# Patient Record
Sex: MALE | Race: WHITE | NOT HISPANIC OR LATINO | Employment: OTHER | URBAN - METROPOLITAN AREA
[De-identification: names, ages, dates, MRNs, and addresses within clinical notes are randomized per-mention and may not be internally consistent; named-entity substitution may affect disease eponyms.]

---

## 2019-12-16 ENCOUNTER — OFFICE VISIT (OUTPATIENT)
Dept: FAMILY MEDICINE | Facility: CLINIC | Age: 69
End: 2019-12-16
Payer: MEDICARE

## 2019-12-16 VITALS
BODY MASS INDEX: 36.48 KG/M2 | TEMPERATURE: 97 F | OXYGEN SATURATION: 96 % | WEIGHT: 218.94 LBS | DIASTOLIC BLOOD PRESSURE: 62 MMHG | HEIGHT: 65 IN | HEART RATE: 74 BPM | SYSTOLIC BLOOD PRESSURE: 130 MMHG

## 2019-12-16 DIAGNOSIS — I10 ESSENTIAL HYPERTENSION: Primary | ICD-10-CM

## 2019-12-16 DIAGNOSIS — K92.2 UPPER GI BLEED: ICD-10-CM

## 2019-12-16 DIAGNOSIS — E66.01 SEVERE OBESITY (BMI 35.0-39.9) WITH COMORBIDITY: ICD-10-CM

## 2019-12-16 DIAGNOSIS — E78.1 HYPERTRIGLYCERIDEMIA: ICD-10-CM

## 2019-12-16 DIAGNOSIS — E78.5 HYPERLIPIDEMIA, UNSPECIFIED HYPERLIPIDEMIA TYPE: ICD-10-CM

## 2019-12-16 PROCEDURE — 99204 OFFICE O/P NEW MOD 45 MIN: CPT | Mod: S$GLB,,, | Performed by: FAMILY MEDICINE

## 2019-12-16 PROCEDURE — 99999 PR PBB SHADOW E&M-NEW PATIENT-LVL III: ICD-10-PCS | Mod: PBBFAC,,, | Performed by: FAMILY MEDICINE

## 2019-12-16 PROCEDURE — 1159F PR MEDICATION LIST DOCUMENTED IN MEDICAL RECORD: ICD-10-PCS | Mod: S$GLB,,, | Performed by: FAMILY MEDICINE

## 2019-12-16 PROCEDURE — 1159F MED LIST DOCD IN RCRD: CPT | Mod: S$GLB,,, | Performed by: FAMILY MEDICINE

## 2019-12-16 PROCEDURE — 1126F AMNT PAIN NOTED NONE PRSNT: CPT | Mod: S$GLB,,, | Performed by: FAMILY MEDICINE

## 2019-12-16 PROCEDURE — 99204 PR OFFICE/OUTPT VISIT, NEW, LEVL IV, 45-59 MIN: ICD-10-PCS | Mod: S$GLB,,, | Performed by: FAMILY MEDICINE

## 2019-12-16 PROCEDURE — 1101F PT FALLS ASSESS-DOCD LE1/YR: CPT | Mod: S$GLB,,, | Performed by: FAMILY MEDICINE

## 2019-12-16 PROCEDURE — 99499 UNLISTED E&M SERVICE: CPT | Mod: S$GLB,,, | Performed by: FAMILY MEDICINE

## 2019-12-16 PROCEDURE — 1101F PR PT FALLS ASSESS DOC 0-1 FALLS W/OUT INJ PAST YR: ICD-10-PCS | Mod: S$GLB,,, | Performed by: FAMILY MEDICINE

## 2019-12-16 PROCEDURE — 99499 RISK ADDL DX/OHS AUDIT: ICD-10-PCS | Mod: S$GLB,,, | Performed by: FAMILY MEDICINE

## 2019-12-16 PROCEDURE — 99999 PR PBB SHADOW E&M-NEW PATIENT-LVL III: CPT | Mod: PBBFAC,,, | Performed by: FAMILY MEDICINE

## 2019-12-16 PROCEDURE — 1126F PR PAIN SEVERITY QUANTIFIED, NO PAIN PRESENT: ICD-10-PCS | Mod: S$GLB,,, | Performed by: FAMILY MEDICINE

## 2019-12-16 RX ORDER — DICYCLOMINE HYDROCHLORIDE 20 MG/1
TABLET ORAL
Status: ON HOLD | COMMUNITY
Start: 2019-08-28 | End: 2024-01-05 | Stop reason: HOSPADM

## 2019-12-16 RX ORDER — PANTOPRAZOLE SODIUM 40 MG/1
40 TABLET, DELAYED RELEASE ORAL DAILY
Refills: 3 | COMMUNITY
Start: 2019-09-24 | End: 2019-12-16

## 2019-12-16 RX ORDER — ATORVASTATIN CALCIUM 20 MG/1
20 TABLET, FILM COATED ORAL DAILY
Qty: 90 TABLET | Refills: 3 | Status: SHIPPED | OUTPATIENT
Start: 2019-12-16 | End: 2020-11-18 | Stop reason: SDUPTHER

## 2019-12-16 RX ORDER — FENOFIBRATE 160 MG/1
160 TABLET ORAL
COMMUNITY
Start: 2019-07-10 | End: 2019-12-16 | Stop reason: SDUPTHER

## 2019-12-16 RX ORDER — AMLODIPINE AND BENAZEPRIL HYDROCHLORIDE 10; 40 MG/1; MG/1
1 CAPSULE ORAL DAILY
Refills: 1 | COMMUNITY
Start: 2019-10-27 | End: 2020-04-21 | Stop reason: SDUPTHER

## 2019-12-16 RX ORDER — ATORVASTATIN CALCIUM 20 MG/1
20 TABLET, FILM COATED ORAL DAILY
Refills: 5 | COMMUNITY
Start: 2019-11-30 | End: 2019-12-16 | Stop reason: SDUPTHER

## 2019-12-16 RX ORDER — FENOFIBRATE 160 MG/1
160 TABLET ORAL DAILY
Qty: 90 TABLET | Refills: 3 | Status: SHIPPED | OUTPATIENT
Start: 2019-12-16 | End: 2020-11-27

## 2019-12-16 RX ORDER — METOPROLOL SUCCINATE 50 MG/1
50 TABLET, EXTENDED RELEASE ORAL DAILY
Refills: 1 | COMMUNITY
Start: 2019-10-27 | End: 2020-03-27 | Stop reason: SDUPTHER

## 2019-12-16 NOTE — PROGRESS NOTES
Subjective:       Patient ID: Quintin Ling is a 69 y.o. male.    Chief Complaint: No chief complaint on file.      HPI Comments:       Current Outpatient Medications:     dicyclomine (BENTYL) 20 mg tablet, TAKE 1 TABLET BY MOUTH THREE TIMES A DAY FOR 7 DAYS, Disp: , Rfl:     fenofibrate 160 MG Tab, Take 1 tablet (160 mg total) by mouth once daily., Disp: 90 tablet, Rfl: 3    amlodipine-benazepril (LOTREL) 10-40 mg per capsule, Take 1 capsule by mouth once daily., Disp: , Rfl: 1    atorvastatin (LIPITOR) 20 MG tablet, Take 1 tablet (20 mg total) by mouth once daily., Disp: 90 tablet, Rfl: 3    metoprolol succinate (TOPROL-XL) 50 MG 24 hr tablet, Take 50 mg by mouth once daily., Disp: , Rfl: 1      Needs a new PCP.  Previous:  Dr. Tripathi.    Hypertension, hyperlipidemia/hypertriglyceridemia, recent bleeding duodenal ulcer.  Recently completed a course of PPI, which was discontinued by GI.  Has something on his liver that they are going to look at with an ultrasound.  Followed by Baudilio Brown.:  Guarded negative this year.  Never had a colonoscopy.    Past medical history:  Tonsillectomy, hypertension, hyperlipidemia, hypertriglyceridemia, bleeding peptic ulcer disease. No other hospitalizations or surgeries.    Medications Lotrel 10/40, Toprol 50 mg, atorvastatin 20 mg, fenofibrate 160 mg    Social:  .  Two grown children.  Nonsmoker.  Quit drinking with his ulcer.  Retired     Lab results from 09/19 reviewed and significant only for anemia    Family history:  Mother with MI, dad with Alzheimer's, no cancer or diabetes.    Review of Systems   Constitutional: Negative for activity change, appetite change and fever.   HENT: Negative for sore throat.    Respiratory: Negative for cough and shortness of breath.    Cardiovascular: Negative for chest pain.   Gastrointestinal: Negative for abdominal pain, anal bleeding, blood in stool, diarrhea and nausea.   Genitourinary: Negative for difficulty  "urinating.   Musculoskeletal: Negative for arthralgias and myalgias.   Neurological: Negative for dizziness and headaches.       Objective:      Vitals:    12/16/19 0930   BP: 130/62   Pulse: 74   Temp: 96.9 °F (36.1 °C)   TempSrc: Tympanic   SpO2: 96%   Weight: 99.3 kg (218 lb 14.7 oz)   Height: 5' 5" (1.651 m)   PainSc: 0-No pain     Physical Exam   Constitutional: He is oriented to person, place, and time. He appears well-developed and well-nourished. No distress.   HENT:   Head: Normocephalic.   Neck: Neck supple. No thyromegaly present.   Cardiovascular: Normal rate, regular rhythm and normal heart sounds.   No murmur heard.  Pulmonary/Chest: Effort normal and breath sounds normal. He has no wheezes. He has no rales.   Abdominal: Soft. He exhibits no distension and no mass. There is no hepatosplenomegaly. There is no tenderness.   Musculoskeletal: He exhibits no edema.   Large, nontender, abdominal diastasis   Lymphadenopathy:     He has no cervical adenopathy.   Neurological: He is alert and oriented to person, place, and time.   Skin: Skin is warm and dry. He is not diaphoretic.   Psychiatric: He has a normal mood and affect. His behavior is normal. Judgment and thought content normal.   Nursing note and vitals reviewed.      Assessment:       1. Essential hypertension    2. Hyperlipidemia, unspecified hyperlipidemia type    3. Hypertriglyceridemia    4. Upper GI bleed    5. Severe obesity (BMI 35.0-39.9) with comorbidity        Plan:   Essential hypertension  Comments:  Continue current medications.  Get previous PCP records.  Follow-up 3 months    Hyperlipidemia, unspecified hyperlipidemia type  Comments:  Moderately well controlled on most recent lipid profile    Hypertriglyceridemia  Comments:  Controlled on medication    Upper GI bleed  Comments:  Resolved.  Chronic duodenal ulcer.  Completed PPI therapy.  Followed by GI.  Liver lesion being further evaluated    Severe obesity (BMI 35.0-39.9) with " comorbidity  Comments:  Patient is working on his diet    Other orders  -     atorvastatin (LIPITOR) 20 MG tablet; Take 1 tablet (20 mg total) by mouth once daily.  Dispense: 90 tablet; Refill: 3  -     fenofibrate 160 MG Tab; Take 1 tablet (160 mg total) by mouth once daily.  Dispense: 90 tablet; Refill: 3

## 2019-12-16 NOTE — PROGRESS NOTES
Patient, Quintin Ling (MRN #[id number]), presented with a recorded BMI of 36.43 kg/m^2 and a documented comorbidity(s):  - Hypertension  - Hyperlipidemia  to which the severe obesity is a contributing factor. This is consistent with the definition of severe obesity (BMI 35.0-39.9) with comorbidity (ICD-10 E66.01, Z68.35). The patient's severe obesity was monitored, evaluated, addressed and/or treated. This addendum to the medical record is made on 12/16/2019.

## 2020-03-27 RX ORDER — METOPROLOL SUCCINATE 50 MG/1
50 TABLET, EXTENDED RELEASE ORAL DAILY
Qty: 90 TABLET | Refills: 1 | Status: SHIPPED | OUTPATIENT
Start: 2020-03-27 | End: 2020-04-21 | Stop reason: SDUPTHER

## 2020-04-21 ENCOUNTER — OFFICE VISIT (OUTPATIENT)
Dept: FAMILY MEDICINE | Facility: CLINIC | Age: 70
End: 2020-04-21
Payer: MEDICARE

## 2020-04-21 VITALS
HEIGHT: 65 IN | TEMPERATURE: 98 F | HEART RATE: 81 BPM | BODY MASS INDEX: 37.76 KG/M2 | OXYGEN SATURATION: 95 % | WEIGHT: 226.63 LBS

## 2020-04-21 DIAGNOSIS — Z13.6 ENCOUNTER FOR ABDOMINAL AORTIC ANEURYSM (AAA) SCREENING: ICD-10-CM

## 2020-04-21 DIAGNOSIS — E66.01 SEVERE OBESITY (BMI 35.0-39.9) WITH COMORBIDITY: ICD-10-CM

## 2020-04-21 DIAGNOSIS — E78.1 HYPERTRIGLYCERIDEMIA: ICD-10-CM

## 2020-04-21 DIAGNOSIS — Z23 NEED FOR VACCINATION: ICD-10-CM

## 2020-04-21 DIAGNOSIS — I10 ESSENTIAL HYPERTENSION: Primary | ICD-10-CM

## 2020-04-21 DIAGNOSIS — K92.2 UPPER GI BLEED: ICD-10-CM

## 2020-04-21 DIAGNOSIS — E78.5 HYPERLIPIDEMIA, UNSPECIFIED HYPERLIPIDEMIA TYPE: ICD-10-CM

## 2020-04-21 DIAGNOSIS — Z12.11 SCREENING FOR COLON CANCER: ICD-10-CM

## 2020-04-21 PROCEDURE — 90715 TDAP VACCINE GREATER THAN OR EQUAL TO 7YO IM: ICD-10-PCS | Mod: S$GLB,,, | Performed by: FAMILY MEDICINE

## 2020-04-21 PROCEDURE — 99999 PR PBB SHADOW E&M-EST. PATIENT-LVL III: ICD-10-PCS | Mod: PBBFAC,,, | Performed by: FAMILY MEDICINE

## 2020-04-21 PROCEDURE — 1126F AMNT PAIN NOTED NONE PRSNT: CPT | Mod: S$GLB,,, | Performed by: FAMILY MEDICINE

## 2020-04-21 PROCEDURE — 99214 PR OFFICE/OUTPT VISIT, EST, LEVL IV, 30-39 MIN: ICD-10-PCS | Mod: 25,S$GLB,, | Performed by: FAMILY MEDICINE

## 2020-04-21 PROCEDURE — 1159F PR MEDICATION LIST DOCUMENTED IN MEDICAL RECORD: ICD-10-PCS | Mod: S$GLB,,, | Performed by: FAMILY MEDICINE

## 2020-04-21 PROCEDURE — 1159F MED LIST DOCD IN RCRD: CPT | Mod: S$GLB,,, | Performed by: FAMILY MEDICINE

## 2020-04-21 PROCEDURE — 1101F PT FALLS ASSESS-DOCD LE1/YR: CPT | Mod: S$GLB,,, | Performed by: FAMILY MEDICINE

## 2020-04-21 PROCEDURE — 1101F PR PT FALLS ASSESS DOC 0-1 FALLS W/OUT INJ PAST YR: ICD-10-PCS | Mod: S$GLB,,, | Performed by: FAMILY MEDICINE

## 2020-04-21 PROCEDURE — 90471 TDAP VACCINE GREATER THAN OR EQUAL TO 7YO IM: ICD-10-PCS | Mod: S$GLB,,, | Performed by: FAMILY MEDICINE

## 2020-04-21 PROCEDURE — 90715 TDAP VACCINE 7 YRS/> IM: CPT | Mod: S$GLB,,, | Performed by: FAMILY MEDICINE

## 2020-04-21 PROCEDURE — 1126F PR PAIN SEVERITY QUANTIFIED, NO PAIN PRESENT: ICD-10-PCS | Mod: S$GLB,,, | Performed by: FAMILY MEDICINE

## 2020-04-21 PROCEDURE — 90471 IMMUNIZATION ADMIN: CPT | Mod: S$GLB,,, | Performed by: FAMILY MEDICINE

## 2020-04-21 PROCEDURE — 99214 OFFICE O/P EST MOD 30 MIN: CPT | Mod: 25,S$GLB,, | Performed by: FAMILY MEDICINE

## 2020-04-21 PROCEDURE — 99999 PR PBB SHADOW E&M-EST. PATIENT-LVL III: CPT | Mod: PBBFAC,,, | Performed by: FAMILY MEDICINE

## 2020-04-21 RX ORDER — AMLODIPINE AND BENAZEPRIL HYDROCHLORIDE 10; 40 MG/1; MG/1
1 CAPSULE ORAL DAILY
Qty: 90 CAPSULE | Refills: 1 | Status: SHIPPED | OUTPATIENT
Start: 2020-04-21 | End: 2020-10-16

## 2020-04-21 RX ORDER — METOPROLOL SUCCINATE 100 MG/1
100 TABLET, EXTENDED RELEASE ORAL DAILY
Qty: 90 TABLET | Refills: 1 | Status: SHIPPED | OUTPATIENT
Start: 2020-04-21 | End: 2020-09-21

## 2020-04-21 NOTE — PROGRESS NOTES
Subjective:       Patient ID: Quintin Ling is a 69 y.o. male.    Chief Complaint: No chief complaint on file.      HPI Comments:       Current Outpatient Medications:     amLODIPine-benazepriL (LOTREL) 10-40 mg per capsule, Take 1 capsule by mouth once daily., Disp: 90 capsule, Rfl: 1    atorvastatin (LIPITOR) 20 MG tablet, Take 1 tablet (20 mg total) by mouth once daily., Disp: 90 tablet, Rfl: 3    dicyclomine (BENTYL) 20 mg tablet, TAKE 1 TABLET BY MOUTH THREE TIMES A DAY FOR 7 DAYS, Disp: , Rfl:     fenofibrate 160 MG Tab, Take 1 tablet (160 mg total) by mouth once daily., Disp: 90 tablet, Rfl: 3    metoprolol succinate (TOPROL-XL) 100 MG 24 hr tablet, Take 1 tablet (100 mg total) by mouth once daily., Disp: 90 tablet, Rfl: 1      Four-month follow-up after our initial, [facility], visit in December.    He has been taking his blood pressure medicines every day and has not run out.  Says his readings at home are high, with systolic generally greater than 150.    He would like to do a wellness visit at our next visit.    Dizzy had his liver further evaluated by GI.  Ultrasound showed that everything was okay.  No details.    Today we talked about AAA screening.  He smoked for at least a decade, 1 pack per day, quit over 20 years ago.  He is interested in screening will talk loses or company about co-pay.    He has never had a colonoscopy.  He had a stool test done last year that was normal.  This was the 1st time.  He is willing to have a colonoscopy done however    Review of Systems   Constitutional: Negative for activity change, appetite change and fever.   HENT: Negative for sore throat.    Respiratory: Negative for cough and shortness of breath.    Cardiovascular: Negative for chest pain.   Gastrointestinal: Negative for abdominal pain, diarrhea and nausea.   Genitourinary: Negative for difficulty urinating.   Musculoskeletal: Negative for arthralgias and myalgias.   Neurological: Negative for  "dizziness and headaches.       Objective:      Vitals:    04/21/20 0858   Pulse: 81   Temp: 97.9 °F (36.6 °C)   SpO2: 95%   Weight: 102.8 kg (226 lb 10.1 oz)   Height: 5' 5" (1.651 m)   PainSc: 0-No pain     Physical Exam   Constitutional: He is oriented to person, place, and time. He appears well-developed and well-nourished. No distress.   HENT:   Head: Normocephalic.   Neck: Neck supple. No thyromegaly present.   Cardiovascular: Normal rate and regular rhythm.   Murmur heard.   Systolic murmur is present with a grade of 2/6.  Pulmonary/Chest: Effort normal and breath sounds normal. He has no wheezes. He has no rales.   Abdominal: Soft. He exhibits no distension.   Musculoskeletal: He exhibits no edema.   Lymphadenopathy:     He has no cervical adenopathy.   Neurological: He is alert and oriented to person, place, and time.   Skin: Skin is warm and dry. He is not diaphoretic.   Psychiatric: He has a normal mood and affect. His behavior is normal. Judgment and thought content normal.   Nursing note and vitals reviewed.      Assessment:       1. Essential hypertension    2. Hyperlipidemia, unspecified hyperlipidemia type    3. Hypertriglyceridemia    4. Upper GI bleed    5. Screening for colon cancer    6. Severe obesity (BMI 35.0-39.9) with comorbidity    7. Need for vaccination    8. Encounter for abdominal aortic aneurysm (AAA) screening        Plan:   Essential hypertension  Comments:  Uncontrolled.  Increase metoprolol to 100 mg.  Nurse visit blood pressure check in 2 weeks    Hyperlipidemia, unspecified hyperlipidemia type  Comments:  Taking statin.  Will check lipids wellness visit this summer    Hypertriglyceridemia  Comments:  On medication    Upper GI bleed  Comments:  No current problems    Screening for colon cancer  Comments:  Initial screening colonoscopy ordered  Orders:  -     Case request GI: COLONOSCOPY    Severe obesity (BMI 35.0-39.9) with comorbidity  Comments:  His increased weight by several " lb    Need for vaccination  Comments:  Tdap today  Orders:  -     Tdap Vaccine    Encounter for abdominal aortic aneurysm (AAA) screening  Comments:  Patient will look into details on co-payment, and let me know    Other orders  -     amLODIPine-benazepriL (LOTREL) 10-40 mg per capsule; Take 1 capsule by mouth once daily.  Dispense: 90 capsule; Refill: 1  -     metoprolol succinate (TOPROL-XL) 100 MG 24 hr tablet; Take 1 tablet (100 mg total) by mouth once daily.  Dispense: 90 tablet; Refill: 1

## 2020-05-05 ENCOUNTER — CLINICAL SUPPORT (OUTPATIENT)
Dept: FAMILY MEDICINE | Facility: CLINIC | Age: 70
End: 2020-05-05
Payer: MEDICARE

## 2020-05-05 NOTE — Clinical Note
Pt came in today for bp check. Pt bp at 8:20 was 179/74 pulse 66 , pt sat for 15 mins and bp was 143/74 pulse 60. Pt did take his medication this morning.

## 2020-06-02 ENCOUNTER — TELEPHONE (OUTPATIENT)
Dept: ENDOSCOPY | Facility: HOSPITAL | Age: 70
End: 2020-06-02

## 2020-06-11 ENCOUNTER — TELEPHONE (OUTPATIENT)
Dept: FAMILY MEDICINE | Facility: CLINIC | Age: 70
End: 2020-06-11

## 2020-06-11 NOTE — TELEPHONE ENCOUNTER
S/w Estela. She stated that pt is requesting rx refill for compound medication for sildenafil joanna 100mg. She stated that the last doctor to prescribe this medication was Dr. Merlin Tripathi in June of 2019. rx is for sildenafil joanna 100mg, dissolve 1 joanna 30 mins prior to intercourse, not to exceed 1 joanna in 24 hour period. Told Estela that I would send information to Dr. Gabriel

## 2020-06-11 NOTE — TELEPHONE ENCOUNTER
Received fax refill request from [facility]. Dr. Gabriel approved rx refill with 3 refills. Faxed refill to [facility] at [phone]

## 2020-06-11 NOTE — TELEPHONE ENCOUNTER
----- Message from Ly Carlson sent at 6/11/2020 12:41 PM CDT -----  Contact: Estela Palmer from [facility] needs authorization for sildenafil joanna    Please reach out to [facility] at [phone] Fax [phone]

## 2020-07-21 ENCOUNTER — OFFICE VISIT (OUTPATIENT)
Dept: FAMILY MEDICINE | Facility: CLINIC | Age: 70
End: 2020-07-21
Payer: MEDICARE

## 2020-07-21 ENCOUNTER — LAB VISIT (OUTPATIENT)
Dept: LAB | Facility: HOSPITAL | Age: 70
End: 2020-07-21
Attending: FAMILY MEDICINE
Payer: MEDICARE

## 2020-07-21 VITALS
OXYGEN SATURATION: 95 % | WEIGHT: 225.88 LBS | HEIGHT: 65 IN | DIASTOLIC BLOOD PRESSURE: 76 MMHG | BODY MASS INDEX: 37.63 KG/M2 | SYSTOLIC BLOOD PRESSURE: 138 MMHG | TEMPERATURE: 99 F | HEART RATE: 74 BPM

## 2020-07-21 DIAGNOSIS — E66.01 SEVERE OBESITY (BMI 35.0-39.9) WITH COMORBIDITY: ICD-10-CM

## 2020-07-21 DIAGNOSIS — Z00.00 ANNUAL PHYSICAL EXAM: Primary | ICD-10-CM

## 2020-07-21 DIAGNOSIS — I10 ESSENTIAL HYPERTENSION: ICD-10-CM

## 2020-07-21 DIAGNOSIS — Z72.0 TOBACCO ABUSE: ICD-10-CM

## 2020-07-21 DIAGNOSIS — Z13.6 SCREENING FOR ISCHEMIC HEART DISEASE: ICD-10-CM

## 2020-07-21 DIAGNOSIS — E78.5 HYPERLIPIDEMIA, UNSPECIFIED HYPERLIPIDEMIA TYPE: ICD-10-CM

## 2020-07-21 DIAGNOSIS — Z12.11 SCREENING FOR COLON CANCER: ICD-10-CM

## 2020-07-21 DIAGNOSIS — Z13.6 SCREENING FOR AAA (ABDOMINAL AORTIC ANEURYSM): ICD-10-CM

## 2020-07-21 LAB
ALBUMIN SERPL BCP-MCNC: 4 G/DL (ref 3.5–5.2)
ALP SERPL-CCNC: 50 U/L (ref 55–135)
ALT SERPL W/O P-5'-P-CCNC: 19 U/L (ref 10–44)
ANION GAP SERPL CALC-SCNC: 6 MMOL/L (ref 8–16)
AST SERPL-CCNC: 17 U/L (ref 10–40)
BASOPHILS # BLD AUTO: 0.02 K/UL (ref 0–0.2)
BASOPHILS NFR BLD: 0.3 % (ref 0–1.9)
BILIRUB SERPL-MCNC: 0.4 MG/DL (ref 0.1–1)
BUN SERPL-MCNC: 29 MG/DL (ref 8–23)
CALCIUM SERPL-MCNC: 9.2 MG/DL (ref 8.7–10.5)
CHLORIDE SERPL-SCNC: 107 MMOL/L (ref 95–110)
CO2 SERPL-SCNC: 26 MMOL/L (ref 23–29)
CREAT SERPL-MCNC: 1.3 MG/DL (ref 0.5–1.4)
DIFFERENTIAL METHOD: ABNORMAL
EOSINOPHIL # BLD AUTO: 0.1 K/UL (ref 0–0.5)
EOSINOPHIL NFR BLD: 2.1 % (ref 0–8)
ERYTHROCYTE [DISTWIDTH] IN BLOOD BY AUTOMATED COUNT: 13.2 % (ref 11.5–14.5)
EST. GFR  (AFRICAN AMERICAN): >60 ML/MIN/1.73 M^2
EST. GFR  (NON AFRICAN AMERICAN): 56.1 ML/MIN/1.73 M^2
GLUCOSE SERPL-MCNC: 106 MG/DL (ref 70–110)
HCT VFR BLD AUTO: 41.8 % (ref 40–54)
HGB BLD-MCNC: 12.9 G/DL (ref 14–18)
IMM GRANULOCYTES # BLD AUTO: 0.01 K/UL (ref 0–0.04)
IMM GRANULOCYTES NFR BLD AUTO: 0.2 % (ref 0–0.5)
LYMPHOCYTES # BLD AUTO: 0.9 K/UL (ref 1–4.8)
LYMPHOCYTES NFR BLD: 15.2 % (ref 18–48)
MCH RBC QN AUTO: 27.9 PG (ref 27–31)
MCHC RBC AUTO-ENTMCNC: 30.9 G/DL (ref 32–36)
MCV RBC AUTO: 90 FL (ref 82–98)
MONOCYTES # BLD AUTO: 0.6 K/UL (ref 0.3–1)
MONOCYTES NFR BLD: 10.2 % (ref 4–15)
NEUTROPHILS # BLD AUTO: 4.2 K/UL (ref 1.8–7.7)
NEUTROPHILS NFR BLD: 72 % (ref 38–73)
NRBC BLD-RTO: 0 /100 WBC
PLATELET # BLD AUTO: 235 K/UL (ref 150–350)
PMV BLD AUTO: 11.3 FL (ref 9.2–12.9)
POTASSIUM SERPL-SCNC: 5.2 MMOL/L (ref 3.5–5.1)
PROT SERPL-MCNC: 7.6 G/DL (ref 6–8.4)
RBC # BLD AUTO: 4.63 M/UL (ref 4.6–6.2)
SODIUM SERPL-SCNC: 139 MMOL/L (ref 136–145)
TSH SERPL DL<=0.005 MIU/L-ACNC: 1.29 UIU/ML (ref 0.4–4)
WBC # BLD AUTO: 5.8 K/UL (ref 3.9–12.7)

## 2020-07-21 PROCEDURE — 99397 PR PREVENTIVE VISIT,EST,65 & OVER: ICD-10-PCS | Mod: S$GLB,,, | Performed by: FAMILY MEDICINE

## 2020-07-21 PROCEDURE — 99397 PER PM REEVAL EST PAT 65+ YR: CPT | Mod: S$GLB,,, | Performed by: FAMILY MEDICINE

## 2020-07-21 PROCEDURE — 36415 COLL VENOUS BLD VENIPUNCTURE: CPT | Mod: PO

## 2020-07-21 PROCEDURE — 3078F PR MOST RECENT DIASTOLIC BLOOD PRESSURE < 80 MM HG: ICD-10-PCS | Mod: S$GLB,,, | Performed by: FAMILY MEDICINE

## 2020-07-21 PROCEDURE — 83036 HEMOGLOBIN GLYCOSYLATED A1C: CPT

## 2020-07-21 PROCEDURE — 3078F DIAST BP <80 MM HG: CPT | Mod: S$GLB,,, | Performed by: FAMILY MEDICINE

## 2020-07-21 PROCEDURE — 80061 LIPID PANEL: CPT

## 2020-07-21 PROCEDURE — 85025 COMPLETE CBC W/AUTO DIFF WBC: CPT

## 2020-07-21 PROCEDURE — 84443 ASSAY THYROID STIM HORMONE: CPT

## 2020-07-21 PROCEDURE — 80053 COMPREHEN METABOLIC PANEL: CPT

## 2020-07-21 PROCEDURE — 3075F SYST BP GE 130 - 139MM HG: CPT | Mod: S$GLB,,, | Performed by: FAMILY MEDICINE

## 2020-07-21 PROCEDURE — 99999 PR PBB SHADOW E&M-EST. PATIENT-LVL IV: ICD-10-PCS | Mod: PBBFAC,,, | Performed by: FAMILY MEDICINE

## 2020-07-21 PROCEDURE — 3075F PR MOST RECENT SYSTOLIC BLOOD PRESS GE 130-139MM HG: ICD-10-PCS | Mod: S$GLB,,, | Performed by: FAMILY MEDICINE

## 2020-07-21 PROCEDURE — 99999 PR PBB SHADOW E&M-EST. PATIENT-LVL IV: CPT | Mod: PBBFAC,,, | Performed by: FAMILY MEDICINE

## 2020-07-21 RX ORDER — DOXAZOSIN 2 MG/1
2 TABLET ORAL NIGHTLY
Qty: 30 TABLET | Refills: 1 | Status: SHIPPED | OUTPATIENT
Start: 2020-07-21 | End: 2020-08-13

## 2020-07-21 RX ORDER — SILDENAFIL 100 MG/1
100 TABLET, FILM COATED ORAL DAILY PRN
Qty: 10 TABLET | Refills: 1 | Status: ON HOLD | OUTPATIENT
Start: 2020-07-21 | End: 2024-01-05 | Stop reason: HOSPADM

## 2020-07-21 NOTE — PROGRESS NOTES
Subjective:       Patient ID: Quintin Ling is a 70 y.o. male.    Chief Complaint: Follow-up (3 month)      HPI Comments:       Current Outpatient Medications:     amLODIPine-benazepriL (LOTREL) 10-40 mg per capsule, Take 1 capsule by mouth once daily., Disp: 90 capsule, Rfl: 1    atorvastatin (LIPITOR) 20 MG tablet, Take 1 tablet (20 mg total) by mouth once daily., Disp: 90 tablet, Rfl: 3    dicyclomine (BENTYL) 20 mg tablet, TAKE 1 TABLET BY MOUTH THREE TIMES A DAY FOR 7 DAYS, Disp: , Rfl:     fenofibrate 160 MG Tab, Take 1 tablet (160 mg total) by mouth once daily., Disp: 90 tablet, Rfl: 3    metoprolol succinate (TOPROL-XL) 100 MG 24 hr tablet, Take 1 tablet (100 mg total) by mouth once daily., Disp: 90 tablet, Rfl: 1    doxazosin (CARDURA) 2 MG tablet, Take 1 tablet (2 mg total) by mouth every evening., Disp: 30 tablet, Rfl: 1    sildenafiL (VIAGRA) 100 MG tablet, Take 1 tablet (100 mg total) by mouth daily as needed for Erectile Dysfunction., Disp: 10 tablet, Rfl: 1      Blood pressure still running high at home and here when checked.  Wants a refill on the sildenafil troches.  I have a hard time finding does so a were going to switch to tablets and 100 mg.  He is going to shop them through [facility].    Wants to postpone the colonoscopy because of some problems with health that his wife is having.  This was ordered last time but he has not heard from them..  Also his insurance company wants me to order the AAA screen in order for them to cost it out for him     Gets up twice at night.  Stable pattern    Review of Systems   Constitutional: Negative for activity change, appetite change and fever.   HENT: Negative for sore throat.    Respiratory: Negative for cough and shortness of breath.    Cardiovascular: Negative for chest pain.   Gastrointestinal: Negative for abdominal pain, diarrhea and nausea.   Genitourinary: Negative for difficulty urinating.   Musculoskeletal: Negative for arthralgias and  "myalgias.   Neurological: Negative for dizziness and headaches.       Objective:      Vitals:    07/21/20 0848   BP: 138/76   Pulse: 74   Temp: 98.8 °F (37.1 °C)   TempSrc: Temporal   SpO2: 95%   Weight: 102.4 kg (225 lb 13.8 oz)   Height: 5' 5" (1.651 m)   PainSc: 0-No pain     Physical Exam  Vitals signs and nursing note reviewed.   Constitutional:       General: He is not in acute distress.     Appearance: He is well-developed. He is not diaphoretic.   HENT:      Head: Normocephalic.   Neck:      Musculoskeletal: Neck supple.      Thyroid: No thyromegaly.   Cardiovascular:      Rate and Rhythm: Normal rate and regular rhythm.      Heart sounds: Normal heart sounds. No murmur.   Pulmonary:      Effort: Pulmonary effort is normal.      Breath sounds: Normal breath sounds. No wheezing or rales.   Abdominal:      General: There is no distension.      Palpations: Abdomen is soft.   Lymphadenopathy:      Cervical: No cervical adenopathy.   Skin:     General: Skin is warm and dry.   Neurological:      Mental Status: He is alert and oriented to person, place, and time.   Psychiatric:         Behavior: Behavior normal.         Thought Content: Thought content normal.         Judgment: Judgment normal.         Assessment:       1. Annual physical exam    2. Essential hypertension    3. Hyperlipidemia, unspecified hyperlipidemia type    4. Screening for colon cancer    5. Severe obesity (BMI 35.0-39.9) with comorbidity    6. Screening for ischemic heart disease    7. Screening for AAA (abdominal aortic aneurysm)    8. Tobacco abuse        Plan:   Annual physical exam    Essential hypertension  Comments:  Uncontrolled.  Add Cardura 2 mg daily.  Follow-up 3 months.  Cautioned about orthostatic hypotension initially  Orders:  -     CBC auto differential; Future; Expected date: 07/21/2020  -     Comprehensive metabolic panel; Future; Expected date: 07/21/2020  -     TSH; Future; Expected date: 07/21/2020    Hyperlipidemia, " unspecified hyperlipidemia type  Comments:  Fasting lipid profile    Screening for colon cancer  Comments:  Patient wants to postpone colonoscopy until later in the year or next year    Severe obesity (BMI 35.0-39.9) with comorbidity  -     Hemoglobin A1C; Future; Expected date: 07/21/2020    Screening for ischemic heart disease  -     Lipid Panel; Future; Expected date: 07/21/2020    Screening for AAA (abdominal aortic aneurysm)  Comments:  Ultrasound ordered  Orders:  -     US Abdominal Aorta; Future; Expected date: 07/21/2020    Tobacco abuse  Comments:  Former smoker.  qualifies for AAA screen  Orders:  -     US Abdominal Aorta; Future; Expected date: 07/21/2020    Other orders  -     sildenafiL (VIAGRA) 100 MG tablet; Take 1 tablet (100 mg total) by mouth daily as needed for Erectile Dysfunction.  Dispense: 10 tablet; Refill: 1  -     doxazosin (CARDURA) 2 MG tablet; Take 1 tablet (2 mg total) by mouth every evening.  Dispense: 30 tablet; Refill: 1

## 2020-07-22 LAB
CHOLEST SERPL-MCNC: 154 MG/DL (ref 120–199)
CHOLEST/HDLC SERPL: 4.3 {RATIO} (ref 2–5)
ESTIMATED AVG GLUCOSE: 120 MG/DL (ref 68–131)
HBA1C MFR BLD HPLC: 5.8 % (ref 4–5.6)
HDLC SERPL-MCNC: 36 MG/DL (ref 40–75)
HDLC SERPL: 23.4 % (ref 20–50)
LDLC SERPL CALC-MCNC: 103.2 MG/DL (ref 63–159)
NONHDLC SERPL-MCNC: 118 MG/DL
TRIGL SERPL-MCNC: 74 MG/DL (ref 30–150)

## 2020-08-18 ENCOUNTER — OFFICE VISIT (OUTPATIENT)
Dept: FAMILY MEDICINE | Facility: CLINIC | Age: 70
End: 2020-08-18
Payer: MEDICARE

## 2020-08-18 ENCOUNTER — LAB VISIT (OUTPATIENT)
Dept: LAB | Facility: HOSPITAL | Age: 70
End: 2020-08-18
Attending: FAMILY MEDICINE
Payer: MEDICARE

## 2020-08-18 VITALS
HEART RATE: 76 BPM | TEMPERATURE: 98 F | WEIGHT: 226.63 LBS | BODY MASS INDEX: 37.71 KG/M2 | DIASTOLIC BLOOD PRESSURE: 67 MMHG | OXYGEN SATURATION: 96 % | SYSTOLIC BLOOD PRESSURE: 154 MMHG

## 2020-08-18 DIAGNOSIS — R73.03 PREDIABETES: ICD-10-CM

## 2020-08-18 DIAGNOSIS — D64.9 ANEMIA, UNSPECIFIED TYPE: Primary | ICD-10-CM

## 2020-08-18 DIAGNOSIS — E87.5 HYPERKALEMIA: ICD-10-CM

## 2020-08-18 DIAGNOSIS — Z13.6 SCREENING FOR AAA (ABDOMINAL AORTIC ANEURYSM): ICD-10-CM

## 2020-08-18 DIAGNOSIS — I10 ESSENTIAL HYPERTENSION: ICD-10-CM

## 2020-08-18 DIAGNOSIS — K92.2 UPPER GI BLEED: ICD-10-CM

## 2020-08-18 PROCEDURE — 36415 COLL VENOUS BLD VENIPUNCTURE: CPT | Mod: PO

## 2020-08-18 PROCEDURE — 99214 OFFICE O/P EST MOD 30 MIN: CPT | Mod: S$GLB,,, | Performed by: FAMILY MEDICINE

## 2020-08-18 PROCEDURE — 1101F PT FALLS ASSESS-DOCD LE1/YR: CPT | Mod: S$GLB,,, | Performed by: FAMILY MEDICINE

## 2020-08-18 PROCEDURE — 80048 BASIC METABOLIC PNL TOTAL CA: CPT

## 2020-08-18 PROCEDURE — 1101F PR PT FALLS ASSESS DOC 0-1 FALLS W/OUT INJ PAST YR: ICD-10-PCS | Mod: S$GLB,,, | Performed by: FAMILY MEDICINE

## 2020-08-18 PROCEDURE — 3078F PR MOST RECENT DIASTOLIC BLOOD PRESSURE < 80 MM HG: ICD-10-PCS | Mod: S$GLB,,, | Performed by: FAMILY MEDICINE

## 2020-08-18 PROCEDURE — 3077F SYST BP >= 140 MM HG: CPT | Mod: S$GLB,,, | Performed by: FAMILY MEDICINE

## 2020-08-18 PROCEDURE — 1126F AMNT PAIN NOTED NONE PRSNT: CPT | Mod: S$GLB,,, | Performed by: FAMILY MEDICINE

## 2020-08-18 PROCEDURE — 3008F BODY MASS INDEX DOCD: CPT | Mod: S$GLB,,, | Performed by: FAMILY MEDICINE

## 2020-08-18 PROCEDURE — 99214 PR OFFICE/OUTPT VISIT, EST, LEVL IV, 30-39 MIN: ICD-10-PCS | Mod: S$GLB,,, | Performed by: FAMILY MEDICINE

## 2020-08-18 PROCEDURE — 3077F PR MOST RECENT SYSTOLIC BLOOD PRESSURE >= 140 MM HG: ICD-10-PCS | Mod: S$GLB,,, | Performed by: FAMILY MEDICINE

## 2020-08-18 PROCEDURE — 3078F DIAST BP <80 MM HG: CPT | Mod: S$GLB,,, | Performed by: FAMILY MEDICINE

## 2020-08-18 PROCEDURE — 1159F PR MEDICATION LIST DOCUMENTED IN MEDICAL RECORD: ICD-10-PCS | Mod: S$GLB,,, | Performed by: FAMILY MEDICINE

## 2020-08-18 PROCEDURE — 1159F MED LIST DOCD IN RCRD: CPT | Mod: S$GLB,,, | Performed by: FAMILY MEDICINE

## 2020-08-18 PROCEDURE — 99999 PR PBB SHADOW E&M-EST. PATIENT-LVL III: ICD-10-PCS | Mod: PBBFAC,,, | Performed by: FAMILY MEDICINE

## 2020-08-18 PROCEDURE — 99999 PR PBB SHADOW E&M-EST. PATIENT-LVL III: CPT | Mod: PBBFAC,,, | Performed by: FAMILY MEDICINE

## 2020-08-18 PROCEDURE — 1126F PR PAIN SEVERITY QUANTIFIED, NO PAIN PRESENT: ICD-10-PCS | Mod: S$GLB,,, | Performed by: FAMILY MEDICINE

## 2020-08-18 PROCEDURE — 3008F PR BODY MASS INDEX (BMI) DOCUMENTED: ICD-10-PCS | Mod: S$GLB,,, | Performed by: FAMILY MEDICINE

## 2020-08-18 RX ORDER — DOXAZOSIN 4 MG/1
2 TABLET ORAL NIGHTLY
Qty: 90 TABLET | Refills: 1 | Status: SHIPPED | OUTPATIENT
Start: 2020-08-18 | End: 2020-11-18 | Stop reason: SDUPTHER

## 2020-08-18 RX ORDER — METFORMIN HYDROCHLORIDE 500 MG/1
500 TABLET ORAL 2 TIMES DAILY WITH MEALS
Qty: 180 TABLET | Refills: 3 | Status: SHIPPED | OUTPATIENT
Start: 2020-08-18 | End: 2021-08-11

## 2020-08-18 NOTE — PROGRESS NOTES
Subjective:       Patient ID: Quintin Ling is a 70 y.o. male.    Chief Complaint: Follow-up      HPI Comments:       Current Outpatient Medications:     amLODIPine-benazepriL (LOTREL) 10-40 mg per capsule, Take 1 capsule by mouth once daily., Disp: 90 capsule, Rfl: 1    atorvastatin (LIPITOR) 20 MG tablet, Take 1 tablet (20 mg total) by mouth once daily., Disp: 90 tablet, Rfl: 3    dicyclomine (BENTYL) 20 mg tablet, TAKE 1 TABLET BY MOUTH THREE TIMES A DAY FOR 7 DAYS, Disp: , Rfl:     doxazosin (CARDURA) 4 MG tablet, Take 0.5 tablets (2 mg total) by mouth every evening., Disp: 90 tablet, Rfl: 1    fenofibrate 160 MG Tab, Take 1 tablet (160 mg total) by mouth once daily., Disp: 90 tablet, Rfl: 3    metoprolol succinate (TOPROL-XL) 100 MG 24 hr tablet, Take 1 tablet (100 mg total) by mouth once daily., Disp: 90 tablet, Rfl: 1    sildenafiL (VIAGRA) 100 MG tablet, Take 1 tablet (100 mg total) by mouth daily as needed for Erectile Dysfunction., Disp: 10 tablet, Rfl: 1    metFORMIN (GLUCOPHAGE) 500 MG tablet, Take 1 tablet (500 mg total) by mouth 2 (two) times daily with meals., Disp: 180 tablet, Rfl: 3      Three month follow-up.  Patient doing well.  No complaints taking Cardura without any problems.  Blood pressure at home are still elevated, with systolic consistently 140s, 150s.  Thinks he might be getting up of the LEs at night since starting alpha blocker.    Today we talked about prediabetes.  The a restart metformin.  His wife also recently been diagnosed with the same.  They will work on the diet.    Still chronically anemic.  History of upper GI bleed.  Never had a colonoscopy but is done stool cards.  Wants to wait while longer before scheduling colonoscopy, because of everything going on at home     Says he insurance company need some kind of clarification for me before approving his AAA screen.  I have not received anything.  He will look into this further and let me know    Review of Systems    Constitutional: Negative for activity change, appetite change and fever.   HENT: Negative for sore throat.    Respiratory: Negative for cough and shortness of breath.    Cardiovascular: Negative for chest pain.   Gastrointestinal: Negative for abdominal pain, diarrhea and nausea.   Genitourinary: Negative for difficulty urinating.   Musculoskeletal: Negative for arthralgias and myalgias.   Neurological: Negative for dizziness and headaches.       Objective:      Vitals:    08/18/20 0830   BP: (!) 154/67   Pulse: 76   Temp: 97.5 °F (36.4 °C)   SpO2: 96%   Weight: 102.8 kg (226 lb 10.1 oz)   PainSc: 0-No pain     Physical Exam  Vitals signs and nursing note reviewed.   Constitutional:       General: He is not in acute distress.     Appearance: He is well-developed. He is not diaphoretic.   HENT:      Head: Normocephalic.   Neck:      Musculoskeletal: Neck supple.      Thyroid: No thyromegaly.   Cardiovascular:      Rate and Rhythm: Normal rate and regular rhythm.      Heart sounds: Normal heart sounds. No murmur.   Pulmonary:      Effort: Pulmonary effort is normal.      Breath sounds: Normal breath sounds. No wheezing or rales.   Abdominal:      General: There is no distension.      Palpations: Abdomen is soft.   Lymphadenopathy:      Cervical: No cervical adenopathy.   Skin:     General: Skin is warm and dry.   Neurological:      Mental Status: He is alert and oriented to person, place, and time.   Psychiatric:         Behavior: Behavior normal.         Thought Content: Thought content normal.         Judgment: Judgment normal.         Assessment:       1. Anemia, unspecified type    2. Essential hypertension    3. Hyperkalemia    4. Prediabetes    5. Upper GI bleed    6. Screening for AAA (abdominal aortic aneurysm)        Plan:   Anemia, unspecified type  Comments:  Better than previously during his upper GI bleed.  Patient still wants to defer colonoscopy few more months    Essential  hypertension  Comments:  Uncontrolled.  Increase Cardura to 4 mg.  Follow-up 3 months    Hyperkalemia  Comments:  BMP today  Orders:  -     Basic metabolic panel; Future; Expected date: 08/18/2020    Prediabetes  Comments:  Metformin.  Recheck A1c at follow-up    Upper GI bleed  Comments:  Resolving, but persistent, anemia    Screening for AAA (abdominal aortic aneurysm)  Comments:  Patient still working out with his insurance    Other orders  -     metFORMIN (GLUCOPHAGE) 500 MG tablet; Take 1 tablet (500 mg total) by mouth 2 (two) times daily with meals.  Dispense: 180 tablet; Refill: 3  -     doxazosin (CARDURA) 4 MG tablet; Take 0.5 tablets (2 mg total) by mouth every evening.  Dispense: 90 tablet; Refill: 1

## 2020-08-19 LAB
ANION GAP SERPL CALC-SCNC: 6 MMOL/L (ref 8–16)
BUN SERPL-MCNC: 24 MG/DL (ref 8–23)
CALCIUM SERPL-MCNC: 9.3 MG/DL (ref 8.7–10.5)
CHLORIDE SERPL-SCNC: 107 MMOL/L (ref 95–110)
CO2 SERPL-SCNC: 24 MMOL/L (ref 23–29)
CREAT SERPL-MCNC: 1.2 MG/DL (ref 0.5–1.4)
EST. GFR  (AFRICAN AMERICAN): >60 ML/MIN/1.73 M^2
EST. GFR  (NON AFRICAN AMERICAN): >60 ML/MIN/1.73 M^2
GLUCOSE SERPL-MCNC: 107 MG/DL (ref 70–110)
POTASSIUM SERPL-SCNC: 4.8 MMOL/L (ref 3.5–5.1)
SODIUM SERPL-SCNC: 137 MMOL/L (ref 136–145)

## 2020-11-18 ENCOUNTER — OFFICE VISIT (OUTPATIENT)
Dept: FAMILY MEDICINE | Facility: CLINIC | Age: 70
End: 2020-11-18
Payer: MEDICARE

## 2020-11-18 ENCOUNTER — LAB VISIT (OUTPATIENT)
Dept: LAB | Facility: HOSPITAL | Age: 70
End: 2020-11-18
Attending: FAMILY MEDICINE
Payer: MEDICARE

## 2020-11-18 VITALS
WEIGHT: 227.19 LBS | HEIGHT: 65 IN | BODY MASS INDEX: 37.85 KG/M2 | DIASTOLIC BLOOD PRESSURE: 60 MMHG | HEART RATE: 78 BPM | RESPIRATION RATE: 16 BRPM | TEMPERATURE: 98 F | OXYGEN SATURATION: 98 % | SYSTOLIC BLOOD PRESSURE: 136 MMHG

## 2020-11-18 DIAGNOSIS — Z12.11 SCREENING FOR COLON CANCER: ICD-10-CM

## 2020-11-18 DIAGNOSIS — R73.03 PREDIABETES: ICD-10-CM

## 2020-11-18 DIAGNOSIS — R25.2 LEG CRAMPS: ICD-10-CM

## 2020-11-18 DIAGNOSIS — I10 ESSENTIAL HYPERTENSION: Primary | ICD-10-CM

## 2020-11-18 PROCEDURE — 99214 OFFICE O/P EST MOD 30 MIN: CPT | Mod: S$GLB,,, | Performed by: FAMILY MEDICINE

## 2020-11-18 PROCEDURE — 1159F PR MEDICATION LIST DOCUMENTED IN MEDICAL RECORD: ICD-10-PCS | Mod: S$GLB,,, | Performed by: FAMILY MEDICINE

## 2020-11-18 PROCEDURE — 99999 PR PBB SHADOW E&M-EST. PATIENT-LVL IV: CPT | Mod: PBBFAC,,, | Performed by: FAMILY MEDICINE

## 2020-11-18 PROCEDURE — 83036 HEMOGLOBIN GLYCOSYLATED A1C: CPT

## 2020-11-18 PROCEDURE — 3075F SYST BP GE 130 - 139MM HG: CPT | Mod: S$GLB,,, | Performed by: FAMILY MEDICINE

## 2020-11-18 PROCEDURE — 1126F PR PAIN SEVERITY QUANTIFIED, NO PAIN PRESENT: ICD-10-PCS | Mod: S$GLB,,, | Performed by: FAMILY MEDICINE

## 2020-11-18 PROCEDURE — 3078F PR MOST RECENT DIASTOLIC BLOOD PRESSURE < 80 MM HG: ICD-10-PCS | Mod: S$GLB,,, | Performed by: FAMILY MEDICINE

## 2020-11-18 PROCEDURE — 3075F PR MOST RECENT SYSTOLIC BLOOD PRESS GE 130-139MM HG: ICD-10-PCS | Mod: S$GLB,,, | Performed by: FAMILY MEDICINE

## 2020-11-18 PROCEDURE — 3078F DIAST BP <80 MM HG: CPT | Mod: S$GLB,,, | Performed by: FAMILY MEDICINE

## 2020-11-18 PROCEDURE — 1126F AMNT PAIN NOTED NONE PRSNT: CPT | Mod: S$GLB,,, | Performed by: FAMILY MEDICINE

## 2020-11-18 PROCEDURE — 3008F BODY MASS INDEX DOCD: CPT | Mod: S$GLB,,, | Performed by: FAMILY MEDICINE

## 2020-11-18 PROCEDURE — 1159F MED LIST DOCD IN RCRD: CPT | Mod: S$GLB,,, | Performed by: FAMILY MEDICINE

## 2020-11-18 PROCEDURE — 36415 COLL VENOUS BLD VENIPUNCTURE: CPT | Mod: PO

## 2020-11-18 PROCEDURE — 99999 PR PBB SHADOW E&M-EST. PATIENT-LVL IV: ICD-10-PCS | Mod: PBBFAC,,, | Performed by: FAMILY MEDICINE

## 2020-11-18 PROCEDURE — 3008F PR BODY MASS INDEX (BMI) DOCUMENTED: ICD-10-PCS | Mod: S$GLB,,, | Performed by: FAMILY MEDICINE

## 2020-11-18 PROCEDURE — 99214 PR OFFICE/OUTPT VISIT, EST, LEVL IV, 30-39 MIN: ICD-10-PCS | Mod: S$GLB,,, | Performed by: FAMILY MEDICINE

## 2020-11-18 RX ORDER — ATORVASTATIN CALCIUM 20 MG/1
20 TABLET, FILM COATED ORAL DAILY
Qty: 90 TABLET | Refills: 3 | Status: SHIPPED | OUTPATIENT
Start: 2020-11-18 | End: 2022-01-28

## 2020-11-18 RX ORDER — DOXAZOSIN 4 MG/1
4 TABLET ORAL NIGHTLY
Qty: 90 TABLET | Refills: 1 | Status: SHIPPED | OUTPATIENT
Start: 2020-11-18 | End: 2021-05-19 | Stop reason: SDUPTHER

## 2020-11-18 NOTE — PROGRESS NOTES
The the Subjective:       Patient ID: Quintin Ling is a 70 y.o. male.    Chief Complaint: Follow-up      HPI Comments:       Current Outpatient Medications:     amLODIPine-benazepriL (LOTREL) 10-40 mg per capsule, TAKE 1 CAPSULE BY MOUTH EVERY DAY, Disp: 90 capsule, Rfl: 1    atorvastatin (LIPITOR) 20 MG tablet, Take 1 tablet (20 mg total) by mouth once daily., Disp: 90 tablet, Rfl: 3    dicyclomine (BENTYL) 20 mg tablet, TAKE 1 TABLET BY MOUTH THREE TIMES A DAY FOR 7 DAYS, Disp: , Rfl:     doxazosin (CARDURA) 4 MG tablet, Take 1 tablet (4 mg total) by mouth every evening., Disp: 90 tablet, Rfl: 1    fenofibrate 160 MG Tab, Take 1 tablet (160 mg total) by mouth once daily., Disp: 90 tablet, Rfl: 3    metFORMIN (GLUCOPHAGE) 500 MG tablet, Take 1 tablet (500 mg total) by mouth 2 (two) times daily with meals., Disp: 180 tablet, Rfl: 3    metoprolol succinate (TOPROL-XL) 100 MG 24 hr tablet, TAKE 1 TABLET BY MOUTH EVERY DAY, Disp: 90 tablet, Rfl: 1    sildenafiL (VIAGRA) 100 MG tablet, Take 1 tablet (100 mg total) by mouth daily as needed for Erectile Dysfunction., Disp: 10 tablet, Rfl: 1      Doing well.  Only taking half tablet of Cardura 4 mg.  Will increase today.    Today we talked about changing his diet in light of prediabetes.  No weight loss.  Starts to get more exercise when hunting season begins.    AAA screen is scheduled for December.    Needs colonoscopy order.    Night cramps.  Not every night.  Gets better when he moves around or when he takes a hot bath.  No claudication    Review of Systems   Constitutional: Negative for activity change, appetite change and fever.   HENT: Negative for sore throat.    Respiratory: Negative for cough and shortness of breath.    Cardiovascular: Negative for chest pain.   Gastrointestinal: Negative for abdominal pain, diarrhea and nausea.   Genitourinary: Negative for difficulty urinating.   Musculoskeletal: Positive for myalgias. Negative for arthralgias.  "  Neurological: Negative for dizziness and headaches.       Objective:      Vitals:    11/18/20 0838 11/18/20 0850   BP: (!) 140/60 136/60   Pulse: 78    Resp: 16    Temp: 97.9 °F (36.6 °C)    TempSrc: Temporal    SpO2: 98%    Weight: 103 kg (227 lb 2.9 oz)    Height: 5' 5" (1.651 m)    PainSc: 0-No pain      Physical Exam  Vitals signs and nursing note reviewed.   Constitutional:       General: He is not in acute distress.     Appearance: He is well-developed. He is not diaphoretic.   HENT:      Head: Normocephalic.   Neck:      Musculoskeletal: Neck supple.      Thyroid: No thyromegaly.   Cardiovascular:      Rate and Rhythm: Normal rate and regular rhythm.      Heart sounds: Normal heart sounds. No murmur.   Pulmonary:      Effort: Pulmonary effort is normal.      Breath sounds: Normal breath sounds. No wheezing or rales.   Abdominal:      General: There is no distension.      Palpations: Abdomen is soft.   Lymphadenopathy:      Cervical: No cervical adenopathy.   Skin:     General: Skin is warm and dry.   Neurological:      Mental Status: He is alert and oriented to person, place, and time.   Psychiatric:         Mood and Affect: Mood normal.         Behavior: Behavior normal.         Thought Content: Thought content normal.         Judgment: Judgment normal.         Assessment:       1. Essential hypertension    2. Prediabetes    3. Screening for colon cancer    4. Leg cramps        Plan:   Essential hypertension  Comments:  Near the target.  Increase Cardura to 4 mg.    Prediabetes  Comments:  No weight loss since starting metformin.  Review dietary recommendations and emphasize physical activity.  Follow-up 6 months.  A1c today  Orders:  -     Hemoglobin A1C; Future; Expected date: 11/18/2020    Screening for colon cancer  Comments:  Colonoscopy previously ordered  Orders:  -     Case request GI: COLONOSCOPY    Leg cramps  Comments:  No concern about PAD.  Reassured    Other orders  -     doxazosin (CARDURA) " 4 MG tablet; Take 1 tablet (4 mg total) by mouth every evening.  Dispense: 90 tablet; Refill: 1  -     atorvastatin (LIPITOR) 20 MG tablet; Take 1 tablet (20 mg total) by mouth once daily.  Dispense: 90 tablet; Refill: 3

## 2020-11-19 LAB
ESTIMATED AVG GLUCOSE: 114 MG/DL (ref 68–131)
HBA1C MFR BLD HPLC: 5.6 % (ref 4–5.6)

## 2020-11-27 ENCOUNTER — TELEPHONE (OUTPATIENT)
Dept: ENDOSCOPY | Facility: HOSPITAL | Age: 70
End: 2020-11-27

## 2020-11-27 RX ORDER — FENOFIBRATE 160 MG/1
TABLET ORAL
Qty: 90 TABLET | Refills: 3 | Status: SHIPPED | OUTPATIENT
Start: 2020-11-27 | End: 2021-06-13

## 2020-11-27 RX ORDER — METOPROLOL SUCCINATE 100 MG/1
100 TABLET, EXTENDED RELEASE ORAL DAILY
Qty: 90 TABLET | Refills: 1 | Status: SHIPPED | OUTPATIENT
Start: 2020-11-27 | End: 2021-05-19 | Stop reason: SDUPTHER

## 2020-11-27 NOTE — TELEPHONE ENCOUNTER
----- Message from Virgen Herrmann sent at 11/27/2020 11:31 AM CST -----  ..Type:  RX Refill Request    Who Called:   Refill or New Rx refill   RX Name and Strength: metoprolol succer  How is the patient currently taking it? (ex. 1XDay):daily   Is this a 30 day or 90 day RX 30  Preferred Pharmacy with phone number: .  [facility]/pharmacy #[id number] - [place], [place] - [id number] [facility] [id number]  Phone: [phone] Fax: [phone]     Local or Mail Order local   Ordering Provider:  Would the patient rather a call back or a response via [facility]?  Call back   Best Call Back Number: [phone]  Additional Information:  refill

## 2020-12-01 ENCOUNTER — TELEPHONE (OUTPATIENT)
Dept: RADIOLOGY | Facility: HOSPITAL | Age: 70
End: 2020-12-01

## 2020-12-02 ENCOUNTER — HOSPITAL ENCOUNTER (OUTPATIENT)
Dept: RADIOLOGY | Facility: HOSPITAL | Age: 70
Discharge: HOME | End: 2020-12-02
Attending: FAMILY MEDICINE
Payer: MEDICARE

## 2020-12-02 DIAGNOSIS — Z72.0 TOBACCO ABUSE: ICD-10-CM

## 2020-12-02 DIAGNOSIS — Z13.6 SCREENING FOR AAA (ABDOMINAL AORTIC ANEURYSM): ICD-10-CM

## 2020-12-02 PROCEDURE — 76775 US EXAM ABDO BACK WALL LIM: CPT | Mod: TC

## 2020-12-02 PROCEDURE — 76775 US EXAM ABDO BACK WALL LIM: CPT | Mod: 26,,, | Performed by: RADIOLOGY

## 2020-12-02 PROCEDURE — 76775 US ABDOMINAL AORTA: ICD-10-PCS | Mod: 26,,, | Performed by: RADIOLOGY

## 2021-05-19 ENCOUNTER — TELEPHONE (OUTPATIENT)
Dept: FAMILY MEDICINE | Facility: CLINIC | Age: 71
End: 2021-05-19

## 2021-05-19 ENCOUNTER — LAB VISIT (OUTPATIENT)
Dept: LAB | Facility: HOSPITAL | Age: 71
End: 2021-05-19
Attending: FAMILY MEDICINE
Payer: MEDICARE

## 2021-05-19 ENCOUNTER — OFFICE VISIT (OUTPATIENT)
Dept: FAMILY MEDICINE | Facility: CLINIC | Age: 71
End: 2021-05-19
Payer: MEDICARE

## 2021-05-19 VITALS
TEMPERATURE: 98 F | OXYGEN SATURATION: 95 % | BODY MASS INDEX: 38.36 KG/M2 | HEIGHT: 65 IN | HEART RATE: 82 BPM | RESPIRATION RATE: 16 BRPM | WEIGHT: 230.25 LBS | SYSTOLIC BLOOD PRESSURE: 160 MMHG | DIASTOLIC BLOOD PRESSURE: 72 MMHG

## 2021-05-19 DIAGNOSIS — Z12.5 SCREENING FOR PROSTATE CANCER: ICD-10-CM

## 2021-05-19 DIAGNOSIS — Z12.11 SCREENING FOR COLON CANCER: Primary | ICD-10-CM

## 2021-05-19 DIAGNOSIS — R73.03 PREDIABETES: ICD-10-CM

## 2021-05-19 DIAGNOSIS — I10 ESSENTIAL HYPERTENSION: ICD-10-CM

## 2021-05-19 DIAGNOSIS — Z12.11 SCREENING FOR COLON CANCER: ICD-10-CM

## 2021-05-19 DIAGNOSIS — E66.01 SEVERE OBESITY (BMI 35.0-39.9) WITH COMORBIDITY: ICD-10-CM

## 2021-05-19 DIAGNOSIS — D75.839 THROMBOCYTOSIS: ICD-10-CM

## 2021-05-19 DIAGNOSIS — E78.2 MIXED HYPERLIPIDEMIA: ICD-10-CM

## 2021-05-19 DIAGNOSIS — I10 ESSENTIAL HYPERTENSION: Primary | ICD-10-CM

## 2021-05-19 LAB
ALBUMIN SERPL BCP-MCNC: 4.1 G/DL (ref 3.5–5.2)
ALP SERPL-CCNC: 41 U/L (ref 55–135)
ALT SERPL W/O P-5'-P-CCNC: 17 U/L (ref 10–44)
ANION GAP SERPL CALC-SCNC: 8 MMOL/L (ref 8–16)
AST SERPL-CCNC: 21 U/L (ref 10–40)
BASOPHILS # BLD AUTO: 0.03 K/UL (ref 0–0.2)
BASOPHILS NFR BLD: 0.4 % (ref 0–1.9)
BILIRUB SERPL-MCNC: 0.5 MG/DL (ref 0.1–1)
BUN SERPL-MCNC: 24 MG/DL (ref 8–23)
CALCIUM SERPL-MCNC: 9.5 MG/DL (ref 8.7–10.5)
CHLORIDE SERPL-SCNC: 106 MMOL/L (ref 95–110)
CHOLEST SERPL-MCNC: 146 MG/DL (ref 120–199)
CHOLEST/HDLC SERPL: 3.2 {RATIO} (ref 2–5)
CO2 SERPL-SCNC: 24 MMOL/L (ref 23–29)
COMPLEXED PSA SERPL-MCNC: 0.75 NG/ML (ref 0–4)
CREAT SERPL-MCNC: 1.3 MG/DL (ref 0.5–1.4)
DIFFERENTIAL METHOD: ABNORMAL
EOSINOPHIL # BLD AUTO: 0.1 K/UL (ref 0–0.5)
EOSINOPHIL NFR BLD: 1.2 % (ref 0–8)
ERYTHROCYTE [DISTWIDTH] IN BLOOD BY AUTOMATED COUNT: 13.4 % (ref 11.5–14.5)
EST. GFR  (AFRICAN AMERICAN): >60 ML/MIN/1.73 M^2
EST. GFR  (NON AFRICAN AMERICAN): 56.1 ML/MIN/1.73 M^2
ESTIMATED AVG GLUCOSE: 114 MG/DL (ref 68–131)
GLUCOSE SERPL-MCNC: 98 MG/DL (ref 70–110)
HBA1C MFR BLD: 5.6 % (ref 4–5.6)
HCT VFR BLD AUTO: 38.9 % (ref 40–54)
HDLC SERPL-MCNC: 45 MG/DL (ref 40–75)
HDLC SERPL: 30.8 % (ref 20–50)
HGB BLD-MCNC: 12.5 G/DL (ref 14–18)
IMM GRANULOCYTES # BLD AUTO: 0.02 K/UL (ref 0–0.04)
IMM GRANULOCYTES NFR BLD AUTO: 0.3 % (ref 0–0.5)
LDLC SERPL CALC-MCNC: 86.6 MG/DL (ref 63–159)
LYMPHOCYTES # BLD AUTO: 0.8 K/UL (ref 1–4.8)
LYMPHOCYTES NFR BLD: 12.1 % (ref 18–48)
MCH RBC QN AUTO: 28.2 PG (ref 27–31)
MCHC RBC AUTO-ENTMCNC: 32.1 G/DL (ref 32–36)
MCV RBC AUTO: 88 FL (ref 82–98)
MONOCYTES # BLD AUTO: 0.6 K/UL (ref 0.3–1)
MONOCYTES NFR BLD: 9.4 % (ref 4–15)
NEUTROPHILS # BLD AUTO: 5.1 K/UL (ref 1.8–7.7)
NEUTROPHILS NFR BLD: 76.6 % (ref 38–73)
NONHDLC SERPL-MCNC: 101 MG/DL
NRBC BLD-RTO: 0 /100 WBC
PLATELET # BLD AUTO: 208 K/UL (ref 150–450)
PMV BLD AUTO: 11.2 FL (ref 9.2–12.9)
POTASSIUM SERPL-SCNC: 4.7 MMOL/L (ref 3.5–5.1)
PROT SERPL-MCNC: 7.5 G/DL (ref 6–8.4)
RBC # BLD AUTO: 4.43 M/UL (ref 4.6–6.2)
SODIUM SERPL-SCNC: 138 MMOL/L (ref 136–145)
TRIGL SERPL-MCNC: 72 MG/DL (ref 30–150)
WBC # BLD AUTO: 6.71 K/UL (ref 3.9–12.7)

## 2021-05-19 PROCEDURE — 1126F AMNT PAIN NOTED NONE PRSNT: CPT | Mod: S$GLB,,, | Performed by: FAMILY MEDICINE

## 2021-05-19 PROCEDURE — 3078F DIAST BP <80 MM HG: CPT | Mod: S$GLB,,, | Performed by: FAMILY MEDICINE

## 2021-05-19 PROCEDURE — 3077F PR MOST RECENT SYSTOLIC BLOOD PRESSURE >= 140 MM HG: ICD-10-PCS | Mod: S$GLB,,, | Performed by: FAMILY MEDICINE

## 2021-05-19 PROCEDURE — 36415 COLL VENOUS BLD VENIPUNCTURE: CPT | Mod: PO | Performed by: FAMILY MEDICINE

## 2021-05-19 PROCEDURE — 99214 PR OFFICE/OUTPT VISIT, EST, LEVL IV, 30-39 MIN: ICD-10-PCS | Mod: S$GLB,,, | Performed by: FAMILY MEDICINE

## 2021-05-19 PROCEDURE — 3008F BODY MASS INDEX DOCD: CPT | Mod: S$GLB,,, | Performed by: FAMILY MEDICINE

## 2021-05-19 PROCEDURE — 1159F PR MEDICATION LIST DOCUMENTED IN MEDICAL RECORD: ICD-10-PCS | Mod: S$GLB,,, | Performed by: FAMILY MEDICINE

## 2021-05-19 PROCEDURE — 3288F FALL RISK ASSESSMENT DOCD: CPT | Mod: S$GLB,,, | Performed by: FAMILY MEDICINE

## 2021-05-19 PROCEDURE — 1126F PR PAIN SEVERITY QUANTIFIED, NO PAIN PRESENT: ICD-10-PCS | Mod: S$GLB,,, | Performed by: FAMILY MEDICINE

## 2021-05-19 PROCEDURE — 1101F PR PT FALLS ASSESS DOC 0-1 FALLS W/OUT INJ PAST YR: ICD-10-PCS | Mod: S$GLB,,, | Performed by: FAMILY MEDICINE

## 2021-05-19 PROCEDURE — 3078F PR MOST RECENT DIASTOLIC BLOOD PRESSURE < 80 MM HG: ICD-10-PCS | Mod: S$GLB,,, | Performed by: FAMILY MEDICINE

## 2021-05-19 PROCEDURE — 83036 HEMOGLOBIN GLYCOSYLATED A1C: CPT | Performed by: FAMILY MEDICINE

## 2021-05-19 PROCEDURE — 99999 PR PBB SHADOW E&M-EST. PATIENT-LVL III: ICD-10-PCS | Mod: PBBFAC,,, | Performed by: FAMILY MEDICINE

## 2021-05-19 PROCEDURE — 1101F PT FALLS ASSESS-DOCD LE1/YR: CPT | Mod: S$GLB,,, | Performed by: FAMILY MEDICINE

## 2021-05-19 PROCEDURE — 85025 COMPLETE CBC W/AUTO DIFF WBC: CPT | Performed by: FAMILY MEDICINE

## 2021-05-19 PROCEDURE — 80061 LIPID PANEL: CPT | Performed by: FAMILY MEDICINE

## 2021-05-19 PROCEDURE — 1159F MED LIST DOCD IN RCRD: CPT | Mod: S$GLB,,, | Performed by: FAMILY MEDICINE

## 2021-05-19 PROCEDURE — 80053 COMPREHEN METABOLIC PANEL: CPT | Performed by: FAMILY MEDICINE

## 2021-05-19 PROCEDURE — 99999 PR PBB SHADOW E&M-EST. PATIENT-LVL III: CPT | Mod: PBBFAC,,, | Performed by: FAMILY MEDICINE

## 2021-05-19 PROCEDURE — 3077F SYST BP >= 140 MM HG: CPT | Mod: S$GLB,,, | Performed by: FAMILY MEDICINE

## 2021-05-19 PROCEDURE — 3008F PR BODY MASS INDEX (BMI) DOCUMENTED: ICD-10-PCS | Mod: S$GLB,,, | Performed by: FAMILY MEDICINE

## 2021-05-19 PROCEDURE — 3288F PR FALLS RISK ASSESSMENT DOCUMENTED: ICD-10-PCS | Mod: S$GLB,,, | Performed by: FAMILY MEDICINE

## 2021-05-19 PROCEDURE — 99214 OFFICE O/P EST MOD 30 MIN: CPT | Mod: S$GLB,,, | Performed by: FAMILY MEDICINE

## 2021-05-19 PROCEDURE — 84153 ASSAY OF PSA TOTAL: CPT | Performed by: FAMILY MEDICINE

## 2021-05-19 RX ORDER — DOXAZOSIN 4 MG/1
4 TABLET ORAL NIGHTLY
Qty: 90 TABLET | Refills: 1 | Status: SHIPPED | OUTPATIENT
Start: 2021-05-19 | End: 2021-09-06

## 2021-05-19 RX ORDER — METOPROLOL SUCCINATE 100 MG/1
100 TABLET, EXTENDED RELEASE ORAL 2 TIMES DAILY
Qty: 180 TABLET | Refills: 1 | Status: SHIPPED | OUTPATIENT
Start: 2021-05-19 | End: 2021-06-16 | Stop reason: SDUPTHER

## 2021-05-25 ENCOUNTER — TELEPHONE (OUTPATIENT)
Dept: ENDOSCOPY | Facility: HOSPITAL | Age: 71
End: 2021-05-25

## 2021-06-16 ENCOUNTER — OFFICE VISIT (OUTPATIENT)
Dept: FAMILY MEDICINE | Facility: CLINIC | Age: 71
End: 2021-06-16
Payer: MEDICARE

## 2021-06-16 VITALS
HEIGHT: 65 IN | TEMPERATURE: 98 F | RESPIRATION RATE: 16 BRPM | SYSTOLIC BLOOD PRESSURE: 156 MMHG | HEART RATE: 85 BPM | DIASTOLIC BLOOD PRESSURE: 71 MMHG | BODY MASS INDEX: 38.89 KG/M2 | WEIGHT: 233.44 LBS | OXYGEN SATURATION: 96 %

## 2021-06-16 DIAGNOSIS — E78.2 MIXED HYPERLIPIDEMIA: ICD-10-CM

## 2021-06-16 DIAGNOSIS — I10 ESSENTIAL HYPERTENSION: Primary | ICD-10-CM

## 2021-06-16 DIAGNOSIS — R35.1 NOCTURIA: ICD-10-CM

## 2021-06-16 DIAGNOSIS — D64.9 ANEMIA, UNSPECIFIED TYPE: ICD-10-CM

## 2021-06-16 DIAGNOSIS — R73.03 PREDIABETES: ICD-10-CM

## 2021-06-16 PROCEDURE — 1126F PR PAIN SEVERITY QUANTIFIED, NO PAIN PRESENT: ICD-10-PCS | Mod: S$GLB,,, | Performed by: FAMILY MEDICINE

## 2021-06-16 PROCEDURE — 1159F PR MEDICATION LIST DOCUMENTED IN MEDICAL RECORD: ICD-10-PCS | Mod: S$GLB,,, | Performed by: FAMILY MEDICINE

## 2021-06-16 PROCEDURE — 3008F BODY MASS INDEX DOCD: CPT | Mod: S$GLB,,, | Performed by: FAMILY MEDICINE

## 2021-06-16 PROCEDURE — 99999 PR PBB SHADOW E&M-EST. PATIENT-LVL III: CPT | Mod: PBBFAC,,, | Performed by: FAMILY MEDICINE

## 2021-06-16 PROCEDURE — 3077F PR MOST RECENT SYSTOLIC BLOOD PRESSURE >= 140 MM HG: ICD-10-PCS | Mod: S$GLB,,, | Performed by: FAMILY MEDICINE

## 2021-06-16 PROCEDURE — 1159F MED LIST DOCD IN RCRD: CPT | Mod: S$GLB,,, | Performed by: FAMILY MEDICINE

## 2021-06-16 PROCEDURE — 3078F DIAST BP <80 MM HG: CPT | Mod: S$GLB,,, | Performed by: FAMILY MEDICINE

## 2021-06-16 PROCEDURE — 3008F PR BODY MASS INDEX (BMI) DOCUMENTED: ICD-10-PCS | Mod: S$GLB,,, | Performed by: FAMILY MEDICINE

## 2021-06-16 PROCEDURE — 99214 OFFICE O/P EST MOD 30 MIN: CPT | Mod: S$GLB,,, | Performed by: FAMILY MEDICINE

## 2021-06-16 PROCEDURE — 3077F SYST BP >= 140 MM HG: CPT | Mod: S$GLB,,, | Performed by: FAMILY MEDICINE

## 2021-06-16 PROCEDURE — 1126F AMNT PAIN NOTED NONE PRSNT: CPT | Mod: S$GLB,,, | Performed by: FAMILY MEDICINE

## 2021-06-16 PROCEDURE — 3078F PR MOST RECENT DIASTOLIC BLOOD PRESSURE < 80 MM HG: ICD-10-PCS | Mod: S$GLB,,, | Performed by: FAMILY MEDICINE

## 2021-06-16 PROCEDURE — 99214 PR OFFICE/OUTPT VISIT, EST, LEVL IV, 30-39 MIN: ICD-10-PCS | Mod: S$GLB,,, | Performed by: FAMILY MEDICINE

## 2021-06-16 PROCEDURE — 99999 PR PBB SHADOW E&M-EST. PATIENT-LVL III: ICD-10-PCS | Mod: PBBFAC,,, | Performed by: FAMILY MEDICINE

## 2021-06-16 RX ORDER — AMLODIPINE BESYLATE 10 MG/1
10 TABLET ORAL DAILY
Qty: 90 TABLET | Refills: 1 | Status: SHIPPED | OUTPATIENT
Start: 2021-06-16 | End: 2021-10-12

## 2021-06-16 RX ORDER — OLMESARTAN MEDOXOMIL 40 MG/1
40 TABLET ORAL DAILY
Qty: 90 TABLET | Refills: 3 | Status: SHIPPED | OUTPATIENT
Start: 2021-06-16 | End: 2022-05-30 | Stop reason: SDUPTHER

## 2021-06-16 RX ORDER — METOPROLOL SUCCINATE 100 MG/1
100 TABLET, EXTENDED RELEASE ORAL DAILY
Qty: 1 TABLET | Refills: 1 | Status: SHIPPED | OUTPATIENT
Start: 2021-06-16 | End: 2021-09-06

## 2021-07-15 ENCOUNTER — LAB VISIT (OUTPATIENT)
Dept: LAB | Facility: HOSPITAL | Age: 71
End: 2021-07-15
Attending: FAMILY MEDICINE
Payer: MEDICARE

## 2021-07-15 ENCOUNTER — OFFICE VISIT (OUTPATIENT)
Dept: FAMILY MEDICINE | Facility: CLINIC | Age: 71
End: 2021-07-15
Payer: MEDICARE

## 2021-07-15 VITALS
WEIGHT: 235.69 LBS | BODY MASS INDEX: 39.27 KG/M2 | HEART RATE: 79 BPM | SYSTOLIC BLOOD PRESSURE: 138 MMHG | TEMPERATURE: 99 F | HEIGHT: 65 IN | DIASTOLIC BLOOD PRESSURE: 72 MMHG | RESPIRATION RATE: 16 BRPM | OXYGEN SATURATION: 96 %

## 2021-07-15 DIAGNOSIS — N52.9 ERECTILE DYSFUNCTION, UNSPECIFIED ERECTILE DYSFUNCTION TYPE: ICD-10-CM

## 2021-07-15 DIAGNOSIS — I10 ESSENTIAL HYPERTENSION: ICD-10-CM

## 2021-07-15 DIAGNOSIS — I10 ESSENTIAL HYPERTENSION: Primary | ICD-10-CM

## 2021-07-15 DIAGNOSIS — R35.1 NOCTURIA: ICD-10-CM

## 2021-07-15 PROCEDURE — 1126F PR PAIN SEVERITY QUANTIFIED, NO PAIN PRESENT: ICD-10-PCS | Mod: S$GLB,,, | Performed by: FAMILY MEDICINE

## 2021-07-15 PROCEDURE — 3288F FALL RISK ASSESSMENT DOCD: CPT | Mod: S$GLB,,, | Performed by: FAMILY MEDICINE

## 2021-07-15 PROCEDURE — 3075F SYST BP GE 130 - 139MM HG: CPT | Mod: S$GLB,,, | Performed by: FAMILY MEDICINE

## 2021-07-15 PROCEDURE — 99999 PR PBB SHADOW E&M-EST. PATIENT-LVL III: ICD-10-PCS | Mod: PBBFAC,,, | Performed by: FAMILY MEDICINE

## 2021-07-15 PROCEDURE — 1159F PR MEDICATION LIST DOCUMENTED IN MEDICAL RECORD: ICD-10-PCS | Mod: S$GLB,,, | Performed by: FAMILY MEDICINE

## 2021-07-15 PROCEDURE — 3008F BODY MASS INDEX DOCD: CPT | Mod: S$GLB,,, | Performed by: FAMILY MEDICINE

## 2021-07-15 PROCEDURE — 36415 COLL VENOUS BLD VENIPUNCTURE: CPT | Mod: PO | Performed by: FAMILY MEDICINE

## 2021-07-15 PROCEDURE — 1126F AMNT PAIN NOTED NONE PRSNT: CPT | Mod: S$GLB,,, | Performed by: FAMILY MEDICINE

## 2021-07-15 PROCEDURE — 80048 BASIC METABOLIC PNL TOTAL CA: CPT | Performed by: FAMILY MEDICINE

## 2021-07-15 PROCEDURE — 3288F PR FALLS RISK ASSESSMENT DOCUMENTED: ICD-10-PCS | Mod: S$GLB,,, | Performed by: FAMILY MEDICINE

## 2021-07-15 PROCEDURE — 3078F DIAST BP <80 MM HG: CPT | Mod: S$GLB,,, | Performed by: FAMILY MEDICINE

## 2021-07-15 PROCEDURE — 3078F PR MOST RECENT DIASTOLIC BLOOD PRESSURE < 80 MM HG: ICD-10-PCS | Mod: S$GLB,,, | Performed by: FAMILY MEDICINE

## 2021-07-15 PROCEDURE — 1159F MED LIST DOCD IN RCRD: CPT | Mod: S$GLB,,, | Performed by: FAMILY MEDICINE

## 2021-07-15 PROCEDURE — 99999 PR PBB SHADOW E&M-EST. PATIENT-LVL III: CPT | Mod: PBBFAC,,, | Performed by: FAMILY MEDICINE

## 2021-07-15 PROCEDURE — 3008F PR BODY MASS INDEX (BMI) DOCUMENTED: ICD-10-PCS | Mod: S$GLB,,, | Performed by: FAMILY MEDICINE

## 2021-07-15 PROCEDURE — 1101F PT FALLS ASSESS-DOCD LE1/YR: CPT | Mod: S$GLB,,, | Performed by: FAMILY MEDICINE

## 2021-07-15 PROCEDURE — 3075F PR MOST RECENT SYSTOLIC BLOOD PRESS GE 130-139MM HG: ICD-10-PCS | Mod: S$GLB,,, | Performed by: FAMILY MEDICINE

## 2021-07-15 PROCEDURE — 99214 PR OFFICE/OUTPT VISIT, EST, LEVL IV, 30-39 MIN: ICD-10-PCS | Mod: S$GLB,,, | Performed by: FAMILY MEDICINE

## 2021-07-15 PROCEDURE — 1101F PR PT FALLS ASSESS DOC 0-1 FALLS W/OUT INJ PAST YR: ICD-10-PCS | Mod: S$GLB,,, | Performed by: FAMILY MEDICINE

## 2021-07-15 PROCEDURE — 99214 OFFICE O/P EST MOD 30 MIN: CPT | Mod: S$GLB,,, | Performed by: FAMILY MEDICINE

## 2021-07-16 LAB
ANION GAP SERPL CALC-SCNC: 9 MMOL/L (ref 8–16)
BUN SERPL-MCNC: 22 MG/DL (ref 8–23)
CALCIUM SERPL-MCNC: 9.8 MG/DL (ref 8.7–10.5)
CHLORIDE SERPL-SCNC: 107 MMOL/L (ref 95–110)
CO2 SERPL-SCNC: 23 MMOL/L (ref 23–29)
CREAT SERPL-MCNC: 1.3 MG/DL (ref 0.5–1.4)
EST. GFR  (AFRICAN AMERICAN): >60 ML/MIN/1.73 M^2
EST. GFR  (NON AFRICAN AMERICAN): 55.7 ML/MIN/1.73 M^2
GLUCOSE SERPL-MCNC: 94 MG/DL (ref 70–110)
POTASSIUM SERPL-SCNC: 4.9 MMOL/L (ref 3.5–5.1)
SODIUM SERPL-SCNC: 139 MMOL/L (ref 136–145)

## 2021-07-29 ENCOUNTER — OFFICE VISIT (OUTPATIENT)
Dept: UROLOGY | Facility: CLINIC | Age: 71
End: 2021-07-29
Payer: MEDICARE

## 2021-07-29 VITALS
WEIGHT: 239.44 LBS | DIASTOLIC BLOOD PRESSURE: 76 MMHG | BODY MASS INDEX: 39.84 KG/M2 | SYSTOLIC BLOOD PRESSURE: 172 MMHG

## 2021-07-29 DIAGNOSIS — Z12.5 PROSTATE CANCER SCREENING: ICD-10-CM

## 2021-07-29 DIAGNOSIS — N52.9 ERECTILE DYSFUNCTION, UNSPECIFIED ERECTILE DYSFUNCTION TYPE: Primary | ICD-10-CM

## 2021-07-29 DIAGNOSIS — R35.1 NOCTURIA: ICD-10-CM

## 2021-07-29 PROCEDURE — 3078F DIAST BP <80 MM HG: CPT | Mod: S$GLB,,, | Performed by: UROLOGY

## 2021-07-29 PROCEDURE — 3077F PR MOST RECENT SYSTOLIC BLOOD PRESSURE >= 140 MM HG: ICD-10-PCS | Mod: S$GLB,,, | Performed by: UROLOGY

## 2021-07-29 PROCEDURE — 3044F PR MOST RECENT HEMOGLOBIN A1C LEVEL <7.0%: ICD-10-PCS | Mod: S$GLB,,, | Performed by: UROLOGY

## 2021-07-29 PROCEDURE — 99204 PR OFFICE/OUTPT VISIT, NEW, LEVL IV, 45-59 MIN: ICD-10-PCS | Mod: S$GLB,,, | Performed by: UROLOGY

## 2021-07-29 PROCEDURE — 3077F SYST BP >= 140 MM HG: CPT | Mod: S$GLB,,, | Performed by: UROLOGY

## 2021-07-29 PROCEDURE — 1126F AMNT PAIN NOTED NONE PRSNT: CPT | Mod: S$GLB,,, | Performed by: UROLOGY

## 2021-07-29 PROCEDURE — 99999 PR PBB SHADOW E&M-EST. PATIENT-LVL III: ICD-10-PCS | Mod: PBBFAC,,, | Performed by: UROLOGY

## 2021-07-29 PROCEDURE — 99999 PR PBB SHADOW E&M-EST. PATIENT-LVL III: CPT | Mod: PBBFAC,,, | Performed by: UROLOGY

## 2021-07-29 PROCEDURE — 99204 OFFICE O/P NEW MOD 45 MIN: CPT | Mod: S$GLB,,, | Performed by: UROLOGY

## 2021-07-29 PROCEDURE — 1159F PR MEDICATION LIST DOCUMENTED IN MEDICAL RECORD: ICD-10-PCS | Mod: S$GLB,,, | Performed by: UROLOGY

## 2021-07-29 PROCEDURE — 1101F PR PT FALLS ASSESS DOC 0-1 FALLS W/OUT INJ PAST YR: ICD-10-PCS | Mod: S$GLB,,, | Performed by: UROLOGY

## 2021-07-29 PROCEDURE — 3288F PR FALLS RISK ASSESSMENT DOCUMENTED: ICD-10-PCS | Mod: S$GLB,,, | Performed by: UROLOGY

## 2021-07-29 PROCEDURE — 1101F PT FALLS ASSESS-DOCD LE1/YR: CPT | Mod: S$GLB,,, | Performed by: UROLOGY

## 2021-07-29 PROCEDURE — 1159F MED LIST DOCD IN RCRD: CPT | Mod: S$GLB,,, | Performed by: UROLOGY

## 2021-07-29 PROCEDURE — 3288F FALL RISK ASSESSMENT DOCD: CPT | Mod: S$GLB,,, | Performed by: UROLOGY

## 2021-07-29 PROCEDURE — 1160F PR REVIEW ALL MEDS BY PRESCRIBER/CLIN PHARMACIST DOCUMENTED: ICD-10-PCS | Mod: S$GLB,,, | Performed by: UROLOGY

## 2021-07-29 PROCEDURE — 3078F PR MOST RECENT DIASTOLIC BLOOD PRESSURE < 80 MM HG: ICD-10-PCS | Mod: S$GLB,,, | Performed by: UROLOGY

## 2021-07-29 PROCEDURE — 1160F RVW MEDS BY RX/DR IN RCRD: CPT | Mod: S$GLB,,, | Performed by: UROLOGY

## 2021-07-29 PROCEDURE — 3044F HG A1C LEVEL LT 7.0%: CPT | Mod: S$GLB,,, | Performed by: UROLOGY

## 2021-07-29 PROCEDURE — 3008F PR BODY MASS INDEX (BMI) DOCUMENTED: ICD-10-PCS | Mod: S$GLB,,, | Performed by: UROLOGY

## 2021-07-29 PROCEDURE — 1126F PR PAIN SEVERITY QUANTIFIED, NO PAIN PRESENT: ICD-10-PCS | Mod: S$GLB,,, | Performed by: UROLOGY

## 2021-07-29 PROCEDURE — 3008F BODY MASS INDEX DOCD: CPT | Mod: S$GLB,,, | Performed by: UROLOGY

## 2021-10-28 ENCOUNTER — OFFICE VISIT (OUTPATIENT)
Dept: UROLOGY | Facility: CLINIC | Age: 71
End: 2021-10-28
Payer: MEDICARE

## 2021-10-28 VITALS — DIASTOLIC BLOOD PRESSURE: 78 MMHG | WEIGHT: 239.75 LBS | BODY MASS INDEX: 39.9 KG/M2 | SYSTOLIC BLOOD PRESSURE: 161 MMHG

## 2021-10-28 DIAGNOSIS — R35.1 NOCTURIA: Primary | ICD-10-CM

## 2021-10-28 DIAGNOSIS — N52.9 ERECTILE DYSFUNCTION, UNSPECIFIED ERECTILE DYSFUNCTION TYPE: ICD-10-CM

## 2021-10-28 PROCEDURE — 4010F ACE/ARB THERAPY RXD/TAKEN: CPT | Mod: S$GLB,,, | Performed by: UROLOGY

## 2021-10-28 PROCEDURE — 3044F PR MOST RECENT HEMOGLOBIN A1C LEVEL <7.0%: ICD-10-PCS | Mod: S$GLB,,, | Performed by: UROLOGY

## 2021-10-28 PROCEDURE — 1159F MED LIST DOCD IN RCRD: CPT | Mod: S$GLB,,, | Performed by: UROLOGY

## 2021-10-28 PROCEDURE — 3077F PR MOST RECENT SYSTOLIC BLOOD PRESSURE >= 140 MM HG: ICD-10-PCS | Mod: S$GLB,,, | Performed by: UROLOGY

## 2021-10-28 PROCEDURE — 99214 OFFICE O/P EST MOD 30 MIN: CPT | Mod: S$GLB,,, | Performed by: UROLOGY

## 2021-10-28 PROCEDURE — 1126F AMNT PAIN NOTED NONE PRSNT: CPT | Mod: S$GLB,,, | Performed by: UROLOGY

## 2021-10-28 PROCEDURE — 3008F BODY MASS INDEX DOCD: CPT | Mod: S$GLB,,, | Performed by: UROLOGY

## 2021-10-28 PROCEDURE — 1160F RVW MEDS BY RX/DR IN RCRD: CPT | Mod: S$GLB,,, | Performed by: UROLOGY

## 2021-10-28 PROCEDURE — 99999 PR PBB SHADOW E&M-EST. PATIENT-LVL III: ICD-10-PCS | Mod: PBBFAC,,, | Performed by: UROLOGY

## 2021-10-28 PROCEDURE — 1159F PR MEDICATION LIST DOCUMENTED IN MEDICAL RECORD: ICD-10-PCS | Mod: S$GLB,,, | Performed by: UROLOGY

## 2021-10-28 PROCEDURE — 3077F SYST BP >= 140 MM HG: CPT | Mod: S$GLB,,, | Performed by: UROLOGY

## 2021-10-28 PROCEDURE — 3008F PR BODY MASS INDEX (BMI) DOCUMENTED: ICD-10-PCS | Mod: S$GLB,,, | Performed by: UROLOGY

## 2021-10-28 PROCEDURE — 3078F DIAST BP <80 MM HG: CPT | Mod: S$GLB,,, | Performed by: UROLOGY

## 2021-10-28 PROCEDURE — 1160F PR REVIEW ALL MEDS BY PRESCRIBER/CLIN PHARMACIST DOCUMENTED: ICD-10-PCS | Mod: S$GLB,,, | Performed by: UROLOGY

## 2021-10-28 PROCEDURE — 99999 PR PBB SHADOW E&M-EST. PATIENT-LVL III: CPT | Mod: PBBFAC,,, | Performed by: UROLOGY

## 2021-10-28 PROCEDURE — 3078F PR MOST RECENT DIASTOLIC BLOOD PRESSURE < 80 MM HG: ICD-10-PCS | Mod: S$GLB,,, | Performed by: UROLOGY

## 2021-10-28 PROCEDURE — 1126F PR PAIN SEVERITY QUANTIFIED, NO PAIN PRESENT: ICD-10-PCS | Mod: S$GLB,,, | Performed by: UROLOGY

## 2021-10-28 PROCEDURE — 3044F HG A1C LEVEL LT 7.0%: CPT | Mod: S$GLB,,, | Performed by: UROLOGY

## 2021-10-28 PROCEDURE — 4010F PR ACE/ARB THEARPY RXD/TAKEN: ICD-10-PCS | Mod: S$GLB,,, | Performed by: UROLOGY

## 2021-10-28 PROCEDURE — 99214 PR OFFICE/OUTPT VISIT, EST, LEVL IV, 30-39 MIN: ICD-10-PCS | Mod: S$GLB,,, | Performed by: UROLOGY

## 2022-01-23 NOTE — TELEPHONE ENCOUNTER
Care Due:                  Date            Visit Type   Department     Provider  --------------------------------------------------------------------------------                                             [facility] INTERNAL  Last Visit: 07-      None         KERI Gabriel                                           [facility] INTERNAL  Next Visit: 02-      Rodney Gabriel                                                            Last  Test          Frequency    Reason                     Performed    Due Date  --------------------------------------------------------------------------------    HBA1C.......  6 months...  metFORMIN................  05- 11-    Powered by [employer]. Reference number: [id number].   1/23/2022 12:13:08 AM CST

## 2022-01-28 RX ORDER — ATORVASTATIN CALCIUM 20 MG/1
TABLET, FILM COATED ORAL
Qty: 90 TABLET | Refills: 1 | Status: SHIPPED | OUTPATIENT
Start: 2022-01-28 | End: 2022-07-31

## 2022-01-29 NOTE — TELEPHONE ENCOUNTER
Refill Authorization Note   Quintin Ling  is requesting a refill authorization.  Brief Assessment and Rationale for Refill:  Approve    -Medication-Related Problems Identified: Requires labs  Medication Therapy Plan:       Medication Reconciliation Completed: No   Comments:   --->Care Gap information included below if applicable.       Requested Prescriptions   Pending Prescriptions Disp Refills    atorvastatin (LIPITOR) 20 MG tablet [Pharmacy Med Name: ATORVASTATIN 20 MG TABLET] 90 tablet 1     Sig: TAKE 1 TABLET BY MOUTH EVERY DAY       Cardiovascular:  Antilipid - Statins Passed - 1/23/2022 12:12 AM        Passed - Patient is at least 18 years old        Passed - Valid encounter within last 15 months     Recent Visits  Date Type Provider Dept   07/15/21 Office Visit Bishop Gabriel MD [facility] Internal Medicine   06/16/21 Office Visit Bishop Gabrile MD [facility] Medicine   05/19/21 Office Visit Bishop Gabriel MD [facility] Internal Medicine   Showing recent visits within past 720 days and meeting all other requirements  Future Appointments  No visits were found meeting these conditions.  Showing future appointments within next 150 days and meeting all other requirements      Future Appointments              In 4 days Bishop Gabriel MD [facility], [facility]    In 3 months Chago Mohan MD [facility]                Passed - ALT is 131 or below and within 360 days     ALT   Date Value Ref Range Status   05/19/2021 17 10 - 44 U/L Final   07/21/2020 19 10 - 44 U/L Final              Passed - AST is 119 or below and within 360 days     AST   Date Value Ref Range Status   05/19/2021 21 10 - 40 U/L Final   07/21/2020 17 10 - 40 U/L Final              Passed - Total Cholesterol within 360 days     Lab Results   Component Value Date    CHOL 146 05/19/2021    CHOL 154 07/21/2020              Passed - LDL within 360 days     LDL Cholesterol   Date Value Ref Range  Status   05/19/2021 86.6 63.0 - 159.0 mg/dL Final     Comment:     The National Cholesterol Education Program (NCEP) has set the  following guidelines (reference values) for LDL Cholesterol:  Optimal.......................<130 mg/dL  Borderline High...............130-159 mg/dL  High..........................160-189 mg/dL  Very High.....................>190 mg/dL              Passed - HDL within 360 days     HDL   Date Value Ref Range Status   05/19/2021 45 40 - 75 mg/dL Final     Comment:     The National Cholesterol Education Program (NCEP) has set the  following guidelines (reference values) for HDL Cholesterol:  Low...............<40 mg/dL  Optimal...........>60 mg/dL              Passed - Triglycerides within 360 days     Lab Results   Component Value Date    TRIG 72 05/19/2021    TRIG 74 07/21/2020                  Appointments  past 12m or future 3m with PCP    Date Provider   Last Visit   7/15/2021 Bishop Gabriel MD   Next Visit   2/1/2022 Bishop Gabriel MD   ED visits in past 90 days: 0     Note composed:6:52 PM 01/28/2022

## 2022-01-29 NOTE — TELEPHONE ENCOUNTER
Provider Staff:     Action is required for this patient.   Please see care gap opportunities below in Care Due Message.     Thanks!  [facility]     Appointments      Date Provider   Last Visit   7/15/2021 Bishop Gabriel MD   Next Visit   2/1/2022 Bishop Gabriel MD     Note composed:6:52 PM 01/28/2022

## 2022-02-01 ENCOUNTER — OFFICE VISIT (OUTPATIENT)
Dept: FAMILY MEDICINE | Facility: CLINIC | Age: 72
End: 2022-02-01
Payer: MEDICARE

## 2022-02-01 ENCOUNTER — HOSPITAL ENCOUNTER (OUTPATIENT)
Dept: RADIOLOGY | Facility: HOSPITAL | Age: 72
Discharge: HOME | End: 2022-02-01
Attending: FAMILY MEDICINE
Payer: MEDICARE

## 2022-02-01 VITALS
OXYGEN SATURATION: 98 % | BODY MASS INDEX: 38.44 KG/M2 | DIASTOLIC BLOOD PRESSURE: 62 MMHG | HEIGHT: 65 IN | RESPIRATION RATE: 16 BRPM | TEMPERATURE: 97 F | HEART RATE: 68 BPM | SYSTOLIC BLOOD PRESSURE: 160 MMHG | WEIGHT: 230.69 LBS

## 2022-02-01 DIAGNOSIS — N18.31 STAGE 3A CHRONIC KIDNEY DISEASE: ICD-10-CM

## 2022-02-01 DIAGNOSIS — D64.9 ANEMIA, UNSPECIFIED TYPE: ICD-10-CM

## 2022-02-01 DIAGNOSIS — R73.03 PREDIABETES: ICD-10-CM

## 2022-02-01 DIAGNOSIS — L02.212 BACK ABSCESS: Primary | ICD-10-CM

## 2022-02-01 DIAGNOSIS — M79.605 LEFT LEG PAIN: ICD-10-CM

## 2022-02-01 DIAGNOSIS — E66.01 SEVERE OBESITY (BMI 35.0-39.9) WITH COMORBIDITY: ICD-10-CM

## 2022-02-01 DIAGNOSIS — I10 ESSENTIAL HYPERTENSION: ICD-10-CM

## 2022-02-01 DIAGNOSIS — E78.2 MIXED HYPERLIPIDEMIA: ICD-10-CM

## 2022-02-01 DIAGNOSIS — D47.3 ESSENTIAL (HEMORRHAGIC) THROMBOCYTHEMIA: ICD-10-CM

## 2022-02-01 DIAGNOSIS — Z12.11 SCREENING FOR COLON CANCER: ICD-10-CM

## 2022-02-01 PROCEDURE — 3077F SYST BP >= 140 MM HG: CPT | Mod: CPTII,S$GLB,, | Performed by: FAMILY MEDICINE

## 2022-02-01 PROCEDURE — 3078F PR MOST RECENT DIASTOLIC BLOOD PRESSURE < 80 MM HG: ICD-10-PCS | Mod: CPTII,S$GLB,, | Performed by: FAMILY MEDICINE

## 2022-02-01 PROCEDURE — 72100 X-RAY EXAM L-S SPINE 2/3 VWS: CPT | Mod: TC,PO

## 2022-02-01 PROCEDURE — 73562 X-RAY EXAM OF KNEE 3: CPT | Mod: 26,LT,, | Performed by: RADIOLOGY

## 2022-02-01 PROCEDURE — 1126F PR PAIN SEVERITY QUANTIFIED, NO PAIN PRESENT: ICD-10-PCS | Mod: CPTII,S$GLB,, | Performed by: FAMILY MEDICINE

## 2022-02-01 PROCEDURE — 72100 XR LUMBAR SPINE AP AND LATERAL: ICD-10-PCS | Mod: 26,,, | Performed by: RADIOLOGY

## 2022-02-01 PROCEDURE — 3077F PR MOST RECENT SYSTOLIC BLOOD PRESSURE >= 140 MM HG: ICD-10-PCS | Mod: CPTII,S$GLB,, | Performed by: FAMILY MEDICINE

## 2022-02-01 PROCEDURE — 73560 XR KNEE ORTHO LEFT: ICD-10-PCS | Mod: 26,RT,, | Performed by: RADIOLOGY

## 2022-02-01 PROCEDURE — 3008F BODY MASS INDEX DOCD: CPT | Mod: CPTII,S$GLB,, | Performed by: FAMILY MEDICINE

## 2022-02-01 PROCEDURE — 99999 PR PBB SHADOW E&M-EST. PATIENT-LVL V: CPT | Mod: PBBFAC,,, | Performed by: FAMILY MEDICINE

## 2022-02-01 PROCEDURE — 1126F AMNT PAIN NOTED NONE PRSNT: CPT | Mod: CPTII,S$GLB,, | Performed by: FAMILY MEDICINE

## 2022-02-01 PROCEDURE — 3008F PR BODY MASS INDEX (BMI) DOCUMENTED: ICD-10-PCS | Mod: CPTII,S$GLB,, | Performed by: FAMILY MEDICINE

## 2022-02-01 PROCEDURE — 99215 PR OFFICE/OUTPT VISIT, EST, LEVL V, 40-54 MIN: ICD-10-PCS | Mod: S$GLB,,, | Performed by: FAMILY MEDICINE

## 2022-02-01 PROCEDURE — 99999 PR PBB SHADOW E&M-EST. PATIENT-LVL V: ICD-10-PCS | Mod: PBBFAC,,, | Performed by: FAMILY MEDICINE

## 2022-02-01 PROCEDURE — 1159F PR MEDICATION LIST DOCUMENTED IN MEDICAL RECORD: ICD-10-PCS | Mod: CPTII,S$GLB,, | Performed by: FAMILY MEDICINE

## 2022-02-01 PROCEDURE — 3078F DIAST BP <80 MM HG: CPT | Mod: CPTII,S$GLB,, | Performed by: FAMILY MEDICINE

## 2022-02-01 PROCEDURE — 1159F MED LIST DOCD IN RCRD: CPT | Mod: CPTII,S$GLB,, | Performed by: FAMILY MEDICINE

## 2022-02-01 PROCEDURE — 99215 OFFICE O/P EST HI 40 MIN: CPT | Mod: S$GLB,,, | Performed by: FAMILY MEDICINE

## 2022-02-01 PROCEDURE — 73562 XR KNEE ORTHO LEFT: ICD-10-PCS | Mod: 26,LT,, | Performed by: RADIOLOGY

## 2022-02-01 PROCEDURE — 73502 X-RAY EXAM HIP UNI 2-3 VIEWS: CPT | Mod: TC,PO,LT

## 2022-02-01 PROCEDURE — 73560 X-RAY EXAM OF KNEE 1 OR 2: CPT | Mod: 26,RT,, | Performed by: RADIOLOGY

## 2022-02-01 PROCEDURE — 72100 X-RAY EXAM L-S SPINE 2/3 VWS: CPT | Mod: 26,,, | Performed by: RADIOLOGY

## 2022-02-01 PROCEDURE — 73502 X-RAY EXAM HIP UNI 2-3 VIEWS: CPT | Mod: 26,LT,, | Performed by: RADIOLOGY

## 2022-02-01 PROCEDURE — 73502 XR HIP WITH PELVIS WHEN PERFORMED, 2 OR 3 VIEWS LEFT: ICD-10-PCS | Mod: 26,LT,, | Performed by: RADIOLOGY

## 2022-02-01 PROCEDURE — 73562 X-RAY EXAM OF KNEE 3: CPT | Mod: TC,PO,LT

## 2022-02-01 PROCEDURE — 73560 X-RAY EXAM OF KNEE 1 OR 2: CPT | Mod: TC,PO,RT

## 2022-02-01 RX ORDER — METOPROLOL SUCCINATE 100 MG/1
100 TABLET, EXTENDED RELEASE ORAL 2 TIMES DAILY
Qty: 180 TABLET | Refills: 1 | Status: SHIPPED | OUTPATIENT
Start: 2022-02-01 | End: 2022-04-22

## 2022-02-01 RX ORDER — SULFAMETHOXAZOLE AND TRIMETHOPRIM 800; 160 MG/1; MG/1
1 TABLET ORAL 2 TIMES DAILY
Qty: 14 TABLET | Refills: 0 | Status: SHIPPED | OUTPATIENT
Start: 2022-02-01 | End: 2022-11-30

## 2022-02-01 NOTE — PROGRESS NOTES
Subjective:       Patient ID: Quintin Ling is a 71 y.o. male.    Chief Complaint: Follow-up      HPI Comments:       Current Outpatient Medications:     amLODIPine (NORVASC) 10 MG tablet, TAKE 1 TABLET BY MOUTH EVERY DAY, Disp: 90 tablet, Rfl: 1    atorvastatin (LIPITOR) 20 MG tablet, TAKE 1 TABLET BY MOUTH EVERY DAY, Disp: 90 tablet, Rfl: 1    dicyclomine (BENTYL) 20 mg tablet, TAKE 1 TABLET BY MOUTH THREE TIMES A DAY FOR 7 DAYS, Disp: , Rfl:     doxazosin (CARDURA) 4 MG tablet, TAKE 1/2 TABLET BY MOUTH EVERY EVENING, Disp: 45 tablet, Rfl: 3    fenofibrate 160 MG Tab, TAKE 1 TABLET BY MOUTH EVERY DAY, Disp: 90 tablet, Rfl: 3    metFORMIN (GLUCOPHAGE) 500 MG tablet, TAKE 1 TABLET BY MOUTH TWICE A DAY WITH MEALS, Disp: 180 tablet, Rfl: 3    olmesartan (BENICAR) 40 MG tablet, Take 1 tablet (40 mg total) by mouth once daily., Disp: 90 tablet, Rfl: 3    metoprolol succinate (TOPROL-XL) 100 MG 24 hr tablet, Take 1 tablet (100 mg total) by mouth 2 (two) times daily., Disp: 180 tablet, Rfl: 1    sildenafiL (VIAGRA) 100 MG tablet, Take 1 tablet (100 mg total) by mouth daily as needed for Erectile Dysfunction., Disp: 10 tablet, Rfl: 1    sulfamethoxazole-trimethoprim 800-160mg (BACTRIM DS) 800-160 mg Tab, Take 1 tablet by mouth 2 (two) times daily., Disp: 14 tablet, Rfl: 0      Has had a draining lesion on his mid upper back for about a week.  Wife says it looks like a cyst.  Not painful.  No fever chills.  Lately been draining.    Taking Toprol only once a day.  I had previously decreased to once a day to see if it helps his ED.  Seemed to make no difference.  Viagra is also not helping.  Follow-up with urology in April.  Will increase his Toprol to b.i.d. a again today because of uncontrolled hypertension    Complains of calf tightness in the mornings.  Some pain throughout the leg.  Says he had a soft hip joint when he was a boy and had to stay off of it for a few years.  No significant hip her back pain  "now.  Occasional knee pain.  Uses an over-the-counter cream which seems to help when he has the pains.  Cramps seem to wear off after about 30 minutes of walking around    Never got around the colonoscopy.  Really does not want to do it    Review of Systems   Constitutional: Negative for activity change, appetite change and fever.   HENT: Negative for sore throat.    Respiratory: Negative for cough and shortness of breath.    Cardiovascular: Negative for chest pain.   Gastrointestinal: Negative for abdominal pain, diarrhea and nausea.   Genitourinary: Negative for difficulty urinating.   Musculoskeletal: Positive for arthralgias and myalgias.   Skin: Positive for color change.   Neurological: Negative for dizziness and headaches.       Objective:      Vitals:    02/01/22 0820   BP: (!) 160/62   Pulse: 68   Resp: 16   Temp: 97.3 °F (36.3 °C)   TempSrc: Temporal   SpO2: 98%   Weight: 104.6 kg (230 lb 11.4 oz)   Height: 5' 5" (1.651 m)   PainSc: 0-No pain     Physical Exam  Vitals and nursing note reviewed.   Constitutional:       General: He is not in acute distress.     Appearance: He is well-developed and well-nourished. He is not diaphoretic.   HENT:      Head: Normocephalic.   Neck:      Thyroid: No thyromegaly.   Cardiovascular:      Rate and Rhythm: Normal rate and regular rhythm.      Heart sounds: Normal heart sounds. No murmur heard.      Pulmonary:      Effort: Pulmonary effort is normal.      Breath sounds: Normal breath sounds. No wheezing or rales.   Abdominal:      General: There is no distension.      Palpations: Abdomen is soft.   Musculoskeletal:         General: No edema.      Cervical back: Neck supple.        Back:    Lymphadenopathy:      Cervical: No cervical adenopathy.   Skin:     General: Skin is warm and dry.   Neurological:      Mental Status: He is alert and oriented to person, place, and time.   Psychiatric:         Mood and Affect: Mood and affect normal.         Behavior: Behavior " normal.         Thought Content: Thought content normal.         Judgment: Judgment normal.         Assessment:       1. Back abscess    2. Essential hypertension    3. Prediabetes    4. Mixed hyperlipidemia    5. Screening for colon cancer    6. Stage 3a chronic kidney disease    7. Severe obesity (BMI 35.0-39.9) with comorbidity    8. Essential (hemorrhagic) thrombocythemia    9. Left leg pain    10. Anemia, unspecified type        Plan:   Back abscess  Comments:  Referral for incision/excision and drainage.  Bactrim  Orders:  -     Cancel: Ambulatory referral/consult to General Surgery; Future; Expected date: 02/08/2022  -     Ambulatory referral/consult to Dermatology; Future; Expected date: 02/08/2022    Essential hypertension  Comments:  Uncontrolled.  Increase Toprol to b.i.d..  Follow-up 1 month    Prediabetes  Comments:  A1c today  Orders:  -     Comprehensive Metabolic Panel; Future; Expected date: 02/01/2022  -     CBC Auto Differential; Future; Expected date: 02/01/2022  -     Hemoglobin A1C; Future; Expected date: 02/01/2022    Mixed hyperlipidemia  Comments:  Compliant with medication.  Controlled    Screening for colon cancer  Comments:  Patient wants to declined colonoscopy at this time.    Stage 3a chronic kidney disease  Comments:  Want to avoid oral NSAIDs if possible    Severe obesity (BMI 35.0-39.9) with comorbidity  Comments:  Weight down 5 lb    Essential (hemorrhagic) thrombocythemia  Comments:  Resolved    Left leg pain  Comments:  X-rays of hip and knee.  Lumbar spine.  Continue current over-the-counter cream  Orders:  -     X-Ray Lumbar Spine AP And Lateral; Future; Expected date: 02/01/2022  -     X-Ray Hip 2 or 3 views Left (with Pelvis when performed); Future; Expected date: 02/01/2022  -     X-Ray Knee 3 View Left; Future; Expected date: 02/01/2022    Anemia, unspecified type  Comments:  Patient declines colonoscopy.  Will recheck CBC.  If still falling will consult Hematology    Other  orders  -     metoprolol succinate (TOPROL-XL) 100 MG 24 hr tablet; Take 1 tablet (100 mg total) by mouth 2 (two) times daily.  Dispense: 180 tablet; Refill: 1  -     sulfamethoxazole-trimethoprim 800-160mg (BACTRIM DS) 800-160 mg Tab; Take 1 tablet by mouth 2 (two) times daily.  Dispense: 14 tablet; Refill: 0

## 2022-02-23 ENCOUNTER — OFFICE VISIT (OUTPATIENT)
Dept: DERMATOLOGY | Facility: CLINIC | Age: 72
End: 2022-02-23
Payer: MEDICARE

## 2022-02-23 DIAGNOSIS — L72.9 FOLLICULAR CYST OF SKIN: Primary | ICD-10-CM

## 2022-02-23 DIAGNOSIS — L02.212 BACK ABSCESS: ICD-10-CM

## 2022-02-23 DIAGNOSIS — D48.5 NEOPLASM OF UNCERTAIN BEHAVIOR OF SKIN: ICD-10-CM

## 2022-02-23 PROCEDURE — 99999 PR PBB SHADOW E&M-EST. PATIENT-LVL III: ICD-10-PCS | Mod: PBBFAC,,, | Performed by: DERMATOLOGY

## 2022-02-23 PROCEDURE — 99999 PR PBB SHADOW E&M-EST. PATIENT-LVL III: CPT | Mod: PBBFAC,,, | Performed by: DERMATOLOGY

## 2022-02-23 PROCEDURE — 1126F PR PAIN SEVERITY QUANTIFIED, NO PAIN PRESENT: ICD-10-PCS | Mod: CPTII,S$GLB,, | Performed by: DERMATOLOGY

## 2022-02-23 PROCEDURE — 3044F HG A1C LEVEL LT 7.0%: CPT | Mod: CPTII,S$GLB,, | Performed by: DERMATOLOGY

## 2022-02-23 PROCEDURE — 1101F PT FALLS ASSESS-DOCD LE1/YR: CPT | Mod: CPTII,S$GLB,, | Performed by: DERMATOLOGY

## 2022-02-23 PROCEDURE — 1160F RVW MEDS BY RX/DR IN RCRD: CPT | Mod: CPTII,S$GLB,, | Performed by: DERMATOLOGY

## 2022-02-23 PROCEDURE — 1159F PR MEDICATION LIST DOCUMENTED IN MEDICAL RECORD: ICD-10-PCS | Mod: CPTII,S$GLB,, | Performed by: DERMATOLOGY

## 2022-02-23 PROCEDURE — 3288F PR FALLS RISK ASSESSMENT DOCUMENTED: ICD-10-PCS | Mod: CPTII,S$GLB,, | Performed by: DERMATOLOGY

## 2022-02-23 PROCEDURE — 1159F MED LIST DOCD IN RCRD: CPT | Mod: CPTII,S$GLB,, | Performed by: DERMATOLOGY

## 2022-02-23 PROCEDURE — 3288F FALL RISK ASSESSMENT DOCD: CPT | Mod: CPTII,S$GLB,, | Performed by: DERMATOLOGY

## 2022-02-23 PROCEDURE — 99203 PR OFFICE/OUTPT VISIT, NEW, LEVL III, 30-44 MIN: ICD-10-PCS | Mod: S$GLB,,, | Performed by: DERMATOLOGY

## 2022-02-23 PROCEDURE — 1160F PR REVIEW ALL MEDS BY PRESCRIBER/CLIN PHARMACIST DOCUMENTED: ICD-10-PCS | Mod: CPTII,S$GLB,, | Performed by: DERMATOLOGY

## 2022-02-23 PROCEDURE — 1101F PR PT FALLS ASSESS DOC 0-1 FALLS W/OUT INJ PAST YR: ICD-10-PCS | Mod: CPTII,S$GLB,, | Performed by: DERMATOLOGY

## 2022-02-23 PROCEDURE — 3044F PR MOST RECENT HEMOGLOBIN A1C LEVEL <7.0%: ICD-10-PCS | Mod: CPTII,S$GLB,, | Performed by: DERMATOLOGY

## 2022-02-23 PROCEDURE — 1126F AMNT PAIN NOTED NONE PRSNT: CPT | Mod: CPTII,S$GLB,, | Performed by: DERMATOLOGY

## 2022-02-23 PROCEDURE — 99203 OFFICE O/P NEW LOW 30 MIN: CPT | Mod: S$GLB,,, | Performed by: DERMATOLOGY

## 2022-02-23 NOTE — PROGRESS NOTES
Subjective:       Patient ID:  Quintin Ling is a 71 y.o. male who presents for   Chief Complaint   Patient presents with    Cyst     Cyst on back and chest      71M presents to clinic today for cyst on back and growth on chest. Cyst on back recently became infected and he was put on oral antibiotics-Bactrim. This improved symptoms-no more pain, draining, redness, but he is interested in getting the cyst removed. He also c/o growth on central chest. It has been present for many years, stable. It is asymptomatic. He would like to discuss possibly having it removed as well.       Review of Systems   Constitutional: Negative for malaise.   Skin: Negative for rash.        Objective:    Physical Exam   Constitutional: He appears well-developed and well-nourished. No distress.   Neurological: He is alert and oriented to person, place, and time.   Psychiatric: He has a normal mood and affect.   Skin:   Areas Examined (abnormalities noted in diagram):   Head / Face Inspection Performed  Neck Inspection Performed  Chest / Axilla Inspection Performed  Abdomen Inspection Performed  Back Inspection Performed  RUE Inspected  LUE Inspection Performed              Diagram Legend     Erythematous scaling macule/papule c/w actinic keratosis       Vascular papule c/w angioma      Pigmented verrucoid papule/plaque c/w seborrheic keratosis      Yellow umbilicated papule c/w sebaceous hyperplasia      Irregularly shaped tan macule c/w lentigo     1-2 mm smooth white papules consistent with Milia      Movable subcutaneous cyst with punctum c/w epidermal inclusion cyst      Subcutaneous movable cyst c/w pilar cyst      Firm pink to brown papule c/w dermatofibroma      Pedunculated fleshy papule(s) c/w skin tag(s)      Evenly pigmented macule c/w junctional nevus     Mildly variegated pigmented, slightly irregular-bordered macule c/w mildly atypical nevus      Flesh colored to evenly pigmented papule c/w intradermal nevus       Pink  pearly papule/plaque c/w basal cell carcinoma      Erythematous hyperkeratotic cursted plaque c/w SCC      Surgical scar with no sign of skin cancer recurrence      Open and closed comedones      Inflammatory papules and pustules      Verrucoid papule consistent consistent with wart     Erythematous eczematous patches and plaques     Dystrophic onycholytic nail with subungual debris c/w onychomycosis     Umbilicated papule    Erythematous-base heme-crusted tan verrucoid plaque consistent with inflamed seborrheic keratosis     Erythematous Silvery Scaling Plaque c/w Psoriasis     See annotation      Assessment / Plan:        Follicular cyst of skin  Offered elective excision, procedure details, risks and benefits explained. Patient would like to proceed with procedure. We will schedule for near future.         Neoplasm of uncertain behavior of skin, subcutaneous tissue, chest  ?lipoma  Given large size and deep nature and unclear relationship to deeper structures of chest wall, recommend evaluation and consideration of excision by general surgery  I offered to place referral, patient declines at this time             Follow up for cyst excision.

## 2022-03-03 NOTE — TELEPHONE ENCOUNTER
Care Due:                  Date            Visit Type   Department     Provider  --------------------------------------------------------------------------------                                EP -                              PRIMARY      [facility] INTERNAL  Last Visit: 02-      CARE ([facility])   MEDICINE       Bishop Gabriel  Next Visit: None Scheduled  None         None Found                                                            Last  Test          Frequency    Reason                     Performed    Due Date  --------------------------------------------------------------------------------    Lipid Panel.  12 months..  atorvastatin, fenofibrate  05-   05-    Powered by [employer] by [employer]. Reference number: [id number].   3/03/2022 3:04:59 PM CST

## 2022-03-03 NOTE — TELEPHONE ENCOUNTER
----- Message from Temitope Lezama sent at 3/3/2022  2:42 PM CST -----  Contact: [phone]  Requesting an RX refill or new RX.  Is this a refill or new RX: refill 1  RX name and strength (copy/paste from chart):  doxazosin (CARDURA) 4 MG tablet  Is this a 30 day or 90 day RX:   Pharmacy name and phone # (copy/paste from chart): [facility]/pharmacy #[id number] [address]   Phone:  [phone] Fax:  [phone]         The doctors have asked that we provide their patients with the following 2 reminders -- prescription refills can take up to 72 hours, and a friendly reminder that in the future you can use your [facility] account to request refills:     Please update medication to one pill, patient ran out because was never update form 1/2 to 1 pill. thank you

## 2022-03-04 RX ORDER — DOXAZOSIN 4 MG/1
4 TABLET ORAL NIGHTLY
Qty: 90 TABLET | Refills: 3 | Status: SHIPPED | OUTPATIENT
Start: 2022-03-04 | End: 2022-05-30 | Stop reason: SDUPTHER

## 2022-03-09 ENCOUNTER — PROCEDURE VISIT (OUTPATIENT)
Dept: DERMATOLOGY | Facility: CLINIC | Age: 72
End: 2022-03-09
Payer: MEDICARE

## 2022-03-09 DIAGNOSIS — D48.5 NEOPLASM OF UNCERTAIN BEHAVIOR OF SKIN: Primary | ICD-10-CM

## 2022-03-09 PROCEDURE — 88304 TISSUE EXAM BY PATHOLOGIST: CPT | Mod: 26,,, | Performed by: PATHOLOGY

## 2022-03-09 PROCEDURE — 88304 PR  SURG PATH,LEVEL III: ICD-10-PCS | Mod: 26,,, | Performed by: PATHOLOGY

## 2022-03-09 PROCEDURE — 11404 PR EXC SKIN BENIG 3.1-4 CM TRUNK,ARM,LEG: ICD-10-PCS | Mod: S$GLB,,, | Performed by: DERMATOLOGY

## 2022-03-09 PROCEDURE — 11404 EXC TR-EXT B9+MARG 3.1-4 CM: CPT | Mod: S$GLB,,, | Performed by: DERMATOLOGY

## 2022-03-09 PROCEDURE — 99499 UNLISTED E&M SERVICE: CPT | Mod: S$GLB,,, | Performed by: DERMATOLOGY

## 2022-03-09 PROCEDURE — 99499 NO LOS: ICD-10-PCS | Mod: S$GLB,,, | Performed by: DERMATOLOGY

## 2022-03-09 PROCEDURE — 12034 INTMD RPR S/TR/EXT 7.6-12.5: CPT | Mod: 51,S$GLB,, | Performed by: DERMATOLOGY

## 2022-03-09 PROCEDURE — 12034 PR LAYR CLOS WND TRUNK,ARM,LEG 7.6-12.5 CM: ICD-10-PCS | Mod: 51,S$GLB,, | Performed by: DERMATOLOGY

## 2022-03-09 PROCEDURE — 88304 TISSUE EXAM BY PATHOLOGIST: CPT | Performed by: PATHOLOGY

## 2022-03-09 NOTE — PROCEDURES
Procedures     PROCEDURE: Elliptical excision with intermediate layered repair    ANESTHETIC: 15 cc 1% Lidocaine with Epinephrine 1:100,000    SURGICAL PREP: Betadine    SURGEON: Reynaldo Hinkle MD    ASSISTANTS:  PRATEEK Kapoor MA    PREOPERATIVE DIAGNOSIS: NUB, presumed infundibular cyst    POSTOPERATIVE DIAGNOSIS: same    PATHOLOGIC DIAGNOSIS: Pending    LOCATION: mid back    INITIAL LESION SIZE: 3.2 x 2.9 cm    EXCISED DIAMETER: 3.2 cm    PREPARATION:  The diagnosis, procedure, alternatives, benefits and risks, including but not limited to: drug reactions, pain, scar or cosmetic defect, local sensation disturbances, and/or recurrence of present condition were explained to the patient. The patient elected to proceed.    PROCEDURE:  The area of the mid back was prepped, draped, and anesthetized in the usual sterile fashion. Lesional tissue was carefully marked prior to administration of the anesthesia. An elliptical excision was drawn around the lesion. A fusiform elliptical excision was done with a #10 blade carried down completely through the dermis into the subcutaneous tissues. Then, with a combination of blunt and sharp dissection, the lesion was removed.  The specimen was submitted for histologic evaluation. The operative site was widely undermined in the subcutaneous tissue plane. Then, electrocoagulation was used to obtain hemostasis. Blood loss was minimal. The wound was then approximated in a layered fashion with subcutaneous and intradermal 4-0 Vicryl sutures. The wound was then superficially closed with interrupted 4-0 Ethilon sutures.    The patient tolerated the procedure well.    The area was cleaned and dressed appropriately and the patient was given wound care instructions, as well as an appointment for follow-up evaluation.    LENGTH OF REPAIR: 9.1 cm      Follow up in about 2 weeks (around 3/23/2022) for suture removal.

## 2022-03-16 LAB
FINAL PATHOLOGIC DIAGNOSIS: NORMAL
GROSS: NORMAL
Lab: NORMAL
MICROSCOPIC EXAM: NORMAL

## 2022-03-18 ENCOUNTER — TELEPHONE (OUTPATIENT)
Dept: DERMATOLOGY | Facility: CLINIC | Age: 72
End: 2022-03-18
Payer: MEDICARE

## 2022-03-18 NOTE — TELEPHONE ENCOUNTER
Called patient back and informed him of his biopsy results. Patient understood results. NFT.      ----- Message from Aleta Rojo sent at 3/18/2022 11:45 AM CDT -----  Pt would like return call  regarding questions about test results.  Please call back at .[phone] (home)  or [phone].  Erin Raymond

## 2022-03-28 ENCOUNTER — CLINICAL SUPPORT (OUTPATIENT)
Dept: DERMATOLOGY | Facility: CLINIC | Age: 72
End: 2022-03-28
Payer: MEDICARE

## 2022-03-28 DIAGNOSIS — Z48.02 VISIT FOR SUTURE REMOVAL: Primary | ICD-10-CM

## 2022-03-28 PROCEDURE — 99024 PR POST-OP FOLLOW-UP VISIT: ICD-10-PCS | Mod: S$GLB,,, | Performed by: DERMATOLOGY

## 2022-03-28 PROCEDURE — 99024 POSTOP FOLLOW-UP VISIT: CPT | Mod: S$GLB,,, | Performed by: DERMATOLOGY

## 2022-03-28 NOTE — PATIENT INSTRUCTIONS
Patient presents for suture removal. The wound is well healed without signs of infection.  The sutures are removed. Wound care and activity instructions given. Return prn.    Suture removal was performed on Wednesday the 23rd of March.

## 2022-04-21 NOTE — TELEPHONE ENCOUNTER
No new care gaps identified.  Powered by [employer] by [employer]. Reference number: [id number].   4/21/2022 12:10:35 AM CDT

## 2022-04-21 NOTE — TELEPHONE ENCOUNTER
Refill Routing Note   Medication(s) are not appropriate for processing by [facility] for the following reason(s):      - Required vitals are abnormal    [facility] action(s):  Defer          Medication reconciliation completed: No     Appointments  past 12m or future 3m with PCP    Date Provider   Last Visit   2/1/2022 Bishop Gabriel MD   Next Visit   Visit date not found Bishop Gabriel MD   ED visits in past 90 days: 0        Note composed:10:00 AM 04/21/2022

## 2022-04-22 RX ORDER — METOPROLOL SUCCINATE 100 MG/1
TABLET, EXTENDED RELEASE ORAL
Qty: 180 TABLET | Refills: 0 | Status: SHIPPED | OUTPATIENT
Start: 2022-04-22 | End: 2022-12-05

## 2022-04-22 NOTE — TELEPHONE ENCOUNTER
LM letting patient know refill was sent in for 90 days and he will need to be seen before they run out for an exam and possible blood work

## 2022-05-19 NOTE — TELEPHONE ENCOUNTER
Care Due:                  Date            Visit Type   Department     Provider  --------------------------------------------------------------------------------                                EP -                              PRIMARY      [facility] INTERNAL  Last Visit: 02-      CARE ([facility])   MEDICINE       Bishop Gabriel  Next Visit: None Scheduled  None         None Found                                                            Last  Test          Frequency    Reason                     Performed    Due Date  --------------------------------------------------------------------------------    HBA1C.......  6 months...  metFORMIN................  02- 08-    Lipid Panel.  12 months..  atorvastatin, fenofibrate  05-   05-    [facility] Embedded Care Gaps. Reference number: [id number]. 5/19/2022   12:17:12 AM CDT

## 2022-05-19 NOTE — TELEPHONE ENCOUNTER
Refill Routing Note   Medication(s) are not appropriate for processing by [facility] for the following reason(s):      - Required vitals are abnormal    [facility] action(s):  Defer Medication-related problems identified: Requires labs        Medication reconciliation completed: No     Appointments  past 12m or future 3m with PCP    Date Provider   Last Visit   2/1/2022 Bishop Gabriel MD   Next Visit   Visit date not found Bishop Gabriel MD   ED visits in past 90 days: 0        Note composed:1:57 PM 05/19/2022

## 2022-05-20 RX ORDER — AMLODIPINE BESYLATE 10 MG/1
10 TABLET ORAL DAILY
Qty: 90 TABLET | Refills: 0 | Status: SHIPPED | OUTPATIENT
Start: 2022-05-20 | End: 2022-08-19

## 2022-05-23 ENCOUNTER — TELEPHONE (OUTPATIENT)
Dept: FAMILY MEDICINE | Facility: CLINIC | Age: 72
End: 2022-05-23
Payer: MEDICARE

## 2022-05-23 NOTE — TELEPHONE ENCOUNTER
Left vm for pt to return call to clinic if he has any other questions regarding his appt on 5/30/22

## 2022-05-23 NOTE — TELEPHONE ENCOUNTER
----- Message from Miah Matias sent at 5/23/2022 10:25 AM CDT -----  Contact: [phone]  Quintin is requesting a callback; he has an appointment  next Monday and he states that he needs to get blood work done.    Callback number: [phone]

## 2022-05-30 ENCOUNTER — OFFICE VISIT (OUTPATIENT)
Dept: FAMILY MEDICINE | Facility: CLINIC | Age: 72
End: 2022-05-30
Payer: MEDICARE

## 2022-05-30 ENCOUNTER — LAB VISIT (OUTPATIENT)
Dept: LAB | Facility: HOSPITAL | Age: 72
End: 2022-05-30
Attending: FAMILY MEDICINE
Payer: MEDICARE

## 2022-05-30 VITALS
BODY MASS INDEX: 38.35 KG/M2 | WEIGHT: 230.19 LBS | OXYGEN SATURATION: 95 % | HEIGHT: 65 IN | DIASTOLIC BLOOD PRESSURE: 64 MMHG | SYSTOLIC BLOOD PRESSURE: 162 MMHG | RESPIRATION RATE: 18 BRPM | TEMPERATURE: 98 F | HEART RATE: 70 BPM

## 2022-05-30 DIAGNOSIS — Z00.00 ANNUAL PHYSICAL EXAM: Primary | ICD-10-CM

## 2022-05-30 DIAGNOSIS — I10 ESSENTIAL HYPERTENSION: ICD-10-CM

## 2022-05-30 DIAGNOSIS — R25.2 LEG CRAMPS: ICD-10-CM

## 2022-05-30 DIAGNOSIS — N18.31 STAGE 3A CHRONIC KIDNEY DISEASE: ICD-10-CM

## 2022-05-30 DIAGNOSIS — Z12.5 PROSTATE CANCER SCREENING: ICD-10-CM

## 2022-05-30 DIAGNOSIS — E78.2 MIXED HYPERLIPIDEMIA: ICD-10-CM

## 2022-05-30 DIAGNOSIS — R73.03 PREDIABETES: ICD-10-CM

## 2022-05-30 DIAGNOSIS — Z12.11 SCREENING FOR COLON CANCER: ICD-10-CM

## 2022-05-30 DIAGNOSIS — E66.01 SEVERE OBESITY (BMI 35.0-39.9) WITH COMORBIDITY: ICD-10-CM

## 2022-05-30 LAB
ALBUMIN SERPL BCP-MCNC: 4 G/DL (ref 3.5–5.2)
ALP SERPL-CCNC: 36 U/L (ref 55–135)
ALT SERPL W/O P-5'-P-CCNC: 15 U/L (ref 10–44)
ANION GAP SERPL CALC-SCNC: 8 MMOL/L (ref 8–16)
AST SERPL-CCNC: 16 U/L (ref 10–40)
BILIRUB SERPL-MCNC: 0.4 MG/DL (ref 0.1–1)
BUN SERPL-MCNC: 23 MG/DL (ref 8–23)
CALCIUM SERPL-MCNC: 9.5 MG/DL (ref 8.7–10.5)
CHLORIDE SERPL-SCNC: 104 MMOL/L (ref 95–110)
CO2 SERPL-SCNC: 26 MMOL/L (ref 23–29)
COMPLEXED PSA SERPL-MCNC: 0.98 NG/ML (ref 0–4)
CREAT SERPL-MCNC: 1.2 MG/DL (ref 0.5–1.4)
EST. GFR  (AFRICAN AMERICAN): >60 ML/MIN/1.73 M^2
EST. GFR  (NON AFRICAN AMERICAN): >60 ML/MIN/1.73 M^2
ESTIMATED AVG GLUCOSE: 111 MG/DL (ref 68–131)
GLUCOSE SERPL-MCNC: 97 MG/DL (ref 70–110)
HBA1C MFR BLD: 5.5 % (ref 4–5.6)
POTASSIUM SERPL-SCNC: 4.7 MMOL/L (ref 3.5–5.1)
PROT SERPL-MCNC: 7.1 G/DL (ref 6–8.4)
SODIUM SERPL-SCNC: 138 MMOL/L (ref 136–145)

## 2022-05-30 PROCEDURE — 1159F MED LIST DOCD IN RCRD: CPT | Mod: CPTII,S$GLB,, | Performed by: FAMILY MEDICINE

## 2022-05-30 PROCEDURE — 1159F PR MEDICATION LIST DOCUMENTED IN MEDICAL RECORD: ICD-10-PCS | Mod: CPTII,S$GLB,, | Performed by: FAMILY MEDICINE

## 2022-05-30 PROCEDURE — 84153 ASSAY OF PSA TOTAL: CPT | Performed by: FAMILY MEDICINE

## 2022-05-30 PROCEDURE — 3288F FALL RISK ASSESSMENT DOCD: CPT | Mod: CPTII,S$GLB,, | Performed by: FAMILY MEDICINE

## 2022-05-30 PROCEDURE — 3078F DIAST BP <80 MM HG: CPT | Mod: CPTII,S$GLB,, | Performed by: FAMILY MEDICINE

## 2022-05-30 PROCEDURE — 80053 COMPREHEN METABOLIC PANEL: CPT | Performed by: FAMILY MEDICINE

## 2022-05-30 PROCEDURE — 1101F PT FALLS ASSESS-DOCD LE1/YR: CPT | Mod: CPTII,S$GLB,, | Performed by: FAMILY MEDICINE

## 2022-05-30 PROCEDURE — 3008F PR BODY MASS INDEX (BMI) DOCUMENTED: ICD-10-PCS | Mod: CPTII,S$GLB,, | Performed by: FAMILY MEDICINE

## 2022-05-30 PROCEDURE — 1101F PR PT FALLS ASSESS DOC 0-1 FALLS W/OUT INJ PAST YR: ICD-10-PCS | Mod: CPTII,S$GLB,, | Performed by: FAMILY MEDICINE

## 2022-05-30 PROCEDURE — 3044F HG A1C LEVEL LT 7.0%: CPT | Mod: CPTII,S$GLB,, | Performed by: FAMILY MEDICINE

## 2022-05-30 PROCEDURE — 3078F PR MOST RECENT DIASTOLIC BLOOD PRESSURE < 80 MM HG: ICD-10-PCS | Mod: CPTII,S$GLB,, | Performed by: FAMILY MEDICINE

## 2022-05-30 PROCEDURE — 4010F PR ACE/ARB THEARPY RXD/TAKEN: ICD-10-PCS | Mod: CPTII,S$GLB,, | Performed by: FAMILY MEDICINE

## 2022-05-30 PROCEDURE — 99999 PR PBB SHADOW E&M-EST. PATIENT-LVL III: CPT | Mod: PBBFAC,,, | Performed by: FAMILY MEDICINE

## 2022-05-30 PROCEDURE — 3044F PR MOST RECENT HEMOGLOBIN A1C LEVEL <7.0%: ICD-10-PCS | Mod: CPTII,S$GLB,, | Performed by: FAMILY MEDICINE

## 2022-05-30 PROCEDURE — 3008F BODY MASS INDEX DOCD: CPT | Mod: CPTII,S$GLB,, | Performed by: FAMILY MEDICINE

## 2022-05-30 PROCEDURE — 4010F ACE/ARB THERAPY RXD/TAKEN: CPT | Mod: CPTII,S$GLB,, | Performed by: FAMILY MEDICINE

## 2022-05-30 PROCEDURE — 83036 HEMOGLOBIN GLYCOSYLATED A1C: CPT | Performed by: FAMILY MEDICINE

## 2022-05-30 PROCEDURE — 36415 COLL VENOUS BLD VENIPUNCTURE: CPT | Mod: PO | Performed by: FAMILY MEDICINE

## 2022-05-30 PROCEDURE — 3077F PR MOST RECENT SYSTOLIC BLOOD PRESSURE >= 140 MM HG: ICD-10-PCS | Mod: CPTII,S$GLB,, | Performed by: FAMILY MEDICINE

## 2022-05-30 PROCEDURE — 3077F SYST BP >= 140 MM HG: CPT | Mod: CPTII,S$GLB,, | Performed by: FAMILY MEDICINE

## 2022-05-30 PROCEDURE — 99999 PR PBB SHADOW E&M-EST. PATIENT-LVL III: ICD-10-PCS | Mod: PBBFAC,,, | Performed by: FAMILY MEDICINE

## 2022-05-30 PROCEDURE — 99397 PR PREVENTIVE VISIT,EST,65 & OVER: ICD-10-PCS | Mod: GY,S$GLB,, | Performed by: FAMILY MEDICINE

## 2022-05-30 PROCEDURE — 3288F PR FALLS RISK ASSESSMENT DOCUMENTED: ICD-10-PCS | Mod: CPTII,S$GLB,, | Performed by: FAMILY MEDICINE

## 2022-05-30 PROCEDURE — 99397 PER PM REEVAL EST PAT 65+ YR: CPT | Mod: GY,S$GLB,, | Performed by: FAMILY MEDICINE

## 2022-05-30 RX ORDER — HYDROCHLOROTHIAZIDE 25 MG/1
TABLET ORAL
Qty: 90 TABLET | Refills: 1 | Status: SHIPPED | OUTPATIENT
Start: 2022-05-30 | End: 2022-09-09

## 2022-05-30 RX ORDER — DOXAZOSIN 4 MG/1
4 TABLET ORAL NIGHTLY
Qty: 90 TABLET | Refills: 3 | Status: SHIPPED | OUTPATIENT
Start: 2022-05-30 | End: 2023-06-03

## 2022-05-30 RX ORDER — OLMESARTAN MEDOXOMIL 40 MG/1
40 TABLET ORAL DAILY
Qty: 90 TABLET | Refills: 3 | Status: SHIPPED | OUTPATIENT
Start: 2022-05-30 | End: 2023-05-29

## 2022-05-30 NOTE — PROGRESS NOTES
Subjective:       Patient ID: Quintin Ling is a 71 y.o. male.    Chief Complaint: Annual Exam      HPI Comments:       Current Outpatient Medications:     amLODIPine (NORVASC) 10 MG tablet, Take 1 tablet (10 mg total) by mouth once daily., Disp: 90 tablet, Rfl: 0    atorvastatin (LIPITOR) 20 MG tablet, TAKE 1 TABLET BY MOUTH EVERY DAY, Disp: 90 tablet, Rfl: 1    dicyclomine (BENTYL) 20 mg tablet, TAKE 1 TABLET BY MOUTH THREE TIMES A DAY FOR 7 DAYS, Disp: , Rfl:     fenofibrate 160 MG Tab, TAKE 1 TABLET BY MOUTH EVERY DAY, Disp: 90 tablet, Rfl: 3    metFORMIN (GLUCOPHAGE) 500 MG tablet, TAKE 1 TABLET BY MOUTH TWICE A DAY WITH MEALS, Disp: 180 tablet, Rfl: 3    metoprolol succinate (TOPROL-XL) 100 MG 24 hr tablet, TAKE 1 TABLET BY MOUTH TWICE A DAY, Disp: 180 tablet, Rfl: 0    sulfamethoxazole-trimethoprim 800-160mg (BACTRIM DS) 800-160 mg Tab, Take 1 tablet by mouth 2 (two) times daily., Disp: 14 tablet, Rfl: 0    doxazosin (CARDURA) 4 MG tablet, Take 1 tablet (4 mg total) by mouth every evening., Disp: 90 tablet, Rfl: 3    hydroCHLOROthiazide (HYDRODIURIL) 25 MG tablet, Take one tablet by mouth every morning, Disp: 90 tablet, Rfl: 1    olmesartan (BENICAR) 40 MG tablet, Take 1 tablet (40 mg total) by mouth once daily., Disp: 90 tablet, Rfl: 3    sildenafiL (VIAGRA) 100 MG tablet, Take 1 tablet (100 mg total) by mouth daily as needed for Erectile Dysfunction., Disp: 10 tablet, Rfl: 1      Here for annual physical.  Generally doing well.  Still has cramps in his calves at night.  Recommend he try pickle juice and vitamin-B complex, the latter t.i.d..    Does not want to do colonoscopy..  Willing to try  Cologuard    Taking all of his blood pressure medications, including the b.i.d. change of Toprol that I had made last time in February.  Blood pressure still elevated.  Will add HCTZ in the morning     Had a large cyst removed from his back.  Has another 1 in his front near the sternum.  Dermatology wants  "him to see surgery when time comes for this 1 to me removed.  Right now it's not bothering the patient very much    The the blood work done last time was all good, including A1c of 5.5    Review of Systems   Constitutional: Negative for activity change, appetite change and fever.   HENT: Negative for sore throat.    Respiratory: Negative for cough and shortness of breath.    Cardiovascular: Negative for chest pain.   Gastrointestinal: Negative for abdominal pain, diarrhea and nausea.   Genitourinary: Negative for difficulty urinating.   Musculoskeletal: Positive for myalgias. Negative for arthralgias.   Neurological: Negative for dizziness and headaches.       Objective:      Vitals:    05/30/22 0929   BP: (!) 162/64   Pulse: 70   Resp: 18   Temp: 97.9 °F (36.6 °C)   SpO2: 95%   Weight: 104.4 kg (230 lb 2.6 oz)   Height: 5' 5" (1.651 m)     Physical Exam  Vitals and nursing note reviewed.   Constitutional:       General: He is not in acute distress.     Appearance: He is well-developed. He is not diaphoretic.   HENT:      Head: Normocephalic.   Neck:      Thyroid: No thyromegaly.   Cardiovascular:      Rate and Rhythm: Normal rate and regular rhythm.      Heart sounds: Normal heart sounds. No murmur heard.  Pulmonary:      Effort: Pulmonary effort is normal.      Breath sounds: Normal breath sounds. No wheezing or rales.   Abdominal:      General: There is no distension.      Palpations: Abdomen is soft.   Musculoskeletal:      Cervical back: Neck supple.   Lymphadenopathy:      Cervical: No cervical adenopathy.   Skin:     General: Skin is warm and dry.   Neurological:      Mental Status: He is alert and oriented to person, place, and time.   Psychiatric:         Mood and Affect: Mood normal.         Behavior: Behavior normal.         Thought Content: Thought content normal.         Judgment: Judgment normal.         Assessment:       1. Annual physical exam    2. Essential hypertension    3. Prediabetes    4. " Mixed hyperlipidemia    5. Screening for colon cancer    6. Stage 3a chronic kidney disease    7. Severe obesity (BMI 35.0-39.9) with comorbidity    8. Prostate cancer screening    9. Leg cramps        Plan:   Annual physical exam  Comments:  Reviewed colon cancer screening options.  Patient does not want to do colonoscopy    Essential hypertension  Comments:  Uncontrolled.  Add HCTZ.  Follow-up 6 months  Orders:  -     Comprehensive Metabolic Panel; Future; Expected date: 05/30/2022    Prediabetes  Comments:  Controlled on metformin.  A1c today  Orders:  -     Hemoglobin A1C; Future; Expected date: 05/30/2022    Mixed hyperlipidemia  Comments:  Stable on statin    Screening for colon cancer  Comments:  Cologuard ordered  Orders:  -     Cologuard Screening (Multitarget Stool DNA); Future; Expected date: 05/30/2022    Stage 3a chronic kidney disease  Comments:  Controlled    Severe obesity (BMI 35.0-39.9) with comorbidity  Comments:  Weight unchanged    Prostate cancer screening  Comments:  PSA  Orders:  -     PSA, Screening; Future; Expected date: 05/30/2022    Leg cramps  Comments:  Recommend pickle juice and/or vitamin-D complex    Other orders  -     hydroCHLOROthiazide (HYDRODIURIL) 25 MG tablet; Take one tablet by mouth every morning  Dispense: 90 tablet; Refill: 1  -     olmesartan (BENICAR) 40 MG tablet; Take 1 tablet (40 mg total) by mouth once daily.  Dispense: 90 tablet; Refill: 3  -     doxazosin (CARDURA) 4 MG tablet; Take 1 tablet (4 mg total) by mouth every evening.  Dispense: 90 tablet; Refill: 3

## 2022-06-06 NOTE — TELEPHONE ENCOUNTER
No new care gaps identified.  [facility] Embedded Care Gaps. Reference number: [id number]. 6/06/2022   12:12:03 AM CDT

## 2022-06-06 NOTE — TELEPHONE ENCOUNTER
Refill Routing Note   Medication(s) are not appropriate for processing by [facility] for the following reason(s):      - Required laboratory values are outdated    [facility] action(s):  Defer          Medication reconciliation completed: No     Appointments  past 12m or future 3m with PCP    Date Provider   Last Visit   5/30/2022 Bishop Gabriel MD   Next Visit   11/30/2022 Bishop Gabriel MD   ED visits in past 90 days: 0        Note composed:3:01 PM 06/06/2022

## 2022-06-08 RX ORDER — FENOFIBRATE 160 MG/1
TABLET ORAL
Qty: 90 TABLET | Refills: 3 | Status: SHIPPED | OUTPATIENT
Start: 2022-06-08 | End: 2023-06-05

## 2022-06-15 ENCOUNTER — TELEPHONE (OUTPATIENT)
Dept: FAMILY MEDICINE | Facility: CLINIC | Age: 72
End: 2022-06-15
Payer: MEDICARE

## 2022-06-15 NOTE — TELEPHONE ENCOUNTER
Gave pt results of blood work patient verbalized understanding. And would like for a copy to be mailed to him. Also notified him [facility] will mail his test to him

## 2022-08-13 LAB — NONINV COLON CA DNA+OCC BLD SCRN STL QL: NEGATIVE

## 2022-08-17 RX ORDER — METFORMIN HYDROCHLORIDE 500 MG/1
TABLET ORAL
Qty: 180 TABLET | Refills: 1 | Status: SHIPPED | OUTPATIENT
Start: 2022-08-17 | End: 2023-03-01

## 2022-08-17 NOTE — TELEPHONE ENCOUNTER
No new care gaps identified.  [facility] Embedded Care Gaps. Reference number: [id number]. 8/17/2022   1:20:05 AM YANELIST

## 2022-08-17 NOTE — TELEPHONE ENCOUNTER
Refill Routing Note   Medication(s) are not appropriate for processing by [facility] for the following reason(s):      - Required vitals are abnormal    [facility] action(s):  Defer  Approve       Medication Therapy Plan: approve-metformin; defer-amlodipine  --->Care Gap information included in message below if applicable.   Medication reconciliation completed: No   Automatic Epic Generated Protocol Data:        Requested Prescriptions   Pending Prescriptions Disp Refills    metFORMIN (GLUCOPHAGE) 500 MG tablet [Pharmacy Med Name: METFORMIN  MG TABLET] 180 tablet 3     Sig: TAKE 1 TABLET BY MOUTH TWICE A DAY WITH MEALS       Endocrinology:  Diabetes - Biguanides Passed - 8/17/2022 10:04 AM        Passed - Patient is at least 18 years old        Passed - Valid encounter within last 15 months     Recent Visits  Date Type Provider Dept   05/30/22 Office Visit Bishop Gabriel MD [facility]   02/01/22 Office Visit Bishop Gabriel MD [facility]   07/15/21 Office Visit Bishop Gabriel MD [facility]   06/16/21 Office Visit Bishop Gabriel MD [facility]   05/19/21 Office Visit Bishop Gabriel MD [facility]   Showing recent visits within past 720 days and meeting all other requirements  Future Appointments  No visits were found meeting these conditions.  Showing future appointments within next 150 days and meeting all other requirements      Future Appointments              In 3 months Bishop Gabriel MD [facility]                Passed - Matches previous order       Previous Authorizing Provider: Bishop Gabriel MD (metFORMIN (GLUCOPHAGE) 500 MG tablet)  Previous Authorizing Provider: Bishop Gabriel MD (amLODIPine (NORVASC) 10 MG tablet)  Previous Pharmacy: [facility]/pharmacy #[id number] [facility] - [place], [place] - [address] [address]  Previous Pharmacy: [facility]/pharmacy #[id number] [place]  [address] [address]            Passed - No ED/Admission Since Last PCP visit               Passed - Cr is 1.4 or below and within 360 days     Lab Results   Component Value Date    CREATININE 1.2 05/30/2022    CREATININE 1.3 02/01/2022    CREATININE 1.3 07/15/2021              Passed - HBA1C within 180 days     Lab Results   Component Value Date    HGBA1C 5.5 05/30/2022    HGBA1C 5.5 02/01/2022    HGBA1C 5.6 05/19/2021              Passed - eGFR is 45 or above and within 360 days     Lab Results   Component Value Date    EGFRNONAA >60.0 05/30/2022    EGFRNONAA 55.7 (A) 02/01/2022    EGFRNONAA 55.7 (A) 07/15/2021                  amLODIPine (NORVASC) 10 MG tablet [Pharmacy Med Name: AMLODIPINE BESYLATE 10 MG TAB] 90 tablet 0     Sig: TAKE 1 TABLET BY MOUTH EVERY DAY       Cardiovascular:  Calcium Channel Blockers Failed - 8/17/2022 10:04 AM        Failed - Last BP in normal range within 360 days     BP Readings from Last 3 Encounters:   05/30/22 (!) 162/64   02/01/22 (!) 160/62   10/28/21 (!) 161/78               Passed - Patient is at least 18 years old        Passed - Valid encounter within last 15 months     Recent Visits  Date Type Provider Dept   05/30/22 Office Visit Bishop Gabriel MD [facility]   02/01/22 Office Visit Bishop Gabriel MD [facility]   07/15/21 Office Visit Bishop Gabriel MD [facility]   06/16/21 Office Visit Bishop Gabriel MD [facility]   05/19/21 Office Visit Bishop Gabriel MD [facility]   Showing recent visits within past 720 days and meeting all other requirements  Future Appointments  No visits were found meeting these conditions.  Showing future appointments within next 150 days and meeting all other requirements      Future Appointments              [facility]                Passed - Matches previous order       Previous Authorizing Provider: Bishop Gabriel MD  (metFORMIN (GLUCOPHAGE) 500 MG tablet)  Previous Authorizing Provider: Bishop Gabriel MD (amLODIPine (NORVASC) 10 MG tablet)  Previous Pharmacy: [facility]/pharmacy #[id number] [facility] - [address]  Previous Pharmacy: [facility]/pharmacy #[id number] [facility] - [address]            Passed - No ED/Admission Since Last PCP visit                     Appointments  past 12m or future 3m with PCP    Date Provider   Last Visit   5/30/2022 Bishop Gabriel MD   Next Visit   11/30/2022 Bishop Gabriel MD   ED visits in past 90 days: 0        Note composed:10:05 AM 08/17/2022

## 2022-08-19 RX ORDER — AMLODIPINE BESYLATE 10 MG/1
TABLET ORAL
Qty: 90 TABLET | Refills: 1 | Status: SHIPPED | OUTPATIENT
Start: 2022-08-19 | End: 2022-11-30 | Stop reason: SDUPTHER

## 2022-08-30 ENCOUNTER — TELEPHONE (OUTPATIENT)
Dept: FAMILY MEDICINE | Facility: CLINIC | Age: 72
End: 2022-08-30
Payer: MEDICARE

## 2022-08-30 NOTE — TELEPHONE ENCOUNTER
----- Message from Becca Alejandro sent at 8/30/2022 12:40 PM CDT -----  Contact: [phone] or [phone]  Pt states he sent in his Cologuard test on 08/08/22 and has not received the results. Please call and advise.

## 2022-09-06 ENCOUNTER — TELEPHONE (OUTPATIENT)
Dept: FAMILY MEDICINE | Facility: CLINIC | Age: 72
End: 2022-09-06
Payer: MEDICARE

## 2022-09-06 NOTE — TELEPHONE ENCOUNTER
----- Message from Elisa Rico sent at 9/6/2022  8:39 AM CDT -----  Contact: Quintin Meyer is calling to speak with the nurse regarding test results. Patient explains the test was mailed out 8/08/2022. Please give patient a call back at [phone] or [phone] as requested.  Thanks  LR

## 2022-11-30 ENCOUNTER — LAB VISIT (OUTPATIENT)
Dept: LAB | Facility: HOSPITAL | Age: 72
End: 2022-11-30
Attending: FAMILY MEDICINE
Payer: MEDICARE

## 2022-11-30 ENCOUNTER — OFFICE VISIT (OUTPATIENT)
Dept: FAMILY MEDICINE | Facility: CLINIC | Age: 72
End: 2022-11-30
Payer: MEDICARE

## 2022-11-30 VITALS
TEMPERATURE: 97 F | WEIGHT: 225.06 LBS | DIASTOLIC BLOOD PRESSURE: 60 MMHG | HEART RATE: 76 BPM | SYSTOLIC BLOOD PRESSURE: 138 MMHG | OXYGEN SATURATION: 95 % | HEIGHT: 65 IN | BODY MASS INDEX: 37.5 KG/M2

## 2022-11-30 DIAGNOSIS — E78.2 MIXED HYPERLIPIDEMIA: ICD-10-CM

## 2022-11-30 DIAGNOSIS — E66.01 SEVERE OBESITY (BMI 35.0-39.9) WITH COMORBIDITY: ICD-10-CM

## 2022-11-30 DIAGNOSIS — N18.31 STAGE 3A CHRONIC KIDNEY DISEASE: ICD-10-CM

## 2022-11-30 DIAGNOSIS — I10 ESSENTIAL HYPERTENSION: ICD-10-CM

## 2022-11-30 DIAGNOSIS — I10 ESSENTIAL HYPERTENSION: Primary | ICD-10-CM

## 2022-11-30 DIAGNOSIS — R73.03 PREDIABETES: ICD-10-CM

## 2022-11-30 LAB
ANION GAP SERPL CALC-SCNC: 7 MMOL/L (ref 8–16)
BUN SERPL-MCNC: 25 MG/DL (ref 8–23)
CALCIUM SERPL-MCNC: 9.1 MG/DL (ref 8.7–10.5)
CHLORIDE SERPL-SCNC: 106 MMOL/L (ref 95–110)
CO2 SERPL-SCNC: 26 MMOL/L (ref 23–29)
CREAT SERPL-MCNC: 1.3 MG/DL (ref 0.5–1.4)
EST. GFR  (NO RACE VARIABLE): 59.1 ML/MIN/1.73 M^2
GLUCOSE SERPL-MCNC: 99 MG/DL (ref 70–110)
POTASSIUM SERPL-SCNC: 4.7 MMOL/L (ref 3.5–5.1)
SODIUM SERPL-SCNC: 139 MMOL/L (ref 136–145)

## 2022-11-30 PROCEDURE — 3075F SYST BP GE 130 - 139MM HG: CPT | Mod: CPTII,S$GLB,, | Performed by: FAMILY MEDICINE

## 2022-11-30 PROCEDURE — 4010F ACE/ARB THERAPY RXD/TAKEN: CPT | Mod: CPTII,S$GLB,, | Performed by: FAMILY MEDICINE

## 2022-11-30 PROCEDURE — 1159F MED LIST DOCD IN RCRD: CPT | Mod: CPTII,S$GLB,, | Performed by: FAMILY MEDICINE

## 2022-11-30 PROCEDURE — 1101F PT FALLS ASSESS-DOCD LE1/YR: CPT | Mod: CPTII,S$GLB,, | Performed by: FAMILY MEDICINE

## 2022-11-30 PROCEDURE — 36415 COLL VENOUS BLD VENIPUNCTURE: CPT | Mod: PO | Performed by: FAMILY MEDICINE

## 2022-11-30 PROCEDURE — 3044F PR MOST RECENT HEMOGLOBIN A1C LEVEL <7.0%: ICD-10-PCS | Mod: CPTII,S$GLB,, | Performed by: FAMILY MEDICINE

## 2022-11-30 PROCEDURE — 3008F PR BODY MASS INDEX (BMI) DOCUMENTED: ICD-10-PCS | Mod: CPTII,S$GLB,, | Performed by: FAMILY MEDICINE

## 2022-11-30 PROCEDURE — 1126F PR PAIN SEVERITY QUANTIFIED, NO PAIN PRESENT: ICD-10-PCS | Mod: CPTII,S$GLB,, | Performed by: FAMILY MEDICINE

## 2022-11-30 PROCEDURE — 99214 OFFICE O/P EST MOD 30 MIN: CPT | Mod: S$GLB,,, | Performed by: FAMILY MEDICINE

## 2022-11-30 PROCEDURE — 3008F BODY MASS INDEX DOCD: CPT | Mod: CPTII,S$GLB,, | Performed by: FAMILY MEDICINE

## 2022-11-30 PROCEDURE — 4010F PR ACE/ARB THEARPY RXD/TAKEN: ICD-10-PCS | Mod: CPTII,S$GLB,, | Performed by: FAMILY MEDICINE

## 2022-11-30 PROCEDURE — 3044F HG A1C LEVEL LT 7.0%: CPT | Mod: CPTII,S$GLB,, | Performed by: FAMILY MEDICINE

## 2022-11-30 PROCEDURE — 3288F FALL RISK ASSESSMENT DOCD: CPT | Mod: CPTII,S$GLB,, | Performed by: FAMILY MEDICINE

## 2022-11-30 PROCEDURE — 99214 PR OFFICE/OUTPT VISIT, EST, LEVL IV, 30-39 MIN: ICD-10-PCS | Mod: S$GLB,,, | Performed by: FAMILY MEDICINE

## 2022-11-30 PROCEDURE — 3288F PR FALLS RISK ASSESSMENT DOCUMENTED: ICD-10-PCS | Mod: CPTII,S$GLB,, | Performed by: FAMILY MEDICINE

## 2022-11-30 PROCEDURE — 1126F AMNT PAIN NOTED NONE PRSNT: CPT | Mod: CPTII,S$GLB,, | Performed by: FAMILY MEDICINE

## 2022-11-30 PROCEDURE — 1101F PR PT FALLS ASSESS DOC 0-1 FALLS W/OUT INJ PAST YR: ICD-10-PCS | Mod: CPTII,S$GLB,, | Performed by: FAMILY MEDICINE

## 2022-11-30 PROCEDURE — 1159F PR MEDICATION LIST DOCUMENTED IN MEDICAL RECORD: ICD-10-PCS | Mod: CPTII,S$GLB,, | Performed by: FAMILY MEDICINE

## 2022-11-30 PROCEDURE — 80048 BASIC METABOLIC PNL TOTAL CA: CPT | Performed by: FAMILY MEDICINE

## 2022-11-30 PROCEDURE — 3075F PR MOST RECENT SYSTOLIC BLOOD PRESS GE 130-139MM HG: ICD-10-PCS | Mod: CPTII,S$GLB,, | Performed by: FAMILY MEDICINE

## 2022-11-30 PROCEDURE — 99999 PR PBB SHADOW E&M-EST. PATIENT-LVL III: CPT | Mod: PBBFAC,,, | Performed by: FAMILY MEDICINE

## 2022-11-30 PROCEDURE — 3078F PR MOST RECENT DIASTOLIC BLOOD PRESSURE < 80 MM HG: ICD-10-PCS | Mod: CPTII,S$GLB,, | Performed by: FAMILY MEDICINE

## 2022-11-30 PROCEDURE — 3078F DIAST BP <80 MM HG: CPT | Mod: CPTII,S$GLB,, | Performed by: FAMILY MEDICINE

## 2022-11-30 PROCEDURE — 99999 PR PBB SHADOW E&M-EST. PATIENT-LVL III: ICD-10-PCS | Mod: PBBFAC,,, | Performed by: FAMILY MEDICINE

## 2022-11-30 RX ORDER — AMLODIPINE BESYLATE 5 MG/1
5 TABLET ORAL DAILY
Qty: 90 TABLET | Refills: 1 | Status: SHIPPED | OUTPATIENT
Start: 2022-11-30 | End: 2023-05-29

## 2022-11-30 NOTE — PROGRESS NOTES
Subjective:       Patient ID: Quintin Ling is a 72 y.o. male.    Chief Complaint: Follow-up      HPI Comments:       Current Outpatient Medications:     atorvastatin (LIPITOR) 20 MG tablet, TAKE 1 TABLET BY MOUTH EVERY DAY, Disp: 90 tablet, Rfl: 1    dicyclomine (BENTYL) 20 mg tablet, TAKE 1 TABLET BY MOUTH THREE TIMES A DAY FOR 7 DAYS, Disp: , Rfl:     doxazosin (CARDURA) 4 MG tablet, Take 1 tablet (4 mg total) by mouth every evening., Disp: 90 tablet, Rfl: 3    fenofibrate 160 MG Tab, TAKE 1 TABLET BY MOUTH EVERY DAY, Disp: 90 tablet, Rfl: 3    hydroCHLOROthiazide (HYDRODIURIL) 25 MG tablet, TAKE 1 TABLET BY MOUTH EVERY DAY IN THE MORNING, Disp: 90 tablet, Rfl: 0    metFORMIN (GLUCOPHAGE) 500 MG tablet, TAKE 1 TABLET BY MOUTH TWICE A DAY WITH MEALS, Disp: 180 tablet, Rfl: 1    metoprolol succinate (TOPROL-XL) 100 MG 24 hr tablet, TAKE 1 TABLET BY MOUTH TWICE A DAY, Disp: 180 tablet, Rfl: 0    olmesartan (BENICAR) 40 MG tablet, Take 1 tablet (40 mg total) by mouth once daily., Disp: 90 tablet, Rfl: 3    amLODIPine (NORVASC) 5 MG tablet, Take 1 tablet (5 mg total) by mouth once daily., Disp: 90 tablet, Rfl: 1    sildenafiL (VIAGRA) 100 MG tablet, Take 1 tablet (100 mg total) by mouth daily as needed for Erectile Dysfunction., Disp: 10 tablet, Rfl: 1    6 month follow-up.  No new concerns or complaints    Had his cataract surgery done.  Vision is better.      Taking B complex vitamin 3 times a day as I recommended for leg cramps.  Seems to be helping.      Noticed some swelling in his legs.  I note that his amlodipine has been increased up to 10 mg.  Will reduce this to 5 mg.      Last time I put him on HCTZ.  This seems to be helping his blood pressure.  Will check BMP today.    Cologuard negative    Blood work last time: CMP okay, A1c 5.5, PSA 0.98    Review of Systems   Constitutional:  Negative for activity change, appetite change and fever.   HENT:  Negative for sore throat.    Respiratory:  Negative for cough  "and shortness of breath.    Cardiovascular:  Positive for leg swelling. Negative for chest pain.   Gastrointestinal:  Negative for abdominal pain, diarrhea and nausea.   Genitourinary:  Negative for difficulty urinating.   Musculoskeletal:  Negative for arthralgias and myalgias.   Neurological:  Negative for dizziness and headaches.     Objective:      Vitals:    11/30/22 0948   BP: 138/60   Pulse: 76   Temp: 97.3 °F (36.3 °C)   SpO2: 95%   Weight: 102.1 kg (225 lb 1.4 oz)   Height: 5' 5" (1.651 m)   PainSc: 0-No pain     Physical Exam  Vitals and nursing note reviewed.   Constitutional:       General: He is not in acute distress.     Appearance: He is well-developed. He is not diaphoretic.   HENT:      Head: Normocephalic.   Neck:      Thyroid: No thyromegaly.   Cardiovascular:      Rate and Rhythm: Normal rate and regular rhythm.      Heart sounds: Normal heart sounds. No murmur heard.  Pulmonary:      Effort: Pulmonary effort is normal.      Breath sounds: Normal breath sounds. No wheezing or rales.   Abdominal:      General: There is no distension.      Palpations: Abdomen is soft.   Musculoskeletal:      Cervical back: Neck supple.      Right lower leg: Edema present.      Left lower leg: Edema present.   Lymphadenopathy:      Cervical: No cervical adenopathy.   Skin:     General: Skin is warm and dry.   Neurological:      Mental Status: He is alert and oriented to person, place, and time.   Psychiatric:         Mood and Affect: Mood normal.         Behavior: Behavior normal.         Thought Content: Thought content normal.         Judgment: Judgment normal.       Assessment:       1. Essential hypertension    2. Prediabetes    3. Mixed hyperlipidemia    4. Stage 3a chronic kidney disease    5. Severe obesity (BMI 35.0-39.9) with comorbidity          Plan:   Essential hypertension  Comments:  Improved control.  However need to reduce amlodipine to 5 mg given edema.  Recheck 6 months  Orders:  -     Basic " Metabolic Panel; Future; Expected date: 11/30/2022    Prediabetes  Comments:  A1c stable    Mixed hyperlipidemia  Comments:  LDL 86    Stage 3a chronic kidney disease  Comments:  BMP today    Severe obesity (BMI 35.0-39.9) with comorbidity  Comments:  Weight down a few lb    Other orders  -     amLODIPine (NORVASC) 5 MG tablet; Take 1 tablet (5 mg total) by mouth once daily.  Dispense: 90 tablet; Refill: 1

## 2022-12-05 RX ORDER — METOPROLOL SUCCINATE 100 MG/1
TABLET, EXTENDED RELEASE ORAL
Qty: 180 TABLET | Refills: 3 | Status: SHIPPED | OUTPATIENT
Start: 2022-12-05 | End: 2023-09-30

## 2022-12-05 NOTE — TELEPHONE ENCOUNTER
Care Due:                  Date            Visit Type   Department     Provider  --------------------------------------------------------------------------------                                EP -                              PRIMARY      [facility] INTERNAL  Last Visit: 11-      CARE ([facility])   MEDICINE       Bishop Gabriel                               -                              PRIMARY      [facility] INTERNAL  Next Visit: 05-      CARE ([facility])   KERI Gabriel                                                            Last  Test          Frequency    Reason                     Performed    Due Date  --------------------------------------------------------------------------------    CBC.........  12 months..  fenofibrate..............  02- 01-    HBA1C.......  6 months...  metFORMIN................  05- 11-    Lipid Panel.  12 months..  atorvastatin, fenofibrate  05-   05-    [facility] Embedded Care Gaps. Reference number: [id number]. 12/05/2022   12:12:14 AM CST

## 2022-12-06 NOTE — TELEPHONE ENCOUNTER
Refill Decision Note   Quintin Ling  is requesting a refill authorization.  Brief Assessment and Rationale for Refill:  Approve    -Medication-Related Problems Identified: Requires labs  Medication Therapy Plan:       Medication Reconciliation Completed: No   Comments:     Provider Staff:     Action is required for this patient.   Please see care gap opportunities below in Care Due Message.     Thanks!  [facility]     Appointments      Date Provider   Last Visit   11/30/2022 Bishop Gabriel MD   Next Visit   5/30/2023 Bishop Gabriel MD     Note composed:7:24 PM 12/05/2022           Note composed:7:24 PM 12/05/2022

## 2023-02-12 RX ORDER — HYDROCHLOROTHIAZIDE 25 MG/1
TABLET ORAL
Qty: 90 TABLET | Refills: 3 | Status: SHIPPED | OUTPATIENT
Start: 2023-02-12 | End: 2023-11-27

## 2023-02-12 NOTE — TELEPHONE ENCOUNTER
Care Due:                  Date            Visit Type   Department     Provider  --------------------------------------------------------------------------------                                EP -                              PRIMARY      [facility] INTERNAL  Last Visit: 11-      CARE ([facility])   MEDICINE       Bishop Gabriel                               -                              PRIMARY      [facility] INTERNAL  Next Visit: 05-      CARE ([facility])   KERI Gabriel                                                            Last  Test          Frequency    Reason                     Performed    Due Date  --------------------------------------------------------------------------------    CBC.........  12 months..  fenofibrate..............  02- 01-    HBA1C.......  6 months...  metFORMIN................  05- 11-    Lipid Panel.  12 months..  atorvastatin, fenofibrate  05-   05-    [facility] Embedded Care Gaps. Reference number: [id number]. 2/12/2023   12:21:28 AM CST

## 2023-02-13 NOTE — TELEPHONE ENCOUNTER
Refill Decision Note   Quintin Ling  is requesting a refill authorization.  Brief Assessment and Rationale for Refill:  Approve     Medication Therapy Plan:       Medication Reconciliation Completed: No   Comments:     Provider Staff:     Action is required for this patient.   Please see care gap opportunities below in Care Due Message.     Thanks!  [facility]     Appointments      Date Provider   Last Visit   11/30/2022 Bishop Gabriel MD   Next Visit   5/30/2023 Bishop Gabriel MD     Note composed:6:22 PM 02/12/2023           Note composed:6:22 PM 02/12/2023

## 2023-05-28 NOTE — TELEPHONE ENCOUNTER
Care Due:                  Date            Visit Type   Department     Provider  --------------------------------------------------------------------------------                                EP -                              PRIMARY      [facility] INTERNAL  Last Visit: 11-      CARE ([facility])   KERI Gabriel                              [facility]                              PRIMARY      [facility] INTERNAL  Next Visit: 05-      CARE ([facility])   KERI Gabriel                                                            Last  Test          Frequency    Reason                     Performed    Due Date  --------------------------------------------------------------------------------    CBC.........  12 months..  fenofibrate..............  02- 01-    CMP.........  12 months..  atorvastatin, fenofibrate  05- 05-    HBA1C.......  6 months...  metFORMIN................  05- 11-    Lipid Panel.  12 months..  atorvastatin, fenofibrate  Not Found    Overdue    [facility] Embedded Care Due Messages. Reference number: [id number].   5/28/2023 12:24:39 AM CDT

## 2023-05-29 PROBLEM — D47.3 ESSENTIAL (HEMORRHAGIC) THROMBOCYTHEMIA: Status: RESOLVED | Noted: 2021-05-19 | Resolved: 2023-05-29

## 2023-05-29 PROBLEM — N18.31 STAGE 3A CHRONIC KIDNEY DISEASE: Status: ACTIVE | Noted: 2023-05-29

## 2023-05-29 RX ORDER — AMLODIPINE BESYLATE 5 MG/1
TABLET ORAL
Qty: 90 TABLET | Refills: 1 | Status: SHIPPED | OUTPATIENT
Start: 2023-05-29 | End: 2023-09-02

## 2023-05-29 RX ORDER — OLMESARTAN MEDOXOMIL 40 MG/1
TABLET ORAL
Qty: 90 TABLET | Refills: 1 | Status: SHIPPED | OUTPATIENT
Start: 2023-05-29 | End: 2023-09-30

## 2023-05-29 NOTE — TELEPHONE ENCOUNTER
Provider Staff:  Action required for this patient     Please see care gap opportunities below in Care Due Message.    Thanks!  [facility]     Appointments      Date Provider   Last Visit   11/30/2022 Bishop Gabriel MD   Next Visit   5/30/2023 Bishop Gabriel MD     Refill Decision Note   Quintin Ling  is requesting a refill authorization.  Brief Assessment and Rationale for Refill:  Approve     Medication Therapy Plan:         Comments:     Note composed:10:57 AM 05/29/2023

## 2023-05-30 ENCOUNTER — OFFICE VISIT (OUTPATIENT)
Dept: FAMILY MEDICINE | Facility: CLINIC | Age: 73
End: 2023-05-30
Payer: MEDICARE

## 2023-05-30 ENCOUNTER — LAB VISIT (OUTPATIENT)
Dept: LAB | Facility: HOSPITAL | Age: 73
End: 2023-05-30
Attending: FAMILY MEDICINE
Payer: MEDICARE

## 2023-05-30 VITALS
HEART RATE: 65 BPM | DIASTOLIC BLOOD PRESSURE: 62 MMHG | HEIGHT: 65 IN | SYSTOLIC BLOOD PRESSURE: 134 MMHG | WEIGHT: 213.88 LBS | TEMPERATURE: 97 F | BODY MASS INDEX: 35.63 KG/M2 | OXYGEN SATURATION: 96 %

## 2023-05-30 DIAGNOSIS — N18.31 STAGE 3A CHRONIC KIDNEY DISEASE: Primary | ICD-10-CM

## 2023-05-30 DIAGNOSIS — I10 ESSENTIAL HYPERTENSION: ICD-10-CM

## 2023-05-30 DIAGNOSIS — E78.2 MIXED HYPERLIPIDEMIA: ICD-10-CM

## 2023-05-30 DIAGNOSIS — Z12.5 PROSTATE CANCER SCREENING: ICD-10-CM

## 2023-05-30 DIAGNOSIS — E66.01 SEVERE OBESITY (BMI 35.0-39.9) WITH COMORBIDITY: ICD-10-CM

## 2023-05-30 DIAGNOSIS — R73.03 PREDIABETES: ICD-10-CM

## 2023-05-30 LAB
ALBUMIN SERPL BCP-MCNC: 3.8 G/DL (ref 3.5–5.2)
ALP SERPL-CCNC: 31 U/L (ref 55–135)
ALT SERPL W/O P-5'-P-CCNC: 18 U/L (ref 10–44)
ANION GAP SERPL CALC-SCNC: 8 MMOL/L (ref 8–16)
AST SERPL-CCNC: 20 U/L (ref 10–40)
BASOPHILS # BLD AUTO: 0.03 K/UL (ref 0–0.2)
BASOPHILS NFR BLD: 0.5 % (ref 0–1.9)
BILIRUB SERPL-MCNC: 0.5 MG/DL (ref 0.1–1)
BUN SERPL-MCNC: 18 MG/DL (ref 8–23)
CALCIUM SERPL-MCNC: 9.5 MG/DL (ref 8.7–10.5)
CHLORIDE SERPL-SCNC: 105 MMOL/L (ref 95–110)
CHOLEST SERPL-MCNC: 131 MG/DL (ref 120–199)
CHOLEST/HDLC SERPL: 2.9 {RATIO} (ref 2–5)
CO2 SERPL-SCNC: 25 MMOL/L (ref 23–29)
COMPLEXED PSA SERPL-MCNC: 0.83 NG/ML (ref 0–4)
CREAT SERPL-MCNC: 1.5 MG/DL (ref 0.5–1.4)
DIFFERENTIAL METHOD: ABNORMAL
EOSINOPHIL # BLD AUTO: 0.1 K/UL (ref 0–0.5)
EOSINOPHIL NFR BLD: 0.9 % (ref 0–8)
ERYTHROCYTE [DISTWIDTH] IN BLOOD BY AUTOMATED COUNT: 13.5 % (ref 11.5–14.5)
EST. GFR  (NO RACE VARIABLE): 49.8 ML/MIN/1.73 M^2
ESTIMATED AVG GLUCOSE: 105 MG/DL (ref 68–131)
GLUCOSE SERPL-MCNC: 103 MG/DL (ref 70–110)
HBA1C MFR BLD: 5.3 % (ref 4–5.6)
HCT VFR BLD AUTO: 36.7 % (ref 40–54)
HDLC SERPL-MCNC: 45 MG/DL (ref 40–75)
HDLC SERPL: 34.4 % (ref 20–50)
HGB BLD-MCNC: 11.9 G/DL (ref 14–18)
IMM GRANULOCYTES # BLD AUTO: 0.04 K/UL (ref 0–0.04)
IMM GRANULOCYTES NFR BLD AUTO: 0.6 % (ref 0–0.5)
LDLC SERPL CALC-MCNC: 75 MG/DL (ref 63–159)
LYMPHOCYTES # BLD AUTO: 0.8 K/UL (ref 1–4.8)
LYMPHOCYTES NFR BLD: 12.5 % (ref 18–48)
MCH RBC QN AUTO: 28.8 PG (ref 27–31)
MCHC RBC AUTO-ENTMCNC: 32.4 G/DL (ref 32–36)
MCV RBC AUTO: 89 FL (ref 82–98)
MONOCYTES # BLD AUTO: 0.6 K/UL (ref 0.3–1)
MONOCYTES NFR BLD: 9.7 % (ref 4–15)
NEUTROPHILS # BLD AUTO: 4.8 K/UL (ref 1.8–7.7)
NEUTROPHILS NFR BLD: 75.8 % (ref 38–73)
NONHDLC SERPL-MCNC: 86 MG/DL
NRBC BLD-RTO: 0 /100 WBC
PLATELET # BLD AUTO: 194 K/UL (ref 150–450)
PMV BLD AUTO: 10.8 FL (ref 9.2–12.9)
POTASSIUM SERPL-SCNC: 4.5 MMOL/L (ref 3.5–5.1)
PROT SERPL-MCNC: 6.8 G/DL (ref 6–8.4)
RBC # BLD AUTO: 4.13 M/UL (ref 4.6–6.2)
SODIUM SERPL-SCNC: 138 MMOL/L (ref 136–145)
TRIGL SERPL-MCNC: 55 MG/DL (ref 30–150)
WBC # BLD AUTO: 6.32 K/UL (ref 3.9–12.7)

## 2023-05-30 PROCEDURE — 3078F DIAST BP <80 MM HG: CPT | Mod: CPTII,S$GLB,, | Performed by: FAMILY MEDICINE

## 2023-05-30 PROCEDURE — 1126F AMNT PAIN NOTED NONE PRSNT: CPT | Mod: CPTII,S$GLB,, | Performed by: FAMILY MEDICINE

## 2023-05-30 PROCEDURE — 1159F MED LIST DOCD IN RCRD: CPT | Mod: CPTII,S$GLB,, | Performed by: FAMILY MEDICINE

## 2023-05-30 PROCEDURE — 80053 COMPREHEN METABOLIC PANEL: CPT | Performed by: FAMILY MEDICINE

## 2023-05-30 PROCEDURE — 3008F BODY MASS INDEX DOCD: CPT | Mod: CPTII,S$GLB,, | Performed by: FAMILY MEDICINE

## 2023-05-30 PROCEDURE — 3075F SYST BP GE 130 - 139MM HG: CPT | Mod: CPTII,S$GLB,, | Performed by: FAMILY MEDICINE

## 2023-05-30 PROCEDURE — 3078F PR MOST RECENT DIASTOLIC BLOOD PRESSURE < 80 MM HG: ICD-10-PCS | Mod: CPTII,S$GLB,, | Performed by: FAMILY MEDICINE

## 2023-05-30 PROCEDURE — 99999 PR PBB SHADOW E&M-EST. PATIENT-LVL IV: ICD-10-PCS | Mod: PBBFAC,,, | Performed by: FAMILY MEDICINE

## 2023-05-30 PROCEDURE — 99999 PR PBB SHADOW E&M-EST. PATIENT-LVL IV: CPT | Mod: PBBFAC,,, | Performed by: FAMILY MEDICINE

## 2023-05-30 PROCEDURE — 1126F PR PAIN SEVERITY QUANTIFIED, NO PAIN PRESENT: ICD-10-PCS | Mod: CPTII,S$GLB,, | Performed by: FAMILY MEDICINE

## 2023-05-30 PROCEDURE — 4010F PR ACE/ARB THEARPY RXD/TAKEN: ICD-10-PCS | Mod: CPTII,S$GLB,, | Performed by: FAMILY MEDICINE

## 2023-05-30 PROCEDURE — 85025 COMPLETE CBC W/AUTO DIFF WBC: CPT | Performed by: FAMILY MEDICINE

## 2023-05-30 PROCEDURE — 1159F PR MEDICATION LIST DOCUMENTED IN MEDICAL RECORD: ICD-10-PCS | Mod: CPTII,S$GLB,, | Performed by: FAMILY MEDICINE

## 2023-05-30 PROCEDURE — 99214 PR OFFICE/OUTPT VISIT, EST, LEVL IV, 30-39 MIN: ICD-10-PCS | Mod: S$GLB,,, | Performed by: FAMILY MEDICINE

## 2023-05-30 PROCEDURE — 4010F ACE/ARB THERAPY RXD/TAKEN: CPT | Mod: CPTII,S$GLB,, | Performed by: FAMILY MEDICINE

## 2023-05-30 PROCEDURE — 3075F PR MOST RECENT SYSTOLIC BLOOD PRESS GE 130-139MM HG: ICD-10-PCS | Mod: CPTII,S$GLB,, | Performed by: FAMILY MEDICINE

## 2023-05-30 PROCEDURE — 3008F PR BODY MASS INDEX (BMI) DOCUMENTED: ICD-10-PCS | Mod: CPTII,S$GLB,, | Performed by: FAMILY MEDICINE

## 2023-05-30 PROCEDURE — 80061 LIPID PANEL: CPT | Performed by: FAMILY MEDICINE

## 2023-05-30 PROCEDURE — 99214 OFFICE O/P EST MOD 30 MIN: CPT | Mod: S$GLB,,, | Performed by: FAMILY MEDICINE

## 2023-05-30 PROCEDURE — 83036 HEMOGLOBIN GLYCOSYLATED A1C: CPT | Performed by: FAMILY MEDICINE

## 2023-05-30 PROCEDURE — 84153 ASSAY OF PSA TOTAL: CPT | Performed by: FAMILY MEDICINE

## 2023-05-30 PROCEDURE — 36415 COLL VENOUS BLD VENIPUNCTURE: CPT | Mod: PO | Performed by: FAMILY MEDICINE

## 2023-05-30 NOTE — PROGRESS NOTES
"Subjective:       Patient ID: Quintin Ling is a 72 y.o. male.    Chief Complaint: Follow-up      HPI Comments:       Current Outpatient Medications:     amLODIPine (NORVASC) 5 MG tablet, TAKE 1 TABLET BY MOUTH EVERY DAY, Disp: 90 tablet, Rfl: 1    atorvastatin (LIPITOR) 20 MG tablet, TAKE 1 TABLET BY MOUTH EVERY DAY, Disp: 90 tablet, Rfl: 1    dicyclomine (BENTYL) 20 mg tablet, TAKE 1 TABLET BY MOUTH THREE TIMES A DAY FOR 7 DAYS, Disp: , Rfl:     doxazosin (CARDURA) 4 MG tablet, Take 1 tablet (4 mg total) by mouth every evening., Disp: 90 tablet, Rfl: 3    fenofibrate 160 MG Tab, TAKE 1 TABLET BY MOUTH EVERY DAY, Disp: 90 tablet, Rfl: 3    hydroCHLOROthiazide (HYDRODIURIL) 25 MG tablet, TAKE 1 TABLET BY MOUTH EVERY DAY IN THE MORNING, Disp: 90 tablet, Rfl: 3    metFORMIN (GLUCOPHAGE) 500 MG tablet, TAKE 1 TABLET BY MOUTH TWICE A DAY WITH MEALS, Disp: 180 tablet, Rfl: 1    metoprolol succinate (TOPROL-XL) 100 MG 24 hr tablet, TAKE 1 TABLET BY MOUTH TWICE A DAY, Disp: 180 tablet, Rfl: 3    olmesartan (BENICAR) 40 MG tablet, TAKE 1 TABLET BY MOUTH EVERY DAY, Disp: 90 tablet, Rfl: 1    sildenafiL (VIAGRA) 100 MG tablet, Take 1 tablet (100 mg total) by mouth daily as needed for Erectile Dysfunction., Disp: 10 tablet, Rfl: 1      6 month follow-up.  Generally doing very well.  The no complaints.      Last time I decreased his amlodipine dose because of some swelling in his legs.  This has improved.  His blood pressure is also stable.      He has lost 12 lb since his last visit.  He is not sure how he did that.  Nothing special     Recently his brother  at the is a 75.  This suspect cardiac disease but not sure.  Quintin has never seen a cardiologist and has had heart control blood pressure.  I offered him to get a cardiac "checkup" but he wants to wait for now.  "maybe later".  He has [facility] nursing home in [place] without Alzheimer's.    He has refused colonoscopy in the past.  His recent Cologuard was " "normal    Review of Systems   Constitutional:  Negative for activity change, appetite change and fever.   HENT:  Negative for sore throat.    Respiratory:  Negative for cough and shortness of breath.    Cardiovascular:  Negative for chest pain.   Gastrointestinal:  Negative for abdominal pain, diarrhea and nausea.   Genitourinary:  Negative for difficulty urinating.   Musculoskeletal:  Negative for arthralgias and myalgias.   Neurological:  Negative for dizziness and headaches.     Objective:      Vitals:    05/30/23 0850   BP: 134/62   Pulse: 65   Temp: 96.7 °F (35.9 °C)   TempSrc: Tympanic   SpO2: 96%   Weight: 97 kg (213 lb 13.5 oz)   Height: 5' 5" (1.651 m)   PainSc: 0-No pain     Physical Exam  Vitals and nursing note reviewed.   Constitutional:       General: He is not in acute distress.     Appearance: He is well-developed. He is not diaphoretic.   HENT:      Head: Normocephalic.   Neck:      Thyroid: No thyromegaly.   Cardiovascular:      Rate and Rhythm: Normal rate and regular rhythm.      Heart sounds: Normal heart sounds. No murmur heard.  Pulmonary:      Effort: Pulmonary effort is normal.      Breath sounds: Normal breath sounds. No wheezing or rales.   Abdominal:      General: There is no distension.      Palpations: Abdomen is soft.   Musculoskeletal:      Cervical back: Neck supple.      Right lower leg: No edema.      Left lower leg: No edema.   Lymphadenopathy:      Cervical: No cervical adenopathy.   Skin:     General: Skin is warm and dry.   Neurological:      Mental Status: He is alert and oriented to person, place, and time.   Psychiatric:         Mood and Affect: Mood normal.         Behavior: Behavior normal.         Thought Content: Thought content normal.         Judgment: Judgment normal.       Assessment:       1. Stage 3a chronic kidney disease    2. Severe obesity (BMI 35.0-39.9) with comorbidity    3. Essential hypertension    4. Prediabetes    5. Mixed hyperlipidemia    6. Prostate " cancer screening        Plan:   Stage 3a chronic kidney disease  Comments:  CMP today.  Follow-up 6 months    Severe obesity (BMI 35.0-39.9) with comorbidity  Comments:  Weight down 12 lb.    Essential hypertension  Comments:  Controlled on current meds/doses  Orders:  -     CBC Auto Differential; Future; Expected date: 05/30/2023    Prediabetes  Comments:  A1c today  Orders:  -     Comprehensive Metabolic Panel; Future; Expected date: 05/30/2023  -     Hemoglobin A1C; Future; Expected date: 05/30/2023    Mixed hyperlipidemia  Comments:  Fasting lipid profile today.  On statin  Orders:  -     Lipid Panel; Future; Expected date: 05/30/2023    Prostate cancer screening  Comments:  PSA today  Orders:  -     PSA, Screening; Future; Expected date: 05/30/2023

## 2023-06-05 ENCOUNTER — TELEPHONE (OUTPATIENT)
Dept: FAMILY MEDICINE | Facility: CLINIC | Age: 73
End: 2023-06-05
Payer: MEDICARE

## 2023-06-05 DIAGNOSIS — R79.89 ELEVATED SERUM CREATININE: Primary | ICD-10-CM

## 2023-06-05 RX ORDER — FENOFIBRATE 54 MG/1
54 TABLET ORAL DAILY
Qty: 90 TABLET | Refills: 3 | Status: SHIPPED | OUTPATIENT
Start: 2023-06-05 | End: 2024-06-04

## 2023-06-05 NOTE — TELEPHONE ENCOUNTER
----- Message from Radharae Bennett sent at 6/5/2023  9:45 AM CDT -----  Contact: Quintin  Type:  Patient Returning Call    Who Called:Quintin  Who Left Message for Patient:unknown  Does the patient know what this is regarding?:Test results/Visit  Would the patient rather a call back or a response via [facility]? call  Best Call Back Number:[phone]  Additional Information: Patient reports missing a call and request another call back.  Thank you,  GH

## 2023-06-05 NOTE — TELEPHONE ENCOUNTER
----- Message from Bishop Gabriel MD sent at 6/5/2023  4:33 AM CDT -----  Bloodwork looks good except for a slight decrease in kidney function. I am going to decrease your dose of fenofibrate and would like to recheck the numbers in a month  Please schedule office visit in one month, a few days after BMP I ordered

## 2023-06-21 ENCOUNTER — HOSPITAL ENCOUNTER (OUTPATIENT)
Dept: RADIOLOGY | Facility: HOSPITAL | Age: 73
Discharge: HOME | End: 2023-06-21
Attending: FAMILY MEDICINE
Payer: MEDICARE

## 2023-06-21 ENCOUNTER — OFFICE VISIT (OUTPATIENT)
Dept: FAMILY MEDICINE | Facility: CLINIC | Age: 73
End: 2023-06-21
Payer: MEDICARE

## 2023-06-21 VITALS
DIASTOLIC BLOOD PRESSURE: 62 MMHG | TEMPERATURE: 99 F | OXYGEN SATURATION: 98 % | HEART RATE: 69 BPM | WEIGHT: 218.5 LBS | SYSTOLIC BLOOD PRESSURE: 142 MMHG | BODY MASS INDEX: 36.36 KG/M2

## 2023-06-21 DIAGNOSIS — M79.605 LEFT LEG PAIN: Primary | ICD-10-CM

## 2023-06-21 DIAGNOSIS — I10 ESSENTIAL HYPERTENSION: ICD-10-CM

## 2023-06-21 DIAGNOSIS — N18.31 STAGE 3A CHRONIC KIDNEY DISEASE: ICD-10-CM

## 2023-06-21 DIAGNOSIS — M79.605 LEFT LEG PAIN: ICD-10-CM

## 2023-06-21 DIAGNOSIS — E78.2 MIXED HYPERLIPIDEMIA: ICD-10-CM

## 2023-06-21 DIAGNOSIS — R73.03 PREDIABETES: ICD-10-CM

## 2023-06-21 PROCEDURE — 1159F MED LIST DOCD IN RCRD: CPT | Mod: CPTII,S$GLB,, | Performed by: FAMILY MEDICINE

## 2023-06-21 PROCEDURE — 72100 X-RAY EXAM L-S SPINE 2/3 VWS: CPT | Mod: 26,,, | Performed by: RADIOLOGY

## 2023-06-21 PROCEDURE — 1125F PR PAIN SEVERITY QUANTIFIED, PAIN PRESENT: ICD-10-PCS | Mod: CPTII,S$GLB,, | Performed by: FAMILY MEDICINE

## 2023-06-21 PROCEDURE — 3008F PR BODY MASS INDEX (BMI) DOCUMENTED: ICD-10-PCS | Mod: CPTII,S$GLB,, | Performed by: FAMILY MEDICINE

## 2023-06-21 PROCEDURE — 3077F PR MOST RECENT SYSTOLIC BLOOD PRESSURE >= 140 MM HG: ICD-10-PCS | Mod: CPTII,S$GLB,, | Performed by: FAMILY MEDICINE

## 2023-06-21 PROCEDURE — 3008F BODY MASS INDEX DOCD: CPT | Mod: CPTII,S$GLB,, | Performed by: FAMILY MEDICINE

## 2023-06-21 PROCEDURE — 4010F PR ACE/ARB THEARPY RXD/TAKEN: ICD-10-PCS | Mod: CPTII,S$GLB,, | Performed by: FAMILY MEDICINE

## 2023-06-21 PROCEDURE — 99999 PR PBB SHADOW E&M-EST. PATIENT-LVL V: CPT | Mod: PBBFAC,,, | Performed by: FAMILY MEDICINE

## 2023-06-21 PROCEDURE — 3044F PR MOST RECENT HEMOGLOBIN A1C LEVEL <7.0%: ICD-10-PCS | Mod: CPTII,S$GLB,, | Performed by: FAMILY MEDICINE

## 2023-06-21 PROCEDURE — 72100 X-RAY EXAM L-S SPINE 2/3 VWS: CPT | Mod: TC,PO

## 2023-06-21 PROCEDURE — 99214 OFFICE O/P EST MOD 30 MIN: CPT | Mod: S$GLB,,, | Performed by: FAMILY MEDICINE

## 2023-06-21 PROCEDURE — 73502 X-RAY EXAM HIP UNI 2-3 VIEWS: CPT | Mod: TC,PO,LT

## 2023-06-21 PROCEDURE — 3078F PR MOST RECENT DIASTOLIC BLOOD PRESSURE < 80 MM HG: ICD-10-PCS | Mod: CPTII,S$GLB,, | Performed by: FAMILY MEDICINE

## 2023-06-21 PROCEDURE — 4010F ACE/ARB THERAPY RXD/TAKEN: CPT | Mod: CPTII,S$GLB,, | Performed by: FAMILY MEDICINE

## 2023-06-21 PROCEDURE — 1125F AMNT PAIN NOTED PAIN PRSNT: CPT | Mod: CPTII,S$GLB,, | Performed by: FAMILY MEDICINE

## 2023-06-21 PROCEDURE — 73502 XR HIP WITH PELVIS WHEN PERFORMED, 2 OR 3 VIEWS LEFT: ICD-10-PCS | Mod: 26,LT,, | Performed by: RADIOLOGY

## 2023-06-21 PROCEDURE — 3044F HG A1C LEVEL LT 7.0%: CPT | Mod: CPTII,S$GLB,, | Performed by: FAMILY MEDICINE

## 2023-06-21 PROCEDURE — 99999 PR PBB SHADOW E&M-EST. PATIENT-LVL V: ICD-10-PCS | Mod: PBBFAC,,, | Performed by: FAMILY MEDICINE

## 2023-06-21 PROCEDURE — 1159F PR MEDICATION LIST DOCUMENTED IN MEDICAL RECORD: ICD-10-PCS | Mod: CPTII,S$GLB,, | Performed by: FAMILY MEDICINE

## 2023-06-21 PROCEDURE — 72100 XR LUMBAR SPINE AP AND LATERAL: ICD-10-PCS | Mod: 26,,, | Performed by: RADIOLOGY

## 2023-06-21 PROCEDURE — 73590 X-RAY EXAM OF LOWER LEG: CPT | Mod: 26,LT,, | Performed by: RADIOLOGY

## 2023-06-21 PROCEDURE — 3078F DIAST BP <80 MM HG: CPT | Mod: CPTII,S$GLB,, | Performed by: FAMILY MEDICINE

## 2023-06-21 PROCEDURE — 73590 X-RAY EXAM OF LOWER LEG: CPT | Mod: TC,PO,LT

## 2023-06-21 PROCEDURE — 3077F SYST BP >= 140 MM HG: CPT | Mod: CPTII,S$GLB,, | Performed by: FAMILY MEDICINE

## 2023-06-21 PROCEDURE — 99214 PR OFFICE/OUTPT VISIT, EST, LEVL IV, 30-39 MIN: ICD-10-PCS | Mod: S$GLB,,, | Performed by: FAMILY MEDICINE

## 2023-06-21 PROCEDURE — 73590 XR TIBIA FIBULA 2 VIEW LEFT: ICD-10-PCS | Mod: 26,LT,, | Performed by: RADIOLOGY

## 2023-06-21 PROCEDURE — 73502 X-RAY EXAM HIP UNI 2-3 VIEWS: CPT | Mod: 26,LT,, | Performed by: RADIOLOGY

## 2023-06-21 NOTE — PROGRESS NOTES
"Subjective:       Patient ID: Quintin Ling is a 72 y.o. male.    Chief Complaint: Lt leg pain      HPI Comments:       Current Outpatient Medications:     amLODIPine (NORVASC) 5 MG tablet, TAKE 1 TABLET BY MOUTH EVERY DAY, Disp: 90 tablet, Rfl: 1    atorvastatin (LIPITOR) 20 MG tablet, TAKE 1 TABLET BY MOUTH EVERY DAY, Disp: 90 tablet, Rfl: 1    dicyclomine (BENTYL) 20 mg tablet, TAKE 1 TABLET BY MOUTH THREE TIMES A DAY FOR 7 DAYS, Disp: , Rfl:     doxazosin (CARDURA) 4 MG tablet, TAKE 1 TABLET BY MOUTH EVERY EVENING., Disp: 90 tablet, Rfl: 3    fenofibrate (TRICOR) 54 MG tablet, Take 1 tablet (54 mg total) by mouth once daily., Disp: 90 tablet, Rfl: 3    hydroCHLOROthiazide (HYDRODIURIL) 25 MG tablet, TAKE 1 TABLET BY MOUTH EVERY DAY IN THE MORNING, Disp: 90 tablet, Rfl: 3    metFORMIN (GLUCOPHAGE) 500 MG tablet, TAKE 1 TABLET BY MOUTH TWICE A DAY WITH MEALS, Disp: 180 tablet, Rfl: 1    metoprolol succinate (TOPROL-XL) 100 MG 24 hr tablet, TAKE 1 TABLET BY MOUTH TWICE A DAY, Disp: 180 tablet, Rfl: 3    olmesartan (BENICAR) 40 MG tablet, TAKE 1 TABLET BY MOUTH EVERY DAY, Disp: 90 tablet, Rfl: 1    sildenafiL (VIAGRA) 100 MG tablet, Take 1 tablet (100 mg total) by mouth daily as needed for Erectile Dysfunction., Disp: 10 tablet, Rfl: 1      Presents with a bad pain in his left lower leg over the last week or so.  Does not bother him when he is just sitting, but if he declines with his legs extended it will begin hurting.  Even worse with limping when he is walking.  Takes some Tylenol and uses heat but no improvement so far.  No trauma.  Feels like it "tightness in his shin and Calf.  Right side is okay.    Had something similar last year when he had bilateral leg cramps.  At the time we thought it was coming from his degenerative changes in his low back.  But he also improved with B complex vitamins.  This pain seems different    Says he had a "soft hip joint" when he was a child.  Had to avoid weight-bearing for 2 " or 3 years before it finally resolved    Creatinine bumped a little bit last month.  Will check it again today.  In response I lowered his fenofibrate dose, which he has been doing now for about a month    Review of Systems   Constitutional:  Negative for activity change, appetite change and fever.   HENT:  Negative for sore throat.    Respiratory:  Negative for cough and shortness of breath.    Cardiovascular:  Negative for chest pain.   Gastrointestinal:  Negative for abdominal pain, diarrhea and nausea.   Genitourinary:  Negative for difficulty urinating.   Musculoskeletal:  Positive for back pain. Negative for arthralgias and myalgias.   Neurological:  Negative for dizziness and headaches.     Objective:      Vitals:    06/21/23 1313 06/21/23 1335   BP: (!) 162/60 (!) 142/62   Pulse: 69    Temp: 99.3 °F (37.4 °C)    TempSrc: Tympanic    SpO2: 98%    Weight: 99.1 kg (218 lb 7.6 oz)    PainSc:   8    PainLoc: Leg      Physical Exam  Vitals and nursing note reviewed.   Constitutional:       General: He is not in acute distress.     Appearance: He is well-developed. He is not diaphoretic.   HENT:      Head: Normocephalic.      Mouth/Throat:      Pharynx: No oropharyngeal exudate.   Neck:      Thyroid: No thyromegaly.   Cardiovascular:      Rate and Rhythm: Normal rate and regular rhythm.      Heart sounds: Normal heart sounds. No murmur heard.  Pulmonary:      Effort: Pulmonary effort is normal.      Breath sounds: Normal breath sounds. No wheezing or rales.   Abdominal:      General: There is no distension.      Palpations: Abdomen is soft.   Musculoskeletal:         General: No swelling, tenderness or deformity.      Cervical back: Neck supple.      Right lower leg: No edema.      Left lower leg: No edema.        Legs:       Comments: Areas of discomfort   Lymphadenopathy:      Cervical: No cervical adenopathy.   Skin:     General: Skin is warm and dry.   Neurological:      Mental Status: He is alert and oriented  to person, place, and time.   Psychiatric:         Mood and Affect: Mood normal.         Behavior: Behavior normal.         Thought Content: Thought content normal.         Judgment: Judgment normal.       Assessment:       1. Left leg pain    2. Stage 3a chronic kidney disease    3. Essential hypertension    4. Prediabetes    5. Mixed hyperlipidemia        Plan:   Left leg pain  Comments:  Rule out DVT.  X-ray of tib-fib, hip, lumbar spine  Orders:  -     X-Ray Lumbar Spine AP And Lateral; Future; Expected date: 06/21/2023  -     X-Ray Tibia Fibula Bilateral; Future; Expected date: 06/21/2023  -     X-Ray Hip 2 or 3 views Left (with Pelvis when performed); Future; Expected date: 06/21/2023  -     US Lower Extremity Veins Bilateral; Future; Expected date: 06/21/2023    Stage 3a chronic kidney disease  Comments:  Creatinine increased from 1.3-1.5 last time.  Recheck today  Orders:  -     Basic Metabolic Panel; Future; Expected date: 06/21/2023    Essential hypertension  Comments:  Elevated today    Prediabetes  Comments:  A1c 5.3    Mixed hyperlipidemia  Comments:  LDL 75

## 2023-06-22 ENCOUNTER — TELEPHONE (OUTPATIENT)
Dept: PHYSICAL MEDICINE AND REHAB | Facility: CLINIC | Age: 73
End: 2023-06-22
Payer: MEDICARE

## 2023-06-22 ENCOUNTER — TELEPHONE (OUTPATIENT)
Dept: FAMILY MEDICINE | Facility: CLINIC | Age: 73
End: 2023-06-22
Payer: MEDICARE

## 2023-06-22 DIAGNOSIS — M79.605 LEG PAIN, DIFFUSE, LEFT: Primary | ICD-10-CM

## 2023-06-22 DIAGNOSIS — M47.816 OSTEOARTHRITIS OF LUMBAR SPINE, UNSPECIFIED SPINAL OSTEOARTHRITIS COMPLICATION STATUS: ICD-10-CM

## 2023-06-22 NOTE — TELEPHONE ENCOUNTER
Spoke to pt and scheduled his appt with [facility] on 06/23/23 at 0800 with Dr. HOLLI Manriquez.

## 2023-06-22 NOTE — TELEPHONE ENCOUNTER
----- Message from Bishop Gabriel MD sent at 6/22/2023  8:28 AM CDT -----  Spoke with patient on the telephone.    Imaging of lower leg was unremarkable.  Thank you might be walking differently because of your back problem.  This is causing strain on the muscles and soft tissues of the lower leg.  Would like to see a spine specialist     Referral placed

## 2023-06-22 NOTE — TELEPHONE ENCOUNTER
Spoke to pt and explained to him that I was going to cancel his appt with Dr. Manriquez at [facility] because she said that she will only refer him to pain management so Pain manage will give him a call to schedule an appt; Pt verbalized understanding.

## 2023-06-22 NOTE — TELEPHONE ENCOUNTER
Called patient to get him correctly scheduled with [facility]. Scheduled at [facility] and put on wait list per request.  LIBIA

## 2023-06-27 ENCOUNTER — TELEPHONE (OUTPATIENT)
Dept: FAMILY MEDICINE | Facility: CLINIC | Age: 73
End: 2023-06-27
Payer: MEDICARE

## 2023-06-27 NOTE — TELEPHONE ENCOUNTER
Spoke to pt and he is complaining of still having left leg pain; pt stated he has a pain management appt in Oct but that is tool long for him; pt stated that the lady at pain management added him to the waiting list but he can't wait. I explained to pt that the only thing I can do is try and send pain management a message to see if they can get him in sooner and pt was ok with that but requested that he would like for Dr. Gabriel to give him a call. Message sent to MD.

## 2023-06-27 NOTE — TELEPHONE ENCOUNTER
----- Message from Rowena Ag sent at 6/27/2023  8:15 AM CDT -----  Contact: Patt  Patient is calling to speak with a nurse regarding questions and concerns . Reports having pain in left leg . Please give patient a call back at [phone] .

## 2023-06-28 ENCOUNTER — HOSPITAL ENCOUNTER (EMERGENCY)
Facility: HOSPITAL | Age: 73
Discharge: HOME | End: 2023-06-28
Attending: EMERGENCY MEDICINE
Payer: MEDICARE

## 2023-06-28 VITALS
HEART RATE: 62 BPM | DIASTOLIC BLOOD PRESSURE: 69 MMHG | BODY MASS INDEX: 36.01 KG/M2 | WEIGHT: 216.38 LBS | OXYGEN SATURATION: 99 % | TEMPERATURE: 98 F | SYSTOLIC BLOOD PRESSURE: 155 MMHG | RESPIRATION RATE: 20 BRPM

## 2023-06-28 DIAGNOSIS — M79.605 LEFT LEG PAIN: Primary | ICD-10-CM

## 2023-06-28 DIAGNOSIS — I73.9 PERIPHERAL ARTERIAL DISEASE: ICD-10-CM

## 2023-06-28 DIAGNOSIS — G57.32 NEUROPATHY OF LEFT PERONEAL NERVE: ICD-10-CM

## 2023-06-28 PROCEDURE — 99284 EMERGENCY DEPT VISIT MOD MDM: CPT

## 2023-06-28 PROCEDURE — 25000003 PHARM REV CODE 250: Performed by: EMERGENCY MEDICINE

## 2023-06-28 RX ORDER — HYDROCODONE BITARTRATE AND ACETAMINOPHEN 5; 325 MG/1; MG/1
1 TABLET ORAL EVERY 12 HOURS PRN
Qty: 30 TABLET | Refills: 0 | Status: SHIPPED | OUTPATIENT
Start: 2023-06-28 | End: 2023-07-11

## 2023-06-28 RX ORDER — ONDANSETRON 4 MG/1
4 TABLET, ORALLY DISINTEGRATING ORAL
Status: COMPLETED | OUTPATIENT
Start: 2023-06-28 | End: 2023-06-28

## 2023-06-28 RX ORDER — CYCLOBENZAPRINE HCL 10 MG
10 TABLET ORAL 3 TIMES DAILY PRN
Qty: 15 TABLET | Refills: 0 | Status: SHIPPED | OUTPATIENT
Start: 2023-06-28 | End: 2023-07-03

## 2023-06-28 RX ORDER — HYDROCODONE BITARTRATE AND ACETAMINOPHEN 10; 325 MG/1; MG/1
1 TABLET ORAL
Status: COMPLETED | OUTPATIENT
Start: 2023-06-28 | End: 2023-06-28

## 2023-06-28 RX ADMIN — ONDANSETRON 4 MG: 4 TABLET, ORALLY DISINTEGRATING ORAL at 09:06

## 2023-06-28 RX ADMIN — HYDROCODONE BITARTRATE AND ACETAMINOPHEN 1 TABLET: 10; 325 TABLET ORAL at 09:06

## 2023-06-28 NOTE — ED PROVIDER NOTES
SCRIBE #1 NOTE: IPuma, am scribing for, and in the presence of, Laura Huston Do, MD. I have scribed the entire note.       History     Chief Complaint   Patient presents with    Leg Pain     C/o left leg pain. Was seen PCP 2 weeks ago. Has appointment with pain management for October. Denies fall/trauma. Hx of osteoarthritis     Review of patient's allergies indicates:  No Known Allergies      History of Present Illness     HPI    6/28/2023, 8:24 AM  History obtained from the patient      History of Present Illness: Quintin Ling is a 72 y.o. male patient with a PMHx of HLD, HTN, DM2, who presents to the Emergency Department for evaluation of left leg pain which onset gradually 3 weeks ago. Pt states pain is not always in the same spot. He states his L ankle was hurting this morning, but it also occurs in his front thigh and the front of his shin on L leg. Symptoms are constant and moderate in severity. Pt states pain is worse when he sits for long periods of time and at night when he lays down. Associated sxs include back pain. Patient denies any incontinence, numbness, CP, SOB, abdominal pain, and all other sxs at this time. Pt was seen by his PCP on 6/21/23 for these symptoms and had L hip x-ray which showed degenerative changes, L spine x-ray that showed spondylotic changes with no fractures and a tib/fib x-ray with no fracture abnormality. Pt was in a brace from age 10-11 for a soft hip bone that resolved after wearing the brace. No further complaints or concerns at this time.       Arrival mode: Personal vehicle     PCP: Bishop Gabriel MD        Past Medical History:  Past Medical History:   Diagnosis Date    Hyperlipidemia     Hypertension        Past Surgical History:  Past Surgical History:   Procedure Laterality Date    NO PAST SURGERIES           Family History:  Family History   Problem Relation Age of Onset    No Known Problems Mother     No Known Problems Father        Social  History:  Social History     Tobacco Use    Smoking status: Former    Smokeless tobacco: Never   Substance and Sexual Activity    Alcohol use: Never    Drug use: Never    Sexual activity: Yes     Partners: Female        Review of Systems     Review of Systems   Constitutional:  Negative for fever.   HENT:  Negative for sore throat.    Respiratory:  Negative for shortness of breath.    Cardiovascular:  Negative for chest pain.   Gastrointestinal:  Negative for abdominal pain and nausea.   Genitourinary:  Negative for dysuria.        (-)Incontinence   Musculoskeletal:  Positive for arthralgias (L leg) and back pain.   Skin:  Negative for rash.   Neurological:  Negative for weakness and numbness.   Hematological:  Does not bruise/bleed easily.   All other systems reviewed and are negative.     Physical Exam     Initial Vitals [06/28/23 0755]   BP Pulse Resp Temp SpO2   (!) 173/73 70 20 97.9 °F (36.6 °C) 97 %      MAP       --          Physical Exam  Nursing Notes and Vital Signs Reviewed.  Constitutional: Patient is in no acute distress. Well-developed and well-nourished.  Head: Atraumatic. Normocephalic.  Eyes: PERRL. EOM intact. Conjunctivae are not pale. No scleral icterus.  ENT: Mucous membranes are moist. Oropharynx is clear and symmetric.    Neck: Supple. Full ROM. No lymphadenopathy.  Cardiovascular: Regular rate. Regular rhythm. No murmurs, rubs, or gallops. Distal pulses are 2+ and symmetric.  Pulmonary/Chest: No respiratory distress. Clear to auscultation bilaterally. No wheezing or rales.  Abdominal: Soft and non-distended.  There is no tenderness.  No rebound, guarding, or rigidity. Good bowel sounds.  Genitourinary: No CVA tenderness  Musculoskeletal: Walks with a limp. No tenderness to palpation of T or L spine or SI joint spaces. Bilateral leg raise to 80 degrees with no pain. No hair to lower leg. R leg 2+ DP pulse, L leg 1+ DP pulse. Patella reflex 2+ bilaterally. R achilles reflex 1+. L achilles  reflex 2+. Full ROM of hip, int/ext rotation. Good ROM of knee and ankle. No foot drop. Good dorsiflexion of feet and toes against resistance. Moves all extremities. No obvious deformities. No edema. No calf tenderness.  Skin: Warm and dry.  Neurological:  Alert, awake, and appropriate.  Normal speech.  No acute focal neurological deficits are appreciated.  Psychiatric: Normal affect. Good eye contact. Appropriate in content.     ED Course   Procedures  ED Vital Signs:  Vitals:    06/28/23 0755 06/28/23 0907 06/28/23 0955 06/28/23 1138   BP: (!) 173/73  (!) 165/75 (!) 155/69   Pulse: 70  71 62   Resp: 20 18 20 20   Temp: 97.9 °F (36.6 °C)  98 °F (36.7 °C) 98 °F (36.7 °C)   TempSrc: Oral  Oral Oral   SpO2: 97%  98% 99%   Weight: 98.1 kg (216 lb 6.1 oz)          Abnormal Lab Results:  Labs Reviewed - No data to display     All Lab Results:  None    Imaging Results:  Imaging Results    None              The Emergency Provider reviewed the vital signs and test results, which are outlined above.     ED Discussion     10:51 AM: Reassessed pt at this time. Discussed with pt all pertinent ED information and results. Discussed pt dx and plan of tx. Gave pt all f/u and return to the ED instructions. All questions and concerns were addressed at this time. Pt expresses understanding of information and instructions, and is comfortable with plan to discharge. Pt is stable for discharge.    I discussed with patient and/or family/caretaker that evaluation in the ED does not suggest any emergent or life threatening medical conditions requiring immediate intervention beyond what was provided in the ED, and I believe patient is safe for discharge.  Regardless, an unremarkable evaluation in the ED does not preclude the development or presence of a serious of life threatening condition. As such, patient was instructed to return immediately for any worsening or change in current symptoms.        Medical Decision Making:   Initial  Assessment:   Patient with intermittent left leg pain for over a month.  Differential Diagnosis:   Osteoarthritis, sciatica, DVT, neuropathy, vascular disease  ED Management:  10:45 AM: Pt is no longer in pain. PCP referred him to pain management but he has not gotten the appointment yet..  He had a x-ray of the lumbar spine the left hip and pelvis and he also had a recent ultrasound.  He has not DJD in the lumbar spine but there were no other acute findings.  The ultrasound did not show a DVT.  Will give him the number to vasular surgeon to follow up on decreased pulses. Pt will discuss physical therapy with PCP to increase mobility. Will give pt a few norcos for pain and muscle relaxers to help bridge him.     I think he has several things going on.  I think he has compression of his peroneal nerve,  When he sits a lot , he will have pain and numbness to the left lateral   He also gets the numbness and pain when he drives    He would great reflexes on the left side he did not have any here in his lower legs, the right leg had a good pulse dorsalis pulse the left leg was very thready barely feel it.  It was not emergent I think he can get outpatient vascular studies.     He was given Norco in the emergency room which helped with his pain.  He was sent home with a prescription for  Norco and muscle relaxers for now because he was so frustrated due to the intermittent pain and he is not been able to sleep and he is not seen by pain management yet.    Chart was sent to his primary care doctor for further management and workup.           ED Medication(s):  Medications   HYDROcodone-acetaminophen  mg per tablet 1 tablet (1 tablet Oral Given 6/28/23 0907)   ondansetron disintegrating tablet 4 mg (4 mg Oral Given 6/28/23 0907)       Discharge Medication List as of 6/28/2023 11:33 AM        START taking these medications    Details   cyclobenzaprine (FLEXERIL) 10 MG tablet Take 1 tablet (10 mg total) by mouth 3  (three) times daily as needed for Muscle spasms., Starting Wed 6/28/2023, Until Mon 7/3/2023 at 2359, Normal      HYDROcodone-acetaminophen (NORCO) 5-325 mg per tablet Take 1 tablet by mouth every 12 (twelve) hours as needed for Pain., Starting Wed 6/28/2023, Until Thu 7/13/2023 at 2359, Normal              Follow-up Information       Bishop Gabriel MD In 2 days.    Specialty: Family Medicine  Contact information:  [address] [place] [id number]  [phone]                                 Scribe Attestation:   Scribe #1: I performed the above scribed service and the documentation accurately describes the services I performed. I attest to the accuracy of the note.     Attending:   Physician Attestation Statement for Scribe #1: I, Laura Huston Do, MD, personally performed the services described in this documentation, as scribed by Puma Pearl, in my presence, and it is both accurate and complete.           Clinical Impression       ICD-10-CM ICD-9-CM   1. Left leg pain  M79.605 729.5   2. Neuropathy of left peroneal nerve  G57.32 355.3   3. Peripheral arterial disease  I73.9 443.9       Disposition:   Disposition: Discharged  Condition: Stable       Laura Huston Do, MD  06/28/23 5890

## 2023-06-29 ENCOUNTER — TELEPHONE (OUTPATIENT)
Dept: FAMILY MEDICINE | Facility: CLINIC | Age: 73
End: 2023-06-29
Payer: MEDICARE

## 2023-06-29 NOTE — TELEPHONE ENCOUNTER
----- Message from Hoda Middleton sent at 6/29/2023  3:22 PM CDT -----  Contact: Quintin  Patient is calling to speak with the nurse regarding medication. Reports wanting to know about medication. Please give patient a call back at .[phone]

## 2023-06-29 NOTE — TELEPHONE ENCOUNTER
Pt went to ER and a Rx for hydrocodone was sent to the pharmacy but pt said the pharmacist told him they didn't have what he needed and he will have to get his doctor to write another script; I explained to the pt that he will need to contact the ER doctor that wrote the script to send in another one because Dr. Gabriel is not going to do it because he didn't prescribe it; Pt verbalized understanding.

## 2023-07-05 ENCOUNTER — TELEPHONE (OUTPATIENT)
Dept: FAMILY MEDICINE | Facility: CLINIC | Age: 73
End: 2023-07-05
Payer: MEDICARE

## 2023-07-05 NOTE — TELEPHONE ENCOUNTER
----- Message from Flor Washington sent at 7/5/2023 11:39 AM CDT -----  Contact: Quintin  .Type:  Patient Returning Call    Who Called: Quintin   Who Left Message for Patient: Payal   Does the patient know what this is regarding?: unknown   Would the patient rather a call back or a response via [facility]?  Call   Best Call Back Number: [phone]  Additional Information:

## 2023-07-05 NOTE — TELEPHONE ENCOUNTER
----- Message from Niki Nelson sent at 7/5/2023 11:25 AM CDT -----  Pt is requesting the nurse give him a call back today at [phone]

## 2023-07-11 ENCOUNTER — OFFICE VISIT (OUTPATIENT)
Dept: FAMILY MEDICINE | Facility: CLINIC | Age: 73
End: 2023-07-11
Payer: MEDICARE

## 2023-07-11 ENCOUNTER — TELEPHONE (OUTPATIENT)
Dept: VASCULAR SURGERY | Facility: CLINIC | Age: 73
End: 2023-07-11
Payer: MEDICARE

## 2023-07-11 VITALS
DIASTOLIC BLOOD PRESSURE: 80 MMHG | HEIGHT: 65 IN | OXYGEN SATURATION: 95 % | TEMPERATURE: 97 F | SYSTOLIC BLOOD PRESSURE: 136 MMHG | HEART RATE: 72 BPM | WEIGHT: 217.38 LBS | BODY MASS INDEX: 36.22 KG/M2

## 2023-07-11 DIAGNOSIS — N18.31 STAGE 3A CHRONIC KIDNEY DISEASE: ICD-10-CM

## 2023-07-11 DIAGNOSIS — E78.2 MIXED HYPERLIPIDEMIA: ICD-10-CM

## 2023-07-11 DIAGNOSIS — M79.605 LEG PAIN, DIFFUSE, LEFT: Primary | ICD-10-CM

## 2023-07-11 DIAGNOSIS — M47.816 OSTEOARTHRITIS OF LUMBAR SPINE, UNSPECIFIED SPINAL OSTEOARTHRITIS COMPLICATION STATUS: ICD-10-CM

## 2023-07-11 DIAGNOSIS — E66.01 SEVERE OBESITY (BMI 35.0-39.9) WITH COMORBIDITY: ICD-10-CM

## 2023-07-11 DIAGNOSIS — I10 ESSENTIAL HYPERTENSION: ICD-10-CM

## 2023-07-11 DIAGNOSIS — R73.03 PREDIABETES: ICD-10-CM

## 2023-07-11 PROCEDURE — 4010F ACE/ARB THERAPY RXD/TAKEN: CPT | Mod: CPTII,S$GLB,, | Performed by: FAMILY MEDICINE

## 2023-07-11 PROCEDURE — 3288F PR FALLS RISK ASSESSMENT DOCUMENTED: ICD-10-PCS | Mod: CPTII,S$GLB,, | Performed by: FAMILY MEDICINE

## 2023-07-11 PROCEDURE — 3044F HG A1C LEVEL LT 7.0%: CPT | Mod: CPTII,S$GLB,, | Performed by: FAMILY MEDICINE

## 2023-07-11 PROCEDURE — 3079F PR MOST RECENT DIASTOLIC BLOOD PRESSURE 80-89 MM HG: ICD-10-PCS | Mod: CPTII,S$GLB,, | Performed by: FAMILY MEDICINE

## 2023-07-11 PROCEDURE — 3008F PR BODY MASS INDEX (BMI) DOCUMENTED: ICD-10-PCS | Mod: CPTII,S$GLB,, | Performed by: FAMILY MEDICINE

## 2023-07-11 PROCEDURE — 3079F DIAST BP 80-89 MM HG: CPT | Mod: CPTII,S$GLB,, | Performed by: FAMILY MEDICINE

## 2023-07-11 PROCEDURE — 3288F FALL RISK ASSESSMENT DOCD: CPT | Mod: CPTII,S$GLB,, | Performed by: FAMILY MEDICINE

## 2023-07-11 PROCEDURE — 3075F PR MOST RECENT SYSTOLIC BLOOD PRESS GE 130-139MM HG: ICD-10-PCS | Mod: CPTII,S$GLB,, | Performed by: FAMILY MEDICINE

## 2023-07-11 PROCEDURE — 1101F PR PT FALLS ASSESS DOC 0-1 FALLS W/OUT INJ PAST YR: ICD-10-PCS | Mod: CPTII,S$GLB,, | Performed by: FAMILY MEDICINE

## 2023-07-11 PROCEDURE — 3044F PR MOST RECENT HEMOGLOBIN A1C LEVEL <7.0%: ICD-10-PCS | Mod: CPTII,S$GLB,, | Performed by: FAMILY MEDICINE

## 2023-07-11 PROCEDURE — 3008F BODY MASS INDEX DOCD: CPT | Mod: CPTII,S$GLB,, | Performed by: FAMILY MEDICINE

## 2023-07-11 PROCEDURE — 99214 PR OFFICE/OUTPT VISIT, EST, LEVL IV, 30-39 MIN: ICD-10-PCS | Mod: S$GLB,,, | Performed by: FAMILY MEDICINE

## 2023-07-11 PROCEDURE — 99999 PR PBB SHADOW E&M-EST. PATIENT-LVL IV: CPT | Mod: PBBFAC,,, | Performed by: FAMILY MEDICINE

## 2023-07-11 PROCEDURE — 1159F MED LIST DOCD IN RCRD: CPT | Mod: CPTII,S$GLB,, | Performed by: FAMILY MEDICINE

## 2023-07-11 PROCEDURE — 99214 OFFICE O/P EST MOD 30 MIN: CPT | Mod: S$GLB,,, | Performed by: FAMILY MEDICINE

## 2023-07-11 PROCEDURE — 4010F PR ACE/ARB THEARPY RXD/TAKEN: ICD-10-PCS | Mod: CPTII,S$GLB,, | Performed by: FAMILY MEDICINE

## 2023-07-11 PROCEDURE — 3075F SYST BP GE 130 - 139MM HG: CPT | Mod: CPTII,S$GLB,, | Performed by: FAMILY MEDICINE

## 2023-07-11 PROCEDURE — 99999 PR PBB SHADOW E&M-EST. PATIENT-LVL IV: ICD-10-PCS | Mod: PBBFAC,,, | Performed by: FAMILY MEDICINE

## 2023-07-11 PROCEDURE — 1101F PT FALLS ASSESS-DOCD LE1/YR: CPT | Mod: CPTII,S$GLB,, | Performed by: FAMILY MEDICINE

## 2023-07-11 PROCEDURE — 1159F PR MEDICATION LIST DOCUMENTED IN MEDICAL RECORD: ICD-10-PCS | Mod: CPTII,S$GLB,, | Performed by: FAMILY MEDICINE

## 2023-07-11 RX ORDER — GABAPENTIN 100 MG/1
100 CAPSULE ORAL 3 TIMES DAILY
Qty: 90 CAPSULE | Refills: 1 | Status: SHIPPED | OUTPATIENT
Start: 2023-07-11 | End: 2023-08-04

## 2023-07-11 RX ORDER — TRAMADOL HYDROCHLORIDE 50 MG/1
50 TABLET ORAL EVERY 6 HOURS PRN
Qty: 12 TABLET | Refills: 0 | Status: ON HOLD | OUTPATIENT
Start: 2023-07-11 | End: 2024-03-20 | Stop reason: HOSPADM

## 2023-07-11 NOTE — TELEPHONE ENCOUNTER
----- Message from Lor Serna LPN sent at 7/11/2023  8:17 AM CDT -----  Regarding: Appt  Pt needs an appt. First available per Dr. Gabriel.

## 2023-07-11 NOTE — TELEPHONE ENCOUNTER
----- Message from Maddy Paz sent at 7/11/2023 11:05 AM CDT -----  Regarding: pt call back  Type:  Patient Returning Call         Who Called:Wife          Who Left Message for Patient: Briana Castañeda         Does the patient know what this is regarding? Scheduling          Best Call Back Number: [phone]         Additional Information:

## 2023-07-11 NOTE — PROGRESS NOTES
Subjective:       Patient ID: Quintin Ling is a 73 y.o. male.    Chief Complaint: Follow-up      HPI Comments:       Current Outpatient Medications:     amLODIPine (NORVASC) 5 MG tablet, TAKE 1 TABLET BY MOUTH EVERY DAY, Disp: 90 tablet, Rfl: 1    atorvastatin (LIPITOR) 20 MG tablet, TAKE 1 TABLET BY MOUTH EVERY DAY, Disp: 90 tablet, Rfl: 1    dicyclomine (BENTYL) 20 mg tablet, TAKE 1 TABLET BY MOUTH THREE TIMES A DAY FOR 7 DAYS, Disp: , Rfl:     doxazosin (CARDURA) 4 MG tablet, TAKE 1 TABLET BY MOUTH EVERY EVENING., Disp: 90 tablet, Rfl: 3    fenofibrate (TRICOR) 54 MG tablet, Take 1 tablet (54 mg total) by mouth once daily., Disp: 90 tablet, Rfl: 3    hydroCHLOROthiazide (HYDRODIURIL) 25 MG tablet, TAKE 1 TABLET BY MOUTH EVERY DAY IN THE MORNING, Disp: 90 tablet, Rfl: 3    metFORMIN (GLUCOPHAGE) 500 MG tablet, TAKE 1 TABLET BY MOUTH TWICE A DAY WITH MEALS, Disp: 180 tablet, Rfl: 1    metoprolol succinate (TOPROL-XL) 100 MG 24 hr tablet, TAKE 1 TABLET BY MOUTH TWICE A DAY, Disp: 180 tablet, Rfl: 3    olmesartan (BENICAR) 40 MG tablet, TAKE 1 TABLET BY MOUTH EVERY DAY, Disp: 90 tablet, Rfl: 1    gabapentin (NEURONTIN) 100 MG capsule, Take 1 capsule (100 mg total) by mouth 3 (three) times daily., Disp: 90 capsule, Rfl: 1    sildenafiL (VIAGRA) 100 MG tablet, Take 1 tablet (100 mg total) by mouth daily as needed for Erectile Dysfunction., Disp: 10 tablet, Rfl: 1    traMADoL (ULTRAM) 50 mg tablet, Take 1 tablet (50 mg total) by mouth every 6 (six) hours as needed for Pain., Disp: 12 tablet, Rfl: 0    Ongoing pain in the left leg.  Generally below the knee.  Severe pain in the calf at times.  Went to the ER because of severe pain in the left ankle.  Was not able to get the Lortab fill that the ER gave him.  Has been on muscle relaxants.  Would like something for severe pain when he has not.  Has an appointment in October for pain management, however I sent a note today and they will move him up much sooner.    Today  "will also get him set up for vascular evaluation and nerve conduction studies..  A trial of gabapentin and Rx for tramadol    Follow-up  Pertinent negatives include no abdominal pain, arthralgias, chest pain, coughing, fever, headaches, myalgias, nausea or sore throat.   Review of Systems   Constitutional:  Negative for activity change, appetite change and fever.   HENT:  Negative for sore throat.    Respiratory:  Negative for cough and shortness of breath.    Cardiovascular:  Negative for chest pain.   Gastrointestinal:  Negative for abdominal pain, diarrhea and nausea.   Genitourinary:  Negative for difficulty urinating.   Musculoskeletal:  Negative for arthralgias and myalgias.   Neurological:  Negative for dizziness and headaches.     Objective:      Vitals:    07/11/23 0751 07/11/23 0810   BP: (!) 154/70 136/80   Pulse: 72    Temp: 96.7 °F (35.9 °C)    TempSrc: Tympanic    SpO2: 95%    Weight: 98.6 kg (217 lb 6 oz)    Height: 5' 5" (1.651 m)      Physical Exam  Vitals and nursing note reviewed.   Constitutional:       General: He is not in acute distress.     Appearance: He is well-developed. He is not diaphoretic.   HENT:      Head: Normocephalic.      Mouth/Throat:      Pharynx: No oropharyngeal exudate.   Neck:      Thyroid: No thyromegaly.   Cardiovascular:      Rate and Rhythm: Normal rate and regular rhythm.      Heart sounds: Normal heart sounds. No murmur heard.  Pulmonary:      Effort: Pulmonary effort is normal.      Breath sounds: Normal breath sounds. No wheezing or rales.   Abdominal:      General: There is no distension.      Palpations: Abdomen is soft.   Musculoskeletal:      Cervical back: Neck supple.      Left lower leg: No swelling, deformity, tenderness or bony tenderness. No edema.      Left ankle: No swelling. No tenderness. Normal range of motion.   Lymphadenopathy:      Cervical: No cervical adenopathy.   Skin:     General: Skin is warm and dry.   Neurological:      Mental Status: He is " alert and oriented to person, place, and time.   Psychiatric:         Behavior: Behavior normal.         Thought Content: Thought content normal.         Judgment: Judgment normal.       Assessment:       1. Leg pain, diffuse, left    2. Osteoarthritis of lumbar spine, unspecified spinal osteoarthritis complication status    3. Stage 3a chronic kidney disease    4. Essential hypertension    5. Prediabetes    6. Mixed hyperlipidemia    7. Severe obesity (BMI 35.0-39.9) with comorbidity        Plan:   Leg pain, diffuse, left  Comments:  Vascular evaluation in progress.  Pain management access poor.  Will try to improve.  Tramadol.  Gabapentin  Orders:  -     EMG W/ ULTRASOUND AND NERVE CONDUCTION TEST 1 Extremity; Future  -     gabapentin (NEURONTIN) 100 MG capsule; Take 1 capsule (100 mg total) by mouth 3 (three) times daily.  Dispense: 90 capsule; Refill: 1  -     Ambulatory referral/consult to Vascular Surgery; Future; Expected date: 07/18/2023  -     traMADoL (ULTRAM) 50 mg tablet; Take 1 tablet (50 mg total) by mouth every 6 (six) hours as needed for Pain.  Dispense: 12 tablet; Refill: 0    Osteoarthritis of lumbar spine, unspecified spinal osteoarthritis complication status  Comments:  Chronic pain referral EMG    Stage 3a chronic kidney disease  Comments:  Creatinine 1.4.  Avoiding NSAIDs    Essential hypertension  Comments:  Controlled    Prediabetes  Comments:  Stable    Mixed hyperlipidemia  Comments:  Stable on statin    Severe obesity (BMI 35.0-39.9) with comorbidity

## 2023-07-11 NOTE — TELEPHONE ENCOUNTER
Returned call to patient and spoke with his wife. Appointment scheduled with Dr. Noel per referral. Patient is aware of date, time and location.

## 2023-08-03 ENCOUNTER — INITIAL CONSULT (OUTPATIENT)
Dept: VASCULAR SURGERY | Facility: CLINIC | Age: 73
End: 2023-08-03
Payer: MEDICARE

## 2023-08-03 VITALS — BODY MASS INDEX: 36.17 KG/M2 | WEIGHT: 217.38 LBS

## 2023-08-03 DIAGNOSIS — M79.605 LEG PAIN, DIFFUSE, LEFT: ICD-10-CM

## 2023-08-03 PROCEDURE — 99999 PR PBB SHADOW E&M-EST. PATIENT-LVL III: CPT | Mod: PBBFAC,,, | Performed by: SURGERY

## 2023-08-03 PROCEDURE — 99203 OFFICE O/P NEW LOW 30 MIN: CPT | Mod: S$GLB,,, | Performed by: SURGERY

## 2023-08-03 PROCEDURE — 99999 PR PBB SHADOW E&M-EST. PATIENT-LVL III: ICD-10-PCS | Mod: PBBFAC,,, | Performed by: SURGERY

## 2023-08-03 PROCEDURE — 99203 PR OFFICE/OUTPT VISIT, NEW, LEVL III, 30-44 MIN: ICD-10-PCS | Mod: S$GLB,,, | Performed by: SURGERY

## 2023-08-03 NOTE — PROGRESS NOTES
Subjective:       Patient ID: Quintin Ling is a 73 y.o. male.    Chief Complaint: Leg Pain    HPI seen today for evaluation of left leg pain.  72 y/o male history of DM, HTN, remote former smoker.  Reports over last year has been having increasing pain in the left posterior knee and calf.  Pain is described as fairly constant and seems to be positional occurring mainly in a sitting position and sometime in bed while resting.  He noticed that sitting in a heated carseat seems to relieve his symptoms.  He does have 2+ palp pedal pulses on exam.  Was recommended for evaluatioon with ortho spine specialist but pt reports he has not seen anyone for his back  Review of Systems      Objective:      Physical Exam    Assessment:       1. Leg pain, diffuse, left        Plan:       Discussed with pt that although he certainly does have risk factors for PAD including DM and HTN his history and symptoms do not appear consistent with PAD and do not appear to be true vascular claudication.  He vascular exam is normal with readily palpable pedal pulses.  I've discussed with him further eval with a spine specialist

## 2023-08-04 ENCOUNTER — OFFICE VISIT (OUTPATIENT)
Dept: PAIN MEDICINE | Facility: CLINIC | Age: 73
End: 2023-08-04
Payer: MEDICARE

## 2023-08-04 ENCOUNTER — TELEPHONE (OUTPATIENT)
Dept: PHYSICAL MEDICINE AND REHAB | Facility: CLINIC | Age: 73
End: 2023-08-04
Payer: MEDICARE

## 2023-08-04 VITALS
SYSTOLIC BLOOD PRESSURE: 160 MMHG | BODY MASS INDEX: 35.45 KG/M2 | HEIGHT: 65 IN | WEIGHT: 212.75 LBS | HEART RATE: 57 BPM | DIASTOLIC BLOOD PRESSURE: 73 MMHG

## 2023-08-04 DIAGNOSIS — M43.16 SPONDYLOLISTHESIS OF LUMBAR REGION: ICD-10-CM

## 2023-08-04 DIAGNOSIS — M47.816 OSTEOARTHRITIS OF LUMBAR SPINE, UNSPECIFIED SPINAL OSTEOARTHRITIS COMPLICATION STATUS: ICD-10-CM

## 2023-08-04 DIAGNOSIS — M60.862 OTHER MYOSITIS OF LEFT LOWER EXTREMITY: ICD-10-CM

## 2023-08-04 DIAGNOSIS — M54.9 DORSALGIA, UNSPECIFIED: ICD-10-CM

## 2023-08-04 DIAGNOSIS — M47.816 LUMBAR SPONDYLOSIS: ICD-10-CM

## 2023-08-04 DIAGNOSIS — M51.36 DDD (DEGENERATIVE DISC DISEASE), LUMBAR: ICD-10-CM

## 2023-08-04 DIAGNOSIS — M54.16 LUMBAR RADICULOPATHY: Primary | ICD-10-CM

## 2023-08-04 DIAGNOSIS — M79.605 LEG PAIN, DIFFUSE, LEFT: ICD-10-CM

## 2023-08-04 DIAGNOSIS — M54.16 LUMBAR RADICULOPATHY, CHRONIC: ICD-10-CM

## 2023-08-04 PROCEDURE — 3044F HG A1C LEVEL LT 7.0%: CPT | Mod: CPTII,S$GLB,, | Performed by: ANESTHESIOLOGY

## 2023-08-04 PROCEDURE — 99204 OFFICE O/P NEW MOD 45 MIN: CPT | Mod: S$GLB,,, | Performed by: ANESTHESIOLOGY

## 2023-08-04 PROCEDURE — 3008F PR BODY MASS INDEX (BMI) DOCUMENTED: ICD-10-PCS | Mod: CPTII,S$GLB,, | Performed by: ANESTHESIOLOGY

## 2023-08-04 PROCEDURE — 99204 PR OFFICE/OUTPT VISIT, NEW, LEVL IV, 45-59 MIN: ICD-10-PCS | Mod: S$GLB,,, | Performed by: ANESTHESIOLOGY

## 2023-08-04 PROCEDURE — 4010F PR ACE/ARB THEARPY RXD/TAKEN: ICD-10-PCS | Mod: CPTII,S$GLB,, | Performed by: ANESTHESIOLOGY

## 2023-08-04 PROCEDURE — 1101F PR PT FALLS ASSESS DOC 0-1 FALLS W/OUT INJ PAST YR: ICD-10-PCS | Mod: CPTII,S$GLB,, | Performed by: ANESTHESIOLOGY

## 2023-08-04 PROCEDURE — 99999 PR PBB SHADOW E&M-EST. PATIENT-LVL III: CPT | Mod: PBBFAC,,, | Performed by: ANESTHESIOLOGY

## 2023-08-04 PROCEDURE — 4010F ACE/ARB THERAPY RXD/TAKEN: CPT | Mod: CPTII,S$GLB,, | Performed by: ANESTHESIOLOGY

## 2023-08-04 PROCEDURE — 1126F AMNT PAIN NOTED NONE PRSNT: CPT | Mod: CPTII,S$GLB,, | Performed by: ANESTHESIOLOGY

## 2023-08-04 PROCEDURE — 3288F FALL RISK ASSESSMENT DOCD: CPT | Mod: CPTII,S$GLB,, | Performed by: ANESTHESIOLOGY

## 2023-08-04 PROCEDURE — 3288F PR FALLS RISK ASSESSMENT DOCUMENTED: ICD-10-PCS | Mod: CPTII,S$GLB,, | Performed by: ANESTHESIOLOGY

## 2023-08-04 PROCEDURE — 1126F PR PAIN SEVERITY QUANTIFIED, NO PAIN PRESENT: ICD-10-PCS | Mod: CPTII,S$GLB,, | Performed by: ANESTHESIOLOGY

## 2023-08-04 PROCEDURE — 3044F PR MOST RECENT HEMOGLOBIN A1C LEVEL <7.0%: ICD-10-PCS | Mod: CPTII,S$GLB,, | Performed by: ANESTHESIOLOGY

## 2023-08-04 PROCEDURE — 3008F BODY MASS INDEX DOCD: CPT | Mod: CPTII,S$GLB,, | Performed by: ANESTHESIOLOGY

## 2023-08-04 PROCEDURE — 3078F PR MOST RECENT DIASTOLIC BLOOD PRESSURE < 80 MM HG: ICD-10-PCS | Mod: CPTII,S$GLB,, | Performed by: ANESTHESIOLOGY

## 2023-08-04 PROCEDURE — 3077F SYST BP >= 140 MM HG: CPT | Mod: CPTII,S$GLB,, | Performed by: ANESTHESIOLOGY

## 2023-08-04 PROCEDURE — 3077F PR MOST RECENT SYSTOLIC BLOOD PRESSURE >= 140 MM HG: ICD-10-PCS | Mod: CPTII,S$GLB,, | Performed by: ANESTHESIOLOGY

## 2023-08-04 PROCEDURE — 99999 PR PBB SHADOW E&M-EST. PATIENT-LVL III: ICD-10-PCS | Mod: PBBFAC,,, | Performed by: ANESTHESIOLOGY

## 2023-08-04 PROCEDURE — 1101F PT FALLS ASSESS-DOCD LE1/YR: CPT | Mod: CPTII,S$GLB,, | Performed by: ANESTHESIOLOGY

## 2023-08-04 PROCEDURE — 3078F DIAST BP <80 MM HG: CPT | Mod: CPTII,S$GLB,, | Performed by: ANESTHESIOLOGY

## 2023-08-04 RX ORDER — PREGABALIN 50 MG/1
50 CAPSULE ORAL 2 TIMES DAILY
Qty: 60 CAPSULE | Refills: 1 | Status: SHIPPED | OUTPATIENT
Start: 2023-08-04 | End: 2023-08-29 | Stop reason: DRUGHIGH

## 2023-08-04 NOTE — PROGRESS NOTES
New Patient Interventional Pain Note (Initial Visit)    Referring Physician: Bishop Gabriel MD    PCP: Bishop Gabriel MD    Chief Complaint:     Chief Complaint   Patient presents with    Leg Pain     Left side        SUBJECTIVE:    Quintin Ling is a 73 y.o. male who presents to the clinic for the evaluation of left lower extremity pain.   Patient reports three-month history of left lower extremity pain.  He denies any inciting events or traumas.  Patient reports 1 year ago he experienced bilateral calf pain that is resolved with B12 supplementation.  He also reports as a child he was placed in traction for 2 weeks on his left lower extremity and to wear a brace for 3 years secondary to hip dysplasia.  Patient denies any recent infections.  Patient denies any previous surgical intervention on his lumbar spine.  Pain is described as a pulling burning pain diffusely over his left calf.  He denies any significant radiation to his foot or up into his left lower back but does report a long history of lower back pain.  Patient denies any significant change in sensation to left lower extremity.  Pain is worse with prolonged sitting or walking, better with lying supine.  Patient does report swelling of his left lower extremity with prolonged walking.  Patient denies any significant history of rheumatoid disease in his family.  Patient reports emergency department on 06/28/2023 for exacerbation of left lower extremity pain.  Denies any fevers, chills, saddle anesthesia, or bowel and bladder incontinence        Non-Pharmacologic Treatments:  Physical Therapy/Home Exercise: yes  Ice/Heat:yes  TENS: no  Acupuncture: no  Massage: yes  Chiropractic: no        Previous Pain Medications:  NSAIDs, Tylenol, gabapentin, tramadol     report:  Reviewed and consistent with medication use as prescribed.    Pain Procedures:   None    Pain Disability Index Review:     Last 3 PDI Scores 8/4/2023   Pain Disability Index (PDI) 32        Imaging:   Results for orders placed during the hospital encounter of 06/21/23    X-Ray Lumbar Spine AP And Lateral    Narrative  EXAMINATION:  XR LUMBAR SPINE AP AND LATERAL    CLINICAL HISTORY:  Pain in left leg    TECHNIQUE:  AP, lateral and spot images were performed of the lumbar spine.    COMPARISON:  02/01/2022    FINDINGS:  Five lumbar type vertebral bodies.  The lordotic curvature is normal.  Grade 1 anterolisthesis of L5 on S1. no evidence for compression deformity, fracture, or subluxation.  The vertebral heights are maintained without significant intervertebral disc space narrowing.  Progressive degenerative changes of facets are identified.    The bilateral SI joints are normal.    Impression  Spondylotic changes lumbar spine without evidence for fracture.      Electronically signed by: Chago Chapa MD  Date:    06/21/2023  Time:    14:29        Results for orders placed during the hospital encounter of 06/21/23    X-Ray Hip 2 or 3 views Left (with Pelvis when performed)    Narrative  EXAMINATION:  XR HIP WITH PELVIS WHEN PERFORMED, 2 OR 3 VIEWS LEFT    CLINICAL HISTORY:  Pain in left leg    TECHNIQUE:  AP view of the pelvis and frog leg lateral view of the left hip were performed.    COMPARISON:  02/01/2022    FINDINGS:  No fracture the pelvis or proximal femora.  The femoral heads are well seated in the acetabulum without significant joint space narrowing.  Osteopenia is identified.    Degenerative changes of bilateral SI joints.  The pubic symphysis is normal.    Impression  No fracture the pelvis or proximal femora.  Degenerative changes are noted.      Electronically signed by: Chago Chapa MD  Date:    06/21/2023  Time:    14:28    XR TIBIA FIBULA 2 VIEW LEFT 6/21/23     CLINICAL HISTORY:  Pain in left leg     TECHNIQUE:  AP and lateral views of the left tibia and fibula were performed.     COMPARISON:  None.     FINDINGS:  No fracture.  The alignment is normal.  Joint space narrowing is  identified of the knee as well as of the ankle.  No significant soft tissue swelling.  Calcifications are identified of the vasculature.     Impression:     No fracture of the tibia or fibula.      US LOWER EXTREMITY VEINS BILATERAL 06/21/23     CLINICAL HISTORY:  Pain in left leg     TECHNIQUE:  Duplex and color flow Doppler and dynamic compression was performed of the bilateral lower extremity veins was performed.     COMPARISON:  None     FINDINGS:  Right thigh veins: The common femoral, femoral, popliteal, upper greater saphenous, and deep femoral veins are patent and free of thrombus. The veins are normally compressible and have normal phasic flow and augmentation response.     Right calf veins: The visualized calf veins are patent.     Left thigh veins: The common femoral, femoral, popliteal, upper greater saphenous, and deep femoral veins are patent and free of thrombus. The veins are normally compressible and have normal phasic flow and augmentation response.     Left calf veins: The visualized calf veins are patent.     Miscellaneous: None     Impression:     No evidence of deep venous thrombosis in either lower extremity.    Past Medical History:   Diagnosis Date    Hyperlipidemia     Hypertension      Past Surgical History:   Procedure Laterality Date    NO PAST SURGERIES       Social History     Socioeconomic History    Marital status:    Tobacco Use    Smoking status: Former     Current packs/day: 0.00    Smokeless tobacco: Never   Substance and Sexual Activity    Alcohol use: Never    Drug use: Never    Sexual activity: Yes     Partners: Female     Family History   Problem Relation Age of Onset    No Known Problems Mother     No Known Problems Father        Review of patient's allergies indicates:  No Known Allergies    Current Outpatient Medications   Medication Sig    amLODIPine (NORVASC) 5 MG tablet TAKE 1 TABLET BY MOUTH EVERY DAY    atorvastatin (LIPITOR) 20 MG tablet TAKE 1 TABLET BY MOUTH  EVERY DAY    dicyclomine (BENTYL) 20 mg tablet TAKE 1 TABLET BY MOUTH THREE TIMES A DAY FOR 7 DAYS    doxazosin (CARDURA) 4 MG tablet TAKE 1 TABLET BY MOUTH EVERY EVENING.    fenofibrate (TRICOR) 54 MG tablet Take 1 tablet (54 mg total) by mouth once daily.    hydroCHLOROthiazide (HYDRODIURIL) 25 MG tablet TAKE 1 TABLET BY MOUTH EVERY DAY IN THE MORNING    metFORMIN (GLUCOPHAGE) 500 MG tablet TAKE 1 TABLET BY MOUTH TWICE A DAY WITH MEALS    metoprolol succinate (TOPROL-XL) 100 MG 24 hr tablet TAKE 1 TABLET BY MOUTH TWICE A DAY    olmesartan (BENICAR) 40 MG tablet TAKE 1 TABLET BY MOUTH EVERY DAY    traMADoL (ULTRAM) 50 mg tablet Take 1 tablet (50 mg total) by mouth every 6 (six) hours as needed for Pain.    pregabalin (LYRICA) 50 MG capsule Take 1 capsule (50 mg total) by mouth 2 (two) times daily.    sildenafiL (VIAGRA) 100 MG tablet Take 1 tablet (100 mg total) by mouth daily as needed for Erectile Dysfunction. (Patient not taking: Reported on 8/4/2023)     No current facility-administered medications for this visit.         ROS  Review of Systems   Constitutional:  Negative for activity change, appetite change and fever.   HENT:  Negative for facial swelling, rhinorrhea and sore throat.    Eyes:  Negative for pain and redness.   Respiratory:  Negative for cough, chest tightness, shortness of breath, wheezing and stridor.    Cardiovascular:  Positive for leg swelling. Negative for chest pain and palpitations.   Gastrointestinal:  Negative for abdominal pain, blood in stool, constipation, diarrhea, nausea and vomiting.   Endocrine: Negative for polydipsia, polyphagia and polyuria.   Genitourinary:  Positive for urgency. Negative for dysuria and hematuria.   Musculoskeletal:  Positive for arthralgias, back pain, gait problem and myalgias. Negative for joint swelling, neck pain and neck stiffness.   Skin:  Negative for rash.   Allergic/Immunologic: Negative for food allergies.   Neurological:  Positive for weakness  "and numbness. Negative for dizziness, tremors, seizures, syncope, facial asymmetry, speech difficulty, light-headedness and headaches.   Psychiatric/Behavioral:  Negative for agitation, hallucinations, self-injury and suicidal ideas. The patient is not nervous/anxious and is not hyperactive.             OBJECTIVE:  BP (!) 160/73 (BP Location: Left arm, Patient Position: Sitting)   Pulse (!) 57   Ht 5' 5" (1.651 m)   Wt 96.5 kg (212 lb 11.9 oz)   BMI 35.40 kg/m²         Physical Exam  Constitutional:       General: He is not in acute distress.     Appearance: Normal appearance. He is not ill-appearing.   HENT:      Head: Normocephalic and atraumatic.      Nose: No congestion or rhinorrhea.   Eyes:      Extraocular Movements: Extraocular movements intact.      Pupils: Pupils are equal, round, and reactive to light.   Cardiovascular:      Pulses: Normal pulses.   Pulmonary:      Effort: Pulmonary effort is normal.   Abdominal:      General: Abdomen is flat.      Palpations: Abdomen is soft.   Skin:     General: Skin is warm and dry.      Capillary Refill: Capillary refill takes less than 2 seconds.   Neurological:      General: No focal deficit present.      Mental Status: He is alert and oriented to person, place, and time.      Sensory: Sensory deficit present.      Motor: Weakness present. No abnormal muscle tone.      Gait: Gait abnormal.      Deep Tendon Reflexes:      Reflex Scores:       Patellar reflexes are 2+ on the right side and 2+ on the left side.       Achilles reflexes are 2+ on the right side and 1+ on the left side.     Comments: Decreased light touch sensation over lateral aspect left calf  4/5 strength in left dorsiflexion   Psychiatric:         Mood and Affect: Mood normal.         Behavior: Behavior normal.         Thought Content: Thought content normal.           Musculoskeletal:      Lumbar Exam  Incision: no  Pain with Flexion: no  Pain with Extension: no  ROM: FROM  Paraspinous TTP:  Mild " bilaterally  Facet TTP:  L5-S1  Facet Loading:  Negative bilaterally  SLR:  Positive on the left at 75° in L5 distribution  SIJ TTP:  Negative bilaterally      LABS:  Lab Results   Component Value Date    WBC 6.32 05/30/2023    HGB 11.9 (L) 05/30/2023    HCT 36.7 (L) 05/30/2023    MCV 89 05/30/2023     05/30/2023       CMP  Sodium   Date Value Ref Range Status   06/21/2023 139 136 - 145 mmol/L Final     Potassium   Date Value Ref Range Status   06/21/2023 4.4 3.5 - 5.1 mmol/L Final     Chloride   Date Value Ref Range Status   06/21/2023 104 95 - 110 mmol/L Final     CO2   Date Value Ref Range Status   06/21/2023 26 23 - 29 mmol/L Final     Glucose   Date Value Ref Range Status   06/21/2023 91 70 - 110 mg/dL Final     BUN   Date Value Ref Range Status   06/21/2023 22 8 - 23 mg/dL Final     Creatinine   Date Value Ref Range Status   06/21/2023 1.4 0.5 - 1.4 mg/dL Final     Calcium   Date Value Ref Range Status   06/21/2023 9.5 8.7 - 10.5 mg/dL Final     Total Protein   Date Value Ref Range Status   05/30/2023 6.8 6.0 - 8.4 g/dL Final     Albumin   Date Value Ref Range Status   05/30/2023 3.8 3.5 - 5.2 g/dL Final     Total Bilirubin   Date Value Ref Range Status   05/30/2023 0.5 0.1 - 1.0 mg/dL Final     Comment:     For infants and newborns, interpretation of results should be based  on gestational age, weight and in agreement with clinical  observations.    Premature Infant recommended reference ranges:  Up to 24 hours.............<8.0 mg/dL  Up to 48 hours............<12.0 mg/dL  3-5 days..................<15.0 mg/dL  6-29 days.................<15.0 mg/dL       Alkaline Phosphatase   Date Value Ref Range Status   05/30/2023 31 (L) 55 - 135 U/L Final     AST   Date Value Ref Range Status   05/30/2023 20 10 - 40 U/L Final     ALT   Date Value Ref Range Status   05/30/2023 18 10 - 44 U/L Final     Anion Gap   Date Value Ref Range Status   06/21/2023 9 8 - 16 mmol/L Final     eGFR if    Date Value  Ref Range Status   05/30/2022 >60.0 >60 mL/min/1.73 m^2 Final     eGFR if non    Date Value Ref Range Status   05/30/2022 >60.0 >60 mL/min/1.73 m^2 Final     Comment:     Calculation used to obtain the estimated glomerular filtration  rate (eGFR) is the CKD-EPI equation.          Lab Results   Component Value Date    HGBA1C 5.3 05/30/2023             ASSESSMENT:       73 y.o. year old male with left lower extremity pain, consistent with     1. Lumbar radiculopathy  pregabalin (LYRICA) 50 MG capsule    MRI Lumbar Spine Without Contrast    Nerve conduction test      2. Leg pain, diffuse, left  Ambulatory referral/consult to [facility]    Ambulatory referral/consult to Pain Clinic    pregabalin (LYRICA) 50 MG capsule    Nerve conduction test      3. Osteoarthritis of lumbar spine, unspecified spinal osteoarthritis complication status  Ambulatory referral/consult to [facility]    Ambulatory referral/consult to Pain Clinic    pregabalin (LYRICA) 50 MG capsule      4. Lumbar spondylosis  pregabalin (LYRICA) 50 MG capsule      5. DDD (degenerative disc disease), lumbar  pregabalin (LYRICA) 50 MG capsule      6. Other myositis of left lower extremity  pregabalin (LYRICA) 50 MG capsule    Nerve conduction test      7. Dorsalgia, unspecified  pregabalin (LYRICA) 50 MG capsule      8. Lumbar radiculopathy, chronic  pregabalin (LYRICA) 50 MG capsule      9. Spondylolisthesis of lumbar region          Lumbar radiculopathy  -     pregabalin (LYRICA) 50 MG capsule; Take 1 capsule (50 mg total) by mouth 2 (two) times daily.  Dispense: 60 capsule; Refill: 1  -     MRI Lumbar Spine Without Contrast; Future; Expected date: 08/04/2023  -     Nerve conduction test; Future    Leg pain, diffuse, left  -     Ambulatory referral/consult to [facility]  -     Ambulatory referral/consult to Pain Clinic  -     pregabalin (LYRICA) 50 MG capsule; Take 1 capsule (50 mg total) by mouth 2 (two) times daily.   Dispense: 60 capsule; Refill: 1  -     Nerve conduction test; Future    Osteoarthritis of lumbar spine, unspecified spinal osteoarthritis complication status  -     Ambulatory referral/consult to [facility]  -     Ambulatory referral/consult to Pain Clinic  -     pregabalin (LYRICA) 50 MG capsule; Take 1 capsule (50 mg total) by mouth 2 (two) times daily.  Dispense: 60 capsule; Refill: 1    Lumbar spondylosis  -     pregabalin (LYRICA) 50 MG capsule; Take 1 capsule (50 mg total) by mouth 2 (two) times daily.  Dispense: 60 capsule; Refill: 1    DDD (degenerative disc disease), lumbar  -     pregabalin (LYRICA) 50 MG capsule; Take 1 capsule (50 mg total) by mouth 2 (two) times daily.  Dispense: 60 capsule; Refill: 1    Other myositis of left lower extremity  -     pregabalin (LYRICA) 50 MG capsule; Take 1 capsule (50 mg total) by mouth 2 (two) times daily.  Dispense: 60 capsule; Refill: 1  -     Nerve conduction test; Future    Dorsalgia, unspecified  -     pregabalin (LYRICA) 50 MG capsule; Take 1 capsule (50 mg total) by mouth 2 (two) times daily.  Dispense: 60 capsule; Refill: 1    Lumbar radiculopathy, chronic  -     pregabalin (LYRICA) 50 MG capsule; Take 1 capsule (50 mg total) by mouth 2 (two) times daily.  Dispense: 60 capsule; Refill: 1    Spondylolisthesis of lumbar region             PLAN:   - Interventions:   Left lower extremity none at this time.  Left lower extremity pain appears to be most consistent with lumbar radiculopathy.  Patient has had ultrasound of left lower extremity reveals no significant vascular abnormality.  We will obtain MRI lumbar spine further evaluation of lumbar radiculopathy.  We will also obtain EMG of bilateral lower extremity to further evaluate lumbar radiculopathy versus an overlapping peripheral neuropathy.  Can not currently rule out myositis picture of the left lower extremity as well.    - Anticoagulation use:   No no anticoagulation    -  Medications:   Discontinue gabapentin and start Lyrica 50 mg at night.  Patient may titrate up to 50 mg twice a day   Continue tramadol 50 mg twice a day as needed    - Therapy:    Patient has completed 6 weeks of formal physical therapy in the last 3 months with increase in pain.  We will stop formal physical therapy at this time until pain is lessened.    - Imaging/Diagnostic:   X-rays of left hip left tibia and fibula are unremarkable.   Ultrasound of left lower extremity reveals no abnormality   X-ray of lumbar spine reveals grade 1 anterolisthesis of L5 upon S1 with loss of disc height at L5-S1 and L4-5.  Degenerative changes of the facets noted at L4-5 and L5-S1.   We will obtain MRI of lumbar spine without contrast to further evaluate left lower extremity pain consistent with lumbar radiculopathy that is continued despite over 6 weeks of conservative therapy with noted changes sensation on exam, loss of reflex, and positive straight leg raise test on the left.  Ultrasound of left lower extremity and x-rays of the left lower extremity  Revealed no acute abnormality.  - Consults:   None at this time.        - Patient Questions: Answered all of the patient's questions regarding diagnosis, therapy, and treatment     This condition does not require this patient to take time off of work, and the primary goal of our Pain Management services is to improve the patient's functional capacity.     - Follow up visit: return to clinic after MRI and EMG of bilateral lower extremities        The above plan and management options were discussed at length with patient. Patient is in agreement with the above and verbalized understanding.    I discussed the goals of interventional chronic pain management with the patient on today's visit.  I explained the utility of injections for diagnostic and therapeutic purposes.  We discussed a multimodal approach to pain including treating the patient's given worst pain at any given time.   We will use a systematic approach to addressing pain.  We will also adopt a multimodal approach that includes injections, adjuvant medications, physical therapy, at times psychiatry.  There may be a limited role for opioid use intermittently in the treatment of pain, more particularly for acute pain although no one approach can be used as a sole treatment modality.    I emphasized the importance of regular exercise, core strengthening and stretching, diet and weight loss as a cornerstone of long-term pain management.      Oneil Carlson MD  Interventional Pain Management  [facility]    Disclaimer:  This note was prepared using voice recognition system and is likely to have sound alike errors that may have been overlooked even after proof reading.  Please call me with any questions

## 2023-08-22 ENCOUNTER — HOSPITAL ENCOUNTER (OUTPATIENT)
Dept: RADIOLOGY | Facility: HOSPITAL | Age: 73
Discharge: HOME | End: 2023-08-22
Attending: ANESTHESIOLOGY
Payer: MEDICARE

## 2023-08-22 DIAGNOSIS — M54.16 LUMBAR RADICULOPATHY: ICD-10-CM

## 2023-08-22 PROCEDURE — 72148 MRI LUMBAR SPINE W/O DYE: CPT | Mod: TC

## 2023-08-22 PROCEDURE — 72148 MRI LUMBAR SPINE WITHOUT CONTRAST: ICD-10-PCS | Mod: 26,,, | Performed by: RADIOLOGY

## 2023-08-22 PROCEDURE — 72148 MRI LUMBAR SPINE W/O DYE: CPT | Mod: 26,,, | Performed by: RADIOLOGY

## 2023-08-23 ENCOUNTER — OFFICE VISIT (OUTPATIENT)
Dept: FAMILY MEDICINE | Facility: CLINIC | Age: 73
End: 2023-08-23
Payer: MEDICARE

## 2023-08-23 ENCOUNTER — LAB VISIT (OUTPATIENT)
Dept: LAB | Facility: HOSPITAL | Age: 73
End: 2023-08-23
Attending: FAMILY MEDICINE
Payer: MEDICARE

## 2023-08-23 VITALS
WEIGHT: 214.94 LBS | HEART RATE: 56 BPM | BODY MASS INDEX: 35.81 KG/M2 | DIASTOLIC BLOOD PRESSURE: 64 MMHG | TEMPERATURE: 98 F | OXYGEN SATURATION: 96 % | HEIGHT: 65 IN | SYSTOLIC BLOOD PRESSURE: 132 MMHG

## 2023-08-23 DIAGNOSIS — M54.16 LUMBAR RADICULOPATHY: Primary | ICD-10-CM

## 2023-08-23 DIAGNOSIS — D64.9 ANEMIA, UNSPECIFIED TYPE: ICD-10-CM

## 2023-08-23 DIAGNOSIS — R73.03 PREDIABETES: ICD-10-CM

## 2023-08-23 DIAGNOSIS — R01.1 HEART MURMUR: ICD-10-CM

## 2023-08-23 DIAGNOSIS — N18.31 STAGE 3A CHRONIC KIDNEY DISEASE: ICD-10-CM

## 2023-08-23 DIAGNOSIS — M48.061 SPINAL STENOSIS OF LUMBAR REGION WITH RADICULOPATHY: ICD-10-CM

## 2023-08-23 DIAGNOSIS — R30.0 DYSURIA: ICD-10-CM

## 2023-08-23 DIAGNOSIS — R26.89 OTHER ABNORMALITIES OF GAIT AND MOBILITY: ICD-10-CM

## 2023-08-23 DIAGNOSIS — E66.01 SEVERE OBESITY (BMI 35.0-39.9) WITH COMORBIDITY: ICD-10-CM

## 2023-08-23 DIAGNOSIS — I10 ESSENTIAL HYPERTENSION: ICD-10-CM

## 2023-08-23 DIAGNOSIS — M54.16 SPINAL STENOSIS OF LUMBAR REGION WITH RADICULOPATHY: ICD-10-CM

## 2023-08-23 LAB
BACTERIA #/AREA URNS AUTO: ABNORMAL /HPF
BASOPHILS # BLD AUTO: 0.03 K/UL (ref 0–0.2)
BASOPHILS NFR BLD: 0.4 % (ref 0–1.9)
BILIRUB UR QL STRIP: NEGATIVE
CLARITY UR REFRACT.AUTO: ABNORMAL
COLOR UR AUTO: YELLOW
DIFFERENTIAL METHOD: ABNORMAL
EOSINOPHIL # BLD AUTO: 0.1 K/UL (ref 0–0.5)
EOSINOPHIL NFR BLD: 1.7 % (ref 0–8)
ERYTHROCYTE [DISTWIDTH] IN BLOOD BY AUTOMATED COUNT: 13.2 % (ref 11.5–14.5)
GLUCOSE UR QL STRIP: NEGATIVE
HCT VFR BLD AUTO: 35.6 % (ref 40–54)
HGB BLD-MCNC: 11.3 G/DL (ref 14–18)
HGB UR QL STRIP: NEGATIVE
IMM GRANULOCYTES # BLD AUTO: 0.02 K/UL (ref 0–0.04)
IMM GRANULOCYTES NFR BLD AUTO: 0.3 % (ref 0–0.5)
IRON SERPL-MCNC: 58 UG/DL (ref 45–160)
KETONES UR QL STRIP: NEGATIVE
LEUKOCYTE ESTERASE UR QL STRIP: ABNORMAL
LYMPHOCYTES # BLD AUTO: 0.8 K/UL (ref 1–4.8)
LYMPHOCYTES NFR BLD: 11.4 % (ref 18–48)
MCH RBC QN AUTO: 28.3 PG (ref 27–31)
MCHC RBC AUTO-ENTMCNC: 31.7 G/DL (ref 32–36)
MCV RBC AUTO: 89 FL (ref 82–98)
MICROSCOPIC COMMENT: ABNORMAL
MONOCYTES # BLD AUTO: 0.6 K/UL (ref 0.3–1)
MONOCYTES NFR BLD: 8.5 % (ref 4–15)
NEUTROPHILS # BLD AUTO: 5.5 K/UL (ref 1.8–7.7)
NEUTROPHILS NFR BLD: 77.7 % (ref 38–73)
NITRITE UR QL STRIP: NEGATIVE
NRBC BLD-RTO: 0 /100 WBC
PH UR STRIP: 6 [PH] (ref 5–8)
PLATELET # BLD AUTO: 209 K/UL (ref 150–450)
PMV BLD AUTO: 10.6 FL (ref 9.2–12.9)
PROT UR QL STRIP: NEGATIVE
RBC # BLD AUTO: 4 M/UL (ref 4.6–6.2)
RBC #/AREA URNS AUTO: 2 /HPF (ref 0–4)
SATURATED IRON: 15 % (ref 20–50)
SP GR UR STRIP: 1.01 (ref 1–1.03)
TOTAL IRON BINDING CAPACITY: 391 UG/DL (ref 250–450)
TRANSFERRIN SERPL-MCNC: 264 MG/DL (ref 200–375)
URN SPEC COLLECT METH UR: ABNORMAL
VIT B12 SERPL-MCNC: 436 PG/ML (ref 210–950)
WBC # BLD AUTO: 7.04 K/UL (ref 3.9–12.7)
WBC #/AREA URNS AUTO: 18 /HPF (ref 0–5)
WBC CLUMPS UR QL AUTO: ABNORMAL

## 2023-08-23 PROCEDURE — 1101F PR PT FALLS ASSESS DOC 0-1 FALLS W/OUT INJ PAST YR: ICD-10-PCS | Mod: CPTII,S$GLB,, | Performed by: FAMILY MEDICINE

## 2023-08-23 PROCEDURE — 3288F FALL RISK ASSESSMENT DOCD: CPT | Mod: CPTII,S$GLB,, | Performed by: FAMILY MEDICINE

## 2023-08-23 PROCEDURE — 99215 PR OFFICE/OUTPT VISIT, EST, LEVL V, 40-54 MIN: ICD-10-PCS | Mod: S$GLB,,, | Performed by: FAMILY MEDICINE

## 2023-08-23 PROCEDURE — 1159F MED LIST DOCD IN RCRD: CPT | Mod: CPTII,S$GLB,, | Performed by: FAMILY MEDICINE

## 2023-08-23 PROCEDURE — 87186 SC STD MICRODIL/AGAR DIL: CPT | Performed by: FAMILY MEDICINE

## 2023-08-23 PROCEDURE — 87077 CULTURE AEROBIC IDENTIFY: CPT | Performed by: FAMILY MEDICINE

## 2023-08-23 PROCEDURE — 3008F PR BODY MASS INDEX (BMI) DOCUMENTED: ICD-10-PCS | Mod: CPTII,S$GLB,, | Performed by: FAMILY MEDICINE

## 2023-08-23 PROCEDURE — 82746 ASSAY OF FOLIC ACID SERUM: CPT | Performed by: FAMILY MEDICINE

## 2023-08-23 PROCEDURE — 3044F PR MOST RECENT HEMOGLOBIN A1C LEVEL <7.0%: ICD-10-PCS | Mod: CPTII,S$GLB,, | Performed by: FAMILY MEDICINE

## 2023-08-23 PROCEDURE — 99215 OFFICE O/P EST HI 40 MIN: CPT | Mod: S$GLB,,, | Performed by: FAMILY MEDICINE

## 2023-08-23 PROCEDURE — 1126F AMNT PAIN NOTED NONE PRSNT: CPT | Mod: CPTII,S$GLB,, | Performed by: FAMILY MEDICINE

## 2023-08-23 PROCEDURE — 3075F SYST BP GE 130 - 139MM HG: CPT | Mod: CPTII,S$GLB,, | Performed by: FAMILY MEDICINE

## 2023-08-23 PROCEDURE — 3075F PR MOST RECENT SYSTOLIC BLOOD PRESS GE 130-139MM HG: ICD-10-PCS | Mod: CPTII,S$GLB,, | Performed by: FAMILY MEDICINE

## 2023-08-23 PROCEDURE — 99999 PR PBB SHADOW E&M-EST. PATIENT-LVL IV: CPT | Mod: PBBFAC,,, | Performed by: FAMILY MEDICINE

## 2023-08-23 PROCEDURE — 99999 PR PBB SHADOW E&M-EST. PATIENT-LVL IV: ICD-10-PCS | Mod: PBBFAC,,, | Performed by: FAMILY MEDICINE

## 2023-08-23 PROCEDURE — 81001 URINALYSIS AUTO W/SCOPE: CPT | Performed by: FAMILY MEDICINE

## 2023-08-23 PROCEDURE — 3288F PR FALLS RISK ASSESSMENT DOCUMENTED: ICD-10-PCS | Mod: CPTII,S$GLB,, | Performed by: FAMILY MEDICINE

## 2023-08-23 PROCEDURE — 36415 COLL VENOUS BLD VENIPUNCTURE: CPT | Mod: PO | Performed by: FAMILY MEDICINE

## 2023-08-23 PROCEDURE — 3044F HG A1C LEVEL LT 7.0%: CPT | Mod: CPTII,S$GLB,, | Performed by: FAMILY MEDICINE

## 2023-08-23 PROCEDURE — 85025 COMPLETE CBC W/AUTO DIFF WBC: CPT | Performed by: FAMILY MEDICINE

## 2023-08-23 PROCEDURE — 83540 ASSAY OF IRON: CPT | Performed by: FAMILY MEDICINE

## 2023-08-23 PROCEDURE — 4010F PR ACE/ARB THEARPY RXD/TAKEN: ICD-10-PCS | Mod: CPTII,S$GLB,, | Performed by: FAMILY MEDICINE

## 2023-08-23 PROCEDURE — 3078F PR MOST RECENT DIASTOLIC BLOOD PRESSURE < 80 MM HG: ICD-10-PCS | Mod: CPTII,S$GLB,, | Performed by: FAMILY MEDICINE

## 2023-08-23 PROCEDURE — 83921 ORGANIC ACID SINGLE QUANT: CPT | Performed by: FAMILY MEDICINE

## 2023-08-23 PROCEDURE — 3078F DIAST BP <80 MM HG: CPT | Mod: CPTII,S$GLB,, | Performed by: FAMILY MEDICINE

## 2023-08-23 PROCEDURE — 1159F PR MEDICATION LIST DOCUMENTED IN MEDICAL RECORD: ICD-10-PCS | Mod: CPTII,S$GLB,, | Performed by: FAMILY MEDICINE

## 2023-08-23 PROCEDURE — 4010F ACE/ARB THERAPY RXD/TAKEN: CPT | Mod: CPTII,S$GLB,, | Performed by: FAMILY MEDICINE

## 2023-08-23 PROCEDURE — 1126F PR PAIN SEVERITY QUANTIFIED, NO PAIN PRESENT: ICD-10-PCS | Mod: CPTII,S$GLB,, | Performed by: FAMILY MEDICINE

## 2023-08-23 PROCEDURE — 1101F PT FALLS ASSESS-DOCD LE1/YR: CPT | Mod: CPTII,S$GLB,, | Performed by: FAMILY MEDICINE

## 2023-08-23 PROCEDURE — 3008F BODY MASS INDEX DOCD: CPT | Mod: CPTII,S$GLB,, | Performed by: FAMILY MEDICINE

## 2023-08-23 PROCEDURE — 87086 URINE CULTURE/COLONY COUNT: CPT | Performed by: FAMILY MEDICINE

## 2023-08-23 PROCEDURE — 84466 ASSAY OF TRANSFERRIN: CPT | Performed by: FAMILY MEDICINE

## 2023-08-23 PROCEDURE — 82607 VITAMIN B-12: CPT | Performed by: FAMILY MEDICINE

## 2023-08-23 PROCEDURE — 87088 URINE BACTERIA CULTURE: CPT | Performed by: FAMILY MEDICINE

## 2023-08-23 RX ORDER — CIPROFLOXACIN 500 MG/1
500 TABLET ORAL 2 TIMES DAILY
Qty: 14 TABLET | Refills: 0 | Status: ON HOLD | OUTPATIENT
Start: 2023-08-23 | End: 2024-01-05 | Stop reason: HOSPADM

## 2023-08-23 NOTE — PROGRESS NOTES
Subjective:       Patient ID: Quintin Ling is a 73 y.o. male.    Chief Complaint: Urinary Tract Infection      HPI Comments:       Current Outpatient Medications:     amLODIPine (NORVASC) 5 MG tablet, TAKE 1 TABLET BY MOUTH EVERY DAY, Disp: 90 tablet, Rfl: 1    atorvastatin (LIPITOR) 20 MG tablet, TAKE 1 TABLET BY MOUTH EVERY DAY, Disp: 90 tablet, Rfl: 3    dicyclomine (BENTYL) 20 mg tablet, TAKE 1 TABLET BY MOUTH THREE TIMES A DAY FOR 7 DAYS, Disp: , Rfl:     doxazosin (CARDURA) 4 MG tablet, TAKE 1 TABLET BY MOUTH EVERY EVENING., Disp: 90 tablet, Rfl: 3    fenofibrate (TRICOR) 54 MG tablet, Take 1 tablet (54 mg total) by mouth once daily., Disp: 90 tablet, Rfl: 3    hydroCHLOROthiazide (HYDRODIURIL) 25 MG tablet, TAKE 1 TABLET BY MOUTH EVERY DAY IN THE MORNING, Disp: 90 tablet, Rfl: 3    metFORMIN (GLUCOPHAGE) 500 MG tablet, TAKE 1 TABLET BY MOUTH TWICE A DAY WITH MEALS, Disp: 180 tablet, Rfl: 1    metoprolol succinate (TOPROL-XL) 100 MG 24 hr tablet, TAKE 1 TABLET BY MOUTH TWICE A DAY, Disp: 180 tablet, Rfl: 3    olmesartan (BENICAR) 40 MG tablet, TAKE 1 TABLET BY MOUTH EVERY DAY, Disp: 90 tablet, Rfl: 1    pregabalin (LYRICA) 50 MG capsule, Take 1 capsule (50 mg total) by mouth 2 (two) times daily., Disp: 60 capsule, Rfl: 1    traMADoL (ULTRAM) 50 mg tablet, Take 1 tablet (50 mg total) by mouth every 6 (six) hours as needed for Pain., Disp: 12 tablet, Rfl: 0    ciprofloxacin HCl (CIPRO) 500 MG tablet, Take 1 tablet (500 mg total) by mouth 2 (two) times daily., Disp: 14 tablet, Rfl: 0    sildenafiL (VIAGRA) 100 MG tablet, Take 1 tablet (100 mg total) by mouth daily as needed for Erectile Dysfunction. (Patient not taking: Reported on 8/4/2023), Disp: 10 tablet, Rfl: 1      Complains of burning with urination for the last 2 weeks.  No change in frequency or urgency.  No fever chills.  No abdominal or back pain.  No urethral discharge.  No STD exposures.  No blood in urine.    Left legs got a little bit better  "since he started Lyrica.  Had his MRI yesterday which showed severe spinal stenosis and moderate foraminal.  Has his nerve conduction study tomorrow.  After that will be hearing from pain management I a soon    Heart murmur heard today.  He says he has no history of this.  Will get a Cardiology consult.    Has been watching his hemoglobin.  Up and down.  Will get vitamin and iron studies today.  Told him he may need an EGD.  Had a Cologuard last year that was negative after declining colonoscopy      Review of Systems   Constitutional:  Negative for activity change, appetite change and fever.   HENT:  Negative for sore throat.    Respiratory:  Negative for cough and shortness of breath.    Cardiovascular:  Negative for chest pain.   Gastrointestinal:  Negative for abdominal pain, diarrhea and nausea.   Genitourinary:  Positive for dysuria. Negative for decreased urine volume, difficulty urinating, flank pain, frequency, hematuria, penile discharge and urgency.   Musculoskeletal:  Negative for arthralgias and myalgias.   Neurological:  Negative for dizziness and headaches.       Objective:      Vitals:    08/23/23 0854   BP: 132/64   Pulse: (!) 56   Temp: 98 °F (36.7 °C)   TempSrc: Tympanic   SpO2: 96%   Weight: 97.5 kg (214 lb 15.2 oz)   Height: 5' 5" (1.651 m)   PainSc: 0-No pain     Physical Exam  Vitals and nursing note reviewed.   Constitutional:       General: He is not in acute distress.     Appearance: He is well-developed. He is not diaphoretic.   HENT:      Head: Normocephalic.   Neck:      Thyroid: No thyromegaly.   Cardiovascular:      Rate and Rhythm: Normal rate and regular rhythm.      Heart sounds: Murmur heard.      Systolic murmur is present with a grade of 2/6.   Pulmonary:      Effort: Pulmonary effort is normal.      Breath sounds: Normal breath sounds. No wheezing or rales.   Abdominal:      General: There is no distension.      Palpations: Abdomen is soft.      Tenderness: There is no abdominal " tenderness.   Musculoskeletal:      Cervical back: Neck supple.      Right lower leg: No edema.      Left lower leg: No edema.   Lymphadenopathy:      Cervical: No cervical adenopathy.   Skin:     General: Skin is warm and dry.   Neurological:      Mental Status: He is alert and oriented to person, place, and time.   Psychiatric:         Mood and Affect: Mood normal.         Behavior: Behavior normal.         Thought Content: Thought content normal.         Judgment: Judgment normal.         Assessment:       1. Lumbar radiculopathy    2. Spinal stenosis of lumbar region with radiculopathy    3. Anemia, unspecified type    4. Stage 3a chronic kidney disease    5. Essential hypertension    6. Prediabetes    7. Severe obesity (BMI 35.0-39.9) with comorbidity    8. Dysuria    9. Other abnormalities of gait and mobility    10. Heart murmur        Plan:   Lumbar radiculopathy  Comments:  Followed by pain management.  Doing better on Lyrica    Spinal stenosis of lumbar region with radiculopathy  Comments:  Recently seen on MRI.  Follow-up pain management    Anemia, unspecified type  Comments:  Check iron studies and vitamin levels.  May need EGD  Orders:  -     Iron and TIBC; Future; Expected date: 08/23/2023  -     Vitamin B12; Future; Expected date: 08/23/2023  -     Methylmalonic Acid, Serum; Future; Expected date: 08/23/2023  -     Folate; Future; Expected date: 08/23/2023  -     CBC Auto Differential; Future; Expected date: 08/23/2023    Stage 3a chronic kidney disease  Comments:  Creatinine improved to 1.4 last time    Essential hypertension  Comments:  Controlled.      Prediabetes  Comments:  A1c 5.3    Severe obesity (BMI 35.0-39.9) with comorbidity    Dysuria  Comments:  Likely prostatitis.  Check urinalysis.  Cipro for 7 days.  Follow-up if symptoms remain  Orders:  -     Urinalysis; Future; Expected date: 08/23/2023  -     Urine culture; Future; Expected date: 08/23/2023    Other abnormalities of gait and  mobility  -     Vitamin B12; Future; Expected date: 08/23/2023  -     Folate; Future; Expected date: 08/23/2023    Heart murmur  Comments:  Cardiology consult  Orders:  -     Ambulatory referral/consult to Cardiology; Future; Expected date: 08/30/2023    Other orders  -     ciprofloxacin HCl (CIPRO) 500 MG tablet; Take 1 tablet (500 mg total) by mouth 2 (two) times daily.  Dispense: 14 tablet; Refill: 0

## 2023-08-24 ENCOUNTER — OFFICE VISIT (OUTPATIENT)
Dept: PHYSICAL MEDICINE AND REHAB | Facility: CLINIC | Age: 73
End: 2023-08-24
Payer: MEDICARE

## 2023-08-24 VITALS
WEIGHT: 214 LBS | HEART RATE: 55 BPM | HEIGHT: 65 IN | SYSTOLIC BLOOD PRESSURE: 166 MMHG | RESPIRATION RATE: 13 BRPM | DIASTOLIC BLOOD PRESSURE: 68 MMHG | BODY MASS INDEX: 35.65 KG/M2

## 2023-08-24 DIAGNOSIS — M54.16 LUMBAR RADICULOPATHY: Primary | ICD-10-CM

## 2023-08-24 LAB — FOLATE SERPL-MCNC: 36.8 NG/ML (ref 4–24)

## 2023-08-24 PROCEDURE — 95885 MUSC TST DONE W/NERV TST LIM: CPT | Mod: 59,S$GLB,, | Performed by: PHYSICAL MEDICINE & REHABILITATION

## 2023-08-24 PROCEDURE — 95908 PR NERVE CONDUCTION STUDY; 3-4 STUDIES: ICD-10-PCS | Mod: S$GLB,,, | Performed by: PHYSICAL MEDICINE & REHABILITATION

## 2023-08-24 PROCEDURE — 99499 NO LOS: ICD-10-PCS | Mod: S$GLB,,, | Performed by: PHYSICAL MEDICINE & REHABILITATION

## 2023-08-24 PROCEDURE — 99999 PR PBB SHADOW E&M-EST. PATIENT-LVL III: ICD-10-PCS | Mod: PBBFAC,,, | Performed by: PHYSICAL MEDICINE & REHABILITATION

## 2023-08-24 PROCEDURE — 99999 PR PBB SHADOW E&M-EST. PATIENT-LVL III: CPT | Mod: PBBFAC,,, | Performed by: PHYSICAL MEDICINE & REHABILITATION

## 2023-08-24 PROCEDURE — 95886 MUSC TEST DONE W/N TEST COMP: CPT | Mod: S$GLB,,, | Performed by: PHYSICAL MEDICINE & REHABILITATION

## 2023-08-24 PROCEDURE — 95908 NRV CNDJ TST 3-4 STUDIES: CPT | Mod: S$GLB,,, | Performed by: PHYSICAL MEDICINE & REHABILITATION

## 2023-08-24 PROCEDURE — 95886 PR EMG COMPLETE, W/ NERVE CONDUCTION STUDIES, 5+ MUSCLES: ICD-10-PCS | Mod: S$GLB,,, | Performed by: PHYSICAL MEDICINE & REHABILITATION

## 2023-08-24 PROCEDURE — 99499 UNLISTED E&M SERVICE: CPT | Mod: S$GLB,,, | Performed by: PHYSICAL MEDICINE & REHABILITATION

## 2023-08-24 PROCEDURE — 95885 PR MUSC TST DONE W/NERV TST LIM: ICD-10-PCS | Mod: 59,S$GLB,, | Performed by: PHYSICAL MEDICINE & REHABILITATION

## 2023-08-24 NOTE — PROGRESS NOTES
[facility]   [address]  [place] [address]  Phone: [phone]        Full Name: Quintin Ling YOB: 1950  Patient ID: [id number]      Visit Date: 8/24/2023 09:50  Age: 73 Years 1 Months Old  Examining Physician: Carly Manriquez M.D.  Referring Physician:   Reason for Referral: le pain        Chief Complaint   Patient presents with    Back Pain     Into legs       HPI: This is a 73 y.o.  male being seen in clinic today for evaluation of chronic low back pain with radiation into his left leg/calf.  His symptoms are worse with prolonged standing/walking.  Rest provides some relief.. 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    History obtained from patient    Past family, medical, social, and surgical history reviewed in chart    Review of Systems:     General- denies lethargy, weight change, fever, chills  Head/neck- denies swallowing difficulties  ENT- denies hearing changes  Cardiovascular-denies chest pain  Pulmonary- denies shortness of breath  GI- denies constipation or bowel incontinence  - denies bladder incontinence+CKD  Skin- denies wounds or rashes  Musculoskeletal- denies weakness, + pain  Neurologic- + numbness and tingling  Psychiatric- denies depressive or psychotic features, denies anxiety  Lymphatic-denies swelling  Endocrine- denies hypoglycemic symptoms/DM history  All other pertinent systems negative     Physical Examination:  General: Well developed, well nourished male, NAD  HEENT:NCAT EOMI bilaterally   Pulmonary:Normal respirations    Spinal Examination: CERVICAL  Active ROM is within normal limits.  Inspection: No deformity of spinal alignment.  Palpation: No vertebral tenderness to percussion.      Spinal Examination: LUMBAR or THORACIC  Active ROM is limited in all planes at endrange  Inspection: No deformity of spinal alignment.        Bilateral Upper and Lower Extremities:  Pulses are 2+ at radial  bilaterally.  Shoulder/Elbow/Wrist/Hand ROM   Hip/Knee/Ankle ROM wnl  Bilateral Extremities show normal capillary refill.  No signs of cyanosis, rubor, edema, skin changes, or dysvascular changes of appendages.  Nails appear intact.    Neurological Exam:  Cranial Nerves:  II-XII grossly intact    Manual Muscle Testing: (Motor 5=normal)  5/5 strength bilateral lower extremities    No focal atrophy is noted of either lower extremity.    Bilateral Reflexes:  No clonus at knee or ankle.    Sensation: tested to light touch  - intact in legs    Gait: antalgic and limited left hip and knee ROM      Entire procedure explained to patient prior to proceeding.  Verbal consent obtained      SNC      Nerve / Sites Rec. Site Onset Lat Peak Lat Amp Segments Distance Velocity     ms ms µV  mm m/s   L Sural - Ankle (Calf)      Calf Ankle 3.0 3.4 5.8 Calf - Ankle 140 47   L Superficial peroneal - Ankle      Lat leg Ankle 2.8 3.4 5.8 Lat leg - Ankle 140 50       MNC      Nerve / Sites Muscle Latency Amplitude Duration Rel Amp Segments Distance Lat Diff Velocity     ms mV ms %  mm ms m/s   L Peroneal - EDB      Ankle EDB 3.6 2.4 4.6 100 Ankle - EDB 80        Fibular head EDB 9.9 2.1 5.2 89.8 Fibular head -         Pop fossa EDB 11.3 2.2 5.3 102 Pop fossa - EDB 70     L Tibial - AH      Ankle AH 3.8 0.9 4.1 100 Ankle - Ankle 80        Pop fossa AH 13.0 0.9 4.3 103 Pop fossa - Ankle 400 9.3 43       EMG         EMG Summary Table     Spontaneous MUAP Recruitment   Muscle IA Fib PSW Fasc Other Dur. Dur Amp Dur Polys Pattern Effort   L. Rectus femoris N None None None . _NFT_ _NFT_ N N 1+ sl red Max   L. Tibialis anterior Incr 2+ 2+ None . _NFT_ _NFT_ N N 1+ sl red Max   L. Gastrocnemius (Medial head) N None None None . _NFT_ _NFT_ N N 1+ sl red Max   L. Biceps femoris (short head) N None None None . _NFT_ _NFT_ N N 1+ sl red Max   L. Abductor hallucis N None None None . _NFT_ _NFT_ N N 1+ Reduced Max   L. Extensor digitorum brevis  Incr 2+ 2+ None . _NFT_ _NFT_ N N 1+ sl red Max   R. Extensor digitorum brevis Incr None None None . _NFT_ _NFT_ N N 1+ Reduced Max   R. Gastrocnemius (Medial head) N None None None . _NFT_ _NFT_ N N 1+ sl red Max   R. Tibialis anterior N None None None . _NFT_ _NFT_ N Sl Incr 1+ Reduced Max   R. Rectus femoris N None None None . _NFT_ _NFT_ N N 1+ ssl red Max                              INTERPRETATION    -Left superficial peroneal sensory nerve conduction study showed normal peak latency and borderline normal amplitude  -Left sural sensory nerve conduction study showed normal peak latency and borderline normal amplitude  -Left peroneal motor nerve conduction study showed normal latency, amplitude, and conduction velocity  -Left tibial motor nerve conduction study showed normal latency, dec amplitude, and conduction velocity  -Needle EMG examination performed to above mentioned muscles     IMPRESSION  ABNORMAL study  2.  There is electrodiagnostic evidence of an acute on chronic radiculopathy of the left L5 nerve root, a subacute on chronic radiculopathy of the right L5 nerve root, and a chronic radiculopathy of the S1 and probable L4 nerve roots    PLAN  Discussed in detail for greater than 30 minutes about diagnosis and treatment plan    1. Follow up with referring provider: Dr. Oneil Carlson  2. Handouts on lumbar radic, stenosis, and back exercises provided  3. This study is good for one year. If symptoms worsen or do not improve, please re-consult.    Carly Manriquez M.D.  Physical Medicine and Rehab

## 2023-08-25 LAB — BACTERIA UR CULT: ABNORMAL

## 2023-08-25 RX ORDER — METFORMIN HYDROCHLORIDE 500 MG/1
TABLET ORAL
Qty: 180 TABLET | Refills: 1 | Status: SHIPPED | OUTPATIENT
Start: 2023-08-25 | End: 2024-01-09

## 2023-08-25 NOTE — TELEPHONE ENCOUNTER
No care due was identified.  [facility] Embedded Care Due Messages. Reference number: [id number].   8/25/2023 12:21:11 AM CDT

## 2023-08-25 NOTE — TELEPHONE ENCOUNTER
Refill Decision Note   Quintin Sushil  is requesting a refill authorization.  Brief Assessment and Rationale for Refill:  Approve     Medication Therapy Plan:         Comments:     Note composed:8:16 AM 08/25/2023

## 2023-08-27 RX ORDER — FENOFIBRATE 160 MG/1
TABLET ORAL
Qty: 90 TABLET | Refills: 3 | OUTPATIENT
Start: 2023-08-27

## 2023-08-27 NOTE — TELEPHONE ENCOUNTER
Refill Decision Note   Quintin Ling  is requesting a refill authorization.  Brief Assessment and Rationale for Refill:  Quick Discontinue     Medication Therapy Plan:  The original prescription was discontinued on 6/5/2023 by Bishop Gabriel MD.      Comments:     Note composed:10:47 AM 08/27/2023             Appointments     Last Visit   8/23/2023 Bishop Gabriel MD   Next Visit   Visit date not found Bishop Gabriel MD           Appointments     Last Visit   8/23/2023 Bishop Gabriel MD   Next Visit   Visit date not found Bishop Gabriel MD

## 2023-08-27 NOTE — TELEPHONE ENCOUNTER
No care due was identified.  [facility] Embedded Care Due Messages. Reference number: [id number].   8/27/2023 10:04:32 AM CDT

## 2023-08-29 ENCOUNTER — OFFICE VISIT (OUTPATIENT)
Dept: PAIN MEDICINE | Facility: CLINIC | Age: 73
End: 2023-08-29
Payer: MEDICARE

## 2023-08-29 VITALS
BODY MASS INDEX: 36.58 KG/M2 | SYSTOLIC BLOOD PRESSURE: 146 MMHG | DIASTOLIC BLOOD PRESSURE: 57 MMHG | HEIGHT: 65 IN | WEIGHT: 219.56 LBS | HEART RATE: 54 BPM

## 2023-08-29 DIAGNOSIS — R26.9 GAIT ABNORMALITY: Primary | ICD-10-CM

## 2023-08-29 DIAGNOSIS — M54.16 LUMBAR RADICULOPATHY: ICD-10-CM

## 2023-08-29 LAB — METHYLMALONATE SERPL-SCNC: 0.27 UMOL/L

## 2023-08-29 PROCEDURE — 99999 PR PBB SHADOW E&M-EST. PATIENT-LVL V: CPT | Mod: PBBFAC,,, | Performed by: PHYSICIAN ASSISTANT

## 2023-08-29 PROCEDURE — 99214 PR OFFICE/OUTPT VISIT, EST, LEVL IV, 30-39 MIN: ICD-10-PCS | Mod: S$GLB,,, | Performed by: PHYSICIAN ASSISTANT

## 2023-08-29 PROCEDURE — 3077F SYST BP >= 140 MM HG: CPT | Mod: CPTII,S$GLB,, | Performed by: PHYSICIAN ASSISTANT

## 2023-08-29 PROCEDURE — 1160F RVW MEDS BY RX/DR IN RCRD: CPT | Mod: CPTII,S$GLB,, | Performed by: PHYSICIAN ASSISTANT

## 2023-08-29 PROCEDURE — 3044F HG A1C LEVEL LT 7.0%: CPT | Mod: CPTII,S$GLB,, | Performed by: PHYSICIAN ASSISTANT

## 2023-08-29 PROCEDURE — 1159F MED LIST DOCD IN RCRD: CPT | Mod: CPTII,S$GLB,, | Performed by: PHYSICIAN ASSISTANT

## 2023-08-29 PROCEDURE — 99214 OFFICE O/P EST MOD 30 MIN: CPT | Mod: S$GLB,,, | Performed by: PHYSICIAN ASSISTANT

## 2023-08-29 PROCEDURE — 3288F PR FALLS RISK ASSESSMENT DOCUMENTED: ICD-10-PCS | Mod: CPTII,S$GLB,, | Performed by: PHYSICIAN ASSISTANT

## 2023-08-29 PROCEDURE — 3008F BODY MASS INDEX DOCD: CPT | Mod: CPTII,S$GLB,, | Performed by: PHYSICIAN ASSISTANT

## 2023-08-29 PROCEDURE — 3078F DIAST BP <80 MM HG: CPT | Mod: CPTII,S$GLB,, | Performed by: PHYSICIAN ASSISTANT

## 2023-08-29 PROCEDURE — 1160F PR REVIEW ALL MEDS BY PRESCRIBER/CLIN PHARMACIST DOCUMENTED: ICD-10-PCS | Mod: CPTII,S$GLB,, | Performed by: PHYSICIAN ASSISTANT

## 2023-08-29 PROCEDURE — 1159F PR MEDICATION LIST DOCUMENTED IN MEDICAL RECORD: ICD-10-PCS | Mod: CPTII,S$GLB,, | Performed by: PHYSICIAN ASSISTANT

## 2023-08-29 PROCEDURE — 1101F PR PT FALLS ASSESS DOC 0-1 FALLS W/OUT INJ PAST YR: ICD-10-PCS | Mod: CPTII,S$GLB,, | Performed by: PHYSICIAN ASSISTANT

## 2023-08-29 PROCEDURE — 1101F PT FALLS ASSESS-DOCD LE1/YR: CPT | Mod: CPTII,S$GLB,, | Performed by: PHYSICIAN ASSISTANT

## 2023-08-29 PROCEDURE — 1125F AMNT PAIN NOTED PAIN PRSNT: CPT | Mod: CPTII,S$GLB,, | Performed by: PHYSICIAN ASSISTANT

## 2023-08-29 PROCEDURE — 1125F PR PAIN SEVERITY QUANTIFIED, PAIN PRESENT: ICD-10-PCS | Mod: CPTII,S$GLB,, | Performed by: PHYSICIAN ASSISTANT

## 2023-08-29 PROCEDURE — 3008F PR BODY MASS INDEX (BMI) DOCUMENTED: ICD-10-PCS | Mod: CPTII,S$GLB,, | Performed by: PHYSICIAN ASSISTANT

## 2023-08-29 PROCEDURE — 4010F PR ACE/ARB THEARPY RXD/TAKEN: ICD-10-PCS | Mod: CPTII,S$GLB,, | Performed by: PHYSICIAN ASSISTANT

## 2023-08-29 PROCEDURE — 99999 PR PBB SHADOW E&M-EST. PATIENT-LVL V: ICD-10-PCS | Mod: PBBFAC,,, | Performed by: PHYSICIAN ASSISTANT

## 2023-08-29 PROCEDURE — 3077F PR MOST RECENT SYSTOLIC BLOOD PRESSURE >= 140 MM HG: ICD-10-PCS | Mod: CPTII,S$GLB,, | Performed by: PHYSICIAN ASSISTANT

## 2023-08-29 PROCEDURE — 3078F PR MOST RECENT DIASTOLIC BLOOD PRESSURE < 80 MM HG: ICD-10-PCS | Mod: CPTII,S$GLB,, | Performed by: PHYSICIAN ASSISTANT

## 2023-08-29 PROCEDURE — 3288F FALL RISK ASSESSMENT DOCD: CPT | Mod: CPTII,S$GLB,, | Performed by: PHYSICIAN ASSISTANT

## 2023-08-29 PROCEDURE — 3044F PR MOST RECENT HEMOGLOBIN A1C LEVEL <7.0%: ICD-10-PCS | Mod: CPTII,S$GLB,, | Performed by: PHYSICIAN ASSISTANT

## 2023-08-29 PROCEDURE — 4010F ACE/ARB THERAPY RXD/TAKEN: CPT | Mod: CPTII,S$GLB,, | Performed by: PHYSICIAN ASSISTANT

## 2023-08-29 RX ORDER — PREGABALIN 75 MG/1
75 CAPSULE ORAL 2 TIMES DAILY
Qty: 60 CAPSULE | Refills: 3 | Status: SHIPPED | OUTPATIENT
Start: 2023-08-29 | End: 2024-02-06 | Stop reason: SDUPTHER

## 2023-08-29 NOTE — PROGRESS NOTES
New Patient Interventional Pain Note (Initial Visit)    Referring Physician: No ref. provider found    PCP: Bishop Gabriel MD    Chief Complaint:     No chief complaint on file.       SUBJECTIVE:    Interval History (8/29/2023):  Quintin Ling presents today for follow-up visit for MRI review.  Patient was last seen on 8/4/2023. Taking Lyrica 50 mg BID with no side effects, minimal improvement. Patient reports pain as 8/10 today. Continues to report low back pain with radiation into his left lower extremity. Pain is worsened with prolonged standing and walking. Reports pain and weakness is affecting his gait, he often walks with a limp.    MRI Lumbar spine 08/22/2023  FINDINGS:  Grade 1 degenerative spondylolisthesis at L4-L5.  Vertebral body height is normal.  Marrow signal is within normal limits. The conus medullaris terminates at the level of T12-L1.  No abnormal signal within the conus. Intervertebral disc levels are as follows:     T12-L1 disc: Disc space height loss with anterior bridging osteophytes and fatty Modic change.  Normal facet joints.  No spinal or foraminal stenosis.     L1-L2 disc : Disc space height loss with a broad-based posterior disc bulge and posterior annular fissure.  Normal facet joints.  The dural canal measures 7 mm.  No foraminal stenosis.     L2-L3 disc: Normal disc space height with anterior osteophytes.  Fatty and edematous Modic change.  Normal facet joints.  Dural canal measures 12 mm.  No foraminal stenosis.     L3-L4 disc: Disc space height is relatively normal with anterolateral right-sided osteophytes.  Normal facet joints.  No spinal or foraminal stenosis.     L4-L5 disc: Grade 1 spondylolisthesis with markedly severe degenerative facet hypertrophy bilaterally.  Unroofing of disc material with a disc extrusion that extends cranially.  The disc extrusion measures 23 mm craniocaudal by 20 mm transverse by 9 mm AP.  There is severe spinal stenosis and severe subarticular  recess stenosis.  The dural canal measures 6 mm but is roughly 30% of normal area with redundancy of nerve roots adjacent to this interspace.  There is also a synovial cyst projecting inferiorly from the right facet joint that may compress the descending right L5 spinal nerve root sleeve best demonstrated on axial T2 series 6, image 25.  This synovial cyst measures 11 mm.  Moderate foraminal stenosis bilaterally.     L5-S1 disc: Severe disc space height loss most pronounced toward the left.  Fatty Modic endplate change.  Disc and osteophyte encroach into the floor of the left exit foramen.  There is also a left paracentral disc protrusion causing mild left subarticular recess stenosis without eli nerve compression.  The dural canal measures 10 mm.  Moderate to severe left foraminal stenosis.     Impression:     1. Severe spinal stenosis at L4-L5 as described above which may account for neurogenic claudication and radiculopathy of the descending nerve roots.  2. Moderate foraminal stenosis bilaterally at L4-L5 and moderate/severe left foraminal stenosis at L5-S1.  This could also precipitate radiculopathy.    EMG/NCS 08/24/2023  IMPRESSION  ABNORMAL study  2.  There is electrodiagnostic evidence of an acute on chronic radiculopathy of the left L5 nerve root, a subacute on chronic radiculopathy of the right L5 nerve root, and a chronic radiculopathy of the S1 and probable L4 nerve roots    Initial HPI 08/04/2023  Quintin Ling is a 73 y.o. male who presents to the clinic for the evaluation of left lower extremity pain.   Patient reports three-month history of left lower extremity pain.  He denies any inciting events or traumas.  Patient reports 1 year ago he experienced bilateral calf pain that is resolved with B12 supplementation.  He also reports as a child he was placed in traction for 2 weeks on his left lower extremity and to wear a brace for 3 years secondary to hip dysplasia.  Patient denies any recent  infections.  Patient denies any previous surgical intervention on his lumbar spine.  Pain is described as a pulling burning pain diffusely over his left calf.  He denies any significant radiation to his foot or up into his left lower back but does report a long history of lower back pain.  Patient denies any significant change in sensation to left lower extremity.  Pain is worse with prolonged sitting or walking, better with lying supine.  Patient does report swelling of his left lower extremity with prolonged walking.  Patient denies any significant history of rheumatoid disease in his family.  Patient reports emergency department on 06/28/2023 for exacerbation of left lower extremity pain.  Denies any fevers, chills, saddle anesthesia, or bowel and bladder incontinence        Non-Pharmacologic Treatments:  Physical Therapy/Home Exercise: yes  Ice/Heat:yes  TENS: no  Acupuncture: no  Massage: yes  Chiropractic: no        Previous Pain Medications:  NSAIDs, Tylenol, gabapentin, tramadol     report:  Reviewed and consistent with medication use as prescribed.    Pain Procedures:   None    Pain Disability Index Review:         8/4/2023     8:27 AM   Last 3 PDI Scores   Pain Disability Index (PDI) 32       Imaging:   Results for orders placed during the hospital encounter of 06/21/23    X-Ray Lumbar Spine AP And Lateral    Narrative  EXAMINATION:  XR LUMBAR SPINE AP AND LATERAL    CLINICAL HISTORY:  Pain in left leg    TECHNIQUE:  AP, lateral and spot images were performed of the lumbar spine.    COMPARISON:  02/01/2022    FINDINGS:  Five lumbar type vertebral bodies.  The lordotic curvature is normal.  Grade 1 anterolisthesis of L5 on S1. no evidence for compression deformity, fracture, or subluxation.  The vertebral heights are maintained without significant intervertebral disc space narrowing.  Progressive degenerative changes of facets are identified.    The bilateral SI joints are  normal.    Impression  Spondylotic changes lumbar spine without evidence for fracture.      Electronically signed by: Chago Chapa MD  Date:    06/21/2023  Time:    14:29        Results for orders placed during the hospital encounter of 06/21/23    X-Ray Hip 2 or 3 views Left (with Pelvis when performed)    Narrative  EXAMINATION:  XR HIP WITH PELVIS WHEN PERFORMED, 2 OR 3 VIEWS LEFT    CLINICAL HISTORY:  Pain in left leg    TECHNIQUE:  AP view of the pelvis and frog leg lateral view of the left hip were performed.    COMPARISON:  02/01/2022    FINDINGS:  No fracture the pelvis or proximal femora.  The femoral heads are well seated in the acetabulum without significant joint space narrowing.  Osteopenia is identified.    Degenerative changes of bilateral SI joints.  The pubic symphysis is normal.    Impression  No fracture the pelvis or proximal femora.  Degenerative changes are noted.      Electronically signed by: Chago Chapa MD  Date:    06/21/2023  Time:    14:28    XR TIBIA FIBULA 2 VIEW LEFT 6/21/23     CLINICAL HISTORY:  Pain in left leg     TECHNIQUE:  AP and lateral views of the left tibia and fibula were performed.     COMPARISON:  None.     FINDINGS:  No fracture.  The alignment is normal.  Joint space narrowing is identified of the knee as well as of the ankle.  No significant soft tissue swelling.  Calcifications are identified of the vasculature.     Impression:     No fracture of the tibia or fibula.      US LOWER EXTREMITY VEINS BILATERAL 06/21/23     CLINICAL HISTORY:  Pain in left leg     TECHNIQUE:  Duplex and color flow Doppler and dynamic compression was performed of the bilateral lower extremity veins was performed.     COMPARISON:  None     FINDINGS:  Right thigh veins: The common femoral, femoral, popliteal, upper greater saphenous, and deep femoral veins are patent and free of thrombus. The veins are normally compressible and have normal phasic flow and augmentation response.     Right calf  veins: The visualized calf veins are patent.     Left thigh veins: The common femoral, femoral, popliteal, upper greater saphenous, and deep femoral veins are patent and free of thrombus. The veins are normally compressible and have normal phasic flow and augmentation response.     Left calf veins: The visualized calf veins are patent.     Miscellaneous: None     Impression:     No evidence of deep venous thrombosis in either lower extremity.    Past Medical History:   Diagnosis Date    Hyperlipidemia     Hypertension      Past Surgical History:   Procedure Laterality Date    NO PAST SURGERIES       Social History     Socioeconomic History    Marital status:    Tobacco Use    Smoking status: Former    Smokeless tobacco: Never   Substance and Sexual Activity    Alcohol use: Never    Drug use: Never    Sexual activity: Yes     Partners: Female     Family History   Problem Relation Age of Onset    No Known Problems Mother     No Known Problems Father        Review of patient's allergies indicates:  No Known Allergies    Current Outpatient Medications   Medication Sig    amLODIPine (NORVASC) 5 MG tablet TAKE 1 TABLET BY MOUTH EVERY DAY    atorvastatin (LIPITOR) 20 MG tablet TAKE 1 TABLET BY MOUTH EVERY DAY    ciprofloxacin HCl (CIPRO) 500 MG tablet Take 1 tablet (500 mg total) by mouth 2 (two) times daily.    dicyclomine (BENTYL) 20 mg tablet TAKE 1 TABLET BY MOUTH THREE TIMES A DAY FOR 7 DAYS    doxazosin (CARDURA) 4 MG tablet TAKE 1 TABLET BY MOUTH EVERY EVENING.    fenofibrate (TRICOR) 54 MG tablet Take 1 tablet (54 mg total) by mouth once daily.    hydroCHLOROthiazide (HYDRODIURIL) 25 MG tablet TAKE 1 TABLET BY MOUTH EVERY DAY IN THE MORNING    metFORMIN (GLUCOPHAGE) 500 MG tablet TAKE 1 TABLET BY MOUTH TWICE A DAY WITH MEALS    metoprolol succinate (TOPROL-XL) 100 MG 24 hr tablet TAKE 1 TABLET BY MOUTH TWICE A DAY    olmesartan (BENICAR) 40 MG tablet TAKE 1 TABLET BY MOUTH EVERY DAY    pregabalin (LYRICA) 50  MG capsule Take 1 capsule (50 mg total) by mouth 2 (two) times daily.    sildenafiL (VIAGRA) 100 MG tablet Take 1 tablet (100 mg total) by mouth daily as needed for Erectile Dysfunction. (Patient not taking: Reported on 8/4/2023)    traMADoL (ULTRAM) 50 mg tablet Take 1 tablet (50 mg total) by mouth every 6 (six) hours as needed for Pain.     No current facility-administered medications for this visit.         ROS  Review of Systems   Constitutional:  Negative for activity change, appetite change and fever.   HENT:  Negative for facial swelling, rhinorrhea and sore throat.    Eyes:  Negative for pain and redness.   Respiratory:  Negative for cough, chest tightness, shortness of breath, wheezing and stridor.    Cardiovascular:  Positive for leg swelling. Negative for chest pain and palpitations.   Gastrointestinal:  Negative for abdominal pain, blood in stool, constipation, diarrhea, nausea and vomiting.   Endocrine: Negative for polydipsia, polyphagia and polyuria.   Genitourinary:  Positive for urgency. Negative for dysuria and hematuria.   Musculoskeletal:  Positive for arthralgias, back pain, gait problem and myalgias. Negative for joint swelling, neck pain and neck stiffness.   Skin:  Negative for rash.   Allergic/Immunologic: Negative for food allergies.   Neurological:  Positive for weakness and numbness. Negative for dizziness, tremors, seizures, syncope, facial asymmetry, speech difficulty, light-headedness and headaches.   Psychiatric/Behavioral:  Negative for agitation, hallucinations, self-injury and suicidal ideas. The patient is not nervous/anxious and is not hyperactive.             OBJECTIVE:  There were no vitals taken for this visit.        Physical Exam  Constitutional:       General: He is not in acute distress.     Appearance: Normal appearance. He is not ill-appearing.   HENT:      Head: Normocephalic and atraumatic.      Nose: No congestion or rhinorrhea.   Eyes:      Extraocular Movements:  Extraocular movements intact.      Pupils: Pupils are equal, round, and reactive to light.   Cardiovascular:      Pulses: Normal pulses.   Pulmonary:      Effort: Pulmonary effort is normal.   Abdominal:      General: Abdomen is flat.      Palpations: Abdomen is soft.   Skin:     General: Skin is warm and dry.      Capillary Refill: Capillary refill takes less than 2 seconds.   Neurological:      General: No focal deficit present.      Mental Status: He is alert and oriented to person, place, and time.      Sensory: Sensory deficit present.      Motor: Weakness present. No abnormal muscle tone.      Gait: Gait abnormal.      Deep Tendon Reflexes:      Reflex Scores:       Patellar reflexes are 2+ on the right side and 2+ on the left side.       Achilles reflexes are 2+ on the right side and 1+ on the left side.     Comments: Decreased light touch sensation over lateral aspect left calf  4/5 strength in left dorsiflexion   Psychiatric:         Mood and Affect: Mood normal.         Behavior: Behavior normal.         Thought Content: Thought content normal.           Musculoskeletal:      Lumbar Exam  Incision: no  Pain with Flexion: no  Pain with Extension: no  ROM: FROM  Paraspinous TTP:  Mild bilaterally  Facet TTP:  L5-S1  Facet Loading:  Negative bilaterally  SLR:  Positive on the left at 75° in L5 distribution  SIJ TTP:  Negative bilaterally      LABS:  Lab Results   Component Value Date    WBC 7.04 08/23/2023    HGB 11.3 (L) 08/23/2023    HCT 35.6 (L) 08/23/2023    MCV 89 08/23/2023     08/23/2023       CMP  Sodium   Date Value Ref Range Status   06/21/2023 139 136 - 145 mmol/L Final     Potassium   Date Value Ref Range Status   06/21/2023 4.4 3.5 - 5.1 mmol/L Final     Chloride   Date Value Ref Range Status   06/21/2023 104 95 - 110 mmol/L Final     CO2   Date Value Ref Range Status   06/21/2023 26 23 - 29 mmol/L Final     Glucose   Date Value Ref Range Status   06/21/2023 91 70 - 110 mg/dL Final     BUN    Date Value Ref Range Status   06/21/2023 22 8 - 23 mg/dL Final     Creatinine   Date Value Ref Range Status   06/21/2023 1.4 0.5 - 1.4 mg/dL Final     Calcium   Date Value Ref Range Status   06/21/2023 9.5 8.7 - 10.5 mg/dL Final     Total Protein   Date Value Ref Range Status   05/30/2023 6.8 6.0 - 8.4 g/dL Final     Albumin   Date Value Ref Range Status   05/30/2023 3.8 3.5 - 5.2 g/dL Final     Total Bilirubin   Date Value Ref Range Status   05/30/2023 0.5 0.1 - 1.0 mg/dL Final     Comment:     For infants and newborns, interpretation of results should be based  on gestational age, weight and in agreement with clinical  observations.    Premature Infant recommended reference ranges:  Up to 24 hours.............<8.0 mg/dL  Up to 48 hours............<12.0 mg/dL  3-5 days..................<15.0 mg/dL  6-29 days.................<15.0 mg/dL       Alkaline Phosphatase   Date Value Ref Range Status   05/30/2023 31 (L) 55 - 135 U/L Final     AST   Date Value Ref Range Status   05/30/2023 20 10 - 40 U/L Final     ALT   Date Value Ref Range Status   05/30/2023 18 10 - 44 U/L Final     Anion Gap   Date Value Ref Range Status   06/21/2023 9 8 - 16 mmol/L Final     eGFR if    Date Value Ref Range Status   05/30/2022 >60.0 >60 mL/min/1.73 m^2 Final     eGFR if non    Date Value Ref Range Status   05/30/2022 >60.0 >60 mL/min/1.73 m^2 Final     Comment:     Calculation used to obtain the estimated glomerular filtration  rate (eGFR) is the CKD-EPI equation.          Lab Results   Component Value Date    HGBA1C 5.3 05/30/2023             ASSESSMENT:       73 y.o. year old male with left lower extremity pain, consistent with     No diagnosis found.    There are no diagnoses linked to this encounter.           PLAN:   - Interventions:   We discussed considering lumbar Left L4/5 +L5/S1 TF JOSE to address radicular symptoms, defers at this time    - Anticoagulation use:   No no anticoagulation    -  Medications:   Increase Lyrica to 75 mg BID   Continue tramadol 50 mg twice a day as needed    - Therapy:    Patient has completed 6 weeks of formal physical therapy in the last 3 months with increase in pain.  Refer to physical therapy for gait training and aqua therapy at [place] [facility]    - Imaging/Diagnostic:   X-rays of left hip left tibia and fibula are unremarkable.   Ultrasound of left lower extremity reveals no abnormality   X-ray of lumbar spine reveals grade 1 anterolisthesis of L5 upon S1 with loss of disc height at L5-S1 and L4-5.  Degenerative changes of the facets noted at L4-5 and L5-S1.   MRI of lumbar spine and EMG/NCS reviewed  - Consults:   None at this time.        - Patient Questions: Answered all of the patient's questions regarding diagnosis, therapy, and treatment     This condition does not require this patient to take time off of work, and the primary goal of our Pain Management services is to improve the patient's functional capacity.     - Follow up visit: 10-12 weeks        The above plan and management options were discussed at length with patient. Patient is in agreement with the above and verbalized understanding.    I discussed the goals of interventional chronic pain management with the patient on today's visit.  I explained the utility of injections for diagnostic and therapeutic purposes.  We discussed a multimodal approach to pain including treating the patient's given worst pain at any given time.  We will use a systematic approach to addressing pain.  We will also adopt a multimodal approach that includes injections, adjuvant medications, physical therapy, at times psychiatry.  There may be a limited role for opioid use intermittently in the treatment of pain, more particularly for acute pain although no one approach can be used as a sole treatment modality.    I emphasized the importance of regular exercise, core strengthening and stretching, diet and weight loss as  a cornerstone of long-term pain management.      Debora Proctor PA-C  Interventional Pain Management  [facility]    Disclaimer:  This note was prepared using voice recognition system and is likely to have sound alike errors that may have been overlooked even after proof reading.  Please call me with any questions

## 2023-08-31 NOTE — TELEPHONE ENCOUNTER
Care Due:                  Date            Visit Type   Department     Provider  --------------------------------------------------------------------------------                                EP -                              PRIMARY      [facility] INTERNAL  Last Visit: 08-      CARE ([facility])   MEDICINE       Bishop Gabriel  Next Visit: None Scheduled  None         None Found                                                            Last  Test          Frequency    Reason                     Performed    Due Date  --------------------------------------------------------------------------------    HBA1C.......  6 months...  metFORMIN................  05- 11-    [facility] Embedded Care Due Messages. Reference number: [id number].   8/31/2023 10:09:58 AM CDT

## 2023-08-31 NOTE — TELEPHONE ENCOUNTER
Refill Routing Note     Refill Routing Note   Medication(s) are not appropriate for processing by [facility] for the following reason(s):      Required vitals abnormal    [facility] action(s):  Defer Care Due:  Labs due            Appointments  past 12m or future 3m with PCP    Date Provider   Last Visit   8/23/2023 Bishop Gabriel MD   Next Visit   Visit date not found Bishop Gabriel MD   ED visits in past 90 days: 1        Note composed:12:36 PM 08/31/2023

## 2023-09-02 RX ORDER — AMLODIPINE BESYLATE 5 MG/1
TABLET ORAL
Qty: 90 TABLET | Refills: 3 | Status: ON HOLD | OUTPATIENT
Start: 2023-09-02 | End: 2024-03-20 | Stop reason: HOSPADM

## 2023-09-13 DIAGNOSIS — I10 ESSENTIAL HYPERTENSION: Primary | ICD-10-CM

## 2023-09-13 DIAGNOSIS — Z00.00 ROUTINE HEALTH MAINTENANCE: ICD-10-CM

## 2023-09-27 ENCOUNTER — IMMUNIZATION (OUTPATIENT)
Dept: FAMILY MEDICINE | Facility: CLINIC | Age: 73
End: 2023-09-27
Payer: MEDICARE

## 2023-09-27 ENCOUNTER — OFFICE VISIT (OUTPATIENT)
Dept: CARDIOLOGY | Facility: CLINIC | Age: 73
End: 2023-09-27
Payer: MEDICARE

## 2023-09-27 VITALS
DIASTOLIC BLOOD PRESSURE: 62 MMHG | BODY MASS INDEX: 36.65 KG/M2 | HEART RATE: 57 BPM | HEIGHT: 65 IN | WEIGHT: 220 LBS | SYSTOLIC BLOOD PRESSURE: 138 MMHG | OXYGEN SATURATION: 97 %

## 2023-09-27 DIAGNOSIS — I10 ESSENTIAL HYPERTENSION: ICD-10-CM

## 2023-09-27 DIAGNOSIS — E66.01 SEVERE OBESITY (BMI 35.0-39.9) WITH COMORBIDITY: ICD-10-CM

## 2023-09-27 DIAGNOSIS — R01.1 HEART MURMUR: Primary | ICD-10-CM

## 2023-09-27 PROCEDURE — 1101F PT FALLS ASSESS-DOCD LE1/YR: CPT | Mod: CPTII,S$GLB,, | Performed by: INTERNAL MEDICINE

## 2023-09-27 PROCEDURE — 99999 PR PBB SHADOW E&M-EST. PATIENT-LVL IV: ICD-10-PCS | Mod: PBBFAC,,, | Performed by: INTERNAL MEDICINE

## 2023-09-27 PROCEDURE — 3075F PR MOST RECENT SYSTOLIC BLOOD PRESS GE 130-139MM HG: ICD-10-PCS | Mod: CPTII,S$GLB,, | Performed by: INTERNAL MEDICINE

## 2023-09-27 PROCEDURE — 3008F BODY MASS INDEX DOCD: CPT | Mod: CPTII,S$GLB,, | Performed by: INTERNAL MEDICINE

## 2023-09-27 PROCEDURE — 1160F PR REVIEW ALL MEDS BY PRESCRIBER/CLIN PHARMACIST DOCUMENTED: ICD-10-PCS | Mod: CPTII,S$GLB,, | Performed by: INTERNAL MEDICINE

## 2023-09-27 PROCEDURE — 4010F PR ACE/ARB THEARPY RXD/TAKEN: ICD-10-PCS | Mod: CPTII,S$GLB,, | Performed by: INTERNAL MEDICINE

## 2023-09-27 PROCEDURE — 99204 PR OFFICE/OUTPT VISIT, NEW, LEVL IV, 45-59 MIN: ICD-10-PCS | Mod: S$GLB,,, | Performed by: INTERNAL MEDICINE

## 2023-09-27 PROCEDURE — G0008 ADMIN INFLUENZA VIRUS VAC: HCPCS | Mod: S$GLB,,, | Performed by: INTERNAL MEDICINE

## 2023-09-27 PROCEDURE — 3078F DIAST BP <80 MM HG: CPT | Mod: CPTII,S$GLB,, | Performed by: INTERNAL MEDICINE

## 2023-09-27 PROCEDURE — 4010F ACE/ARB THERAPY RXD/TAKEN: CPT | Mod: CPTII,S$GLB,, | Performed by: INTERNAL MEDICINE

## 2023-09-27 PROCEDURE — 3008F PR BODY MASS INDEX (BMI) DOCUMENTED: ICD-10-PCS | Mod: CPTII,S$GLB,, | Performed by: INTERNAL MEDICINE

## 2023-09-27 PROCEDURE — 3288F PR FALLS RISK ASSESSMENT DOCUMENTED: ICD-10-PCS | Mod: CPTII,S$GLB,, | Performed by: INTERNAL MEDICINE

## 2023-09-27 PROCEDURE — G0008 FLU VACCINE - QUADRIVALENT - ADJUVANTED: ICD-10-PCS | Mod: S$GLB,,, | Performed by: INTERNAL MEDICINE

## 2023-09-27 PROCEDURE — 1101F PR PT FALLS ASSESS DOC 0-1 FALLS W/OUT INJ PAST YR: ICD-10-PCS | Mod: CPTII,S$GLB,, | Performed by: INTERNAL MEDICINE

## 2023-09-27 PROCEDURE — 3288F FALL RISK ASSESSMENT DOCD: CPT | Mod: CPTII,S$GLB,, | Performed by: INTERNAL MEDICINE

## 2023-09-27 PROCEDURE — 1159F MED LIST DOCD IN RCRD: CPT | Mod: CPTII,S$GLB,, | Performed by: INTERNAL MEDICINE

## 2023-09-27 PROCEDURE — 3075F SYST BP GE 130 - 139MM HG: CPT | Mod: CPTII,S$GLB,, | Performed by: INTERNAL MEDICINE

## 2023-09-27 PROCEDURE — 1159F PR MEDICATION LIST DOCUMENTED IN MEDICAL RECORD: ICD-10-PCS | Mod: CPTII,S$GLB,, | Performed by: INTERNAL MEDICINE

## 2023-09-27 PROCEDURE — 3078F PR MOST RECENT DIASTOLIC BLOOD PRESSURE < 80 MM HG: ICD-10-PCS | Mod: CPTII,S$GLB,, | Performed by: INTERNAL MEDICINE

## 2023-09-27 PROCEDURE — 3044F PR MOST RECENT HEMOGLOBIN A1C LEVEL <7.0%: ICD-10-PCS | Mod: CPTII,S$GLB,, | Performed by: INTERNAL MEDICINE

## 2023-09-27 PROCEDURE — 90694 FLU VACCINE - QUADRIVALENT - ADJUVANTED: ICD-10-PCS | Mod: S$GLB,,, | Performed by: INTERNAL MEDICINE

## 2023-09-27 PROCEDURE — 99204 OFFICE O/P NEW MOD 45 MIN: CPT | Mod: S$GLB,,, | Performed by: INTERNAL MEDICINE

## 2023-09-27 PROCEDURE — 3044F HG A1C LEVEL LT 7.0%: CPT | Mod: CPTII,S$GLB,, | Performed by: INTERNAL MEDICINE

## 2023-09-27 PROCEDURE — 90694 VACC AIIV4 NO PRSRV 0.5ML IM: CPT | Mod: S$GLB,,, | Performed by: INTERNAL MEDICINE

## 2023-09-27 PROCEDURE — 99999 PR PBB SHADOW E&M-EST. PATIENT-LVL IV: CPT | Mod: PBBFAC,,, | Performed by: INTERNAL MEDICINE

## 2023-09-27 PROCEDURE — 1160F RVW MEDS BY RX/DR IN RCRD: CPT | Mod: CPTII,S$GLB,, | Performed by: INTERNAL MEDICINE

## 2023-09-27 NOTE — PROGRESS NOTES
Subjective:   Patient ID:  Quintin Ling is a 73 y.o. male who presents for evaluation of No chief complaint on file.      73 ym , referred by Dr. Gabriel for care establish  PMH HTN HLD DM 2 yrs. No h/o MI CVA Ca.quit smoking 25 yrs ago, occasional drinking, Lives with his wife. Exerciser as walking  He denied chest pain, dyspnea on exertion, palpitation, fainting, PND, orthopnea, syncope and claudication.   Chronic lower back pain and on PT  2021 US venous LE no DVT  2020 ABD US no AAA  Ekg today NSR  BP LDL and A1C controlled          No results found for this or any previous visit.     No results found for this or any previous visit.       Past Medical History:   Diagnosis Date    Hyperlipidemia     Hypertension        Past Surgical History:   Procedure Laterality Date    NO PAST SURGERIES         Social History     Tobacco Use    Smoking status: Former    Smokeless tobacco: Never   Substance Use Topics    Alcohol use: Never    Drug use: Never       Family History   Problem Relation Age of Onset    No Known Problems Mother     No Known Problems Father        Review of Systems   Constitutional: Negative for decreased appetite, diaphoresis, fever, malaise/fatigue and night sweats.   HENT:  Negative for nosebleeds.    Eyes:  Negative for blurred vision and double vision.   Cardiovascular:  Negative for chest pain, claudication, dyspnea on exertion, irregular heartbeat, leg swelling, near-syncope, orthopnea, palpitations, paroxysmal nocturnal dyspnea and syncope.   Respiratory:  Negative for cough, shortness of breath, sleep disturbances due to breathing, snoring, sputum production and wheezing.    Endocrine: Negative for cold intolerance and polyuria.   Hematologic/Lymphatic: Does not bruise/bleed easily.   Skin:  Negative for rash.   Musculoskeletal:  Positive for arthritis and back pain. Negative for falls, joint pain, joint swelling and neck pain.   Gastrointestinal:  Negative for abdominal pain, heartburn,  nausea and vomiting.   Genitourinary:  Negative for dysuria, frequency and hematuria.   Neurological:  Negative for difficulty with concentration, dizziness, focal weakness, headaches, light-headedness, numbness, seizures and weakness.   Psychiatric/Behavioral:  Negative for depression, memory loss and substance abuse. The patient does not have insomnia.    Allergic/Immunologic: Negative for HIV exposure and hives.       Objective:   Physical Exam  HENT:      Head: Normocephalic.   Eyes:      Pupils: Pupils are equal, round, and reactive to light.   Neck:      Thyroid: No thyromegaly.      Vascular: Normal carotid pulses. No carotid bruit or JVD.   Cardiovascular:      Rate and Rhythm: Normal rate and regular rhythm. No extrasystoles are present.     Chest Wall: PMI is not displaced.      Pulses: Normal pulses.           Carotid pulses are 2+ on the right side and 2+ on the left side.     Heart sounds: Murmur heard.      Harsh midsystolic murmur is present with a grade of 3/6 at the upper right sternal border and upper left sternal border radiating to the neck and lower right sternal border.      No gallop. No S3 sounds.   Pulmonary:      Effort: No respiratory distress.      Breath sounds: Normal breath sounds. No stridor.   Abdominal:      General: Bowel sounds are normal.      Palpations: Abdomen is soft.      Tenderness: There is no abdominal tenderness. There is no rebound.   Musculoskeletal:         General: Normal range of motion.   Skin:     Findings: No rash.   Neurological:      Mental Status: He is alert and oriented to person, place, and time.   Psychiatric:         Behavior: Behavior normal.         Lab Results   Component Value Date    CHOL 131 05/30/2023    CHOL 146 05/19/2021    CHOL 154 07/21/2020     Lab Results   Component Value Date    HDL 45 05/30/2023    HDL 45 05/19/2021    HDL 36 (L) 07/21/2020     Lab Results   Component Value Date    LDLCALC 75.0 05/30/2023    LDLCALC 86.6 05/19/2021     "LDLCALC 103.2 07/21/2020     Lab Results   Component Value Date    TRIG 55 05/30/2023    TRIG 72 05/19/2021    TRIG 74 07/21/2020     Lab Results   Component Value Date    CHOLHDL 34.4 05/30/2023    CHOLHDL 30.8 05/19/2021    CHOLHDL 23.4 07/21/2020       Chemistry        Component Value Date/Time     06/21/2023 1345    K 4.4 06/21/2023 1345     06/21/2023 1345    CO2 26 06/21/2023 1345    BUN 22 06/21/2023 1345    CREATININE 1.4 06/21/2023 1345    GLU 91 06/21/2023 1345        Component Value Date/Time    CALCIUM 9.5 06/21/2023 1345    ALKPHOS 31 (L) 05/30/2023 0910    AST 20 05/30/2023 0910    ALT 18 05/30/2023 0910    BILITOT 0.5 05/30/2023 0910    ESTGFRAFRICA >60.0 05/30/2022 0958    EGFRNONAA >60.0 05/30/2022 0958          Lab Results   Component Value Date    HGBA1C 5.3 05/30/2023     Lab Results   Component Value Date    TSH 1.288 07/21/2020     No results found for: "INR", "PROTIME"  Lab Results   Component Value Date    WBC 7.04 08/23/2023    HGB 11.3 (L) 08/23/2023    HCT 35.6 (L) 08/23/2023    MCV 89 08/23/2023     08/23/2023     BNP  @LABRCNTIP(BNP,BNPTRIAGEBLO)@  CrCl cannot be calculated (Patient's most recent lab result is older than the maximum 7 days allowed.).  No results found in the last 24 hours.  No results found in the last 24 hours.  No results found in the last 24 hours.    Assessment:      1. Heart murmur    2. Essential hypertension    3. Severe obesity (BMI 35.0-39.9) with comorbidity        Plan:   Echo for murmur  Continue amlodipine benicar toprolXL HCTZ cardura for HTN  Continue statin and fenofibrate for HLD    Counseled DASH  Check Lipid profile with PCP in 6 months  Recommend heart-healthy diet, weight control and regular exercise.  Chet. Risk modification.   I have reviewed all pertinent labs and cardiac studies independently. Plans and recommendations have been formulated under my direct supervision. All questions answered and patient voiced understanding. "   If symptoms persist go to the ED  RTC in 12 months

## 2023-09-30 RX ORDER — FENOFIBRATE 160 MG/1
160 TABLET ORAL DAILY
Qty: 90 TABLET | Refills: 3 | OUTPATIENT
Start: 2023-09-30

## 2023-09-30 RX ORDER — METOPROLOL SUCCINATE 100 MG/1
100 TABLET, EXTENDED RELEASE ORAL 2 TIMES DAILY
Qty: 180 TABLET | Refills: 3 | Status: ON HOLD | OUTPATIENT
Start: 2023-09-30 | End: 2024-03-20 | Stop reason: HOSPADM

## 2023-09-30 RX ORDER — OLMESARTAN MEDOXOMIL 40 MG/1
40 TABLET ORAL DAILY
Qty: 90 TABLET | Refills: 2 | Status: ON HOLD | OUTPATIENT
Start: 2023-09-30 | End: 2024-04-01 | Stop reason: HOSPADM

## 2023-09-30 NOTE — TELEPHONE ENCOUNTER
No care due was identified.  [facility] Embedded Care Due Messages. Reference number: [id number].   9/30/2023 9:46:33 AM CDT

## 2023-09-30 NOTE — TELEPHONE ENCOUNTER
Refill Decision Note   Quintin Ling  is requesting a refill authorization.  Brief Assessment and Rationale for Refill:  Approve  Quick Discontinue     Medication Therapy Plan:       Medication Reconciliation Completed: No    Comments:     No Care Gaps recommended.     Note composed:1:57 PM 09/30/2023

## 2023-10-02 ENCOUNTER — HOSPITAL ENCOUNTER (OUTPATIENT)
Dept: CARDIOLOGY | Facility: HOSPITAL | Age: 73
Discharge: HOME | End: 2023-10-02
Attending: INTERNAL MEDICINE
Payer: MEDICARE

## 2023-10-02 VITALS
DIASTOLIC BLOOD PRESSURE: 62 MMHG | WEIGHT: 220 LBS | SYSTOLIC BLOOD PRESSURE: 138 MMHG | BODY MASS INDEX: 36.65 KG/M2 | HEIGHT: 65 IN

## 2023-10-02 DIAGNOSIS — R01.1 HEART MURMUR: ICD-10-CM

## 2023-10-02 LAB
AORTIC ROOT ANNULUS: 3.37 CM
ASCENDING AORTA: 3.21 CM
AV INDEX (PROSTH): 0.36
AV MEAN GRADIENT: 17 MMHG
AV PEAK GRADIENT: 25 MMHG
AV REGURGITATION PRESSURE HALF TIME: 1080.58 MS
AV VALVE AREA BY VELOCITY RATIO: 1.26 CM²
AV VALVE AREA: 1.11 CM²
AV VELOCITY RATIO: 0.41
BSA FOR ECHO PROCEDURE: 2.14 M2
CV ECHO LV RWT: 0.53 CM
DOP CALC AO PEAK VEL: 2.52 M/S
DOP CALC AO VTI: 77.5 CM
DOP CALC LVOT AREA: 3 CM2
DOP CALC LVOT DIAMETER: 1.97 CM
DOP CALC LVOT PEAK VEL: 1.04 M/S
DOP CALC LVOT STROKE VOLUME: 85.91 CM3
DOP CALC RVOT PEAK VEL: 1.01 M/S
DOP CALC RVOT VTI: 26.5 CM
DOP CALCLVOT PEAK VEL VTI: 28.2 CM
E WAVE DECELERATION TIME: 202.27 MSEC
E/A RATIO: 1.08
E/E' RATIO: 10.53 M/S
ECHO LV POSTERIOR WALL: 1.38 CM (ref 0.6–1.1)
FRACTIONAL SHORTENING: 36 % (ref 28–44)
INTERVENTRICULAR SEPTUM: 1.46 CM (ref 0.6–1.1)
IVC DIAMETER: 1.85 CM
IVRT: 57.09 MSEC
LA MAJOR: 5.44 CM
LA MINOR: 5.47 CM
LA WIDTH: 4.3 CM
LEFT ATRIUM SIZE: 3.74 CM
LEFT ATRIUM VOLUME INDEX: 36.2 ML/M2
LEFT ATRIUM VOLUME: 74.57 CM3
LEFT INTERNAL DIMENSION IN SYSTOLE: 3.31 CM (ref 2.1–4)
LEFT VENTRICLE DIASTOLIC VOLUME INDEX: 62.72 ML/M2
LEFT VENTRICLE DIASTOLIC VOLUME: 129.21 ML
LEFT VENTRICLE MASS INDEX: 153 G/M2
LEFT VENTRICLE SYSTOLIC VOLUME INDEX: 21.6 ML/M2
LEFT VENTRICLE SYSTOLIC VOLUME: 44.44 ML
LEFT VENTRICULAR INTERNAL DIMENSION IN DIASTOLE: 5.19 CM (ref 3.5–6)
LEFT VENTRICULAR MASS: 315.08 G
LV LATERAL E/E' RATIO: 10 M/S
LV SEPTAL E/E' RATIO: 11.11 M/S
LVOT MG: 2.37 MMHG
LVOT MV: 0.73 CM/S
MV PEAK A VEL: 0.93 M/S
MV PEAK E VEL: 1 M/S
MV STENOSIS PRESSURE HALF TIME: 58.66 MS
MV VALVE AREA P 1/2 METHOD: 3.75 CM2
PISA AR MAX VEL: 2.44 M/S
PISA MRMAX VEL: 3.2 M/S
PISA TR MAX VEL: 2.06 M/S
PV MEAN GRADIENT: 3 MMHG
RA MAJOR: 4.31 CM
RA PRESSURE ESTIMATED: 3 MMHG
RA WIDTH: 2.99 CM
RV TB RVSP: 5 MMHG
SINUS: 3.44 CM
STJ: 3.25 CM
TDI LATERAL: 0.1 M/S
TDI SEPTAL: 0.09 M/S
TDI: 0.1 M/S
TR MAX PG: 17 MMHG
TRICUSPID ANNULAR PLANE SYSTOLIC EXCURSION: 2.34 CM
TV REST PULMONARY ARTERY PRESSURE: 20 MMHG
Z-SCORE OF LEFT VENTRICULAR DIMENSION IN END DIASTOLE: -1.81
Z-SCORE OF LEFT VENTRICULAR DIMENSION IN END SYSTOLE: -1.11

## 2023-10-02 PROCEDURE — 93306 ECHO (CUPID ONLY): ICD-10-PCS | Mod: 26,,, | Performed by: INTERNAL MEDICINE

## 2023-10-02 PROCEDURE — 93306 TTE W/DOPPLER COMPLETE: CPT

## 2023-10-02 PROCEDURE — 93306 TTE W/DOPPLER COMPLETE: CPT | Mod: 26,,, | Performed by: INTERNAL MEDICINE

## 2023-10-03 ENCOUNTER — TELEPHONE (OUTPATIENT)
Dept: CARDIOLOGY | Facility: CLINIC | Age: 73
End: 2023-10-03
Payer: MEDICARE

## 2023-10-03 NOTE — TELEPHONE ENCOUNTER
Spoke with pt's wife in regards to lab results. She asked for us to call back tomorrow due to neither one of them being home.           ----- Message from Chai Paiz MD sent at 10/3/2023  2:04 PM CDT -----  Echo showed nl function. Moderately enlarged heart and mild AS.  Continue current Rx  F/U as scheduled

## 2023-10-04 ENCOUNTER — TELEPHONE (OUTPATIENT)
Dept: FAMILY MEDICINE | Facility: CLINIC | Age: 73
End: 2023-10-04
Payer: MEDICARE

## 2023-10-04 ENCOUNTER — TELEPHONE (OUTPATIENT)
Dept: CARDIOLOGY | Facility: CLINIC | Age: 73
End: 2023-10-04
Payer: MEDICARE

## 2023-10-04 NOTE — TELEPHONE ENCOUNTER
----- Message from Niki Nelson sent at 10/4/2023  9:04 AM CDT -----  Patient is requesting a call back at [phone]

## 2023-10-04 NOTE — TELEPHONE ENCOUNTER
Spoke to pt and he was trying to see who contacted him; told pt it was nobody from our office and it shows someone from Dr. Paiz's office was trying to contact him and he may want to reach out to there office and pt verbalized understanding.

## 2023-10-04 NOTE — TELEPHONE ENCOUNTER
Spoke with pt in regards to echo results. Pt verbalized understanding information.             ----- Message from Zoey Conway MA sent at 10/4/2023  9:40 AM CDT -----  Contact: [email or web address] [phone]  Patient called              In regards to returning phone call back to staff.                Call back [phone]

## 2023-10-27 ENCOUNTER — PATIENT MESSAGE (OUTPATIENT)
Dept: PAIN MEDICINE | Facility: CLINIC | Age: 73
End: 2023-10-27

## 2023-10-27 ENCOUNTER — OFFICE VISIT (OUTPATIENT)
Dept: PAIN MEDICINE | Facility: CLINIC | Age: 73
End: 2023-10-27
Payer: MEDICARE

## 2023-10-27 VITALS
SYSTOLIC BLOOD PRESSURE: 142 MMHG | HEIGHT: 65 IN | WEIGHT: 222.25 LBS | BODY MASS INDEX: 37.03 KG/M2 | HEART RATE: 59 BPM | DIASTOLIC BLOOD PRESSURE: 64 MMHG

## 2023-10-27 DIAGNOSIS — M51.36 DDD (DEGENERATIVE DISC DISEASE), LUMBAR: ICD-10-CM

## 2023-10-27 DIAGNOSIS — M54.16 LUMBAR RADICULOPATHY: Primary | ICD-10-CM

## 2023-10-27 DIAGNOSIS — M47.816 LUMBAR SPONDYLOSIS: ICD-10-CM

## 2023-10-27 PROCEDURE — 3077F PR MOST RECENT SYSTOLIC BLOOD PRESSURE >= 140 MM HG: ICD-10-PCS | Mod: CPTII,S$GLB,, | Performed by: ANESTHESIOLOGY

## 2023-10-27 PROCEDURE — 3288F PR FALLS RISK ASSESSMENT DOCUMENTED: ICD-10-PCS | Mod: CPTII,S$GLB,, | Performed by: ANESTHESIOLOGY

## 2023-10-27 PROCEDURE — 3044F PR MOST RECENT HEMOGLOBIN A1C LEVEL <7.0%: ICD-10-PCS | Mod: CPTII,S$GLB,, | Performed by: ANESTHESIOLOGY

## 2023-10-27 PROCEDURE — 3077F SYST BP >= 140 MM HG: CPT | Mod: CPTII,S$GLB,, | Performed by: ANESTHESIOLOGY

## 2023-10-27 PROCEDURE — 1101F PT FALLS ASSESS-DOCD LE1/YR: CPT | Mod: CPTII,S$GLB,, | Performed by: ANESTHESIOLOGY

## 2023-10-27 PROCEDURE — 3078F PR MOST RECENT DIASTOLIC BLOOD PRESSURE < 80 MM HG: ICD-10-PCS | Mod: CPTII,S$GLB,, | Performed by: ANESTHESIOLOGY

## 2023-10-27 PROCEDURE — 4010F PR ACE/ARB THEARPY RXD/TAKEN: ICD-10-PCS | Mod: CPTII,S$GLB,, | Performed by: ANESTHESIOLOGY

## 2023-10-27 PROCEDURE — 1159F MED LIST DOCD IN RCRD: CPT | Mod: CPTII,S$GLB,, | Performed by: ANESTHESIOLOGY

## 2023-10-27 PROCEDURE — 3008F PR BODY MASS INDEX (BMI) DOCUMENTED: ICD-10-PCS | Mod: CPTII,S$GLB,, | Performed by: ANESTHESIOLOGY

## 2023-10-27 PROCEDURE — 4010F ACE/ARB THERAPY RXD/TAKEN: CPT | Mod: CPTII,S$GLB,, | Performed by: ANESTHESIOLOGY

## 2023-10-27 PROCEDURE — 99999 PR PBB SHADOW E&M-EST. PATIENT-LVL IV: CPT | Mod: PBBFAC,,, | Performed by: ANESTHESIOLOGY

## 2023-10-27 PROCEDURE — 99999 PR PBB SHADOW E&M-EST. PATIENT-LVL IV: ICD-10-PCS | Mod: PBBFAC,,, | Performed by: ANESTHESIOLOGY

## 2023-10-27 PROCEDURE — 3008F BODY MASS INDEX DOCD: CPT | Mod: CPTII,S$GLB,, | Performed by: ANESTHESIOLOGY

## 2023-10-27 PROCEDURE — 1159F PR MEDICATION LIST DOCUMENTED IN MEDICAL RECORD: ICD-10-PCS | Mod: CPTII,S$GLB,, | Performed by: ANESTHESIOLOGY

## 2023-10-27 PROCEDURE — 3044F HG A1C LEVEL LT 7.0%: CPT | Mod: CPTII,S$GLB,, | Performed by: ANESTHESIOLOGY

## 2023-10-27 PROCEDURE — 3288F FALL RISK ASSESSMENT DOCD: CPT | Mod: CPTII,S$GLB,, | Performed by: ANESTHESIOLOGY

## 2023-10-27 PROCEDURE — 99214 OFFICE O/P EST MOD 30 MIN: CPT | Mod: S$GLB,,, | Performed by: ANESTHESIOLOGY

## 2023-10-27 PROCEDURE — 1101F PR PT FALLS ASSESS DOC 0-1 FALLS W/OUT INJ PAST YR: ICD-10-PCS | Mod: CPTII,S$GLB,, | Performed by: ANESTHESIOLOGY

## 2023-10-27 PROCEDURE — 99214 PR OFFICE/OUTPT VISIT, EST, LEVL IV, 30-39 MIN: ICD-10-PCS | Mod: S$GLB,,, | Performed by: ANESTHESIOLOGY

## 2023-10-27 PROCEDURE — 1125F AMNT PAIN NOTED PAIN PRSNT: CPT | Mod: CPTII,S$GLB,, | Performed by: ANESTHESIOLOGY

## 2023-10-27 PROCEDURE — 3078F DIAST BP <80 MM HG: CPT | Mod: CPTII,S$GLB,, | Performed by: ANESTHESIOLOGY

## 2023-10-27 PROCEDURE — 1125F PR PAIN SEVERITY QUANTIFIED, PAIN PRESENT: ICD-10-PCS | Mod: CPTII,S$GLB,, | Performed by: ANESTHESIOLOGY

## 2023-10-27 NOTE — PROGRESS NOTES
Interventional Pain Progress Note       Referring Physician: No ref. provider found    PCP: Bishop Gabriel MD    Chief Complaint:     Chief Complaint   Patient presents with    Low-back Pain          SUBJECTIVE:    Interval History (10/27/2023):  Patient returns to clinic for evaluation of lower back pain.  Patient reports continued lower back pain that starts in a band across his lower back.  Patient reports that as we will occasionally radiates to his bilateral posterior thighs.  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  Pain is currently rated a 5/10, but can increase to a 8/10 with exacerbating activity. Denies any fevers, chills, changes in gait, saddle anesthesia, or bowel and bladder incontinence        Interval History (8/29/2023):  Quintin Ling presents today for follow-up visit for MRI review.  Patient was last seen on 8/4/2023. Taking Lyrica 50 mg BID with no side effects, minimal improvement. Patient reports pain as 8/10 today. Continues to report low back pain with radiation into his left lower extremity. Pain is worsened with prolonged standing and walking. Reports pain and weakness is affecting his gait, he often walks with a limp.  Initial HPI 08/04/2023  Quintin Ling is a 73 y.o. male who presents to the clinic for the evaluation of left lower extremity pain.   Patient reports three-month history of left lower extremity pain.  He denies any inciting events or traumas.  Patient reports 1 year ago he experienced bilateral calf pain that is resolved with B12 supplementation.  He also reports as a child he was placed in traction for 2 weeks on his left lower extremity and to wear a brace for 3 years secondary to hip dysplasia.  Patient denies any recent infections.  Patient denies any previous surgical intervention on his lumbar spine.  Pain is described as a pulling burning pain diffusely over his left calf.  He denies any  significant radiation to his foot or up into his left lower back but does report a long history of lower back pain.  Patient denies any significant change in sensation to left lower extremity.  Pain is worse with prolonged sitting or walking, better with lying supine.  Patient does report swelling of his left lower extremity with prolonged walking.  Patient denies any significant history of rheumatoid disease in his family.  Patient reports emergency department on 06/28/2023 for exacerbation of left lower extremity pain.  Denies any fevers, chills, saddle anesthesia, or bowel and bladder incontinence        Non-Pharmacologic Treatments:  Physical Therapy/Home Exercise: yes  Ice/Heat:yes  TENS: no  Acupuncture: no  Massage: yes  Chiropractic: no        Previous Pain Medications:  NSAIDs, Tylenol, gabapentin, tramadol     report:  Reviewed and consistent with medication use as prescribed.    Pain Procedures:   None    Pain Disability Index Review:         10/27/2023     8:42 AM 8/29/2023     2:19 PM 8/4/2023     8:27 AM   Last 3 PDI Scores   Pain Disability Index (PDI) 35 0 32       Imaging:     MRI Lumbar spine 08/22/2023  FINDINGS:  Grade 1 degenerative spondylolisthesis at L4-L5.  Vertebral body height is normal.  Marrow signal is within normal limits. The conus medullaris terminates at the level of T12-L1.  No abnormal signal within the conus. Intervertebral disc levels are as follows:     T12-L1 disc: Disc space height loss with anterior bridging osteophytes and fatty Modic change.  Normal facet joints.  No spinal or foraminal stenosis.     L1-L2 disc : Disc space height loss with a broad-based posterior disc bulge and posterior annular fissure.  Normal facet joints.  The dural canal measures 7 mm.  No foraminal stenosis.     L2-L3 disc: Normal disc space height with anterior osteophytes.  Fatty and edematous Modic change.  Normal facet joints.  Dural canal measures 12 mm.  No foraminal stenosis.     L3-L4  disc: Disc space height is relatively normal with anterolateral right-sided osteophytes.  Normal facet joints.  No spinal or foraminal stenosis.     L4-L5 disc: Grade 1 spondylolisthesis with markedly severe degenerative facet hypertrophy bilaterally.  Unroofing of disc material with a disc extrusion that extends cranially.  The disc extrusion measures 23 mm craniocaudal by 20 mm transverse by 9 mm AP.  There is severe spinal stenosis and severe subarticular recess stenosis.  The dural canal measures 6 mm but is roughly 30% of normal area with redundancy of nerve roots adjacent to this interspace.  There is also a synovial cyst projecting inferiorly from the right facet joint that may compress the descending right L5 spinal nerve root sleeve best demonstrated on axial T2 series 6, image 25.  This synovial cyst measures 11 mm.  Moderate foraminal stenosis bilaterally.     L5-S1 disc: Severe disc space height loss most pronounced toward the left.  Fatty Modic endplate change.  Disc and osteophyte encroach into the floor of the left exit foramen.  There is also a left paracentral disc protrusion causing mild left subarticular recess stenosis without eli nerve compression.  The dural canal measures 10 mm.  Moderate to severe left foraminal stenosis.     Impression:     1. Severe spinal stenosis at L4-L5 as described above which may account for neurogenic claudication and radiculopathy of the descending nerve roots.  2. Moderate foraminal stenosis bilaterally at L4-L5 and moderate/severe left foraminal stenosis at L5-S1.  This could also precipitate radiculopathy.    EMG/NCS 08/24/2023  IMPRESSION  ABNORMAL study  2.  There is electrodiagnostic evidence of an acute on chronic radiculopathy of the left L5 nerve root, a subacute on chronic radiculopathy of the right L5 nerve root, and a chronic radiculopathy of the S1 and probable L4 nerve roots      Results for orders placed during the hospital encounter of  06/21/23    X-Ray Lumbar Spine AP And Lateral    Narrative  EXAMINATION:  XR LUMBAR SPINE AP AND LATERAL    CLINICAL HISTORY:  Pain in left leg    TECHNIQUE:  AP, lateral and spot images were performed of the lumbar spine.    COMPARISON:  02/01/2022    FINDINGS:  Five lumbar type vertebral bodies.  The lordotic curvature is normal.  Grade 1 anterolisthesis of L5 on S1. no evidence for compression deformity, fracture, or subluxation.  The vertebral heights are maintained without significant intervertebral disc space narrowing.  Progressive degenerative changes of facets are identified.    The bilateral SI joints are normal.    Impression  Spondylotic changes lumbar spine without evidence for fracture.      Electronically signed by: Chago Chapa MD  Date:    06/21/2023  Time:    14:29        Results for orders placed during the hospital encounter of 06/21/23    X-Ray Hip 2 or 3 views Left (with Pelvis when performed)    Narrative  EXAMINATION:  XR HIP WITH PELVIS WHEN PERFORMED, 2 OR 3 VIEWS LEFT    CLINICAL HISTORY:  Pain in left leg    TECHNIQUE:  AP view of the pelvis and frog leg lateral view of the left hip were performed.    COMPARISON:  02/01/2022    FINDINGS:  No fracture the pelvis or proximal femora.  The femoral heads are well seated in the acetabulum without significant joint space narrowing.  Osteopenia is identified.    Degenerative changes of bilateral SI joints.  The pubic symphysis is normal.    Impression  No fracture the pelvis or proximal femora.  Degenerative changes are noted.      Electronically signed by: Chago Chapa MD  Date:    06/21/2023  Time:    14:28    XR TIBIA FIBULA 2 VIEW LEFT 6/21/23     CLINICAL HISTORY:  Pain in left leg     TECHNIQUE:  AP and lateral views of the left tibia and fibula were performed.     COMPARISON:  None.     FINDINGS:  No fracture.  The alignment is normal.  Joint space narrowing is identified of the knee as well as of the ankle.  No significant soft tissue  swelling.  Calcifications are identified of the vasculature.     Impression:     No fracture of the tibia or fibula.      US LOWER EXTREMITY VEINS BILATERAL 06/21/23     CLINICAL HISTORY:  Pain in left leg     TECHNIQUE:  Duplex and color flow Doppler and dynamic compression was performed of the bilateral lower extremity veins was performed.     COMPARISON:  None     FINDINGS:  Right thigh veins: The common femoral, femoral, popliteal, upper greater saphenous, and deep femoral veins are patent and free of thrombus. The veins are normally compressible and have normal phasic flow and augmentation response.     Right calf veins: The visualized calf veins are patent.     Left thigh veins: The common femoral, femoral, popliteal, upper greater saphenous, and deep femoral veins are patent and free of thrombus. The veins are normally compressible and have normal phasic flow and augmentation response.     Left calf veins: The visualized calf veins are patent.     Miscellaneous: None     Impression:     No evidence of deep venous thrombosis in either lower extremity.    Past Medical History:   Diagnosis Date    Hyperlipidemia     Hypertension      Past Surgical History:   Procedure Laterality Date    NO PAST SURGERIES       Social History     Socioeconomic History    Marital status:    Tobacco Use    Smoking status: Former    Smokeless tobacco: Never   Substance and Sexual Activity    Alcohol use: Never    Drug use: Never    Sexual activity: Yes     Partners: Female     Family History   Problem Relation Age of Onset    No Known Problems Mother     No Known Problems Father        Review of patient's allergies indicates:  No Known Allergies    Current Outpatient Medications   Medication Sig    amLODIPine (NORVASC) 5 MG tablet TAKE 1 TABLET BY MOUTH EVERY DAY    atorvastatin (LIPITOR) 20 MG tablet TAKE 1 TABLET BY MOUTH EVERY DAY    ciprofloxacin HCl (CIPRO) 500 MG tablet Take 1 tablet (500 mg total) by mouth 2 (two)  times daily.    dicyclomine (BENTYL) 20 mg tablet TAKE 1 TABLET BY MOUTH THREE TIMES A DAY FOR 7 DAYS    doxazosin (CARDURA) 4 MG tablet TAKE 1 TABLET BY MOUTH EVERY EVENING.    fenofibrate (TRICOR) 54 MG tablet Take 1 tablet (54 mg total) by mouth once daily.    hydroCHLOROthiazide (HYDRODIURIL) 25 MG tablet TAKE 1 TABLET BY MOUTH EVERY DAY IN THE MORNING    metFORMIN (GLUCOPHAGE) 500 MG tablet TAKE 1 TABLET BY MOUTH TWICE A DAY WITH MEALS    metoprolol succinate (TOPROL-XL) 100 MG 24 hr tablet Take 1 tablet (100 mg total) by mouth 2 (two) times daily.    olmesartan (BENICAR) 40 MG tablet Take 1 tablet (40 mg total) by mouth once daily.    pregabalin (LYRICA) 75 MG capsule Take 1 capsule (75 mg total) by mouth 2 (two) times daily.    traMADoL (ULTRAM) 50 mg tablet Take 1 tablet (50 mg total) by mouth every 6 (six) hours as needed for Pain.    sildenafiL (VIAGRA) 100 MG tablet Take 1 tablet (100 mg total) by mouth daily as needed for Erectile Dysfunction. (Patient not taking: Reported on 8/4/2023)     No current facility-administered medications for this visit.         ROS  Review of Systems   Constitutional:  Negative for activity change, appetite change and fever.   Respiratory:  Negative for cough, chest tightness, shortness of breath, wheezing and stridor.    Cardiovascular:  Positive for leg swelling. Negative for chest pain and palpitations.   Gastrointestinal:  Negative for abdominal pain, blood in stool, constipation, diarrhea, nausea and vomiting.   Endocrine: Negative for polydipsia, polyphagia and polyuria.   Genitourinary:  Positive for urgency. Negative for dysuria and hematuria.   Musculoskeletal:  Positive for arthralgias, back pain, gait problem and myalgias. Negative for joint swelling, neck pain and neck stiffness.   Skin:  Negative for rash.   Allergic/Immunologic: Negative for food allergies.   Neurological:  Positive for weakness and numbness. Negative for dizziness, tremors, seizures, syncope,  "facial asymmetry, speech difficulty, light-headedness and headaches.   Psychiatric/Behavioral:  Negative for agitation, hallucinations, self-injury and suicidal ideas. The patient is not nervous/anxious and is not hyperactive.             OBJECTIVE:  BP (!) 142/64   Pulse (!) 59   Ht 5' 5" (1.651 m)   Wt 100.8 kg (222 lb 3.6 oz)   BMI 36.98 kg/m²         Physical Exam  Constitutional:       General: He is not in acute distress.     Appearance: Normal appearance. He is not ill-appearing.   HENT:      Head: Normocephalic and atraumatic.      Nose: No congestion or rhinorrhea.   Eyes:      Extraocular Movements: Extraocular movements intact.      Pupils: Pupils are equal, round, and reactive to light.   Cardiovascular:      Pulses: Normal pulses.   Pulmonary:      Effort: Pulmonary effort is normal.   Abdominal:      General: Abdomen is flat.      Palpations: Abdomen is soft.   Skin:     General: Skin is warm and dry.      Capillary Refill: Capillary refill takes less than 2 seconds.   Neurological:      General: No focal deficit present.      Mental Status: He is alert and oriented to person, place, and time.      Sensory: Sensory deficit present.      Motor: Weakness present. No abnormal muscle tone.      Gait: Gait abnormal.      Deep Tendon Reflexes:      Reflex Scores:       Patellar reflexes are 2+ on the right side and 2+ on the left side.       Achilles reflexes are 2+ on the right side and 1+ on the left side.     Comments: Decreased light touch sensation over lateral aspect left calf  4/5 strength in left dorsiflexion   Psychiatric:         Mood and Affect: Mood normal.         Behavior: Behavior normal.         Thought Content: Thought content normal.           Musculoskeletal:      Lumbar Exam  Incision: no  Pain with Flexion: no  Pain with Extension: yes  ROM:  Decreased  Paraspinous TTP:  Positive bilaterally  Facet TTP:  L5-S1  Facet Loading:  Positive bilaterally  SLR:  Positive on the left at 75° " in L5 distribution  SIJ TTP:  Negative bilaterally      LABS:  Lab Results   Component Value Date    WBC 7.04 08/23/2023    HGB 11.3 (L) 08/23/2023    HCT 35.6 (L) 08/23/2023    MCV 89 08/23/2023     08/23/2023       CMP  Sodium   Date Value Ref Range Status   06/21/2023 139 136 - 145 mmol/L Final     Potassium   Date Value Ref Range Status   06/21/2023 4.4 3.5 - 5.1 mmol/L Final     Chloride   Date Value Ref Range Status   06/21/2023 104 95 - 110 mmol/L Final     CO2   Date Value Ref Range Status   06/21/2023 26 23 - 29 mmol/L Final     Glucose   Date Value Ref Range Status   06/21/2023 91 70 - 110 mg/dL Final     BUN   Date Value Ref Range Status   06/21/2023 22 8 - 23 mg/dL Final     Creatinine   Date Value Ref Range Status   06/21/2023 1.4 0.5 - 1.4 mg/dL Final     Calcium   Date Value Ref Range Status   06/21/2023 9.5 8.7 - 10.5 mg/dL Final     Total Protein   Date Value Ref Range Status   05/30/2023 6.8 6.0 - 8.4 g/dL Final     Albumin   Date Value Ref Range Status   05/30/2023 3.8 3.5 - 5.2 g/dL Final     Total Bilirubin   Date Value Ref Range Status   05/30/2023 0.5 0.1 - 1.0 mg/dL Final     Comment:     For infants and newborns, interpretation of results should be based  on gestational age, weight and in agreement with clinical  observations.    Premature Infant recommended reference ranges:  Up to 24 hours.............<8.0 mg/dL  Up to 48 hours............<12.0 mg/dL  3-5 days..................<15.0 mg/dL  6-29 days.................<15.0 mg/dL       Alkaline Phosphatase   Date Value Ref Range Status   05/30/2023 31 (L) 55 - 135 U/L Final     AST   Date Value Ref Range Status   05/30/2023 20 10 - 40 U/L Final     ALT   Date Value Ref Range Status   05/30/2023 18 10 - 44 U/L Final     Anion Gap   Date Value Ref Range Status   06/21/2023 9 8 - 16 mmol/L Final     eGFR if    Date Value Ref Range Status   05/30/2022 >60.0 >60 mL/min/1.73 m^2 Final     eGFR if non    Date  Value Ref Range Status   05/30/2022 >60.0 >60 mL/min/1.73 m^2 Final     Comment:     Calculation used to obtain the estimated glomerular filtration  rate (eGFR) is the CKD-EPI equation.          Lab Results   Component Value Date    HGBA1C 5.3 05/30/2023             ASSESSMENT:       73 y.o. year old male with left lower extremity pain, consistent with     1. Lumbar radiculopathy  IR JOSE Lumbar w/ Img    Case Request-RAD/Other Procedure Area: Lumbar L5/S1 IL JOSE      2. DDD (degenerative disc disease), lumbar        3. Lumbar spondylosis            Lumbar radiculopathy  -     IR JOSE Lumbar w/ Img; Future; Expected date: 10/27/2023  -     Case Request-RAD/Other Procedure Area: Lumbar L5/S1 IL JOSE    DDD (degenerative disc disease), lumbar    Lumbar spondylosis             PLAN:   - Interventions:   Schedule patient for L5-S1 interlaminar epidural steroid injection for lumbar radiculopathy.  If patient has continued axial low back pain after JOSE, can consider L4-5 and L5-S1 medial branch blocks.    - Anticoagulation use:   No no anticoagulation    - Medications:   Continue Lyrica to 75 mg BID       - Therapy:    Patient has completed 6 weeks of formal physical therapy in the last 3 months with increase in pain.  Refer to physical therapy for gait training and aqua therapy at [facility]    - Imaging/Diagnostic:   X-rays of left hip left tibia and fibula are unremarkable.   Ultrasound of left lower extremity reveals no abnormality   X-ray of lumbar spine reveals grade 1 anterolisthesis of L5 upon S1 with loss of disc height at L5-S1 and L4-5.  Degenerative changes of the facets noted at L4-5 and L5-S1.   MRI of lumbar spine reviewed and findings discussed with patient.  Significant for grade 1 anterolisthesis of L4 upon L5.  There is noted disc extrusion at L4-5 resulting in severe canal stenosis and severe bilateral recess stenosis.  Dural canal measures 6 mm.  Associated right-greater-than-left foraminal  stenosis with extrusion.  There is noted loss of height at L5-S1 with left paracentral disc protrusion causing Moderate to severe left foraminal stenosis.   EMG and nerve conduction study of bilateral lower extremities reveals acute on chronic bilateral L5 and S1 radiculopathy    - Consults:   Can consider referral to Neurosurgery if lower back pain continues for evaluation of disc extrusion at L4-5        - Patient Questions: Answered all of the patient's questions regarding diagnosis, therapy, and treatment     This condition does not require this patient to take time off of work, and the primary goal of our Pain Management services is to improve the patient's functional capacity.     - Follow up visit:  Returns to clinic 4 weeks after procedure      The above plan and management options were discussed at length with patient. Patient is in agreement with the above and verbalized understanding.    I discussed the goals of interventional chronic pain management with the patient on today's visit.  I explained the utility of injections for diagnostic and therapeutic purposes.  We discussed a multimodal approach to pain including treating the patient's given worst pain at any given time.  We will use a systematic approach to addressing pain.  We will also adopt a multimodal approach that includes injections, adjuvant medications, physical therapy, at times psychiatry.  There may be a limited role for opioid use intermittently in the treatment of pain, more particularly for acute pain although no one approach can be used as a sole treatment modality.    I emphasized the importance of regular exercise, core strengthening and stretching, diet and weight loss as a cornerstone of long-term pain management.      Oneil Carlson MD  Interventional Pain Management  [facility]    Disclaimer:  This note was prepared using voice recognition system and is likely to have sound alike errors that may have been overlooked  even after proof reading.  Please call me with any questions

## 2023-10-27 NOTE — H&P (VIEW-ONLY)
Interventional Pain Progress Note       Referring Physician: No ref. provider found    PCP: Bishop Gabriel MD    Chief Complaint:     Chief Complaint   Patient presents with    Low-back Pain          SUBJECTIVE:    Interval History (10/27/2023):  Patient returns to clinic for evaluation of lower back pain.  Patient reports continued lower back pain that starts in a band across his lower back.  Patient reports that as we will occasionally radiates to his bilateral posterior thighs.  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  Pain is currently rated a 5/10, but can increase to a 8/10 with exacerbating activity. Denies any fevers, chills, changes in gait, saddle anesthesia, or bowel and bladder incontinence        Interval History (8/29/2023):  Quintin Ling presents today for follow-up visit for MRI review.  Patient was last seen on 8/4/2023. Taking Lyrica 50 mg BID with no side effects, minimal improvement. Patient reports pain as 8/10 today. Continues to report low back pain with radiation into his left lower extremity. Pain is worsened with prolonged standing and walking. Reports pain and weakness is affecting his gait, he often walks with a limp.  Initial HPI 08/04/2023  Quintin Ling is a 73 y.o. male who presents to the clinic for the evaluation of left lower extremity pain.   Patient reports three-month history of left lower extremity pain.  He denies any inciting events or traumas.  Patient reports 1 year ago he experienced bilateral calf pain that is resolved with B12 supplementation.  He also reports as a child he was placed in traction for 2 weeks on his left lower extremity and to wear a brace for 3 years secondary to hip dysplasia.  Patient denies any recent infections.  Patient denies any previous surgical intervention on his lumbar spine.  Pain is described as a pulling burning pain diffusely over his left calf.  He denies any  significant radiation to his foot or up into his left lower back but does report a long history of lower back pain.  Patient denies any significant change in sensation to left lower extremity.  Pain is worse with prolonged sitting or walking, better with lying supine.  Patient does report swelling of his left lower extremity with prolonged walking.  Patient denies any significant history of rheumatoid disease in his family.  Patient reports emergency department on 06/28/2023 for exacerbation of left lower extremity pain.  Denies any fevers, chills, saddle anesthesia, or bowel and bladder incontinence        Non-Pharmacologic Treatments:  Physical Therapy/Home Exercise: yes  Ice/Heat:yes  TENS: no  Acupuncture: no  Massage: yes  Chiropractic: no        Previous Pain Medications:  NSAIDs, Tylenol, gabapentin, tramadol     report:  Reviewed and consistent with medication use as prescribed.    Pain Procedures:   None    Pain Disability Index Review:         10/27/2023     8:42 AM 8/29/2023     2:19 PM 8/4/2023     8:27 AM   Last 3 PDI Scores   Pain Disability Index (PDI) 35 0 32       Imaging:     MRI Lumbar spine 08/22/2023  FINDINGS:  Grade 1 degenerative spondylolisthesis at L4-L5.  Vertebral body height is normal.  Marrow signal is within normal limits. The conus medullaris terminates at the level of T12-L1.  No abnormal signal within the conus. Intervertebral disc levels are as follows:     T12-L1 disc: Disc space height loss with anterior bridging osteophytes and fatty Modic change.  Normal facet joints.  No spinal or foraminal stenosis.     L1-L2 disc : Disc space height loss with a broad-based posterior disc bulge and posterior annular fissure.  Normal facet joints.  The dural canal measures 7 mm.  No foraminal stenosis.     L2-L3 disc: Normal disc space height with anterior osteophytes.  Fatty and edematous Modic change.  Normal facet joints.  Dural canal measures 12 mm.  No foraminal stenosis.     L3-L4  disc: Disc space height is relatively normal with anterolateral right-sided osteophytes.  Normal facet joints.  No spinal or foraminal stenosis.     L4-L5 disc: Grade 1 spondylolisthesis with markedly severe degenerative facet hypertrophy bilaterally.  Unroofing of disc material with a disc extrusion that extends cranially.  The disc extrusion measures 23 mm craniocaudal by 20 mm transverse by 9 mm AP.  There is severe spinal stenosis and severe subarticular recess stenosis.  The dural canal measures 6 mm but is roughly 30% of normal area with redundancy of nerve roots adjacent to this interspace.  There is also a synovial cyst projecting inferiorly from the right facet joint that may compress the descending right L5 spinal nerve root sleeve best demonstrated on axial T2 series 6, image 25.  This synovial cyst measures 11 mm.  Moderate foraminal stenosis bilaterally.     L5-S1 disc: Severe disc space height loss most pronounced toward the left.  Fatty Modic endplate change.  Disc and osteophyte encroach into the floor of the left exit foramen.  There is also a left paracentral disc protrusion causing mild left subarticular recess stenosis without eli nerve compression.  The dural canal measures 10 mm.  Moderate to severe left foraminal stenosis.     Impression:     1. Severe spinal stenosis at L4-L5 as described above which may account for neurogenic claudication and radiculopathy of the descending nerve roots.  2. Moderate foraminal stenosis bilaterally at L4-L5 and moderate/severe left foraminal stenosis at L5-S1.  This could also precipitate radiculopathy.    EMG/NCS 08/24/2023  IMPRESSION  ABNORMAL study  2.  There is electrodiagnostic evidence of an acute on chronic radiculopathy of the left L5 nerve root, a subacute on chronic radiculopathy of the right L5 nerve root, and a chronic radiculopathy of the S1 and probable L4 nerve roots      Results for orders placed during the hospital encounter of  06/21/23    X-Ray Lumbar Spine AP And Lateral    Narrative  EXAMINATION:  XR LUMBAR SPINE AP AND LATERAL    CLINICAL HISTORY:  Pain in left leg    TECHNIQUE:  AP, lateral and spot images were performed of the lumbar spine.    COMPARISON:  02/01/2022    FINDINGS:  Five lumbar type vertebral bodies.  The lordotic curvature is normal.  Grade 1 anterolisthesis of L5 on S1. no evidence for compression deformity, fracture, or subluxation.  The vertebral heights are maintained without significant intervertebral disc space narrowing.  Progressive degenerative changes of facets are identified.    The bilateral SI joints are normal.    Impression  Spondylotic changes lumbar spine without evidence for fracture.      Electronically signed by: Chago Chapa MD  Date:    06/21/2023  Time:    14:29        Results for orders placed during the hospital encounter of 06/21/23    X-Ray Hip 2 or 3 views Left (with Pelvis when performed)    Narrative  EXAMINATION:  XR HIP WITH PELVIS WHEN PERFORMED, 2 OR 3 VIEWS LEFT    CLINICAL HISTORY:  Pain in left leg    TECHNIQUE:  AP view of the pelvis and frog leg lateral view of the left hip were performed.    COMPARISON:  02/01/2022    FINDINGS:  No fracture the pelvis or proximal femora.  The femoral heads are well seated in the acetabulum without significant joint space narrowing.  Osteopenia is identified.    Degenerative changes of bilateral SI joints.  The pubic symphysis is normal.    Impression  No fracture the pelvis or proximal femora.  Degenerative changes are noted.      Electronically signed by: Chago Chapa MD  Date:    06/21/2023  Time:    14:28    XR TIBIA FIBULA 2 VIEW LEFT 6/21/23     CLINICAL HISTORY:  Pain in left leg     TECHNIQUE:  AP and lateral views of the left tibia and fibula were performed.     COMPARISON:  None.     FINDINGS:  No fracture.  The alignment is normal.  Joint space narrowing is identified of the knee as well as of the ankle.  No significant soft tissue  swelling.  Calcifications are identified of the vasculature.     Impression:     No fracture of the tibia or fibula.      US LOWER EXTREMITY VEINS BILATERAL 06/21/23     CLINICAL HISTORY:  Pain in left leg     TECHNIQUE:  Duplex and color flow Doppler and dynamic compression was performed of the bilateral lower extremity veins was performed.     COMPARISON:  None     FINDINGS:  Right thigh veins: The common femoral, femoral, popliteal, upper greater saphenous, and deep femoral veins are patent and free of thrombus. The veins are normally compressible and have normal phasic flow and augmentation response.     Right calf veins: The visualized calf veins are patent.     Left thigh veins: The common femoral, femoral, popliteal, upper greater saphenous, and deep femoral veins are patent and free of thrombus. The veins are normally compressible and have normal phasic flow and augmentation response.     Left calf veins: The visualized calf veins are patent.     Miscellaneous: None     Impression:     No evidence of deep venous thrombosis in either lower extremity.    Past Medical History:   Diagnosis Date    Hyperlipidemia     Hypertension      Past Surgical History:   Procedure Laterality Date    NO PAST SURGERIES       Social History     Socioeconomic History    Marital status:    Tobacco Use    Smoking status: Former    Smokeless tobacco: Never   Substance and Sexual Activity    Alcohol use: Never    Drug use: Never    Sexual activity: Yes     Partners: Female     Family History   Problem Relation Age of Onset    No Known Problems Mother     No Known Problems Father        Review of patient's allergies indicates:  No Known Allergies    Current Outpatient Medications   Medication Sig    amLODIPine (NORVASC) 5 MG tablet TAKE 1 TABLET BY MOUTH EVERY DAY    atorvastatin (LIPITOR) 20 MG tablet TAKE 1 TABLET BY MOUTH EVERY DAY    ciprofloxacin HCl (CIPRO) 500 MG tablet Take 1 tablet (500 mg total) by mouth 2 (two)  times daily.    dicyclomine (BENTYL) 20 mg tablet TAKE 1 TABLET BY MOUTH THREE TIMES A DAY FOR 7 DAYS    doxazosin (CARDURA) 4 MG tablet TAKE 1 TABLET BY MOUTH EVERY EVENING.    fenofibrate (TRICOR) 54 MG tablet Take 1 tablet (54 mg total) by mouth once daily.    hydroCHLOROthiazide (HYDRODIURIL) 25 MG tablet TAKE 1 TABLET BY MOUTH EVERY DAY IN THE MORNING    metFORMIN (GLUCOPHAGE) 500 MG tablet TAKE 1 TABLET BY MOUTH TWICE A DAY WITH MEALS    metoprolol succinate (TOPROL-XL) 100 MG 24 hr tablet Take 1 tablet (100 mg total) by mouth 2 (two) times daily.    olmesartan (BENICAR) 40 MG tablet Take 1 tablet (40 mg total) by mouth once daily.    pregabalin (LYRICA) 75 MG capsule Take 1 capsule (75 mg total) by mouth 2 (two) times daily.    traMADoL (ULTRAM) 50 mg tablet Take 1 tablet (50 mg total) by mouth every 6 (six) hours as needed for Pain.    sildenafiL (VIAGRA) 100 MG tablet Take 1 tablet (100 mg total) by mouth daily as needed for Erectile Dysfunction. (Patient not taking: Reported on 8/4/2023)     No current facility-administered medications for this visit.         ROS  Review of Systems   Constitutional:  Negative for activity change, appetite change and fever.   Respiratory:  Negative for cough, chest tightness, shortness of breath, wheezing and stridor.    Cardiovascular:  Positive for leg swelling. Negative for chest pain and palpitations.   Gastrointestinal:  Negative for abdominal pain, blood in stool, constipation, diarrhea, nausea and vomiting.   Endocrine: Negative for polydipsia, polyphagia and polyuria.   Genitourinary:  Positive for urgency. Negative for dysuria and hematuria.   Musculoskeletal:  Positive for arthralgias, back pain, gait problem and myalgias. Negative for joint swelling, neck pain and neck stiffness.   Skin:  Negative for rash.   Allergic/Immunologic: Negative for food allergies.   Neurological:  Positive for weakness and numbness. Negative for dizziness, tremors, seizures, syncope,  "facial asymmetry, speech difficulty, light-headedness and headaches.   Psychiatric/Behavioral:  Negative for agitation, hallucinations, self-injury and suicidal ideas. The patient is not nervous/anxious and is not hyperactive.             OBJECTIVE:  BP (!) 142/64   Pulse (!) 59   Ht 5' 5" (1.651 m)   Wt 100.8 kg (222 lb 3.6 oz)   BMI 36.98 kg/m²         Physical Exam  Constitutional:       General: He is not in acute distress.     Appearance: Normal appearance. He is not ill-appearing.   HENT:      Head: Normocephalic and atraumatic.      Nose: No congestion or rhinorrhea.   Eyes:      Extraocular Movements: Extraocular movements intact.      Pupils: Pupils are equal, round, and reactive to light.   Cardiovascular:      Pulses: Normal pulses.   Pulmonary:      Effort: Pulmonary effort is normal.   Abdominal:      General: Abdomen is flat.      Palpations: Abdomen is soft.   Skin:     General: Skin is warm and dry.      Capillary Refill: Capillary refill takes less than 2 seconds.   Neurological:      General: No focal deficit present.      Mental Status: He is alert and oriented to person, place, and time.      Sensory: Sensory deficit present.      Motor: Weakness present. No abnormal muscle tone.      Gait: Gait abnormal.      Deep Tendon Reflexes:      Reflex Scores:       Patellar reflexes are 2+ on the right side and 2+ on the left side.       Achilles reflexes are 2+ on the right side and 1+ on the left side.     Comments: Decreased light touch sensation over lateral aspect left calf  4/5 strength in left dorsiflexion   Psychiatric:         Mood and Affect: Mood normal.         Behavior: Behavior normal.         Thought Content: Thought content normal.           Musculoskeletal:      Lumbar Exam  Incision: no  Pain with Flexion: no  Pain with Extension: yes  ROM:  Decreased  Paraspinous TTP:  Positive bilaterally  Facet TTP:  L5-S1  Facet Loading:  Positive bilaterally  SLR:  Positive on the left at 75° " in L5 distribution  SIJ TTP:  Negative bilaterally      LABS:  Lab Results   Component Value Date    WBC 7.04 08/23/2023    HGB 11.3 (L) 08/23/2023    HCT 35.6 (L) 08/23/2023    MCV 89 08/23/2023     08/23/2023       CMP  Sodium   Date Value Ref Range Status   06/21/2023 139 136 - 145 mmol/L Final     Potassium   Date Value Ref Range Status   06/21/2023 4.4 3.5 - 5.1 mmol/L Final     Chloride   Date Value Ref Range Status   06/21/2023 104 95 - 110 mmol/L Final     CO2   Date Value Ref Range Status   06/21/2023 26 23 - 29 mmol/L Final     Glucose   Date Value Ref Range Status   06/21/2023 91 70 - 110 mg/dL Final     BUN   Date Value Ref Range Status   06/21/2023 22 8 - 23 mg/dL Final     Creatinine   Date Value Ref Range Status   06/21/2023 1.4 0.5 - 1.4 mg/dL Final     Calcium   Date Value Ref Range Status   06/21/2023 9.5 8.7 - 10.5 mg/dL Final     Total Protein   Date Value Ref Range Status   05/30/2023 6.8 6.0 - 8.4 g/dL Final     Albumin   Date Value Ref Range Status   05/30/2023 3.8 3.5 - 5.2 g/dL Final     Total Bilirubin   Date Value Ref Range Status   05/30/2023 0.5 0.1 - 1.0 mg/dL Final     Comment:     For infants and newborns, interpretation of results should be based  on gestational age, weight and in agreement with clinical  observations.    Premature Infant recommended reference ranges:  Up to 24 hours.............<8.0 mg/dL  Up to 48 hours............<12.0 mg/dL  3-5 days..................<15.0 mg/dL  6-29 days.................<15.0 mg/dL       Alkaline Phosphatase   Date Value Ref Range Status   05/30/2023 31 (L) 55 - 135 U/L Final     AST   Date Value Ref Range Status   05/30/2023 20 10 - 40 U/L Final     ALT   Date Value Ref Range Status   05/30/2023 18 10 - 44 U/L Final     Anion Gap   Date Value Ref Range Status   06/21/2023 9 8 - 16 mmol/L Final     eGFR if    Date Value Ref Range Status   05/30/2022 >60.0 >60 mL/min/1.73 m^2 Final     eGFR if non    Date  Value Ref Range Status   05/30/2022 >60.0 >60 mL/min/1.73 m^2 Final     Comment:     Calculation used to obtain the estimated glomerular filtration  rate (eGFR) is the CKD-EPI equation.          Lab Results   Component Value Date    HGBA1C 5.3 05/30/2023             ASSESSMENT:       73 y.o. year old male with left lower extremity pain, consistent with     1. Lumbar radiculopathy  IR JOSE Lumbar w/ Img    Case Request-RAD/Other Procedure Area: Lumbar L5/S1 IL JOSE      2. DDD (degenerative disc disease), lumbar        3. Lumbar spondylosis            Lumbar radiculopathy  -     IR JOSE Lumbar w/ Img; Future; Expected date: 10/27/2023  -     Case Request-RAD/Other Procedure Area: Lumbar L5/S1 IL JOSE    DDD (degenerative disc disease), lumbar    Lumbar spondylosis             PLAN:   - Interventions:   Schedule patient for L5-S1 interlaminar epidural steroid injection for lumbar radiculopathy.  If patient has continued axial low back pain after JOSE, can consider L4-5 and L5-S1 medial branch blocks.    - Anticoagulation use:   No no anticoagulation    - Medications:   Continue Lyrica to 75 mg BID       - Therapy:    Patient has completed 6 weeks of formal physical therapy in the last 3 months with increase in pain.  Refer to physical therapy for gait training and aqua therapy at [facility]    - Imaging/Diagnostic:   X-rays of left hip left tibia and fibula are unremarkable.   Ultrasound of left lower extremity reveals no abnormality   X-ray of lumbar spine reveals grade 1 anterolisthesis of L5 upon S1 with loss of disc height at L5-S1 and L4-5.  Degenerative changes of the facets noted at L4-5 and L5-S1.   MRI of lumbar spine reviewed and findings discussed with patient.  Significant for grade 1 anterolisthesis of L4 upon L5.  There is noted disc extrusion at L4-5 resulting in severe canal stenosis and severe bilateral recess stenosis.  Dural canal measures 6 mm.  Associated right-greater-than-left foraminal  stenosis with extrusion.  There is noted loss of height at L5-S1 with left paracentral disc protrusion causing Moderate to severe left foraminal stenosis.   EMG and nerve conduction study of bilateral lower extremities reveals acute on chronic bilateral L5 and S1 radiculopathy    - Consults:   Can consider referral to Neurosurgery if lower back pain continues for evaluation of disc extrusion at L4-5        - Patient Questions: Answered all of the patient's questions regarding diagnosis, therapy, and treatment     This condition does not require this patient to take time off of work, and the primary goal of our Pain Management services is to improve the patient's functional capacity.     - Follow up visit:  Returns to clinic 4 weeks after procedure      The above plan and management options were discussed at length with patient. Patient is in agreement with the above and verbalized understanding.    I discussed the goals of interventional chronic pain management with the patient on today's visit.  I explained the utility of injections for diagnostic and therapeutic purposes.  We discussed a multimodal approach to pain including treating the patient's given worst pain at any given time.  We will use a systematic approach to addressing pain.  We will also adopt a multimodal approach that includes injections, adjuvant medications, physical therapy, at times psychiatry.  There may be a limited role for opioid use intermittently in the treatment of pain, more particularly for acute pain although no one approach can be used as a sole treatment modality.    I emphasized the importance of regular exercise, core strengthening and stretching, diet and weight loss as a cornerstone of long-term pain management.      Oneil Carlson MD  Interventional Pain Management  [facility]    Disclaimer:  This note was prepared using voice recognition system and is likely to have sound alike errors that may have been overlooked  even after proof reading.  Please call me with any questions

## 2023-11-07 NOTE — PRE-PROCEDURE INSTRUCTIONS
Spoke with patients wife regarding procedure scheduled on 11.13     Arrival time 0745     Has patient been sick with fever or on antibiotics within the last 7 days? No     Does the patient have any open wounds, sores or rashes? No     Does the patient have any recent fractures? no     Has patient received a vaccination within the last 7 days? No     Received the COVID vaccination? yes     Has the patient stopped all medications as directed? na     Does patient have a pacemaker, defibrillator, or implantable stimulator? No     Does the patient have a ride to and from procedure and someone reliable to remain with patient?  wife     Is the patient diabetic? yes     Does the patient have sleep apnea? Or use O2 at home? No and no     Is the patient receiving sedation? yes     Is the patient instructed to remain NPO beginning at midnight the night before their procedure? yes     Procedure location confirmed with patient? Yes     Covid- Denies signs/symptoms. Instructed to notify PAT/MD if any changes.

## 2023-11-13 ENCOUNTER — HOSPITAL ENCOUNTER (OUTPATIENT)
Facility: HOSPITAL | Age: 73
Discharge: HOME | End: 2023-11-13
Attending: ANESTHESIOLOGY | Admitting: ANESTHESIOLOGY
Payer: MEDICARE

## 2023-11-13 VITALS
OXYGEN SATURATION: 97 % | DIASTOLIC BLOOD PRESSURE: 58 MMHG | SYSTOLIC BLOOD PRESSURE: 121 MMHG | RESPIRATION RATE: 15 BRPM | HEIGHT: 65 IN | HEART RATE: 60 BPM | TEMPERATURE: 98 F | BODY MASS INDEX: 37.03 KG/M2 | WEIGHT: 222.25 LBS

## 2023-11-13 DIAGNOSIS — M54.16 LUMBAR RADICULOPATHY: Primary | ICD-10-CM

## 2023-11-13 LAB — POCT GLUCOSE: 102 MG/DL (ref 70–110)

## 2023-11-13 PROCEDURE — 25000003 PHARM REV CODE 250: Performed by: ANESTHESIOLOGY

## 2023-11-13 PROCEDURE — 62323 NJX INTERLAMINAR LMBR/SAC: CPT | Mod: ,,, | Performed by: ANESTHESIOLOGY

## 2023-11-13 PROCEDURE — 62323 NJX INTERLAMINAR LMBR/SAC: CPT | Performed by: ANESTHESIOLOGY

## 2023-11-13 PROCEDURE — 99152 MOD SED SAME PHYS/QHP 5/>YRS: CPT | Performed by: ANESTHESIOLOGY

## 2023-11-13 PROCEDURE — 63600175 PHARM REV CODE 636 W HCPCS: Performed by: ANESTHESIOLOGY

## 2023-11-13 PROCEDURE — 25500020 PHARM REV CODE 255: Performed by: ANESTHESIOLOGY

## 2023-11-13 PROCEDURE — 62323 PR INJ LUMBAR/SACRAL, W/IMAGING GUIDANCE: ICD-10-PCS | Mod: ,,, | Performed by: ANESTHESIOLOGY

## 2023-11-13 RX ORDER — MIDAZOLAM HYDROCHLORIDE 1 MG/ML
INJECTION, SOLUTION INTRAMUSCULAR; INTRAVENOUS
Status: DISCONTINUED | OUTPATIENT
Start: 2023-11-13 | End: 2023-11-13 | Stop reason: HOSPADM

## 2023-11-13 RX ORDER — LIDOCAINE HYDROCHLORIDE 10 MG/ML
INJECTION, SOLUTION EPIDURAL; INFILTRATION; INTRACAUDAL; PERINEURAL
Status: DISCONTINUED | OUTPATIENT
Start: 2023-11-13 | End: 2023-11-13 | Stop reason: HOSPADM

## 2023-11-13 RX ORDER — FENTANYL CITRATE 50 UG/ML
INJECTION, SOLUTION INTRAMUSCULAR; INTRAVENOUS
Status: DISCONTINUED | OUTPATIENT
Start: 2023-11-13 | End: 2023-11-13 | Stop reason: HOSPADM

## 2023-11-13 RX ORDER — ONDANSETRON 2 MG/ML
4 INJECTION INTRAMUSCULAR; INTRAVENOUS ONCE AS NEEDED
Status: DISCONTINUED | OUTPATIENT
Start: 2023-11-13 | End: 2023-11-13 | Stop reason: HOSPADM

## 2023-11-13 RX ORDER — BETAMETHASONE SODIUM PHOSPHATE AND BETAMETHASONE ACETATE 3; 3 MG/ML; MG/ML
INJECTION, SUSPENSION INTRA-ARTICULAR; INTRALESIONAL; INTRAMUSCULAR; SOFT TISSUE
Status: DISCONTINUED | OUTPATIENT
Start: 2023-11-13 | End: 2023-11-13 | Stop reason: HOSPADM

## 2023-11-13 NOTE — DISCHARGE INSTRUCTIONS

## 2023-11-13 NOTE — OP NOTE
Lumbar Interlaminar Epidural Steroid Injection under Fluoroscopic Guidance.     INFORMED CONSENT: The procedure, risks, benefits and options were discussed with patient. There are no contraindications to the procedure. The patient expressed understanding and agreed to proceed. The personnel performing the procedure was discussed.    Date of procedure 11/13/2023    Time-out taken to identify patient and procedure side prior to starting the procedure.                     PROCEDURE:   L4/5 Interlaminar Epidural Steroid Injection Under Fluoroscopic Guidance.     Pre-Op diagnosis: Lumbar radiculopathy [M54.16]    Post-Op diagnosis: Lumbar radiculopathy [M54.16]    PHYSICIAN: Oneil Carlson MD    ASSISTANTS: None     ESTIMATED BLOOD LOSS: none.     COMPLICATIONS: none.     SPECIMENS: none    Sedation: Conscious sedation provided by M.D    SEDATION MEDICATIONS: local/IV sedation: Versed 2 mg and fentanyl 50 mcg IV.  Conscious sedation ordered by MD.  Patient reevaluated and sedation administered by MD and monitored by RN.  Total sedation time was less than 15 min.      TECHNIQUE: With the patient laying in a prone position, the area was prepped and draped in the usual sterile fashion using ChloraPrep and a fenestrated drape. 1% lidocaine was given using a 27-gauge needle by raising a wheal and going down to the hub of the needle over the L5/S1 interspace. This interspace could not be accessed, so the L4/L5 was targeted as well. After achieving skin anesthesia,  the L4/5 interlaminar space was targeted with a 3.5 inch 18-gauge Touhy needle fluoroscopic guidance in the AP and Lateral view. Once the Ligamentum flavum was encountered loss of resistance to saline was used to enter the epidural space. With positive loss of resistance and negative CSF or Blood, 3mL contrast dye Omnipaque (300mg/ml) was injected to confirm placement and there was no vascular runoff. 2ml of Betamethasone PF 6mg/ml and 3ml of Lidocaine PF 1%.  Displacement of the radiopaque contrast after injection of the medication confirmed that the medication went into the area of the epidural space.  The patient tolerated the procedure well.       The patient was monitored for approximately 30 minutes after the procedure.  Patient was given post procedure and discharge instructions to follow at home.  The patient was discharged in a stable condition

## 2023-11-13 NOTE — DISCHARGE SUMMARY
Discharge Note  Short Stay      SUMMARY     Admit Date: 11/13/2023    Attending Physician: Oneil Carlson MD        Discharge Physician: Oneil Carlson MD        Discharge Date: 11/13/2023 9:02 AM    Procedure(s) (LRB):  Lumbar L5/S1 IL JOSE (N/A)    Final Diagnosis: Lumbar radiculopathy [M54.16]    Disposition: Home or self care    Patient Instructions:   Current Discharge Medication List        CONTINUE these medications which have NOT CHANGED    Details   amLODIPine (NORVASC) 5 MG tablet TAKE 1 TABLET BY MOUTH EVERY DAY  Qty: 90 tablet, Refills: 3    Comments: .      atorvastatin (LIPITOR) 20 MG tablet TAKE 1 TABLET BY MOUTH EVERY DAY  Qty: 90 tablet, Refills: 3      doxazosin (CARDURA) 4 MG tablet TAKE 1 TABLET BY MOUTH EVERY EVENING.  Qty: 90 tablet, Refills: 3      fenofibrate (TRICOR) 54 MG tablet Take 1 tablet (54 mg total) by mouth once daily.  Qty: 90 tablet, Refills: 3      hydroCHLOROthiazide (HYDRODIURIL) 25 MG tablet TAKE 1 TABLET BY MOUTH EVERY DAY IN THE MORNING  Qty: 90 tablet, Refills: 3      metFORMIN (GLUCOPHAGE) 500 MG tablet TAKE 1 TABLET BY MOUTH TWICE A DAY WITH MEALS  Qty: 180 tablet, Refills: 1      metoprolol succinate (TOPROL-XL) 100 MG 24 hr tablet Take 1 tablet (100 mg total) by mouth 2 (two) times daily.  Qty: 180 tablet, Refills: 3    Comments: .      olmesartan (BENICAR) 40 MG tablet Take 1 tablet (40 mg total) by mouth once daily.  Qty: 90 tablet, Refills: 2    Comments: .      pregabalin (LYRICA) 75 MG capsule Take 1 capsule (75 mg total) by mouth 2 (two) times daily.  Qty: 60 capsule, Refills: 3    Associated Diagnoses: Lumbar radiculopathy      ciprofloxacin HCl (CIPRO) 500 MG tablet Take 1 tablet (500 mg total) by mouth 2 (two) times daily.  Qty: 14 tablet, Refills: 0      dicyclomine (BENTYL) 20 mg tablet TAKE 1 TABLET BY MOUTH THREE TIMES A DAY FOR 7 DAYS      sildenafiL (VIAGRA) 100 MG tablet Take 1 tablet (100 mg total) by mouth daily as needed for Erectile  Dysfunction.  Qty: 10 tablet, Refills: 1      traMADoL (ULTRAM) 50 mg tablet Take 1 tablet (50 mg total) by mouth every 6 (six) hours as needed for Pain.  Qty: 12 tablet, Refills: 0    Comments: Quantity prescribed more than 7 day supply? No  Associated Diagnoses: Leg pain, diffuse, left                 Discharge Diagnosis: Lumbar radiculopathy [M54.16]  Condition on Discharge: Stable with no complications to procedure   Diet on Discharge: Same as before.  Activity: as per instruction sheet.  Discharge to: Home with a responsible adult.  Follow up: 2-4 weeks       Please call the office at [phone] if you experience any weakness or loss of sensation, fever > 101.5, pain uncontrolled with oral medications, persistent nausea/vomiting/or diarrhea, redness or drainage from the incisions, or any other worrisome concerns. If physician on call was not reached or could not communicate with our office for any reason please go to the nearest emergency department

## 2023-11-20 ENCOUNTER — PATIENT MESSAGE (OUTPATIENT)
Dept: FAMILY MEDICINE | Facility: CLINIC | Age: 73
End: 2023-11-20
Payer: MEDICARE

## 2023-11-20 ENCOUNTER — TELEPHONE (OUTPATIENT)
Dept: FAMILY MEDICINE | Facility: CLINIC | Age: 73
End: 2023-11-20
Payer: MEDICARE

## 2023-11-20 NOTE — TELEPHONE ENCOUNTER
----- Message from Bishop Gabriel MD sent at 11/18/2023  1:49 PM CST -----  Patient was due to come see me in November for a follow-up on his anemia.  Please schedule follow-up at patient's convenience

## 2023-11-30 ENCOUNTER — OFFICE VISIT (OUTPATIENT)
Dept: FAMILY MEDICINE | Facility: CLINIC | Age: 73
End: 2023-11-30
Payer: MEDICARE

## 2023-11-30 VITALS
HEIGHT: 65 IN | OXYGEN SATURATION: 95 % | WEIGHT: 226.88 LBS | BODY MASS INDEX: 37.8 KG/M2 | SYSTOLIC BLOOD PRESSURE: 130 MMHG | DIASTOLIC BLOOD PRESSURE: 62 MMHG | HEART RATE: 119 BPM | TEMPERATURE: 97 F

## 2023-11-30 DIAGNOSIS — E66.01 SEVERE OBESITY (BMI 35.0-39.9) WITH COMORBIDITY: ICD-10-CM

## 2023-11-30 DIAGNOSIS — N18.31 STAGE 3A CHRONIC KIDNEY DISEASE: ICD-10-CM

## 2023-11-30 DIAGNOSIS — M54.16 LUMBAR RADICULOPATHY: ICD-10-CM

## 2023-11-30 DIAGNOSIS — R73.03 PREDIABETES: ICD-10-CM

## 2023-11-30 DIAGNOSIS — I10 ESSENTIAL HYPERTENSION: ICD-10-CM

## 2023-11-30 DIAGNOSIS — D64.9 ANEMIA, UNSPECIFIED TYPE: Primary | ICD-10-CM

## 2023-11-30 PROCEDURE — 3044F PR MOST RECENT HEMOGLOBIN A1C LEVEL <7.0%: ICD-10-PCS | Mod: CPTII,S$GLB,, | Performed by: FAMILY MEDICINE

## 2023-11-30 PROCEDURE — 4010F PR ACE/ARB THEARPY RXD/TAKEN: ICD-10-PCS | Mod: CPTII,S$GLB,, | Performed by: FAMILY MEDICINE

## 2023-11-30 PROCEDURE — 1101F PR PT FALLS ASSESS DOC 0-1 FALLS W/OUT INJ PAST YR: ICD-10-PCS | Mod: CPTII,S$GLB,, | Performed by: FAMILY MEDICINE

## 2023-11-30 PROCEDURE — 3075F PR MOST RECENT SYSTOLIC BLOOD PRESS GE 130-139MM HG: ICD-10-PCS | Mod: CPTII,S$GLB,, | Performed by: FAMILY MEDICINE

## 2023-11-30 PROCEDURE — 1159F MED LIST DOCD IN RCRD: CPT | Mod: CPTII,S$GLB,, | Performed by: FAMILY MEDICINE

## 2023-11-30 PROCEDURE — 3044F HG A1C LEVEL LT 7.0%: CPT | Mod: CPTII,S$GLB,, | Performed by: FAMILY MEDICINE

## 2023-11-30 PROCEDURE — 3078F DIAST BP <80 MM HG: CPT | Mod: CPTII,S$GLB,, | Performed by: FAMILY MEDICINE

## 2023-11-30 PROCEDURE — 3008F BODY MASS INDEX DOCD: CPT | Mod: CPTII,S$GLB,, | Performed by: FAMILY MEDICINE

## 2023-11-30 PROCEDURE — 4010F ACE/ARB THERAPY RXD/TAKEN: CPT | Mod: CPTII,S$GLB,, | Performed by: FAMILY MEDICINE

## 2023-11-30 PROCEDURE — 3288F PR FALLS RISK ASSESSMENT DOCUMENTED: ICD-10-PCS | Mod: CPTII,S$GLB,, | Performed by: FAMILY MEDICINE

## 2023-11-30 PROCEDURE — 99999 PR PBB SHADOW E&M-EST. PATIENT-LVL IV: ICD-10-PCS | Mod: PBBFAC,,, | Performed by: FAMILY MEDICINE

## 2023-11-30 PROCEDURE — 99214 OFFICE O/P EST MOD 30 MIN: CPT | Mod: S$GLB,,, | Performed by: FAMILY MEDICINE

## 2023-11-30 PROCEDURE — 1159F PR MEDICATION LIST DOCUMENTED IN MEDICAL RECORD: ICD-10-PCS | Mod: CPTII,S$GLB,, | Performed by: FAMILY MEDICINE

## 2023-11-30 PROCEDURE — 1125F PR PAIN SEVERITY QUANTIFIED, PAIN PRESENT: ICD-10-PCS | Mod: CPTII,S$GLB,, | Performed by: FAMILY MEDICINE

## 2023-11-30 PROCEDURE — 3008F PR BODY MASS INDEX (BMI) DOCUMENTED: ICD-10-PCS | Mod: CPTII,S$GLB,, | Performed by: FAMILY MEDICINE

## 2023-11-30 PROCEDURE — 3075F SYST BP GE 130 - 139MM HG: CPT | Mod: CPTII,S$GLB,, | Performed by: FAMILY MEDICINE

## 2023-11-30 PROCEDURE — 99214 PR OFFICE/OUTPT VISIT, EST, LEVL IV, 30-39 MIN: ICD-10-PCS | Mod: S$GLB,,, | Performed by: FAMILY MEDICINE

## 2023-11-30 PROCEDURE — 3078F PR MOST RECENT DIASTOLIC BLOOD PRESSURE < 80 MM HG: ICD-10-PCS | Mod: CPTII,S$GLB,, | Performed by: FAMILY MEDICINE

## 2023-11-30 PROCEDURE — 1101F PT FALLS ASSESS-DOCD LE1/YR: CPT | Mod: CPTII,S$GLB,, | Performed by: FAMILY MEDICINE

## 2023-11-30 PROCEDURE — 3288F FALL RISK ASSESSMENT DOCD: CPT | Mod: CPTII,S$GLB,, | Performed by: FAMILY MEDICINE

## 2023-11-30 PROCEDURE — 1125F AMNT PAIN NOTED PAIN PRSNT: CPT | Mod: CPTII,S$GLB,, | Performed by: FAMILY MEDICINE

## 2023-11-30 PROCEDURE — 99999 PR PBB SHADOW E&M-EST. PATIENT-LVL IV: CPT | Mod: PBBFAC,,, | Performed by: FAMILY MEDICINE

## 2023-11-30 NOTE — PROGRESS NOTES
Subjective:       Patient ID: Quintin Ling is a 73 y.o. male.    Chief Complaint: No chief complaint on file.      HPI Comments:       Current Outpatient Medications:     amLODIPine (NORVASC) 5 MG tablet, TAKE 1 TABLET BY MOUTH EVERY DAY, Disp: 90 tablet, Rfl: 3    atorvastatin (LIPITOR) 20 MG tablet, TAKE 1 TABLET BY MOUTH EVERY DAY, Disp: 90 tablet, Rfl: 3    ciprofloxacin HCl (CIPRO) 500 MG tablet, Take 1 tablet (500 mg total) by mouth 2 (two) times daily., Disp: 14 tablet, Rfl: 0    dicyclomine (BENTYL) 20 mg tablet, TAKE 1 TABLET BY MOUTH THREE TIMES A DAY FOR 7 DAYS, Disp: , Rfl:     doxazosin (CARDURA) 4 MG tablet, TAKE 1 TABLET BY MOUTH EVERY EVENING., Disp: 90 tablet, Rfl: 3    fenofibrate (TRICOR) 54 MG tablet, Take 1 tablet (54 mg total) by mouth once daily., Disp: 90 tablet, Rfl: 3    hydroCHLOROthiazide (HYDRODIURIL) 25 MG tablet, TAKE 1 TABLET BY MOUTH EVERY DAY IN THE MORNING, Disp: 90 tablet, Rfl: 1    metFORMIN (GLUCOPHAGE) 500 MG tablet, TAKE 1 TABLET BY MOUTH TWICE A DAY WITH MEALS, Disp: 180 tablet, Rfl: 1    metoprolol succinate (TOPROL-XL) 100 MG 24 hr tablet, Take 1 tablet (100 mg total) by mouth 2 (two) times daily., Disp: 180 tablet, Rfl: 3    olmesartan (BENICAR) 40 MG tablet, Take 1 tablet (40 mg total) by mouth once daily., Disp: 90 tablet, Rfl: 2    pregabalin (LYRICA) 75 MG capsule, Take 1 capsule (75 mg total) by mouth 2 (two) times daily., Disp: 60 capsule, Rfl: 3    traMADoL (ULTRAM) 50 mg tablet, Take 1 tablet (50 mg total) by mouth every 6 (six) hours as needed for Pain., Disp: 12 tablet, Rfl: 0    sildenafiL (VIAGRA) 100 MG tablet, Take 1 tablet (100 mg total) by mouth daily as needed for Erectile Dysfunction. (Patient not taking: Reported on 8/4/2023), Disp: 10 tablet, Rfl: 1      Three-month follow-up.  He has been having intermittent anemia.  Workup found low iron.  Had colonoscopy done earlier.  Needs EGD.  Will refer to GI..  Not having any GI or epigastric symptoms.   "    Feels tired.  Hard time getting much physical activity, largely because of low back pain.  Gained 12 lb since our last visit 3 months ago.      Has some edema in his legs but has a normal echo recently.    Shot in his lower back was somewhat helpful.  Still taking Lyrica      Review of Systems   Constitutional:  Positive for fatigue and unexpected weight change. Negative for activity change, appetite change and fever.   HENT:  Negative for sore throat.    Respiratory:  Negative for cough and shortness of breath.    Cardiovascular:  Positive for leg swelling. Negative for chest pain.   Gastrointestinal:  Negative for abdominal pain, diarrhea and nausea.   Genitourinary:  Negative for difficulty urinating.   Musculoskeletal:  Negative for arthralgias and myalgias.   Neurological:  Negative for dizziness and headaches.       Objective:      Vitals:    11/30/23 0759   BP: 130/62   Pulse: (!) 119   Temp: 97.4 °F (36.3 °C)   TempSrc: Tympanic   SpO2: 95%   Weight: 102.9 kg (226 lb 13.7 oz)   Height: 5' 5" (1.651 m)   PainSc:   4   PainLoc: Back     Physical Exam  Vitals and nursing note reviewed.   Constitutional:       General: He is not in acute distress.     Appearance: He is well-developed. He is not diaphoretic.   HENT:      Head: Normocephalic.   Neck:      Thyroid: No thyromegaly.   Cardiovascular:      Rate and Rhythm: Normal rate and regular rhythm.      Heart sounds: Normal heart sounds. No murmur heard.  Pulmonary:      Effort: Pulmonary effort is normal.      Breath sounds: Normal breath sounds. No wheezing or rales.   Abdominal:      General: There is no distension.      Palpations: Abdomen is soft.   Musculoskeletal:      Cervical back: Neck supple.      Right lower leg: Edema present.      Left lower leg: Edema present.      Comments: 1+ pitting edema bilaterally   Lymphadenopathy:      Cervical: No cervical adenopathy.   Skin:     General: Skin is warm and dry.   Neurological:      Mental Status: He is " alert and oriented to person, place, and time.   Psychiatric:         Mood and Affect: Mood normal.         Behavior: Behavior normal.         Thought Content: Thought content normal.         Judgment: Judgment normal.         Assessment:       1. Anemia, unspecified type    2. Essential hypertension    3. Lumbar radiculopathy    4. Prediabetes    5. Severe obesity (BMI 35.0-39.9) with comorbidity    6. Stage 3a chronic kidney disease        Plan:   Anemia, unspecified type  Comments:  With low iron.  GI consult  Orders:  -     Ambulatory referral/consult to Gastroenterology; Future; Expected date: 12/07/2023    Essential hypertension  Comments:  Controlled    Lumbar radiculopathy  Comments:  Followed by pain management    Prediabetes  Comments:  A1c 5.3.  Follow-up 3 months    Severe obesity (BMI 35.0-39.9) with comorbidity  Comments:  12 lb weight gain since last visit    Stage 3a chronic kidney disease  Comments:  Stable

## 2023-12-07 ENCOUNTER — OFFICE VISIT (OUTPATIENT)
Dept: GASTROENTEROLOGY | Facility: CLINIC | Age: 73
End: 2023-12-07
Payer: MEDICARE

## 2023-12-07 VITALS
WEIGHT: 229.06 LBS | BODY MASS INDEX: 38.16 KG/M2 | DIASTOLIC BLOOD PRESSURE: 60 MMHG | HEIGHT: 65 IN | SYSTOLIC BLOOD PRESSURE: 144 MMHG | HEART RATE: 59 BPM

## 2023-12-07 DIAGNOSIS — D50.9 IRON DEFICIENCY ANEMIA, UNSPECIFIED IRON DEFICIENCY ANEMIA TYPE: Primary | ICD-10-CM

## 2023-12-07 DIAGNOSIS — K74.00 LIVER FIBROSIS: ICD-10-CM

## 2023-12-07 PROCEDURE — 3288F PR FALLS RISK ASSESSMENT DOCUMENTED: ICD-10-PCS | Mod: CPTII,S$GLB,, | Performed by: NURSE PRACTITIONER

## 2023-12-07 PROCEDURE — 1101F PR PT FALLS ASSESS DOC 0-1 FALLS W/OUT INJ PAST YR: ICD-10-PCS | Mod: CPTII,S$GLB,, | Performed by: NURSE PRACTITIONER

## 2023-12-07 PROCEDURE — 4010F PR ACE/ARB THEARPY RXD/TAKEN: ICD-10-PCS | Mod: CPTII,S$GLB,, | Performed by: NURSE PRACTITIONER

## 2023-12-07 PROCEDURE — 3044F PR MOST RECENT HEMOGLOBIN A1C LEVEL <7.0%: ICD-10-PCS | Mod: CPTII,S$GLB,, | Performed by: NURSE PRACTITIONER

## 2023-12-07 PROCEDURE — 99204 PR OFFICE/OUTPT VISIT, NEW, LEVL IV, 45-59 MIN: ICD-10-PCS | Mod: S$GLB,,, | Performed by: NURSE PRACTITIONER

## 2023-12-07 PROCEDURE — 1101F PT FALLS ASSESS-DOCD LE1/YR: CPT | Mod: CPTII,S$GLB,, | Performed by: NURSE PRACTITIONER

## 2023-12-07 PROCEDURE — 3078F DIAST BP <80 MM HG: CPT | Mod: CPTII,S$GLB,, | Performed by: NURSE PRACTITIONER

## 2023-12-07 PROCEDURE — 1160F PR REVIEW ALL MEDS BY PRESCRIBER/CLIN PHARMACIST DOCUMENTED: ICD-10-PCS | Mod: CPTII,S$GLB,, | Performed by: NURSE PRACTITIONER

## 2023-12-07 PROCEDURE — 1159F PR MEDICATION LIST DOCUMENTED IN MEDICAL RECORD: ICD-10-PCS | Mod: CPTII,S$GLB,, | Performed by: NURSE PRACTITIONER

## 2023-12-07 PROCEDURE — 1159F MED LIST DOCD IN RCRD: CPT | Mod: CPTII,S$GLB,, | Performed by: NURSE PRACTITIONER

## 2023-12-07 PROCEDURE — 4010F ACE/ARB THERAPY RXD/TAKEN: CPT | Mod: CPTII,S$GLB,, | Performed by: NURSE PRACTITIONER

## 2023-12-07 PROCEDURE — 1126F PR PAIN SEVERITY QUANTIFIED, NO PAIN PRESENT: ICD-10-PCS | Mod: CPTII,S$GLB,, | Performed by: NURSE PRACTITIONER

## 2023-12-07 PROCEDURE — 3077F SYST BP >= 140 MM HG: CPT | Mod: CPTII,S$GLB,, | Performed by: NURSE PRACTITIONER

## 2023-12-07 PROCEDURE — 1126F AMNT PAIN NOTED NONE PRSNT: CPT | Mod: CPTII,S$GLB,, | Performed by: NURSE PRACTITIONER

## 2023-12-07 PROCEDURE — 3078F PR MOST RECENT DIASTOLIC BLOOD PRESSURE < 80 MM HG: ICD-10-PCS | Mod: CPTII,S$GLB,, | Performed by: NURSE PRACTITIONER

## 2023-12-07 PROCEDURE — 1160F RVW MEDS BY RX/DR IN RCRD: CPT | Mod: CPTII,S$GLB,, | Performed by: NURSE PRACTITIONER

## 2023-12-07 PROCEDURE — 99204 OFFICE O/P NEW MOD 45 MIN: CPT | Mod: S$GLB,,, | Performed by: NURSE PRACTITIONER

## 2023-12-07 PROCEDURE — 99999 PR PBB SHADOW E&M-EST. PATIENT-LVL V: CPT | Mod: PBBFAC,,, | Performed by: NURSE PRACTITIONER

## 2023-12-07 PROCEDURE — 3044F HG A1C LEVEL LT 7.0%: CPT | Mod: CPTII,S$GLB,, | Performed by: NURSE PRACTITIONER

## 2023-12-07 PROCEDURE — 3288F FALL RISK ASSESSMENT DOCD: CPT | Mod: CPTII,S$GLB,, | Performed by: NURSE PRACTITIONER

## 2023-12-07 PROCEDURE — 3008F PR BODY MASS INDEX (BMI) DOCUMENTED: ICD-10-PCS | Mod: CPTII,S$GLB,, | Performed by: NURSE PRACTITIONER

## 2023-12-07 PROCEDURE — 99999 PR PBB SHADOW E&M-EST. PATIENT-LVL V: ICD-10-PCS | Mod: PBBFAC,,, | Performed by: NURSE PRACTITIONER

## 2023-12-07 PROCEDURE — 3077F PR MOST RECENT SYSTOLIC BLOOD PRESSURE >= 140 MM HG: ICD-10-PCS | Mod: CPTII,S$GLB,, | Performed by: NURSE PRACTITIONER

## 2023-12-07 PROCEDURE — 3008F BODY MASS INDEX DOCD: CPT | Mod: CPTII,S$GLB,, | Performed by: NURSE PRACTITIONER

## 2023-12-07 RX ORDER — SODIUM, POTASSIUM,MAG SULFATES 17.5-3.13G
SOLUTION, RECONSTITUTED, ORAL ORAL
Qty: 354 ML | Refills: 0 | Status: SHIPPED | OUTPATIENT
Start: 2023-12-07 | End: 2023-12-11

## 2023-12-07 NOTE — H&P (VIEW-ONLY)
Clinic Consult:  [facility] Consultation Note    Reason for Consult:  The primary encounter diagnosis was Iron deficiency anemia, unspecified iron deficiency anemia type. A diagnosis of Liver fibrosis was also pertinent to this visit.    PCP: Bishop Gabriel   [address] [facility] / [place] [address]    HPI:  This is a 73 y.o. male here for evaluation of anemia.     # anemia  Type of anemia: Iron deficiency   Last Hgb: 11.3 (8/23/23)  Iron studies: iron sat 15 (8/23/23)  Blood thinners:no  Any overt GI bleeding: no  EGD/Colonoscopy done: Had EGD done in 2019 with perforated giant duodenal ulcer. Did not require surgical intervention.   No prior colonoscopy negative cologuard in 8/2022    # possible cirrhosis  - nodular contour of liver seen on CT scan in 2019.   - has not had any follow up from this.   - he admits to having heavy alcohol use in the past for many years and stopped with ulcer in 2019. He is now having occasional alcohol use. Previously was drinking 12+ beers per day.     Review of Systems   Constitutional:  Negative for fever and weight loss.   HENT:  Negative for sore throat.    Respiratory:  Negative for cough, shortness of breath and wheezing.    Cardiovascular:  Negative for chest pain and palpitations.   Gastrointestinal:  Negative for abdominal pain, blood in stool, constipation, diarrhea, heartburn, melena, nausea and vomiting.   Genitourinary:  Negative for dysuria and frequency.   Skin:  Negative for itching and rash.   Neurological:  Negative for dizziness, speech change, seizures, loss of consciousness and headaches.       Medical History:  has a past medical history of Hyperlipidemia and Hypertension.    Surgical History:  has a past surgical history that includes No past surgeries and Epidural steroid injection (N/A, 11/13/2023).    Family History: family history includes No Known Problems in his father and mother..     Social History:  reports that he has quit smoking.  "He has never used smokeless tobacco. He reports that he does not drink alcohol and does not use drugs.    Allergies: Reviewed    Home Medications:   Current Outpatient Medications on File Prior to Visit   Medication Sig Dispense Refill    amLODIPine (NORVASC) 5 MG tablet TAKE 1 TABLET BY MOUTH EVERY DAY 90 tablet 3    atorvastatin (LIPITOR) 20 MG tablet TAKE 1 TABLET BY MOUTH EVERY DAY 90 tablet 3    doxazosin (CARDURA) 4 MG tablet TAKE 1 TABLET BY MOUTH EVERY EVENING. 90 tablet 3    fenofibrate (TRICOR) 54 MG tablet Take 1 tablet (54 mg total) by mouth once daily. 90 tablet 3    hydroCHLOROthiazide (HYDRODIURIL) 25 MG tablet TAKE 1 TABLET BY MOUTH EVERY DAY IN THE MORNING 90 tablet 1    metFORMIN (GLUCOPHAGE) 500 MG tablet TAKE 1 TABLET BY MOUTH TWICE A DAY WITH MEALS 180 tablet 1    metoprolol succinate (TOPROL-XL) 100 MG 24 hr tablet Take 1 tablet (100 mg total) by mouth 2 (two) times daily. 180 tablet 3    olmesartan (BENICAR) 40 MG tablet Take 1 tablet (40 mg total) by mouth once daily. 90 tablet 2    pregabalin (LYRICA) 75 MG capsule Take 1 capsule (75 mg total) by mouth 2 (two) times daily. 60 capsule 3    traMADoL (ULTRAM) 50 mg tablet Take 1 tablet (50 mg total) by mouth every 6 (six) hours as needed for Pain. 12 tablet 0    ciprofloxacin HCl (CIPRO) 500 MG tablet Take 1 tablet (500 mg total) by mouth 2 (two) times daily. (Patient not taking: Reported on 12/7/2023) 14 tablet 0    dicyclomine (BENTYL) 20 mg tablet TAKE 1 TABLET BY MOUTH THREE TIMES A DAY FOR 7 DAYS      sildenafiL (VIAGRA) 100 MG tablet Take 1 tablet (100 mg total) by mouth daily as needed for Erectile Dysfunction. (Patient not taking: Reported on 8/4/2023) 10 tablet 1     No current facility-administered medications on file prior to visit.       Physical Exam:  BP (!) 144/60 (BP Location: Left arm, Patient Position: Sitting, BP Method: Large (Manual))   Pulse (!) 59   Ht 5' 5" (1.651 m)   Wt 103.9 kg (229 lb 0.9 oz)   BMI 38.12 kg/m²   " Body mass index is 38.12 kg/m².  Physical Exam  Constitutional:       General: He is not in acute distress.  Cardiovascular:      Rate and Rhythm: Normal rate and regular rhythm.      Heart sounds: Normal heart sounds. No murmur heard.  Pulmonary:      Effort: Pulmonary effort is normal. No respiratory distress.      Breath sounds: Normal breath sounds.   Neurological:      Mental Status: He is alert.   Psychiatric:         Mood and Affect: Mood normal.         Behavior: Behavior normal.         Labs: Pertinent labs reviewed.  CRC Screening: up to date    Assessment:  1. Iron deficiency anemia, unspecified iron deficiency anemia type - needs workup. No overt bleeding    2. Liver fibrosis -- imaging with cirrhosis in 2019. No follow up since.     Recommendations:   - EGD and colonoscopy  - A referral has been placed for endoscopy procedure scheduling and pre-admission testing (PAT) appointment has been scheduled.    - needs liver workup to evaluate for cirrhosis. Ultrasound and fibroscan.     Iron deficiency anemia, unspecified iron deficiency anemia type  -     Ambulatory referral/consult to Gastroenterology  -     Case Request Endoscopy: EGD (ESOPHAGOGASTRODUODENOSCOPY), COLONOSCOPY  -     Ambulatory referral/consult to Endo Procedure ; Future; Expected date: 12/08/2023  -     sodium,potassium,mag sulfates (SUPREP BOWEL PREP KIT) 17.5-3.13-1.6 gram SolR; Use as directed  Dispense: 354 mL; Refill: 0    Liver fibrosis  -     US Abdomen Limited; Future; Expected date: 12/07/2023  -     FibroScan (Vibration Controlled Transient Elastography); Future    Follow up to be determined after results/ procedure(s).    Thank you so much for allowing me to participate in the care of MARCIANO Serna

## 2023-12-07 NOTE — PROGRESS NOTES
Clinic Consult:  [facility] Consultation Note    Reason for Consult:  The primary encounter diagnosis was Iron deficiency anemia, unspecified iron deficiency anemia type. A diagnosis of Liver fibrosis was also pertinent to this visit.    PCP: Bishop Gabriel   [address] [facility] / [facility] [id number]    HPI:  This is a 73 y.o. male here for evaluation of anemia.     # anemia  Type of anemia: Iron deficiency   Last Hgb: 11.3 (8/23/23)  Iron studies: iron sat 15 (8/23/23)  Blood thinners:no  Any overt GI bleeding: no  EGD/Colonoscopy done: Had EGD done in 2019 with perforated giant duodenal ulcer. Did not require surgical intervention.   No prior colonoscopy negative cologuard in 8/2022    # possible cirrhosis  - nodular contour of liver seen on CT scan in 2019.   - has not had any follow up from this.   - he admits to having heavy alcohol use in the past for many years and stopped with ulcer in 2019. He is now having occasional alcohol use. Previously was drinking 12+ beers per day.     Review of Systems   Constitutional:  Negative for fever and weight loss.   HENT:  Negative for sore throat.    Respiratory:  Negative for cough, shortness of breath and wheezing.    Cardiovascular:  Negative for chest pain and palpitations.   Gastrointestinal:  Negative for abdominal pain, blood in stool, constipation, diarrhea, heartburn, melena, nausea and vomiting.   Genitourinary:  Negative for dysuria and frequency.   Skin:  Negative for itching and rash.   Neurological:  Negative for dizziness, speech change, seizures, loss of consciousness and headaches.       Medical History:  has a past medical history of Hyperlipidemia and Hypertension.    Surgical History:  has a past surgical history that includes No past surgeries and Epidural steroid injection (N/A, 11/13/2023).    Family History: family history includes No Known Problems in his father and mother..     Social History:  reports that he has quit smoking.  "He has never used smokeless tobacco. He reports that he does not drink alcohol and does not use drugs.    Allergies: Reviewed    Home Medications:   Current Outpatient Medications on File Prior to Visit   Medication Sig Dispense Refill    amLODIPine (NORVASC) 5 MG tablet TAKE 1 TABLET BY MOUTH EVERY DAY 90 tablet 3    atorvastatin (LIPITOR) 20 MG tablet TAKE 1 TABLET BY MOUTH EVERY DAY 90 tablet 3    doxazosin (CARDURA) 4 MG tablet TAKE 1 TABLET BY MOUTH EVERY EVENING. 90 tablet 3    fenofibrate (TRICOR) 54 MG tablet Take 1 tablet (54 mg total) by mouth once daily. 90 tablet 3    hydroCHLOROthiazide (HYDRODIURIL) 25 MG tablet TAKE 1 TABLET BY MOUTH EVERY DAY IN THE MORNING 90 tablet 1    metFORMIN (GLUCOPHAGE) 500 MG tablet TAKE 1 TABLET BY MOUTH TWICE A DAY WITH MEALS 180 tablet 1    metoprolol succinate (TOPROL-XL) 100 MG 24 hr tablet Take 1 tablet (100 mg total) by mouth 2 (two) times daily. 180 tablet 3    olmesartan (BENICAR) 40 MG tablet Take 1 tablet (40 mg total) by mouth once daily. 90 tablet 2    pregabalin (LYRICA) 75 MG capsule Take 1 capsule (75 mg total) by mouth 2 (two) times daily. 60 capsule 3    traMADoL (ULTRAM) 50 mg tablet Take 1 tablet (50 mg total) by mouth every 6 (six) hours as needed for Pain. 12 tablet 0    ciprofloxacin HCl (CIPRO) 500 MG tablet Take 1 tablet (500 mg total) by mouth 2 (two) times daily. (Patient not taking: Reported on 12/7/2023) 14 tablet 0    dicyclomine (BENTYL) 20 mg tablet TAKE 1 TABLET BY MOUTH THREE TIMES A DAY FOR 7 DAYS      sildenafiL (VIAGRA) 100 MG tablet Take 1 tablet (100 mg total) by mouth daily as needed for Erectile Dysfunction. (Patient not taking: Reported on 8/4/2023) 10 tablet 1     No current facility-administered medications on file prior to visit.       Physical Exam:  BP (!) 144/60 (BP Location: Left arm, Patient Position: Sitting, BP Method: Large (Manual))   Pulse (!) 59   Ht 5' 5" (1.651 m)   Wt 103.9 kg (229 lb 0.9 oz)   BMI 38.12 kg/m²   " Body mass index is 38.12 kg/m².  Physical Exam  Constitutional:       General: He is not in acute distress.  Cardiovascular:      Rate and Rhythm: Normal rate and regular rhythm.      Heart sounds: Normal heart sounds. No murmur heard.  Pulmonary:      Effort: Pulmonary effort is normal. No respiratory distress.      Breath sounds: Normal breath sounds.   Neurological:      Mental Status: He is alert.   Psychiatric:         Mood and Affect: Mood normal.         Behavior: Behavior normal.         Labs: Pertinent labs reviewed.  CRC Screening: up to date    Assessment:  1. Iron deficiency anemia, unspecified iron deficiency anemia type - needs workup. No overt bleeding    2. Liver fibrosis -- imaging with cirrhosis in 2019. No follow up since.     Recommendations:   - EGD and colonoscopy  - A referral has been placed for endoscopy procedure scheduling and pre-admission testing (PAT) appointment has been scheduled.    - needs liver workup to evaluate for cirrhosis. Ultrasound and fibroscan.     Iron deficiency anemia, unspecified iron deficiency anemia type  -     Ambulatory referral/consult to Gastroenterology  -     Case Request Endoscopy: EGD (ESOPHAGOGASTRODUODENOSCOPY), COLONOSCOPY  -     Ambulatory referral/consult to Endo Procedure ; Future; Expected date: 12/08/2023  -     sodium,potassium,mag sulfates (SUPREP BOWEL PREP KIT) 17.5-3.13-1.6 gram SolR; Use as directed  Dispense: 354 mL; Refill: 0    Liver fibrosis  -     US Abdomen Limited; Future; Expected date: 12/07/2023  -     FibroScan (Vibration Controlled Transient Elastography); Future    Follow up to be determined after results/ procedure(s).    Thank you so much for allowing me to participate in the care of MARCIANO Serna

## 2023-12-08 ENCOUNTER — HOSPITAL ENCOUNTER (OUTPATIENT)
Dept: PREADMISSION TESTING | Facility: HOSPITAL | Age: 73
Discharge: HOME | End: 2023-12-08
Attending: FAMILY MEDICINE
Payer: MEDICARE

## 2023-12-08 DIAGNOSIS — D50.9 IRON DEFICIENCY ANEMIA, UNSPECIFIED IRON DEFICIENCY ANEMIA TYPE: ICD-10-CM

## 2023-12-11 RX ORDER — SODIUM, POTASSIUM,MAG SULFATES 17.5-3.13G
1 SOLUTION, RECONSTITUTED, ORAL ORAL DAILY
Qty: 1 KIT | Refills: 0 | Status: SHIPPED | OUTPATIENT
Start: 2023-12-11 | End: 2023-12-13

## 2023-12-28 ENCOUNTER — OFFICE VISIT (OUTPATIENT)
Dept: PAIN MEDICINE | Facility: CLINIC | Age: 73
End: 2023-12-28
Payer: MEDICARE

## 2023-12-28 VITALS
DIASTOLIC BLOOD PRESSURE: 70 MMHG | HEIGHT: 65 IN | HEART RATE: 73 BPM | WEIGHT: 227.06 LBS | BODY MASS INDEX: 37.83 KG/M2 | RESPIRATION RATE: 17 BRPM | SYSTOLIC BLOOD PRESSURE: 135 MMHG

## 2023-12-28 DIAGNOSIS — M51.36 DDD (DEGENERATIVE DISC DISEASE), LUMBAR: Primary | ICD-10-CM

## 2023-12-28 DIAGNOSIS — M54.16 LUMBAR RADICULOPATHY: ICD-10-CM

## 2023-12-28 DIAGNOSIS — M47.816 LUMBAR SPONDYLOSIS: ICD-10-CM

## 2023-12-28 PROCEDURE — 3288F PR FALLS RISK ASSESSMENT DOCUMENTED: ICD-10-PCS | Mod: CPTII,S$GLB,, | Performed by: PHYSICIAN ASSISTANT

## 2023-12-28 PROCEDURE — 99999 PR PBB SHADOW E&M-EST. PATIENT-LVL IV: CPT | Mod: PBBFAC,,, | Performed by: PHYSICIAN ASSISTANT

## 2023-12-28 PROCEDURE — 1101F PR PT FALLS ASSESS DOC 0-1 FALLS W/OUT INJ PAST YR: ICD-10-PCS | Mod: CPTII,S$GLB,, | Performed by: PHYSICIAN ASSISTANT

## 2023-12-28 PROCEDURE — 1101F PT FALLS ASSESS-DOCD LE1/YR: CPT | Mod: CPTII,S$GLB,, | Performed by: PHYSICIAN ASSISTANT

## 2023-12-28 PROCEDURE — 3078F PR MOST RECENT DIASTOLIC BLOOD PRESSURE < 80 MM HG: ICD-10-PCS | Mod: CPTII,S$GLB,, | Performed by: PHYSICIAN ASSISTANT

## 2023-12-28 PROCEDURE — 3078F DIAST BP <80 MM HG: CPT | Mod: CPTII,S$GLB,, | Performed by: PHYSICIAN ASSISTANT

## 2023-12-28 PROCEDURE — 3008F BODY MASS INDEX DOCD: CPT | Mod: CPTII,S$GLB,, | Performed by: PHYSICIAN ASSISTANT

## 2023-12-28 PROCEDURE — 99213 OFFICE O/P EST LOW 20 MIN: CPT | Mod: S$GLB,,, | Performed by: PHYSICIAN ASSISTANT

## 2023-12-28 PROCEDURE — 4010F PR ACE/ARB THEARPY RXD/TAKEN: ICD-10-PCS | Mod: CPTII,S$GLB,, | Performed by: PHYSICIAN ASSISTANT

## 2023-12-28 PROCEDURE — 99999 PR PBB SHADOW E&M-EST. PATIENT-LVL IV: ICD-10-PCS | Mod: PBBFAC,,, | Performed by: PHYSICIAN ASSISTANT

## 2023-12-28 PROCEDURE — 3288F FALL RISK ASSESSMENT DOCD: CPT | Mod: CPTII,S$GLB,, | Performed by: PHYSICIAN ASSISTANT

## 2023-12-28 PROCEDURE — 3044F PR MOST RECENT HEMOGLOBIN A1C LEVEL <7.0%: ICD-10-PCS | Mod: CPTII,S$GLB,, | Performed by: PHYSICIAN ASSISTANT

## 2023-12-28 PROCEDURE — 3044F HG A1C LEVEL LT 7.0%: CPT | Mod: CPTII,S$GLB,, | Performed by: PHYSICIAN ASSISTANT

## 2023-12-28 PROCEDURE — 4010F ACE/ARB THERAPY RXD/TAKEN: CPT | Mod: CPTII,S$GLB,, | Performed by: PHYSICIAN ASSISTANT

## 2023-12-28 PROCEDURE — 99213 PR OFFICE/OUTPT VISIT, EST, LEVL III, 20-29 MIN: ICD-10-PCS | Mod: S$GLB,,, | Performed by: PHYSICIAN ASSISTANT

## 2023-12-28 PROCEDURE — 1159F MED LIST DOCD IN RCRD: CPT | Mod: CPTII,S$GLB,, | Performed by: PHYSICIAN ASSISTANT

## 2023-12-28 PROCEDURE — 3075F PR MOST RECENT SYSTOLIC BLOOD PRESS GE 130-139MM HG: ICD-10-PCS | Mod: CPTII,S$GLB,, | Performed by: PHYSICIAN ASSISTANT

## 2023-12-28 PROCEDURE — 1159F PR MEDICATION LIST DOCUMENTED IN MEDICAL RECORD: ICD-10-PCS | Mod: CPTII,S$GLB,, | Performed by: PHYSICIAN ASSISTANT

## 2023-12-28 PROCEDURE — 3075F SYST BP GE 130 - 139MM HG: CPT | Mod: CPTII,S$GLB,, | Performed by: PHYSICIAN ASSISTANT

## 2023-12-28 PROCEDURE — 3008F PR BODY MASS INDEX (BMI) DOCUMENTED: ICD-10-PCS | Mod: CPTII,S$GLB,, | Performed by: PHYSICIAN ASSISTANT

## 2023-12-28 RX ORDER — TIZANIDINE 4 MG/1
2-4 TABLET ORAL 2 TIMES DAILY PRN
Qty: 60 TABLET | Refills: 0 | Status: SHIPPED | OUTPATIENT
Start: 2023-12-28 | End: 2024-01-08

## 2023-12-28 NOTE — PROGRESS NOTES
"Interventional Pain Progress Note     Referring Physician: No ref. provider found    PCP: Bishop Gabriel MD    Chief Complaint:     Chief Complaint   Patient presents with    Back Pain          SUBJECTIVE:    Interval History (12/28/2023):  Quintin Ling presents today for follow-up visit.  he underwent Lumbar L5/S1 IL JOSE on 11/13/23.  The patient reports that he is/was better following the procedure.  he reports 75 % pain relief.  The changes have continued through this visit.  Patient reports pain as "0/10 today. Patient currently c/o pain only on the L side whenever he walks. States it is more of a pulling sensation on the L side of his lower back. He denies leg pain. Also denies pain while sitting.     Interval History (12/28/2023):  Patient returns to clinic for evaluation of lower back pain.  Patient reports continued lower back pain that starts in a band across his lower back.  Patient reports that as we will occasionally radiates to his bilateral posterior thighs.  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  Pain is currently rated a 5/10, but can increase to a 8/10 with exacerbating activity. Denies any fevers, chills, changes in gait, saddle anesthesia, or bowel and bladder incontinence      Interval History (8/29/2023):  Quintin Ling presents today for follow-up visit for MRI review.  Patient was last seen on 8/4/2023. Taking Lyrica 50 mg BID with no side effects, minimal improvement. Patient reports pain as 8/10 today. Continues to report low back pain with radiation into his left lower extremity. Pain is worsened with prolonged standing and walking. Reports pain and weakness is affecting his gait, he often walks with a limp.  Initial HPI 08/04/2023  Quintin Ling is a 73 y.o. male who presents to the clinic for the evaluation of left lower extremity pain.   Patient reports three-month history of left lower extremity pain.  He denies " any inciting events or traumas.  Patient reports 1 year ago he experienced bilateral calf pain that is resolved with B12 supplementation.  He also reports as a child he was placed in traction for 2 weeks on his left lower extremity and to wear a brace for 3 years secondary to hip dysplasia.  Patient denies any recent infections.  Patient denies any previous surgical intervention on his lumbar spine.  Pain is described as a pulling burning pain diffusely over his left calf.  He denies any significant radiation to his foot or up into his left lower back but does report a long history of lower back pain.  Patient denies any significant change in sensation to left lower extremity.  Pain is worse with prolonged sitting or walking, better with lying supine.  Patient does report swelling of his left lower extremity with prolonged walking.  Patient denies any significant history of rheumatoid disease in his family.  Patient reports emergency department on 06/28/2023 for exacerbation of left lower extremity pain.  Denies any fevers, chills, saddle anesthesia, or bowel and bladder incontinence        Non-Pharmacologic Treatments:  Physical Therapy/Home Exercise: yes  Ice/Heat:yes  TENS: no  Acupuncture: no  Massage: yes  Chiropractic: no        Previous Pain Medications:  NSAIDs, Tylenol, gabapentin, tramadol     report:  Reviewed and consistent with medication use as prescribed.    Pain Procedures:   -11/13/2023: Lumbar L5/S1 IL JOSE with 75% relief    Pain Disability Index Review:         12/28/2023     3:23 PM 10/27/2023     8:42 AM 8/29/2023     2:19 PM   Last 3 PDI Scores   Pain Disability Index (PDI) 6 35 0       Imaging (Reviewed on 12/28/2023):    MRI Lumbar spine 08/22/2023  FINDINGS:  Grade 1 degenerative spondylolisthesis at L4-L5.  Vertebral body height is normal.  Marrow signal is within normal limits. The conus medullaris terminates at the level of T12-L1.  No abnormal signal within the conus. Intervertebral  disc levels are as follows:     T12-L1 disc: Disc space height loss with anterior bridging osteophytes and fatty Modic change.  Normal facet joints.  No spinal or foraminal stenosis.     L1-L2 disc : Disc space height loss with a broad-based posterior disc bulge and posterior annular fissure.  Normal facet joints.  The dural canal measures 7 mm.  No foraminal stenosis.     L2-L3 disc: Normal disc space height with anterior osteophytes.  Fatty and edematous Modic change.  Normal facet joints.  Dural canal measures 12 mm.  No foraminal stenosis.     L3-L4 disc: Disc space height is relatively normal with anterolateral right-sided osteophytes.  Normal facet joints.  No spinal or foraminal stenosis.     L4-L5 disc: Grade 1 spondylolisthesis with markedly severe degenerative facet hypertrophy bilaterally.  Unroofing of disc material with a disc extrusion that extends cranially.  The disc extrusion measures 23 mm craniocaudal by 20 mm transverse by 9 mm AP.  There is severe spinal stenosis and severe subarticular recess stenosis.  The dural canal measures 6 mm but is roughly 30% of normal area with redundancy of nerve roots adjacent to this interspace.  There is also a synovial cyst projecting inferiorly from the right facet joint that may compress the descending right L5 spinal nerve root sleeve best demonstrated on axial T2 series 6, image 25.  This synovial cyst measures 11 mm.  Moderate foraminal stenosis bilaterally.     L5-S1 disc: Severe disc space height loss most pronounced toward the left.  Fatty Modic endplate change.  Disc and osteophyte encroach into the floor of the left exit foramen.  There is also a left paracentral disc protrusion causing mild left subarticular recess stenosis without eli nerve compression.  The dural canal measures 10 mm.  Moderate to severe left foraminal stenosis.     Impression:     1. Severe spinal stenosis at L4-L5 as described above which may account for neurogenic claudication  and radiculopathy of the descending nerve roots.  2. Moderate foraminal stenosis bilaterally at L4-L5 and moderate/severe left foraminal stenosis at L5-S1.  This could also precipitate radiculopathy.    EMG/NCS 08/24/2023  IMPRESSION  ABNORMAL study  2.  There is electrodiagnostic evidence of an acute on chronic radiculopathy of the left L5 nerve root, a subacute on chronic radiculopathy of the right L5 nerve root, and a chronic radiculopathy of the S1 and probable L4 nerve roots      Results for orders placed during the hospital encounter of 06/21/23    X-Ray Lumbar Spine AP And Lateral    Narrative  EXAMINATION:  XR LUMBAR SPINE AP AND LATERAL    CLINICAL HISTORY:  Pain in left leg    TECHNIQUE:  AP, lateral and spot images were performed of the lumbar spine.    COMPARISON:  02/01/2022    FINDINGS:  Five lumbar type vertebral bodies.  The lordotic curvature is normal.  Grade 1 anterolisthesis of L5 on S1. no evidence for compression deformity, fracture, or subluxation.  The vertebral heights are maintained without significant intervertebral disc space narrowing.  Progressive degenerative changes of facets are identified.    The bilateral SI joints are normal.    Impression  Spondylotic changes lumbar spine without evidence for fracture.      Results for orders placed during the hospital encounter of 06/21/23    X-Ray Hip 2 or 3 views Left (with Pelvis when performed)    Narrative  EXAMINATION:  XR HIP WITH PELVIS WHEN PERFORMED, 2 OR 3 VIEWS LEFT    CLINICAL HISTORY:  Pain in left leg    TECHNIQUE:  AP view of the pelvis and frog leg lateral view of the left hip were performed.    COMPARISON:  02/01/2022    FINDINGS:  No fracture the pelvis or proximal femora.  The femoral heads are well seated in the acetabulum without significant joint space narrowing.  Osteopenia is identified.    Degenerative changes of bilateral SI joints.  The pubic symphysis is normal.    Impression  No fracture the pelvis or proximal  femora.  Degenerative changes are noted.      XR TIBIA FIBULA 2 VIEW LEFT 6/21/23     CLINICAL HISTORY:  Pain in left leg     TECHNIQUE:  AP and lateral views of the left tibia and fibula were performed.     COMPARISON:  None.     FINDINGS:  No fracture.  The alignment is normal.  Joint space narrowing is identified of the knee as well as of the ankle.  No significant soft tissue swelling.  Calcifications are identified of the vasculature.     Impression:     No fracture of the tibia or fibula.      US LOWER EXTREMITY VEINS BILATERAL 06/21/23     CLINICAL HISTORY:  Pain in left leg     TECHNIQUE:  Duplex and color flow Doppler and dynamic compression was performed of the bilateral lower extremity veins was performed.     COMPARISON:  None     FINDINGS:  Right thigh veins: The common femoral, femoral, popliteal, upper greater saphenous, and deep femoral veins are patent and free of thrombus. The veins are normally compressible and have normal phasic flow and augmentation response.     Right calf veins: The visualized calf veins are patent.     Left thigh veins: The common femoral, femoral, popliteal, upper greater saphenous, and deep femoral veins are patent and free of thrombus. The veins are normally compressible and have normal phasic flow and augmentation response.     Left calf veins: The visualized calf veins are patent.     Miscellaneous: None     Impression:     No evidence of deep venous thrombosis in either lower extremity.    Past Medical History:   Diagnosis Date    Hyperlipidemia     Hypertension      Past Surgical History:   Procedure Laterality Date    EPIDURAL STEROID INJECTION N/A 11/13/2023    Procedure: Lumbar L5/S1 IL JOSE;  Surgeon: Oneil Carlson MD;  Location: [facility] [place];  Service: Pain Management;  Laterality: N/A;    NO PAST SURGERIES       Social History     Socioeconomic History    Marital status:    Tobacco Use    Smoking status: Former    Smokeless tobacco: Never   Substance  and Sexual Activity    Alcohol use: Never    Drug use: Never    Sexual activity: Yes     Partners: Female     Family History   Problem Relation Age of Onset    No Known Problems Mother     No Known Problems Father        Review of patient's allergies indicates:  No Known Allergies    Current Outpatient Medications   Medication Sig    amLODIPine (NORVASC) 5 MG tablet TAKE 1 TABLET BY MOUTH EVERY DAY    atorvastatin (LIPITOR) 20 MG tablet TAKE 1 TABLET BY MOUTH EVERY DAY    ciprofloxacin HCl (CIPRO) 500 MG tablet Take 1 tablet (500 mg total) by mouth 2 (two) times daily. (Patient not taking: Reported on 12/7/2023)    dicyclomine (BENTYL) 20 mg tablet TAKE 1 TABLET BY MOUTH THREE TIMES A DAY FOR 7 DAYS    doxazosin (CARDURA) 4 MG tablet TAKE 1 TABLET BY MOUTH EVERY EVENING.    fenofibrate (TRICOR) 54 MG tablet Take 1 tablet (54 mg total) by mouth once daily.    hydroCHLOROthiazide (HYDRODIURIL) 25 MG tablet TAKE 1 TABLET BY MOUTH EVERY DAY IN THE MORNING    metFORMIN (GLUCOPHAGE) 500 MG tablet TAKE 1 TABLET BY MOUTH TWICE A DAY WITH MEALS    metoprolol succinate (TOPROL-XL) 100 MG 24 hr tablet Take 1 tablet (100 mg total) by mouth 2 (two) times daily.    olmesartan (BENICAR) 40 MG tablet Take 1 tablet (40 mg total) by mouth once daily.    pregabalin (LYRICA) 75 MG capsule Take 1 capsule (75 mg total) by mouth 2 (two) times daily.    sildenafiL (VIAGRA) 100 MG tablet Take 1 tablet (100 mg total) by mouth daily as needed for Erectile Dysfunction. (Patient not taking: Reported on 8/4/2023)    traMADoL (ULTRAM) 50 mg tablet Take 1 tablet (50 mg total) by mouth every 6 (six) hours as needed for Pain.     No current facility-administered medications for this visit.         ROS  Review of Systems   Constitutional:  Negative for activity change, appetite change and fever.   Respiratory:  Negative for cough, chest tightness, shortness of breath, wheezing and stridor.    Cardiovascular:  Negative for chest pain and palpitations.    Gastrointestinal:  Negative for abdominal pain, blood in stool, constipation, diarrhea, nausea and vomiting.   Endocrine: Negative for polydipsia, polyphagia and polyuria.   Genitourinary:  Negative for dysuria and hematuria.   Musculoskeletal:  Positive for arthralgias, back pain, gait problem and myalgias. Negative for joint swelling, neck pain and neck stiffness.   Skin:  Negative for rash.   Allergic/Immunologic: Negative for food allergies.   Neurological:  Negative for dizziness, tremors, seizures, syncope, facial asymmetry, speech difficulty, light-headedness and headaches.   Psychiatric/Behavioral:  Negative for agitation, hallucinations, self-injury and suicidal ideas. The patient is not nervous/anxious and is not hyperactive.             OBJECTIVE:  There were no vitals taken for this visit.        Physical Exam  Constitutional:       General: He is not in acute distress.     Appearance: Normal appearance. He is not ill-appearing.   HENT:      Head: Normocephalic and atraumatic.      Nose: No congestion or rhinorrhea.   Eyes:      Extraocular Movements: Extraocular movements intact.      Pupils: Pupils are equal, round, and reactive to light.   Cardiovascular:      Pulses: Normal pulses.   Pulmonary:      Effort: Pulmonary effort is normal.   Abdominal:      General: Abdomen is flat.      Palpations: Abdomen is soft.   Skin:     General: Skin is warm and dry.      Capillary Refill: Capillary refill takes less than 2 seconds.   Neurological:      General: No focal deficit present.      Mental Status: He is alert and oriented to person, place, and time.      Sensory: Sensory deficit present.      Motor: Weakness present. No abnormal muscle tone.      Gait: Gait abnormal.      Deep Tendon Reflexes:      Reflex Scores:       Patellar reflexes are 2+ on the right side and 2+ on the left side.       Achilles reflexes are 2+ on the right side and 1+ on the left side.     Comments: Decreased light touch  sensation over lateral aspect left calf  4/5 strength in left dorsiflexion   Psychiatric:         Mood and Affect: Mood normal.         Behavior: Behavior normal.         Thought Content: Thought content normal.           Musculoskeletal:      Lumbar Exam  Incision: no  Pain with Flexion: yes  Pain with Extension: yes  ROM:  Decreased  Paraspinous TTP:  Positive L side only  Facet Loading:  Positive left side  SLR:  Negative bilaterally  SIJ TTP:  Negative bilaterally      LABS:  Lab Results   Component Value Date    WBC 7.04 08/23/2023    HGB 11.3 (L) 08/23/2023    HCT 35.6 (L) 08/23/2023    MCV 89 08/23/2023     08/23/2023       ASSESSMENT:       73 y.o. year old male with left lower extremity pain, consistent with     1. DDD (degenerative disc disease), lumbar  tiZANidine (ZANAFLEX) 4 MG tablet      2. Lumbar radiculopathy  tiZANidine (ZANAFLEX) 4 MG tablet      3. Lumbar spondylosis  tiZANidine (ZANAFLEX) 4 MG tablet            PLAN:   - Interventions: None at this time. Consider L4-5 and L5-S1 medial branch blocks if the patient has continued axial low back pain.    - S/p  L5-S1 interlaminar epidural steroid injection with 75% relief    - Anticoagulation use:   No no anticoagulation    - Medications:   - Start Zanaflex 4mg BID PRN muscle spasms (or can take 1/2 tablet). Patient understands this may cause drowsiness.   - Continue Cathy 75 mg BID. Patient will inform me if he needs refills.     - Therapy:    Patient has completed  formal physical therapy in the past with no pain relief.      - Imaging/Diagnostic:   X-ray of lumbar spine reveals grade 1 anterolisthesis of L5 upon S1 with loss of disc height at L5-S1 and L4-5.  Degenerative changes of the facets noted at L4-5 and L5-S1.   MRI of lumbar spine reviewed and findings discussed with patient.  Significant for grade 1 anterolisthesis of L4 upon L5.  There is noted disc extrusion at L4-5 resulting in severe canal stenosis and severe bilateral recess  stenosis.  Dural canal measures 6 mm.  Associated right-greater-than-left foraminal stenosis with extrusion.  There is noted loss of height at L5-S1 with left paracentral disc protrusion causing Moderate to severe left foraminal stenosis.   EMG and nerve conduction study of bilateral lower extremities reveals acute on chronic bilateral L5 and S1 radiculopathy    - Consults:   Can consider referral to Neurosurgery if lower back pain continues for evaluation of disc extrusion at L4-5    - Miscellaneous:  Will provide temporary handicap tag for patient today.      - Patient Questions: Answered all of the patient's questions regarding diagnosis, therapy, and treatment     This condition does not require this patient to take time off of work, and the primary goal of our Pain Management services is to improve the patient's functional capacity.     - Follow up visit:  Return to clinic 3 months for reassessment or sooner if needed. Patient aware to reach out if symptoms worsen and wanting to proceed with MBB or Nsgy referral.      The above plan and management options were discussed at length with patient. Patient is in agreement with the above and verbalized understanding.    I discussed the goals of interventional chronic pain management with the patient on today's visit.  I explained the utility of injections for diagnostic and therapeutic purposes.  We discussed a multimodal approach to pain including treating the patient's given worst pain at any given time.  We will use a systematic approach to addressing pain.  We will also adopt a multimodal approach that includes injections, adjuvant medications, physical therapy, at times psychiatry.  There may be a limited role for opioid use intermittently in the treatment of pain, more particularly for acute pain although no one approach can be used as a sole treatment modality.    I emphasized the importance of regular exercise, core strengthening and stretching, diet and  weight loss as a cornerstone of long-term pain management.      Lydia Gay PA-C  Interventional Pain Management  [facility]    Disclaimer:  This note was prepared using voice recognition system and is likely to have sound alike errors that may have been overlooked even after proof reading.  Please call me with any questions

## 2024-01-02 ENCOUNTER — HOSPITAL ENCOUNTER (OUTPATIENT)
Dept: RADIOLOGY | Facility: HOSPITAL | Age: 74
Discharge: HOME | End: 2024-01-02
Attending: NURSE PRACTITIONER
Payer: MEDICARE

## 2024-01-02 ENCOUNTER — PROCEDURE VISIT (OUTPATIENT)
Dept: HEPATOLOGY | Facility: CLINIC | Age: 74
End: 2024-01-02
Payer: MEDICARE

## 2024-01-02 VITALS — HEIGHT: 65 IN | BODY MASS INDEX: 37.83 KG/M2 | WEIGHT: 227.06 LBS

## 2024-01-02 DIAGNOSIS — K74.00 LIVER FIBROSIS: ICD-10-CM

## 2024-01-02 PROCEDURE — 76705 ECHO EXAM OF ABDOMEN: CPT | Mod: 26,,, | Performed by: RADIOLOGY

## 2024-01-02 PROCEDURE — 76705 ECHO EXAM OF ABDOMEN: CPT | Mod: TC

## 2024-01-02 PROCEDURE — 76981 USE PARENCHYMA: CPT | Mod: S$GLB,,, | Performed by: NURSE PRACTITIONER

## 2024-01-02 NOTE — PROCEDURES
FibroScan (Vibration Controlled Transient Elastography)    Date/Time: 1/2/2024 9:30 AM    Performed by: Cristina Parra RN  Authorized by: Christine Fitzgerald NP    Probe:     Universal Protocol: Patient's identity, procedure and site were verified, confirmatory pause was performed.  Discussed procedure including risks and potential complications.  Questions answered.  Patient verbalizes understanding and wishes to proceed with VCTE.     Procedure: After providing explanations of the procedure, patient was placed in the supine position with right arm in maximum abduction to allow optimal exposure of right lateral abdomen.  Patient was briefly assessed, Testing was performed in the mid-axillary location, 50Hz Shear Wave pulses were applied and the resulting Shear Wave and Propagation Speed detected with a 3.5 MHz ultrasonic signal, using the FibroScan probe, Skin to liver capsule distance and liver parenchyma were accessed during the entire examination with the FibroScan probe, Patient was instructed to breathe normally and to abstain from sudden movements during the procedure, allowing for random measurements of liver stiffness. At least 10 Shear Waves were produced, Individual measurements of each Shear Wave were calculated.  Patient tolerated the procedure well with no complications.  Meets discharge criteria as was dismissed.  Rates pain 0 out of 10.  Patient will follow up with ordering provider to review results.

## 2024-01-03 NOTE — PROCEDURES
Fibroscan Procedure     Name: Quintin Godfreyiot  Date of Procedure : 2024   :: LOUISE Trejo  Diagnosis: other    Probe: M    Fibroscan readin.3 KPa    Fibrosis:F 0-1     CAP readin dB/m    Steatosis: :S3       Interpretation:   Severe steatosis without fibrosis

## 2024-01-04 PROBLEM — D64.9 NORMOCYTIC ANEMIA: Status: ACTIVE | Noted: 2024-01-04

## 2024-01-05 ENCOUNTER — ANESTHESIA (OUTPATIENT)
Dept: ENDOSCOPY | Facility: HOSPITAL | Age: 74
End: 2024-01-05
Payer: MEDICARE

## 2024-01-05 ENCOUNTER — ANESTHESIA EVENT (OUTPATIENT)
Dept: ENDOSCOPY | Facility: HOSPITAL | Age: 74
End: 2024-01-05
Payer: MEDICARE

## 2024-01-05 ENCOUNTER — HOSPITAL ENCOUNTER (OUTPATIENT)
Facility: HOSPITAL | Age: 74
Discharge: HOME | End: 2024-01-05
Attending: INTERNAL MEDICINE | Admitting: INTERNAL MEDICINE
Payer: MEDICARE

## 2024-01-05 VITALS
DIASTOLIC BLOOD PRESSURE: 59 MMHG | HEART RATE: 79 BPM | TEMPERATURE: 98 F | RESPIRATION RATE: 17 BRPM | BODY MASS INDEX: 38.11 KG/M2 | SYSTOLIC BLOOD PRESSURE: 117 MMHG | WEIGHT: 229 LBS | OXYGEN SATURATION: 97 %

## 2024-01-05 DIAGNOSIS — D64.9 NORMOCYTIC ANEMIA: ICD-10-CM

## 2024-01-05 DIAGNOSIS — K63.89 COLONIC MASS: Primary | ICD-10-CM

## 2024-01-05 LAB — POCT GLUCOSE: 101 MG/DL (ref 70–110)

## 2024-01-05 PROCEDURE — 88305 TISSUE EXAM BY PATHOLOGIST: CPT | Mod: 59 | Performed by: STUDENT IN AN ORGANIZED HEALTH CARE EDUCATION/TRAINING PROGRAM

## 2024-01-05 PROCEDURE — 43270 EGD LESION ABLATION: CPT | Mod: ,,, | Performed by: INTERNAL MEDICINE

## 2024-01-05 PROCEDURE — 45380 COLONOSCOPY AND BIOPSY: CPT | Mod: 59,,, | Performed by: INTERNAL MEDICINE

## 2024-01-05 PROCEDURE — 27201012 HC FORCEPS, HOT/COLD, DISP: Performed by: INTERNAL MEDICINE

## 2024-01-05 PROCEDURE — 37000009 HC ANESTHESIA EA ADD 15 MINS: Performed by: INTERNAL MEDICINE

## 2024-01-05 PROCEDURE — 45385 COLONOSCOPY W/LESION REMOVAL: CPT | Performed by: INTERNAL MEDICINE

## 2024-01-05 PROCEDURE — 45385 COLONOSCOPY W/LESION REMOVAL: CPT | Mod: ,,, | Performed by: INTERNAL MEDICINE

## 2024-01-05 PROCEDURE — 43239 EGD BIOPSY SINGLE/MULTIPLE: CPT | Mod: 59 | Performed by: INTERNAL MEDICINE

## 2024-01-05 PROCEDURE — 88305 TISSUE EXAM BY PATHOLOGIST: CPT | Mod: 26,,, | Performed by: STUDENT IN AN ORGANIZED HEALTH CARE EDUCATION/TRAINING PROGRAM

## 2024-01-05 PROCEDURE — 88342 IMHCHEM/IMCYTCHM 1ST ANTB: CPT | Mod: 26,,, | Performed by: STUDENT IN AN ORGANIZED HEALTH CARE EDUCATION/TRAINING PROGRAM

## 2024-01-05 PROCEDURE — 43239 EGD BIOPSY SINGLE/MULTIPLE: CPT | Mod: 59,,, | Performed by: INTERNAL MEDICINE

## 2024-01-05 PROCEDURE — 27201089 HC SNARE, DISP (ANY): Performed by: INTERNAL MEDICINE

## 2024-01-05 PROCEDURE — 45381 COLONOSCOPY SUBMUCOUS NJX: CPT | Performed by: INTERNAL MEDICINE

## 2024-01-05 PROCEDURE — 63600175 PHARM REV CODE 636 W HCPCS: Performed by: STUDENT IN AN ORGANIZED HEALTH CARE EDUCATION/TRAINING PROGRAM

## 2024-01-05 PROCEDURE — 27202087 HC PROBE, APC: Performed by: INTERNAL MEDICINE

## 2024-01-05 PROCEDURE — 27202363 HC INJECTION AGENT, SUBMUCOSAL, ANY: Performed by: INTERNAL MEDICINE

## 2024-01-05 PROCEDURE — 25000003 PHARM REV CODE 250: Performed by: STUDENT IN AN ORGANIZED HEALTH CARE EDUCATION/TRAINING PROGRAM

## 2024-01-05 PROCEDURE — 43270 EGD LESION ABLATION: CPT | Performed by: INTERNAL MEDICINE

## 2024-01-05 PROCEDURE — 37000008 HC ANESTHESIA 1ST 15 MINUTES: Performed by: INTERNAL MEDICINE

## 2024-01-05 PROCEDURE — 45380 COLONOSCOPY AND BIOPSY: CPT | Mod: 59 | Performed by: INTERNAL MEDICINE

## 2024-01-05 PROCEDURE — 45381 COLONOSCOPY SUBMUCOUS NJX: CPT | Mod: ,,, | Performed by: INTERNAL MEDICINE

## 2024-01-05 PROCEDURE — 88342 IMHCHEM/IMCYTCHM 1ST ANTB: CPT | Performed by: STUDENT IN AN ORGANIZED HEALTH CARE EDUCATION/TRAINING PROGRAM

## 2024-01-05 PROCEDURE — 27201028 HC NEEDLE, SCLERO: Performed by: INTERNAL MEDICINE

## 2024-01-05 RX ORDER — PHENYLEPHRINE HYDROCHLORIDE 10 MG/ML
INJECTION INTRAVENOUS
Status: DISCONTINUED | OUTPATIENT
Start: 2024-01-05 | End: 2024-01-05

## 2024-01-05 RX ORDER — SODIUM CHLORIDE, SODIUM LACTATE, POTASSIUM CHLORIDE, CALCIUM CHLORIDE 600; 310; 30; 20 MG/100ML; MG/100ML; MG/100ML; MG/100ML
INJECTION, SOLUTION INTRAVENOUS CONTINUOUS PRN
Status: DISCONTINUED | OUTPATIENT
Start: 2024-01-05 | End: 2024-01-05

## 2024-01-05 RX ORDER — PROPOFOL 10 MG/ML
VIAL (ML) INTRAVENOUS
Status: DISCONTINUED | OUTPATIENT
Start: 2024-01-05 | End: 2024-01-05

## 2024-01-05 RX ORDER — LIDOCAINE HYDROCHLORIDE 10 MG/ML
INJECTION, SOLUTION EPIDURAL; INFILTRATION; INTRACAUDAL; PERINEURAL
Status: DISCONTINUED | OUTPATIENT
Start: 2024-01-05 | End: 2024-01-05

## 2024-01-05 RX ADMIN — PHENYLEPHRINE HYDROCHLORIDE 50 MCG: 10 INJECTION INTRAVENOUS at 01:01

## 2024-01-05 RX ADMIN — PROPOFOL 50 MG: 10 INJECTION, EMULSION INTRAVENOUS at 01:01

## 2024-01-05 RX ADMIN — PROPOFOL 100 MG: 10 INJECTION, EMULSION INTRAVENOUS at 02:01

## 2024-01-05 RX ADMIN — PROPOFOL 50 MG: 10 INJECTION, EMULSION INTRAVENOUS at 02:01

## 2024-01-05 RX ADMIN — PROPOFOL 100 MG: 10 INJECTION, EMULSION INTRAVENOUS at 01:01

## 2024-01-05 RX ADMIN — PHENYLEPHRINE HYDROCHLORIDE 100 MCG: 10 INJECTION INTRAVENOUS at 01:01

## 2024-01-05 RX ADMIN — SODIUM CHLORIDE, SODIUM LACTATE, POTASSIUM CHLORIDE, AND CALCIUM CHLORIDE: 600; 310; 30; 20 INJECTION, SOLUTION INTRAVENOUS at 01:01

## 2024-01-05 RX ADMIN — LIDOCAINE HYDROCHLORIDE 100 MG: 10 SOLUTION INTRAVENOUS at 01:01

## 2024-01-05 NOTE — PROVATION PATIENT INSTRUCTIONS
Discharge Summary/Instructions after an Endoscopic Procedure  Patient Name: Quintin Ling  Patient MRN: [id number]  Patient YOB: 1950 Friday, January 5, 2024 Irasema Gil MD  Dear patient,  As a result of recent federal legislation (The Federal Cures Act), you may   receive lab or pathology results from your procedure in your [facility]   account before your physician is able to contact you. Your physician or   their representative will relay the results to you with their   recommendations at their soonest availability.  Thank you,  RESTRICTIONS:  During your procedure today, you received medications for sedation.  These   medications may affect your judgment, balance and coordination.  Therefore,   for 24 hours, you have the following restrictions:   - DO NOT drive a car, operate machinery, make legal/financial decisions,   sign important papers or drink alcohol.    ACTIVITY:  Today: no heavy lifting, straining or running due to procedural   sedation/anesthesia.  The following day: return to full activity including work.  DIET:  Eat and drink normally unless instructed otherwise.     TREATMENT FOR COMMON SIDE EFFECTS:  - Mild abdominal pain, nausea, belching, bloating or excessive gas:  rest,   eat lightly and use a heating pad.  - Sore Throat: treat with throat lozenges and/or gargle with warm salt   water.  - Because air was used during the procedure, expelling large amounts of air   from your rectum or belching is normal.  - If a bowel prep was taken, you may not have a bowel movement for 1-3 days.    This is normal.  SYMPTOMS TO WATCH FOR AND REPORT TO YOUR PHYSICIAN:  1. Abdominal pain or bloating, other than gas cramps.  2. Chest pain.  3. Back pain.  4. Signs of infection such as: chills or fever occurring within 24 hours   after the procedure.  5. Rectal bleeding, which would show as bright red, maroon, or black stools.   (A tablespoon of blood from the rectum is not serious, especially if    hemorrhoids are present.)  6. Vomiting.  7. Weakness or dizziness.  GO DIRECTLY TO THE NEAREST EMERGENCY ROOM IF YOU HAVE ANY OF THE FOLLOWING:      Difficulty breathing              Chills and/or fever over 101 F   Persistent vomiting and/or vomiting blood   Severe abdominal pain   Severe chest pain   Black, tarry stools   Bleeding- more than one tablespoon   Any other symptom or condition that you feel may need urgent attention  Your doctor recommends these additional instructions:  If any biopsies were taken, your doctors clinic will contact you in 1 to 2   weeks with any results.  - Discharge patient to home (with escort).   - Resume previous diet.   - Continue present medications.   - No aspirin, ibuprofen, naproxen, or other non-steroidal anti-inflammatory   drugs after polyp removal.   - Await pathology results.   - Refer to a colo-rectal surgeon.   - Repeat colonoscopy in 1 year for surveillance.   - Try to avoid heavy lifting. No more than 20 lbs for 3 weeks.  For questions, problems or results please call your physician Irasema Gil MD at Work:  [phone]  If you have any questions about the above instructions, call the GI   department at [phone] or call the endoscopy unit at [phone]   from 7am until 3 pm.  [facility], EMERGENCY ROOM PHONE NUMBER:   [phone]  IF A COMPLICATION OR EMERGENCY SITUATION ARISES AND YOU ARE UNABLE TO REACH   YOUR PHYSICIAN - GO DIRECTLY TO THE EMERGENCY ROOM.  I have read or have had read to me these discharge instructions for my   procedure and have received a written copy.  I understand these   instructions and will follow-up with my physician if I have any questions.     __________________________________       _____________________________________  Nurse Signature                                          Patient/Designated   Responsible Party Signature  MD Irasema Blanco MD  1/5/2024 2:20:21 PM  This  report has been verified and signed electronically.  Dear patient,  As a result of recent federal legislation (The Federal Cures Act), you may   receive lab or pathology results from your procedure in your [facility]   account before your physician is able to contact you. Your physician or   their representative will relay the results to you with their   recommendations at their soonest availability.  Thank you,  PROVATION

## 2024-01-05 NOTE — ANESTHESIA POSTPROCEDURE EVALUATION
Anesthesia Post Evaluation    Patient: Quintin Ling    Procedure(s) Performed: Procedure(s) (LRB):  EGD (ESOPHAGOGASTRODUODENOSCOPY) (N/A)  COLONOSCOPY (N/A)    Final Anesthesia Type: MAC      Patient location during evaluation: GI PACU  Patient participation: Yes- Able to Participate  Level of consciousness: awake and alert and oriented  Post-procedure vital signs: reviewed and stable  Pain management: adequate  Airway patency: patent  PATRICIA mitigation strategies: Multimodal analgesia and Extubation and recovery carried out in lateral, semiupright, or other nonsupine position  PONV status at discharge: No PONV  Anesthetic complications: no      Cardiovascular status: blood pressure returned to baseline  Respiratory status: unassisted  Hydration status: euvolemic  Follow-up not needed.  Comments: Report given to GI PACU RN. Hand Off Tool Used. RN given opportunity to ask questions or clarify concerns. No Concerns verbalized. RN was asked if ready to assume care of patient. RN verbally confirmed. Pt. Left in stable condition. SV. Vital Signs Return to Near Baseline. No s/s of distress noted.           Vitals Value Taken Time   /60 01/05/24 1308   Temp 37.3 °C (99.1 °F) 01/05/24 1308   Pulse 80 01/05/24 1308   Resp 16 01/05/24 1308   SpO2 96 % 01/05/24 1308         No case tracking events are documented in the log.      Pain/Boston Score: No data recorded

## 2024-01-05 NOTE — TRANSFER OF CARE
Anesthesia Transfer of Care Note    Patient: Quintin Ling    Procedure(s) Performed: Procedure(s) (LRB):  EGD (ESOPHAGOGASTRODUODENOSCOPY) (N/A)  COLONOSCOPY (N/A)    Patient location: PACU    Anesthesia Type: MAC    Transport from OR: Transported from OR on room air with adequate spontaneous ventilation    Post pain: adequate analgesia    Post assessment: no apparent anesthetic complications    Post vital signs: stable    Level of consciousness: responds to stimulation and awake    Nausea/Vomiting: no nausea/vomiting    Complications: none    Transfer of care protocol was followedComments: Report given to Endoscopy PACU RN at bedside. RN given opportunity to ask questions or clarify concerns. No Concerns verbalized. RN was asked if ready to assume care of patient. RN verbally confirmed. Pt. left in stable condition. SV. Vital Signs Return to Near Baseline. No s/s of distress noted.     Last vitals: Visit Vitals  BP (!) 149/60   Pulse 80   Temp 37.3 °C (99.1 °F)   Resp 16   Wt 103.9 kg (229 lb)   SpO2 96%   BMI 38.11 kg/m²

## 2024-01-05 NOTE — PROVATION PATIENT INSTRUCTIONS
Discharge Summary/Instructions after an Endoscopic Procedure  Patient Name: Quintin Ling  Patient MRN: [id number]  Patient YOB: 1950 Friday, January 5, 2024 Irasema Gil MD  Dear patient,  As a result of recent federal legislation (The Federal Cures Act), you may   receive lab or pathology results from your procedure in your [facility]   account before your physician is able to contact you. Your physician or   their representative will relay the results to you with their   recommendations at their soonest availability.  Thank you,  RESTRICTIONS:  During your procedure today, you received medications for sedation.  These   medications may affect your judgment, balance and coordination.  Therefore,   for 24 hours, you have the following restrictions:   - DO NOT drive a car, operate machinery, make legal/financial decisions,   sign important papers or drink alcohol.    ACTIVITY:  Today: no heavy lifting, straining or running due to procedural   sedation/anesthesia.  The following day: return to full activity including work.  DIET:  Eat and drink normally unless instructed otherwise.     TREATMENT FOR COMMON SIDE EFFECTS:  - Mild abdominal pain, nausea, belching, bloating or excessive gas:  rest,   eat lightly and use a heating pad.  - Sore Throat: treat with throat lozenges and/or gargle with warm salt   water.  - Because air was used during the procedure, expelling large amounts of air   from your rectum or belching is normal.  - If a bowel prep was taken, you may not have a bowel movement for 1-3 days.    This is normal.  SYMPTOMS TO WATCH FOR AND REPORT TO YOUR PHYSICIAN:  1. Abdominal pain or bloating, other than gas cramps.  2. Chest pain.  3. Back pain.  4. Signs of infection such as: chills or fever occurring within 24 hours   after the procedure.  5. Rectal bleeding, which would show as bright red, maroon, or black stools.   (A tablespoon of blood from the rectum is not serious, especially if    hemorrhoids are present.)  6. Vomiting.  7. Weakness or dizziness.  GO DIRECTLY TO THE NEAREST EMERGENCY ROOM IF YOU HAVE ANY OF THE FOLLOWING:      Difficulty breathing              Chills and/or fever over 101 F   Persistent vomiting and/or vomiting blood   Severe abdominal pain   Severe chest pain   Black, tarry stools   Bleeding- more than one tablespoon   Any other symptom or condition that you feel may need urgent attention  Your doctor recommends these additional instructions:  If any biopsies were taken, your doctors clinic will contact you in 1 to 2   weeks with any results.  - Discharge patient to home after colonoscopy.   - Resume previous diet.   - Continue present medications.   - Await pathology results.   - Proceed with colonoscopy.  For questions, problems or results please call your physician Irasema Gil MD at Work:  [phone]  If you have any questions about the above instructions, call the GI   department at [phone] or call the endoscopy unit at [phone]   from 7am until 3 pm.  [facility], EMERGENCY ROOM PHONE NUMBER:   [phone]  IF A COMPLICATION OR EMERGENCY SITUATION ARISES AND YOU ARE UNABLE TO REACH   YOUR PHYSICIAN - GO DIRECTLY TO THE EMERGENCY ROOM.  I have read or have had read to me these discharge instructions for my   procedure and have received a written copy.  I understand these   instructions and will follow-up with my physician if I have any questions.     __________________________________       _____________________________________  Nurse Signature                                          Patient/Designated   Responsible Party Signature  MD Irasema Blanco MD  1/5/2024 2:14:14 PM  This report has been verified and signed electronically.  Dear patient,  As a result of recent federal legislation (The Federal Cures Act), you may   receive lab or pathology results from your procedure [facility]   account  before your physician is able to contact you. Your physician or   their representative will relay the results to you with their   recommendations at their soonest availability.  Thank you,  PROVATION

## 2024-01-05 NOTE — PLAN OF CARE
Dr. Gil  at bedside discussing findings. VSS. Patient alert. No N/V. No bleeding, no pain. Patient released from unit.   Explained no blood thinners or NSAIDS of any sort for 3 weeks per doctor's orders. Patient and family thoroughly understood.  No heavy lifting per report. Patient and family understood.

## 2024-01-05 NOTE — INTERVAL H&P NOTE
The patient has been examined and the H&P has been reviewed:    I concur with the findings and changes have been noted since the H&P was written: no overt GI bleeding. Remote hx PUD in past diagnosed on previous EGD. No previous colonoscopy. No Fhx of CRC, no blood thinners.     Anesthesia/Surgery risks, benefits and alternative options discussed and understood by patient/family.    The risks, benefits and alternatives of the procedure were discussed with the patient in detail. This discussion was had in the presence of endoscopy staff. The risks include, risks of adverse reaction to sedation requiring the use of reversal agents, bleeding requiring blood transfusion, perforation requiring surgical intervention and technical failure. Other risks include aspiration leading to respiratory distress and respiratory failure resulting in endotracheal intubation and mechanical ventilation including death. If anesthesia is being utilized for this procedure, it is up to the anesthesiologist to determine airway safety including elective endotracheal intubation. Questions were answered, they agree to proceed. There was no language barriers.        Active Hospital Problems    Diagnosis  POA    *Normocytic anemia [D64.9]  Yes      Resolved Hospital Problems   No resolved problems to display.

## 2024-01-05 NOTE — TELEPHONE ENCOUNTER
No care due was identified.  [facility] Embedded Care Due Messages. Reference number: [id number].   1/05/2024 5:38:00 PM CST

## 2024-01-05 NOTE — ANESTHESIA PREPROCEDURE EVALUATION
01/05/2024  Quintin Ling is a 73 y.o., male.      Pre-op Assessment    I have reviewed the Patient Summary Reports.     I have reviewed the Nursing Notes. I have reviewed the NPO Status.   I have reviewed the Medications.     Review of Systems  Anesthesia Hx:  No problems with previous Anesthesia                Social:  Former Smoker       Cardiovascular:     Hypertension           hyperlipidemia    Summary       ·  Left Ventricle: The left ventricle is normal in size. Moderately increased ventricular mass. Moderately increased wall thickness. There is concentric hypertrophy. Normal wall motion. There is normal systolic function with a visually estimated ejection fraction of 55 - 60%. There is normal diastolic function.  ·  Left Atrium: Left atrium is mildly dilated.  ·  Right Ventricle: Normal right ventricular cavity size. Wall thickness is normal. Right ventricle wall motion  is normal. Systolic function is normal.  ·  Aortic Valve: There is mild to moderate stenosis. Aortic valve area by VTI is 1.11 cm². Aortic valve peak velocity is 2.52 m/s. Mean gradient is 17 mmHg. The dimensionless index is 0.36.  ·  Mitral Valve: There is mild mitral annular calcification present.  ·  Pulmonary Artery: The estimated pulmonary artery systolic pressure is 20 mmHg.  ·  IVC/SVC: Normal venous pressure at 3 mmHg.    Sinus bradycardia   Otherwise normal ECG   No previous ECGs available   Confirmed by CALDERON SULLIVAN MD ([id number]) on 9/28/2023 4:57:55 PM                              Pulmonary:  Pulmonary Normal                       Renal/:  Chronic Renal Disease, CKD                Hepatic/GI:  Bowel Prep.                Musculoskeletal:  Musculoskeletal Normal                Neurological:  Neurology Normal                                      Endocrine:  Diabetes         Obesity / BMI > 30      Physical Exam  General:  Well nourished, Cooperative, Alert and Oriented    Airway:  Mallampati: II   Mouth Opening: Normal  TM Distance: Normal  Tongue: Normal  Neck ROM: Normal ROM    Dental:  Intact        Anesthesia Plan  Type of Anesthesia, risks & benefits discussed:    Anesthesia Type: Gen Natural Airway, MAC  Intra-op Monitoring Plan: Standard ASA Monitors  Induction:  IV  Informed Consent: Informed consent signed with the Patient and all parties understand the risks and agree with anesthesia plan.  All questions answered.   ASA Score: 3  Day of Surgery Review of History & Physical: H&P Update referred to the surgeon/provider.I have interviewed and examined the patient. I have reviewed the patient's H&P dated: There are no significant changes.     Ready For Surgery From Anesthesia Perspective.     .

## 2024-01-07 DIAGNOSIS — M47.816 LUMBAR SPONDYLOSIS: ICD-10-CM

## 2024-01-07 DIAGNOSIS — M54.16 LUMBAR RADICULOPATHY: ICD-10-CM

## 2024-01-07 DIAGNOSIS — M51.36 DDD (DEGENERATIVE DISC DISEASE), LUMBAR: ICD-10-CM

## 2024-01-07 RX ORDER — FENOFIBRATE 160 MG/1
160 TABLET ORAL
Qty: 90 TABLET | Refills: 3 | OUTPATIENT
Start: 2024-01-07

## 2024-01-08 RX ORDER — TIZANIDINE 4 MG/1
TABLET ORAL
Qty: 60 TABLET | Refills: 0 | Status: SHIPPED | OUTPATIENT
Start: 2024-01-08

## 2024-01-08 NOTE — TELEPHONE ENCOUNTER
Can you refill?tiZANidine (ZANAFLEX) 4 MG tablet     Last Visit:12/28/2023  Next Visit:04/12/2024  Last refill:12/05/2023  How pt patient is currently taking medication requested: Sig - Route: Take 0.5-1 tablets (2-4 mg total) by mouth 2 (two) times daily as needed (muscle spasms). May cause drowsiness. - Oral   Pharmacy:   [id number] - [place] - [id number] [facility]

## 2024-01-08 NOTE — TELEPHONE ENCOUNTER
Refill Decision Note   Quintin Ling  is requesting a refill authorization.  Brief Assessment and Rationale for Refill:  Quick Discontinue     Medication Therapy Plan:  on Tricor 54mg      Comments:     Note composed:8:54 PM 01/07/2024

## 2024-01-08 NOTE — TELEPHONE ENCOUNTER
Pt stated and pt stated he picked up rx from the pharmacy on 12/05/2023. I called pharmacy and they informed me that the rx have already been picked up on 12/05/2023. I informed pt that I would send to the provider for review. Pt verbalized understanding.    Yumiko ELIZABETH

## 2024-01-08 NOTE — TELEPHONE ENCOUNTER
No care due was identified.  [facility] Embedded Care Due Messages. Reference number: [id number].   1/07/2024 9:57:45 PM CST

## 2024-01-08 NOTE — TELEPHONE ENCOUNTER
Refill Routing Note   Medication(s) are not appropriate for processing by [facility] for the following reason(s):        Required labs outdated    [facility] action(s):  Defer               Appointments  past 12m or future 3m with PCP    Date Provider   Last Visit   11/30/2023 Bishop Gabriel MD   Next Visit   2/29/2024 Bishop Gabriel MD   ED visits in past 90 days: 0        Note composed:5:03 PM 01/08/2024

## 2024-01-09 RX ORDER — METFORMIN HYDROCHLORIDE 500 MG/1
500 TABLET ORAL 2 TIMES DAILY WITH MEALS
Qty: 180 TABLET | Refills: 1 | Status: SHIPPED | OUTPATIENT
Start: 2024-01-09

## 2024-01-10 LAB
COMMENT: NORMAL
FINAL PATHOLOGIC DIAGNOSIS: NORMAL
GROSS: NORMAL
Lab: NORMAL
SUPPLEMENTAL DIAGNOSIS: NORMAL

## 2024-01-18 ENCOUNTER — OFFICE VISIT (OUTPATIENT)
Dept: SURGERY | Facility: CLINIC | Age: 74
End: 2024-01-18
Payer: MEDICARE

## 2024-01-18 DIAGNOSIS — K63.89 COLONIC MASS: ICD-10-CM

## 2024-01-18 PROCEDURE — 99499 UNLISTED E&M SERVICE: CPT | Mod: S$GLB,,, | Performed by: STUDENT IN AN ORGANIZED HEALTH CARE EDUCATION/TRAINING PROGRAM

## 2024-01-19 ENCOUNTER — HOSPITAL ENCOUNTER (OUTPATIENT)
Dept: CARDIOLOGY | Facility: HOSPITAL | Age: 74
Discharge: HOME | End: 2024-01-19
Attending: STUDENT IN AN ORGANIZED HEALTH CARE EDUCATION/TRAINING PROGRAM
Payer: MEDICARE

## 2024-01-19 ENCOUNTER — E-CONSULT (OUTPATIENT)
Dept: CARDIOLOGY | Facility: CLINIC | Age: 74
End: 2024-01-19
Payer: MEDICARE

## 2024-01-19 ENCOUNTER — OFFICE VISIT (OUTPATIENT)
Dept: SURGERY | Facility: CLINIC | Age: 74
End: 2024-01-19
Payer: MEDICARE

## 2024-01-19 VITALS — DIASTOLIC BLOOD PRESSURE: 67 MMHG | SYSTOLIC BLOOD PRESSURE: 164 MMHG

## 2024-01-19 DIAGNOSIS — K63.89 COLONIC MASS: Primary | ICD-10-CM

## 2024-01-19 DIAGNOSIS — Z01.810 PREOP CARDIOVASCULAR EXAM: Primary | ICD-10-CM

## 2024-01-19 PROCEDURE — 1126F AMNT PAIN NOTED NONE PRSNT: CPT | Mod: CPTII,S$GLB,, | Performed by: STUDENT IN AN ORGANIZED HEALTH CARE EDUCATION/TRAINING PROGRAM

## 2024-01-19 PROCEDURE — 3077F SYST BP >= 140 MM HG: CPT | Mod: CPTII,S$GLB,, | Performed by: STUDENT IN AN ORGANIZED HEALTH CARE EDUCATION/TRAINING PROGRAM

## 2024-01-19 PROCEDURE — 99205 OFFICE O/P NEW HI 60 MIN: CPT | Mod: S$GLB,,, | Performed by: STUDENT IN AN ORGANIZED HEALTH CARE EDUCATION/TRAINING PROGRAM

## 2024-01-19 PROCEDURE — 99451 NTRPROF PH1/NTRNET/EHR 5/>: CPT | Mod: S$GLB,,, | Performed by: INTERNAL MEDICINE

## 2024-01-19 PROCEDURE — 3078F DIAST BP <80 MM HG: CPT | Mod: CPTII,S$GLB,, | Performed by: STUDENT IN AN ORGANIZED HEALTH CARE EDUCATION/TRAINING PROGRAM

## 2024-01-19 PROCEDURE — 93010 ELECTROCARDIOGRAM REPORT: CPT | Mod: S$GLB,,, | Performed by: INTERNAL MEDICINE

## 2024-01-19 PROCEDURE — 1159F MED LIST DOCD IN RCRD: CPT | Mod: CPTII,S$GLB,, | Performed by: STUDENT IN AN ORGANIZED HEALTH CARE EDUCATION/TRAINING PROGRAM

## 2024-01-19 PROCEDURE — 99999 PR PBB SHADOW E&M-EST. PATIENT-LVL III: CPT | Mod: PBBFAC,,, | Performed by: STUDENT IN AN ORGANIZED HEALTH CARE EDUCATION/TRAINING PROGRAM

## 2024-01-19 PROCEDURE — 93005 ELECTROCARDIOGRAM TRACING: CPT

## 2024-01-19 RX ORDER — METRONIDAZOLE 500 MG/1
500 TABLET ORAL 3 TIMES DAILY
Qty: 3 TABLET | Refills: 0 | Status: SHIPPED | OUTPATIENT
Start: 2024-01-19 | End: 2024-02-14

## 2024-01-19 RX ORDER — NEOMYCIN SULFATE 500 MG/1
1000 TABLET ORAL 3 TIMES DAILY
Qty: 6 TABLET | Refills: 0 | Status: SHIPPED | OUTPATIENT
Start: 2024-01-19 | End: 2024-02-14

## 2024-01-19 RX ORDER — POLYETHYLENE GLYCOL 3350 17 G/17G
238 POWDER, FOR SOLUTION ORAL DAILY
Qty: 238 G | Refills: 0 | Status: SHIPPED | OUTPATIENT
Start: 2024-01-19 | End: 2024-02-14

## 2024-01-19 NOTE — CONSULTS
[facility]  Response for E-Consult     Patient Name: Quintin Ling  MRN: [id number]  Primary Care Provider: Bishop Gabriel MD   Requesting Provider: Becky Jenkins NP  E-Consult to Cardiology  Consult performed by: Chai Paiz MD  Consult ordered by: Becky Jenkins NP               72 yo male, E consult for preop clearance of Robotic Colectomy Surgery on 1/29/24   The chart reviewed.  PMH HTN  10/23 echo EF nl mild LVH mild to mod AS      Plan  Elevated periop risk of CV events for non-high risk procedure.  Ok to proceed the scheduled procedure without further cardiac study.        Total time of Consultation: 10 minute    I did not speak to the requesting provider verbally about this.     *This eConsult is based on the clinical data available to me and is furnished without benefit of a physical examination. The eConsult will need to be interpreted in light of any clinical issues or changes in patient status not available to me at the time of filing this eConsults. Significant changes in patient condition or level of acuity should result in immediate formal consultation and reevaluation. Please alert me if you have further questions.    Thank you for this eConsult referral.     Chai Paiz MD  [facility]

## 2024-01-19 NOTE — H&P (VIEW-ONLY)
Subjective:       Patient ID: Quintin Ling is a 73 y.o. male.    Chief Complaint: Mass (Colon mass)    Patient is a 73-year-old male who presents with an unresectable large polyp of the cecum.  He was previously found to be iron deficient which prompted upper and lower endoscopy.  He denies constipation, diarrhea, blood in stool, melena, nausea, vomiting, weight loss or fatigue.  He does have back pain that limits his mobility.  Previously he was very active and could do his daily activities without chest pain or shortness of breath.  Otherwise has no complaints.    Mass        Colonoscopy: A frond-like/villous, infiltrative and sessile non-obstructing large mass was found in the cecum. The mass was partially circumferential (involving two-thirds of the lumen circumference). No bleeding was present. Biopsies were taken with a cold forceps for histology. Area was tattooed with an injection of 3 mL of Spot (carbon black).        A 1 mm polyp was found in the ascending colon. The polyp was sessile. The polyp was removed with a jumbo cold forceps. Resection and retrieval were complete.        Two sessile polyps were found in the ascending colon. The polyps were 3 to 4 mm in size. These polyps were removed with a cold snare.        Resection and retrieval were complete.        A 7 mm polyp was found in the descending colon. The polyp was flat. Area was successfully injected with 3 mL of a 0.1 mg/mL solution of epinephrine for a lift polypectomy. The polyp was removed with a hot snare. Resection and retrieval were complete.        A 1 mm polyp was found in the descending colon. The polyp was sessile. The polyp was removed with a jumbo cold forceps. Resection and retrieval were complete.        The exam was otherwise without abnormality.        Hemorrhoids were found on perianal exam.     Pathology: tubular adenoma for all polyps, no HGD    SH: denies smoking. Social EtOH, no drug use    FH: Denies FH of colorectal  cancer, polyps or IBD    PSxH: none    Past Medical History:   Diagnosis Date    Diabetes mellitus     Hyperlipidemia     Hypertension              Objective:      Physical Exam  Constitutional:       Appearance: He is well-developed.   HENT:      Head: Normocephalic and atraumatic.   Eyes:      Conjunctiva/sclera: Conjunctivae normal.      Pupils: Pupils are equal, round, and reactive to light.   Neck:      Thyroid: No thyromegaly.   Cardiovascular:      Rate and Rhythm: Normal rate and regular rhythm.   Pulmonary:      Effort: Pulmonary effort is normal. No respiratory distress.   Abdominal:      General: There is no distension.      Palpations: Abdomen is soft. There is no mass.      Tenderness: There is no abdominal tenderness.   Musculoskeletal:         General: No tenderness. Normal range of motion.      Cervical back: Normal range of motion.   Skin:     General: Skin is warm and dry.      Capillary Refill: Capillary refill takes less than 2 seconds.   Neurological:      General: No focal deficit present.      Mental Status: He is alert and oriented to person, place, and time.         Assessment:       1. Colonic mass        Plan:       - I discussed at great length the pathophysiology of progression of adenoma to cancer.  Although his biopsy does not reveal cancer it was only a small fragment of his underlying mass.  Surgical resection of the right colon as needed to remove polyp as well as determine final pathology.   - Discussed risks, benefits and alternatives including bleeding, infection, damage to surrounding structures (intestine, ureter, bladder, blood vessels), anastomotic leak, prolonged hospitalization, postoperative ileus, hernia formation, and anesthetic complications such as heart attack, stroke or death. Patient agreed to proceed.  - plan for 1/29  - ERAS robotic colectomy. Mechanical and oral bowel prep sent  - preop labs and EKG today  - all questions answered    >60 minutes spent on chart  review as well as >50% time spent on patient education, counseling and discussion of disease management and surgical consent         Tosin Boothe MD  Colon and Rectal Surgery  [facility]

## 2024-01-24 ENCOUNTER — TELEPHONE (OUTPATIENT)
Dept: PREADMISSION TESTING | Facility: HOSPITAL | Age: 74
End: 2024-01-24
Payer: MEDICARE

## 2024-01-24 RX ORDER — DOXAZOSIN 4 MG/1
4 TABLET ORAL DAILY
Status: ON HOLD | COMMUNITY
End: 2024-04-01 | Stop reason: HOSPADM

## 2024-01-26 ENCOUNTER — TELEPHONE (OUTPATIENT)
Dept: PREADMISSION TESTING | Facility: HOSPITAL | Age: 74
End: 2024-01-26
Payer: MEDICARE

## 2024-01-26 NOTE — TELEPHONE ENCOUNTER
Called and spoke with patient about the following:     Please arrive to [facility] at 11:15am on 1/29/24 for your scheduled procedure.  Address: [address] [place] [address] [place] [address] ([address] on left, 1st Floor Lobby)  >>>NO eating or drinking after midnight unless instructed otherwise by your Surgeon<<<    Thank you,  -[facility]  Mon-Fri 7:30 am - 4 pm [phone]

## 2024-01-29 ENCOUNTER — HOSPITAL ENCOUNTER (INPATIENT)
Facility: HOSPITAL | Age: 74
LOS: 1 days | Discharge: HOME | DRG: 376 | End: 2024-01-30
Attending: STUDENT IN AN ORGANIZED HEALTH CARE EDUCATION/TRAINING PROGRAM | Admitting: STUDENT IN AN ORGANIZED HEALTH CARE EDUCATION/TRAINING PROGRAM
Payer: MEDICARE

## 2024-01-29 ENCOUNTER — ANESTHESIA (OUTPATIENT)
Dept: SURGERY | Facility: HOSPITAL | Age: 74
DRG: 376 | End: 2024-01-29
Payer: MEDICARE

## 2024-01-29 ENCOUNTER — ANESTHESIA EVENT (OUTPATIENT)
Dept: SURGERY | Facility: HOSPITAL | Age: 74
DRG: 376 | End: 2024-01-29
Payer: MEDICARE

## 2024-01-29 DIAGNOSIS — D12.6 ADENOMA OF COLON: ICD-10-CM

## 2024-01-29 DIAGNOSIS — D12.0 ADENOMA OF CECUM: Primary | ICD-10-CM

## 2024-01-29 LAB — POCT GLUCOSE: 110 MG/DL (ref 70–110)

## 2024-01-29 PROCEDURE — C9290 INJ, BUPIVACAINE LIPOSOME: HCPCS | Performed by: STUDENT IN AN ORGANIZED HEALTH CARE EDUCATION/TRAINING PROGRAM

## 2024-01-29 PROCEDURE — 88341 IMHCHEM/IMCYTCHM EA ADD ANTB: CPT | Mod: 26,,, | Performed by: PATHOLOGY

## 2024-01-29 PROCEDURE — 88341 IMHCHEM/IMCYTCHM EA ADD ANTB: CPT | Performed by: PATHOLOGY

## 2024-01-29 PROCEDURE — 49320 DIAG LAPARO SEPARATE PROC: CPT | Mod: ,,, | Performed by: STUDENT IN AN ORGANIZED HEALTH CARE EDUCATION/TRAINING PROGRAM

## 2024-01-29 PROCEDURE — 25000003 PHARM REV CODE 250: Performed by: STUDENT IN AN ORGANIZED HEALTH CARE EDUCATION/TRAINING PROGRAM

## 2024-01-29 PROCEDURE — 0DBW4ZX EXCISION OF PERITONEUM, PERCUTANEOUS ENDOSCOPIC APPROACH, DIAGNOSTIC: ICD-10-PCS | Performed by: SURGERY

## 2024-01-29 PROCEDURE — 11000001 HC ACUTE MED/SURG PRIVATE ROOM

## 2024-01-29 PROCEDURE — 94761 N-INVAS EAR/PLS OXIMETRY MLT: CPT

## 2024-01-29 PROCEDURE — 37000008 HC ANESTHESIA 1ST 15 MINUTES: Performed by: STUDENT IN AN ORGANIZED HEALTH CARE EDUCATION/TRAINING PROGRAM

## 2024-01-29 PROCEDURE — 36000708 HC OR TIME LEV III 1ST 15 MIN: Performed by: STUDENT IN AN ORGANIZED HEALTH CARE EDUCATION/TRAINING PROGRAM

## 2024-01-29 PROCEDURE — 88342 IMHCHEM/IMCYTCHM 1ST ANTB: CPT | Mod: 26,,, | Performed by: PATHOLOGY

## 2024-01-29 PROCEDURE — 88185 FLOWCYTOMETRY/TC ADD-ON: CPT | Performed by: PATHOLOGY

## 2024-01-29 PROCEDURE — 8E0W4CZ ROBOTIC ASSISTED PROCEDURE OF TRUNK REGION, PERCUTANEOUS ENDOSCOPIC APPROACH: ICD-10-PCS | Performed by: SURGERY

## 2024-01-29 PROCEDURE — 36000709 HC OR TIME LEV III EA ADD 15 MIN: Performed by: STUDENT IN AN ORGANIZED HEALTH CARE EDUCATION/TRAINING PROGRAM

## 2024-01-29 PROCEDURE — 88342 IMHCHEM/IMCYTCHM 1ST ANTB: CPT | Mod: 59 | Performed by: PATHOLOGY

## 2024-01-29 PROCEDURE — 71000033 HC RECOVERY, INTIAL HOUR: Performed by: STUDENT IN AN ORGANIZED HEALTH CARE EDUCATION/TRAINING PROGRAM

## 2024-01-29 PROCEDURE — 63600175 PHARM REV CODE 636 W HCPCS: Performed by: ANESTHESIOLOGY

## 2024-01-29 PROCEDURE — 37000009 HC ANESTHESIA EA ADD 15 MINS: Performed by: STUDENT IN AN ORGANIZED HEALTH CARE EDUCATION/TRAINING PROGRAM

## 2024-01-29 PROCEDURE — 88305 TISSUE EXAM BY PATHOLOGIST: CPT | Performed by: PATHOLOGY

## 2024-01-29 PROCEDURE — 63600175 PHARM REV CODE 636 W HCPCS: Mod: JZ,JG | Performed by: STUDENT IN AN ORGANIZED HEALTH CARE EDUCATION/TRAINING PROGRAM

## 2024-01-29 PROCEDURE — 88305 TISSUE EXAM BY PATHOLOGIST: CPT | Mod: 26,,, | Performed by: PATHOLOGY

## 2024-01-29 PROCEDURE — 27201423 OPTIME MED/SURG SUP & DEVICES STERILE SUPPLY: Performed by: STUDENT IN AN ORGANIZED HEALTH CARE EDUCATION/TRAINING PROGRAM

## 2024-01-29 PROCEDURE — 88189 FLOWCYTOMETRY/READ 16 & >: CPT | Mod: ,,, | Performed by: PATHOLOGY

## 2024-01-29 PROCEDURE — 63600175 PHARM REV CODE 636 W HCPCS: Performed by: STUDENT IN AN ORGANIZED HEALTH CARE EDUCATION/TRAINING PROGRAM

## 2024-01-29 PROCEDURE — 71000039 HC RECOVERY, EACH ADD'L HOUR: Performed by: STUDENT IN AN ORGANIZED HEALTH CARE EDUCATION/TRAINING PROGRAM

## 2024-01-29 PROCEDURE — 88184 FLOWCYTOMETRY/ TC 1 MARKER: CPT | Performed by: PATHOLOGY

## 2024-01-29 PROCEDURE — 25500020 PHARM REV CODE 255: Performed by: STUDENT IN AN ORGANIZED HEALTH CARE EDUCATION/TRAINING PROGRAM

## 2024-01-29 PROCEDURE — 49321 LAPAROSCOPY BIOPSY: CPT | Mod: ,,, | Performed by: SURGERY

## 2024-01-29 RX ORDER — FENTANYL CITRATE 50 UG/ML
INJECTION, SOLUTION INTRAMUSCULAR; INTRAVENOUS
Status: DISCONTINUED | OUTPATIENT
Start: 2024-01-29 | End: 2024-01-29

## 2024-01-29 RX ORDER — MIDAZOLAM HYDROCHLORIDE 1 MG/ML
INJECTION INTRAMUSCULAR; INTRAVENOUS
Status: DISCONTINUED | OUTPATIENT
Start: 2024-01-29 | End: 2024-01-29

## 2024-01-29 RX ORDER — BUPIVACAINE HYDROCHLORIDE 2.5 MG/ML
INJECTION, SOLUTION EPIDURAL; INFILTRATION; INTRACAUDAL
Status: DISCONTINUED | OUTPATIENT
Start: 2024-01-29 | End: 2024-01-29 | Stop reason: HOSPADM

## 2024-01-29 RX ORDER — GABAPENTIN 300 MG/1
300 CAPSULE ORAL 3 TIMES DAILY
Status: DISCONTINUED | OUTPATIENT
Start: 2024-01-29 | End: 2024-01-30 | Stop reason: HOSPADM

## 2024-01-29 RX ORDER — KETAMINE HCL IN 0.9 % NACL 50 MG/5 ML
SYRINGE (ML) INTRAVENOUS
Status: DISCONTINUED | OUTPATIENT
Start: 2024-01-29 | End: 2024-01-29

## 2024-01-29 RX ORDER — DEXAMETHASONE SODIUM PHOSPHATE 4 MG/ML
INJECTION, SOLUTION INTRA-ARTICULAR; INTRALESIONAL; INTRAMUSCULAR; INTRAVENOUS; SOFT TISSUE
Status: DISCONTINUED | OUTPATIENT
Start: 2024-01-29 | End: 2024-01-29

## 2024-01-29 RX ORDER — OXYCODONE HYDROCHLORIDE 5 MG/1
10 TABLET ORAL EVERY 4 HOURS PRN
Status: DISCONTINUED | OUTPATIENT
Start: 2024-01-29 | End: 2024-01-30 | Stop reason: HOSPADM

## 2024-01-29 RX ORDER — KETOROLAC TROMETHAMINE 30 MG/ML
15 INJECTION, SOLUTION INTRAMUSCULAR; INTRAVENOUS EVERY 8 HOURS PRN
Status: DISCONTINUED | OUTPATIENT
Start: 2024-01-29 | End: 2024-01-29 | Stop reason: HOSPADM

## 2024-01-29 RX ORDER — METOPROLOL SUCCINATE 50 MG/1
100 TABLET, EXTENDED RELEASE ORAL 2 TIMES DAILY
Status: DISCONTINUED | OUTPATIENT
Start: 2024-01-29 | End: 2024-01-30 | Stop reason: HOSPADM

## 2024-01-29 RX ORDER — ACETAMINOPHEN 500 MG
1000 TABLET ORAL
Status: COMPLETED | OUTPATIENT
Start: 2024-01-29 | End: 2024-01-29

## 2024-01-29 RX ORDER — ROCURONIUM BROMIDE 10 MG/ML
INJECTION, SOLUTION INTRAVENOUS
Status: DISCONTINUED | OUTPATIENT
Start: 2024-01-29 | End: 2024-01-29

## 2024-01-29 RX ORDER — ONDANSETRON HYDROCHLORIDE 2 MG/ML
4 INJECTION, SOLUTION INTRAVENOUS DAILY PRN
Status: DISCONTINUED | OUTPATIENT
Start: 2024-01-29 | End: 2024-01-29 | Stop reason: HOSPADM

## 2024-01-29 RX ORDER — SODIUM CHLORIDE 0.9 % (FLUSH) 0.9 %
10 SYRINGE (ML) INJECTION
Status: DISCONTINUED | OUTPATIENT
Start: 2024-01-29 | End: 2024-01-30 | Stop reason: HOSPADM

## 2024-01-29 RX ORDER — PHENYLEPHRINE HYDROCHLORIDE 10 MG/ML
INJECTION INTRAVENOUS
Status: DISCONTINUED | OUTPATIENT
Start: 2024-01-29 | End: 2024-01-29

## 2024-01-29 RX ORDER — ONDANSETRON HYDROCHLORIDE 2 MG/ML
4 INJECTION, SOLUTION INTRAVENOUS EVERY 8 HOURS PRN
Status: DISCONTINUED | OUTPATIENT
Start: 2024-01-29 | End: 2024-01-30 | Stop reason: HOSPADM

## 2024-01-29 RX ORDER — HYDROMORPHONE HYDROCHLORIDE 2 MG/ML
0.2 INJECTION, SOLUTION INTRAMUSCULAR; INTRAVENOUS; SUBCUTANEOUS EVERY 5 MIN PRN
Status: DISCONTINUED | OUTPATIENT
Start: 2024-01-29 | End: 2024-01-29 | Stop reason: HOSPADM

## 2024-01-29 RX ORDER — CEFAZOLIN SODIUM 2 G/50ML
2 SOLUTION INTRAVENOUS
Status: COMPLETED | OUTPATIENT
Start: 2024-01-29 | End: 2024-01-29

## 2024-01-29 RX ORDER — PROPOFOL 10 MG/ML
VIAL (ML) INTRAVENOUS
Status: DISCONTINUED | OUTPATIENT
Start: 2024-01-29 | End: 2024-01-29

## 2024-01-29 RX ORDER — SUCCINYLCHOLINE CHLORIDE 20 MG/ML
INJECTION INTRAMUSCULAR; INTRAVENOUS
Status: DISCONTINUED | OUTPATIENT
Start: 2024-01-29 | End: 2024-01-29

## 2024-01-29 RX ORDER — LIDOCAINE HYDROCHLORIDE 20 MG/ML
INJECTION INTRAVENOUS
Status: DISCONTINUED | OUTPATIENT
Start: 2024-01-29 | End: 2024-01-29

## 2024-01-29 RX ORDER — TRAMADOL HYDROCHLORIDE 50 MG/1
50 TABLET ORAL EVERY 6 HOURS PRN
Status: DISCONTINUED | OUTPATIENT
Start: 2024-01-29 | End: 2024-01-30 | Stop reason: HOSPADM

## 2024-01-29 RX ORDER — METRONIDAZOLE 500 MG/100ML
500 INJECTION, SOLUTION INTRAVENOUS
Status: COMPLETED | OUTPATIENT
Start: 2024-01-29 | End: 2024-01-29

## 2024-01-29 RX ORDER — TALC
6 POWDER (GRAM) TOPICAL NIGHTLY PRN
Status: DISCONTINUED | OUTPATIENT
Start: 2024-01-29 | End: 2024-01-30 | Stop reason: HOSPADM

## 2024-01-29 RX ORDER — SODIUM CHLORIDE 9 MG/ML
INJECTION, SOLUTION INTRAVENOUS CONTINUOUS
Status: DISCONTINUED | OUTPATIENT
Start: 2024-01-29 | End: 2024-01-29

## 2024-01-29 RX ORDER — OXYCODONE HYDROCHLORIDE 5 MG/1
5 TABLET ORAL EVERY 4 HOURS PRN
Status: DISCONTINUED | OUTPATIENT
Start: 2024-01-29 | End: 2024-01-30 | Stop reason: HOSPADM

## 2024-01-29 RX ORDER — ACETAMINOPHEN 325 MG/1
650 TABLET ORAL EVERY 8 HOURS PRN
Status: DISCONTINUED | OUTPATIENT
Start: 2024-01-29 | End: 2024-01-30 | Stop reason: HOSPADM

## 2024-01-29 RX ORDER — CHLORHEXIDINE GLUCONATE ORAL RINSE 1.2 MG/ML
10 SOLUTION DENTAL
Status: DISCONTINUED | OUTPATIENT
Start: 2024-01-29 | End: 2024-01-29 | Stop reason: HOSPADM

## 2024-01-29 RX ORDER — OXYCODONE AND ACETAMINOPHEN 5; 325 MG/1; MG/1
1 TABLET ORAL
Status: DISCONTINUED | OUTPATIENT
Start: 2024-01-29 | End: 2024-01-29 | Stop reason: HOSPADM

## 2024-01-29 RX ADMIN — KETOROLAC TROMETHAMINE 15 MG: 30 INJECTION, SOLUTION INTRAMUSCULAR at 05:01

## 2024-01-29 RX ADMIN — PROPOFOL 80 MG: 10 INJECTION, EMULSION INTRAVENOUS at 03:01

## 2024-01-29 RX ADMIN — ACETAMINOPHEN 1000 MG: 500 TABLET ORAL at 02:01

## 2024-01-29 RX ADMIN — SUGAMMADEX 200 MG: 100 INJECTION, SOLUTION INTRAVENOUS at 04:01

## 2024-01-29 RX ADMIN — SUCCINYLCHOLINE CHLORIDE 100 MG: 20 INJECTION, SOLUTION INTRAMUSCULAR; INTRAVENOUS; PARENTERAL at 03:01

## 2024-01-29 RX ADMIN — Medication 6 MG: at 08:01

## 2024-01-29 RX ADMIN — CHLORHEXIDINE GLUCONATE 0.12% ORAL RINSE 10 ML: 1.2 LIQUID ORAL at 02:01

## 2024-01-29 RX ADMIN — ROCURONIUM BROMIDE 45 MG: 10 SOLUTION INTRAVENOUS at 03:01

## 2024-01-29 RX ADMIN — SODIUM CHLORIDE, SODIUM LACTATE, POTASSIUM CHLORIDE, AND CALCIUM CHLORIDE: .6; .31; .03; .02 INJECTION, SOLUTION INTRAVENOUS at 03:01

## 2024-01-29 RX ADMIN — LIDOCAINE HYDROCHLORIDE 100 MG: 20 INJECTION INTRAVENOUS at 03:01

## 2024-01-29 RX ADMIN — METOPROLOL SUCCINATE 100 MG: 50 TABLET, EXTENDED RELEASE ORAL at 08:01

## 2024-01-29 RX ADMIN — GLYCOPYRROLATE 0.2 MG: 0.2 INJECTION, SOLUTION INTRAMUSCULAR; INTRAVENOUS at 04:01

## 2024-01-29 RX ADMIN — HYDROMORPHONE HYDROCHLORIDE 0.2 MG: 2 INJECTION INTRAMUSCULAR; INTRAVENOUS; SUBCUTANEOUS at 05:01

## 2024-01-29 RX ADMIN — PHENYLEPHRINE HYDROCHLORIDE 100 MCG: 10 INJECTION INTRAVENOUS at 04:01

## 2024-01-29 RX ADMIN — Medication 20 MG: at 03:01

## 2024-01-29 RX ADMIN — ROCURONIUM BROMIDE 5 MG: 10 SOLUTION INTRAVENOUS at 03:01

## 2024-01-29 RX ADMIN — MIDAZOLAM HYDROCHLORIDE 2 MG: 1 INJECTION, SOLUTION INTRAMUSCULAR; INTRAVENOUS at 03:01

## 2024-01-29 RX ADMIN — FENTANYL CITRATE 50 MCG: 50 INJECTION, SOLUTION INTRAMUSCULAR; INTRAVENOUS at 03:01

## 2024-01-29 RX ADMIN — GABAPENTIN 300 MG: 300 CAPSULE ORAL at 02:01

## 2024-01-29 RX ADMIN — Medication 30 MG: at 03:01

## 2024-01-29 RX ADMIN — IOHEXOL 100 ML: 350 INJECTION, SOLUTION INTRAVENOUS at 06:01

## 2024-01-29 RX ADMIN — GABAPENTIN 300 MG: 300 CAPSULE ORAL at 08:01

## 2024-01-29 RX ADMIN — METRONIDAZOLE 500 MG: 5 INJECTION, SOLUTION INTRAVENOUS at 03:01

## 2024-01-29 RX ADMIN — DEXAMETHASONE SODIUM PHOSPHATE 4 MG: 4 INJECTION, SOLUTION INTRA-ARTICULAR; INTRALESIONAL; INTRAMUSCULAR; INTRAVENOUS; SOFT TISSUE at 03:01

## 2024-01-29 RX ADMIN — CEFAZOLIN SODIUM 2 G: 2 SOLUTION INTRAVENOUS at 03:01

## 2024-01-29 NOTE — TRANSFER OF CARE
"Anesthesia Transfer of Care Note    Patient: Quintin Ling    Procedure(s) Performed: Procedure(s) (LRB):  LAPAROSCOPY, DIAGNOSTIC (N/A)  BIOPSY, PERITONEUM (N/A)    Patient location: PACU    Anesthesia Type: general    Transport from OR: Transported from OR on room air with adequate spontaneous ventilation    Post pain: adequate analgesia    Post assessment: no apparent anesthetic complications and tolerated procedure well    Post vital signs: stable    Level of consciousness: awake, responds to stimulation and alert    Nausea/Vomiting: no nausea/vomiting    Complications: none    Transfer of care protocol was followedComments: Report given to PACU RN at bedside. RN given opportunity to ask questions or clarify concerns. No Concerns verbalized. RN was asked if ready to assume care of patient. RN verbally confirmed. Pt. left in stable condition. SV. Vital Signs Return to Near Baseline. No s/s of distress noted.     Last vitals: Visit Vitals  BP (!) 176/73 (BP Location: Right arm, Patient Position: Sitting)   Pulse 62   Temp 37.1 °C (98.8 °F) (Temporal)   Resp 20   Ht 5' 5" (1.651 m)   Wt 103.9 kg (229 lb)   SpO2 97%   BMI 38.11 kg/m²     "

## 2024-01-29 NOTE — OP NOTE
[facility] ([facility])  Colon and Rectal Surgery  Operative Note    SUMMARY     Date of Procedure: 1/29/2024     Procedure: Procedure(s) (LRB):  LAPAROSCOPY, DIAGNOSTIC (N/A)  BIOPSY, PERITONEUM (N/A)     Surgeon(s) and Role:     * Tosin Boothe MD - Primary    Assisting Surgeon: None    Pre-Operative Diagnosis: Colonic mass [K63.89]    Post-Operative Diagnosis: Post-Op Diagnosis Codes:     * Colonic mass [K63.89]    Anesthesia: General    Operative Findings (including complications, if any): firm peritoneal implants, intraoperative surgical oncology/ colectomy not performed    Description of Technical Procedures:  The patient was identified in the preoperative holding area.  After informed consent was obtained he was taken to the operating room placed on the operating table in supine position.  General anesthesia was administered.  He was then prepped and draped in the usual fashion.  Preoperative antibiotics were given.  A time-out was performed indicating the correct patient procedure and operative site and all the room were in agreement.    A stab incision in the left upper quadrant was made at martinez's 2 cm off the costal margin at the midclavicular line.  A Veress needle was used to access the abdomen and pneumoperitoneum was achieved without any compromise to underlying cardiorespiratory status.  Using the Optiview technique an 8 mm robotic trocar was advanced into the abdomen under direct visualization.  Upon entry to the abdomen there was noted to be no underlying iatrogenic injury.  However on inspection of the abdomen there were noted to be dense plaque-like peritoneal masses throughout the abdomen.  There was also starting on the small bowel as well as the omentum and the transverse colon.  Additional robotic trocars were placed in the robot was docked without any adverse events.  At this time Dr. Stark with surgical oncology was consulted.  Please see her operative dictation.  After  discussion and confirmation that these were malignant appearing peritoneal implants that were unlikely to be from the colon as his unresectable adenoma of the cecum did not appear to be malignant.  This time I elected to not perform his colectomy.  Dr. Stark had taken adequate biopsies of the peritoneal implants.  Patient is to be admitted to the floor for ongoing workup.    Significant Surgical Tasks Conducted by the Assistant(s), if Applicable: none    Estimated Blood Loss (EBL): * No values recorded between 1/29/2024  3:40 PM and 1/29/2024  5:08 PM *           Implants: * No implants in log *    Specimens:   Specimen (24h ago, onward)       Start     Ordered    01/29/24 1616  Specimen to Pathology, Surgery General Surgery  Once        Comments: Pre-op Diagnosis: Colonic mass [K63.89]Procedure(s):XI ROBOTIC COLECTOMY, RIGHT Number of specimens: 3Name of specimens: 1. Peritoneal implant-frozen/perm2. Peritoneal implant-perm3.Peritoneal Plaque-perm     References:    Click here for ordering Quick Tip   Question Answer Comment   Procedure Type: General Surgery    Specimen Class: Routine/Screening    Which provider would you like to cc? MAX NDIAYE    Release to patient Immediate        01/29/24 7049                     Condition: Good    Malignancy: Procedure performed for malignancy no    Disposition: PACU - hemodynamically stable.    Attestation: I performed the procedure.

## 2024-01-29 NOTE — ANESTHESIA PROCEDURE NOTES
Intubation    Date/Time: 1/29/2024 3:15 PM    Performed by: Kenney Cannon CRNA  Authorized by: Jean Claude Turner MD    Intubation:     Induction:  Intravenous    Intubated:  Postinduction    Mask Ventilation:  Easy mask    Attempts:  1    Attempted By:  CRNA    Method of Intubation:  Direct    Blade:  Kun 3    Laryngeal View Grade: Grade III - only epiglottis visible      Difficult Airway Encountered?: No      Complications:  None    Airway Device:  Oral endotracheal tube    Airway Device Size:  7.5    Style/Cuff Inflation:  Cuffed (inflated to minimal occlusive pressure)    Tube secured:  23    Secured at:  The lips    Placement Verified By:  Capnometry    Complicating Factors:  Large/floppy epiglottis, obesity and short neck    Findings Post-Intubation:  BS equal bilateral and atraumatic/condition of teeth unchanged

## 2024-01-29 NOTE — INTERVAL H&P NOTE
The patient has been examined and the H&P has been reviewed:    I concur with the findings and no changes have occurred since H&P was written.    Anesthesia/Surgery risks, benefits and alternative options discussed and understood by patient/family. To OR for robotic R hemicolectomy. Consent reviewed and all questions answered          There are no hospital problems to display for this patient.

## 2024-01-29 NOTE — ANESTHESIA PREPROCEDURE EVALUATION
01/29/2024  Quintin Ling is a 73 y.o., male.    Patient Active Problem List   Diagnosis    Essential hypertension    Prediabetes    Severe obesity (BMI 35.0-39.9) with comorbidity    Prostate cancer screening    Erectile dysfunction    Nocturia    Stage 3a chronic kidney disease    Lumbar radiculopathy    Heart murmur    Normocytic anemia    Preop cardiovascular exam     Past Surgical History:   Procedure Laterality Date    COLONOSCOPY N/A 1/5/2024    Procedure: COLONOSCOPY;  Surgeon: Irasema Gil MD;  Location: [facility];  Service: Endoscopy;  Laterality: N/A;    EPIDURAL STEROID INJECTION N/A 11/13/2023    Procedure: Lumbar L5/S1 IL JOSE;  Surgeon: Oneil Carlson MD;  Location: [facility];  Service: Pain Management;  Laterality: N/A;    ESOPHAGOGASTRODUODENOSCOPY N/A 1/5/2024    Procedure: EGD (ESOPHAGOGASTRODUODENOSCOPY);  Surgeon: Irasema Gil MD;  Location: [facility];  Service: Endoscopy;  Laterality: N/A;    NO PAST SURGERIES         Pre-op Assessment    I have reviewed the Patient Summary Reports.     I have reviewed the Nursing Notes. I have reviewed the NPO Status.   I have reviewed the Medications.     Review of Systems  Anesthesia Hx:  No problems with previous Anesthesia             Denies Family Hx of Anesthesia complications.    Denies Personal Hx of Anesthesia complications.                    Social:  Non-Smoker       Hematology/Oncology:  Hematology Normal                                     Cardiovascular:     Hypertension Valvular problems/Murmurs (Moderate AS), AS          hyperlipidemia   ECG has been reviewed.                          Pulmonary:  Pulmonary Normal                       Renal/:  Chronic Renal Disease, CKD                Hepatic/GI:        Colon mass          Musculoskeletal:         Spine Disorders: lumbar Degenerative disease            Neurological:  Neurology Normal                                      Endocrine:  Diabetes         Obesity / BMI > 30      Physical Exam  General: Alert and Cooperative    Airway:  Mallampati: III   Mouth Opening: Normal  TM Distance: Normal  Neck ROM: Normal ROM    Dental:  Intact        Anesthesia Plan  Type of Anesthesia, risks & benefits discussed:    Anesthesia Type: Gen ETT  Intra-op Monitoring Plan: Standard ASA Monitors  Post Op Pain Control Plan: multimodal analgesia  Induction:  IV  Airway Plan: Video, Post-Induction  Informed Consent: Informed consent signed with the Patient and all parties understand the risks and agree with anesthesia plan.  All questions answered.   ASA Score: 3  Day of Surgery Review of History & Physical: I have interviewed and examined the patient. I have reviewed the patient's H&P dated:     Ready For Surgery From Anesthesia Perspective.     .    Chemistry        Component Value Date/Time     01/19/2024 1245    K 4.2 01/19/2024 1245     01/19/2024 1245    CO2 26 01/19/2024 1245    BUN 20 01/19/2024 1245    CREATININE 1.4 01/19/2024 1245     01/19/2024 1245        Component Value Date/Time    CALCIUM 9.6 01/19/2024 1245    ALKPHOS 56 01/19/2024 1245    AST 19 01/19/2024 1245    ALT 10 01/19/2024 1245    BILITOT 0.7 01/19/2024 1245    ESTGFRAFRICA >60.0 05/30/2022 0958    EGFRNONAA >60.0 05/30/2022 0958        Lab Results   Component Value Date    WBC 8.60 01/19/2024    HGB 12.0 (L) 01/19/2024    HCT 36.6 (L) 01/19/2024    MCV 87 01/19/2024     01/19/2024       10/2/23:      Left Ventricle: The left ventricle is normal in size. Moderately increased ventricular mass. Moderately increased wall thickness. There is concentric hypertrophy. Normal wall motion. There is normal systolic function with a visually estimated ejection fraction of 55 - 60%. There is normal diastolic function.    Left Atrium: Left atrium is mildly dilated.    Right Ventricle: Normal right  ventricular cavity size. Wall thickness is normal. Right ventricle wall motion  is normal. Systolic function is normal.    Aortic Valve: There is mild to moderate stenosis. Aortic valve area by VTI is 1.11 cm². Aortic valve peak velocity is 2.52 m/s. Mean gradient is 17 mmHg. The dimensionless index is 0.36.    Mitral Valve: There is mild mitral annular calcification present.    Pulmonary Artery: The estimated pulmonary artery systolic pressure is 20 mmHg.    IVC/SVC: Normal venous pressure at 3 mmHg.

## 2024-01-29 NOTE — OP NOTE
[facility]  Surgical Oncology  Operative Note           Date of Procedure: 1/29/2024   Time: 4:36 PM    Surgeon(s) and Role:    * Magda Stark- Primary     Pre-Operative Diagnosis:   Peritoneal disease    Post-Operative Diagnosis:   Peritoneal disease    Anesthesia: General    Procedure:  Excisional biopsy peritoneal lesions  Diagnostic laparoscopy     Procedure in Detail:    Called by Dr. Boothe for intraoperative consult.  Upon entering the OR the patient was already intubated and sedated in the robot had already been docked.  He had significant peritoneal disease with innumerous thickened plaques along the peritoneum in bilateral upper quadrants as well as tracking along the falciform ligament.  Upon further inspection he had small areas of peritoneal disease evident within the mesentery of the small bowel as well as several large and firm nodules within his omentum.  This was very concerning for malignancy potentially mesothelioma or lymphoma, or metastatic disease from colonic or gastric origins (although those seem less likely based on his recent colonoscopy and EGD).  Scissors were used to excise peritoneal nodule at the peritoneal side of the falciform ligament, additional peritoneal plaques were taken down sharply with scissors for frozen as well as lymphoma and permanent pathology.  His liver and falciform were densely adherent to the anterior abdominal wall by these thick plaques and due to his positioning I was unable to evaluate the left and right lower quadrants.  Based on my visibility I estimate his PCI to be at least 10 potentially more.  I discussed this with Dr. Boothe who agrees with aborting his colectomy since he is asymptomatic from this polyp in favor of further workup.  See Dr. Boothe's operative report for further details.      Portions of the record were created with [employer] Direct voice recognition software. This may lead to occasional typographical errors  due to the inherent limitations of the software. Read the chart carefully and recognize, using context, where substitutions have occurred. Please do not hesitate to contact me directly if clarification is needed.               Magda Stark MD      Surgical Oncology      1/29/2024, 4:36 PM

## 2024-01-30 VITALS
RESPIRATION RATE: 18 BRPM | SYSTOLIC BLOOD PRESSURE: 137 MMHG | HEIGHT: 65 IN | HEART RATE: 58 BPM | BODY MASS INDEX: 38.15 KG/M2 | TEMPERATURE: 99 F | OXYGEN SATURATION: 95 % | DIASTOLIC BLOOD PRESSURE: 64 MMHG | WEIGHT: 229 LBS

## 2024-01-30 PROCEDURE — 99900035 HC TECH TIME PER 15 MIN (STAT)

## 2024-01-30 PROCEDURE — 25000003 PHARM REV CODE 250: Performed by: STUDENT IN AN ORGANIZED HEALTH CARE EDUCATION/TRAINING PROGRAM

## 2024-01-30 RX ORDER — HYDROCODONE BITARTRATE AND ACETAMINOPHEN 5; 325 MG/1; MG/1
1 TABLET ORAL EVERY 6 HOURS PRN
Qty: 12 TABLET | Refills: 0 | Status: SHIPPED | OUTPATIENT
Start: 2024-01-30 | End: 2024-02-02

## 2024-01-30 RX ADMIN — METOPROLOL SUCCINATE 100 MG: 50 TABLET, EXTENDED RELEASE ORAL at 11:01

## 2024-01-30 RX ADMIN — GABAPENTIN 300 MG: 300 CAPSULE ORAL at 11:01

## 2024-01-30 NOTE — DISCHARGE SUMMARY
[facility]  Cardiac Electrophysiology  Discharge Summary      Patient Name: Quintin Ling  MRN: [id number]  Admission Date: 1/29/2024  Hospital Length of Stay: 1 days  Discharge Date and Time: No discharge date for patient encounter.  Attending Physician: Tosin Boothe MD    Discharging Provider: Tosin Boothe MD  Primary Care Physician: Bishop Gabriel MD    HPI:   No notes on file    Procedure(s) (LRB):  LAPAROSCOPY, DIAGNOSTIC (N/A)  BIOPSY, PERITONEUM (N/A)     Indwelling Lines/Drains at time of discharge:  Lines/Drains/Airways       None                   Hospital Course:  Admitted postoperatively after aborted colectomy 2/2 findings of carcinomatosis. Staging imaging done. Tolerating diet post op    Consults: surgical oncology    Significant Diagnostic Studies: Radiology: CT scan: CT ABDOMEN PELVIS WITH CONTRAST: No results found for this visit on 01/29/24.    Pending Diagnostic Studies:       Procedure Component Value Units Date/Time    Leukemia/Lymphoma Screen - Lymph Node Has a separate specimen been submitted and ordered for surgical pathology? Yes [[id number]] Collected: 01/29/24 1644    Order Status: Sent Lab Status: In process Updated: 01/29/24 1645    Specimen: Lymph Node     Specimen to Pathology, Surgery General Surgery [[id number]] Collected: 01/29/24 1644    Order Status: Sent Lab Status: In process Updated: 01/30/24 0815    Specimen: Tissue             Final Active Diagnoses:    Diagnosis Date Noted POA    PRINCIPAL PROBLEM:  Adenoma of colon [D12.6] 01/29/2024 Yes      Problems Resolved During this Admission:     Assessment & plan notes cannot be loaded without a specified hospital service.      Discharged Condition: good    Disposition: Home or Self Care    Follow Up:    Patient Instructions:      Diet Adult Regular     Notify your health care provider if you experience any of the following:  temperature >100.4     Notify your health care provider if you experience any of  the following:  persistent nausea and vomiting or diarrhea     Notify your health care provider if you experience any of the following:  severe uncontrolled pain     Notify your health care provider if you experience any of the following:  redness, tenderness, or signs of infection (pain, swelling, redness, odor or green/yellow discharge around incision site)     Activity as tolerated     Medications:  Reconciled Home Medications:      Medication List        START taking these medications      HYDROcodone-acetaminophen 5-325 mg per tablet  Commonly known as: NORCO  Take 1 tablet by mouth every 6 (six) hours as needed for Pain.            CONTINUE taking these medications      amLODIPine 5 MG tablet  Commonly known as: NORVASC  TAKE 1 TABLET BY MOUTH EVERY DAY     atorvastatin 20 MG tablet  Commonly known as: LIPITOR  TAKE 1 TABLET BY MOUTH EVERY DAY     doxazosin 4 MG tablet  Commonly known as: CARDURA  Take 4 mg by mouth once daily.     fenofibrate 54 MG tablet  Commonly known as: TRICOR  Take 1 tablet (54 mg total) by mouth once daily.     hydroCHLOROthiazide 25 MG tablet  Commonly known as: HYDRODIURIL  TAKE 1 TABLET BY MOUTH EVERY DAY IN THE MORNING     metFORMIN 500 MG tablet  Commonly known as: GLUCOPHAGE  TAKE 1 TABLET BY MOUTH TWICE A DAY WITH FOOD     metoprolol succinate 100 MG 24 hr tablet  Commonly known as: TOPROL-XL  Take 1 tablet (100 mg total) by mouth 2 (two) times daily.     metroNIDAZOLE 500 MG tablet  Commonly known as: FLAGYL  Take 1 tablet (500 mg total) by mouth 3 (three) times daily. Take initial dose at 2pm, second dose at 3pm and final dose at 10pm the day before surgery     neomycin 500 mg Tab  Commonly known as: MYCIFRADIN  Take 2 tablets (1,000 mg total) by mouth 3 (three) times daily. Take initial dose at 2pm, take second dose at 3pm and take final dose at 10pm the day before surgery.     olmesartan 40 MG tablet  Commonly known as: BENICAR  Take 1 tablet (40 mg total) by mouth once  daily.     polyethylene glycol 17 gram/dose powder  Commonly known as: GLYCOLAX  Take 238 g by mouth once daily. Mix with 2L of gatorade and drink all of mixed solution starting at 12pm the day before surgery, finishing by 10pm the night before surgery.     pregabalin 75 MG capsule  Commonly known as: LYRICA  Take 1 capsule (75 mg total) by mouth 2 (two) times daily.     tiZANidine 4 MG tablet  Commonly known as: ZANAFLEX  TAKE 1/2-1 TABLET BY MOUTH 2 TIMES DAILY AS NEEDED FOR MUSCLE SPASMS MAY CAUSE DROWSINESS.     traMADoL 50 mg tablet  Commonly known as: ULTRAM  Take 1 tablet (50 mg total) by mouth every 6 (six) hours as needed for Pain.              Time spent on the discharge of patient: 20 minutes    Tosin Boothe MD  Cardiac Electrophysiology  [facility]

## 2024-01-30 NOTE — PLAN OF CARE
Problem: Adult Inpatient Plan of Care  Goal: Plan of Care Review  1/30/2024 0459 by Feli Colby RN  Outcome: Ongoing, Progressing  1/30/2024 0459 by Feli Colby RN  Outcome: Ongoing, Progressing  Flowsheets (Taken 1/30/2024 0459)  Plan of Care Reviewed With:   patient   spouse     Problem: Fall Injury Risk  Goal: Absence of Fall and Fall-Related Injury  1/30/2024 0459 by Feli Colby RN  Outcome: Ongoing, Progressing  1/30/2024 0459 by Feli Colby RN  Outcome: Ongoing, Progressing     Problem: Pain Acute  Goal: Acceptable Pain Control and Functional Ability  Outcome: Ongoing, Progressing

## 2024-01-30 NOTE — PLAN OF CARE
[facility]  Initial Discharge Assessment       Primary Care Provider: Bishop Gabriel MD    Admission Diagnosis: Colonic mass [K63.89]  Adenoma of colon [D12.6]    Admission Date: 1/29/2024  Expected Discharge Date: 1/30/2024    Transition of Care Barriers: (P) None    Payor: BCBS [place] MEDICARE / Plan[facility] / Product Type: Medicare Advantage /     Extended Emergency Contact Information  Primary Emergency Contact: roderick mota  Mobile Phone: [phone]  Relation: Son  Secondary Emergency Contact: Emily Mota  Mobile Phone: [phone]  Relation: Spouse    Discharge Plan A: (P) Home with family  Discharge Plan B: (P) Home with family      [id number] - [place], [place] - [address] [address]  [address] [address] [address]  Phone: [phone] Fax: [phone]      Initial Assessment (most recent)       Adult Discharge Assessment - 01/30/24 1329          Discharge Assessment    Assessment Type Discharge Planning Assessment (P)      Confirmed/corrected address, phone number and insurance Yes (P)      Confirmed Demographics Correct on Facesheet (P)      Source of Information patient;family (P)      Communicated MERCEDEZ with patient/caregiver Yes (P)      Reason For Admission Colon Mass (P)      People in Home spouse (P)      Facility Arrived From: home (P)      Do you expect to return to your current living situation? Yes (P)      Do you have help at home or someone to help you manage your care at home? Yes (P)      Who are your caregiver(s) and their phone number(s)? spouse (P)      Prior to hospitilization cognitive status: Alert/Oriented (P)      Current cognitive status: Alert/Oriented (P)      Walking or Climbing Stairs Difficulty no (P)      Dressing/Bathing Difficulty no (P)      Equipment Currently Used at Home none (P)      Readmission within 30 days? No (P)      Patient currently being followed by outpatient case management? No (P)      Do you  currently have service(s) that help you manage your care at home? No (P)      Do you take prescription medications? Yes (P)      Do you have prescription coverage? Yes (P)      Do you have any problems affording any of your prescribed medications? No (P)      Who is going to help you get home at discharge? spouse (P)      How do you get to doctors appointments? car, drives self;family or friend will provide (P)      Are you on dialysis? No (P)      Do you take coumadin? No (P)      Discharge Plan A Home with family (P)      Discharge Plan B Home with family (P)      DME Needed Upon Discharge  none (P)      Discharge Plan discussed with: Spouse/sig other (P)      Transition of Care Barriers None (P)                    Patient lives with spouse who can be his help at home.  He is independent with ADL's.  Currently no needs.

## 2024-01-30 NOTE — ANESTHESIA POSTPROCEDURE EVALUATION
Anesthesia Post Evaluation    Patient: Quintin Ling    Procedure(s) Performed: Procedure(s) (LRB):  LAPAROSCOPY, DIAGNOSTIC (N/A)  BIOPSY, PERITONEUM (N/A)    Final Anesthesia Type: general      Patient location during evaluation: PACU  Patient participation: Yes- Able to Participate  Level of consciousness: awake  Post-procedure vital signs: reviewed and stable  Pain management: adequate  Airway patency: patent    PONV status at discharge: No PONV  Anesthetic complications: no      Cardiovascular status: hemodynamically stable  Respiratory status: unassisted  Hydration status: euvolemic  Follow-up not needed.              Vitals Value Taken Time   /63 01/29/24 1836   Temp 36.2 °C (97.1 °F) 01/29/24 1700   Pulse 73 01/29/24 1836   Resp 17 01/29/24 1836   SpO2 97 % 01/29/24 1836         Event Time   Out of Recovery 18:15:33         Pain/Boston Score: Pain Rating Prior to Med Admin: 5 (1/29/2024  5:55 PM)  Boston Score: 10 (1/29/2024  6:00 PM)

## 2024-01-30 NOTE — PLAN OF CARE
[facility]  Discharge Final Note    Primary Care Provider: Bishop Gabriel MD    Expected Discharge Date: 1/30/2024    Final Discharge Note (most recent)       Final Note - 01/30/24 1331          Final Note    Assessment Type Final Discharge Note (P)      Anticipated Discharge Disposition Home or Self Care (P)      Hospital Resources/Appts/Education Provided Appointments scheduled and added to AVS (P)         Post-Acute Status    Discharge Delays None known at this time (P)                      Important Message from Medicare

## 2024-01-31 ENCOUNTER — TELEPHONE (OUTPATIENT)
Dept: FAMILY MEDICINE | Facility: CLINIC | Age: 74
End: 2024-01-31
Payer: MEDICARE

## 2024-01-31 ENCOUNTER — PATIENT MESSAGE (OUTPATIENT)
Dept: SURGERY | Facility: CLINIC | Age: 74
End: 2024-01-31
Payer: MEDICARE

## 2024-01-31 LAB
FLOW CYTOMETRY ANTIBODIES ANALYZED - LYMPH NODE: NORMAL
FLOW CYTOMETRY COMMENT - LYMPH NODE: NORMAL
FLOW CYTOMETRY INTERPRETATION - LYMPH NODE: NORMAL

## 2024-01-31 NOTE — TELEPHONE ENCOUNTER
Spoke to pt and told him it was Albina Ochoa with colon surgery trying to reach him and not anyone from Dr. Gabriel's office. Pt was given [phone] to call and speak with Albina Ochoa RN.

## 2024-01-31 NOTE — TELEPHONE ENCOUNTER
----- Message from Aleshia Desai sent at 1/31/2024  8:58 AM CST -----  Type:  Patient Returning Call    Who Called:pt  Who Left Message for Patient:nurse  Does the patient know what this is regarding?:return call  Would the patient rather a call back or a response via [employer]? call  Best Call Back Number: [phone]  Additional Information: .    Thank you

## 2024-02-06 ENCOUNTER — TELEPHONE (OUTPATIENT)
Dept: PAIN MEDICINE | Facility: CLINIC | Age: 74
End: 2024-02-06
Payer: MEDICARE

## 2024-02-06 DIAGNOSIS — M54.16 LUMBAR RADICULOPATHY: ICD-10-CM

## 2024-02-06 RX ORDER — PREGABALIN 75 MG/1
75 CAPSULE ORAL 2 TIMES DAILY
Qty: 60 CAPSULE | Refills: 3 | Status: SHIPPED | OUTPATIENT
Start: 2024-02-06 | End: 2024-06-14

## 2024-02-06 NOTE — TELEPHONE ENCOUNTER
Good Morning/Afternoon,     Pt is requesting for a refill of: Pregabalin 75 mg  Last filed: 8/29/23 with 3 refills  Last encounter: 12/28/23  Up coming apt: 4/12/24  Pharmacy: [id number] - [address]  Is this something you can do?      Rachele Carrasquillo  Medical Assistant

## 2024-02-06 NOTE — TELEPHONE ENCOUNTER
----- Message from Salima Hoovergabyaleksandrmerritt sent at 2/6/2024  8:40 AM CST -----  Contact: pt  Pt is calling in regard to his med, pregabalin (LYRICA) 75 MG capsule and says that the pharm told him that he no more refills.  Please call him to talk about this at [phone]  or [phone] lester/bere

## 2024-02-08 LAB
FINAL PATHOLOGIC DIAGNOSIS: NORMAL
GROSS: NORMAL
Lab: NORMAL
MICROSCOPIC EXAM: NORMAL

## 2024-02-14 ENCOUNTER — OFFICE VISIT (OUTPATIENT)
Dept: SURGERY | Facility: CLINIC | Age: 74
End: 2024-02-14
Payer: MEDICARE

## 2024-02-14 VITALS — SYSTOLIC BLOOD PRESSURE: 164 MMHG | DIASTOLIC BLOOD PRESSURE: 75 MMHG

## 2024-02-14 DIAGNOSIS — D12.6 ADENOMA OF COLON: Primary | ICD-10-CM

## 2024-02-14 PROCEDURE — 3077F SYST BP >= 140 MM HG: CPT | Mod: CPTII,S$GLB,, | Performed by: STUDENT IN AN ORGANIZED HEALTH CARE EDUCATION/TRAINING PROGRAM

## 2024-02-14 PROCEDURE — 99215 OFFICE O/P EST HI 40 MIN: CPT | Mod: S$GLB,,, | Performed by: STUDENT IN AN ORGANIZED HEALTH CARE EDUCATION/TRAINING PROGRAM

## 2024-02-14 PROCEDURE — 99999 PR PBB SHADOW E&M-EST. PATIENT-LVL III: CPT | Mod: PBBFAC,,, | Performed by: STUDENT IN AN ORGANIZED HEALTH CARE EDUCATION/TRAINING PROGRAM

## 2024-02-14 PROCEDURE — 1159F MED LIST DOCD IN RCRD: CPT | Mod: CPTII,S$GLB,, | Performed by: STUDENT IN AN ORGANIZED HEALTH CARE EDUCATION/TRAINING PROGRAM

## 2024-02-14 PROCEDURE — 1126F AMNT PAIN NOTED NONE PRSNT: CPT | Mod: CPTII,S$GLB,, | Performed by: STUDENT IN AN ORGANIZED HEALTH CARE EDUCATION/TRAINING PROGRAM

## 2024-02-14 PROCEDURE — 1111F DSCHRG MED/CURRENT MED MERGE: CPT | Mod: CPTII,S$GLB,, | Performed by: STUDENT IN AN ORGANIZED HEALTH CARE EDUCATION/TRAINING PROGRAM

## 2024-02-14 PROCEDURE — 3078F DIAST BP <80 MM HG: CPT | Mod: CPTII,S$GLB,, | Performed by: STUDENT IN AN ORGANIZED HEALTH CARE EDUCATION/TRAINING PROGRAM

## 2024-02-14 RX ORDER — ACETAMINOPHEN 500 MG
1000 TABLET ORAL
Status: CANCELLED | OUTPATIENT
Start: 2024-02-14 | End: 2024-02-14

## 2024-02-14 RX ORDER — SODIUM CHLORIDE 9 MG/ML
INJECTION, SOLUTION INTRAVENOUS CONTINUOUS
Status: CANCELLED | OUTPATIENT
Start: 2024-02-14

## 2024-02-14 RX ORDER — NEOMYCIN SULFATE 500 MG/1
1000 TABLET ORAL 3 TIMES DAILY
Qty: 6 TABLET | Refills: 0 | Status: ON HOLD | OUTPATIENT
Start: 2024-02-14 | End: 2024-03-20 | Stop reason: HOSPADM

## 2024-02-14 RX ORDER — CEFAZOLIN SODIUM 2 G/50ML
2 SOLUTION INTRAVENOUS
Status: CANCELLED | OUTPATIENT
Start: 2024-02-14

## 2024-02-14 RX ORDER — GABAPENTIN 100 MG/1
200 CAPSULE ORAL
Status: CANCELLED | OUTPATIENT
Start: 2024-02-14

## 2024-02-14 RX ORDER — METRONIDAZOLE 500 MG/100ML
500 INJECTION, SOLUTION INTRAVENOUS
Status: CANCELLED | OUTPATIENT
Start: 2024-02-14

## 2024-02-14 RX ORDER — POLYETHYLENE GLYCOL 3350 17 G/17G
238 POWDER, FOR SOLUTION ORAL DAILY
Qty: 238 G | Refills: 0 | Status: SHIPPED | OUTPATIENT
Start: 2024-02-14 | End: 2024-04-05

## 2024-02-14 RX ORDER — MUPIROCIN 20 MG/G
OINTMENT TOPICAL
Status: CANCELLED | OUTPATIENT
Start: 2024-02-14

## 2024-02-14 RX ORDER — METRONIDAZOLE 500 MG/1
500 TABLET ORAL 3 TIMES DAILY
Qty: 3 TABLET | Refills: 0 | Status: ON HOLD | OUTPATIENT
Start: 2024-02-14 | End: 2024-03-20 | Stop reason: HOSPADM

## 2024-02-14 NOTE — PROGRESS NOTES
Subjective:       Patient ID: Quintin Ling is a 73 y.o. male.    Chief Complaint: Post-op Evaluation (2 week post op)    Patient is a 73-year-old male who presents as a follow-up after diagnostic laparoscopy with a aborted right hemicolectomy.  Intraoperative findings included multiple plaques on the peritoneum as well as omentum.  This was concerning for undiagnosed malignancy and so biopsies were performed and procedure was aborted.  Since then pathology has resulted with benign peritoneal plaque disease.  Patient was previously asymptomatic from GI symptoms.  He continues to be so.  Has no other complaints today.        Colonoscopy:  A frond-like/villous, infiltrative and sessile non-obstructing large mass was found in the cecum. The mass was partially circumferential (involving two-thirds of the lumen circumference). No bleeding was present. Biopsies were taken with a cold forceps for histology. Area was tattooed with an injection of 3 mL of Spot (carbon black).        A 1 mm polyp was found in the ascending colon. The polyp was sessile. The polyp was removed with a jumbo cold forceps. Resection and retrieval were complete.        Two sessile polyps were found in the ascending colon. The polyps were 3 to 4 mm in size. These polyps were removed with a cold snare.        Resection and retrieval were complete.        A 7 mm polyp was found in the descending colon. The polyp was flat. Area was successfully injected with 3 mL of a 0.1 mg/mL solution of epinephrine for a lift polypectomy. The polyp was removed with a hot snare. Resection and retrieval were complete.        A 1 mm polyp was found in the descending colon. The polyp was sessile. The polyp was removed with a jumbo cold forceps. Resection and retrieval were complete.        The exam was otherwise without abnormality.        Hemorrhoids were found on perianal exam.     Pathology: tubular adenoma for all polyps, no HGD     SH: denies smoking. Social EtOH,  no drug use     FH: Denies FH of colorectal cancer, polyps or IBD        Objective:      Physical Exam  Constitutional:       Appearance: He is well-developed.   HENT:      Head: Normocephalic and atraumatic.   Eyes:      Conjunctiva/sclera: Conjunctivae normal.      Pupils: Pupils are equal, round, and reactive to light.   Neck:      Thyroid: No thyromegaly.   Cardiovascular:      Rate and Rhythm: Normal rate and regular rhythm.   Pulmonary:      Effort: Pulmonary effort is normal. No respiratory distress.   Abdominal:      General: There is no distension.      Palpations: Abdomen is soft. There is no mass.      Tenderness: There is no abdominal tenderness.   Musculoskeletal:         General: No tenderness. Normal range of motion.      Cervical back: Normal range of motion.   Skin:     General: Skin is warm and dry.      Capillary Refill: Capillary refill takes less than 2 seconds.   Neurological:      General: No focal deficit present.      Mental Status: He is alert and oriented to person, place, and time.         CT ch/a/p  FINDINGS:  Free intraperitoneal gas and superficial anterior abdominal wall gas. Correlate to history of prior procedure or if not there is non recommend correlation to ruptured viscus     Cystic lesion superficially in the anterior subcutaneous chest at the level of the manubrium measuring up to 4 cm of uncertain origin. Cystic metastatic disease cannot entirely be excluded.     Atherosclerotic changes     No bowel obstruction.  No free fluid.  No hydronephrosis.  No splenic enlargement.  Adrenal glands pancreas are grossly unremarkable.  Irregular left   liver margin with question postsurgical changes in  adjacent to the anterior capsule.  No pancreatic mass is identified.  Aortic valve calcification suspected.  No evidence for pneumonia.  Mild subpleural the thickening or subsegmental atelectasis left lower lobe.  Gas within the bladder.  Tiny right renal cyst.  Fatty infiltration of  liver.  Mild degenerative joint disease.  Assessment:       1. Adenoma of colon        Plan:       - will discuss case with surgical oncology. Pathology benign but still malignant appearing on intraoperative findings  - at minimum I will plan to excise the omental lesion intraoperatively   - will consider adding on to tumor board as well  - plan to proceed with robotic R hemicolectomy 3/11/24  - preop ERAS protocol  - Discussed risks, benefits and alternatives including bleeding, infection, damage to surrounding structures (intestine, ureter, bladder, blood vessels), anastomotic leak, prolonged hospitalization, postoperative ileus, hernia formation, and anesthetic complications such as heart attack, stroke or death. Patient agreed to proceed.        >60 minutes were spent on chart review as well as face to face discussion with the patient including education on pathophysiology of disease and treatment recommendations        Tosin Boothe MD  Colon and Rectal Surgery  [facility]

## 2024-02-28 ENCOUNTER — TELEPHONE (OUTPATIENT)
Dept: SURGERY | Facility: CLINIC | Age: 74
End: 2024-02-28
Payer: MEDICARE

## 2024-02-28 DIAGNOSIS — K63.89 COLONIC MASS: Primary | ICD-10-CM

## 2024-02-28 NOTE — TELEPHONE ENCOUNTER
RN called pt to schedule for pre-op labs because 6 weeks has lapsed since last lab work. Successfully scheduled pt. 02/28/2024 4:25 PM LINCOLN

## 2024-02-29 ENCOUNTER — OFFICE VISIT (OUTPATIENT)
Dept: FAMILY MEDICINE | Facility: CLINIC | Age: 74
End: 2024-02-29
Payer: MEDICARE

## 2024-02-29 VITALS
BODY MASS INDEX: 38.05 KG/M2 | TEMPERATURE: 98 F | RESPIRATION RATE: 19 BRPM | HEIGHT: 65 IN | DIASTOLIC BLOOD PRESSURE: 70 MMHG | HEART RATE: 67 BPM | OXYGEN SATURATION: 96 % | WEIGHT: 228.38 LBS | SYSTOLIC BLOOD PRESSURE: 130 MMHG

## 2024-02-29 DIAGNOSIS — K76.0 HEPATIC STEATOSIS: ICD-10-CM

## 2024-02-29 DIAGNOSIS — D12.6 ADENOMA OF COLON: Primary | ICD-10-CM

## 2024-02-29 DIAGNOSIS — M54.16 LUMBAR RADICULOPATHY: ICD-10-CM

## 2024-02-29 DIAGNOSIS — I10 ESSENTIAL HYPERTENSION: ICD-10-CM

## 2024-02-29 DIAGNOSIS — D64.9 ANEMIA, UNSPECIFIED TYPE: ICD-10-CM

## 2024-02-29 DIAGNOSIS — R73.03 PREDIABETES: ICD-10-CM

## 2024-02-29 DIAGNOSIS — N18.31 STAGE 3A CHRONIC KIDNEY DISEASE: ICD-10-CM

## 2024-02-29 DIAGNOSIS — E66.01 SEVERE OBESITY (BMI 35.0-39.9) WITH COMORBIDITY: ICD-10-CM

## 2024-02-29 DIAGNOSIS — I73.9 PERIPHERAL ARTERIAL DISEASE: ICD-10-CM

## 2024-02-29 PROCEDURE — 99999 PR PBB SHADOW E&M-EST. PATIENT-LVL V: CPT | Mod: PBBFAC,,, | Performed by: FAMILY MEDICINE

## 2024-02-29 PROCEDURE — 1126F AMNT PAIN NOTED NONE PRSNT: CPT | Mod: CPTII,S$GLB,, | Performed by: FAMILY MEDICINE

## 2024-02-29 PROCEDURE — 1111F DSCHRG MED/CURRENT MED MERGE: CPT | Mod: CPTII,S$GLB,, | Performed by: FAMILY MEDICINE

## 2024-02-29 PROCEDURE — 3078F DIAST BP <80 MM HG: CPT | Mod: CPTII,S$GLB,, | Performed by: FAMILY MEDICINE

## 2024-02-29 PROCEDURE — 3008F BODY MASS INDEX DOCD: CPT | Mod: CPTII,S$GLB,, | Performed by: FAMILY MEDICINE

## 2024-02-29 PROCEDURE — 3288F FALL RISK ASSESSMENT DOCD: CPT | Mod: CPTII,S$GLB,, | Performed by: FAMILY MEDICINE

## 2024-02-29 PROCEDURE — 3075F SYST BP GE 130 - 139MM HG: CPT | Mod: CPTII,S$GLB,, | Performed by: FAMILY MEDICINE

## 2024-02-29 PROCEDURE — 1159F MED LIST DOCD IN RCRD: CPT | Mod: CPTII,S$GLB,, | Performed by: FAMILY MEDICINE

## 2024-02-29 PROCEDURE — 4010F ACE/ARB THERAPY RXD/TAKEN: CPT | Mod: CPTII,S$GLB,, | Performed by: FAMILY MEDICINE

## 2024-02-29 PROCEDURE — 1101F PT FALLS ASSESS-DOCD LE1/YR: CPT | Mod: CPTII,S$GLB,, | Performed by: FAMILY MEDICINE

## 2024-02-29 PROCEDURE — 99214 OFFICE O/P EST MOD 30 MIN: CPT | Mod: S$GLB,,, | Performed by: FAMILY MEDICINE

## 2024-02-29 NOTE — PROGRESS NOTES
Subjective:       Patient ID: Quintin Ling is a 73 y.o. male.    Chief Complaint: Follow-up      HPI Comments:       Current Outpatient Medications:     amLODIPine (NORVASC) 5 MG tablet, TAKE 1 TABLET BY MOUTH EVERY DAY, Disp: 90 tablet, Rfl: 3    atorvastatin (LIPITOR) 20 MG tablet, TAKE 1 TABLET BY MOUTH EVERY DAY, Disp: 90 tablet, Rfl: 3    doxazosin (CARDURA) 4 MG tablet, Take 4 mg by mouth once daily., Disp: , Rfl:     fenofibrate (TRICOR) 54 MG tablet, Take 1 tablet (54 mg total) by mouth once daily., Disp: 90 tablet, Rfl: 3    hydroCHLOROthiazide (HYDRODIURIL) 25 MG tablet, TAKE 1 TABLET BY MOUTH EVERY DAY IN THE MORNING, Disp: 90 tablet, Rfl: 1    metFORMIN (GLUCOPHAGE) 500 MG tablet, TAKE 1 TABLET BY MOUTH TWICE A DAY WITH FOOD, Disp: 180 tablet, Rfl: 1    metoprolol succinate (TOPROL-XL) 100 MG 24 hr tablet, Take 1 tablet (100 mg total) by mouth 2 (two) times daily., Disp: 180 tablet, Rfl: 3    metroNIDAZOLE (FLAGYL) 500 MG tablet, Take 1 tablet (500 mg total) by mouth 3 (three) times daily. Take initial dose at 2pm, second dose at 3pm and final dose at 10pm the day before surgery, Disp: 3 tablet, Rfl: 0    neomycin (MYCIFRADIN) 500 mg Tab, Take 2 tablets (1,000 mg total) by mouth 3 (three) times daily. Take initial dose at 2pm, take second dose at 3pm and take final dose at 10pm the day before surgery., Disp: 6 tablet, Rfl: 0    olmesartan (BENICAR) 40 MG tablet, Take 1 tablet (40 mg total) by mouth once daily., Disp: 90 tablet, Rfl: 2    polyethylene glycol (GLYCOLAX) 17 gram/dose powder, Take 238 g by mouth once daily. Mix with 2L of gatorade and drink all of mixed solution starting at 12pm the day before surgery, finishing by 10pm the night before surgery., Disp: 238 g, Rfl: 0    pregabalin (LYRICA) 75 MG capsule, Take 1 capsule (75 mg total) by mouth 2 (two) times daily., Disp: 60 capsule, Rfl: 3    tiZANidine (ZANAFLEX) 4 MG tablet, TAKE 1/2-1 TABLET BY MOUTH 2 TIMES DAILY AS NEEDED FOR MUSCLE  "SPASMS MAY CAUSE DROWSINESS., Disp: 60 tablet, Rfl: 0    traMADoL (ULTRAM) 50 mg tablet, Take 1 tablet (50 mg total) by mouth every 6 (six) hours as needed for Pain., Disp: 12 tablet, Rfl: 0      Three-month follow-up.      Anemia led to GI workup.  Finding of a cancers looking lesion on the right colon.  Pathology was benign.  Plans to go in a few weeks to perform hemicolectomy.      Now on muscle relaxants for his back.  Steroid shot lasted about 3 months.  Plans to go back soon for next steps.    Liver workup since our last visit.  Severe steatosis without fibrosis    Blood pressure labile.  Compliant with all medications    Follow-up  Associated symptoms include fatigue. Pertinent negatives include no abdominal pain, arthralgias, chest pain, coughing, fever, headaches, myalgias, nausea or sore throat.     Review of Systems   Constitutional:  Positive for fatigue. Negative for activity change, appetite change and fever.   HENT:  Negative for sore throat.    Respiratory:  Negative for cough and shortness of breath.    Cardiovascular:  Negative for chest pain.   Gastrointestinal:  Negative for abdominal pain, diarrhea and nausea.   Genitourinary:  Negative for difficulty urinating.   Musculoskeletal:  Positive for back pain. Negative for arthralgias and myalgias.   Neurological:  Negative for dizziness and headaches.       Objective:      Vitals:    02/29/24 0806 02/29/24 0809 02/29/24 0817   BP: 110/60 (!) 152/60 130/70   Pulse: 67     Resp: 19     Temp: 97.6 °F (36.4 °C)     TempSrc: Tympanic     SpO2: 96%     Weight: 103.6 kg (228 lb 6.3 oz)     Height: 5' 5" (1.651 m)     PainSc: 0-No pain       Physical Exam  Vitals and nursing note reviewed.   Constitutional:       General: He is not in acute distress.     Appearance: He is well-developed. He is not diaphoretic.   HENT:      Head: Normocephalic.   Neck:      Thyroid: No thyromegaly.   Cardiovascular:      Rate and Rhythm: Normal rate and regular rhythm.      " Heart sounds: Normal heart sounds. No murmur heard.  Pulmonary:      Effort: Pulmonary effort is normal.      Breath sounds: Normal breath sounds. No wheezing or rales.   Abdominal:      General: There is no distension.      Palpations: Abdomen is soft.   Musculoskeletal:      Cervical back: Neck supple.   Lymphadenopathy:      Cervical: No cervical adenopathy.   Skin:     General: Skin is warm and dry.   Neurological:      Mental Status: He is alert and oriented to person, place, and time.   Psychiatric:         Behavior: Behavior normal.         Thought Content: Thought content normal.         Judgment: Judgment normal.         Assessment:       1. Adenoma of colon    2. Peripheral arterial disease    3. Severe obesity (BMI 35.0-39.9) with comorbidity    4. Stage 3a chronic kidney disease    5. Essential hypertension    6. Lumbar radiculopathy    7. Prediabetes    8. Hepatic steatosis    9. Anemia, unspecified type        Plan:   Adenoma of colon  Comments:  Planned right hemicolectomy next month due to intraoperative appearance of carcinoma    Peripheral arterial disease  Comments:  No current symptoms    Severe obesity (BMI 35.0-39.9) with comorbidity  Comments:  Weight unchanged    Stage 3a chronic kidney disease  Comments:  Stable    Essential hypertension  Comments:  Labile.  Compliant with medications.  Follow-up 6 months    Lumbar radiculopathy  Comments:  Epidural shot lasted about 3 months.  Follow-up soon    Prediabetes  Comments:  A1c 5.3    Hepatic steatosis  Comments:  severe, without fibrosis    Anemia, unspecified type  Comments:  Led to GI findings.  Hemoglobin stable

## 2024-03-05 ENCOUNTER — LAB VISIT (OUTPATIENT)
Dept: LAB | Facility: HOSPITAL | Age: 74
End: 2024-03-05
Attending: STUDENT IN AN ORGANIZED HEALTH CARE EDUCATION/TRAINING PROGRAM
Payer: MEDICARE

## 2024-03-05 ENCOUNTER — TELEPHONE (OUTPATIENT)
Dept: PREADMISSION TESTING | Facility: HOSPITAL | Age: 74
End: 2024-03-05
Payer: MEDICARE

## 2024-03-05 DIAGNOSIS — K63.89 COLONIC MASS: ICD-10-CM

## 2024-03-05 LAB
ALBUMIN SERPL BCP-MCNC: 3.9 G/DL (ref 3.5–5.2)
ALP SERPL-CCNC: 53 U/L (ref 55–135)
ALT SERPL W/O P-5'-P-CCNC: 12 U/L (ref 10–44)
ANION GAP SERPL CALC-SCNC: 9 MMOL/L (ref 8–16)
AST SERPL-CCNC: 15 U/L (ref 10–40)
BASOPHILS # BLD AUTO: 0.01 K/UL (ref 0–0.2)
BASOPHILS NFR BLD: 0.1 % (ref 0–1.9)
BILIRUB SERPL-MCNC: 0.6 MG/DL (ref 0.1–1)
BUN SERPL-MCNC: 26 MG/DL (ref 8–23)
CALCIUM SERPL-MCNC: 10 MG/DL (ref 8.7–10.5)
CHLORIDE SERPL-SCNC: 107 MMOL/L (ref 95–110)
CO2 SERPL-SCNC: 26 MMOL/L (ref 23–29)
CREAT SERPL-MCNC: 1.4 MG/DL (ref 0.5–1.4)
DIFFERENTIAL METHOD BLD: ABNORMAL
EOSINOPHIL # BLD AUTO: 0.1 K/UL (ref 0–0.5)
EOSINOPHIL NFR BLD: 1.6 % (ref 0–8)
ERYTHROCYTE [DISTWIDTH] IN BLOOD BY AUTOMATED COUNT: 13.6 % (ref 11.5–14.5)
EST. GFR  (NO RACE VARIABLE): 53.7 ML/MIN/1.73 M^2
GLUCOSE SERPL-MCNC: 98 MG/DL (ref 70–110)
HCT VFR BLD AUTO: 36.8 % (ref 40–54)
HGB BLD-MCNC: 12.2 G/DL (ref 14–18)
IMM GRANULOCYTES # BLD AUTO: 0.02 K/UL (ref 0–0.04)
IMM GRANULOCYTES NFR BLD AUTO: 0.3 % (ref 0–0.5)
LYMPHOCYTES # BLD AUTO: 0.9 K/UL (ref 1–4.8)
LYMPHOCYTES NFR BLD: 13.2 % (ref 18–48)
MCH RBC QN AUTO: 29.3 PG (ref 27–31)
MCHC RBC AUTO-ENTMCNC: 33.2 G/DL (ref 32–36)
MCV RBC AUTO: 88 FL (ref 82–98)
MONOCYTES # BLD AUTO: 0.7 K/UL (ref 0.3–1)
MONOCYTES NFR BLD: 10.3 % (ref 4–15)
NEUTROPHILS # BLD AUTO: 5.2 K/UL (ref 1.8–7.7)
NEUTROPHILS NFR BLD: 74.5 % (ref 38–73)
NRBC BLD-RTO: 0 /100 WBC
PLATELET # BLD AUTO: 171 K/UL (ref 150–450)
PMV BLD AUTO: 11.1 FL (ref 9.2–12.9)
POTASSIUM SERPL-SCNC: 4.5 MMOL/L (ref 3.5–5.1)
PROT SERPL-MCNC: 7.2 G/DL (ref 6–8.4)
RBC # BLD AUTO: 4.17 M/UL (ref 4.6–6.2)
SODIUM SERPL-SCNC: 142 MMOL/L (ref 136–145)
WBC # BLD AUTO: 6.99 K/UL (ref 3.9–12.7)

## 2024-03-05 PROCEDURE — 36415 COLL VENOUS BLD VENIPUNCTURE: CPT | Performed by: STUDENT IN AN ORGANIZED HEALTH CARE EDUCATION/TRAINING PROGRAM

## 2024-03-05 PROCEDURE — 85025 COMPLETE CBC W/AUTO DIFF WBC: CPT | Performed by: STUDENT IN AN ORGANIZED HEALTH CARE EDUCATION/TRAINING PROGRAM

## 2024-03-05 PROCEDURE — 80053 COMPREHEN METABOLIC PANEL: CPT | Performed by: STUDENT IN AN ORGANIZED HEALTH CARE EDUCATION/TRAINING PROGRAM

## 2024-03-05 NOTE — TELEPHONE ENCOUNTER
Pre op instructions reviewed with pt : Spoke about the following; verbalized understanding.    To confirm, Your Surgery is scheduled on 3/11/24. We will call you late afternoon the business day prior to surgery with your arrival time.    >>>Address: [address], [place], [place].<<<  Please report to [facility] (1st Floor) located off of [facility] (2nd building on the left, in front of the flag pole).    INSTRUCTIONS IMPORTANT!!!  DO NOT EAT, DRINK OR SMOKE AFTER MIDNIGHT PRIOR TO SURGERY! OK to brush teeth, no gum, candy or mints!    May have light breakfast day before surgery, but ONLY CLEAR LIQUIDS starting at 12 Noon. NO SOLID FOOD AFTER STARTING BOWEL PREP!  Take antibiotics day before surgery. Start bowel prep day before surgery at 12pm, finish by 10pm. Nothing after Midnight!      metronidazole------- 500 mg Oral 3 times daily, Take initial dose@2pm, second dose@3pm and final dose@10pm day before surgery    neomycin sulfate -1,000 mg Oral 3 times daily, Take initial dose@2pm, second dose@3pm and final dose@10pm day before surgery    polyethylene glycol 3350 238 g Oral Daily, Mix with 2L of Gatorade, drink all mixed solution starting@12pm day before surgery, finish by 10pm         *Take only these medicines with a small swallow of water-morning of surgery.  AMLODIPINE  METOPROLOL     ____  NO powder, lotions, deodorants or creams on body.  ____  Remove all jewelry, piercings or foreign objects before arrival and leave at home.  ____  Dentures, Hearing Aids and Contact Lens will need to be removed prior to the start of surgery.  ____  Please bring photo ID and insurance information to hospital (Leave Valuables at Home)  ____  If going home the same day, arrange for a ride home. You will not be able to             drive for 24 hours after anesthesia.  ____  Wear clean, loose fitting clothing. Allow for dressings, bandages.  ____  Stop all Aspirin/ Nsaid products, Ibuprofen, Motrin, Advil  and Aleve at least 7 days before surgery, unless otherwise instructed by your doctor, or the nurse.   ____  Blood Thinners are stopped based on your Provider's recommendation; Patients need to call their Surgeon regarding when to stop/hold.  ____  Avoid Alcoholic beverages 3 days prior to surgery, as it can thin the blood.     *Diabetic/ Prediabetic Patients: !!!If you take diabetic or weight loss medication, Do NOT take morning of surgery unless instructed by Doctor!!!  Metformin to be stopped 24 hrs prior to surgery.   Ozempic/ Mounjaro/ Wegovy/ Trulicity/ Semaglutide injections or weight loss medication to be stopped 7 days prior to surgery.  Long Acting Insulin Instructions: hold  the night before surgery.    Vitamins/supplements/ OTC Aspirin products: Stop 7 days prior to surgery!    Weight Loss Injections/medications: Stop 7 days prior to surgery!      Bathing Instructions:    -Do not shave your face or body the day before or the day of surgery!   -Shower your body with anti-bacterial Soap (Dial, Lever 2000, or Hibiclens) the night before surgery and the morning of surgery.               -Do not use Hibiclens on your face or genitals.   -Rinse & dry your body thoroughly. Apply clean clothes after shower.    [facility]:  Only 2 adults allowed in pre op/recovery area during your procedure. You MUST HAVE A RIDE HOME from a responsible adult that you know and trust. Medical Transport, Uber or Lyft can ONLY be used if patient has a responsible adult to accompany them during ride home.         *Signs and symptoms of Infection Before or After Surgery:               !!!If you experience any fever, chills, nausea/ vomiting, foul odor/ excessive drainage from surgical site, flu-like symptoms, new wounds or cuts, PLEASE CALL THE SURGEON OFFICE at [phone] or SEND MESSAGE THROUGH [facility]!!!       *If you are running late or have questions the morning of surgery, please call the [facility] Surgery  Dept @ [phone].     *Billing questions:  [phone]       Thank you,  -[facility] Pre Admit Dept.  [phone] or [phone]  M-F 7:30 am-4:00 pm (Closed Major Holidays)

## 2024-03-08 ENCOUNTER — TELEPHONE (OUTPATIENT)
Dept: PREADMISSION TESTING | Facility: HOSPITAL | Age: 74
End: 2024-03-08
Payer: MEDICARE

## 2024-03-08 NOTE — TELEPHONE ENCOUNTER
Called and LVM for pt wife about the following:     Please arrive to [facility] ([facility] at 0530 am on 3/11/24 for your scheduled procedure.  Address: [address] [place] [address] [place] [address] (2nd Building on left, 1st Floor Lobby)  >>>NO eating or drinking after midnight unless instructed otherwise by your Surgeon<<<    Thank you,  -[facility] Dept.  Mon-Fri 7:30 am - 4 pm [phone]

## 2024-03-10 ENCOUNTER — ANESTHESIA EVENT (OUTPATIENT)
Dept: SURGERY | Facility: HOSPITAL | Age: 74
DRG: 331 | End: 2024-03-10
Payer: MEDICARE

## 2024-03-10 NOTE — ANESTHESIA PREPROCEDURE EVALUATION
03/10/2024  Quintin Ling is a 73 y.o., male.      Pre-op Assessment    I have reviewed the Patient Summary Reports.     I have reviewed the Nursing Notes. I have reviewed the NPO Status.      Review of Systems  Anesthesia Hx:  No problems with previous Anesthesia                Hematology/Oncology:  Hematology Normal   Oncology Normal                                   Cardiovascular:     Hypertension           hyperlipidemia   ECG has been reviewed.                          Renal/:  Chronic Renal Disease                Hepatic/GI:  Hepatic/GI Normal                 Neurological:    Neuromuscular Disease,                                   Endocrine:  Diabetes           Dermatological:  Skin Normal    Psych:  Psychiatric Normal                  Patient Active Problem List   Diagnosis    Essential hypertension    Prediabetes    Severe obesity (BMI 35.0-39.9) with comorbidity    Prostate cancer screening    Erectile dysfunction    Nocturia    Stage 3a chronic kidney disease    Lumbar radiculopathy    Heart murmur    Normocytic anemia    Preop cardiovascular exam    Adenoma of colon    Peripheral arterial disease         Physical Exam  General: Well nourished    Airway:  Mallampati: III   Mouth Opening: Small, but > 3cm  TM Distance: Normal  Neck ROM: Normal ROM    Dental:  Intact        Anesthesia Plan  Type of Anesthesia, risks & benefits discussed:    Anesthesia Type: Gen ETT  Intra-op Monitoring Plan: Standard ASA Monitors  Post Op Pain Control Plan: multimodal analgesia  Induction:  IV  Airway Plan: Direct  Informed Consent: Informed consent signed with the Patient and all parties understand the risks and agree with anesthesia plan.  All questions answered.   ASA Score: 3  Day of Surgery Review of History & Physical: H&P Update referred to the surgeon/provider.H&P completed by Anesthesiologist.    Ready  For Surgery From Anesthesia Perspective.     .      Chemistry        Component Value Date/Time     03/05/2024 1052    K 4.5 03/05/2024 1052     03/05/2024 1052    CO2 26 03/05/2024 1052    BUN 26 (H) 03/05/2024 1052    CREATININE 1.4 03/05/2024 1052    GLU 98 03/05/2024 1052        Component Value Date/Time    CALCIUM 10.0 03/05/2024 1052    ALKPHOS 53 (L) 03/05/2024 1052    AST 15 03/05/2024 1052    ALT 12 03/05/2024 1052    BILITOT 0.6 03/05/2024 1052    ESTGFRAFRICA >60.0 05/30/2022 0958    EGFRNONAA >60.0 05/30/2022 0958        Lab Results   Component Value Date    WBC 6.99 03/05/2024    HGB 12.2 (L) 03/05/2024    HCT 36.8 (L) 03/05/2024    MCV 88 03/05/2024     03/05/2024

## 2024-03-11 ENCOUNTER — ANESTHESIA (OUTPATIENT)
Dept: SURGERY | Facility: HOSPITAL | Age: 74
DRG: 331 | End: 2024-03-11
Payer: MEDICARE

## 2024-03-11 ENCOUNTER — HOSPITAL ENCOUNTER (INPATIENT)
Facility: HOSPITAL | Age: 74
LOS: 1 days | Discharge: HOME | DRG: 331 | End: 2024-03-12
Attending: STUDENT IN AN ORGANIZED HEALTH CARE EDUCATION/TRAINING PROGRAM | Admitting: STUDENT IN AN ORGANIZED HEALTH CARE EDUCATION/TRAINING PROGRAM
Payer: MEDICARE

## 2024-03-11 DIAGNOSIS — D12.6 ADENOMA OF COLON: ICD-10-CM

## 2024-03-11 LAB — POCT GLUCOSE: 95 MG/DL (ref 70–110)

## 2024-03-11 PROCEDURE — 25000003 PHARM REV CODE 250: Performed by: INTERNAL MEDICINE

## 2024-03-11 PROCEDURE — 25000003 PHARM REV CODE 250: Performed by: STUDENT IN AN ORGANIZED HEALTH CARE EDUCATION/TRAINING PROGRAM

## 2024-03-11 PROCEDURE — 88312 SPECIAL STAINS GROUP 1: CPT | Mod: 59 | Performed by: PATHOLOGY

## 2024-03-11 PROCEDURE — 25000003 PHARM REV CODE 250: Performed by: NURSE ANESTHETIST, CERTIFIED REGISTERED

## 2024-03-11 PROCEDURE — 88307 TISSUE EXAM BY PATHOLOGIST: CPT | Mod: 26,,, | Performed by: PATHOLOGY

## 2024-03-11 PROCEDURE — 37000008 HC ANESTHESIA 1ST 15 MINUTES: Performed by: STUDENT IN AN ORGANIZED HEALTH CARE EDUCATION/TRAINING PROGRAM

## 2024-03-11 PROCEDURE — 63600175 PHARM REV CODE 636 W HCPCS: Performed by: STUDENT IN AN ORGANIZED HEALTH CARE EDUCATION/TRAINING PROGRAM

## 2024-03-11 PROCEDURE — 27201423 OPTIME MED/SURG SUP & DEVICES STERILE SUPPLY: Performed by: STUDENT IN AN ORGANIZED HEALTH CARE EDUCATION/TRAINING PROGRAM

## 2024-03-11 PROCEDURE — 63600175 PHARM REV CODE 636 W HCPCS: Performed by: ANESTHESIOLOGY

## 2024-03-11 PROCEDURE — C9290 INJ, BUPIVACAINE LIPOSOME: HCPCS | Performed by: STUDENT IN AN ORGANIZED HEALTH CARE EDUCATION/TRAINING PROGRAM

## 2024-03-11 PROCEDURE — 94761 N-INVAS EAR/PLS OXIMETRY MLT: CPT

## 2024-03-11 PROCEDURE — 71000039 HC RECOVERY, EACH ADD'L HOUR: Performed by: STUDENT IN AN ORGANIZED HEALTH CARE EDUCATION/TRAINING PROGRAM

## 2024-03-11 PROCEDURE — 0DBU0ZZ EXCISION OF OMENTUM, OPEN APPROACH: ICD-10-PCS | Performed by: STUDENT IN AN ORGANIZED HEALTH CARE EDUCATION/TRAINING PROGRAM

## 2024-03-11 PROCEDURE — 11000001 HC ACUTE MED/SURG PRIVATE ROOM

## 2024-03-11 PROCEDURE — 36000712 HC OR TIME LEV V 1ST 15 MIN: Performed by: STUDENT IN AN ORGANIZED HEALTH CARE EDUCATION/TRAINING PROGRAM

## 2024-03-11 PROCEDURE — 0DTF0ZZ RESECTION OF RIGHT LARGE INTESTINE, OPEN APPROACH: ICD-10-PCS | Performed by: STUDENT IN AN ORGANIZED HEALTH CARE EDUCATION/TRAINING PROGRAM

## 2024-03-11 PROCEDURE — 36000713 HC OR TIME LEV V EA ADD 15 MIN: Performed by: STUDENT IN AN ORGANIZED HEALTH CARE EDUCATION/TRAINING PROGRAM

## 2024-03-11 PROCEDURE — 44160 REMOVAL OF COLON: CPT | Mod: ,,, | Performed by: STUDENT IN AN ORGANIZED HEALTH CARE EDUCATION/TRAINING PROGRAM

## 2024-03-11 PROCEDURE — V2790 AMNIOTIC MEMBRANE: HCPCS | Performed by: STUDENT IN AN ORGANIZED HEALTH CARE EDUCATION/TRAINING PROGRAM

## 2024-03-11 PROCEDURE — 37000009 HC ANESTHESIA EA ADD 15 MINS: Performed by: STUDENT IN AN ORGANIZED HEALTH CARE EDUCATION/TRAINING PROGRAM

## 2024-03-11 PROCEDURE — 4A1BXSH MONITORING OF GASTROINTESTINAL VASCULAR PERFUSION USING INDOCYANINE GREEN DYE, EXTERNAL APPROACH: ICD-10-PCS | Performed by: STUDENT IN AN ORGANIZED HEALTH CARE EDUCATION/TRAINING PROGRAM

## 2024-03-11 PROCEDURE — 63600175 PHARM REV CODE 636 W HCPCS: Performed by: NURSE ANESTHETIST, CERTIFIED REGISTERED

## 2024-03-11 PROCEDURE — 71000033 HC RECOVERY, INTIAL HOUR: Performed by: STUDENT IN AN ORGANIZED HEALTH CARE EDUCATION/TRAINING PROGRAM

## 2024-03-11 PROCEDURE — 99900035 HC TECH TIME PER 15 MIN (STAT)

## 2024-03-11 PROCEDURE — 88307 TISSUE EXAM BY PATHOLOGIST: CPT | Performed by: PATHOLOGY

## 2024-03-11 PROCEDURE — 63600175 PHARM REV CODE 636 W HCPCS: Mod: JZ,JG | Performed by: STUDENT IN AN ORGANIZED HEALTH CARE EDUCATION/TRAINING PROGRAM

## 2024-03-11 PROCEDURE — 94799 UNLISTED PULMONARY SVC/PX: CPT | Mod: XB

## 2024-03-11 PROCEDURE — 8E0W0CZ ROBOTIC ASSISTED PROCEDURE OF TRUNK REGION, OPEN APPROACH: ICD-10-PCS | Performed by: STUDENT IN AN ORGANIZED HEALTH CARE EDUCATION/TRAINING PROGRAM

## 2024-03-11 PROCEDURE — 88312 SPECIAL STAINS GROUP 1: CPT | Mod: 26,,, | Performed by: PATHOLOGY

## 2024-03-11 DEVICE — MEMBRANE AMNIOFIX 4X10CM: Type: IMPLANTABLE DEVICE | Site: ABDOMEN | Status: FUNCTIONAL

## 2024-03-11 RX ORDER — ROCURONIUM BROMIDE 10 MG/ML
INJECTION, SOLUTION INTRAVENOUS
Status: DISCONTINUED | OUTPATIENT
Start: 2024-03-11 | End: 2024-03-11

## 2024-03-11 RX ORDER — HYDRALAZINE HYDROCHLORIDE 20 MG/ML
10 INJECTION INTRAMUSCULAR; INTRAVENOUS EVERY 6 HOURS PRN
Status: DISCONTINUED | OUTPATIENT
Start: 2024-03-11 | End: 2024-03-12 | Stop reason: HOSPADM

## 2024-03-11 RX ORDER — PREGABALIN 75 MG/1
75 CAPSULE ORAL 2 TIMES DAILY
Status: DISCONTINUED | OUTPATIENT
Start: 2024-03-11 | End: 2024-03-12 | Stop reason: HOSPADM

## 2024-03-11 RX ORDER — METRONIDAZOLE 500 MG/100ML
500 INJECTION, SOLUTION INTRAVENOUS
Status: COMPLETED | OUTPATIENT
Start: 2024-03-11 | End: 2024-03-11

## 2024-03-11 RX ORDER — LIDOCAINE HYDROCHLORIDE 20 MG/ML
INJECTION, SOLUTION EPIDURAL; INFILTRATION; INTRACAUDAL; PERINEURAL
Status: DISCONTINUED | OUTPATIENT
Start: 2024-03-11 | End: 2024-03-11

## 2024-03-11 RX ORDER — ACETAMINOPHEN 10 MG/ML
1000 INJECTION, SOLUTION INTRAVENOUS EVERY 8 HOURS
Status: COMPLETED | OUTPATIENT
Start: 2024-03-11 | End: 2024-03-12

## 2024-03-11 RX ORDER — CEFAZOLIN SODIUM 2 G/50ML
2 SOLUTION INTRAVENOUS
Status: DISCONTINUED | OUTPATIENT
Start: 2024-03-11 | End: 2024-03-11 | Stop reason: HOSPADM

## 2024-03-11 RX ORDER — INDOCYANINE GREEN AND WATER 25 MG
KIT INJECTION
Status: DISCONTINUED | OUTPATIENT
Start: 2024-03-11 | End: 2024-03-11

## 2024-03-11 RX ORDER — SODIUM CHLORIDE, SODIUM LACTATE, POTASSIUM CHLORIDE, CALCIUM CHLORIDE 600; 310; 30; 20 MG/100ML; MG/100ML; MG/100ML; MG/100ML
INJECTION, SOLUTION INTRAVENOUS CONTINUOUS
Status: DISCONTINUED | OUTPATIENT
Start: 2024-03-11 | End: 2024-03-12

## 2024-03-11 RX ORDER — HYDROMORPHONE HYDROCHLORIDE 2 MG/ML
0.2 INJECTION, SOLUTION INTRAMUSCULAR; INTRAVENOUS; SUBCUTANEOUS EVERY 6 HOURS PRN
Status: DISCONTINUED | OUTPATIENT
Start: 2024-03-11 | End: 2024-03-12 | Stop reason: HOSPADM

## 2024-03-11 RX ORDER — GLUCAGON 1 MG
1 KIT INJECTION
Status: DISCONTINUED | OUTPATIENT
Start: 2024-03-11 | End: 2024-03-12 | Stop reason: HOSPADM

## 2024-03-11 RX ORDER — METRONIDAZOLE 500 MG/100ML
500 INJECTION, SOLUTION INTRAVENOUS
Status: COMPLETED | OUTPATIENT
Start: 2024-03-11 | End: 2024-03-12

## 2024-03-11 RX ORDER — OXYCODONE HYDROCHLORIDE 5 MG/1
10 TABLET ORAL EVERY 4 HOURS PRN
Status: DISCONTINUED | OUTPATIENT
Start: 2024-03-11 | End: 2024-03-12 | Stop reason: HOSPADM

## 2024-03-11 RX ORDER — PHENYLEPHRINE HYDROCHLORIDE 10 MG/ML
INJECTION INTRAVENOUS
Status: DISCONTINUED | OUTPATIENT
Start: 2024-03-11 | End: 2024-03-11

## 2024-03-11 RX ORDER — FENTANYL CITRATE 50 UG/ML
INJECTION, SOLUTION INTRAMUSCULAR; INTRAVENOUS
Status: DISCONTINUED | OUTPATIENT
Start: 2024-03-11 | End: 2024-03-11

## 2024-03-11 RX ORDER — INSULIN ASPART 100 [IU]/ML
0-5 INJECTION, SOLUTION INTRAVENOUS; SUBCUTANEOUS
Status: DISCONTINUED | OUTPATIENT
Start: 2024-03-11 | End: 2024-03-12 | Stop reason: HOSPADM

## 2024-03-11 RX ORDER — SODIUM CHLORIDE 9 MG/ML
INJECTION, SOLUTION INTRAVENOUS CONTINUOUS
Status: DISCONTINUED | OUTPATIENT
Start: 2024-03-11 | End: 2024-03-11

## 2024-03-11 RX ORDER — FENOFIBRATE 160 MG/1
160 TABLET ORAL DAILY
Status: DISCONTINUED | OUTPATIENT
Start: 2024-03-12 | End: 2024-03-12 | Stop reason: HOSPADM

## 2024-03-11 RX ORDER — OXYCODONE AND ACETAMINOPHEN 5; 325 MG/1; MG/1
1 TABLET ORAL
Status: DISCONTINUED | OUTPATIENT
Start: 2024-03-11 | End: 2024-03-11 | Stop reason: HOSPADM

## 2024-03-11 RX ORDER — IBUPROFEN 200 MG
24 TABLET ORAL
Status: DISCONTINUED | OUTPATIENT
Start: 2024-03-11 | End: 2024-03-12 | Stop reason: HOSPADM

## 2024-03-11 RX ORDER — SODIUM CHLORIDE 0.9 % (FLUSH) 0.9 %
10 SYRINGE (ML) INJECTION
Status: DISCONTINUED | OUTPATIENT
Start: 2024-03-11 | End: 2024-03-12 | Stop reason: HOSPADM

## 2024-03-11 RX ORDER — MUPIROCIN 20 MG/G
OINTMENT TOPICAL
Status: DISCONTINUED | OUTPATIENT
Start: 2024-03-11 | End: 2024-03-11 | Stop reason: HOSPADM

## 2024-03-11 RX ORDER — EPHEDRINE SULFATE 50 MG/ML
INJECTION, SOLUTION INTRAVENOUS
Status: DISCONTINUED | OUTPATIENT
Start: 2024-03-11 | End: 2024-03-11

## 2024-03-11 RX ORDER — SUCCINYLCHOLINE CHLORIDE 20 MG/ML
INJECTION INTRAMUSCULAR; INTRAVENOUS
Status: DISCONTINUED | OUTPATIENT
Start: 2024-03-11 | End: 2024-03-11

## 2024-03-11 RX ORDER — DEXAMETHASONE SODIUM PHOSPHATE 4 MG/ML
INJECTION, SOLUTION INTRA-ARTICULAR; INTRALESIONAL; INTRAMUSCULAR; INTRAVENOUS; SOFT TISSUE
Status: DISCONTINUED | OUTPATIENT
Start: 2024-03-11 | End: 2024-03-11

## 2024-03-11 RX ORDER — TRAMADOL HYDROCHLORIDE 50 MG/1
50 TABLET ORAL EVERY 6 HOURS PRN
Status: DISCONTINUED | OUTPATIENT
Start: 2024-03-11 | End: 2024-03-12 | Stop reason: HOSPADM

## 2024-03-11 RX ORDER — BUPIVACAINE HYDROCHLORIDE 2.5 MG/ML
INJECTION, SOLUTION EPIDURAL; INFILTRATION; INTRACAUDAL
Status: DISCONTINUED | OUTPATIENT
Start: 2024-03-11 | End: 2024-03-11 | Stop reason: HOSPADM

## 2024-03-11 RX ORDER — ENOXAPARIN SODIUM 100 MG/ML
40 INJECTION SUBCUTANEOUS EVERY 24 HOURS
Status: DISCONTINUED | OUTPATIENT
Start: 2024-03-12 | End: 2024-03-12 | Stop reason: HOSPADM

## 2024-03-11 RX ORDER — AMLODIPINE BESYLATE 5 MG/1
5 TABLET ORAL DAILY
Status: DISCONTINUED | OUTPATIENT
Start: 2024-03-12 | End: 2024-03-12 | Stop reason: HOSPADM

## 2024-03-11 RX ORDER — OXYCODONE HYDROCHLORIDE 5 MG/1
5 TABLET ORAL EVERY 6 HOURS PRN
Status: DISCONTINUED | OUTPATIENT
Start: 2024-03-11 | End: 2024-03-12 | Stop reason: HOSPADM

## 2024-03-11 RX ORDER — METOPROLOL TARTRATE 50 MG/1
50 TABLET ORAL 2 TIMES DAILY
Status: DISCONTINUED | OUTPATIENT
Start: 2024-03-11 | End: 2024-03-12 | Stop reason: HOSPADM

## 2024-03-11 RX ORDER — ONDANSETRON HYDROCHLORIDE 2 MG/ML
4 INJECTION, SOLUTION INTRAVENOUS DAILY PRN
Status: DISCONTINUED | OUTPATIENT
Start: 2024-03-11 | End: 2024-03-11 | Stop reason: HOSPADM

## 2024-03-11 RX ORDER — ACETAMINOPHEN 500 MG
1000 TABLET ORAL
Status: COMPLETED | OUTPATIENT
Start: 2024-03-11 | End: 2024-03-11

## 2024-03-11 RX ORDER — METOCLOPRAMIDE HYDROCHLORIDE 5 MG/ML
5 INJECTION INTRAMUSCULAR; INTRAVENOUS EVERY 6 HOURS PRN
Status: DISCONTINUED | OUTPATIENT
Start: 2024-03-11 | End: 2024-03-12 | Stop reason: HOSPADM

## 2024-03-11 RX ORDER — CHLORHEXIDINE GLUCONATE ORAL RINSE 1.2 MG/ML
10 SOLUTION DENTAL 2 TIMES DAILY
Status: DISCONTINUED | OUTPATIENT
Start: 2024-03-11 | End: 2024-03-12 | Stop reason: HOSPADM

## 2024-03-11 RX ORDER — PROPOFOL 10 MG/ML
VIAL (ML) INTRAVENOUS
Status: DISCONTINUED | OUTPATIENT
Start: 2024-03-11 | End: 2024-03-11

## 2024-03-11 RX ORDER — IBUPROFEN 200 MG
16 TABLET ORAL
Status: DISCONTINUED | OUTPATIENT
Start: 2024-03-11 | End: 2024-03-12 | Stop reason: HOSPADM

## 2024-03-11 RX ORDER — GABAPENTIN 300 MG/1
300 CAPSULE ORAL 3 TIMES DAILY
Status: DISCONTINUED | OUTPATIENT
Start: 2024-03-11 | End: 2024-03-11

## 2024-03-11 RX ORDER — ACETAMINOPHEN 500 MG
1000 TABLET ORAL EVERY 8 HOURS
Status: DISCONTINUED | OUTPATIENT
Start: 2024-03-12 | End: 2024-03-12 | Stop reason: HOSPADM

## 2024-03-11 RX ORDER — ONDANSETRON HYDROCHLORIDE 2 MG/ML
4 INJECTION, SOLUTION INTRAVENOUS EVERY 6 HOURS PRN
Status: DISCONTINUED | OUTPATIENT
Start: 2024-03-11 | End: 2024-03-12 | Stop reason: HOSPADM

## 2024-03-11 RX ORDER — KETOROLAC TROMETHAMINE 30 MG/ML
15 INJECTION, SOLUTION INTRAMUSCULAR; INTRAVENOUS EVERY 8 HOURS PRN
Status: DISCONTINUED | OUTPATIENT
Start: 2024-03-11 | End: 2024-03-11 | Stop reason: HOSPADM

## 2024-03-11 RX ORDER — GABAPENTIN 100 MG/1
200 CAPSULE ORAL
Status: COMPLETED | OUTPATIENT
Start: 2024-03-11 | End: 2024-03-11

## 2024-03-11 RX ORDER — ATORVASTATIN CALCIUM 10 MG/1
20 TABLET, FILM COATED ORAL DAILY
Status: DISCONTINUED | OUTPATIENT
Start: 2024-03-12 | End: 2024-03-12 | Stop reason: HOSPADM

## 2024-03-11 RX ORDER — DOXAZOSIN 2 MG/1
4 TABLET ORAL DAILY
Status: DISCONTINUED | OUTPATIENT
Start: 2024-03-12 | End: 2024-03-12 | Stop reason: HOSPADM

## 2024-03-11 RX ORDER — ONDANSETRON HYDROCHLORIDE 2 MG/ML
INJECTION, SOLUTION INTRAVENOUS
Status: DISCONTINUED | OUTPATIENT
Start: 2024-03-11 | End: 2024-03-11

## 2024-03-11 RX ORDER — CEFAZOLIN SODIUM 1 G/3ML
INJECTION, POWDER, FOR SOLUTION INTRAMUSCULAR; INTRAVENOUS
Status: DISCONTINUED | OUTPATIENT
Start: 2024-03-11 | End: 2024-03-11

## 2024-03-11 RX ORDER — HYDROMORPHONE HYDROCHLORIDE 2 MG/ML
0.2 INJECTION, SOLUTION INTRAMUSCULAR; INTRAVENOUS; SUBCUTANEOUS EVERY 5 MIN PRN
Status: DISCONTINUED | OUTPATIENT
Start: 2024-03-11 | End: 2024-03-11 | Stop reason: HOSPADM

## 2024-03-11 RX ORDER — METHOCARBAMOL 500 MG/1
1000 TABLET, FILM COATED ORAL 3 TIMES DAILY
Status: DISCONTINUED | OUTPATIENT
Start: 2024-03-11 | End: 2024-03-12 | Stop reason: HOSPADM

## 2024-03-11 RX ADMIN — SODIUM CHLORIDE, POTASSIUM CHLORIDE, SODIUM LACTATE AND CALCIUM CHLORIDE: 600; 310; 30; 20 INJECTION, SOLUTION INTRAVENOUS at 10:03

## 2024-03-11 RX ADMIN — DEXAMETHASONE SODIUM PHOSPHATE 8 MG: 4 INJECTION, SOLUTION INTRA-ARTICULAR; INTRALESIONAL; INTRAMUSCULAR; INTRAVENOUS; SOFT TISSUE at 09:03

## 2024-03-11 RX ADMIN — ROCURONIUM BROMIDE 25 MG: 10 INJECTION, SOLUTION INTRAVENOUS at 08:03

## 2024-03-11 RX ADMIN — HYDROMORPHONE HYDROCHLORIDE 0.2 MG: 2 INJECTION INTRAMUSCULAR; INTRAVENOUS; SUBCUTANEOUS at 11:03

## 2024-03-11 RX ADMIN — PROPOFOL 20 MG: 10 INJECTION, EMULSION INTRAVENOUS at 10:03

## 2024-03-11 RX ADMIN — METRONIDAZOLE 500 MG: 5 INJECTION, SOLUTION INTRAVENOUS at 04:03

## 2024-03-11 RX ADMIN — PROPOFOL 150 MG: 10 INJECTION, EMULSION INTRAVENOUS at 07:03

## 2024-03-11 RX ADMIN — METRONIDAZOLE 500 MG: 5 INJECTION, SOLUTION INTRAVENOUS at 11:03

## 2024-03-11 RX ADMIN — INDOCYANINE GREEN AND WATER 5 MG: KIT at 09:03

## 2024-03-11 RX ADMIN — ACETAMINOPHEN 1000 MG: 10 INJECTION INTRAVENOUS at 01:03

## 2024-03-11 RX ADMIN — FENTANYL CITRATE 50 MCG: 50 INJECTION, SOLUTION INTRAMUSCULAR; INTRAVENOUS at 07:03

## 2024-03-11 RX ADMIN — CHLORHEXIDINE GLUCONATE 0.12% ORAL RINSE 10 ML: 1.2 LIQUID ORAL at 01:03

## 2024-03-11 RX ADMIN — SODIUM CHLORIDE, POTASSIUM CHLORIDE, SODIUM LACTATE AND CALCIUM CHLORIDE: 600; 310; 30; 20 INJECTION, SOLUTION INTRAVENOUS at 06:03

## 2024-03-11 RX ADMIN — FENTANYL CITRATE 50 MCG: 50 INJECTION, SOLUTION INTRAMUSCULAR; INTRAVENOUS at 06:03

## 2024-03-11 RX ADMIN — CHLORHEXIDINE GLUCONATE 0.12% ORAL RINSE 10 ML: 1.2 LIQUID ORAL at 09:03

## 2024-03-11 RX ADMIN — ROCURONIUM BROMIDE 5 MG: 10 INJECTION, SOLUTION INTRAVENOUS at 07:03

## 2024-03-11 RX ADMIN — METHOCARBAMOL 1000 MG: 500 TABLET ORAL at 04:03

## 2024-03-11 RX ADMIN — SUCCINYLCHOLINE CHLORIDE 120 MG: 20 INJECTION, SOLUTION INTRAMUSCULAR; INTRAVENOUS; PARENTERAL at 07:03

## 2024-03-11 RX ADMIN — METRONIDAZOLE 500 MG: 5 INJECTION, SOLUTION INTRAVENOUS at 07:03

## 2024-03-11 RX ADMIN — ONDANSETRON 4 MG: 2 INJECTION INTRAMUSCULAR; INTRAVENOUS at 10:03

## 2024-03-11 RX ADMIN — ACETAMINOPHEN 1000 MG: 500 TABLET ORAL at 06:03

## 2024-03-11 RX ADMIN — PREGABALIN 75 MG: 75 CAPSULE ORAL at 09:03

## 2024-03-11 RX ADMIN — METOPROLOL TARTRATE 50 MG: 50 TABLET, FILM COATED ORAL at 09:03

## 2024-03-11 RX ADMIN — CEFAZOLIN 2 G: 2 INJECTION, POWDER, FOR SOLUTION INTRAMUSCULAR; INTRAVENOUS at 10:03

## 2024-03-11 RX ADMIN — OXYCODONE HYDROCHLORIDE 5 MG: 5 TABLET ORAL at 04:03

## 2024-03-11 RX ADMIN — SUGAMMADEX 200 MG: 100 INJECTION, SOLUTION INTRAVENOUS at 10:03

## 2024-03-11 RX ADMIN — SODIUM CHLORIDE, POTASSIUM CHLORIDE, SODIUM LACTATE AND CALCIUM CHLORIDE: 600; 310; 30; 20 INJECTION, SOLUTION INTRAVENOUS at 12:03

## 2024-03-11 RX ADMIN — GABAPENTIN 200 MG: 100 CAPSULE ORAL at 06:03

## 2024-03-11 RX ADMIN — PHENYLEPHRINE HYDROCHLORIDE 100 MCG: 10 INJECTION INTRAVENOUS at 07:03

## 2024-03-11 RX ADMIN — ROCURONIUM BROMIDE 25 MG: 10 INJECTION, SOLUTION INTRAVENOUS at 09:03

## 2024-03-11 RX ADMIN — ACETAMINOPHEN 1000 MG: 10 INJECTION INTRAVENOUS at 09:03

## 2024-03-11 RX ADMIN — CEFAZOLIN 2 G: 330 INJECTION, POWDER, FOR SOLUTION INTRAMUSCULAR; INTRAVENOUS at 07:03

## 2024-03-11 RX ADMIN — METOPROLOL TARTRATE 50 MG: 50 TABLET, FILM COATED ORAL at 01:03

## 2024-03-11 RX ADMIN — EPHEDRINE SULFATE 10 MG: 50 INJECTION INTRAVENOUS at 07:03

## 2024-03-11 RX ADMIN — LIDOCAINE HYDROCHLORIDE 100 MG: 20 INJECTION, SOLUTION EPIDURAL; INFILTRATION; INTRACAUDAL; PERINEURAL at 07:03

## 2024-03-11 RX ADMIN — GABAPENTIN 300 MG: 300 CAPSULE ORAL at 04:03

## 2024-03-11 RX ADMIN — ROCURONIUM BROMIDE 45 MG: 10 INJECTION, SOLUTION INTRAVENOUS at 07:03

## 2024-03-11 RX ADMIN — METHOCARBAMOL 1000 MG: 500 TABLET ORAL at 09:03

## 2024-03-11 RX ADMIN — CEFAZOLIN 2 G: 2 INJECTION, POWDER, FOR SOLUTION INTRAMUSCULAR; INTRAVENOUS at 03:03

## 2024-03-11 NOTE — PLAN OF CARE
Patient  ready for surgery. Family at bedside. Belongings locker 1  Patient instructed on Preop Process verbalizes understanding.

## 2024-03-11 NOTE — PT/OT/SLP PROGRESS
Physical Therapy      Patient Name:  Quintin Ling   MRN:  [id number]    Chart review performed and attempted but patient not seen today secondary to Nurse/ RANDY hold as pt just returned to floor following procedure/sx.

## 2024-03-11 NOTE — ASSESSMENT & PLAN NOTE
Chronic, controlled. Latest blood pressure and vitals reviewed-     Temp:  [97.9 °F (36.6 °C)-98.7 °F (37.1 °C)]   Pulse:  [59-65]   Resp:  [12-19]   BP: (100-186)/(50-73)   SpO2:  [94 %-100 %] .   Home meds for hypertension were reviewed and noted below.   Hypertension Medications               amLODIPine (NORVASC) 5 MG tablet TAKE 1 TABLET BY MOUTH EVERY DAY    doxazosin (CARDURA) 4 MG tablet Take 4 mg by mouth once daily.    hydroCHLOROthiazide (HYDRODIURIL) 25 MG tablet TAKE 1 TABLET BY MOUTH EVERY DAY IN THE MORNING    metoprolol succinate (TOPROL-XL) 100 MG 24 hr tablet Take 1 tablet (100 mg total) by mouth 2 (two) times daily.    olmesartan (BENICAR) 40 MG tablet Take 1 tablet (40 mg total) by mouth once daily.            While in the hospital, will manage blood pressure as follows; Adjust home antihypertensive regimen as follows- hold antihypertensives for now, pt normotensive- resume as BP tolerates     Will utilize p.r.n. blood pressure medication only if patient's blood pressure greater than 180/110 and he develops symptoms such as worsening chest pain or shortness of breath.

## 2024-03-11 NOTE — ANESTHESIA PROCEDURE NOTES
Intubation    Date/Time: 3/11/2024 7:06 AM    Performed by: Chago Chi CRNA  Authorized by: Yair Moore II, MD    Intubation:     Induction:  Intravenous    Mask Ventilation:  Easy mask    Attempts:  1    Attempted By:  CRNA    Method of Intubation:  Direct    Blade:  Kun 3    Laryngeal View Grade: Grade IIA - cords partially seen      Difficult Airway Encountered?: No      Complications:  None    Airway Device:  Oral endotracheal tube    Airway Device Size:  8.0    Style/Cuff Inflation:  Cuffed (inflated to minimal occlusive pressure)    Tube secured:  21    Secured at:  The lips    Placement Verified By:  Capnometry    Complicating Factors:  None    Findings Post-Intubation:  BS equal bilateral  Notes:      Floppy epiglottis noted, view improved with cricoid pressure.       The patient is status post Yag laser Capsulotomy in the right eye. The vision shows nice improvement.

## 2024-03-11 NOTE — HPI
Patient is a 72 YO  male with PMH notable for essential HTN, Pre-DM, CKD 3, chronic anemia, PAD who is admitted to the surgery service for management of colon adenoma. He underwent R hemicolectomy on 03/11. Hospital medicine consulted postop for management of HTN, DM.     Pt seen postop after arrival to floor. He is awake, alert, reports some abdominal soreness postoperatively, has not needed any pain medications yet. Denies nausea, vomiting, chest pain, shortness of breath. Prior to surgery, was in his usual state of health. Denies recent illness, sick contacts or medication changes. Last BM was morning of surgery.

## 2024-03-11 NOTE — ASSESSMENT & PLAN NOTE
Creatine stable for now. BMP reviewed- noted Estimated Creatinine Clearance: 52.8 mL/min (based on SCr of 1.4 mg/dL). according to latest data. Based on current GFR, CKD stage is stage 3 - GFR 30-59.  Monitor UOP and serial BMP and adjust therapy as needed. Renally dose meds. Avoid nephrotoxic medications and procedures.  Cr baseline appears to be 1.4-1.5 recently   Monitor BMP  Strict Is and Os

## 2024-03-11 NOTE — ASSESSMENT & PLAN NOTE
Body mass index is 36.98 kg/m². Morbid obesity complicates all aspects of disease management from diagnostic modalities to treatment. Weight loss encouraged and health benefits explained to patient.

## 2024-03-11 NOTE — INTERVAL H&P NOTE
The patient has been examined and the H&P has been reviewed:    I concur with the findings and changes have been noted since the H&P was written: discussed at tumor board. Agreed with peritoneal biopsy being benign. Ok to proceed with robotic R hemicolectomy for cecal polyp    Anesthesia/Surgery risks, benefits and alternative options discussed and understood by patient/family.          There are no hospital problems to display for this patient.

## 2024-03-11 NOTE — ASSESSMENT & PLAN NOTE
- s/p R hemicolectomy 03/11  - management per primary team   - monitor postop CBC, BMP  - consider discontinuation of paulson once pt more ambulatory

## 2024-03-11 NOTE — CONSULTS
[facility] Medicine  Consult Note    Patient Name: Quintin Ling  MRN: [id number]  Admission Date: 3/11/2024  Hospital Length of Stay: 0 days  Attending Physician: Tosin Boothe MD   Primary Care Provider: Bishop Gabriel MD           Patient information was obtained from patient, past medical records, and ER records.     Consults  Subjective:     Principal Problem: Adenoma of colon    Chief Complaint: No chief complaint on file.       HPI: Patient is a 72 YO  male with PMH notable for essential HTN, Pre-DM, CKD 3, chronic anemia, PAD who is admitted to the surgery service for management of colon adenoma. He underwent R hemicolectomy on 03/11. Hospital medicine consulted postop for management of HTN, DM.     Pt seen postop after arrival to floor. He is awake, alert, reports some abdominal soreness postoperatively, has not needed any pain medications yet. Denies nausea, vomiting, chest pain, shortness of breath. Prior to surgery, was in his usual state of health. Denies recent illness, sick contacts or medication changes. Last BM was morning of surgery.     Past Medical History:   Diagnosis Date    Diabetes mellitus     Hyperlipidemia     Hypertension        Past Surgical History:   Procedure Laterality Date    BIOPSY OF PERITONEUM N/A 1/29/2024    Procedure: BIOPSY, PERITONEUM;  Surgeon: Tosin Boothe MD;  Location: [facility] OR;  Service: Colon and Rectal;  Laterality: N/A;    COLONOSCOPY N/A 1/5/2024    Procedure: COLONOSCOPY;  Surgeon: Irasema Gil MD;  Location: [facility] ENDO;  Service: Endoscopy;  Laterality: N/A;    DIAGNOSTIC LAPAROSCOPY N/A 1/29/2024    Procedure: LAPAROSCOPY, DIAGNOSTIC;  Surgeon: Tosin Boothe MD;  Location: [facility] OR;  Service: Colon and Rectal;  Laterality: N/A;    EPIDURAL STEROID INJECTION N/A 11/13/2023    Procedure: Lumbar L5/S1 IL JOSE;  Surgeon: Oneil Carlson MD;  Location: [facility] PAIN MGT;  Service: Pain Management;  Laterality: N/A;     ESOPHAGOGASTRODUODENOSCOPY N/A 1/5/2024    Procedure: EGD (ESOPHAGOGASTRODUODENOSCOPY);  Surgeon: Irasema Gil MD;  Location: [facility];  Service: Endoscopy;  Laterality: N/A;    NO PAST SURGERIES         Review of patient's allergies indicates:  No Known Allergies    No current facility-administered medications on file prior to encounter.     Current Outpatient Medications on File Prior to Encounter   Medication Sig    amLODIPine (NORVASC) 5 MG tablet TAKE 1 TABLET BY MOUTH EVERY DAY    atorvastatin (LIPITOR) 20 MG tablet TAKE 1 TABLET BY MOUTH EVERY DAY    doxazosin (CARDURA) 4 MG tablet Take 4 mg by mouth once daily.    fenofibrate (TRICOR) 54 MG tablet Take 1 tablet (54 mg total) by mouth once daily.    hydroCHLOROthiazide (HYDRODIURIL) 25 MG tablet TAKE 1 TABLET BY MOUTH EVERY DAY IN THE MORNING    metFORMIN (GLUCOPHAGE) 500 MG tablet TAKE 1 TABLET BY MOUTH TWICE A DAY WITH FOOD    metoprolol succinate (TOPROL-XL) 100 MG 24 hr tablet Take 1 tablet (100 mg total) by mouth 2 (two) times daily.    metroNIDAZOLE (FLAGYL) 500 MG tablet Take 1 tablet (500 mg total) by mouth 3 (three) times daily. Take initial dose at 2pm, second dose at 3pm and final dose at 10pm the day before surgery    neomycin (MYCIFRADIN) 500 mg Tab Take 2 tablets (1,000 mg total) by mouth 3 (three) times daily. Take initial dose at 2pm, take second dose at 3pm and take final dose at 10pm the day before surgery.    olmesartan (BENICAR) 40 MG tablet Take 1 tablet (40 mg total) by mouth once daily.    polyethylene glycol (GLYCOLAX) 17 gram/dose powder Take 238 g by mouth once daily. Mix with 2L of gatorade and drink all of mixed solution starting at 12pm the day before surgery, finishing by 10pm the night before surgery.    pregabalin (LYRICA) 75 MG capsule Take 1 capsule (75 mg total) by mouth 2 (two) times daily.    tiZANidine (ZANAFLEX) 4 MG tablet TAKE 1/2-1 TABLET BY MOUTH 2 TIMES DAILY AS NEEDED FOR MUSCLE SPASMS MAY CAUSE DROWSINESS.     traMADoL (ULTRAM) 50 mg tablet Take 1 tablet (50 mg total) by mouth every 6 (six) hours as needed for Pain.     Family History       Problem Relation (Age of Onset)    No Known Problems Mother, Father          Tobacco Use    Smoking status: Former     Passive exposure: Never    Smokeless tobacco: Never   Substance and Sexual Activity    Alcohol use: Not Currently     Comment: OCC    Drug use: Never    Sexual activity: Yes     Partners: Female     Review of Systems   Constitutional:  Negative for activity change, appetite change, chills and fever.   HENT:  Negative for congestion, rhinorrhea, sneezing and sore throat.    Respiratory:  Negative for cough, chest tightness and shortness of breath.    Cardiovascular:  Negative for chest pain, palpitations and leg swelling.        + murmur    Gastrointestinal:  Positive for abdominal pain. Negative for blood in stool, constipation, diarrhea, nausea and vomiting.   Genitourinary:  Negative for decreased urine volume, difficulty urinating, dysuria and hematuria.   Musculoskeletal: Negative.    Skin: Negative.    Allergic/Immunologic: Negative.    Neurological: Negative.    Hematological: Negative.      Objective:     Vital Signs (Most Recent):  Temp: 97.9 °F (36.6 °C) (03/11/24 1214)  Pulse: 62 (03/11/24 1214)  Resp: 18 (03/11/24 1214)  BP: (!) 124/56 (03/11/24 1214)  SpO2: 96 % (03/11/24 1214) Vital Signs (24h Range):  Temp:  [97.9 °F (36.6 °C)-98.7 °F (37.1 °C)] 97.9 °F (36.6 °C)  Pulse:  [59-65] 62  Resp:  [12-19] 18  SpO2:  [94 %-100 %] 96 %  BP: (100-186)/(50-73) 124/56     Weight: 100.8 kg (222 lb 3.6 oz)  Body mass index is 36.98 kg/m².     Physical Exam  Vitals and nursing note reviewed.   Constitutional:       General: He is not in acute distress.     Appearance: He is obese. He is not ill-appearing.   HENT:      Head: Normocephalic and atraumatic.      Mouth/Throat:      Mouth: Mucous membranes are dry.      Pharynx: Oropharynx is clear.   Eyes:      General:  No scleral icterus.     Extraocular Movements: Extraocular movements intact.      Conjunctiva/sclera: Conjunctivae normal.   Cardiovascular:      Rate and Rhythm: Normal rate and regular rhythm.      Heart sounds: Murmur heard.      Comments: + systolic murmur throughout precordium   Abdominal:      General: Distension: mild distension.      Palpations: Abdomen is soft.      Tenderness: There is no abdominal tenderness.      Comments: Surgical incisions CDI    Genitourinary:     Comments: Paulson in place   Musculoskeletal:      Right lower leg: No edema.      Left lower leg: No edema.   Neurological:      Mental Status: He is alert and oriented to person, place, and time. Mental status is at baseline.          Significant Labs: All pertinent labs within the past 24 hours have been reviewed.    Significant Imaging: I have reviewed all pertinent imaging results/findings within the past 24 hours.  Assessment/Plan:     * Adenoma of colon  - s/p R hemicolectomy 03/11  - management per primary team   - monitor postop CBC, BMP  - consider discontinuation of paulson once pt more ambulatory       Stage 3a chronic kidney disease  Creatine stable for now. BMP reviewed- noted Estimated Creatinine Clearance: 52.8 mL/min (based on SCr of 1.4 mg/dL). according to latest data. Based on current GFR, CKD stage is stage 3 - GFR 30-59.  Monitor UOP and serial BMP and adjust therapy as needed. Renally dose meds. Avoid nephrotoxic medications and procedures.  Cr baseline appears to be 1.4-1.5 recently   Monitor BMP  Strict Is and Os     Severe obesity (BMI 35.0-39.9) with comorbidity  Body mass index is 36.98 kg/m². Morbid obesity complicates all aspects of disease management from diagnostic modalities to treatment. Weight loss encouraged and health benefits explained to patient.         Prediabetes  - start low dose SSI as needed  - monitor BG  - hypoglycemia protocol       Essential hypertension  Chronic, controlled. Latest blood pressure  and vitals reviewed-     Temp:  [97.9 °F (36.6 °C)-98.7 °F (37.1 °C)]   Pulse:  [59-65]   Resp:  [12-19]   BP: (100-186)/(50-73)   SpO2:  [94 %-100 %] .   Home meds for hypertension were reviewed and noted below.   Hypertension Medications               amLODIPine (NORVASC) 5 MG tablet TAKE 1 TABLET BY MOUTH EVERY DAY    doxazosin (CARDURA) 4 MG tablet Take 4 mg by mouth once daily.    hydroCHLOROthiazide (HYDRODIURIL) 25 MG tablet TAKE 1 TABLET BY MOUTH EVERY DAY IN THE MORNING    metoprolol succinate (TOPROL-XL) 100 MG 24 hr tablet Take 1 tablet (100 mg total) by mouth 2 (two) times daily.    olmesartan (BENICAR) 40 MG tablet Take 1 tablet (40 mg total) by mouth once daily.            While in the hospital, will manage blood pressure as follows; Adjust home antihypertensive regimen as follows- hold antihypertensives for now, pt normotensive- resume as BP tolerates     Will utilize p.r.n. blood pressure medication only if patient's blood pressure greater than 180/110 and he develops symptoms such as worsening chest pain or shortness of breath.      VTE Risk Mitigation (From admission, onward)           Ordered     enoxaparin injection 40 mg  Daily         03/11/24 1220     IP VTE HIGH RISK PATIENT  Once         03/11/24 1220     Place sequential compression device  Until discontinued         03/11/24 1220                        Thank you for your consult. I will follow-up with patient. Please contact us if you have any additional questions.    Aracelis Cyr MD  Department of Hospital Medicine   [facility] - Med Surg

## 2024-03-11 NOTE — PT/OT/SLP PROGRESS
Occupational Therapy      Patient Name:  Quintin Ling   MRN:  [id number]    OT evaluation initiated via chart review. Attempted OT evaluation at 1320.  Per nurse Britney, pt just got onto floor post op and is not ready to get up at this time. Will continue efforts.      Kelle MARTINEZ, LOTR  3/11/2024

## 2024-03-11 NOTE — OP NOTE
[facility] Surg  Colon and Rectal Surgery  Operative Note    SUMMARY     Date of Procedure: 3/11/2024     Procedure: Procedure(s) (LRB):  XI ROBOTIC COLECTOMY, RIGHT (Right)  BLOCK, TRANSVERSUS ABDOMINIS PLANE (N/A)     Surgeon(s) and Role:     * Tosin Boothe MD - Primary    Assisting Surgeon: None    Pre-Operative Diagnosis: Adenoma of colon [D12.6]    Post-Operative Diagnosis: Post-Op Diagnosis Codes:     * Adenoma of colon [D12.6]    Anesthesia: General    Operative Findings (including complications, if any): omentectomy performed 2/2 multiple omental nodules. Lateral to medial dissection with extracorporeal anastomosis with densely scarred mesentery    Description of Technical Procedures:  The patient was identified in the preoperative holding area.  After informed consent was obtained she was taken to the operating room placed on the operating table in the supine position.  General anesthesia was administered.  She was prepped and draped in the usual fashion.  A time-out was performed indicating the correct patient procedure and operative site and all the room were in agreement.  Preoperative antibiotics were given.    A stab incision was made at martinez's point in the left upper quadrant and the abdomen was accessed with a Veress needle.  Pneumoperitoneum was achieved without any compromise to underlying cardiorespiratory status.  A tattoo was identified in the cecum.  Bilateral transversus abdominis plane blocks were performed.  Under direct visualization the transversus abdominis plane was accessed and a combination of Exparel and Marcaine was instilled in this plane bilaterally.  Four robotic working trocars were placed in the left lateral abdomen.  The patient was positioned appropriately.  The robot was then docked without any complication.    The transverse colon was densely adherent to the anterior abdominal wall with its omentum.  This is likely from his prior operation with peritoneal implant  biopsy.  The transverse colon was sharply taken down from the anterior abdominal wall using electrocautery to obtain hemostasis.    I attempted to identify the ileocolic pedicle but because of the patient's peritoneal benign implants from previous pathology the mesentery was densely scarred and I was unable to create a window from the ileocolic pedicle in the retroperitoneum.  I then elected to perform a lateral to medial dissection.  The cecum was grasped and medialized along the white line of Toldt.  The terminal ileum and appendix were mobilized as well in the white line was mobilized up into the hepatic flexure.  Then turned my attention to the transverse colon.  There were multiple omental nodules on the transverse colon.  This was previously discussed at tumor board and the plan was to excise the omental nodules for further pathology.  The mesentery was taken off the transverse colon and the lesser sac was then entered along its extent.  Using a vessel sealer the remainder of the omentum was taken off the greater curve of the stomach including the omental nodules.  It was placed in the right upper quadrant to be removed later for pathology.  The transverse colon and hepatic flexure were then mobilized using a vessel sealer.  All of the attachments to the retroperitoneum and hepatic flexure were taken down and the colon was freely mobile.  At this time I attempted to identify the ileocolic pedicle again however the small bowel mesentery was densely adherent to the peritoneum of the mesentery of the ileocolic artery.  At this point I elected to perform an extracorporeal anastomosis so that I can better identify the duodenum and perform a high ligation of the ileocolic pedicle.    A midline laparotomy incision was made in the upper abdomen and dissected through the skin and subcutaneous tissue.  A Trung wound protector was placed in the wound and the omentum that was previously dissected was delivered through  the opening and sent off the field for pathology.  I then eviscerated the right colon and terminal ileum.  I was able to better identify the ileocolic pedicle incomplete mobilization from a lateral to medial approach identifying the duodenal.  The ileocolic pedicle was isolated and taken with a LigaSure perform a high ligation 2 cm off of its origin.  The intervening mesentery of the small intestine was taken up to the proposed terminal ileal transection site.  The same was performed to the transverse colon in the mid transverse colon.  Identified the middle colic artery and ensured the mesenteric excision did not involve the middle colic artery.  Using spice software in indocyanine green the proposed cut ends of the small intestine in transverse colon were inspected and appeared to have good perfusion.  Using a 75 mm blue load LORA stapler the small bowel and colon were transected and the specimen was sent off field.  Then elected to perform a side-to-side isoperistaltic stapled anastomosis.  Enterotomies were made in the small intestine in the transverse colon and a 75 mm stapler was used to create a common channel.  Upon creation of the common channel there was noted to be a mucosal defect from the enterotomy on the colon.  This was oversewn using a 3-0 Vicryl.  I was unable to evaluate the serosa it as it was on the mesentery side of the colon.  The common channel was then closed using a running 4-0 PDS on an RB 1 ensuring full-thickness bites with mucosa and serosa. The common channel was oversewn using a 3-0 Vicryl in a Lembert fashion.  Abdomen was copiously irrigated and irrigation returned clear.  An AmnioFix was placed over the anastomosis to facilitate healing.  The Trung wound protector was removed.  The fascia was then closed using a running 0 PDS and secured to itself.  Skin was then closed with subcuticular Monocryl and Dermabond was applied.  the patient tolerated the procedure well    Significant  Surgical Tasks Conducted by the Assistant(s), if Applicable: none    Estimated Blood Loss (EBL): 50 mL           Implants:   Implant Name Type Inv. Item Serial No.  Lot No. LRB No. Used Action   [id number]  MEMBRANE AMNIOFIX [id number] [employer] N/A  1 Implanted       Specimens:   Specimen (24h ago, onward)       Start     Ordered    03/11/24 1023  Specimen to Pathology, Surgery General Surgery  Once        Comments: Pre-op Diagnosis: Adenoma of colon [D12.6]Procedure(s):XI ROBOTIC COLECTOMY, RIGHTBLOCK, TRANSVERSUS ABDOMINIS PLANE Number of specimens: 2Name of specimens: 1. Omentum with nodules- PERM2. Right colon with terminal ileum- PERM     References:    Click here for ordering Quick Tip   Question Answer Comment   Procedure Type: General Surgery    Specimen Class: Routine/Screening    Which provider would you like to cc? MAX NDIAYE    Release to patient Immediate        03/11/24 1026                     Condition: Good    Malignancy: Procedure performed for malignancy no    Disposition: PACU - hemodynamically stable.    Attestation: I performed the procedure.

## 2024-03-11 NOTE — TRANSFER OF CARE
"Anesthesia Transfer of Care Note    Patient: Quintin Ling    Procedure(s) Performed: Procedure(s) (LRB):  XI ROBOTIC COLECTOMY, RIGHT (Right)  BLOCK, TRANSVERSUS ABDOMINIS PLANE (N/A)    Patient location: PACU    Anesthesia Type: general    Transport from OR: Transported from OR on room air with adequate spontaneous ventilation    Post pain: adequate analgesia    Post assessment: no apparent anesthetic complications    Post vital signs: stable    Level of consciousness: sedated    Nausea/Vomiting: no nausea/vomiting    Complications: none    Transfer of care protocol was followed      Last vitals: Visit Vitals  BP (!) 109/53   Pulse 61   Temp 37.1 °C (98.7 °F) (Skin)   Resp 16   Ht 5' 5" (1.651 m)   Wt 100.8 kg (222 lb 3.6 oz)   SpO2 100%   BMI 36.98 kg/m²     "

## 2024-03-11 NOTE — SUBJECTIVE & OBJECTIVE
Past Medical History:   Diagnosis Date    Diabetes mellitus     Hyperlipidemia     Hypertension        Past Surgical History:   Procedure Laterality Date    BIOPSY OF PERITONEUM N/A 1/29/2024    Procedure: BIOPSY, PERITONEUM;  Surgeon: Tosin Boothe MD;  Location: [facility] OR;  Service: Colon and Rectal;  Laterality: N/A;    COLONOSCOPY N/A 1/5/2024    Procedure: COLONOSCOPY;  Surgeon: Irasema Gil MD;  Location: [facility] ENDO;  Service: Endoscopy;  Laterality: N/A;    DIAGNOSTIC LAPAROSCOPY N/A 1/29/2024    Procedure: LAPAROSCOPY, DIAGNOSTIC;  Surgeon: Tosin Boothe MD;  Location: [facility] OR;  Service: Colon and Rectal;  Laterality: N/A;    EPIDURAL STEROID INJECTION N/A 11/13/2023    Procedure: Lumbar L5/S1 IL JOSE;  Surgeon: Oneil Carlson MD;  Location: [facility] PAIN MGT;  Service: Pain Management;  Laterality: N/A;    ESOPHAGOGASTRODUODENOSCOPY N/A 1/5/2024    Procedure: EGD (ESOPHAGOGASTRODUODENOSCOPY);  Surgeon: Irasema Gil MD;  Location: [facility] ENDO;  Service: Endoscopy;  Laterality: N/A;    NO PAST SURGERIES         Review of patient's allergies indicates:  No Known Allergies    No current facility-administered medications on file prior to encounter.     Current Outpatient Medications on File Prior to Encounter   Medication Sig    amLODIPine (NORVASC) 5 MG tablet TAKE 1 TABLET BY MOUTH EVERY DAY    atorvastatin (LIPITOR) 20 MG tablet TAKE 1 TABLET BY MOUTH EVERY DAY    doxazosin (CARDURA) 4 MG tablet Take 4 mg by mouth once daily.    fenofibrate (TRICOR) 54 MG tablet Take 1 tablet (54 mg total) by mouth once daily.    hydroCHLOROthiazide (HYDRODIURIL) 25 MG tablet TAKE 1 TABLET BY MOUTH EVERY DAY IN THE MORNING    metFORMIN (GLUCOPHAGE) 500 MG tablet TAKE 1 TABLET BY MOUTH TWICE A DAY WITH FOOD    metoprolol succinate (TOPROL-XL) 100 MG 24 hr tablet Take 1 tablet (100 mg total) by mouth 2 (two) times daily.    metroNIDAZOLE (FLAGYL) 500 MG tablet Take 1 tablet (500 mg total) by mouth 3 (three)  times daily. Take initial dose at 2pm, second dose at 3pm and final dose at 10pm the day before surgery    neomycin (MYCIFRADIN) 500 mg Tab Take 2 tablets (1,000 mg total) by mouth 3 (three) times daily. Take initial dose at 2pm, take second dose at 3pm and take final dose at 10pm the day before surgery.    olmesartan (BENICAR) 40 MG tablet Take 1 tablet (40 mg total) by mouth once daily.    polyethylene glycol (GLYCOLAX) 17 gram/dose powder Take 238 g by mouth once daily. Mix with 2L of gatorade and drink all of mixed solution starting at 12pm the day before surgery, finishing by 10pm the night before surgery.    pregabalin (LYRICA) 75 MG capsule Take 1 capsule (75 mg total) by mouth 2 (two) times daily.    tiZANidine (ZANAFLEX) 4 MG tablet TAKE 1/2-1 TABLET BY MOUTH 2 TIMES DAILY AS NEEDED FOR MUSCLE SPASMS MAY CAUSE DROWSINESS.    traMADoL (ULTRAM) 50 mg tablet Take 1 tablet (50 mg total) by mouth every 6 (six) hours as needed for Pain.     Family History       Problem Relation (Age of Onset)    No Known Problems Mother, Father          Tobacco Use    Smoking status: Former     Passive exposure: Never    Smokeless tobacco: Never   Substance and Sexual Activity    Alcohol use: Not Currently     Comment: OCC    Drug use: Never    Sexual activity: Yes     Partners: Female     Review of Systems   Constitutional:  Negative for activity change, appetite change, chills and fever.   HENT:  Negative for congestion, rhinorrhea, sneezing and sore throat.    Respiratory:  Negative for cough, chest tightness and shortness of breath.    Cardiovascular:  Negative for chest pain, palpitations and leg swelling.        + murmur    Gastrointestinal:  Positive for abdominal pain. Negative for blood in stool, constipation, diarrhea, nausea and vomiting.   Genitourinary:  Negative for decreased urine volume, difficulty urinating, dysuria and hematuria.   Musculoskeletal: Negative.    Skin: Negative.    Allergic/Immunologic: Negative.     Neurological: Negative.    Hematological: Negative.      Objective:     Vital Signs (Most Recent):  Temp: 97.9 °F (36.6 °C) (03/11/24 1214)  Pulse: 62 (03/11/24 1214)  Resp: 18 (03/11/24 1214)  BP: (!) 124/56 (03/11/24 1214)  SpO2: 96 % (03/11/24 1214) Vital Signs (24h Range):  Temp:  [97.9 °F (36.6 °C)-98.7 °F (37.1 °C)] 97.9 °F (36.6 °C)  Pulse:  [59-65] 62  Resp:  [12-19] 18  SpO2:  [94 %-100 %] 96 %  BP: (100-186)/(50-73) 124/56     Weight: 100.8 kg (222 lb 3.6 oz)  Body mass index is 36.98 kg/m².     Physical Exam  Vitals and nursing note reviewed.   Constitutional:       General: He is not in acute distress.     Appearance: He is obese. He is not ill-appearing.   HENT:      Head: Normocephalic and atraumatic.      Mouth/Throat:      Mouth: Mucous membranes are dry.      Pharynx: Oropharynx is clear.   Eyes:      General: No scleral icterus.     Extraocular Movements: Extraocular movements intact.      Conjunctiva/sclera: Conjunctivae normal.   Cardiovascular:      Rate and Rhythm: Normal rate and regular rhythm.      Heart sounds: Murmur heard.      Comments: + systolic murmur throughout precordium   Abdominal:      General: Distension: mild distension.      Palpations: Abdomen is soft.      Tenderness: There is no abdominal tenderness.      Comments: Surgical incisions CDI    Genitourinary:     Comments: Garcia in place   Musculoskeletal:      Right lower leg: No edema.      Left lower leg: No edema.   Neurological:      Mental Status: He is alert and oriented to person, place, and time. Mental status is at baseline.          Significant Labs: All pertinent labs within the past 24 hours have been reviewed.    Significant Imaging: I have reviewed all pertinent imaging results/findings within the past 24 hours.

## 2024-03-12 VITALS
HEART RATE: 63 BPM | DIASTOLIC BLOOD PRESSURE: 58 MMHG | TEMPERATURE: 98 F | OXYGEN SATURATION: 95 % | RESPIRATION RATE: 20 BRPM | HEIGHT: 65 IN | BODY MASS INDEX: 37.03 KG/M2 | SYSTOLIC BLOOD PRESSURE: 124 MMHG | WEIGHT: 222.25 LBS

## 2024-03-12 LAB
ANION GAP SERPL CALC-SCNC: 10 MMOL/L (ref 8–16)
BUN SERPL-MCNC: 20 MG/DL (ref 8–23)
CALCIUM SERPL-MCNC: 8.9 MG/DL (ref 8.7–10.5)
CHLORIDE SERPL-SCNC: 109 MMOL/L (ref 95–110)
CO2 SERPL-SCNC: 20 MMOL/L (ref 23–29)
CREAT SERPL-MCNC: 1.3 MG/DL (ref 0.5–1.4)
ERYTHROCYTE [DISTWIDTH] IN BLOOD BY AUTOMATED COUNT: 13.5 % (ref 11.5–14.5)
EST. GFR  (NO RACE VARIABLE): 59 ML/MIN/1.73 M^2
GLUCOSE SERPL-MCNC: 120 MG/DL (ref 70–110)
HCT VFR BLD AUTO: 33.7 % (ref 40–54)
HGB BLD-MCNC: 10.7 G/DL (ref 14–18)
MAGNESIUM SERPL-MCNC: 1.9 MG/DL (ref 1.6–2.6)
MCH RBC QN AUTO: 28.9 PG (ref 27–31)
MCHC RBC AUTO-ENTMCNC: 31.8 G/DL (ref 32–36)
MCV RBC AUTO: 91 FL (ref 82–98)
PHOSPHATE SERPL-MCNC: 3.3 MG/DL (ref 2.7–4.5)
PLATELET # BLD AUTO: 109 K/UL (ref 150–450)
PLATELET BLD QL SMEAR: NORMAL
PMV BLD AUTO: 11.9 FL (ref 9.2–12.9)
POCT GLUCOSE: 116 MG/DL (ref 70–110)
POCT GLUCOSE: 120 MG/DL (ref 70–110)
POTASSIUM SERPL-SCNC: 4.5 MMOL/L (ref 3.5–5.1)
RBC # BLD AUTO: 3.7 M/UL (ref 4.6–6.2)
SODIUM SERPL-SCNC: 139 MMOL/L (ref 136–145)
WBC # BLD AUTO: 10.13 K/UL (ref 3.9–12.7)

## 2024-03-12 PROCEDURE — 25000003 PHARM REV CODE 250: Performed by: STUDENT IN AN ORGANIZED HEALTH CARE EDUCATION/TRAINING PROGRAM

## 2024-03-12 PROCEDURE — 25000003 PHARM REV CODE 250: Performed by: INTERNAL MEDICINE

## 2024-03-12 PROCEDURE — 25000242 PHARM REV CODE 250 ALT 637 W/ HCPCS: Performed by: INTERNAL MEDICINE

## 2024-03-12 PROCEDURE — 36415 COLL VENOUS BLD VENIPUNCTURE: CPT | Performed by: STUDENT IN AN ORGANIZED HEALTH CARE EDUCATION/TRAINING PROGRAM

## 2024-03-12 PROCEDURE — 94799 UNLISTED PULMONARY SVC/PX: CPT | Mod: XB

## 2024-03-12 PROCEDURE — 99024 POSTOP FOLLOW-UP VISIT: CPT | Mod: ,,,

## 2024-03-12 PROCEDURE — 85027 COMPLETE CBC AUTOMATED: CPT | Performed by: STUDENT IN AN ORGANIZED HEALTH CARE EDUCATION/TRAINING PROGRAM

## 2024-03-12 PROCEDURE — 63600175 PHARM REV CODE 636 W HCPCS: Performed by: STUDENT IN AN ORGANIZED HEALTH CARE EDUCATION/TRAINING PROGRAM

## 2024-03-12 PROCEDURE — 83735 ASSAY OF MAGNESIUM: CPT | Performed by: STUDENT IN AN ORGANIZED HEALTH CARE EDUCATION/TRAINING PROGRAM

## 2024-03-12 PROCEDURE — 80048 BASIC METABOLIC PNL TOTAL CA: CPT | Performed by: STUDENT IN AN ORGANIZED HEALTH CARE EDUCATION/TRAINING PROGRAM

## 2024-03-12 PROCEDURE — 97116 GAIT TRAINING THERAPY: CPT

## 2024-03-12 PROCEDURE — 97161 PT EVAL LOW COMPLEX 20 MIN: CPT

## 2024-03-12 PROCEDURE — 84100 ASSAY OF PHOSPHORUS: CPT | Performed by: STUDENT IN AN ORGANIZED HEALTH CARE EDUCATION/TRAINING PROGRAM

## 2024-03-12 RX ORDER — METHOCARBAMOL 750 MG/1
750 TABLET, FILM COATED ORAL EVERY 8 HOURS
Qty: 15 TABLET | Refills: 0 | Status: ON HOLD | OUTPATIENT
Start: 2024-03-12 | End: 2024-03-20 | Stop reason: HOSPADM

## 2024-03-12 RX ORDER — OXYCODONE HYDROCHLORIDE 5 MG/1
5 TABLET ORAL EVERY 6 HOURS PRN
Qty: 20 TABLET | Refills: 0 | Status: ON HOLD | OUTPATIENT
Start: 2024-03-12 | End: 2024-03-20 | Stop reason: HOSPADM

## 2024-03-12 RX ORDER — ACETAMINOPHEN 500 MG
1000 TABLET ORAL EVERY 6 HOURS
Qty: 40 TABLET | Refills: 0 | Status: ON HOLD | OUTPATIENT
Start: 2024-03-12 | End: 2024-03-20

## 2024-03-12 RX ADMIN — METHOCARBAMOL 1000 MG: 500 TABLET ORAL at 03:03

## 2024-03-12 RX ADMIN — ACETAMINOPHEN 1000 MG: 500 TABLET ORAL at 03:03

## 2024-03-12 RX ADMIN — ENOXAPARIN SODIUM 40 MG: 40 INJECTION SUBCUTANEOUS at 09:03

## 2024-03-12 RX ADMIN — ACETAMINOPHEN 1000 MG: 10 INJECTION INTRAVENOUS at 06:03

## 2024-03-12 RX ADMIN — SODIUM CHLORIDE, POTASSIUM CHLORIDE, SODIUM LACTATE AND CALCIUM CHLORIDE: 600; 310; 30; 20 INJECTION, SOLUTION INTRAVENOUS at 05:03

## 2024-03-12 RX ADMIN — PREGABALIN 75 MG: 75 CAPSULE ORAL at 09:03

## 2024-03-12 RX ADMIN — DOXAZOSIN 4 MG: 2 TABLET ORAL at 09:03

## 2024-03-12 RX ADMIN — ATORVASTATIN CALCIUM 20 MG: 10 TABLET, FILM COATED ORAL at 09:03

## 2024-03-12 RX ADMIN — METOPROLOL TARTRATE 50 MG: 50 TABLET, FILM COATED ORAL at 09:03

## 2024-03-12 RX ADMIN — CHLORHEXIDINE GLUCONATE 0.12% ORAL RINSE 10 ML: 1.2 LIQUID ORAL at 09:03

## 2024-03-12 RX ADMIN — METRONIDAZOLE 500 MG: 5 INJECTION, SOLUTION INTRAVENOUS at 07:03

## 2024-03-12 RX ADMIN — CEFAZOLIN 2 G: 2 INJECTION, POWDER, FOR SOLUTION INTRAMUSCULAR; INTRAVENOUS at 09:03

## 2024-03-12 RX ADMIN — FENOFIBRATE 160 MG: 160 TABLET ORAL at 09:03

## 2024-03-12 RX ADMIN — METHOCARBAMOL 1000 MG: 500 TABLET ORAL at 09:03

## 2024-03-12 RX ADMIN — AMLODIPINE BESYLATE 5 MG: 5 TABLET ORAL at 09:03

## 2024-03-12 NOTE — PT/OT/SLP PROGRESS
Occupational Therapy      Patient Name:  Quintin Ling   MRN:  [id number]    EVAL INITIATED VIA CHART REVIEW  IN AM 0930.Patient not seen today secondary to ANETTE Shen OT  3/12/2024

## 2024-03-12 NOTE — PT/OT/SLP EVAL
Physical Therapy Evaluation and Discharge Note    Patient Name:  Quintin Ling   MRN:  [id number]    Recommendations:     Discharge Recommendations: No Therapy Indicated  Discharge Equipment Recommendations: none   Barriers to discharge: None    Assessment:     Quintin Ling is a 73 y.o. male admitted with a medical diagnosis of Adenoma of colon. .  At this time, patient is functioning at their prior level of function and does not require further acute PT services.     Recent Surgery: Procedure(s) (LRB):  XI ROBOTIC COLECTOMY, RIGHT (Right)  BLOCK, TRANSVERSUS ABDOMINIS PLANE (N/A) 1 Day Post-Op    Plan:     During this hospitalization, patient does not require further acute PT services.  Please re-consult if situation changes.      Subjective     Chief Complaint: NONE   Patient/Family Comments/goals: GO HOME   Pain/Comfort:  Pain Rating 1: 0/10  Pain Rating Post-Intervention 1: 0/10    Patients cultural, spiritual, Oriental orthodox conflicts given the current situation:      Living Environment:   PT LIVES AT HOME WITH WIFE IN A ONE STORY HOME WITH NO STEPS   Prior to admission, patients level of function was IND, DRIVES AND IS RETIRED .  Equipment used at home: none.  DME owned (not currently used): none.  Upon discharge, patient will have assistance from WIFE.    Objective:     Communicated with NURSE GIANNI AND EPIC CHART REVIEW  prior to session.  Patient found supine with peripheral IV upon PT entry to room.    General Precautions: Standard,      Orthopedic Precautions:N/A   Braces: N/A  Respiratory Status: Room air    Exams:  Cognitive Exam:  Patient is oriented to Person, Place, Time, and Situation  RLE ROM: WFL  RLE Strength: WFL  LLE ROM: WFL  LLE Strength: WFL    Functional Mobility:  Bed Mobility:     Rolling Left:  independence  Supine to Sit: independence  Transfers:     Sit to Stand:  independence with no AD  Bed to Chair: independence with  no AD  using  Stand Pivot  Gait: PT GT TRAINED X 400' WITH NO AD      AM-PAC 6 CLICK MOBILITY  Total Score:21       Treatment and Education:  GT. BELT AND  SOCKS DONNED PRIOR TO OOB MOBILITY. PT WITH ANTALGIC GT PATTERN AS PLOF. PT RETURNED TO RM T/F TO CHAIR IND PT EDUCATED ON AMBULATING WITH WIFE OR NURSE SEVERAL X PER DAY TO INC MOBILITY AND DEC RISK OF BLOOD CLOTS. PT REPORTED UNDERSTANDING. PT LEFT SEATED IN CHAIR WITH ALL NEEDS MET.     AM-PAC 6 CLICK MOBILITY  Total Score:21     Patient left up in chair with call button in reach.    GOALS:   Multidisciplinary Problems       Physical Therapy Goals       Not on file                    History:     Past Medical History:   Diagnosis Date    Diabetes mellitus     Hyperlipidemia     Hypertension        Past Surgical History:   Procedure Laterality Date    BIOPSY OF PERITONEUM N/A 1/29/2024    Procedure: BIOPSY, PERITONEUM;  Surgeon: Tosin Boothe MD;  Location: [facility] OR;  Service: Colon and Rectal;  Laterality: N/A;    COLONOSCOPY N/A 1/5/2024    Procedure: COLONOSCOPY;  Surgeon: Irasema Gil MD;  Location: [facility] ENDO;  Service: Endoscopy;  Laterality: N/A;    DIAGNOSTIC LAPAROSCOPY N/A 1/29/2024    Procedure: LAPAROSCOPY, DIAGNOSTIC;  Surgeon: Tosin Boothe MD;  Location: [facility] OR;  Service: Colon and Rectal;  Laterality: N/A;    EPIDURAL STEROID INJECTION N/A 11/13/2023    Procedure: Lumbar L5/S1 IL JOSE;  Surgeon: Oneil Carlson MD;  Location: [facility] PAIN MGT;  Service: Pain Management;  Laterality: N/A;    ESOPHAGOGASTRODUODENOSCOPY N/A 1/5/2024    Procedure: EGD (ESOPHAGOGASTRODUODENOSCOPY);  Surgeon: Irasema Gil MD;  Location: [facility] ENDO;  Service: Endoscopy;  Laterality: N/A;    INJECTION OF ANESTHETIC AGENT INTO TISSUE PLANE DEFINED BY TRANSVERSUS ABDOMINIS MUSCLE N/A 3/11/2024    Procedure: BLOCK, TRANSVERSUS ABDOMINIS PLANE;  Surgeon: Tosin Boothe MD;  Location: [facility] OR;  Service: Colon and Rectal;  Laterality: N/A;    NO PAST SURGERIES      XI ROBOTIC COLECTOMY, RIGHT Right 3/11/2024     Procedure: XI ROBOTIC COLECTOMY, RIGHT;  Surgeon: Tosin Boothe MD;  Location: [facility];  Service: Colon and Rectal;  Laterality: Right;  WITH OMENECTOMY AND AMNIOFIX       Time Tracking:     PT Received On: 03/12/24  PT Start Time: 0750     PT Stop Time: 0815  PT Total Time (min): 25 min     Billable Minutes: Evaluation 16 and Gait Training 9      03/12/2024

## 2024-03-12 NOTE — DISCHARGE SUMMARY
[facility]  Colorectal Surgery  Discharge Summary      Patient Name: Quintin Ling  MRN: [id number]  Admission Date: 3/11/2024  Hospital Length of Stay: 1 days  Discharge Date and Time:  03/12/2024 3:58 PM  Attending Physician: Tosin Boothe MD   Discharging Provider: Sarita Gomez PA-C  Primary Care Provider: Bishop Gabriel MD    HPI:   Patient is a 73-year-old male who presents as a follow-up after diagnostic laparoscopy with a aborted right hemicolectomy.  Intraoperative findings included multiple plaques on the peritoneum as well as omentum.  This was concerning for undiagnosed malignancy and so biopsies were performed and procedure was aborted.  Since then pathology has resulted with benign peritoneal plaque disease.  Patient was previously asymptomatic from GI symptoms.  He continues to be so.  Has no other complaints today.        Procedure(s) (LRB):  XI ROBOTIC COLECTOMY, RIGHT (Right)  BLOCK, TRANSVERSUS ABDOMINIS PLANE (N/A)      Indwelling Lines/Drains at time of discharge:   Lines/Drains/Airways       None                 Hospital Course: 03/12/2024: pod 1, tolerating regular diet, pain well controlled, passing flatus, ambulating, ready for dc    Goals of Care Treatment Preferences:  Code Status: Full Code      Consults:     Significant Diagnostic Studies: Labs: BMP:   Recent Labs   Lab 03/12/24  0536   *      K 4.5      CO2 20*   BUN 20   CREATININE 1.3   CALCIUM 8.9   MG 1.9    and CBC   Recent Labs   Lab 03/12/24  0536   WBC 10.13   HGB 10.7*   HCT 33.7*   *       Pending Diagnostic Studies:       Procedure Component Value Units Date/Time    Specimen to Pathology, Surgery General Surgery [[id number]] Collected: 03/11/24 1027    Order Status: Sent Lab Status: In process Updated: 03/11/24 1332    Specimen: Tissue           Final Active Diagnoses:    Diagnosis Date Noted POA    PRINCIPAL PROBLEM:  Adenoma of colon [D12.6] 01/29/2024 Yes    Stage 3a chronic  kidney disease [N18.31] 05/29/2023 Yes    Severe obesity (BMI 35.0-39.9) with comorbidity [E66.01] 05/19/2021 Yes    Essential hypertension [I10] 11/18/2020 Yes    Prediabetes [R73.03] 11/18/2020 Yes      Problems Resolved During this Admission:      Discharged Condition: good    Disposition: Home or Self Care    Follow Up:   Follow-up Information       Tosin Boothe MD Follow up in 2 week(s).    Specialty: Colon and Rectal Surgery  Contact information:  [address] [place] [address] [address] [address]  [phone]                           Patient Instructions:      Diet Adult Regular     Lifting restrictions   Order Comments: No lifting over 10 pounds for 6 weeks     No driving until:   Order Comments: No longer taking pain medications & able to safely react to drivers     Notify your health care provider if you experience any of the following:  temperature >100.4     Notify your health care provider if you experience any of the following:  persistent nausea and vomiting or diarrhea     Notify your health care provider if you experience any of the following:  severe uncontrolled pain     Notify your health care provider if you experience any of the following:  redness, tenderness, or signs of infection (pain, swelling, redness, odor or green/yellow discharge around incision site)     Notify your health care provider if you experience any of the following:  difficulty breathing or increased cough     Notify your health care provider if you experience any of the following:  severe persistent headache     Notify your health care provider if you experience any of the following:  worsening rash     Notify your health care provider if you experience any of the following:  persistent dizziness, light-headedness, or visual disturbances     Notify your health care provider if you experience any of the following:  increased confusion or weakness     No dressing needed     Activity as tolerated     Shower on day dressing  removed (No bath)   Order Comments: Ok for showers, no submerging incisions underwater in bath for at least 2 weeks post op     Medications:  Reconciled Home Medications:      Medication List        START taking these medications      acetaminophen 500 MG tablet  Commonly known as: TYLENOL  Take 2 tablets (1,000 mg total) by mouth every 6 (six) hours. for 5 days     methocarbamoL 750 MG Tab  Commonly known as: ROBAXIN  Take 1 tablet (750 mg total) by mouth every 8 (eight) hours. for 5 days     oxyCODONE 5 MG immediate release tablet  Commonly known as: ROXICODONE  Take 1 tablet (5 mg total) by mouth every 6 (six) hours as needed for Pain.            CONTINUE taking these medications      amLODIPine 5 MG tablet  Commonly known as: NORVASC  TAKE 1 TABLET BY MOUTH EVERY DAY     atorvastatin 20 MG tablet  Commonly known as: LIPITOR  TAKE 1 TABLET BY MOUTH EVERY DAY     doxazosin 4 MG tablet  Commonly known as: CARDURA  Take 4 mg by mouth once daily.     fenofibrate 54 MG tablet  Commonly known as: TRICOR  Take 1 tablet (54 mg total) by mouth once daily.     hydroCHLOROthiazide 25 MG tablet  Commonly known as: HYDRODIURIL  TAKE 1 TABLET BY MOUTH EVERY DAY IN THE MORNING     metFORMIN 500 MG tablet  Commonly known as: GLUCOPHAGE  TAKE 1 TABLET BY MOUTH TWICE A DAY WITH FOOD     metoprolol succinate 100 MG 24 hr tablet  Commonly known as: TOPROL-XL  Take 1 tablet (100 mg total) by mouth 2 (two) times daily.     metroNIDAZOLE 500 MG tablet  Commonly known as: FLAGYL  Take 1 tablet (500 mg total) by mouth 3 (three) times daily. Take initial dose at 2pm, second dose at 3pm and final dose at 10pm the day before surgery     neomycin 500 mg Tab  Commonly known as: MYCIFRADIN  Take 2 tablets (1,000 mg total) by mouth 3 (three) times daily. Take initial dose at 2pm, take second dose at 3pm and take final dose at 10pm the day before surgery.     olmesartan 40 MG tablet  Commonly known as: BENICAR  Take 1 tablet (40 mg total) by  mouth once daily.     polyethylene glycol 17 gram/dose powder  Commonly known as: GLYCOLAX  Take 238 g by mouth once daily. Mix with 2L of gatorade and drink all of mixed solution starting at 12pm the day before surgery, finishing by 10pm the night before surgery.     pregabalin 75 MG capsule  Commonly known as: LYRICA  Take 1 capsule (75 mg total) by mouth 2 (two) times daily.     tiZANidine 4 MG tablet  Commonly known as: ZANAFLEX  TAKE 1/2-1 TABLET BY MOUTH 2 TIMES DAILY AS NEEDED FOR MUSCLE SPASMS MAY CAUSE DROWSINESS.     traMADoL 50 mg tablet  Commonly known as: ULTRAM  Take 1 tablet (50 mg total) by mouth every 6 (six) hours as needed for Pain.            Time spent on the discharge of patient: 20 minutes    Sarita Gomez PA-C  Colorectal Surgery  [facility]

## 2024-03-12 NOTE — ASSESSMENT & PLAN NOTE
Creatine stable for now. BMP reviewed- noted Estimated Creatinine Clearance: 56.9 mL/min (based on SCr of 1.3 mg/dL). according to latest data. Based on current GFR, CKD stage is stage 3 - GFR 30-59.  Monitor UOP and serial BMP and adjust therapy as needed. Renally dose meds. Avoid nephrotoxic medications and procedures.  Cr baseline appears to be 1.4-1.5 recently   Monitor BMP  Strict Is and Os

## 2024-03-12 NOTE — ASSESSMENT & PLAN NOTE
Chronic, controlled. Latest blood pressure and vitals reviewed-     Temp:  [97.5 °F (36.4 °C)-98.5 °F (36.9 °C)]   Pulse:  [57-63]   Resp:  [16-18]   BP: (130-149)/(57-67)   SpO2:  [92 %-96 %] .   Home meds for hypertension were reviewed and noted below.   Hypertension Medications               amLODIPine (NORVASC) 5 MG tablet TAKE 1 TABLET BY MOUTH EVERY DAY    doxazosin (CARDURA) 4 MG tablet Take 4 mg by mouth once daily.    hydroCHLOROthiazide (HYDRODIURIL) 25 MG tablet TAKE 1 TABLET BY MOUTH EVERY DAY IN THE MORNING    metoprolol succinate (TOPROL-XL) 100 MG 24 hr tablet Take 1 tablet (100 mg total) by mouth 2 (two) times daily.    olmesartan (BENICAR) 40 MG tablet Take 1 tablet (40 mg total) by mouth once daily.            While in the hospital, will manage blood pressure as follows; Adjust home antihypertensive regimen as follows- resume norvasc, restart remaining meds as BP tolerates     Will utilize p.r.n. blood pressure medication only if patient's blood pressure greater than 180/110 and he develops symptoms such as worsening chest pain or shortness of breath.

## 2024-03-12 NOTE — PROGRESS NOTES
[facility] Medicine  Progress Note    Patient Name: Quintin Ling  MRN: [id number]  Patient Class: IP- Surgery Admit   Admission Date: 3/11/2024  Length of Stay: 1 days  Attending Physician: Tosin Boothe MD  Primary Care Provider: Bishop Gabriel MD        Subjective:     Principal Problem:Adenoma of colon        HPI:  Patient is a 74 YO  male with PMH notable for essential HTN, Pre-DM, CKD 3, chronic anemia, PAD who is admitted to the surgery service for management of colon adenoma. He underwent R hemicolectomy on 03/11. Hospital medicine consulted postop for management of HTN, DM.     Pt seen postop after arrival to floor. He is awake, alert, reports some abdominal soreness postoperatively, has not needed any pain medications yet. Denies nausea, vomiting, chest pain, shortness of breath. Prior to surgery, was in his usual state of health. Denies recent illness, sick contacts or medication changes. Last BM was morning of surgery.     Overview/Hospital Course:  No notes on file    Interval History: NAEON. PT reports some lower abd discomfort this AM. Has been able to void spontaneously since paulson discontinued. Denies n/v, CP, SOB. + flatus, no BM yet.     Review of Systems  Objective:     Vital Signs (Most Recent):  Temp: 97.7 °F (36.5 °C) (03/12/24 0740)  Pulse: (!) 57 (03/12/24 0740)  Resp: 18 (03/12/24 0740)  BP: (!) 134/57 (03/12/24 0740)  SpO2: 95 % (03/12/24 0740) Vital Signs (24h Range):  Temp:  [97.5 °F (36.4 °C)-98.5 °F (36.9 °C)] 97.7 °F (36.5 °C)  Pulse:  [57-63] 57  Resp:  [16-18] 18  SpO2:  [92 %-96 %] 95 %  BP: (130-149)/(57-67) 134/57     Weight: 100.8 kg (222 lb 3.6 oz)  Body mass index is 36.98 kg/m².  No intake or output data in the 24 hours ending 03/12/24 1237      Physical Exam  Vitals and nursing note reviewed.   Constitutional:       General: He is not in acute distress.     Appearance: Normal appearance. He is not ill-appearing.   Cardiovascular:      Rate  and Rhythm: Regular rhythm. Bradycardia present.   Pulmonary:      Effort: Pulmonary effort is normal.      Breath sounds: Normal breath sounds.   Abdominal:      General: Bowel sounds are normal. Distension: mild distension.      Palpations: Abdomen is soft.      Comments: Surgical incisions CDI, Bowel sounds +    Musculoskeletal:      Right lower leg: No edema.      Left lower leg: No edema.   Neurological:      Mental Status: He is alert and oriented to person, place, and time. Mental status is at baseline.             Significant Labs: All pertinent labs within the past 24 hours have been reviewed.    Significant Imaging: I have reviewed all pertinent imaging results/findings within the past 24 hours.      Assessment/Plan:      * Adenoma of colon  - s/p R hemicolectomy 03/11  - management per primary team          Stage 3a chronic kidney disease  Creatine stable for now. BMP reviewed- noted Estimated Creatinine Clearance: 56.9 mL/min (based on SCr of 1.3 mg/dL). according to latest data. Based on current GFR, CKD stage is stage 3 - GFR 30-59.  Monitor UOP and serial BMP and adjust therapy as needed. Renally dose meds. Avoid nephrotoxic medications and procedures.  Cr baseline appears to be 1.4-1.5 recently   Monitor BMP  Strict Is and Os     Severe obesity (BMI 35.0-39.9) with comorbidity  Body mass index is 36.98 kg/m². Morbid obesity complicates all aspects of disease management from diagnostic modalities to treatment. Weight loss encouraged and health benefits explained to patient.         Prediabetes  - Continue low dose SSI as needed  - monitor BG  - hypoglycemia protocol       Essential hypertension  Chronic, controlled. Latest blood pressure and vitals reviewed-     Temp:  [97.5 °F (36.4 °C)-98.5 °F (36.9 °C)]   Pulse:  [57-63]   Resp:  [16-18]   BP: (130-149)/(57-67)   SpO2:  [92 %-96 %] .   Home meds for hypertension were reviewed and noted below.   Hypertension Medications               amLODIPine  (NORVASC) 5 MG tablet TAKE 1 TABLET BY MOUTH EVERY DAY    doxazosin (CARDURA) 4 MG tablet Take 4 mg by mouth once daily.    hydroCHLOROthiazide (HYDRODIURIL) 25 MG tablet TAKE 1 TABLET BY MOUTH EVERY DAY IN THE MORNING    metoprolol succinate (TOPROL-XL) 100 MG 24 hr tablet Take 1 tablet (100 mg total) by mouth 2 (two) times daily.    olmesartan (BENICAR) 40 MG tablet Take 1 tablet (40 mg total) by mouth once daily.            While in the hospital, will manage blood pressure as follows; Adjust home antihypertensive regimen as follows- resume norvasc, restart remaining meds as BP tolerates     Will utilize p.r.n. blood pressure medication only if patient's blood pressure greater than 180/110 and he develops symptoms such as worsening chest pain or shortness of breath.      VTE Risk Mitigation (From admission, onward)           Ordered     enoxaparin injection 40 mg  Daily         03/11/24 1220     IP VTE HIGH RISK PATIENT  Once         03/11/24 1220     Place sequential compression device  Until discontinued         03/11/24 1220                                 Aracelis Cyr MD  Department of Hospital Medicine   '[place] [facility]

## 2024-03-12 NOTE — SUBJECTIVE & OBJECTIVE
Interval History: NAEON. PT reports some lower abd discomfort this AM. Has been able to void spontaneously since paulson discontinued. Denies n/v, CP, SOB. + flatus, no BM yet.     Review of Systems  Objective:     Vital Signs (Most Recent):  Temp: 97.7 °F (36.5 °C) (03/12/24 0740)  Pulse: (!) 57 (03/12/24 0740)  Resp: 18 (03/12/24 0740)  BP: (!) 134/57 (03/12/24 0740)  SpO2: 95 % (03/12/24 0740) Vital Signs (24h Range):  Temp:  [97.5 °F (36.4 °C)-98.5 °F (36.9 °C)] 97.7 °F (36.5 °C)  Pulse:  [57-63] 57  Resp:  [16-18] 18  SpO2:  [92 %-96 %] 95 %  BP: (130-149)/(57-67) 134/57     Weight: 100.8 kg (222 lb 3.6 oz)  Body mass index is 36.98 kg/m².  No intake or output data in the 24 hours ending 03/12/24 1237      Physical Exam  Vitals and nursing note reviewed.   Constitutional:       General: He is not in acute distress.     Appearance: Normal appearance. He is not ill-appearing.   Cardiovascular:      Rate and Rhythm: Regular rhythm. Bradycardia present.   Pulmonary:      Effort: Pulmonary effort is normal.      Breath sounds: Normal breath sounds.   Abdominal:      General: Bowel sounds are normal. Distension: mild distension.      Palpations: Abdomen is soft.      Comments: Surgical incisions CDI, Bowel sounds +    Musculoskeletal:      Right lower leg: No edema.      Left lower leg: No edema.   Neurological:      Mental Status: He is alert and oriented to person, place, and time. Mental status is at baseline.             Significant Labs: All pertinent labs within the past 24 hours have been reviewed.    Significant Imaging: I have reviewed all pertinent imaging results/findings within the past 24 hours.

## 2024-03-12 NOTE — ASSESSMENT & PLAN NOTE
- Continue low dose SSI as needed  - monitor BG  - hypoglycemia protocol      History  HPI     Annual Eye Exam     In both eyes.  Associated symptoms include Negative for redness and eye pain.  Treatments tried include glasses.  Pain was noted as 0/10.              Comments     The patient presents with his father for comprehensive eye exam.  Has worn glasses for several years.  Was referred by primary care doctor, Dr. France, as he failed the color vision screening. His father said he retested him at home and had no issues.          Last edited by Pratik Child, BRIE on 8/24/2020  2:11 PM. 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 Copy of chart sent to Dr. France.    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 Monitor annually.    Return to clinic in 1 year for comprehensive eye exam.    Complete documentation of historical and exam elements from today's encounter can  be found in the full encounter summary report (not reduplicated in this progress  note). I personally obtained the chief complaint(s) and history of present illness. I  confirmed and edited as necessary the review of systems, past medical/surgical  history, family history, social history, and examination findings as documented by  others; and I examined the patient myself. I personally reviewed the relevant tests,  images, and reports as documented above. I formulated and edited as necessary the  assessment and plan and discussed the findings and management plan with the  patient and family.    Pratik Child, BRIE, [facility]

## 2024-03-12 NOTE — PLAN OF CARE
Discussed poc with pt and his wife. Both verbalized understanding    Purposeful rounding completed    VS wnl    Blood glucose monitoring   Fall precautions in place, remains injury free    Pain  under control with PRN meds    IVFs infusing       Abx given as prescribed  Bed locked at lowest position  Call light within reach    Chart check complete

## 2024-03-12 NOTE — HOSPITAL COURSE
03/12/2024: pod 1, tolerating regular diet, pain well controlled, passing flatus, ambulating, ready for dc

## 2024-03-12 NOTE — PLAN OF CARE
[facility] - Med Surg  Initial Discharge Assessment       Primary Care Provider: Bishop Gabriel MD    Admission Diagnosis: Adenoma of colon [D12.6]    Admission Date: 3/11/2024  Expected Discharge Date:     Transition of Care Barriers: None    Payor: BCBS [place] MEDICARE / [facility] [place] [facility] Product Type: Medicare Advantage /     Extended Emergency Contact Information  Primary Emergency Contact: Emily Ling  Mobile Phone: [phone]  Relation: Spouse  Secondary Emergency Contact: roderick ling  Mobile Phone: [phone]  Relation: Son    Discharge Plan A: Home with family         [id number] - [place], [place] - [address]  [address] [facility] [address]  Phone: [phone] Fax: [phone]      Initial Assessment (most recent)       Adult Discharge Assessment - 03/12/24 1337          Discharge Assessment    Assessment Type Discharge Planning Assessment     Confirmed/corrected address, phone number and insurance Yes     Confirmed Demographics Correct on Facesheet     Source of Information patient;family     Communicated MERCEDEZ with patient/caregiver Date not available/Unable to determine     Reason For Admission adenoma of colon     People in Home spouse     Facility Arrived From: home     Do you expect to return to your current living situation? Yes     Do you have help at home or someone to help you manage your care at home? Yes     Who are your caregiver(s) and their phone number(s)? spouse     Prior to hospitilization cognitive status: Alert/Oriented     Current cognitive status: Alert/Oriented     Walking or Climbing Stairs Difficulty no     Dressing/Bathing Difficulty no     Equipment Currently Used at Home none     Readmission within 30 days? No     Patient currently being followed by outpatient case management? No     Do you currently have service(s) that help you manage your care at home? No     Do you take prescription medications? Yes     Do you have  prescription coverage? Yes     Do you have any problems affording any of your prescribed medications? TBD     Is the patient taking medications as prescribed? yes     Who is going to help you get home at discharge? wife     How do you get to doctors appointments? car, drives self;family or friend will provide     Are you on dialysis? No     Discharge Plan A Home with family     DME Needed Upon Discharge  none     Discharge Plan discussed with: Patient;Spouse/sig other     Transition of Care Barriers None        Transportation Needs    In the past 12 months, has lack of transportation kept you from medical appointments or from getting medications? No     In the past 12 months, has lack of transportation kept you from meetings, work, or from getting things needed for daily living? No                      Anticipated DC Dispo: home with wife  Prior Level of Function: independent, drives, denies use of HME/HHC  PCP: Dr. Gabriel  Comments:  CM met with patient/wife at bedside to introduce role and discuss d/c planning. Patient is independent, no identified CM needs at this time. Will continue to follow.

## 2024-03-13 ENCOUNTER — TELEPHONE (OUTPATIENT)
Dept: PAIN MEDICINE | Facility: CLINIC | Age: 74
End: 2024-03-13
Payer: MEDICARE

## 2024-03-14 ENCOUNTER — OFFICE VISIT (OUTPATIENT)
Dept: SURGERY | Facility: CLINIC | Age: 74
DRG: 908 | End: 2024-03-14
Payer: MEDICARE

## 2024-03-14 ENCOUNTER — HOSPITAL ENCOUNTER (OUTPATIENT)
Dept: RADIOLOGY | Facility: HOSPITAL | Age: 74
Discharge: HOME | DRG: 908 | End: 2024-03-14
Attending: STUDENT IN AN ORGANIZED HEALTH CARE EDUCATION/TRAINING PROGRAM
Payer: MEDICARE

## 2024-03-14 VITALS
HEIGHT: 65 IN | BODY MASS INDEX: 36.73 KG/M2 | DIASTOLIC BLOOD PRESSURE: 63 MMHG | SYSTOLIC BLOOD PRESSURE: 110 MMHG | TEMPERATURE: 99 F | OXYGEN SATURATION: 95 % | WEIGHT: 220.44 LBS | HEART RATE: 87 BPM

## 2024-03-14 DIAGNOSIS — K63.89 COLONIC MASS: Primary | ICD-10-CM

## 2024-03-14 DIAGNOSIS — K56.7 ILEUS: ICD-10-CM

## 2024-03-14 DIAGNOSIS — K63.89 COLONIC MASS: ICD-10-CM

## 2024-03-14 PROCEDURE — 3074F SYST BP LT 130 MM HG: CPT | Mod: CPTII,S$GLB,, | Performed by: STUDENT IN AN ORGANIZED HEALTH CARE EDUCATION/TRAINING PROGRAM

## 2024-03-14 PROCEDURE — 3078F DIAST BP <80 MM HG: CPT | Mod: CPTII,S$GLB,, | Performed by: STUDENT IN AN ORGANIZED HEALTH CARE EDUCATION/TRAINING PROGRAM

## 2024-03-14 PROCEDURE — 74018 RADEX ABDOMEN 1 VIEW: CPT | Mod: TC

## 2024-03-14 PROCEDURE — 99024 POSTOP FOLLOW-UP VISIT: CPT | Mod: S$GLB,,, | Performed by: STUDENT IN AN ORGANIZED HEALTH CARE EDUCATION/TRAINING PROGRAM

## 2024-03-14 PROCEDURE — 1159F MED LIST DOCD IN RCRD: CPT | Mod: CPTII,S$GLB,, | Performed by: STUDENT IN AN ORGANIZED HEALTH CARE EDUCATION/TRAINING PROGRAM

## 2024-03-14 PROCEDURE — 1125F AMNT PAIN NOTED PAIN PRSNT: CPT | Mod: CPTII,S$GLB,, | Performed by: STUDENT IN AN ORGANIZED HEALTH CARE EDUCATION/TRAINING PROGRAM

## 2024-03-14 PROCEDURE — 74018 RADEX ABDOMEN 1 VIEW: CPT | Mod: 26,,, | Performed by: RADIOLOGY

## 2024-03-14 PROCEDURE — 4010F ACE/ARB THERAPY RXD/TAKEN: CPT | Mod: CPTII,S$GLB,, | Performed by: STUDENT IN AN ORGANIZED HEALTH CARE EDUCATION/TRAINING PROGRAM

## 2024-03-14 PROCEDURE — 99999 PR PBB SHADOW E&M-EST. PATIENT-LVL III: CPT | Mod: PBBFAC,,, | Performed by: STUDENT IN AN ORGANIZED HEALTH CARE EDUCATION/TRAINING PROGRAM

## 2024-03-14 RX ORDER — METOCLOPRAMIDE 10 MG/1
10 TABLET ORAL
Qty: 40 TABLET | Refills: 0 | Status: ON HOLD | OUTPATIENT
Start: 2024-03-14 | End: 2024-03-20 | Stop reason: HOSPADM

## 2024-03-14 NOTE — PROGRESS NOTES
Subjective:       Patient ID: Quintin Ling is a 73 y.o. male.    Chief Complaint: No chief complaint on file.    Patient is a 73-year-old male postop day 3 from a robotic right hemicolectomy and omentectomy.  He reports that he was doing well at home until last night when he started having acid reflux and this morning when he vomited 3 times.  He also had serosanguineous drainage from his midline wound.  After vomiting he reports that he felt better.  His abdomen would get progressively more distended with eating and has improved since vomiting.  He does continue to pass flatus and have bowel movements but he also endorses belching.  He denies fevers, chills, inability to have bowel movements or flatus, difficulty urinating, weakness, fatigue or blurry vision            Objective:      Physical Exam  Constitutional:       Appearance: He is well-developed.   HENT:      Head: Normocephalic and atraumatic.   Eyes:      Conjunctiva/sclera: Conjunctivae normal.      Pupils: Pupils are equal, round, and reactive to light.   Neck:      Thyroid: No thyromegaly.   Cardiovascular:      Rate and Rhythm: Normal rate and regular rhythm.   Pulmonary:      Effort: Pulmonary effort is normal. No respiratory distress.   Abdominal:      Comments: Distended, tympanic and soft. Midline incision with some serosanguinous drainage from focal opening at apex of incision   Musculoskeletal:         General: No tenderness. Normal range of motion.      Cervical back: Normal range of motion.   Skin:     General: Skin is warm and dry.      Capillary Refill: Capillary refill takes less than 2 seconds.   Neurological:      General: No focal deficit present.      Mental Status: He is alert and oriented to person, place, and time.     KUB:  Report concerning for SBO  Assessment:       1. Colonic mass    2. Ileus        Plan:       - I reviewed KUB and pt with air throughout colon and in rectum, pt still passing flatus and having bowel  movements  - discussed etiology of an ileus. In the absence of fevers and with persistent bowel function I told him this is a partial ileus.   - will check electrolytes and CBC today, replace as needed  - start reglan  - ok to use gas-x  - encourage frequent ambulation and avoidance of opioids  - if his vomiting returns he needs to come to ED for admission and NGT        Tosin Boothe MD  Colon and Rectal Surgery  [facility]

## 2024-03-16 ENCOUNTER — ANESTHESIA EVENT (OUTPATIENT)
Dept: SURGERY | Facility: HOSPITAL | Age: 74
DRG: 908 | End: 2024-03-16
Payer: MEDICARE

## 2024-03-16 ENCOUNTER — ANESTHESIA (OUTPATIENT)
Dept: SURGERY | Facility: HOSPITAL | Age: 74
DRG: 908 | End: 2024-03-16
Payer: MEDICARE

## 2024-03-16 ENCOUNTER — HOSPITAL ENCOUNTER (INPATIENT)
Facility: HOSPITAL | Age: 74
LOS: 3 days | Discharge: HOME | DRG: 908 | End: 2024-03-20
Attending: EMERGENCY MEDICINE | Admitting: INTERNAL MEDICINE
Payer: MEDICARE

## 2024-03-16 DIAGNOSIS — I10 ESSENTIAL HYPERTENSION: ICD-10-CM

## 2024-03-16 DIAGNOSIS — I48.0 PAF (PAROXYSMAL ATRIAL FIBRILLATION): ICD-10-CM

## 2024-03-16 DIAGNOSIS — K43.9 VENTRAL HERNIA WITHOUT OBSTRUCTION OR GANGRENE: ICD-10-CM

## 2024-03-16 DIAGNOSIS — R07.9 CHEST PAIN: ICD-10-CM

## 2024-03-16 DIAGNOSIS — R00.0 TACHYCARDIA: ICD-10-CM

## 2024-03-16 DIAGNOSIS — K59.9 BOWEL DYSFUNCTION: ICD-10-CM

## 2024-03-16 DIAGNOSIS — I48.91 NEW ONSET A-FIB: ICD-10-CM

## 2024-03-16 DIAGNOSIS — K56.7 ILEUS: Primary | ICD-10-CM

## 2024-03-16 DIAGNOSIS — I48.91 A-FIB: ICD-10-CM

## 2024-03-16 DIAGNOSIS — R73.03 PREDIABETES: ICD-10-CM

## 2024-03-16 DIAGNOSIS — T81.30XA WOUND DEHISCENCE: ICD-10-CM

## 2024-03-16 PROBLEM — R11.2 INTRACTABLE NAUSEA AND VOMITING: Status: ACTIVE | Noted: 2024-03-16

## 2024-03-16 PROBLEM — E87.1 HYPONATREMIA: Status: ACTIVE | Noted: 2024-03-16

## 2024-03-16 LAB
ALBUMIN SERPL BCP-MCNC: 3.2 G/DL (ref 3.5–5.2)
ALP SERPL-CCNC: 44 U/L (ref 55–135)
ALT SERPL W/O P-5'-P-CCNC: 28 U/L (ref 10–44)
ANION GAP SERPL CALC-SCNC: 12 MMOL/L (ref 8–16)
AST SERPL-CCNC: 26 U/L (ref 10–40)
BASOPHILS # BLD AUTO: 0.02 K/UL (ref 0–0.2)
BASOPHILS NFR BLD: 0.3 % (ref 0–1.9)
BILIRUB SERPL-MCNC: 0.8 MG/DL (ref 0.1–1)
BUN SERPL-MCNC: 29 MG/DL (ref 8–23)
CALCIUM SERPL-MCNC: 9.3 MG/DL (ref 8.7–10.5)
CHLORIDE SERPL-SCNC: 99 MMOL/L (ref 95–110)
CO2 SERPL-SCNC: 24 MMOL/L (ref 23–29)
CREAT SERPL-MCNC: 1.4 MG/DL (ref 0.5–1.4)
DIFFERENTIAL METHOD BLD: ABNORMAL
EOSINOPHIL # BLD AUTO: 0 K/UL (ref 0–0.5)
EOSINOPHIL NFR BLD: 0.1 % (ref 0–8)
ERYTHROCYTE [DISTWIDTH] IN BLOOD BY AUTOMATED COUNT: 13.2 % (ref 11.5–14.5)
EST. GFR  (NO RACE VARIABLE): 54 ML/MIN/1.73 M^2
GLUCOSE SERPL-MCNC: 136 MG/DL (ref 70–110)
HCT VFR BLD AUTO: 34.7 % (ref 40–54)
HGB BLD-MCNC: 11.9 G/DL (ref 14–18)
IMM GRANULOCYTES # BLD AUTO: 0.02 K/UL (ref 0–0.04)
IMM GRANULOCYTES NFR BLD AUTO: 0.3 % (ref 0–0.5)
LYMPHOCYTES # BLD AUTO: 0.3 K/UL (ref 1–4.8)
LYMPHOCYTES NFR BLD: 4.1 % (ref 18–48)
MCH RBC QN AUTO: 28.7 PG (ref 27–31)
MCHC RBC AUTO-ENTMCNC: 34.3 G/DL (ref 32–36)
MCV RBC AUTO: 84 FL (ref 82–98)
MONOCYTES # BLD AUTO: 1.2 K/UL (ref 0.3–1)
MONOCYTES NFR BLD: 17.3 % (ref 4–15)
NEUTROPHILS # BLD AUTO: 5.4 K/UL (ref 1.8–7.7)
NEUTROPHILS NFR BLD: 77.9 % (ref 38–73)
NRBC BLD-RTO: 0 /100 WBC
PLATELET # BLD AUTO: 214 K/UL (ref 150–450)
PMV BLD AUTO: 10.1 FL (ref 9.2–12.9)
POCT GLUCOSE: 123 MG/DL (ref 70–110)
POCT GLUCOSE: 133 MG/DL (ref 70–110)
POTASSIUM SERPL-SCNC: 3.6 MMOL/L (ref 3.5–5.1)
PROT SERPL-MCNC: 6.5 G/DL (ref 6–8.4)
RBC # BLD AUTO: 4.15 M/UL (ref 4.6–6.2)
SODIUM SERPL-SCNC: 135 MMOL/L (ref 136–145)
WBC # BLD AUTO: 6.99 K/UL (ref 3.9–12.7)

## 2024-03-16 PROCEDURE — 63600175 PHARM REV CODE 636 W HCPCS: Performed by: SURGERY

## 2024-03-16 PROCEDURE — 63600175 PHARM REV CODE 636 W HCPCS: Performed by: NURSE ANESTHETIST, CERTIFIED REGISTERED

## 2024-03-16 PROCEDURE — 63600175 PHARM REV CODE 636 W HCPCS: Performed by: INTERNAL MEDICINE

## 2024-03-16 PROCEDURE — G0378 HOSPITAL OBSERVATION PER HR: HCPCS

## 2024-03-16 PROCEDURE — 71000033 HC RECOVERY, INTIAL HOUR: Performed by: SURGERY

## 2024-03-16 PROCEDURE — C9113 INJ PANTOPRAZOLE SODIUM, VIA: HCPCS | Performed by: INTERNAL MEDICINE

## 2024-03-16 PROCEDURE — 85025 COMPLETE CBC W/AUTO DIFF WBC: CPT | Performed by: EMERGENCY MEDICINE

## 2024-03-16 PROCEDURE — 25000003 PHARM REV CODE 250: Performed by: NURSE ANESTHETIST, CERTIFIED REGISTERED

## 2024-03-16 PROCEDURE — 36000706: Performed by: SURGERY

## 2024-03-16 PROCEDURE — 0JQ80ZZ REPAIR ABDOMEN SUBCUTANEOUS TISSUE AND FASCIA, OPEN APPROACH: ICD-10-PCS | Performed by: SURGERY

## 2024-03-16 PROCEDURE — 96361 HYDRATE IV INFUSION ADD-ON: CPT

## 2024-03-16 PROCEDURE — 36000707: Performed by: SURGERY

## 2024-03-16 PROCEDURE — 63600175 PHARM REV CODE 636 W HCPCS: Mod: JZ,JG | Performed by: ANESTHESIOLOGY

## 2024-03-16 PROCEDURE — 37000008 HC ANESTHESIA 1ST 15 MINUTES: Performed by: SURGERY

## 2024-03-16 PROCEDURE — 80053 COMPREHEN METABOLIC PANEL: CPT | Performed by: EMERGENCY MEDICINE

## 2024-03-16 PROCEDURE — 99285 EMERGENCY DEPT VISIT HI MDM: CPT | Mod: 25

## 2024-03-16 PROCEDURE — 96374 THER/PROPH/DIAG INJ IV PUSH: CPT

## 2024-03-16 PROCEDURE — 96375 TX/PRO/DX INJ NEW DRUG ADDON: CPT

## 2024-03-16 PROCEDURE — 63600175 PHARM REV CODE 636 W HCPCS: Performed by: EMERGENCY MEDICINE

## 2024-03-16 PROCEDURE — 82962 GLUCOSE BLOOD TEST: CPT

## 2024-03-16 PROCEDURE — 13160 SEC CLSR SURG WND/DEHSN XTN: CPT | Mod: ,,, | Performed by: SURGERY

## 2024-03-16 PROCEDURE — 37000009 HC ANESTHESIA EA ADD 15 MINS: Performed by: SURGERY

## 2024-03-16 PROCEDURE — 96372 THER/PROPH/DIAG INJ SC/IM: CPT | Performed by: SURGERY

## 2024-03-16 PROCEDURE — 25000003 PHARM REV CODE 250: Performed by: EMERGENCY MEDICINE

## 2024-03-16 PROCEDURE — 25000003 PHARM REV CODE 250: Performed by: SURGERY

## 2024-03-16 PROCEDURE — 0D9670Z DRAINAGE OF STOMACH WITH DRAINAGE DEVICE, VIA NATURAL OR ARTIFICIAL OPENING: ICD-10-PCS | Performed by: EMERGENCY MEDICINE

## 2024-03-16 PROCEDURE — 96376 TX/PRO/DX INJ SAME DRUG ADON: CPT

## 2024-03-16 RX ORDER — ROCURONIUM BROMIDE 10 MG/ML
INJECTION, SOLUTION INTRAVENOUS
Status: DISCONTINUED | OUTPATIENT
Start: 2024-03-16 | End: 2024-03-16

## 2024-03-16 RX ORDER — PROPOFOL 10 MG/ML
VIAL (ML) INTRAVENOUS
Status: DISCONTINUED | OUTPATIENT
Start: 2024-03-16 | End: 2024-03-16

## 2024-03-16 RX ORDER — ONDANSETRON HYDROCHLORIDE 2 MG/ML
8 INJECTION, SOLUTION INTRAVENOUS
Status: COMPLETED | OUTPATIENT
Start: 2024-03-16 | End: 2024-03-16

## 2024-03-16 RX ORDER — ONDANSETRON HYDROCHLORIDE 2 MG/ML
4 INJECTION, SOLUTION INTRAVENOUS EVERY 6 HOURS PRN
Status: DISCONTINUED | OUTPATIENT
Start: 2024-03-16 | End: 2024-03-20 | Stop reason: HOSPADM

## 2024-03-16 RX ORDER — ONDANSETRON HYDROCHLORIDE 2 MG/ML
4 INJECTION, SOLUTION INTRAVENOUS DAILY PRN
Status: DISCONTINUED | OUTPATIENT
Start: 2024-03-16 | End: 2024-03-16

## 2024-03-16 RX ORDER — ACETAMINOPHEN 10 MG/ML
1000 INJECTION, SOLUTION INTRAVENOUS ONCE
Status: COMPLETED | OUTPATIENT
Start: 2024-03-16 | End: 2024-03-16

## 2024-03-16 RX ORDER — GLUCAGON 1 MG
1 KIT INJECTION
Status: DISCONTINUED | OUTPATIENT
Start: 2024-03-16 | End: 2024-03-20 | Stop reason: HOSPADM

## 2024-03-16 RX ORDER — DIPHENHYDRAMINE HYDROCHLORIDE 50 MG/ML
25 INJECTION INTRAMUSCULAR; INTRAVENOUS EVERY 6 HOURS PRN
Status: DISCONTINUED | OUTPATIENT
Start: 2024-03-16 | End: 2024-03-16

## 2024-03-16 RX ORDER — PANTOPRAZOLE SODIUM 40 MG/10ML
40 INJECTION, POWDER, LYOPHILIZED, FOR SOLUTION INTRAVENOUS DAILY
Status: DISCONTINUED | OUTPATIENT
Start: 2024-03-16 | End: 2024-03-19

## 2024-03-16 RX ORDER — ENOXAPARIN SODIUM 100 MG/ML
40 INJECTION SUBCUTANEOUS EVERY 24 HOURS
Status: DISCONTINUED | OUTPATIENT
Start: 2024-03-16 | End: 2024-03-19

## 2024-03-16 RX ORDER — ACETAMINOPHEN 10 MG/ML
1000 INJECTION, SOLUTION INTRAVENOUS EVERY 8 HOURS
Status: DISCONTINUED | OUTPATIENT
Start: 2024-03-16 | End: 2024-03-17

## 2024-03-16 RX ORDER — ONDANSETRON HYDROCHLORIDE 2 MG/ML
INJECTION, SOLUTION INTRAVENOUS
Status: DISCONTINUED | OUTPATIENT
Start: 2024-03-16 | End: 2024-03-16

## 2024-03-16 RX ORDER — SODIUM CHLORIDE 0.9 % (FLUSH) 0.9 %
10 SYRINGE (ML) INJECTION EVERY 12 HOURS PRN
Status: DISCONTINUED | OUTPATIENT
Start: 2024-03-16 | End: 2024-03-20 | Stop reason: HOSPADM

## 2024-03-16 RX ORDER — IBUPROFEN 200 MG
16 TABLET ORAL
Status: DISCONTINUED | OUTPATIENT
Start: 2024-03-16 | End: 2024-03-20 | Stop reason: HOSPADM

## 2024-03-16 RX ORDER — ACETAMINOPHEN 325 MG/1
650 TABLET ORAL EVERY 6 HOURS PRN
Status: DISCONTINUED | OUTPATIENT
Start: 2024-03-16 | End: 2024-03-16

## 2024-03-16 RX ORDER — HYDROMORPHONE HYDROCHLORIDE 2 MG/ML
0.2 INJECTION, SOLUTION INTRAMUSCULAR; INTRAVENOUS; SUBCUTANEOUS EVERY 5 MIN PRN
Status: DISCONTINUED | OUTPATIENT
Start: 2024-03-16 | End: 2024-03-16

## 2024-03-16 RX ORDER — PHENYLEPHRINE HYDROCHLORIDE 10 MG/ML
INJECTION INTRAVENOUS
Status: DISCONTINUED | OUTPATIENT
Start: 2024-03-16 | End: 2024-03-16

## 2024-03-16 RX ORDER — CHLORHEXIDINE GLUCONATE ORAL RINSE 1.2 MG/ML
10 SOLUTION DENTAL 2 TIMES DAILY
Status: DISCONTINUED | OUTPATIENT
Start: 2024-03-16 | End: 2024-03-20 | Stop reason: HOSPADM

## 2024-03-16 RX ORDER — SUCCINYLCHOLINE CHLORIDE 20 MG/ML
INJECTION INTRAMUSCULAR; INTRAVENOUS
Status: DISCONTINUED | OUTPATIENT
Start: 2024-03-16 | End: 2024-03-16

## 2024-03-16 RX ORDER — DROPERIDOL 2.5 MG/ML
0.62 INJECTION, SOLUTION INTRAMUSCULAR; INTRAVENOUS ONCE AS NEEDED
Status: DISCONTINUED | OUTPATIENT
Start: 2024-03-16 | End: 2024-03-16

## 2024-03-16 RX ORDER — HYDRALAZINE HYDROCHLORIDE 20 MG/ML
10 INJECTION INTRAMUSCULAR; INTRAVENOUS EVERY 6 HOURS PRN
Status: DISCONTINUED | OUTPATIENT
Start: 2024-03-16 | End: 2024-03-20 | Stop reason: HOSPADM

## 2024-03-16 RX ORDER — SODIUM CHLORIDE, SODIUM LACTATE, POTASSIUM CHLORIDE, CALCIUM CHLORIDE 600; 310; 30; 20 MG/100ML; MG/100ML; MG/100ML; MG/100ML
125 INJECTION, SOLUTION INTRAVENOUS CONTINUOUS
Status: DISCONTINUED | OUTPATIENT
Start: 2024-03-16 | End: 2024-03-16

## 2024-03-16 RX ORDER — SODIUM CHLORIDE 0.9 % (FLUSH) 0.9 %
10 SYRINGE (ML) INJECTION
Status: DISCONTINUED | OUTPATIENT
Start: 2024-03-16 | End: 2024-03-16

## 2024-03-16 RX ORDER — FENTANYL CITRATE 50 UG/ML
INJECTION, SOLUTION INTRAMUSCULAR; INTRAVENOUS
Status: DISCONTINUED | OUTPATIENT
Start: 2024-03-16 | End: 2024-03-16

## 2024-03-16 RX ORDER — SODIUM CHLORIDE, SODIUM LACTATE, POTASSIUM CHLORIDE, CALCIUM CHLORIDE 600; 310; 30; 20 MG/100ML; MG/100ML; MG/100ML; MG/100ML
INJECTION, SOLUTION INTRAVENOUS CONTINUOUS
Status: DISCONTINUED | OUTPATIENT
Start: 2024-03-16 | End: 2024-03-17

## 2024-03-16 RX ORDER — SODIUM CHLORIDE, SODIUM LACTATE, POTASSIUM CHLORIDE, CALCIUM CHLORIDE 600; 310; 30; 20 MG/100ML; MG/100ML; MG/100ML; MG/100ML
INJECTION, SOLUTION INTRAVENOUS CONTINUOUS PRN
Status: DISCONTINUED | OUTPATIENT
Start: 2024-03-16 | End: 2024-03-16

## 2024-03-16 RX ORDER — MORPHINE SULFATE 4 MG/ML
2 INJECTION, SOLUTION INTRAMUSCULAR; INTRAVENOUS EVERY 6 HOURS PRN
Status: DISCONTINUED | OUTPATIENT
Start: 2024-03-16 | End: 2024-03-20 | Stop reason: HOSPADM

## 2024-03-16 RX ORDER — LIDOCAINE HYDROCHLORIDE 10 MG/ML
INJECTION, SOLUTION EPIDURAL; INFILTRATION; INTRACAUDAL; PERINEURAL
Status: DISCONTINUED | OUTPATIENT
Start: 2024-03-16 | End: 2024-03-16

## 2024-03-16 RX ORDER — NALOXONE HCL 0.4 MG/ML
0.02 VIAL (ML) INJECTION
Status: DISCONTINUED | OUTPATIENT
Start: 2024-03-16 | End: 2024-03-20 | Stop reason: HOSPADM

## 2024-03-16 RX ORDER — IBUPROFEN 200 MG
24 TABLET ORAL
Status: DISCONTINUED | OUTPATIENT
Start: 2024-03-16 | End: 2024-03-20 | Stop reason: HOSPADM

## 2024-03-16 RX ORDER — BUPIVACAINE HYDROCHLORIDE 2.5 MG/ML
INJECTION, SOLUTION EPIDURAL; INFILTRATION; INTRACAUDAL
Status: DISCONTINUED | OUTPATIENT
Start: 2024-03-16 | End: 2024-03-16 | Stop reason: HOSPADM

## 2024-03-16 RX ADMIN — FENTANYL CITRATE 100 MCG: 50 INJECTION, SOLUTION INTRAMUSCULAR; INTRAVENOUS at 02:03

## 2024-03-16 RX ADMIN — FENTANYL CITRATE 100 MCG: 50 INJECTION, SOLUTION INTRAMUSCULAR; INTRAVENOUS at 01:03

## 2024-03-16 RX ADMIN — ACETAMINOPHEN 1000 MG: 10 INJECTION INTRAVENOUS at 11:03

## 2024-03-16 RX ADMIN — ONDANSETRON 4 MG: 2 INJECTION INTRAMUSCULAR; INTRAVENOUS at 02:03

## 2024-03-16 RX ADMIN — SUCCINYLCHOLINE CHLORIDE 140 MG: 20 INJECTION, SOLUTION INTRAMUSCULAR; INTRAVENOUS; PARENTERAL at 02:03

## 2024-03-16 RX ADMIN — PHENYLEPHRINE HYDROCHLORIDE 200 MCG: 10 INJECTION INTRAVENOUS at 03:03

## 2024-03-16 RX ADMIN — SODIUM CHLORIDE, POTASSIUM CHLORIDE, SODIUM LACTATE AND CALCIUM CHLORIDE: 600; 310; 30; 20 INJECTION, SOLUTION INTRAVENOUS at 04:03

## 2024-03-16 RX ADMIN — SODIUM CHLORIDE, SODIUM LACTATE, POTASSIUM CHLORIDE, AND CALCIUM CHLORIDE: 600; 310; 30; 20 INJECTION, SOLUTION INTRAVENOUS at 01:03

## 2024-03-16 RX ADMIN — SODIUM CHLORIDE, POTASSIUM CHLORIDE, SODIUM LACTATE AND CALCIUM CHLORIDE: 600; 310; 30; 20 INJECTION, SOLUTION INTRAVENOUS at 05:03

## 2024-03-16 RX ADMIN — SODIUM CHLORIDE 500 ML: 0.9 INJECTION, SOLUTION INTRAVENOUS at 02:03

## 2024-03-16 RX ADMIN — ACETAMINOPHEN 1000 MG: 10 INJECTION INTRAVENOUS at 03:03

## 2024-03-16 RX ADMIN — CHLORHEXIDINE GLUCONATE 0.12% ORAL RINSE 10 ML: 1.2 LIQUID ORAL at 09:03

## 2024-03-16 RX ADMIN — SUGAMMADEX 200 MG: 100 INJECTION, SOLUTION INTRAVENOUS at 03:03

## 2024-03-16 RX ADMIN — ROCURONIUM BROMIDE 10 MG: 10 SOLUTION INTRAVENOUS at 02:03

## 2024-03-16 RX ADMIN — ENOXAPARIN SODIUM 40 MG: 40 INJECTION SUBCUTANEOUS at 05:03

## 2024-03-16 RX ADMIN — ONDANSETRON 8 MG: 2 INJECTION INTRAMUSCULAR; INTRAVENOUS at 02:03

## 2024-03-16 RX ADMIN — ROCURONIUM BROMIDE 25 MG: 10 SOLUTION INTRAVENOUS at 02:03

## 2024-03-16 RX ADMIN — PHENYLEPHRINE HYDROCHLORIDE 200 MCG: 10 INJECTION INTRAVENOUS at 02:03

## 2024-03-16 RX ADMIN — ROCURONIUM BROMIDE 5 MG: 10 SOLUTION INTRAVENOUS at 02:03

## 2024-03-16 RX ADMIN — PROPOFOL 130 MG: 10 INJECTION, EMULSION INTRAVENOUS at 02:03

## 2024-03-16 RX ADMIN — PANTOPRAZOLE SODIUM 40 MG: 40 INJECTION, POWDER, FOR SOLUTION INTRAVENOUS at 09:03

## 2024-03-16 RX ADMIN — LIDOCAINE HYDROCHLORIDE 50 MG: 10 SOLUTION INTRAVENOUS at 02:03

## 2024-03-16 NOTE — Clinical Note
Diagnosis: Ileus [[id number]]   Future Attending Provider: NANCY PAULA [[id number]]   Special Needs:: No Special Needs [1]

## 2024-03-16 NOTE — HPI
Quintin Ling is a 73 y.o. male s/p robotic right hemicolectomy and omentectomy on 3/11/2023.  He was discharged home on postoperative day 1.  However returned to the Colorectal Clinic on postoperative day 3 complaining of acid reflux, vomiting, and significant drainage from his midline wound.  At that time he was still having flatus and bowel movements but was also belching.  Outpatient KUB was concerning for a possible small-bowel obstruction.  He was believed to have a mild postoperative ileus and was given home care and dietary instructions and told to present to the ER if his symptoms persisted or worsened.  He presented to the emergency department last night with significant episodes of nausea and vomiting.  He had a mild BRANDY with a creatinine of 1.6 no however no leukocytosis or other significant laboratory abnormalities.  CT scan was done which showed dilated loops of small bowel consistent with ileus however also showed what they called a supraumbilical hernia with small bowel.  An NG-tube was placed with 1800 cc output and this morning he states he is feeling better.  He is continues to have midline abdominal wound drainage

## 2024-03-16 NOTE — H&P
[facility] Emergency Dept.  [facility]  History & Physical    Patient Name: Quintin Ling  MRN: [id number]  Patient Class: OP- Observation  Admission Date: 3/16/2024  Attending Physician:  Cynthia Jones MD  Primary Care Provider: Bishop Gabriel MD         Patient information was obtained from patient, ER records, and ER physician .     Subjective:     Principal Problem:Ileus    Chief Complaint:   Chief Complaint   Patient presents with    Post-op Problem     Colon sx this past Monday, decreased appetite, incision draining         HPI: 73-year-old white man with history of hypertension, hyperlipidemia, prediabetes, obesity, erectile dysfunction, chronic kidney disease stage IIIA, lumbar radiculopathy, normocytic anemia, cold now adenoma, and peripheral arterial disease who presented to the emergency department with complaint of intractable nausea with vomiting over the last 2-3 days.  He has had associated abdominal pain and abdominal distention.  He can not identify any aggravating or alleviating factors.  He denies any associated fevers or chills.  Patient has had flatus but denies any bowel movements.    Recent hospitalization 3/11-03/12/2024 for colon adenoma status post robotic right colectomy on 03/11/2024.  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    Past Medical History:   Diagnosis Date    Diabetes mellitus     Hyperlipidemia     Hypertension        Past Surgical History:   Procedure Laterality Date    BIOPSY OF PERITONEUM N/A 1/29/2024    Procedure: BIOPSY, PERITONEUM;  Surgeon: Tosin Boothe MD;  Location: [facility] OR;  Service: Colon and Rectal;  Laterality: N/A;    COLONOSCOPY N/A 1/5/2024    Procedure: COLONOSCOPY;  Surgeon: Irasema Gil MD;  Location: [facility] ENDO;  Service: Endoscopy;  Laterality: N/A;    DIAGNOSTIC LAPAROSCOPY N/A 1/29/2024    Procedure: LAPAROSCOPY, DIAGNOSTIC;  Surgeon: Tosin Boothe MD;   Location: [facility] OR;  Service: Colon and Rectal;  Laterality: N/A;    EPIDURAL STEROID INJECTION N/A 11/13/2023    Procedure: Lumbar L5/S1 IL JOSE;  Surgeon: Oneil Carlson MD;  Location: [facility] PAIN MGT;  Service: Pain Management;  Laterality: N/A;    ESOPHAGOGASTRODUODENOSCOPY N/A 1/5/2024    Procedure: EGD (ESOPHAGOGASTRODUODENOSCOPY);  Surgeon: Irasema Gil MD;  Location: [facility] ENDO;  Service: Endoscopy;  Laterality: N/A;    INJECTION OF ANESTHETIC AGENT INTO TISSUE PLANE DEFINED BY TRANSVERSUS ABDOMINIS MUSCLE N/A 3/11/2024    Procedure: BLOCK, TRANSVERSUS ABDOMINIS PLANE;  Surgeon: Tosin Boothe MD;  Location: [facility] OR;  Service: Colon and Rectal;  Laterality: N/A;    NO PAST SURGERIES      XI ROBOTIC COLECTOMY, RIGHT Right 3/11/2024    Procedure: XI ROBOTIC COLECTOMY, RIGHT;  Surgeon: Tosin Boothe MD;  Location: [facility] OR;  Service: Colon and Rectal;  Laterality: Right;  WITH OMENECTOMY AND AMNIOFIX       Review of patient's allergies indicates:  No Known Allergies    No current facility-administered medications on file prior to encounter.     Current Outpatient Medications on File Prior to Encounter   Medication Sig    acetaminophen (TYLENOL) 500 MG tablet Take 2 tablets (1,000 mg total) by mouth every 6 (six) hours. for 5 days    amLODIPine (NORVASC) 5 MG tablet TAKE 1 TABLET BY MOUTH EVERY DAY    atorvastatin (LIPITOR) 20 MG tablet TAKE 1 TABLET BY MOUTH EVERY DAY    doxazosin (CARDURA) 4 MG tablet Take 4 mg by mouth once daily.    fenofibrate (TRICOR) 54 MG tablet Take 1 tablet (54 mg total) by mouth once daily.    hydroCHLOROthiazide (HYDRODIURIL) 25 MG tablet TAKE 1 TABLET BY MOUTH EVERY DAY IN THE MORNING    metFORMIN (GLUCOPHAGE) 500 MG tablet TAKE 1 TABLET BY MOUTH TWICE A DAY WITH FOOD    methocarbamoL (ROBAXIN) 750 MG Tab Take 1 tablet (750 mg total) by mouth every 8 (eight) hours. for 5 days    metoclopramide HCl (REGLAN) 10 MG tablet Take 1 tablet (10 mg total) by mouth 4 (four)  times daily before meals and nightly.    metoprolol succinate (TOPROL-XL) 100 MG 24 hr tablet Take 1 tablet (100 mg total) by mouth 2 (two) times daily.    metroNIDAZOLE (FLAGYL) 500 MG tablet Take 1 tablet (500 mg total) by mouth 3 (three) times daily. Take initial dose at 2pm, second dose at 3pm and final dose at 10pm the day before surgery    neomycin (MYCIFRADIN) 500 mg Tab Take 2 tablets (1,000 mg total) by mouth 3 (three) times daily. Take initial dose at 2pm, take second dose at 3pm and take final dose at 10pm the day before surgery.    olmesartan (BENICAR) 40 MG tablet Take 1 tablet (40 mg total) by mouth once daily.    oxyCODONE (ROXICODONE) 5 MG immediate release tablet Take 1 tablet (5 mg total) by mouth every 6 (six) hours as needed for Pain.    polyethylene glycol (GLYCOLAX) 17 gram/dose powder Take 238 g by mouth once daily. Mix with 2L of gatorade and drink all of mixed solution starting at 12pm the day before surgery, finishing by 10pm the night before surgery.    pregabalin (LYRICA) 75 MG capsule Take 1 capsule (75 mg total) by mouth 2 (two) times daily.    tiZANidine (ZANAFLEX) 4 MG tablet TAKE 1/2-1 TABLET BY MOUTH 2 TIMES DAILY AS NEEDED FOR MUSCLE SPASMS MAY CAUSE DROWSINESS.    traMADoL (ULTRAM) 50 mg tablet Take 1 tablet (50 mg total) by mouth every 6 (six) hours as needed for Pain.     Family History       Problem Relation (Age of Onset)    No Known Problems Mother, Father          Tobacco Use    Smoking status: Former     Passive exposure: Never    Smokeless tobacco: Never   Substance and Sexual Activity    Alcohol use: Not Currently     Comment: OCC    Drug use: Never    Sexual activity: Yes     Partners: Female     Review of Systems   Constitutional:  Negative for chills and fever.   Respiratory:  Negative for shortness of breath.    Gastrointestinal:  Positive for abdominal distention, abdominal pain, constipation (no BM), nausea and vomiting.   Genitourinary:  Negative for dysuria and  hematuria.   All other systems reviewed and are negative.    Objective:     Vital Signs (Most Recent):  Temp: 99.3 °F (37.4 °C) (03/16/24 0150)  Pulse: 76 (03/16/24 0319)  Resp: 20 (03/16/24 0330)  BP: 136/63 (03/16/24 0230)  SpO2: 97 % (03/16/24 0319) Vital Signs (24h Range):  Temp:  [99.3 °F (37.4 °C)] 99.3 °F (37.4 °C)  Pulse:  [76-86] 76  Resp:  [18-20] 20  SpO2:  [97 %] 97 %  BP: (120-136)/(58-63) 136/63     Weight: 99.8 kg (220 lb)  Body mass index is 36.61 kg/m².     Physical Exam  Vitals reviewed.   Constitutional:       General: He is not in acute distress.     Appearance: He is obese.   HENT:      Nose: Nose normal.   Eyes:      Conjunctiva/sclera: Conjunctivae normal.   Cardiovascular:      Rate and Rhythm: Normal rate.   Pulmonary:      Effort: Pulmonary effort is normal. No respiratory distress.   Abdominal:      General: There is distension.      Tenderness: There is abdominal tenderness. There is no rebound.   Musculoskeletal:         General: No swelling.   Skin:     General: Skin is warm and dry.   Neurological:      Mental Status: He is alert and oriented to person, place, and time.   Psychiatric:         Mood and Affect: Mood normal.         Behavior: Behavior normal.                Significant Labs: All pertinent labs within the past 24 hours have been reviewed.  Recent Lab Results         03/16/24  0227        Albumin 3.2       ALP 44       ALT 28       Anion Gap 12       AST 26       Baso # 0.02       Basophil % 0.3       BILIRUBIN TOTAL 0.8  Comment: For infants and newborns, interpretation of results should be based  on gestational age, weight and in agreement with clinical  observations.    Premature Infant recommended reference ranges:  Up to 24 hours.............<8.0 mg/dL  Up to 48 hours............<12.0 mg/dL  3-5 days..................<15.0 mg/dL  6-29 days.................<15.0 mg/dL         BUN 29       Calcium 9.3       Chloride 99       CO2 24       Creatinine 1.4       Differential  "Method Automated       eGFR 54       Eos # 0.0       Eos % 0.1       Glucose 136       Gran # (ANC) 5.4       Gran % 77.9       Hematocrit 34.7       Hemoglobin 11.9       Immature Grans (Abs) 0.02  Comment: Mild elevation in immature granulocytes is non specific and   can be seen in a variety of conditions including stress response,   acute inflammation, trauma and pregnancy. Correlation with other   laboratory and clinical findings is essential.         Immature Granulocytes 0.3       Lymph # 0.3       Lymph % 4.1       MCH 28.7       MCHC 34.3       MCV 84       Mono # 1.2       Mono % 17.3       MPV 10.1       nRBC 0       Platelet Count 214       Potassium 3.6       PROTEIN TOTAL 6.5       RBC 4.15       RDW 13.2       Sodium 135       WBC 6.99               Significant Imaging: I have reviewed all pertinent imaging results/findings within the past 24 hours.  CT Abdomen Pelvis  Without Contrast    (Results Pending)   XR Gastric tube check, non-radiologist performed    (Results Pending)      Assessment/Plan:     * Ileus  Patient has Post-operative ileus which is adynamic in etiology which is worsening. This is a complication of his procedure.   -CT abdomen/pelvis with "dilated fluid and gas-filled small bowel loops without definite transition point, this could represent enteritis or ileus; nonspecific adrenal nodules bilaterally, probably adenomas; fat stranding in the anterior abdominal wall and mild skin thickening of the inferior aspect of the pannus may be related to edema, but cellulitis is not excluded; gas in the bladder may be secondary to recent manipulation, please correlate with urinalysis to evaluate for cystitis; small intraperitoneal free air foci may be related to recent surgery; fluid in the colon is suggestive of diarrheal state; bowel containing ventral hernia."  -white blood cell count 6.99, LFTs unremarkable, afebrile  -Will treat conservatively with bowel rest, IV fluids, serial abdominal " exams and avoidance if possible of GI paralytics such as narcotics or anti-spasmodics. Monitor patient closely.    -NG tube insertion has been ordered.    -Correct electrolytes as needed.  -check a.m. labs  -check urinalysis with reflex culture  -oxygen saturations on the low end with 92% on room air without complain of shortness a breath.  Suspect atelectasis and splinting from intra-abdominal process.  O2 supplementation if needed, frequent incentive spirometry, continuous pulse oximetry monitoring  -colorectal surgery has been consulted      Intractable nausea and vomiting  Intractable nausea and vomiting related to postoperative ileus.  NPO, NG tube insertion, IV fluids, and p.r.n. antiemetics.      Adenoma of colon  Postop day 5 status post robotic right colectomy.  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      Hyponatremia  Mild hyponatremia related to intractable nausea with vomiting and dehydration.  Current sodium level 135, creatinine 1.4.  Give IV fluids.  Check a.m. labs.      Normocytic anemia  Patient's anemia is currently controlled. Has not received any PRBCs to date. Etiology likely d/t acute blood loss which was from recent surgery and underlying colon pathology  Current CBC reviewed-   Lab Results   Component Value Date    HGB 11.9 (L) 03/16/2024    HCT 34.7 (L) 03/16/2024     Monitor serial CBC and transfuse if patient becomes hemodynamically unstable, symptomatic or H/H drops below 7/21.    Stage 3a chronic kidney disease  Creatine stable for now. BMP reviewed- noted Estimated Creatinine Clearance: 52.6 mL/min (based on SCr of 1.4 mg/dL). according to latest data. Based on current GFR, CKD stage is stage 3 - GFR 30-59.  Monitor UOP and serial BMP and adjust therapy as needed. Renally dose meds. Avoid nephrotoxic medications and procedures.    Severe obesity (BMI 35.0-39.9) with comorbidity  Body mass index is 36.61 kg/m².  Morbid obesity complicates all aspects of disease management from diagnostic modalities to treatment. Weight loss encouraged and health benefits explained to patient.         Prediabetes  Last A1c in our system was 5.3 on 05/30/2023.  Repeat A1c.  NPO, IV fluids, blood glucose q.6 hours with general hypoglycemia protocol.  Sliding scale insulin has not been ordered at this time.  Monitor blood glucose closely and if significantly elevated we will add sliding scale insulin at that time.      Essential hypertension  Chronic, controlled. Latest blood pressure and vitals reviewed-     Temp:  [99.3 °F (37.4 °C)]   Pulse:  [76-86]   Resp:  [18-20]   BP: (120-136)/(58-63)   SpO2:  [97 %] .   Home meds for hypertension were reviewed and noted below.   Hypertension Medications               amLODIPine (NORVASC) 5 MG tablet TAKE 1 TABLET BY MOUTH EVERY DAY    doxazosin (CARDURA) 4 MG tablet Take 4 mg by mouth once daily.    hydroCHLOROthiazide (HYDRODIURIL) 25 MG tablet TAKE 1 TABLET BY MOUTH EVERY DAY IN THE MORNING    metoprolol succinate (TOPROL-XL) 100 MG 24 hr tablet Take 1 tablet (100 mg total) by mouth 2 (two) times daily.    olmesartan (BENICAR) 40 MG tablet Take 1 tablet (40 mg total) by mouth once daily.            While in the hospital, will manage blood pressure as follows; Adjust home antihypertensive regimen as follows- hold all antihypertensives due to postoperative ileus with intractable nausea and vomiting and current NPO state. Held Norvasc, Cardura, hydrochlorothiazide, Toprol-XL, and Benicar.    Will utilize p.r.n. blood pressure medication only if patient's blood pressure greater than 160/100 and he develops symptoms such as worsening chest pain or shortness of breath.      VTE Risk Mitigation (From admission, onward)           Ordered     enoxaparin injection 40 mg  Daily         03/16/24 0507     IP VTE HIGH RISK PATIENT  Once         03/16/24 0507     Place sequential compression device  Until  discontinued         03/16/24 0507                       On 03/16/2024, patient should be placed in hospital observation services under my care.        Pharmacist Renal Dose Adjustment Note    Quintin Ling is a 73 y.o. male being treated with the medication enoxaparin    Patient Data:    Vital Signs (Most Recent):  Temp: 99.3 °F (37.4 °C) (03/16/24 0150)  Pulse: 76 (03/16/24 0319)  Resp: 20 (03/16/24 0330)  BP: 136/63 (03/16/24 0230)  SpO2: 97 % (03/16/24 0319) Vital Signs (72h Range):  Temp:  [99.3 °F (37.4 °C)]   Pulse:  [76-86]   Resp:  [18-20]   BP: (120-136)/(58-63)   SpO2:  [97 %]      Recent Labs   Lab 03/12/24  0536 03/14/24  1531 03/16/24  0227   CREATININE 1.3 1.6* 1.4     Serum creatinine: 1.4 mg/dL 03/16/24 0227  Estimated creatinine clearance: 52.6 mL/min    Enoxaparin 30 mg q24h will be changed to enoxaparin 40 mg q24h for CrCl >30 mL/min.     Pharmacist's Name: Alondra Nevarez  Pharmacist's Extension: [phone]      Cynthia Jones MD  Department of Hospital Medicine  [facility] - Emergency Dept.

## 2024-03-16 NOTE — ANESTHESIA PREPROCEDURE EVALUATION
03/16/2024  Quintin Ling is a 73 y.o., male with h/o Robotic colect on Monday.  Now NV.  Ileus, wound dehiscence.  Uneventful anesthetic.  Easy intub, Mac 3, 8.0    Past Medical History:   Diagnosis Date    Diabetes mellitus     Hyperlipidemia     Hypertension     Obesity  Mild to Mod AS      Past Surgical History:   Procedure Laterality Date    BIOPSY OF PERITONEUM N/A 1/29/2024    Procedure: BIOPSY, PERITONEUM;  Surgeon: Tosin Boothe MD;  Location: [facility] OR;  Service: Colon and Rectal;  Laterality: N/A;    COLONOSCOPY N/A 1/5/2024    Procedure: COLONOSCOPY;  Surgeon: Irasema Gil MD;  Location: [facility] ENDO;  Service: Endoscopy;  Laterality: N/A;    DIAGNOSTIC LAPAROSCOPY N/A 1/29/2024    Procedure: LAPAROSCOPY, DIAGNOSTIC;  Surgeon: Tosin Boothe MD;  Location: [facility] OR;  Service: Colon and Rectal;  Laterality: N/A;    EPIDURAL STEROID INJECTION N/A 11/13/2023    Procedure: Lumbar L5/S1 IL JOSE;  Surgeon: Oneil Carlson MD;  Location: [facility] PAIN MGT;  Service: Pain Management;  Laterality: N/A;    ESOPHAGOGASTRODUODENOSCOPY N/A 1/5/2024    Procedure: EGD (ESOPHAGOGASTRODUODENOSCOPY);  Surgeon: Irasema Gil MD;  Location: [facility] ENDO;  Service: Endoscopy;  Laterality: N/A;    INJECTION OF ANESTHETIC AGENT INTO TISSUE PLANE DEFINED BY TRANSVERSUS ABDOMINIS MUSCLE N/A 3/11/2024    Procedure: BLOCK, TRANSVERSUS ABDOMINIS PLANE;  Surgeon: Tosin Boothe MD;  Location: [facility] OR;  Service: Colon and Rectal;  Laterality: N/A;    NO PAST SURGERIES      XI ROBOTIC COLECTOMY, RIGHT Right 3/11/2024    Procedure: XI ROBOTIC COLECTOMY, RIGHT;  Surgeon: Tosin Boothe MD;  Location: [facility] OR;  Service: Colon and Rectal;  Laterality: Right;  WITH OMENECTOMY AND AMNIOFIX      Pre-op Assessment    I have reviewed the Patient Summary Reports.     I have reviewed the Nursing Notes. I have  reviewed the NPO Status.   I have reviewed the Medications.     Review of Systems  Anesthesia Hx:  No previous Anesthesia   History of prior surgery of interest to airway management or planning:  Previous anesthesia: General        Denies Family Hx of Anesthesia complications.    Denies Personal Hx of Anesthesia complications.                    Social:  Non-Smoker       Hematology/Oncology:  Hematology Normal   Oncology Normal                                   EENT/Dental:  EENT/Dental Normal           Cardiovascular:  Exercise tolerance: good   Hypertension           hyperlipidemia                             Pulmonary:  Pulmonary Normal                       Renal/:  Chronic Renal Disease (Stage 3), CKD                Hepatic/GI:  Hepatic/GI Normal                 Musculoskeletal:         Spine Disorders: (Lumbar Radiculop)             Neurological:  Neurology Normal                                      Endocrine:  Diabetes         Obesity / BMI > 30  Dermatological:  Skin Normal    Psych:  Psychiatric Normal                    Physical Exam  General: Well nourished    Airway:  Mallampati: III   Mouth Opening: Normal  TM Distance: Normal  Neck ROM: Normal ROM    Dental:  Intact    Chest/Lungs:  Clear to auscultation    Heart:  Rate: Normal  Rhythm: Regular Rhythm      Lab Results   Component Value Date    WBC 6.99 03/16/2024    HGB 11.9 (L) 03/16/2024    HCT 34.7 (L) 03/16/2024    MCV 84 03/16/2024     03/16/2024       Chemistry        Component Value Date/Time     (L) 03/16/2024 0227    K 3.6 03/16/2024 0227    CL 99 03/16/2024 0227    CO2 24 03/16/2024 0227    BUN 29 (H) 03/16/2024 0227    CREATININE 1.4 03/16/2024 0227     (H) 03/16/2024 0227        Component Value Date/Time    CALCIUM 9.3 03/16/2024 0227    ALKPHOS 44 (L) 03/16/2024 0227    AST 26 03/16/2024 0227    ALT 28 03/16/2024 0227    BILITOT 0.8 03/16/2024 0227    ESTGFRAFRICA >60.0 05/30/2022 0958    EGFRNONAA >60.0 05/30/2022  0958          Last EKG 1/19  Sinus tavo Incomp RBB  Echo 10/23    Left Ventricle: The left ventricle is normal in size. Moderately increased ventricular mass. Moderately increased wall thickness. There is concentric hypertrophy. Normal wall motion. There is normal systolic function with a visually estimated ejection fraction of 55 - 60%. There is normal diastolic function.    Left Atrium: Left atrium is mildly dilated.    Right Ventricle: Normal right ventricular cavity size. Wall thickness is normal. Right ventricle wall motion  is normal. Systolic function is normal.    Aortic Valve: There is mild to moderate stenosis. Aortic valve area by VTI is 1.11 cm². Aortic valve peak velocity is 2.52 m/s. Mean gradient is 17 mmHg. The dimensionless index is 0.36.    Mitral Valve: There is mild mitral annular calcification present.    Pulmonary Artery: The estimated pulmonary artery systolic pressure is 20 mmHg.    IVC/SVC: Normal venous pressure at 3 mmHg.      Anesthesia Plan  Type of Anesthesia, risks & benefits discussed:    Anesthesia Type: Gen ETT  Intra-op Monitoring Plan: Standard ASA Monitors  Post Op Pain Control Plan: multimodal analgesia and IV/PO Opioids PRN  Induction:  IV  Airway Plan: Direct  Informed Consent: Informed consent signed with the Patient and all parties understand the risks and agree with anesthesia plan.  All questions answered.   ASA Score: 3    Ready For Surgery From Anesthesia Perspective.     .

## 2024-03-16 NOTE — ANESTHESIA PROCEDURE NOTES
Intubation    Date/Time: 3/16/2024 2:04 PM    Performed by: Gilles Velasquez CRNA  Authorized by: Jose Horton MD    Intubation:     Induction:  Intravenous    Intubated:  Postinduction    Mask Ventilation:  Easy mask    Attempts:  1    Attempted By:  CRNA    Method of Intubation:  Direct    Blade:  Kun 3    Laryngeal View Grade: Grade IIA - cords partially seen      Difficult Airway Encountered?: No      Complications:  None    Airway Device:  Oral endotracheal tube    Airway Device Size:  7.5    Style/Cuff Inflation:  Cuffed (inflated to minimal occlusive pressure)    Inflation Amount (mL):  4    Tube secured:  23    Secured at:  The lips    Placement Verified By:  Capnometry    Complicating Factors:  None    Findings Post-Intubation:  BS equal bilateral and atraumatic/condition of teeth unchanged

## 2024-03-16 NOTE — INTERVAL H&P NOTE
Quintin Ling is a 73 y.o. male who presents with early fascial dehiscence without evisceration.    The patient has been examined and the H&P has been reviewed:    I concur with the findings and no changes have occurred since H&P was written.    Surgery risks, benefits and alternative options discussed and understood by patient/family.      -- to OR for wound exploration and fascial closure  -- Laterality marked?  N/a  -- Abx ppx:  Ancef   -- DVT ppx:  SCDs   -- Consent signed and in the chart.  All questions have been answered        Magda Stark MD      Surgical Oncology              Active Hospital Problems    Diagnosis  POA    *Ileus [K56.7]  Yes    Intractable nausea and vomiting [R11.2]  Yes    Hyponatremia [E87.1]  Yes    Dehiscence of fascia [T81.30XA]  Yes    Adenoma of colon [D12.6]  Yes    Normocytic anemia [D64.9]  Yes    Stage 3a chronic kidney disease [N18.31]  Yes    Severe obesity (BMI 35.0-39.9) with comorbidity [E66.01]  Yes    Essential hypertension [I10]  Yes     Near the target.  Increase Cardura to 4 mg.      Prediabetes [R73.03]  Yes     No weight loss since starting metformin.  Review dietary recommendations and emphasize physical activity.  Follow-up 6 months.  A1c today        Resolved Hospital Problems   No resolved problems to display.

## 2024-03-16 NOTE — OP NOTE
[facility]  Surgical Oncology  Operative Note           Date of Procedure: 3/16/2024   Time: 3:54 PM    Procedure: Procedure(s) (LRB):  EXPLORATION, WOUND (N/A)  CLOSURE, WOUND (N/A)     Surgeon(s) and Role:     * Magda Stark MD - Primary  Assisting Surgeon: None    Pre-Operative Diagnosis:   Ileus [K56.7]  Wound dehiscence [T81.30XA]    Post-Operative Diagnosis:   Same    Anesthesia: General    Procedure:  Wound exploration  Closure of fascial dehiscence     Operative Findings:   Complete dehiscence of supraumbilical fascia.           Indications:  Quintin Ling is a 73 y.o. year old male with a history of a robotic right hemicolectomy on 3/11/2024, who presented with nausea and vomiting, found to have an ileus on CT scan as well as concerns for fascial dehiscence without evisceration.     Risks and benefits were reviewed including bleeding, infection, pain, scar, damage to surrounding structures, cardiovascular and pulmonary complications, hernia recurrence, need for additional procedures, death, and imponderables.  He expressed understanding and gave informed consent to proceed.    Procedure in Detail:    Following written and informed consent the patient was taken to the operating room and positioned in the supine position.  Following initiation of general endotracheal anesthesia appropriate antibiotics were given and the patient's abdomen was prepped and draped in the usual sterile fashion.  A time-out was performed verifying the correct patient, procedure, and other pertinent details.    We started by using a scalpel to open the small portions of the skin that had healed, followed by Metzenbaum scissors to open up the deep dermal tissues.  Immediately upon opening the deep dermal space we were faced with small-bowel consistent with dehiscence of his fascial closure.  The entire skin incision was opened revealing complete dehiscence of the fascial closure.  All of the small bowel was healthy  appearing but dilated consistent with his ileus.  His NG tube was replaced with an 18 Maltese and positioning was confirmed by palpation of the stomach.  The fascia was identified and cleaned off circumferentially and then closed with [id number] using 0 PDS, taking care to lying bowel.  The fascia closed without any tension.  Local was injected in the in the incision.  The skin was then closed in 2 layers with 3-0 Vicryl followed by running 4-0 Monocryl and then Dermabond.  He was then extubated and taken to recovery in good condition with an abdominal binder in place.  All counts were correct x2 at the end of the case.    Portions of the record were created with [employer] Direct voice recognition software. This may lead to occasional typographical errors due to the inherent limitations of the software. Read the chart carefully and recognize, using context, where substitutions have occurred. Please do not hesitate to contact me directly if clarification is needed.    Implants: * No implants in log *    Drains: None    Estimated Blood Loss (EBL):  Minimal    Specimens: None            Condition: Good    Disposition: PACU - hemodynamically stable.               Magda Stark MD      Surgical Oncology      3/16/2024, 3:54 PM

## 2024-03-16 NOTE — NURSING
Pt back in room from PACU    Abdominal binder and dermabond in place    Pt was given 1500 of fluids and 1000mg tylenol before returning to Med Surg unit     X-ray for NG placement verification has confirmed    LR rate change to 100ml/hr    Oxygen applied     VS are stable     Family is at bed side

## 2024-03-16 NOTE — ASSESSMENT & PLAN NOTE
Patient's anemia is currently controlled. Has not received any PRBCs to date. Etiology likely d/t acute blood loss which was from recent surgery and underlying colon pathology  Current CBC reviewed-   Lab Results   Component Value Date    HGB 11.9 (L) 03/16/2024    HCT 34.7 (L) 03/16/2024     Monitor serial CBC and transfuse if patient becomes hemodynamically unstable, symptomatic or H/H drops below 7/21.

## 2024-03-16 NOTE — TRANSFER OF CARE
"Anesthesia Transfer of Care Note    Patient: Quintin Ling    Procedure(s) Performed: Procedure(s) (LRB):  EXPLORATION, WOUND (N/A)  CLOSURE, WOUND (N/A)    Patient location: PACU    Anesthesia Type: general    Transport from OR: Transported from OR on room air with adequate spontaneous ventilation    Post pain: adequate analgesia    Post assessment: no apparent anesthetic complications and tolerated procedure well    Post vital signs: stable    Level of consciousness: awake    Nausea/Vomiting: no nausea/vomiting    Complications: none    Transfer of care protocol was followed      Last vitals: Visit Vitals  BP (!) 152/70   Pulse 83   Temp 37.3 °C (99.2 °F)   Resp 18   Ht 5' 5" (1.651 m)   Wt 99.7 kg (219 lb 12.8 oz)   SpO2 96%   BMI 36.58 kg/m²     "

## 2024-03-16 NOTE — H&P (VIEW-ONLY)
[facility] Surg  General Surgery  Consult Note    Patient Name: Quintin Ling  MRN: [id number]  Code Status: Full Code  Admission Date: 3/16/2024  Hospital Length of Stay: 0 days  Attending Physician: Reny Kogn MD  Primary Care Provider: Bishop Gabriel MD    Patient information was obtained from patient, spouse/SO, past medical records, ER records, and primary team.     Inpatient consult to General Surgery  Consult performed by: Magda Stark MD  Consult ordered by: Ирина Yañez MD  Reason for consult: post operative ileus      Subjective:     Principal Problem: Ileus    History of Present Illness: Quintin Ling is a 73 y.o. male s/p robotic right hemicolectomy and omentectomy on 3/11/2023.  He was discharged home on postoperative day 1.  However returned to the [facility] on postoperative day 3 complaining of acid reflux, vomiting, and significant drainage from his midline wound.  At that time he was still having flatus and bowel movements but was also belching.  Outpatient KUB was concerning for a possible small-bowel obstruction.  He was believed to have a mild postoperative ileus and was given home care and dietary instructions and told to present to the ER if his symptoms persisted or worsened.  He presented to the emergency department last night with significant episodes of nausea and vomiting.  He had a mild BRANDY with a creatinine of 1.6 no however no leukocytosis or other significant laboratory abnormalities.  CT scan was done which showed dilated loops of small bowel consistent with ileus however also showed what they called a supraumbilical hernia with small bowel.  An NG-tube was placed with 1800 cc output and this morning he states he is feeling better.  He is continued to have copious amounts of drainage from his midline wound when she states has been going on since the day he was discharged home after surgery.    No current facility-administered medications on file prior  to encounter.     Current Outpatient Medications on File Prior to Encounter   Medication Sig    amLODIPine (NORVASC) 5 MG tablet TAKE 1 TABLET BY MOUTH EVERY DAY    atorvastatin (LIPITOR) 20 MG tablet TAKE 1 TABLET BY MOUTH EVERY DAY    doxazosin (CARDURA) 4 MG tablet Take 4 mg by mouth once daily.    fenofibrate (TRICOR) 54 MG tablet Take 1 tablet (54 mg total) by mouth once daily.    hydroCHLOROthiazide (HYDRODIURIL) 25 MG tablet TAKE 1 TABLET BY MOUTH EVERY DAY IN THE MORNING    metFORMIN (GLUCOPHAGE) 500 MG tablet TAKE 1 TABLET BY MOUTH TWICE A DAY WITH FOOD    methocarbamoL (ROBAXIN) 750 MG Tab Take 1 tablet (750 mg total) by mouth every 8 (eight) hours. for 5 days    metoclopramide HCl (REGLAN) 10 MG tablet Take 1 tablet (10 mg total) by mouth 4 (four) times daily before meals and nightly.    metoprolol succinate (TOPROL-XL) 100 MG 24 hr tablet Take 1 tablet (100 mg total) by mouth 2 (two) times daily.    olmesartan (BENICAR) 40 MG tablet Take 1 tablet (40 mg total) by mouth once daily.    oxyCODONE (ROXICODONE) 5 MG immediate release tablet Take 1 tablet (5 mg total) by mouth every 6 (six) hours as needed for Pain.    pregabalin (LYRICA) 75 MG capsule Take 1 capsule (75 mg total) by mouth 2 (two) times daily.    tiZANidine (ZANAFLEX) 4 MG tablet TAKE 1/2-1 TABLET BY MOUTH 2 TIMES DAILY AS NEEDED FOR MUSCLE SPASMS MAY CAUSE DROWSINESS.    traMADoL (ULTRAM) 50 mg tablet Take 1 tablet (50 mg total) by mouth every 6 (six) hours as needed for Pain.    acetaminophen (TYLENOL) 500 MG tablet Take 2 tablets (1,000 mg total) by mouth every 6 (six) hours. for 5 days    metroNIDAZOLE (FLAGYL) 500 MG tablet Take 1 tablet (500 mg total) by mouth 3 (three) times daily. Take initial dose at 2pm, second dose at 3pm and final dose at 10pm the day before surgery    neomycin (MYCIFRADIN) 500 mg Tab Take 2 tablets (1,000 mg total) by mouth 3 (three) times daily. Take initial dose at 2pm, take second dose at 3pm  and take final dose at 10pm the day before surgery.    polyethylene glycol (GLYCOLAX) 17 gram/dose powder Take 238 g by mouth once daily. Mix with 2L of gatorade and drink all of mixed solution starting at 12pm the day before surgery, finishing by 10pm the night before surgery.       Review of patient's allergies indicates:  No Known Allergies    Past Medical History:   Diagnosis Date    Diabetes mellitus     Hyperlipidemia     Hypertension      Past Surgical History:   Procedure Laterality Date    BIOPSY OF PERITONEUM N/A 1/29/2024    Procedure: BIOPSY, PERITONEUM;  Surgeon: Tosin Boothe MD;  Location: [facility] OR;  Service: Colon and Rectal;  Laterality: N/A;    COLONOSCOPY N/A 1/5/2024    Procedure: COLONOSCOPY;  Surgeon: Irasema Gil MD;  Location: [facility] ENDO;  Service: Endoscopy;  Laterality: N/A;    DIAGNOSTIC LAPAROSCOPY N/A 1/29/2024    Procedure: LAPAROSCOPY, DIAGNOSTIC;  Surgeon: Tosin Boothe MD;  Location: [facility] OR;  Service: Colon and Rectal;  Laterality: N/A;    EPIDURAL STEROID INJECTION N/A 11/13/2023    Procedure: Lumbar L5/S1 IL JOSE;  Surgeon: Oneil Carlson MD;  Location: [facility] PAIN MGT;  Service: Pain Management;  Laterality: N/A;    ESOPHAGOGASTRODUODENOSCOPY N/A 1/5/2024    Procedure: EGD (ESOPHAGOGASTRODUODENOSCOPY);  Surgeon: Irasema Gil MD;  Location: [facility] ENDO;  Service: Endoscopy;  Laterality: N/A;    INJECTION OF ANESTHETIC AGENT INTO TISSUE PLANE DEFINED BY TRANSVERSUS ABDOMINIS MUSCLE N/A 3/11/2024    Procedure: BLOCK, TRANSVERSUS ABDOMINIS PLANE;  Surgeon: Tosin Boothe MD;  Location: [facility] OR;  Service: Colon and Rectal;  Laterality: N/A;    NO PAST SURGERIES      XI ROBOTIC COLECTOMY, RIGHT Right 3/11/2024    Procedure: XI ROBOTIC COLECTOMY, RIGHT;  Surgeon: Tosin Boothe MD;  Location: [facility] OR;  Service: Colon and Rectal;  Laterality: Right;  WITH OMENECTOMY AND AMNIOFIX     Family History       Problem Relation (Age of Onset)    No  Known Problems Mother, Father          Tobacco Use    Smoking status: Former     Passive exposure: Never    Smokeless tobacco: Never   Substance and Sexual Activity    Alcohol use: Not Currently     Comment: OCC    Drug use: Never    Sexual activity: Yes     Partners: Female     Review of Systems   Constitutional:  Positive for activity change and appetite change. Negative for chills and fever.   Respiratory:  Negative for cough, chest tightness and shortness of breath.    Cardiovascular:  Negative for chest pain.   Gastrointestinal:  Positive for abdominal distention, constipation, nausea and vomiting. Negative for abdominal pain and diarrhea.   Skin:  Negative for pallor, rash and wound.   Psychiatric/Behavioral:  Negative for agitation and confusion. The patient is not nervous/anxious.      Objective:     Vital Signs (Most Recent):  Temp: 99.2 °F (37.3 °C) (03/16/24 1235)  Pulse: 83 (03/16/24 1235)  Resp: 18 (03/16/24 1235)  BP: (!) 152/70 (03/16/24 1235)  SpO2: 96 % (03/16/24 1235) Vital Signs (24h Range):  Temp:  [98.3 °F (36.8 °C)-99.3 °F (37.4 °C)] 99.2 °F (37.3 °C)  Pulse:  [] 83  Resp:  [18-23] 18  SpO2:  [93 %-97 %] 96 %  BP: (105-152)/(49-70) 152/70     Weight: 99.7 kg (219 lb 12.8 oz)  Body mass index is 36.58 kg/m².     Physical Exam  Constitutional:       General: He is not in acute distress.     Appearance: Normal appearance. He is obese. He is not ill-appearing.   HENT:      Head: Normocephalic and atraumatic.      Nose:      Comments: NG tube in place  Eyes:      Extraocular Movements: Extraocular movements intact.      Conjunctiva/sclera: Conjunctivae normal.   Cardiovascular:      Rate and Rhythm: Normal rate and regular rhythm.   Pulmonary:      Effort: Pulmonary effort is normal.   Abdominal:      General: Abdomen is flat. There is distension.      Palpations: Abdomen is soft. There is no mass.      Tenderness: There is no abdominal tenderness. There is no guarding or rebound.       Hernia: No hernia is present.      Comments: Midline incision with copious amounts of serous drainage, no erythema or induration   Musculoskeletal:         General: No swelling, tenderness, deformity or signs of injury. Normal range of motion.   Skin:     General: Skin is warm and dry.      Coloration: Skin is not jaundiced.   Neurological:      General: No focal deficit present.      Mental Status: He is alert and oriented to person, place, and time.   Psychiatric:         Mood and Affect: Mood normal.         Behavior: Behavior normal.         Thought Content: Thought content normal.            I have reviewed all pertinent lab results within the past 24 hours.  CBC:   Recent Labs   Lab 03/16/24 0227   WBC 6.99   RBC 4.15*   HGB 11.9*   HCT 34.7*      MCV 84   MCH 28.7   MCHC 34.3     BMP:   Recent Labs   Lab 03/14/24  1531 03/16/24 0227   * 136*    135*   K 4.4 3.6    99   CO2 25 24   BUN 21 29*   CREATININE 1.6* 1.4   CALCIUM 9.1 9.3   MG 1.9  --      CMP:   Recent Labs   Lab 03/16/24 0227   *   CALCIUM 9.3   ALBUMIN 3.2*   PROT 6.5   *   K 3.6   CO2 24   CL 99   BUN 29*   CREATININE 1.4   ALKPHOS 44*   ALT 28   AST 26   BILITOT 0.8     LFTs:   Recent Labs   Lab 03/16/24 0227   ALT 28   AST 26   ALKPHOS 44*   BILITOT 0.8   PROT 6.5   ALBUMIN 3.2*       Significant Diagnostics:  I have reviewed all pertinent imaging results/findings within the past 24 hours.  CT: I have reviewed all pertinent results/findings within the past 24 hours and my personal findings are:  Small-bowel dilation , fascial disruption superior to the umbilicus    Assessment/Plan:     * Ileus  POD #5 s/p robotic right hemicolectomy with post operative ileus.     No evidence of obstruction from this fascial dehiscence likely it is secondary to vomiting from his ileus.    -- NG tube  -- NPO  -- encourage ambulation  -- maintain normal electrolyte    Dehiscence of fascia  Early postoperative wound  dehiscence without evidence of evisceration as the skin is intact.  Likely secondary to vomiting from his ileus and has likely been present for several days based on the copious drainage he has had since discharge.      -- to OR today for exploration and closure  -- Ancef  -- abdominal binder postoperatively  -- continue NG tube and conservative management of postoperative ileus    Hyponatremia  -- Management as per primary team     Intractable nausea and vomiting  2/2 ileus     Normocytic anemia  -- Management as per primary team     Stage 3a chronic kidney disease  -- Management as per primary team     Severe obesity (BMI 35.0-39.9) with comorbidity  -- Management as per primary team     Prediabetes  -- Management as per primary team     Essential hypertension  -- Management as per primary team       VTE Risk Mitigation (From admission, onward)           Ordered     enoxaparin injection 40 mg  Daily         03/16/24 0507     IP VTE HIGH RISK PATIENT  Once         03/16/24 0507     Place sequential compression device  Until discontinued         03/16/24 0507                    Thank you for your consult. I will follow-up with patient. Please contact us if you have any additional questions.    Magda Stark MD  General Surgery  [facility] - Med Surg

## 2024-03-16 NOTE — HPI
73-year-old white man with history of hypertension, hyperlipidemia, prediabetes, obesity, erectile dysfunction, chronic kidney disease stage IIIA, lumbar radiculopathy, normocytic anemia, cold now adenoma, and peripheral arterial disease who presented to the emergency department with complaint of intractable nausea with vomiting over the last 2-3 days.  He has had associated abdominal pain and abdominal distention.  He can not identify any aggravating or alleviating factors.  He denies any associated fevers or chills.  Patient has had flatus but denies any bowel movements.    Recent hospitalization 3/11-03/12/2024 for colon adenoma status post robotic right colectomy on 03/11/2024.  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

## 2024-03-16 NOTE — ASSESSMENT & PLAN NOTE
Intractable nausea and vomiting related to postoperative ileus.  NPO, NG tube insertion, IV fluids, and p.r.n. antiemetics.

## 2024-03-16 NOTE — NURSING TRANSFER
Nursing Transfer Note      3/16/2024   2:00pm    Nurse giving handoff:Carlos  Nurse receiving handoff:Surgery     Reason patient is being transferred: Surgery     Transfer To: Surgery     Transfer via bed    Transported by Luiz    Pt transported to surgery at 2:00pm

## 2024-03-16 NOTE — ASSESSMENT & PLAN NOTE
Creatine stable for now. BMP reviewed- noted Estimated Creatinine Clearance: 52.6 mL/min (based on SCr of 1.4 mg/dL). according to latest data. Based on current GFR, CKD stage is stage 3 - GFR 30-59.  Monitor UOP and serial BMP and adjust therapy as needed. Renally dose meds. Avoid nephrotoxic medications and procedures.

## 2024-03-16 NOTE — ANESTHESIA POSTPROCEDURE EVALUATION
Anesthesia Post Evaluation    Patient: Quintin Ling    Procedure(s) Performed: Procedure(s) (LRB):  EXPLORATION, WOUND (N/A)  CLOSURE, WOUND (N/A)    Final Anesthesia Type: general      Patient location during evaluation: PACU  Patient participation: Yes- Able to Participate  Level of consciousness: awake  Post-procedure vital signs: reviewed and stable  Pain management: adequate  Airway patency: patent    PONV status at discharge: No PONV  Anesthetic complications: no      Cardiovascular status: hemodynamically stable  Respiratory status: spontaneous ventilation  Hydration status: euvolemic  Follow-up not needed.  Comments: No N/v.  Mild sore throat          Vitals Value Taken Time   /60 03/16/24 1600   Temp 36.6 °C (97.9 °F) 03/16/24 1600   Pulse 74 03/16/24 1601   Resp 148 03/16/24 1601   SpO2 97 % 03/16/24 1601   Vitals shown include unvalidated device data.      Event Time   Out of Recovery 16:01:39         Pain/Boston Score: Pain Rating Prior to Med Admin: 0 (3/16/2024  3:49 PM)  Boston Score: 10 (3/16/2024  3:55 PM)

## 2024-03-16 NOTE — ASSESSMENT & PLAN NOTE
Last A1c in our system was 5.3 on 05/30/2023.  Repeat A1c.  NPO, IV fluids, blood glucose q.6 hours with general hypoglycemia protocol.  Sliding scale insulin has not been ordered at this time.  Monitor blood glucose closely and if significantly elevated we will add sliding scale insulin at that time.

## 2024-03-16 NOTE — PLAN OF CARE
Discussed poc with pt, pt verbalized understanding    Purposeful rounding every 2hours    VS wnl  Cardiac monitoring in use, pt is NSR, tele monitor # ____  Blood glucose monitoring   Fall precautions in place, remains injury free  Pt denies c/o n/v    IVFs LR @100ml/hr  Accurate I&Os    Bed locked at lowest position  Call light within reach    Pt received hernia closure surgery today   1500 ml of fluids given   1000mg tylenol given before pt returned to med surg floor   Chart check complete  Will cont with POC

## 2024-03-16 NOTE — ASSESSMENT & PLAN NOTE
Early postoperative wound dehiscence without evidence of evisceration as the skin is intact.  Likely secondary to vomiting from his ileus and has likely been present for several days based on the copious drainage he has had since discharge.      -- to OR today for exploration and closure  -- Ancef  -- abdominal binder postoperatively  -- continue NG tube and conservative management of postoperative ileus

## 2024-03-16 NOTE — ASSESSMENT & PLAN NOTE
"Patient has Post-operative ileus which is adynamic in etiology which is worsening. This is a complication of his procedure.   -CT abdomen/pelvis with "dilated fluid and gas-filled small bowel loops without definite transition point, this could represent enteritis or ileus; nonspecific adrenal nodules bilaterally, probably adenomas; fat stranding in the anterior abdominal wall and mild skin thickening of the inferior aspect of the pannus may be related to edema, but cellulitis is not excluded; gas in the bladder may be secondary to recent manipulation, please correlate with urinalysis to evaluate for cystitis; small intraperitoneal free air foci may be related to recent surgery; fluid in the colon is suggestive of diarrheal state; bowel containing ventral hernia."  -white blood cell count 6.99, LFTs unremarkable, afebrile  -Will treat conservatively with bowel rest, IV fluids, serial abdominal exams and avoidance if possible of GI paralytics such as narcotics or anti-spasmodics. Monitor patient closely.    -NG tube insertion has been ordered.    -Correct electrolytes as needed.  -check a.m. labs  -check urinalysis with reflex culture  -oxygen saturations on the low end with 92% on room air without complain of shortness a breath.  Suspect atelectasis and splinting from intra-abdominal process.  O2 supplementation if needed, frequent incentive spirometry, continuous pulse oximetry monitoring  -colorectal surgery has been consulted    "

## 2024-03-16 NOTE — SUBJECTIVE & OBJECTIVE
Past Medical History:   Diagnosis Date    Diabetes mellitus     Hyperlipidemia     Hypertension        Past Surgical History:   Procedure Laterality Date    BIOPSY OF PERITONEUM N/A 1/29/2024    Procedure: BIOPSY, PERITONEUM;  Surgeon: Tosin Boothe MD;  Location: [facility] OR;  Service: Colon and Rectal;  Laterality: N/A;    COLONOSCOPY N/A 1/5/2024    Procedure: COLONOSCOPY;  Surgeon: Irasema Gil MD;  Location: [facility] ENDO;  Service: Endoscopy;  Laterality: N/A;    DIAGNOSTIC LAPAROSCOPY N/A 1/29/2024    Procedure: LAPAROSCOPY, DIAGNOSTIC;  Surgeon: Tosin Boothe MD;  Location: [facility] OR;  Service: Colon and Rectal;  Laterality: N/A;    EPIDURAL STEROID INJECTION N/A 11/13/2023    Procedure: Lumbar L5/S1 IL JOSE;  Surgeon: Oneil Carlson MD;  Location: [facility] PAIN MGT;  Service: Pain Management;  Laterality: N/A;    ESOPHAGOGASTRODUODENOSCOPY N/A 1/5/2024    Procedure: EGD (ESOPHAGOGASTRODUODENOSCOPY);  Surgeon: Irasema Gil MD;  Location: [facility] ENDO;  Service: Endoscopy;  Laterality: N/A;    INJECTION OF ANESTHETIC AGENT INTO TISSUE PLANE DEFINED BY TRANSVERSUS ABDOMINIS MUSCLE N/A 3/11/2024    Procedure: BLOCK, TRANSVERSUS ABDOMINIS PLANE;  Surgeon: Tosin Boothe MD;  Location: [facility] OR;  Service: Colon and Rectal;  Laterality: N/A;    NO PAST SURGERIES      XI ROBOTIC COLECTOMY, RIGHT Right 3/11/2024    Procedure: XI ROBOTIC COLECTOMY, RIGHT;  Surgeon: Tosin Boothe MD;  Location: [facility] OR;  Service: Colon and Rectal;  Laterality: Right;  WITH OMENECTOMY AND AMNIOFIX       Review of patient's allergies indicates:  No Known Allergies    No current facility-administered medications on file prior to encounter.     Current Outpatient Medications on File Prior to Encounter   Medication Sig    acetaminophen (TYLENOL) 500 MG tablet Take 2 tablets (1,000 mg total) by mouth every 6 (six) hours. for 5 days    amLODIPine (NORVASC) 5 MG tablet TAKE 1 TABLET BY MOUTH EVERY DAY    atorvastatin  (LIPITOR) 20 MG tablet TAKE 1 TABLET BY MOUTH EVERY DAY    doxazosin (CARDURA) 4 MG tablet Take 4 mg by mouth once daily.    fenofibrate (TRICOR) 54 MG tablet Take 1 tablet (54 mg total) by mouth once daily.    hydroCHLOROthiazide (HYDRODIURIL) 25 MG tablet TAKE 1 TABLET BY MOUTH EVERY DAY IN THE MORNING    metFORMIN (GLUCOPHAGE) 500 MG tablet TAKE 1 TABLET BY MOUTH TWICE A DAY WITH FOOD    methocarbamoL (ROBAXIN) 750 MG Tab Take 1 tablet (750 mg total) by mouth every 8 (eight) hours. for 5 days    metoclopramide HCl (REGLAN) 10 MG tablet Take 1 tablet (10 mg total) by mouth 4 (four) times daily before meals and nightly.    metoprolol succinate (TOPROL-XL) 100 MG 24 hr tablet Take 1 tablet (100 mg total) by mouth 2 (two) times daily.    metroNIDAZOLE (FLAGYL) 500 MG tablet Take 1 tablet (500 mg total) by mouth 3 (three) times daily. Take initial dose at 2pm, second dose at 3pm and final dose at 10pm the day before surgery    neomycin (MYCIFRADIN) 500 mg Tab Take 2 tablets (1,000 mg total) by mouth 3 (three) times daily. Take initial dose at 2pm, take second dose at 3pm and take final dose at 10pm the day before surgery.    olmesartan (BENICAR) 40 MG tablet Take 1 tablet (40 mg total) by mouth once daily.    oxyCODONE (ROXICODONE) 5 MG immediate release tablet Take 1 tablet (5 mg total) by mouth every 6 (six) hours as needed for Pain.    polyethylene glycol (GLYCOLAX) 17 gram/dose powder Take 238 g by mouth once daily. Mix with 2L of gatorade and drink all of mixed solution starting at 12pm the day before surgery, finishing by 10pm the night before surgery.    pregabalin (LYRICA) 75 MG capsule Take 1 capsule (75 mg total) by mouth 2 (two) times daily.    tiZANidine (ZANAFLEX) 4 MG tablet TAKE 1/2-1 TABLET BY MOUTH 2 TIMES DAILY AS NEEDED FOR MUSCLE SPASMS MAY CAUSE DROWSINESS.    traMADoL (ULTRAM) 50 mg tablet Take 1 tablet (50 mg total) by mouth every 6 (six) hours as needed for Pain.     Family History        Problem Relation (Age of Onset)    No Known Problems Mother, Father          Tobacco Use    Smoking status: Former     Passive exposure: Never    Smokeless tobacco: Never   Substance and Sexual Activity    Alcohol use: Not Currently     Comment: OCC    Drug use: Never    Sexual activity: Yes     Partners: Female     Review of Systems   Constitutional:  Negative for chills and fever.   Respiratory:  Negative for shortness of breath.    Gastrointestinal:  Positive for abdominal distention, abdominal pain, constipation (no BM), nausea and vomiting.   Genitourinary:  Negative for dysuria and hematuria.   All other systems reviewed and are negative.    Objective:     Vital Signs (Most Recent):  Temp: 99.3 °F (37.4 °C) (03/16/24 0150)  Pulse: 76 (03/16/24 0319)  Resp: 20 (03/16/24 0330)  BP: 136/63 (03/16/24 0230)  SpO2: 97 % (03/16/24 0319) Vital Signs (24h Range):  Temp:  [99.3 °F (37.4 °C)] 99.3 °F (37.4 °C)  Pulse:  [76-86] 76  Resp:  [18-20] 20  SpO2:  [97 %] 97 %  BP: (120-136)/(58-63) 136/63     Weight: 99.8 kg (220 lb)  Body mass index is 36.61 kg/m².     Physical Exam  Vitals reviewed.   Constitutional:       General: He is not in acute distress.     Appearance: He is obese.   HENT:      Nose: Nose normal.   Eyes:      Conjunctiva/sclera: Conjunctivae normal.   Cardiovascular:      Rate and Rhythm: Normal rate.   Pulmonary:      Effort: Pulmonary effort is normal. No respiratory distress.   Abdominal:      General: There is distension.      Tenderness: There is abdominal tenderness. There is no rebound.   Musculoskeletal:         General: No swelling.   Skin:     General: Skin is warm and dry.   Neurological:      Mental Status: He is alert and oriented to person, place, and time.   Psychiatric:         Mood and Affect: Mood normal.         Behavior: Behavior normal.                Significant Labs: All pertinent labs within the past 24 hours have been reviewed.  Recent Lab Results         03/16/24  4015         Albumin 3.2       ALP 44       ALT 28       Anion Gap 12       AST 26       Baso # 0.02       Basophil % 0.3       BILIRUBIN TOTAL 0.8  Comment: For infants and newborns, interpretation of results should be based  on gestational age, weight and in agreement with clinical  observations.    Premature Infant recommended reference ranges:  Up to 24 hours.............<8.0 mg/dL  Up to 48 hours............<12.0 mg/dL  3-5 days..................<15.0 mg/dL  6-29 days.................<15.0 mg/dL         BUN 29       Calcium 9.3       Chloride 99       CO2 24       Creatinine 1.4       Differential Method Automated       eGFR 54       Eos # 0.0       Eos % 0.1       Glucose 136       Gran # (ANC) 5.4       Gran % 77.9       Hematocrit 34.7       Hemoglobin 11.9       Immature Grans (Abs) 0.02  Comment: Mild elevation in immature granulocytes is non specific and   can be seen in a variety of conditions including stress response,   acute inflammation, trauma and pregnancy. Correlation with other   laboratory and clinical findings is essential.         Immature Granulocytes 0.3       Lymph # 0.3       Lymph % 4.1       MCH 28.7       MCHC 34.3       MCV 84       Mono # 1.2       Mono % 17.3       MPV 10.1       nRBC 0       Platelet Count 214       Potassium 3.6       PROTEIN TOTAL 6.5       RBC 4.15       RDW 13.2       Sodium 135       WBC 6.99               Significant Imaging: I have reviewed all pertinent imaging results/findings within the past 24 hours.  CT Abdomen Pelvis  Without Contrast    (Results Pending)   XR Gastric tube check, non-radiologist performed    (Results Pending)

## 2024-03-16 NOTE — ED PROVIDER NOTES
"SCRIBE #1 NOTE: I, Shruthi Hollins, am scribing for, and in the presence of, Ирина Yañez MD. I have scribed the entire note.       History     Chief Complaint   Patient presents with    Post-op Problem     Colon sx this past Monday, decreased appetite, incision draining      Review of patient's allergies indicates:  No Known Allergies      History of Present Illness     HPI    3/16/2024, 2:07 AM  History obtained from the patient      History of Present Illness: Quintin Ling is a 73 y.o. male patient with a PMHx of HTN, HLD, and DM who presents to the Emergency Department for evaluation of vomiting and diarrhea which onset on Tuesday. Pt had a right colectomy on 3/11/24. Pt went to Dr. Boothe on Wednesday, who prescribed medication. Dr. Boothe told pt that if his vomiting and diarrhea continued, he should come to the ED. Pt states his vomit is green in color and that his diarrhea "looks like powder." Pt also states that his incision is draining a clear pinkish fluid. Symptoms are constant and moderate in severity. No mitigating or exacerbating factors reported. Associated sxs include nausea. Patient denies any, and all other sxs at this time. No prior Tx reported. No further complaints or concerns at this time.       Arrival mode: Personal vehicle    PCP: Bishop Gabriel MD        Past Medical History:  Past Medical History:   Diagnosis Date    Diabetes mellitus     Hyperlipidemia     Hypertension        Past Surgical History:  Past Surgical History:   Procedure Laterality Date    BIOPSY OF PERITONEUM N/A 1/29/2024    Procedure: BIOPSY, PERITONEUM;  Surgeon: Tosin Boothe MD;  Location: [facility] OR;  Service: Colon and Rectal;  Laterality: N/A;    COLONOSCOPY N/A 1/5/2024    Procedure: COLONOSCOPY;  Surgeon: Irasema Gil MD;  Location: [facility] ENDO;  Service: Endoscopy;  Laterality: N/A;    DIAGNOSTIC LAPAROSCOPY N/A 1/29/2024    Procedure: LAPAROSCOPY, DIAGNOSTIC;  Surgeon: Tosin Boothe, " "MD;  Location: [facility] OR;  Service: Colon and Rectal;  Laterality: N/A;    EPIDURAL STEROID INJECTION N/A 11/13/2023    Procedure: Lumbar L5/S1 IL JOSE;  Surgeon: Oneil Carlson MD;  Location: [facility] PAIN MGT;  Service: Pain Management;  Laterality: N/A;    ESOPHAGOGASTRODUODENOSCOPY N/A 1/5/2024    Procedure: EGD (ESOPHAGOGASTRODUODENOSCOPY);  Surgeon: Irasema Gil MD;  Location: [facility];  Service: Endoscopy;  Laterality: N/A;    INJECTION OF ANESTHETIC AGENT INTO TISSUE PLANE DEFINED BY TRANSVERSUS ABDOMINIS MUSCLE N/A 3/11/2024    Procedure: BLOCK, TRANSVERSUS ABDOMINIS PLANE;  Surgeon: Tosin Boothe MD;  Location: [facility] OR;  Service: Colon and Rectal;  Laterality: N/A;    NO PAST SURGERIES      XI ROBOTIC COLECTOMY, RIGHT Right 3/11/2024    Procedure: XI ROBOTIC COLECTOMY, RIGHT;  Surgeon: Tosin Boothe MD;  Location: [facility];  Service: Colon and Rectal;  Laterality: Right;  WITH OMENECTOMY AND AMNIOFIX         Family History:  Family History   Problem Relation Age of Onset    No Known Problems Mother     No Known Problems Father        Social History:  Social History     Tobacco Use    Smoking status: Former     Passive exposure: Never    Smokeless tobacco: Never   Substance and Sexual Activity    Alcohol use: Not Currently     Comment: OCC    Drug use: Never    Sexual activity: Yes     Partners: Female        Review of Systems     Review of Systems   Constitutional:  Negative for fever.   HENT:  Negative for sore throat.    Respiratory:  Negative for shortness of breath.    Cardiovascular:  Negative for chest pain.   Gastrointestinal:  Positive for diarrhea ("powdery"), nausea and vomiting (green in color).   Genitourinary:  Negative for dysuria.   Musculoskeletal:  Negative for back pain.   Skin:  Negative for rash.        (+) clear/pink drainage from incision site   Neurological:  Negative for weakness.   Hematological:  Does not bruise/bleed easily.        Physical Exam     Initial Vitals " "[03/16/24 0150]   BP Pulse Resp Temp SpO2   (!) 120/58 86 18 99.3 °F (37.4 °C) 97 %      MAP       --          Physical Exam  Nursing Notes and Vital Signs Reviewed.  Constitutional: Patient is in no acute distress. Well-developed and well-nourished.  Head: Atraumatic. Normocephalic.  Eyes: PERRL. EOM intact. Conjunctivae are not pale. No scleral icterus.  ENT: Mucous membranes are moist. Oropharynx is clear and symmetric.    Neck: Supple. Full ROM. No lymphadenopathy.  Cardiovascular: Regular rate. Regular rhythm. Heart murmur in left 4th intercostal space. Distal pulses are 2+ and symmetric.  Pulmonary/Chest: No respiratory distress. Clear to auscultation bilaterally. No wheezing or rales.  Abdominal: There is abdominal distention.  There is no tenderness.  No rebound, guarding, or rigidity. Decreased bowel sounds.  Genitourinary: No CVA tenderness  Musculoskeletal: Moves all extremities. No obvious deformities. No edema. No calf tenderness.  Skin: Warm and dry. Surgical incision leaking small amount of serous sanguinous fluid.  Neurological:  Alert, awake, and appropriate.  Normal speech.  No acute focal neurological deficits are appreciated.  Psychiatric: Normal affect. Good eye contact. Appropriate in content.     ED Course   Procedures  ED Vital Signs:  Vitals:    03/16/24 0150 03/16/24 0230 03/16/24 0319 03/16/24 0330   BP: (!) 120/58 136/63     Pulse: 86  76    Resp: 18  19 20   Temp: 99.3 °F (37.4 °C)      TempSrc: Oral      SpO2: 97%  97%    Weight: 99.8 kg (220 lb)      Height: 5' 5" (1.651 m)          Abnormal Lab Results:  Labs Reviewed   CBC W/ AUTO DIFFERENTIAL - Abnormal; Notable for the following components:       Result Value    RBC 4.15 (*)     Hemoglobin 11.9 (*)     Hematocrit 34.7 (*)     Lymph # 0.3 (*)     Mono # 1.2 (*)     Gran % 77.9 (*)     Lymph % 4.1 (*)     Mono % 17.3 (*)     All other components within normal limits   COMPREHENSIVE METABOLIC PANEL - Abnormal; Notable for the " following components:    Sodium 135 (*)     Glucose 136 (*)     BUN 29 (*)     Albumin 3.2 (*)     Alkaline Phosphatase 44 (*)     eGFR 54 (*)     All other components within normal limits        All Lab Results:  Results for orders placed or performed during the hospital encounter of 03/16/24   CBC auto differential   Result Value Ref Range    WBC 6.99 3.90 - 12.70 K/uL    RBC 4.15 (L) 4.60 - 6.20 M/uL    Hemoglobin 11.9 (L) 14.0 - 18.0 g/dL    Hematocrit 34.7 (L) 40.0 - 54.0 %    MCV 84 82 - 98 fL    MCH 28.7 27.0 - 31.0 pg    MCHC 34.3 32.0 - 36.0 g/dL    RDW 13.2 11.5 - 14.5 %    Platelets 214 150 - 450 K/uL    MPV 10.1 9.2 - 12.9 fL    Immature Granulocytes 0.3 0.0 - 0.5 %    Gran # (ANC) 5.4 1.8 - 7.7 K/uL    Immature Grans (Abs) 0.02 0.00 - 0.04 K/uL    Lymph # 0.3 (L) 1.0 - 4.8 K/uL    Mono # 1.2 (H) 0.3 - 1.0 K/uL    Eos # 0.0 0.0 - 0.5 K/uL    Baso # 0.02 0.00 - 0.20 K/uL    nRBC 0 0 /100 WBC    Gran % 77.9 (H) 38.0 - 73.0 %    Lymph % 4.1 (L) 18.0 - 48.0 %    Mono % 17.3 (H) 4.0 - 15.0 %    Eosinophil % 0.1 0.0 - 8.0 %    Basophil % 0.3 0.0 - 1.9 %    Differential Method Automated    Comprehensive metabolic panel   Result Value Ref Range    Sodium 135 (L) 136 - 145 mmol/L    Potassium 3.6 3.5 - 5.1 mmol/L    Chloride 99 95 - 110 mmol/L    CO2 24 23 - 29 mmol/L    Glucose 136 (H) 70 - 110 mg/dL    BUN 29 (H) 8 - 23 mg/dL    Creatinine 1.4 0.5 - 1.4 mg/dL    Calcium 9.3 8.7 - 10.5 mg/dL    Total Protein 6.5 6.0 - 8.4 g/dL    Albumin 3.2 (L) 3.5 - 5.2 g/dL    Total Bilirubin 0.8 0.1 - 1.0 mg/dL    Alkaline Phosphatase 44 (L) 55 - 135 U/L    AST 26 10 - 40 U/L    ALT 28 10 - 44 U/L    eGFR 54 (A) >60 mL/min/1.73 m^2    Anion Gap 12 8 - 16 mmol/L        Imaging Results:  Imaging Results              CT Abdomen Pelvis  Without Contrast (In process)                    CT Abdomen and Pelvis Without Intravenous Contrast  Radiologist: Mery Smith MD  STATRAD Impression:  Dilated fluid and gas-filled small bowel  loops without definite transition point. This could represent enteritis or ileus.  Nonspecific adrenal nodules bilaterally, probably adenomas.  Fat stranding in the anterior abdominal wall and mild skin thickening of the inferior aspect of the pannus may be related to edema, but cellulitis is not excluded.   Gas in the bladder may be secondary to recent manipulation. Please correlate with urinalysis to evaluate for cystitis.  Small intraperitoneal free air foci may be related to recent surgery.   Fluid in the colon is suggestive of diarrheal state.  Bowel containing ventral hernia.              The Emergency Provider reviewed the vital signs and test results, which are outlined above.     ED Discussion     4:22 AM: Discussed pt's case with Dr. Stark (Surgical Oncology) who recommends admitting pt to  and giving pt an NGT. Dr. Stark will consult.    4:34 AM: Discussed case with Dr. Jones ([facility]). Dr. Jones agrees with current care and management of pt and accepts admission.   Admitting Service: Hospital Medicine  Admitting Physician: Dr. Jones  Admit to: [facility]          Medical Decision Making  Amount and/or Complexity of Data Reviewed  Labs: ordered. Decision-making details documented in ED Course.  Radiology: ordered and independent interpretation performed. Decision-making details documented in ED Course.    Risk  Prescription drug management.                ED Medication(s):  Medications   sodium chloride 0.9% bolus 500 mL 500 mL (0 mLs Intravenous Stopped 3/16/24 0357)   ondansetron injection 8 mg (8 mg Intravenous Given 3/16/24 0230)       New Prescriptions    No medications on file               Scribe Attestation:   Scribe #1: I performed the above scribed service and the documentation accurately describes the services I performed. I attest to the accuracy of the note.     Attending:   Physician Attestation Statement for Scribe #1: IPari Si T., MD, personally performed the services  described in this documentation, as scribed by Shruthi Hollins, in my presence, and it is both accurate and complete.       Scribe Attestation:   Scribe #1: I performed the above scribed service and the documentation accurately describes the services I performed. I attest to the accuracy of the note.         Clinical Impression       ICD-10-CM ICD-9-CM   1. Ventral hernia without obstruction or gangrene  K43.9 553.20   2. Ileus  K56.7 560.1   3. Bowel dysfunction  K59.9 564.9       Disposition:   Disposition: Placed in Observation  Condition: Stable        Ирина Yañez MD  03/16/24 3678

## 2024-03-16 NOTE — CARE UPDATE
Care update:   Patient admitted with ileus presumed secondary to his recent surgery.  NG tube was placed electrolytes and CBC unremarkable.  Surgery was consulted.  Patient was felt to have eviscerated his recent incision.  He was taken back to the OR where he was managed with closure.  Continue NG-tube continue to follow the patient.  Discussed with surgery

## 2024-03-16 NOTE — ASSESSMENT & PLAN NOTE
Chronic, controlled. Latest blood pressure and vitals reviewed-     Temp:  [99.3 °F (37.4 °C)]   Pulse:  [76-86]   Resp:  [18-20]   BP: (120-136)/(58-63)   SpO2:  [97 %] .   Home meds for hypertension were reviewed and noted below.   Hypertension Medications               amLODIPine (NORVASC) 5 MG tablet TAKE 1 TABLET BY MOUTH EVERY DAY    doxazosin (CARDURA) 4 MG tablet Take 4 mg by mouth once daily.    hydroCHLOROthiazide (HYDRODIURIL) 25 MG tablet TAKE 1 TABLET BY MOUTH EVERY DAY IN THE MORNING    metoprolol succinate (TOPROL-XL) 100 MG 24 hr tablet Take 1 tablet (100 mg total) by mouth 2 (two) times daily.    olmesartan (BENICAR) 40 MG tablet Take 1 tablet (40 mg total) by mouth once daily.            While in the hospital, will manage blood pressure as follows; Adjust home antihypertensive regimen as follows- hold all antihypertensives due to postoperative ileus with intractable nausea and vomiting and current NPO state. Held Norvasc, Cardura, hydrochlorothiazide, Toprol-XL, and Benicar.    Will utilize p.r.n. blood pressure medication only if patient's blood pressure greater than 160/100 and he develops symptoms such as worsening chest pain or shortness of breath.

## 2024-03-16 NOTE — PROGRESS NOTES
Pharmacist Renal Dose Adjustment Note    Quintin Ling is a 73 y.o. male being treated with the medication enoxaparin    Patient Data:    Vital Signs (Most Recent):  Temp: 99.3 °F (37.4 °C) (03/16/24 0150)  Pulse: 76 (03/16/24 0319)  Resp: 20 (03/16/24 0330)  BP: 136/63 (03/16/24 0230)  SpO2: 97 % (03/16/24 0319) Vital Signs (72h Range):  Temp:  [99.3 °F (37.4 °C)]   Pulse:  [76-86]   Resp:  [18-20]   BP: (120-136)/(58-63)   SpO2:  [97 %]      Recent Labs   Lab 03/12/24  0536 03/14/24  1531 03/16/24 0227   CREATININE 1.3 1.6* 1.4     Serum creatinine: 1.4 mg/dL 03/16/24 0227  Estimated creatinine clearance: 52.6 mL/min    Enoxaparin 30 mg q24h will be changed to enoxaparin 40 mg q24h for CrCl >30 mL/min.     Pharmacist's Name: Alondra Nevarez  Pharmacist's Extension: [phone]

## 2024-03-16 NOTE — SUBJECTIVE & OBJECTIVE
No current facility-administered medications on file prior to encounter.     Current Outpatient Medications on File Prior to Encounter   Medication Sig    amLODIPine (NORVASC) 5 MG tablet TAKE 1 TABLET BY MOUTH EVERY DAY    atorvastatin (LIPITOR) 20 MG tablet TAKE 1 TABLET BY MOUTH EVERY DAY    doxazosin (CARDURA) 4 MG tablet Take 4 mg by mouth once daily.    fenofibrate (TRICOR) 54 MG tablet Take 1 tablet (54 mg total) by mouth once daily.    hydroCHLOROthiazide (HYDRODIURIL) 25 MG tablet TAKE 1 TABLET BY MOUTH EVERY DAY IN THE MORNING    metFORMIN (GLUCOPHAGE) 500 MG tablet TAKE 1 TABLET BY MOUTH TWICE A DAY WITH FOOD    methocarbamoL (ROBAXIN) 750 MG Tab Take 1 tablet (750 mg total) by mouth every 8 (eight) hours. for 5 days    metoclopramide HCl (REGLAN) 10 MG tablet Take 1 tablet (10 mg total) by mouth 4 (four) times daily before meals and nightly.    metoprolol succinate (TOPROL-XL) 100 MG 24 hr tablet Take 1 tablet (100 mg total) by mouth 2 (two) times daily.    olmesartan (BENICAR) 40 MG tablet Take 1 tablet (40 mg total) by mouth once daily.    oxyCODONE (ROXICODONE) 5 MG immediate release tablet Take 1 tablet (5 mg total) by mouth every 6 (six) hours as needed for Pain.    pregabalin (LYRICA) 75 MG capsule Take 1 capsule (75 mg total) by mouth 2 (two) times daily.    tiZANidine (ZANAFLEX) 4 MG tablet TAKE 1/2-1 TABLET BY MOUTH 2 TIMES DAILY AS NEEDED FOR MUSCLE SPASMS MAY CAUSE DROWSINESS.    traMADoL (ULTRAM) 50 mg tablet Take 1 tablet (50 mg total) by mouth every 6 (six) hours as needed for Pain.    acetaminophen (TYLENOL) 500 MG tablet Take 2 tablets (1,000 mg total) by mouth every 6 (six) hours. for 5 days    metroNIDAZOLE (FLAGYL) 500 MG tablet Take 1 tablet (500 mg total) by mouth 3 (three) times daily. Take initial dose at 2pm, second dose at 3pm and final dose at 10pm the day before surgery    neomycin (MYCIFRADIN) 500 mg Tab Take 2 tablets (1,000 mg total) by mouth 3 (three) times daily. Take  initial dose at 2pm, take second dose at 3pm and take final dose at 10pm the day before surgery.    polyethylene glycol (GLYCOLAX) 17 gram/dose powder Take 238 g by mouth once daily. Mix with 2L of gatorade and drink all of mixed solution starting at 12pm the day before surgery, finishing by 10pm the night before surgery.       Review of patient's allergies indicates:  No Known Allergies    Past Medical History:   Diagnosis Date    Diabetes mellitus     Hyperlipidemia     Hypertension      Past Surgical History:   Procedure Laterality Date    BIOPSY OF PERITONEUM N/A 1/29/2024    Procedure: BIOPSY, PERITONEUM;  Surgeon: Tosin Boothe MD;  Location: [facility] OR;  Service: Colon and Rectal;  Laterality: N/A;    COLONOSCOPY N/A 1/5/2024    Procedure: COLONOSCOPY;  Surgeon: Irasema Gil MD;  Location: [facility] ENDO;  Service: Endoscopy;  Laterality: N/A;    DIAGNOSTIC LAPAROSCOPY N/A 1/29/2024    Procedure: LAPAROSCOPY, DIAGNOSTIC;  Surgeon: Tosin Boothe MD;  Location: [facility] OR;  Service: Colon and Rectal;  Laterality: N/A;    EPIDURAL STEROID INJECTION N/A 11/13/2023    Procedure: Lumbar L5/S1 IL JOSE;  Surgeon: Oneil Carlson MD;  Location: [facility] PAIN MGT;  Service: Pain Management;  Laterality: N/A;    ESOPHAGOGASTRODUODENOSCOPY N/A 1/5/2024    Procedure: EGD (ESOPHAGOGASTRODUODENOSCOPY);  Surgeon: Irasema Gil MD;  Location: [facility] ENDO;  Service: Endoscopy;  Laterality: N/A;    INJECTION OF ANESTHETIC AGENT INTO TISSUE PLANE DEFINED BY TRANSVERSUS ABDOMINIS MUSCLE N/A 3/11/2024    Procedure: BLOCK, TRANSVERSUS ABDOMINIS PLANE;  Surgeon: Tosin Boothe MD;  Location: [facility] OR;  Service: Colon and Rectal;  Laterality: N/A;    NO PAST SURGERIES      XI ROBOTIC COLECTOMY, RIGHT Right 3/11/2024    Procedure: XI ROBOTIC COLECTOMY, RIGHT;  Surgeon: Tosin Boothe MD;  Location: [facility] OR;  Service: Colon and Rectal;  Laterality: Right;  WITH OMENECTOMY AND AMNIOFIX     Family History       Problem  Relation (Age of Onset)    No Known Problems Mother, Father          Tobacco Use    Smoking status: Former     Passive exposure: Never    Smokeless tobacco: Never   Substance and Sexual Activity    Alcohol use: Not Currently     Comment: OCC    Drug use: Never    Sexual activity: Yes     Partners: Female     Review of Systems   Constitutional:  Positive for activity change and appetite change. Negative for chills and fever.   Respiratory:  Negative for cough, chest tightness and shortness of breath.    Cardiovascular:  Negative for chest pain.   Gastrointestinal:  Positive for abdominal distention, constipation, nausea and vomiting. Negative for abdominal pain and diarrhea.   Skin:  Negative for pallor, rash and wound.   Psychiatric/Behavioral:  Negative for agitation and confusion. The patient is not nervous/anxious.      Objective:     Vital Signs (Most Recent):  Temp: 99.2 °F (37.3 °C) (03/16/24 1235)  Pulse: 83 (03/16/24 1235)  Resp: 18 (03/16/24 1235)  BP: (!) 152/70 (03/16/24 1235)  SpO2: 96 % (03/16/24 1235) Vital Signs (24h Range):  Temp:  [98.3 °F (36.8 °C)-99.3 °F (37.4 °C)] 99.2 °F (37.3 °C)  Pulse:  [] 83  Resp:  [18-23] 18  SpO2:  [93 %-97 %] 96 %  BP: (105-152)/(49-70) 152/70     Weight: 99.7 kg (219 lb 12.8 oz)  Body mass index is 36.58 kg/m².     Physical Exam  Constitutional:       General: He is not in acute distress.     Appearance: Normal appearance. He is obese. He is not ill-appearing.   HENT:      Head: Normocephalic and atraumatic.      Nose:      Comments: NG tube in place  Eyes:      Extraocular Movements: Extraocular movements intact.      Conjunctiva/sclera: Conjunctivae normal.   Cardiovascular:      Rate and Rhythm: Normal rate and regular rhythm.   Pulmonary:      Effort: Pulmonary effort is normal.   Abdominal:      General: Abdomen is flat. There is distension.      Palpations: Abdomen is soft. There is no mass.      Tenderness: There is no abdominal tenderness. There is no  guarding or rebound.      Hernia: No hernia is present.      Comments: Midline incision with copious amounts of serous drainage, no erythema or induration   Musculoskeletal:         General: No swelling, tenderness, deformity or signs of injury. Normal range of motion.   Skin:     General: Skin is warm and dry.      Coloration: Skin is not jaundiced.   Neurological:      General: No focal deficit present.      Mental Status: He is alert and oriented to person, place, and time.   Psychiatric:         Mood and Affect: Mood normal.         Behavior: Behavior normal.         Thought Content: Thought content normal.            I have reviewed all pertinent lab results within the past 24 hours.  CBC:   Recent Labs   Lab 03/16/24 0227   WBC 6.99   RBC 4.15*   HGB 11.9*   HCT 34.7*      MCV 84   MCH 28.7   MCHC 34.3     BMP:   Recent Labs   Lab 03/14/24  1531 03/16/24 0227   * 136*    135*   K 4.4 3.6    99   CO2 25 24   BUN 21 29*   CREATININE 1.6* 1.4   CALCIUM 9.1 9.3   MG 1.9  --      CMP:   Recent Labs   Lab 03/16/24 0227   *   CALCIUM 9.3   ALBUMIN 3.2*   PROT 6.5   *   K 3.6   CO2 24   CL 99   BUN 29*   CREATININE 1.4   ALKPHOS 44*   ALT 28   AST 26   BILITOT 0.8     LFTs:   Recent Labs   Lab 03/16/24 0227   ALT 28   AST 26   ALKPHOS 44*   BILITOT 0.8   PROT 6.5   ALBUMIN 3.2*       Significant Diagnostics:  I have reviewed all pertinent imaging results/findings within the past 24 hours.  CT: I have reviewed all pertinent results/findings within the past 24 hours and my personal findings are:  Small-bowel dilation , fascial disruption superior to the umbilicus

## 2024-03-16 NOTE — ASSESSMENT & PLAN NOTE
Mild hyponatremia related to intractable nausea with vomiting and dehydration.  Current sodium level 135, creatinine 1.4.  Give IV fluids.  Check a.m. labs.

## 2024-03-16 NOTE — ASSESSMENT & PLAN NOTE
POD #5 s/p robotic right hemicolectomy with post operative ileus.     No evidence of obstruction from this fascial dehiscence likely it is secondary to vomiting from his ileus.    -- NG tube  -- NPO  -- encourage ambulation  -- maintain normal electrolyte

## 2024-03-16 NOTE — CONSULTS
[facility] Surg  General Surgery  Consult Note    Patient Name: Quintin Ling  MRN: [id number]  Code Status: Full Code  Admission Date: 3/16/2024  Hospital Length of Stay: 0 days  Attending Physician: Reny Kong MD  Primary Care Provider: Bishop Gabriel MD    Patient information was obtained from patient, spouse/SO, past medical records, ER records, and primary team.     Inpatient consult to General Surgery  Consult performed by: Magda Stark MD  Consult ordered by: Ирина Yañez MD  Reason for consult: post operative ileus      Subjective:     Principal Problem: Ileus    History of Present Illness: Quintin Ling is a 73 y.o. male s/p robotic right hemicolectomy and omentectomy on 3/11/2023.  He was discharged home on postoperative day 1.  However returned to the [facility] on postoperative day 3 complaining of acid reflux, vomiting, and significant drainage from his midline wound.  At that time he was still having flatus and bowel movements but was also belching.  Outpatient KUB was concerning for a possible small-bowel obstruction.  He was believed to have a mild postoperative ileus and was given home care and dietary instructions and told to present to the ER if his symptoms persisted or worsened.  He presented to the emergency department last night with significant episodes of nausea and vomiting.  He had a mild BRANDY with a creatinine of 1.6 no however no leukocytosis or other significant laboratory abnormalities.  CT scan was done which showed dilated loops of small bowel consistent with ileus however also showed what they called a supraumbilical hernia with small bowel.  An NG-tube was placed with 1800 cc output and this morning he states he is feeling better.  He is continued to have copious amounts of drainage from his midline wound when she states has been going on since the day he was discharged home after surgery.    No current facility-administered medications on file prior  to encounter.     Current Outpatient Medications on File Prior to Encounter   Medication Sig    amLODIPine (NORVASC) 5 MG tablet TAKE 1 TABLET BY MOUTH EVERY DAY    atorvastatin (LIPITOR) 20 MG tablet TAKE 1 TABLET BY MOUTH EVERY DAY    doxazosin (CARDURA) 4 MG tablet Take 4 mg by mouth once daily.    fenofibrate (TRICOR) 54 MG tablet Take 1 tablet (54 mg total) by mouth once daily.    hydroCHLOROthiazide (HYDRODIURIL) 25 MG tablet TAKE 1 TABLET BY MOUTH EVERY DAY IN THE MORNING    metFORMIN (GLUCOPHAGE) 500 MG tablet TAKE 1 TABLET BY MOUTH TWICE A DAY WITH FOOD    methocarbamoL (ROBAXIN) 750 MG Tab Take 1 tablet (750 mg total) by mouth every 8 (eight) hours. for 5 days    metoclopramide HCl (REGLAN) 10 MG tablet Take 1 tablet (10 mg total) by mouth 4 (four) times daily before meals and nightly.    metoprolol succinate (TOPROL-XL) 100 MG 24 hr tablet Take 1 tablet (100 mg total) by mouth 2 (two) times daily.    olmesartan (BENICAR) 40 MG tablet Take 1 tablet (40 mg total) by mouth once daily.    oxyCODONE (ROXICODONE) 5 MG immediate release tablet Take 1 tablet (5 mg total) by mouth every 6 (six) hours as needed for Pain.    pregabalin (LYRICA) 75 MG capsule Take 1 capsule (75 mg total) by mouth 2 (two) times daily.    tiZANidine (ZANAFLEX) 4 MG tablet TAKE 1/2-1 TABLET BY MOUTH 2 TIMES DAILY AS NEEDED FOR MUSCLE SPASMS MAY CAUSE DROWSINESS.    traMADoL (ULTRAM) 50 mg tablet Take 1 tablet (50 mg total) by mouth every 6 (six) hours as needed for Pain.    acetaminophen (TYLENOL) 500 MG tablet Take 2 tablets (1,000 mg total) by mouth every 6 (six) hours. for 5 days    metroNIDAZOLE (FLAGYL) 500 MG tablet Take 1 tablet (500 mg total) by mouth 3 (three) times daily. Take initial dose at 2pm, second dose at 3pm and final dose at 10pm the day before surgery    neomycin (MYCIFRADIN) 500 mg Tab Take 2 tablets (1,000 mg total) by mouth 3 (three) times daily. Take initial dose at 2pm, take second dose at 3pm  and take final dose at 10pm the day before surgery.    polyethylene glycol (GLYCOLAX) 17 gram/dose powder Take 238 g by mouth once daily. Mix with 2L of gatorade and drink all of mixed solution starting at 12pm the day before surgery, finishing by 10pm the night before surgery.       Review of patient's allergies indicates:  No Known Allergies    Past Medical History:   Diagnosis Date    Diabetes mellitus     Hyperlipidemia     Hypertension      Past Surgical History:   Procedure Laterality Date    BIOPSY OF PERITONEUM N/A 1/29/2024    Procedure: BIOPSY, PERITONEUM;  Surgeon: Tosin Boothe MD;  Location: [facility] OR;  Service: Colon and Rectal;  Laterality: N/A;    COLONOSCOPY N/A 1/5/2024    Procedure: COLONOSCOPY;  Surgeon: Irasema Gil MD;  Location: [facility] ENDO;  Service: Endoscopy;  Laterality: N/A;    DIAGNOSTIC LAPAROSCOPY N/A 1/29/2024    Procedure: LAPAROSCOPY, DIAGNOSTIC;  Surgeon: Tosin Boothe MD;  Location: [facility] OR;  Service: Colon and Rectal;  Laterality: N/A;    EPIDURAL STEROID INJECTION N/A 11/13/2023    Procedure: Lumbar L5/S1 IL JOSE;  Surgeon: Oneil Carlson MD;  Location: [facility] PAIN MGT;  Service: Pain Management;  Laterality: N/A;    ESOPHAGOGASTRODUODENOSCOPY N/A 1/5/2024    Procedure: EGD (ESOPHAGOGASTRODUODENOSCOPY);  Surgeon: Irasema Gil MD;  Location: [facility] ENDO;  Service: Endoscopy;  Laterality: N/A;    INJECTION OF ANESTHETIC AGENT INTO TISSUE PLANE DEFINED BY TRANSVERSUS ABDOMINIS MUSCLE N/A 3/11/2024    Procedure: BLOCK, TRANSVERSUS ABDOMINIS PLANE;  Surgeon: Tosin Boothe MD;  Location: [facility] OR;  Service: Colon and Rectal;  Laterality: N/A;    NO PAST SURGERIES      XI ROBOTIC COLECTOMY, RIGHT Right 3/11/2024    Procedure: XI ROBOTIC COLECTOMY, RIGHT;  Surgeon: Tosin Boothe MD;  Location: [facility] OR;  Service: Colon and Rectal;  Laterality: Right;  WITH OMENECTOMY AND AMNIOFIX     Family History       Problem Relation (Age of Onset)    No  Known Problems Mother, Father          Tobacco Use    Smoking status: Former     Passive exposure: Never    Smokeless tobacco: Never   Substance and Sexual Activity    Alcohol use: Not Currently     Comment: OCC    Drug use: Never    Sexual activity: Yes     Partners: Female     Review of Systems   Constitutional:  Positive for activity change and appetite change. Negative for chills and fever.   Respiratory:  Negative for cough, chest tightness and shortness of breath.    Cardiovascular:  Negative for chest pain.   Gastrointestinal:  Positive for abdominal distention, constipation, nausea and vomiting. Negative for abdominal pain and diarrhea.   Skin:  Negative for pallor, rash and wound.   Psychiatric/Behavioral:  Negative for agitation and confusion. The patient is not nervous/anxious.      Objective:     Vital Signs (Most Recent):  Temp: 99.2 °F (37.3 °C) (03/16/24 1235)  Pulse: 83 (03/16/24 1235)  Resp: 18 (03/16/24 1235)  BP: (!) 152/70 (03/16/24 1235)  SpO2: 96 % (03/16/24 1235) Vital Signs (24h Range):  Temp:  [98.3 °F (36.8 °C)-99.3 °F (37.4 °C)] 99.2 °F (37.3 °C)  Pulse:  [] 83  Resp:  [18-23] 18  SpO2:  [93 %-97 %] 96 %  BP: (105-152)/(49-70) 152/70     Weight: 99.7 kg (219 lb 12.8 oz)  Body mass index is 36.58 kg/m².     Physical Exam  Constitutional:       General: He is not in acute distress.     Appearance: Normal appearance. He is obese. He is not ill-appearing.   HENT:      Head: Normocephalic and atraumatic.      Nose:      Comments: NG tube in place  Eyes:      Extraocular Movements: Extraocular movements intact.      Conjunctiva/sclera: Conjunctivae normal.   Cardiovascular:      Rate and Rhythm: Normal rate and regular rhythm.   Pulmonary:      Effort: Pulmonary effort is normal.   Abdominal:      General: Abdomen is flat. There is distension.      Palpations: Abdomen is soft. There is no mass.      Tenderness: There is no abdominal tenderness. There is no guarding or rebound.       Hernia: No hernia is present.      Comments: Midline incision with copious amounts of serous drainage, no erythema or induration   Musculoskeletal:         General: No swelling, tenderness, deformity or signs of injury. Normal range of motion.   Skin:     General: Skin is warm and dry.      Coloration: Skin is not jaundiced.   Neurological:      General: No focal deficit present.      Mental Status: He is alert and oriented to person, place, and time.   Psychiatric:         Mood and Affect: Mood normal.         Behavior: Behavior normal.         Thought Content: Thought content normal.            I have reviewed all pertinent lab results within the past 24 hours.  CBC:   Recent Labs   Lab 03/16/24 0227   WBC 6.99   RBC 4.15*   HGB 11.9*   HCT 34.7*      MCV 84   MCH 28.7   MCHC 34.3     BMP:   Recent Labs   Lab 03/14/24  1531 03/16/24 0227   * 136*    135*   K 4.4 3.6    99   CO2 25 24   BUN 21 29*   CREATININE 1.6* 1.4   CALCIUM 9.1 9.3   MG 1.9  --      CMP:   Recent Labs   Lab 03/16/24 0227   *   CALCIUM 9.3   ALBUMIN 3.2*   PROT 6.5   *   K 3.6   CO2 24   CL 99   BUN 29*   CREATININE 1.4   ALKPHOS 44*   ALT 28   AST 26   BILITOT 0.8     LFTs:   Recent Labs   Lab 03/16/24 0227   ALT 28   AST 26   ALKPHOS 44*   BILITOT 0.8   PROT 6.5   ALBUMIN 3.2*       Significant Diagnostics:  I have reviewed all pertinent imaging results/findings within the past 24 hours.  CT: I have reviewed all pertinent results/findings within the past 24 hours and my personal findings are:  Small-bowel dilation , fascial disruption superior to the umbilicus    Assessment/Plan:     * Ileus  POD #5 s/p robotic right hemicolectomy with post operative ileus.     No evidence of obstruction from this fascial dehiscence likely it is secondary to vomiting from his ileus.    -- NG tube  -- NPO  -- encourage ambulation  -- maintain normal electrolyte    Dehiscence of fascia  Early postoperative wound  dehiscence without evidence of evisceration as the skin is intact.  Likely secondary to vomiting from his ileus and has likely been present for several days based on the copious drainage he has had since discharge.      -- to OR today for exploration and closure  -- Ancef  -- abdominal binder postoperatively  -- continue NG tube and conservative management of postoperative ileus    Hyponatremia  -- Management as per primary team     Intractable nausea and vomiting  2/2 ileus     Normocytic anemia  -- Management as per primary team     Stage 3a chronic kidney disease  -- Management as per primary team     Severe obesity (BMI 35.0-39.9) with comorbidity  -- Management as per primary team     Prediabetes  -- Management as per primary team     Essential hypertension  -- Management as per primary team       VTE Risk Mitigation (From admission, onward)           Ordered     enoxaparin injection 40 mg  Daily         03/16/24 0507     IP VTE HIGH RISK PATIENT  Once         03/16/24 0507     Place sequential compression device  Until discontinued         03/16/24 0507                    Thank you for your consult. I will follow-up with patient. Please contact us if you have any additional questions.    Magda Stark MD  General Surgery  [facility]

## 2024-03-16 NOTE — ASSESSMENT & PLAN NOTE
Postop day 5 status post robotic right colectomy.  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

## 2024-03-17 LAB
ALBUMIN SERPL BCP-MCNC: 2.7 G/DL (ref 3.5–5.2)
ALP SERPL-CCNC: 40 U/L (ref 55–135)
ALT SERPL W/O P-5'-P-CCNC: 17 U/L (ref 10–44)
ANION GAP SERPL CALC-SCNC: 14 MMOL/L (ref 8–16)
AST SERPL-CCNC: 15 U/L (ref 10–40)
BACTERIA #/AREA URNS HPF: ABNORMAL /HPF
BASOPHILS # BLD AUTO: 0.02 K/UL (ref 0–0.2)
BASOPHILS NFR BLD: 0.4 % (ref 0–1.9)
BILIRUB SERPL-MCNC: 0.7 MG/DL (ref 0.1–1)
BILIRUB UR QL STRIP: NEGATIVE
BUN SERPL-MCNC: 44 MG/DL (ref 8–23)
CALCIUM SERPL-MCNC: 8.1 MG/DL (ref 8.7–10.5)
CHLORIDE SERPL-SCNC: 100 MMOL/L (ref 95–110)
CLARITY UR: CLEAR
CO2 SERPL-SCNC: 27 MMOL/L (ref 23–29)
COLOR UR: YELLOW
CREAT SERPL-MCNC: 2.2 MG/DL (ref 0.5–1.4)
DIFFERENTIAL METHOD BLD: ABNORMAL
EOSINOPHIL # BLD AUTO: 0.1 K/UL (ref 0–0.5)
EOSINOPHIL NFR BLD: 1.4 % (ref 0–8)
ERYTHROCYTE [DISTWIDTH] IN BLOOD BY AUTOMATED COUNT: 13.4 % (ref 11.5–14.5)
EST. GFR  (NO RACE VARIABLE): 31 ML/MIN/1.73 M^2
ESTIMATED AVG GLUCOSE: 108 MG/DL (ref 68–131)
GLUCOSE SERPL-MCNC: 105 MG/DL (ref 70–110)
GLUCOSE UR QL STRIP: NEGATIVE
HBA1C MFR BLD: 5.4 % (ref 4–5.6)
HCT VFR BLD AUTO: 31.3 % (ref 40–54)
HGB BLD-MCNC: 10.2 G/DL (ref 14–18)
HGB UR QL STRIP: NEGATIVE
HYALINE CASTS #/AREA URNS LPF: 3 /LPF
IMM GRANULOCYTES # BLD AUTO: 0.01 K/UL (ref 0–0.04)
IMM GRANULOCYTES NFR BLD AUTO: 0.2 % (ref 0–0.5)
KETONES UR QL STRIP: ABNORMAL
LEUKOCYTE ESTERASE UR QL STRIP: NEGATIVE
LYMPHOCYTES # BLD AUTO: 0.4 K/UL (ref 1–4.8)
LYMPHOCYTES NFR BLD: 6.3 % (ref 18–48)
MAGNESIUM SERPL-MCNC: 2 MG/DL (ref 1.6–2.6)
MCH RBC QN AUTO: 28.4 PG (ref 27–31)
MCHC RBC AUTO-ENTMCNC: 32.6 G/DL (ref 32–36)
MCV RBC AUTO: 87 FL (ref 82–98)
MICROSCOPIC COMMENT: ABNORMAL
MONOCYTES # BLD AUTO: 0.9 K/UL (ref 0.3–1)
MONOCYTES NFR BLD: 15.4 % (ref 4–15)
NEUTROPHILS # BLD AUTO: 4.3 K/UL (ref 1.8–7.7)
NEUTROPHILS NFR BLD: 76.3 % (ref 38–73)
NITRITE UR QL STRIP: NEGATIVE
NRBC BLD-RTO: 0 /100 WBC
PH UR STRIP: 5 [PH] (ref 5–8)
PHOSPHATE SERPL-MCNC: 3.8 MG/DL (ref 2.7–4.5)
PLATELET # BLD AUTO: 181 K/UL (ref 150–450)
PMV BLD AUTO: 10.1 FL (ref 9.2–12.9)
POCT GLUCOSE: 88 MG/DL (ref 70–110)
POCT GLUCOSE: 97 MG/DL (ref 70–110)
POTASSIUM SERPL-SCNC: 3.3 MMOL/L (ref 3.5–5.1)
PROT SERPL-MCNC: 5.7 G/DL (ref 6–8.4)
PROT UR QL STRIP: ABNORMAL
RBC # BLD AUTO: 3.59 M/UL (ref 4.6–6.2)
RBC #/AREA URNS HPF: 0 /HPF (ref 0–4)
SODIUM SERPL-SCNC: 141 MMOL/L (ref 136–145)
SP GR UR STRIP: 1.03 (ref 1–1.03)
URN SPEC COLLECT METH UR: ABNORMAL
UROBILINOGEN UR STRIP-ACNC: NEGATIVE EU/DL
WBC # BLD AUTO: 5.58 K/UL (ref 3.9–12.7)
WBC #/AREA URNS HPF: 1 /HPF (ref 0–5)

## 2024-03-17 PROCEDURE — 81000 URINALYSIS NONAUTO W/SCOPE: CPT | Performed by: SURGERY

## 2024-03-17 PROCEDURE — 11000001 HC ACUTE MED/SURG PRIVATE ROOM

## 2024-03-17 PROCEDURE — 63600175 PHARM REV CODE 636 W HCPCS: Performed by: SURGERY

## 2024-03-17 PROCEDURE — 99900035 HC TECH TIME PER 15 MIN (STAT)

## 2024-03-17 PROCEDURE — 80053 COMPREHEN METABOLIC PANEL: CPT | Performed by: SURGERY

## 2024-03-17 PROCEDURE — 25000003 PHARM REV CODE 250: Performed by: SURGERY

## 2024-03-17 PROCEDURE — 63600175 PHARM REV CODE 636 W HCPCS: Performed by: INTERNAL MEDICINE

## 2024-03-17 PROCEDURE — 85025 COMPLETE CBC W/AUTO DIFF WBC: CPT | Performed by: SURGERY

## 2024-03-17 PROCEDURE — 96376 TX/PRO/DX INJ SAME DRUG ADON: CPT

## 2024-03-17 PROCEDURE — 83036 HEMOGLOBIN GLYCOSYLATED A1C: CPT | Performed by: INTERNAL MEDICINE

## 2024-03-17 PROCEDURE — 94761 N-INVAS EAR/PLS OXIMETRY MLT: CPT

## 2024-03-17 PROCEDURE — 96361 HYDRATE IV INFUSION ADD-ON: CPT

## 2024-03-17 PROCEDURE — 94799 UNLISTED PULMONARY SVC/PX: CPT | Mod: XB

## 2024-03-17 PROCEDURE — 84100 ASSAY OF PHOSPHORUS: CPT | Performed by: SURGERY

## 2024-03-17 PROCEDURE — 36415 COLL VENOUS BLD VENIPUNCTURE: CPT | Performed by: INTERNAL MEDICINE

## 2024-03-17 PROCEDURE — 83735 ASSAY OF MAGNESIUM: CPT | Performed by: SURGERY

## 2024-03-17 PROCEDURE — C9113 INJ PANTOPRAZOLE SODIUM, VIA: HCPCS | Performed by: SURGERY

## 2024-03-17 RX ORDER — SODIUM CHLORIDE AND POTASSIUM CHLORIDE 150; 450 MG/100ML; MG/100ML
INJECTION, SOLUTION INTRAVENOUS CONTINUOUS
Status: DISCONTINUED | OUTPATIENT
Start: 2024-03-17 | End: 2024-03-18

## 2024-03-17 RX ADMIN — CHLORHEXIDINE GLUCONATE 0.12% ORAL RINSE 10 ML: 1.2 LIQUID ORAL at 09:03

## 2024-03-17 RX ADMIN — PANTOPRAZOLE SODIUM 40 MG: 40 INJECTION, POWDER, FOR SOLUTION INTRAVENOUS at 09:03

## 2024-03-17 RX ADMIN — POTASSIUM CHLORIDE AND SODIUM CHLORIDE: 450; 150 INJECTION, SOLUTION INTRAVENOUS at 10:03

## 2024-03-17 RX ADMIN — POTASSIUM CHLORIDE AND SODIUM CHLORIDE: 450; 150 INJECTION, SOLUTION INTRAVENOUS at 06:03

## 2024-03-17 RX ADMIN — SODIUM CHLORIDE, POTASSIUM CHLORIDE, SODIUM LACTATE AND CALCIUM CHLORIDE: 600; 310; 30; 20 INJECTION, SOLUTION INTRAVENOUS at 04:03

## 2024-03-17 RX ADMIN — ENOXAPARIN SODIUM 40 MG: 40 INJECTION SUBCUTANEOUS at 05:03

## 2024-03-17 RX ADMIN — ACETAMINOPHEN 1000 MG: 10 INJECTION INTRAVENOUS at 07:03

## 2024-03-17 RX ADMIN — CHLORHEXIDINE GLUCONATE 0.12% ORAL RINSE 10 ML: 1.2 LIQUID ORAL at 08:03

## 2024-03-17 NOTE — PROGRESS NOTES
[facility]  General Surgery  Progress Note    Subjective:     History of Present Illness:  Quintin Ling is a 73 y.o. male s/p robotic right hemicolectomy and omentectomy on 3/11/2023.  He was discharged home on postoperative day 1.  However returned to the [facility] on postoperative day 3 complaining of acid reflux, vomiting, and significant drainage from his midline wound.  At that time he was still having flatus and bowel movements but was also belching.  Outpatient KUB was concerning for a possible small-bowel obstruction.  He was believed to have a mild postoperative ileus and was given home care and dietary instructions and told to present to the ER if his symptoms persisted or worsened.  He presented to the emergency department last night with significant episodes of nausea and vomiting.  He had a mild BRANDY with a creatinine of 1.6 no however no leukocytosis or other significant laboratory abnormalities.  CT scan was done which showed dilated loops of small bowel consistent with ileus however also showed what they called a supraumbilical hernia with small bowel.  An NG-tube was placed with 1800 cc output and this morning he states he is feeling better.  He is continued to have copious amounts of drainage from his midline wound when she states has been going on since the day he was discharged home after surgery.    Post-Op Info:  Procedure(s) (LRB):  EXPLORATION, WOUND (N/A)  CLOSURE, WOUND (N/A)   1 Day Post-Op     Interval History: AFVSS. PETE.  No flatus or Bms yet.  Pain moderately controlled.  NGT with 600cc output      Medications:  Continuous Infusions:   0.45 % NaCl with KCl 20 mEq 150 mL/hr at 03/17/24 1022     Scheduled Meds:   acetaminophen  1,000 mg Intravenous Q8H    chlorhexidine  10 mL Mouth/Throat BID    enoxparin  40 mg Subcutaneous Daily    pantoprazole  40 mg Intravenous Daily     PRN Meds:glucagon (human recombinant), glucose, glucose, hydrALAZINE, morphine, naloxone,  ondansetron, sodium chloride 0.9%     Review of patient's allergies indicates:  No Known Allergies  Objective:     Vital Signs (Most Recent):  Temp: 98.6 °F (37 °C) (03/17/24 1341)  Pulse: 90 (03/17/24 1341)  Resp: 16 (03/17/24 1341)  BP: (!) 150/65 (03/17/24 1341)  SpO2: (!) 94 % (03/17/24 1341) Vital Signs (24h Range):  Temp:  [97.9 °F (36.6 °C)-98.7 °F (37.1 °C)] 98.6 °F (37 °C)  Pulse:  [71-90] 90  Resp:  [16-20] 16  SpO2:  [92 %-98 %] 94 %  BP: (130-158)/(60-72) 150/65     Weight: 99.7 kg (219 lb 12.8 oz)  Body mass index is 36.58 kg/m².    Intake/Output - Last 3 Shifts         03/15 0700  03/16 0659 03/16 0700  03/17 0659 03/17 0700  03/18 0659    P.O.  0     I.V. (mL/kg)  1700 (17.1)     Total Intake(mL/kg)  1700 (17.1)     Urine (mL/kg/hr)  300 (0.1) 25 (0)    Drains  600     Other 1800      Stool   0    Total Output 1800 900 25    Net -1800 +800 -25           Stool Occurrence   1 x             Physical Exam  Constitutional:       General: He is not in acute distress.     Appearance: Normal appearance. He is obese. He is not ill-appearing.   HENT:      Head: Normocephalic and atraumatic.      Nose:      Comments: NG tube in place  Eyes:      Extraocular Movements: Extraocular movements intact.      Conjunctiva/sclera: Conjunctivae normal.   Cardiovascular:      Rate and Rhythm: Normal rate and regular rhythm.   Pulmonary:      Effort: Pulmonary effort is normal.   Abdominal:      General: Abdomen is flat. There is distension.      Palpations: Abdomen is soft. There is no mass.      Tenderness: There is no abdominal tenderness. There is no guarding or rebound.      Hernia: No hernia is present.      Comments: Midline incision c/d/I without any drainage    Musculoskeletal:         General: No swelling, tenderness, deformity or signs of injury. Normal range of motion.   Skin:     General: Skin is warm and dry.      Coloration: Skin is not jaundiced.   Neurological:      General: No focal deficit present.       Mental Status: He is alert and oriented to person, place, and time.   Psychiatric:         Mood and Affect: Mood normal.         Behavior: Behavior normal.         Thought Content: Thought content normal.          Significant Labs:  I have reviewed all pertinent lab results within the past 24 hours.  CBC:   Recent Labs   Lab 03/17/24  0531   WBC 5.58   RBC 3.59*   HGB 10.2*   HCT 31.3*      MCV 87   MCH 28.4   MCHC 32.6     BMP:   Recent Labs   Lab 03/17/24  0531         K 3.3*      CO2 27   BUN 44*   CREATININE 2.2*   CALCIUM 8.1*   MG 2.0       Significant Diagnostics:  I have reviewed all pertinent imaging results/findings within the past 24 hours.  Assessment/Plan:     * Ileus  POD #6 s/p robotic right hemicolectomy with post operative ileus.     Awaiting return of bowel function     -- NG tube  -- NPO  -- encourage ambulation and IS  -- maintain normal electrolytes  -- IVF replacement for losses     Dehiscence of fascia  POD #1 s/p closure of fascia.  Doing well, awaiting return of bowel function     -- ok for ice chips, gum, and hard candy  -- continue NGT to LIWS  -- abdominal binder when out of bed  -- encourage ambulation and IS   -- remainder of care as per primary team    Hyponatremia  -- Management as per primary team     Intractable nausea and vomiting  2/2 ileus     Normocytic anemia  -- Management as per primary team     Stage 3a chronic kidney disease  -- Management as per primary team     Severe obesity (BMI 35.0-39.9) with comorbidity  -- Management as per primary team     Prediabetes  -- Management as per primary team     Essential hypertension  -- Management as per primary team         Magda Strak MD  General Surgery  [facility]

## 2024-03-17 NOTE — PLAN OF CARE
[facility]  Initial Discharge Assessment       Primary Care Provider: Bishop Gabriel MD    Admission Diagnosis: Ventral hernia without obstruction or gangrene [K43.9]  Ileus [K56.7]  Bowel dysfunction [K59.9]  Chest pain [R07.9]    Admission Date: 3/16/2024  Expected Discharge Date:     Transition of Care Barriers: None    Payor: [employer] [place] MEDICARE / Plan: [facility] [place] / Product Type: Medicare Advantage /     Extended Emergency Contact Information  Primary Emergency Contact: Emily Ling  Mobile Phone: [phone]  Relation: Spouse  Secondary Emergency Contact: nakulroderick  Mobile Phone: [phone]  Relation: Son    Discharge Plan A: Home with family         [id number] [address] [address]  [address] [facility] [address]  Phone: [phone] Fax: [phone]      Initial Assessment (most recent)       Adult Discharge Assessment - 03/17/24 1629          Discharge Assessment    Assessment Type Discharge Planning Brief Assessment     Confirmed/corrected address, phone number and insurance Yes     Confirmed Demographics Correct on Facesheet     Source of Information patient;family     If unable to respond/provide information was family/caregiver contacted? --   spouse at bedside but patient able to speak too    When was your last doctors appointment? --   last week    Does patient/caregiver understand observation status Yes     Communicated MERCEDEZ with patient/caregiver Date not available/Unable to determine     Reason For Admission ileus     People in Home spouse     Facility Arrived From: home     Do you expect to return to your current living situation? Yes     Do you have help at home or someone to help you manage your care at home? Yes     Who are your caregiver(s) and their phone number(s)? spouse Emily [phone]     Prior to hospitilization cognitive status: Alert/Oriented     Current cognitive status: Alert/Oriented     Walking or  Climbing Stairs Difficulty no     Dressing/Bathing Difficulty no     Home Layout Able to live on 1st floor     Equipment Currently Used at Home none     Readmission within 30 days? No     Patient currently being followed by outpatient case management? No     Do you currently have service(s) that help you manage your care at home? No     Do you take prescription medications? Yes   No problems reported picking up prescriptions    How do you get to doctors appointments? family or friend will provide     Are you on dialysis? No     Do you take coumadin? No     Discharge Plan A Home with family     DME Needed Upon Discharge  none     Discharge Plan discussed with: Spouse/sig other;Patient     Transition of Care Barriers None        Housing Stability    In the last 12 months, was there a time when you were not able to pay the mortgage or rent on time? No     In the last 12 months, was there a time when you did not have a steady place to sleep or slept in a shelter (including now)? No        Transportation Needs    In the past 12 months, has lack of transportation kept you from medical appointments or from getting medications? No     In the past 12 months, has lack of transportation kept you from meetings, work, or from getting things needed for daily living? No        Food Insecurity    Within the past 12 months, you worried that your food would run out before you got the money to buy more. Never true     Within the past 12 months, the food you bought just didn't last and you didn't have money to get more. Never true

## 2024-03-17 NOTE — SUBJECTIVE & OBJECTIVE
Interval History:  Patient seen and examined with family at bedside for follow up of hypertension, diabetes, electrolyte disturbances.  Continues to complain of discomfort with NG tube and abdominal binder.  No further nausea or vomiting.    Review of Systems   Constitutional:  Positive for fatigue. Negative for chills and fever.   Respiratory:  Negative for shortness of breath.    Gastrointestinal:  Positive for abdominal distention, abdominal pain, constipation (no BM), nausea and vomiting.   Genitourinary:  Negative for dysuria and hematuria.   All other systems reviewed and are negative.    Objective:     Vital Signs (Most Recent):  Temp: 97.1 °F (36.2 °C) (03/17/24 1558)  Pulse: 92 (03/17/24 1558)  Resp: 16 (03/17/24 1558)  BP: (!) 157/69 (03/17/24 1558)  SpO2: (!) 94 % (03/17/24 1558) Vital Signs (24h Range):  Temp:  [97.1 °F (36.2 °C)-98.7 °F (37.1 °C)] 97.1 °F (36.2 °C)  Pulse:  [73-92] 92  Resp:  [16-20] 16  SpO2:  [92 %-98 %] 94 %  BP: (144-158)/(65-72) 157/69     Weight: 99.7 kg (219 lb 12.8 oz)  Body mass index is 36.58 kg/m².    Intake/Output Summary (Last 24 hours) at 3/17/2024 1651  Last data filed at 3/17/2024 1200  Gross per 24 hour   Intake --   Output 925 ml   Net -925 ml         Physical Exam  Constitutional:       General: He is not in acute distress.     Appearance: Normal appearance. He is obese. He is not ill-appearing.      Comments: NG-tube in place to low intermittent suction   HENT:      Head: Normocephalic and atraumatic.      Nose:      Comments: NG tube in place  Eyes:      Extraocular Movements: Extraocular movements intact.      Conjunctiva/sclera: Conjunctivae normal.   Cardiovascular:      Rate and Rhythm: Normal rate and regular rhythm.   Pulmonary:      Effort: Pulmonary effort is normal.      Breath sounds: No wheezing, rhonchi or rales.   Abdominal:      General: There is distension.      Palpations: Abdomen is soft. There is no mass.      Tenderness: There is abdominal  tenderness. There is guarding. There is no rebound.      Hernia: No hernia is present.      Comments: Midline incision c/d/I without any drainage    Musculoskeletal:         General: No swelling, tenderness, deformity or signs of injury. Normal range of motion.   Skin:     General: Skin is warm and dry.      Coloration: Skin is not jaundiced.   Neurological:      General: No focal deficit present.      Mental Status: He is alert and oriented to person, place, and time.   Psychiatric:         Mood and Affect: Mood normal.         Behavior: Behavior normal.         Thought Content: Thought content normal.             Significant Labs: All pertinent labs within the past 24 hours have been reviewed.  CBC:   Recent Labs   Lab 03/16/24 0227 03/17/24  0531   WBC 6.99 5.58   HGB 11.9* 10.2*   HCT 34.7* 31.3*    181     CMP:   Recent Labs   Lab 03/16/24 0227 03/17/24  0531   * 141   K 3.6 3.3*   CL 99 100   CO2 24 27   * 105   BUN 29* 44*   CREATININE 1.4 2.2*   CALCIUM 9.3 8.1*   PROT 6.5 5.7*   ALBUMIN 3.2* 2.7*   BILITOT 0.8 0.7   ALKPHOS 44* 40*   AST 26 15   ALT 28 17   ANIONGAP 12 14       Significant Imaging: I have reviewed all pertinent imaging results/findings within the past 24 hours.

## 2024-03-17 NOTE — HOSPITAL COURSE
Patient is a 73-year-old male who underwent robotic right hemicolectomy and omentectomy on 03/11/2024.  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  Patient returned to the hospital on postop day 3 complaining of acid reflux, vomiting, and drainage from his wound.  KUB was obtained concerning for possible small bowel obstruction.  To nausea vomiting he presented emergency department he was noted to have mild BRANDY.  CT scan showed dilated loops of small bowel consistent with ileus but also dehiscence of wound.  NG tube was placed with 800 cc of output.  He was taken back to OR for exploration and closure fascia.  Patient continues to have NG tube.  He has moderate amount of output and has not had return of bowel function yet.  General surgery following    BRANDY likely secondary to large GI fluid losses we will increase IV fluid repletion and check bladder scan for incomplete emptying.  Creatinine up to 2.2.    Electrolyte disorders including hyponatremia, hypokalemia.  These has been replaced.    Hypertension-treated with amlodipine 5 mg, hydrochlorothiazide 25 mg, metoprolol 100 mg b.i.d. has been held in the setting of low blood pressure.  We will restart low-dose metoprolol.    BRANDY/CKD we will increase patient's volume repletion recheck in a.m.    3/18- looks and feels better, no pain, has been walking around in the hallways. No BM yet. IVF reduced to 100 cc/hr. K 4 now, Bun/Cr down to 39/1.4. Phos has dropped to 2.2. will check gain in am and start oral replacements.     3/19- Did better overnight and had a couple of BMs last evening and felt better but developed Afib RVR @ 151 this am. No previous hx of Afib or any arrhythmias. Cards consulted and started on Heparin gtt plus Cardizem gtt and given Lopressor 5 IVP twice with transient improvement in HR- hence Cardizem gtt increased to 15 mg/ hr- HR down to 133 now. PO4 also low at 1.7- given KPhos 20 mmol IV. Will  increase Metoprolol to 25 bid. Otherwise doing better, walking around well.     3/20- looks and feels much better, comfortable, converted to NSR last evening and doing better. No palpitations or dizziness or weakness. Eating drinking well, walking around well. Bowels also moving well. Electrolytes also improving. Cardiology switched him from Cardizem gtt to PO Cardizem  and decreased his Lopressor from 100 to 25 po daily and we d/dom his Norvasc 5. Eliquis also added and heparin gtt d/dom for his P Afib. Cards and Surgery both have cleared him for discharge and he is eager to go home. He was seen and examined and deemed stable for discharge home today.

## 2024-03-17 NOTE — ASSESSMENT & PLAN NOTE
Patient's anemia is currently controlled. Has not received any PRBCs to date. Etiology likely d/t acute blood loss which was from recent surgery and underlying colon pathology  Current CBC reviewed-   Lab Results   Component Value Date    HGB 10.2 (L) 03/17/2024    HCT 31.3 (L) 03/17/2024     Monitor serial CBC and transfuse if patient becomes hemodynamically unstable, symptomatic or H/H drops below 7/21.

## 2024-03-17 NOTE — PLAN OF CARE
Discussed poc with pt, pt verbalized understanding    Purposeful rounding every 2hours    VS wnl  Fall precautions in place, remains injury free  Pt denies c/o pain or discomfort at this time and will continue to be monitored.  Pain and nausea under control with PRN meds    IVFs  Accurate I&Os  Abx given as prescribed  Bed locked at lowest position  Call light within reach    Chart check complete  Will cont with POC

## 2024-03-17 NOTE — ASSESSMENT & PLAN NOTE
Chronic, controlled. Latest blood pressure and vitals reviewed-     Temp:  [97.1 °F (36.2 °C)-98.7 °F (37.1 °C)]   Pulse:  [73-92]   Resp:  [16-20]   BP: (144-158)/(65-72)   SpO2:  [92 %-98 %] .   Home meds for hypertension were reviewed and noted below.   Hypertension Medications               amLODIPine (NORVASC) 5 MG tablet TAKE 1 TABLET BY MOUTH EVERY DAY    doxazosin (CARDURA) 4 MG tablet Take 4 mg by mouth once daily.    hydroCHLOROthiazide (HYDRODIURIL) 25 MG tablet TAKE 1 TABLET BY MOUTH EVERY DAY IN THE MORNING    metoprolol succinate (TOPROL-XL) 100 MG 24 hr tablet Take 1 tablet (100 mg total) by mouth 2 (two) times daily.    olmesartan (BENICAR) 40 MG tablet Take 1 tablet (40 mg total) by mouth once daily.            While in the hospital, will manage blood pressure as follows; Adjust home antihypertensive regimen as follows- hold all antihypertensives due to postoperative ileus with intractable nausea and vomiting and current NPO state. Held Norvasc, Cardura, hydrochlorothiazide, and Benicar.  We will restart low-dose metoprolol  Will utilize p.r.n. blood pressure medication only if patient's blood pressure greater than 160/100 and he develops symptoms such as worsening chest pain or shortness of breath.

## 2024-03-17 NOTE — ASSESSMENT & PLAN NOTE
"Patient has Post-operative ileus which is adynamic in etiology which is worsening. This is a complication of his procedure.   -CT abdomen/pelvis with "dilated fluid and gas-filled small bowel loops without definite transition point, this could represent enteritis or ileus; nonspecific adrenal nodules bilaterally, probably adenomas; fat stranding in the anterior abdominal wall and mild skin thickening of the inferior aspect of the pannus may be related to edema, but cellulitis is not excluded; gas in the bladder may be secondary to recent manipulation, please correlate with urinalysis to evaluate for cystitis; small intraperitoneal free air foci may be related to recent surgery; fluid in the colon is suggestive of diarrheal state; bowel containing ventral hernia."  -white blood cell count 6.99, LFTs unremarkable, afebrile  -Will treat conservatively with bowel rest, IV fluids, serial abdominal exams and avoidance if possible of GI paralytics such as narcotics or anti-spasmodics. Monitor patient closely.    -NG tube insertion has been ordered.    -Correct electrolytes as needed.  -check a.m. labs  -check urinalysis with reflex culture  -oxygen saturations on the low end with 92% on room air without complain of shortness a breath.  Suspect atelectasis and splinting from intra-abdominal process.  O2 supplementation if needed, frequent incentive spirometry, continuous pulse oximetry monitoring  -colorectal surgery following    "

## 2024-03-17 NOTE — ASSESSMENT & PLAN NOTE
Postop day 1 of closure of fascia.  -continue NG tube to low intermittent Suction  -abdominal binder per surgery  Out of bed and IS

## 2024-03-17 NOTE — PROGRESS NOTES
[facility] Medicine  Progress Note    Patient Name: Quintin Ling  MRN: [id number]  Patient Class: IP- Inpatient   Admission Date: 3/16/2024  Length of Stay: 0 days  Attending Physician: Reny Kong MD  Primary Care Provider: Bishop Gabriel MD        Subjective:     Principal Problem:Ileus        HPI:  73-year-old white man with history of hypertension, hyperlipidemia, prediabetes, obesity, erectile dysfunction, chronic kidney disease stage IIIA, lumbar radiculopathy, normocytic anemia, cold now adenoma, and peripheral arterial disease who presented to the emergency department with complaint of intractable nausea with vomiting over the last 2-3 days.  He has had associated abdominal pain and abdominal distention.  He can not identify any aggravating or alleviating factors.  He denies any associated fevers or chills.  Patient has had flatus but denies any bowel movements.    Recent hospitalization 3/11-03/12/2024 for colon adenoma status post robotic right colectomy on 03/11/2024.  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    Overview/Hospital Course:  Patient is a 73-year-old male who underwent robotic right hemicolectomy and omentectomy on 03/11/2024.  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  Patient returned to the hospital on postop day 3 complaining of acid reflux, vomiting, and drainage from his wound.  KUB was obtained concerning for possible small bowel obstruction.  To nausea vomiting he presented emergency department he was noted to have mild BRANDY.  CT scan showed dilated loops of small bowel consistent with ileus but also dehiscence of wound.  NG tube was placed with 800 cc of output.  He was taken back to OR for exploration and closure fascia.  Patient continues to have NG tube.  He has moderate amount of output and has not had return of bowel function yet.   General surgery following    BRANDY likely secondary to large GI fluid losses we will increase IV fluid repletion and check bladder scan for incomplete emptying.  Creatinine up to 2.2.    Electrolyte disorders including hyponatremia, hypokalemia.  These has been replaced.    Hypertension-treated with amlodipine 5 mg, hydrochlorothiazide 25 mg, metoprolol 100 mg b.i.d. has been held in the setting of low blood pressure.  We will restart low-dose metoprolol.    BRANDY/CKD we will increase patient's volume repletion recheck in a.m.    Interval History:  Patient seen and examined with family at bedside for follow up of hypertension, diabetes, electrolyte disturbances.  Continues to complain of discomfort with NG tube and abdominal binder.  No further nausea or vomiting.    Review of Systems   Constitutional:  Positive for fatigue. Negative for chills and fever.   Respiratory:  Negative for shortness of breath.    Gastrointestinal:  Positive for abdominal distention, abdominal pain, constipation (no BM), nausea and vomiting.   Genitourinary:  Negative for dysuria and hematuria.   All other systems reviewed and are negative.    Objective:     Vital Signs (Most Recent):  Temp: 97.1 °F (36.2 °C) (03/17/24 1558)  Pulse: 92 (03/17/24 1558)  Resp: 16 (03/17/24 1558)  BP: (!) 157/69 (03/17/24 1558)  SpO2: (!) 94 % (03/17/24 1558) Vital Signs (24h Range):  Temp:  [97.1 °F (36.2 °C)-98.7 °F (37.1 °C)] 97.1 °F (36.2 °C)  Pulse:  [73-92] 92  Resp:  [16-20] 16  SpO2:  [92 %-98 %] 94 %  BP: (144-158)/(65-72) 157/69     Weight: 99.7 kg (219 lb 12.8 oz)  Body mass index is 36.58 kg/m².    Intake/Output Summary (Last 24 hours) at 3/17/2024 1651  Last data filed at 3/17/2024 1200  Gross per 24 hour   Intake --   Output 925 ml   Net -925 ml         Physical Exam  Constitutional:       General: He is not in acute distress.     Appearance: Normal appearance. He is obese. He is not ill-appearing.      Comments: NG-tube in place to low intermittent  "suction   HENT:      Head: Normocephalic and atraumatic.      Nose:      Comments: NG tube in place  Eyes:      Extraocular Movements: Extraocular movements intact.      Conjunctiva/sclera: Conjunctivae normal.   Cardiovascular:      Rate and Rhythm: Normal rate and regular rhythm.   Pulmonary:      Effort: Pulmonary effort is normal.      Breath sounds: No wheezing, rhonchi or rales.   Abdominal:      General: There is distension.      Palpations: Abdomen is soft. There is no mass.      Tenderness: There is abdominal tenderness. There is guarding. There is no rebound.      Hernia: No hernia is present.      Comments: Midline incision c/d/I without any drainage    Musculoskeletal:         General: No swelling, tenderness, deformity or signs of injury. Normal range of motion.   Skin:     General: Skin is warm and dry.      Coloration: Skin is not jaundiced.   Neurological:      General: No focal deficit present.      Mental Status: He is alert and oriented to person, place, and time.   Psychiatric:         Mood and Affect: Mood normal.         Behavior: Behavior normal.         Thought Content: Thought content normal.             Significant Labs: All pertinent labs within the past 24 hours have been reviewed.  CBC:   Recent Labs   Lab 03/16/24 0227 03/17/24  0531   WBC 6.99 5.58   HGB 11.9* 10.2*   HCT 34.7* 31.3*    181     CMP:   Recent Labs   Lab 03/16/24 0227 03/17/24  0531   * 141   K 3.6 3.3*   CL 99 100   CO2 24 27   * 105   BUN 29* 44*   CREATININE 1.4 2.2*   CALCIUM 9.3 8.1*   PROT 6.5 5.7*   ALBUMIN 3.2* 2.7*   BILITOT 0.8 0.7   ALKPHOS 44* 40*   AST 26 15   ALT 28 17   ANIONGAP 12 14       Significant Imaging: I have reviewed all pertinent imaging results/findings within the past 24 hours.    Assessment/Plan:      * Ileus  Patient has Post-operative ileus which is adynamic in etiology which is worsening. This is a complication of his procedure.   -CT abdomen/pelvis with "dilated " "fluid and gas-filled small bowel loops without definite transition point, this could represent enteritis or ileus; nonspecific adrenal nodules bilaterally, probably adenomas; fat stranding in the anterior abdominal wall and mild skin thickening of the inferior aspect of the pannus may be related to edema, but cellulitis is not excluded; gas in the bladder may be secondary to recent manipulation, please correlate with urinalysis to evaluate for cystitis; small intraperitoneal free air foci may be related to recent surgery; fluid in the colon is suggestive of diarrheal state; bowel containing ventral hernia."  -white blood cell count 6.99, LFTs unremarkable, afebrile  -Will treat conservatively with bowel rest, IV fluids, serial abdominal exams and avoidance if possible of GI paralytics such as narcotics or anti-spasmodics. Monitor patient closely.    -NG tube insertion has been ordered.    -Correct electrolytes as needed.  -check a.m. labs  -check urinalysis with reflex culture  -oxygen saturations on the low end with 92% on room air without complain of shortness a breath.  Suspect atelectasis and splinting from intra-abdominal process.  O2 supplementation if needed, frequent incentive spirometry, continuous pulse oximetry monitoring  -colorectal surgery following      Dehiscence of fascia    Postop day 1 of closure of fascia.  -continue NG tube to low intermittent Suction  -abdominal binder per surgery  Out of bed and IS    Hyponatremia  Mild hyponatremia related to intractable nausea with vomiting and dehydration.  Corrected    Intractable nausea and vomiting  Intractable nausea and vomiting related to postoperative ileus.  Resolved with NG tube placement      Adenoma of colon  Postop day 6 status post robotic right colectomy.  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      Normocytic anemia  Patient's anemia is currently controlled. Has not " received any PRBCs to date. Etiology likely d/t acute blood loss which was from recent surgery and underlying colon pathology  Current CBC reviewed-   Lab Results   Component Value Date    HGB 10.2 (L) 03/17/2024    HCT 31.3 (L) 03/17/2024     Monitor serial CBC and transfuse if patient becomes hemodynamically unstable, symptomatic or H/H drops below 7/21.    Stage 3a chronic kidney disease  Creatine stable for now. BMP reviewed- noted Estimated Creatinine Clearance: 33.5 mL/min (A) (based on SCr of 2.2 mg/dL (H)). according to latest data. Based on current GFR, CKD stage is stage 3 - GFR 30-59.  Monitor UOP and serial BMP and adjust therapy as needed. Renally dose meds. Avoid nephrotoxic medications and procedures.    BRANDY likely secondary to volume depletion due to large NG output.  We will repeat a.m. and if need be check urine electrolytes    Severe obesity (BMI 35.0-39.9) with comorbidity  Body mass index is 36.58 kg/m². Morbid obesity complicates all aspects of disease management from diagnostic modalities to treatment. Weight loss encouraged and health benefits explained to patient.         Prediabetes  Last A1c in our system was 5.3 on 05/30/2023.  Repeat A1c.  NPO, IV fluids, blood glucose q.6 hours with general hypoglycemia protocol.  Sliding scale insulin has not been ordered at this time.  Monitor blood glucose closely and if significantly elevated we will add sliding scale insulin at that time.      Essential hypertension  Chronic, controlled. Latest blood pressure and vitals reviewed-     Temp:  [97.1 °F (36.2 °C)-98.7 °F (37.1 °C)]   Pulse:  [73-92]   Resp:  [16-20]   BP: (144-158)/(65-72)   SpO2:  [92 %-98 %] .   Home meds for hypertension were reviewed and noted below.   Hypertension Medications               amLODIPine (NORVASC) 5 MG tablet TAKE 1 TABLET BY MOUTH EVERY DAY    doxazosin (CARDURA) 4 MG tablet Take 4 mg by mouth once daily.    hydroCHLOROthiazide (HYDRODIURIL) 25 MG tablet TAKE 1  TABLET BY MOUTH EVERY DAY IN THE MORNING    metoprolol succinate (TOPROL-XL) 100 MG 24 hr tablet Take 1 tablet (100 mg total) by mouth 2 (two) times daily.    olmesartan (BENICAR) 40 MG tablet Take 1 tablet (40 mg total) by mouth once daily.            While in the hospital, will manage blood pressure as follows; Adjust home antihypertensive regimen as follows- hold all antihypertensives due to postoperative ileus with intractable nausea and vomiting and current NPO state. Held Norvasc, Cardura, hydrochlorothiazide, and Benicar.  We will restart low-dose metoprolol  Will utilize p.r.n. blood pressure medication only if patient's blood pressure greater than 160/100 and he develops symptoms such as worsening chest pain or shortness of breath.      VTE Risk Mitigation (From admission, onward)           Ordered     enoxaparin injection 40 mg  Daily         03/16/24 0507     IP VTE HIGH RISK PATIENT  Once         03/16/24 0507     Place sequential compression device  Until discontinued         03/16/24 0507                    Discharge Planning   MERCEDEZ:      Code Status: Full Code   Is the patient medically ready for discharge?:     Reason for patient still in hospital (select all that apply): Patient trending condition, Laboratory test, Treatment, and Consult recommendations  Discharge Plan A: Home with family                  Reny Price MD  Department of Hospital Medicine   [facility]

## 2024-03-17 NOTE — ASSESSMENT & PLAN NOTE
Intractable nausea and vomiting related to postoperative ileus.  Resolved with NG tube placement

## 2024-03-17 NOTE — ASSESSMENT & PLAN NOTE
Postop day 6 status post robotic right colectomy.  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

## 2024-03-17 NOTE — PLAN OF CARE
Discussed poc with pt and his son    Purposeful rounding complete    VS wnl    Blood glucose monitoring   Fall precautions in place, remains injury free    IVFs infusing    Accurate I&Os    Bed locked at lowest position  Call light within reach    Chart check complete    Pt's NGT was pulled out while pt was attempting to sleep/turn over in bed. XR ordered to check for placement, report reads, 'Small bowel obstruction. Nasogastric tube projects in the stomach'.   Mds notified. Advised to keep NPO

## 2024-03-17 NOTE — ASSESSMENT & PLAN NOTE
POD #6 s/p robotic right hemicolectomy with post operative ileus.     Awaiting return of bowel function     -- NG tube  -- NPO  -- encourage ambulation and IS  -- maintain normal electrolytes  -- IVF replacement for losses

## 2024-03-17 NOTE — SUBJECTIVE & OBJECTIVE
Interval History: AFVSS. PETE.  No flatus or Bms yet.  Pain moderately controlled.  NGT with 600cc output      Medications:  Continuous Infusions:   0.45 % NaCl with KCl 20 mEq 150 mL/hr at 03/17/24 1022     Scheduled Meds:   acetaminophen  1,000 mg Intravenous Q8H    chlorhexidine  10 mL Mouth/Throat BID    enoxparin  40 mg Subcutaneous Daily    pantoprazole  40 mg Intravenous Daily     PRN Meds:glucagon (human recombinant), glucose, glucose, hydrALAZINE, morphine, naloxone, ondansetron, sodium chloride 0.9%     Review of patient's allergies indicates:  No Known Allergies  Objective:     Vital Signs (Most Recent):  Temp: 98.6 °F (37 °C) (03/17/24 1341)  Pulse: 90 (03/17/24 1341)  Resp: 16 (03/17/24 1341)  BP: (!) 150/65 (03/17/24 1341)  SpO2: (!) 94 % (03/17/24 1341) Vital Signs (24h Range):  Temp:  [97.9 °F (36.6 °C)-98.7 °F (37.1 °C)] 98.6 °F (37 °C)  Pulse:  [71-90] 90  Resp:  [16-20] 16  SpO2:  [92 %-98 %] 94 %  BP: (130-158)/(60-72) 150/65     Weight: 99.7 kg (219 lb 12.8 oz)  Body mass index is 36.58 kg/m².    Intake/Output - Last 3 Shifts         03/15 0700  03/16 0659 03/16 0700  03/17 0659 03/17 0700  03/18 0659    P.O.  0     I.V. (mL/kg)  1700 (17.1)     Total Intake(mL/kg)  1700 (17.1)     Urine (mL/kg/hr)  300 (0.1) 25 (0)    Drains  600     Other 1800      Stool   0    Total Output 1800 900 25    Net -1800 +800 -25           Stool Occurrence   1 x             Physical Exam  Constitutional:       General: He is not in acute distress.     Appearance: Normal appearance. He is obese. He is not ill-appearing.   HENT:      Head: Normocephalic and atraumatic.      Nose:      Comments: NG tube in place  Eyes:      Extraocular Movements: Extraocular movements intact.      Conjunctiva/sclera: Conjunctivae normal.   Cardiovascular:      Rate and Rhythm: Normal rate and regular rhythm.   Pulmonary:      Effort: Pulmonary effort is normal.   Abdominal:      General: Abdomen is flat. There is distension.       Palpations: Abdomen is soft. There is no mass.      Tenderness: There is no abdominal tenderness. There is no guarding or rebound.      Hernia: No hernia is present.      Comments: Midline incision c/d/I without any drainage    Musculoskeletal:         General: No swelling, tenderness, deformity or signs of injury. Normal range of motion.   Skin:     General: Skin is warm and dry.      Coloration: Skin is not jaundiced.   Neurological:      General: No focal deficit present.      Mental Status: He is alert and oriented to person, place, and time.   Psychiatric:         Mood and Affect: Mood normal.         Behavior: Behavior normal.         Thought Content: Thought content normal.          Significant Labs:  I have reviewed all pertinent lab results within the past 24 hours.  CBC:   Recent Labs   Lab 03/17/24  0531   WBC 5.58   RBC 3.59*   HGB 10.2*   HCT 31.3*      MCV 87   MCH 28.4   MCHC 32.6     BMP:   Recent Labs   Lab 03/17/24  0531         K 3.3*      CO2 27   BUN 44*   CREATININE 2.2*   CALCIUM 8.1*   MG 2.0       Significant Diagnostics:  I have reviewed all pertinent imaging results/findings within the past 24 hours.

## 2024-03-17 NOTE — ASSESSMENT & PLAN NOTE
Creatine stable for now. BMP reviewed- noted Estimated Creatinine Clearance: 33.5 mL/min (A) (based on SCr of 2.2 mg/dL (H)). according to latest data. Based on current GFR, CKD stage is stage 3 - GFR 30-59.  Monitor UOP and serial BMP and adjust therapy as needed. Renally dose meds. Avoid nephrotoxic medications and procedures.    BRANDY likely secondary to volume depletion due to large NG output.  We will repeat a.m. and if need be check urine electrolytes

## 2024-03-17 NOTE — ASSESSMENT & PLAN NOTE
POD #1 s/p closure of fascia.  Doing well, awaiting return of bowel function     -- ok for ice chips, gum, and hard candy  -- continue NGT to LIWS  -- abdominal binder when out of bed  -- encourage ambulation and IS   -- remainder of care as per primary team

## 2024-03-18 LAB
ALBUMIN SERPL BCP-MCNC: 2.5 G/DL (ref 3.5–5.2)
ANION GAP SERPL CALC-SCNC: 15 MMOL/L (ref 8–16)
BASOPHILS # BLD AUTO: 0.02 K/UL (ref 0–0.2)
BASOPHILS NFR BLD: 0.2 % (ref 0–1.9)
BUN SERPL-MCNC: 39 MG/DL (ref 8–23)
CALCIUM SERPL-MCNC: 8.3 MG/DL (ref 8.7–10.5)
CHLORIDE SERPL-SCNC: 101 MMOL/L (ref 95–110)
CO2 SERPL-SCNC: 23 MMOL/L (ref 23–29)
CREAT SERPL-MCNC: 1.4 MG/DL (ref 0.5–1.4)
DIFFERENTIAL METHOD BLD: ABNORMAL
EOSINOPHIL # BLD AUTO: 0.1 K/UL (ref 0–0.5)
EOSINOPHIL NFR BLD: 0.9 % (ref 0–8)
ERYTHROCYTE [DISTWIDTH] IN BLOOD BY AUTOMATED COUNT: 13.3 % (ref 11.5–14.5)
EST. GFR  (NO RACE VARIABLE): 54 ML/MIN/1.73 M^2
FINAL PATHOLOGIC DIAGNOSIS: NORMAL
GLUCOSE SERPL-MCNC: 96 MG/DL (ref 70–110)
GROSS: NORMAL
HCT VFR BLD AUTO: 31.4 % (ref 40–54)
HGB BLD-MCNC: 10.1 G/DL (ref 14–18)
IMM GRANULOCYTES # BLD AUTO: 0.06 K/UL (ref 0–0.04)
IMM GRANULOCYTES NFR BLD AUTO: 0.7 % (ref 0–0.5)
LYMPHOCYTES # BLD AUTO: 0.4 K/UL (ref 1–4.8)
LYMPHOCYTES NFR BLD: 5.1 % (ref 18–48)
Lab: NORMAL
MCH RBC QN AUTO: 28.1 PG (ref 27–31)
MCHC RBC AUTO-ENTMCNC: 32.2 G/DL (ref 32–36)
MCV RBC AUTO: 88 FL (ref 82–98)
MICROSCOPIC EXAM: NORMAL
MONOCYTES # BLD AUTO: 1.3 K/UL (ref 0.3–1)
MONOCYTES NFR BLD: 15.6 % (ref 4–15)
NEUTROPHILS # BLD AUTO: 6.3 K/UL (ref 1.8–7.7)
NEUTROPHILS NFR BLD: 77.5 % (ref 38–73)
NRBC BLD-RTO: 0 /100 WBC
PHOSPHATE SERPL-MCNC: 2.2 MG/DL (ref 2.7–4.5)
PLATELET # BLD AUTO: 217 K/UL (ref 150–450)
PLATELET BLD QL SMEAR: ABNORMAL
PMV BLD AUTO: 9.9 FL (ref 9.2–12.9)
POCT GLUCOSE: 104 MG/DL (ref 70–110)
POCT GLUCOSE: 96 MG/DL (ref 70–110)
POCT GLUCOSE: 98 MG/DL (ref 70–110)
POTASSIUM SERPL-SCNC: 4 MMOL/L (ref 3.5–5.1)
RBC # BLD AUTO: 3.59 M/UL (ref 4.6–6.2)
SODIUM SERPL-SCNC: 139 MMOL/L (ref 136–145)
SUPPLEMENTAL DIAGNOSIS: NORMAL
WBC # BLD AUTO: 8.1 K/UL (ref 3.9–12.7)

## 2024-03-18 PROCEDURE — 94799 UNLISTED PULMONARY SVC/PX: CPT | Mod: XB

## 2024-03-18 PROCEDURE — C9113 INJ PANTOPRAZOLE SODIUM, VIA: HCPCS | Performed by: SURGERY

## 2024-03-18 PROCEDURE — 11000001 HC ACUTE MED/SURG PRIVATE ROOM

## 2024-03-18 PROCEDURE — 63600175 PHARM REV CODE 636 W HCPCS: Performed by: INTERNAL MEDICINE

## 2024-03-18 PROCEDURE — 36415 COLL VENOUS BLD VENIPUNCTURE: CPT | Performed by: INTERNAL MEDICINE

## 2024-03-18 PROCEDURE — A9698 NON-RAD CONTRAST MATERIALNOC: HCPCS | Performed by: EMERGENCY MEDICINE

## 2024-03-18 PROCEDURE — 80069 RENAL FUNCTION PANEL: CPT | Performed by: INTERNAL MEDICINE

## 2024-03-18 PROCEDURE — 25000003 PHARM REV CODE 250: Performed by: EMERGENCY MEDICINE

## 2024-03-18 PROCEDURE — 63600175 PHARM REV CODE 636 W HCPCS: Performed by: SURGERY

## 2024-03-18 PROCEDURE — 99900035 HC TECH TIME PER 15 MIN (STAT)

## 2024-03-18 PROCEDURE — 85025 COMPLETE CBC W/AUTO DIFF WBC: CPT | Performed by: INTERNAL MEDICINE

## 2024-03-18 PROCEDURE — 25000003 PHARM REV CODE 250: Performed by: INTERNAL MEDICINE

## 2024-03-18 PROCEDURE — 25500020 PHARM REV CODE 255: Performed by: EMERGENCY MEDICINE

## 2024-03-18 PROCEDURE — A4216 STERILE WATER/SALINE, 10 ML: HCPCS | Performed by: SURGERY

## 2024-03-18 PROCEDURE — 25000003 PHARM REV CODE 250: Performed by: SURGERY

## 2024-03-18 RX ORDER — SODIUM,POTASSIUM PHOSPHATES 280-250MG
2 POWDER IN PACKET (EA) ORAL
Status: DISCONTINUED | OUTPATIENT
Start: 2024-03-18 | End: 2024-03-19

## 2024-03-18 RX ADMIN — Medication 10 ML: at 10:03

## 2024-03-18 RX ADMIN — ENOXAPARIN SODIUM 40 MG: 40 INJECTION SUBCUTANEOUS at 05:03

## 2024-03-18 RX ADMIN — POTASSIUM CHLORIDE AND SODIUM CHLORIDE: 450; 150 INJECTION, SOLUTION INTRAVENOUS at 01:03

## 2024-03-18 RX ADMIN — POTASSIUM CHLORIDE AND SODIUM CHLORIDE: 450; 150 INJECTION, SOLUTION INTRAVENOUS at 09:03

## 2024-03-18 RX ADMIN — PANTOPRAZOLE SODIUM 40 MG: 40 INJECTION, POWDER, FOR SOLUTION INTRAVENOUS at 10:03

## 2024-03-18 RX ADMIN — CHLORHEXIDINE GLUCONATE 0.12% ORAL RINSE 10 ML: 1.2 LIQUID ORAL at 08:03

## 2024-03-18 RX ADMIN — CHLORHEXIDINE GLUCONATE 0.12% ORAL RINSE 10 ML: 1.2 LIQUID ORAL at 09:03

## 2024-03-18 RX ADMIN — Medication 2 PACKET: at 08:03

## 2024-03-18 RX ADMIN — METOPROLOL SUCCINATE 12.5 MG: 25 TABLET, EXTENDED RELEASE ORAL at 09:03

## 2024-03-18 RX ADMIN — IOHEXOL 100 ML: 350 INJECTION, SOLUTION INTRAVENOUS at 02:03

## 2024-03-18 RX ADMIN — IOHEXOL 1000 ML: 12 SOLUTION ORAL at 02:03

## 2024-03-18 NOTE — ASSESSMENT & PLAN NOTE
Body mass index is 36.58 kg/m². Morbid obesity complicates all aspects of disease management from diagnostic modalities to treatment. Weight loss encouraged and health benefits explained to patient.

## 2024-03-18 NOTE — ASSESSMENT & PLAN NOTE
POD #7 s/p robotic right hemicolectomy with post operative ileus.     - NPO  - NGT  - should consider PPN vs TPN tomorrow if no return of bowel function  - do not suspect infectious source as pt afebrile with normal WBC  - will get CT with IV and oral contrast today  - OOB ambulating frequently  - replace electrolytes as needed

## 2024-03-18 NOTE — ASSESSMENT & PLAN NOTE
Patient's anemia is currently controlled. Has not received any PRBCs to date. Etiology likely d/t acute blood loss which was from recent surgery and underlying colon pathology  Current CBC reviewed-   Lab Results   Component Value Date    HGB 10.1 (L) 03/18/2024    HCT 31.4 (L) 03/18/2024     Monitor serial CBC and transfuse if patient becomes hemodynamically unstable, symptomatic or H/H drops below 7/21.

## 2024-03-18 NOTE — ASSESSMENT & PLAN NOTE
Last A1c in our system was 5.3 on 05/30/2023.  Repeat A1c.  NPO, IV fluids, blood glucose q.6 hours with general hypoglycemia protocol.  Sliding scale insulin has not been ordered at this time.  Monitor blood glucose closely and if significantly elevated we will add sliding scale insulin at that time.    stable

## 2024-03-18 NOTE — ASSESSMENT & PLAN NOTE
Postop day 6 status post robotic right colectomy.  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    Appears to be getting better

## 2024-03-18 NOTE — PROGRESS NOTES
[facility]  General Surgery  Progress Note    Subjective:     History of Present Illness:  Quintin Ling is a 73 y.o. male s/p robotic right hemicolectomy and omentectomy on 3/11/2023.  He was discharged home on postoperative day 1.  However returned to the [facility] on postoperative day 3 complaining of acid reflux, vomiting, and significant drainage from his midline wound.  At that time he was still having flatus and bowel movements but was also belching.  Outpatient KUB was concerning for a possible small-bowel obstruction.  He was believed to have a mild postoperative ileus and was given home care and dietary instructions and told to present to the ER if his symptoms persisted or worsened.  He presented to the emergency department last night with significant episodes of nausea and vomiting.  He had a mild BRANDY with a creatinine of 1.6 no however no leukocytosis or other significant laboratory abnormalities.  CT scan was done which showed dilated loops of small bowel consistent with ileus however also showed what they called a supraumbilical hernia with small bowel.  An NG-tube was placed with 1800 cc output and this morning he states he is feeling better.  He is continues to have midline abdominal wound drainage    Post-Op Info:  Procedure(s) (LRB):  EXPLORATION, WOUND (N/A)  CLOSURE, WOUND (N/A)   2 Days Post-Op     Interval History: PETE. Denies flatus or BM. Had 30cc bilious output from NGT in canister. Is OOB ambulating frequently.     Medications:  Continuous Infusions:   0.45 % NaCl with KCl 20 mEq 150 mL/hr at 03/18/24 0932     Scheduled Meds:   chlorhexidine  10 mL Mouth/Throat BID    enoxparin  40 mg Subcutaneous Daily    metoprolol succinate  12.5 mg Oral Daily    pantoprazole  40 mg Intravenous Daily     PRN Meds:glucagon (human recombinant), glucose, glucose, hydrALAZINE, morphine, naloxone, ondansetron, sodium chloride 0.9%     Review of patient's allergies indicates:  No Known  Allergies  Objective:     Vital Signs (Most Recent):  Temp: 98.4 °F (36.9 °C) (03/18/24 1153)  Pulse: 88 (03/18/24 1152)  Resp: 14 (03/18/24 1152)  BP: (!) 163/72 (03/18/24 1152)  SpO2: (!) 94 % (03/18/24 1152) Vital Signs (24h Range):  Temp:  [97 °F (36.1 °C)-99.2 °F (37.3 °C)] 98.4 °F (36.9 °C)  Pulse:  [88-93] 88  Resp:  [14-18] 14  SpO2:  [92 %-95 %] 94 %  BP: (140-177)/(61-73) 163/72     Weight: 99.7 kg (219 lb 12.8 oz)  Body mass index is 36.58 kg/m².    Intake/Output - Last 3 Shifts         03/16 0700  03/17 0659 03/17 0700  03/18 0659 03/18 0700  03/19 0659    P.O. 0      I.V. (mL/kg) 1700 (17.1) 4363.6 (43.8)     IV Piggyback  196.9     Total Intake(mL/kg) 1700 (17.1) 4560.6 (45.7)     Urine (mL/kg/hr) 300 (0.1) 25 (0)     Drains 600      Other       Stool  0     Total Output 900 25     Net +800 +4535.6            Stool Occurrence  1 x              Physical Exam  Constitutional:       Appearance: He is well-developed.   HENT:      Head: Normocephalic and atraumatic.   Eyes:      Conjunctiva/sclera: Conjunctivae normal.      Pupils: Pupils are equal, round, and reactive to light.   Neck:      Thyroid: No thyromegaly.   Cardiovascular:      Rate and Rhythm: Normal rate and regular rhythm.   Pulmonary:      Effort: Pulmonary effort is normal. No respiratory distress.   Abdominal:      Comments: Distended, tympanic, soft, nontender. Incision c/d/I, abdominal binder in place   Musculoskeletal:         General: No tenderness. Normal range of motion.      Cervical back: Normal range of motion.   Skin:     General: Skin is warm and dry.      Capillary Refill: Capillary refill takes less than 2 seconds.   Neurological:      General: No focal deficit present.      Mental Status: He is alert and oriented to person, place, and time.          Significant Labs:  I have reviewed all pertinent lab results within the past 24 hours.  CBC:   Recent Labs   Lab 03/18/24  0538   WBC 8.10   RBC 3.59*   HGB 10.1*   HCT 31.4*   PLT  217   MCV 88   MCH 28.1   MCHC 32.2     CMP:   Recent Labs   Lab 03/17/24  0531 03/18/24  0538    96   CALCIUM 8.1* 8.3*   ALBUMIN 2.7* 2.5*   PROT 5.7*  --     139   K 3.3* 4.0   CO2 27 23    101   BUN 44* 39*   CREATININE 2.2* 1.4   ALKPHOS 40*  --    ALT 17  --    AST 15  --    BILITOT 0.7  --        Significant Diagnostics:  I have reviewed all pertinent imaging results/findings within the past 24 hours.  Assessment/Plan:     * Ileus  POD #7 s/p robotic right hemicolectomy with post operative ileus.     - NPO  - NGT  - should consider PPN vs TPN tomorrow if no return of bowel function  - do not suspect infectious source as pt afebrile with normal WBC  - will get CT with IV and oral contrast today  - OOB ambulating frequently  - replace electrolytes as needed    Dehiscence of fascia  POD #2 s/p closure of fascia.  Doing well, awaiting return of bowel function     - abdominal binder when OOB    Hyponatremia  Per hospital medicine    Intractable nausea and vomiting  2/2 ileus     Normocytic anemia  Per hospital medicine    Stage 3a chronic kidney disease  Per hospital medicine    Severe obesity (BMI 35.0-39.9) with comorbidity  Per hospital medicine    Prediabetes  Per hospital medicine    Essential hypertension  Per hospital medicine        Tosin Boothe MD  General Surgery  [facility]

## 2024-03-18 NOTE — SUBJECTIVE & OBJECTIVE
Interval History: looks and feels better, no pain, has been walking around in the hallways. No BM yet. IVF reduced to 100 cc/hr. K 4 now, Bun/Cr down to 39/1.4. Phos has dropped to 2.2. will check gain in am and start oral replacements.       Review of Systems   Constitutional:  Positive for fatigue. Negative for chills and fever.   Respiratory:  Negative for shortness of breath.    Gastrointestinal:  Positive for abdominal distention. Negative for abdominal pain, constipation (no BM), nausea and vomiting.   Genitourinary:  Negative for dysuria and hematuria.   All other systems reviewed and are negative.    Objective:     Vital Signs (Most Recent):  Temp: 98.6 °F (37 °C) (03/18/24 1552)  Pulse: 91 (03/18/24 1552)  Resp: 20 (03/18/24 1552)  BP: (!) 170/73 (03/18/24 1552)  SpO2: 96 % (03/18/24 1552) Vital Signs (24h Range):  Temp:  [97 °F (36.1 °C)-99.2 °F (37.3 °C)] 98.6 °F (37 °C)  Pulse:  [88-93] 91  Resp:  [14-20] 20  SpO2:  [92 %-96 %] 96 %  BP: (140-177)/(61-73) 170/73     Weight: 99.7 kg (219 lb 12.8 oz)  Body mass index is 36.58 kg/m².    Intake/Output Summary (Last 24 hours) at 3/18/2024 1800  Last data filed at 3/18/2024 0932  Gross per 24 hour   Intake 1835.65 ml   Output 400 ml   Net 1435.65 ml         Physical Exam  Constitutional:       Appearance: He is well-developed.   HENT:      Head: Normocephalic and atraumatic.   Eyes:      Conjunctiva/sclera: Conjunctivae normal.      Pupils: Pupils are equal, round, and reactive to light.   Neck:      Thyroid: No thyromegaly.   Cardiovascular:      Rate and Rhythm: Normal rate and regular rhythm.   Pulmonary:      Effort: Pulmonary effort is normal. No respiratory distress.   Abdominal:      Comments: Distended, tympanic, soft, nontender. Incision c/d/I, abdominal binder in place   Musculoskeletal:         General: No tenderness. Normal range of motion.      Cervical back: Normal range of motion.   Skin:     General: Skin is warm and dry.      Capillary Refill:  Capillary refill takes less than 2 seconds.   Neurological:      General: No focal deficit present.      Mental Status: He is alert and oriented to person, place, and time.             Significant Labs: All pertinent labs within the past 24 hours have been reviewed.  BMP:   Recent Labs   Lab 03/17/24  0531 03/18/24  0538    96    139   K 3.3* 4.0    101   CO2 27 23   BUN 44* 39*   CREATININE 2.2* 1.4   CALCIUM 8.1* 8.3*   MG 2.0  --      CBC:   Recent Labs   Lab 03/17/24  0531 03/18/24  0538   WBC 5.58 8.10   HGB 10.2* 10.1*   HCT 31.3* 31.4*    217       Significant Imaging: I have reviewed all pertinent imaging results/findings within the past 24 hours.

## 2024-03-18 NOTE — PROGRESS NOTES
[facility] [place] [facility]  Progress Note    Patient Name: Quintin Ling  MRN: [id number]  Patient Class: IP- Inpatient   Admission Date: 3/16/2024  Length of Stay: 1 days  Attending Physician: Ildefonso Cross MD  Primary Care Provider: Bishop Gabriel MD        Subjective:     Principal Problem:Ileus        HPI:  73-year-old white man with history of hypertension, hyperlipidemia, prediabetes, obesity, erectile dysfunction, chronic kidney disease stage IIIA, lumbar radiculopathy, normocytic anemia, cold now adenoma, and peripheral arterial disease who presented to the emergency department with complaint of intractable nausea with vomiting over the last 2-3 days.  He has had associated abdominal pain and abdominal distention.  He can not identify any aggravating or alleviating factors.  He denies any associated fevers or chills.  Patient has had flatus but denies any bowel movements.    Recent hospitalization 3/11-03/12/2024 for colon adenoma status post robotic right colectomy on 03/11/2024.  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    Overview/Hospital Course:  Patient is a 73-year-old male who underwent robotic right hemicolectomy and omentectomy on 03/11/2024.  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  Patient returned to the hospital on postop day 3 complaining of acid reflux, vomiting, and drainage from his wound.  KUB was obtained concerning for possible small bowel obstruction.  To nausea vomiting he presented emergency department he was noted to have mild BRANDY.  CT scan showed dilated loops of small bowel consistent with ileus but also dehiscence of wound.  NG tube was placed with 800 cc of output.  He was taken back to OR for exploration and closure fascia.  Patient continues to have NG tube.  He has moderate amount of output and has not had return of bowel function yet.   General surgery following    BRANDY likely secondary to large GI fluid losses we will increase IV fluid repletion and check bladder scan for incomplete emptying.  Creatinine up to 2.2.    Electrolyte disorders including hyponatremia, hypokalemia.  These has been replaced.    Hypertension-treated with amlodipine 5 mg, hydrochlorothiazide 25 mg, metoprolol 100 mg b.i.d. has been held in the setting of low blood pressure.  We will restart low-dose metoprolol.    BRANDY/CKD we will increase patient's volume repletion recheck in a.m.    3/18- looks and feels better, no pain, has been walking around in the hallways. No BM yet. IVF reduced to 100 cc/hr. K 4 now, Bun/Cr down to 39/1.4. Phos has dropped to 2.2. will check gain in am and start oral replacements.     Interval History: looks and feels better, no pain, has been walking around in the hallways. No BM yet. IVF reduced to 100 cc/hr. K 4 now, Bun/Cr down to 39/1.4. Phos has dropped to 2.2. will check gain in am and start oral replacements.       Review of Systems   Constitutional:  Positive for fatigue. Negative for chills and fever.   Respiratory:  Negative for shortness of breath.    Gastrointestinal:  Positive for abdominal distention. Negative for abdominal pain, constipation (no BM), nausea and vomiting.   Genitourinary:  Negative for dysuria and hematuria.   All other systems reviewed and are negative.    Objective:     Vital Signs (Most Recent):  Temp: 98.6 °F (37 °C) (03/18/24 1552)  Pulse: 91 (03/18/24 1552)  Resp: 20 (03/18/24 1552)  BP: (!) 170/73 (03/18/24 1552)  SpO2: 96 % (03/18/24 1552) Vital Signs (24h Range):  Temp:  [97 °F (36.1 °C)-99.2 °F (37.3 °C)] 98.6 °F (37 °C)  Pulse:  [88-93] 91  Resp:  [14-20] 20  SpO2:  [92 %-96 %] 96 %  BP: (140-177)/(61-73) 170/73     Weight: 99.7 kg (219 lb 12.8 oz)  Body mass index is 36.58 kg/m².    Intake/Output Summary (Last 24 hours) at 3/18/2024 1800  Last data filed at 3/18/2024 0932  Gross per 24 hour   Intake 6000.65  "ml   Output 400 ml   Net 1435.65 ml         Physical Exam  Constitutional:       Appearance: He is well-developed.   HENT:      Head: Normocephalic and atraumatic.   Eyes:      Conjunctiva/sclera: Conjunctivae normal.      Pupils: Pupils are equal, round, and reactive to light.   Neck:      Thyroid: No thyromegaly.   Cardiovascular:      Rate and Rhythm: Normal rate and regular rhythm.   Pulmonary:      Effort: Pulmonary effort is normal. No respiratory distress.   Abdominal:      Comments: Distended, tympanic, soft, nontender. Incision c/d/I, abdominal binder in place   Musculoskeletal:         General: No tenderness. Normal range of motion.      Cervical back: Normal range of motion.   Skin:     General: Skin is warm and dry.      Capillary Refill: Capillary refill takes less than 2 seconds.   Neurological:      General: No focal deficit present.      Mental Status: He is alert and oriented to person, place, and time.             Significant Labs: All pertinent labs within the past 24 hours have been reviewed.  BMP:   Recent Labs   Lab 03/17/24  0531 03/18/24  0538    96    139   K 3.3* 4.0    101   CO2 27 23   BUN 44* 39*   CREATININE 2.2* 1.4   CALCIUM 8.1* 8.3*   MG 2.0  --      CBC:   Recent Labs   Lab 03/17/24  0531 03/18/24  0538   WBC 5.58 8.10   HGB 10.2* 10.1*   HCT 31.3* 31.4*    217       Significant Imaging: I have reviewed all pertinent imaging results/findings within the past 24 hours.    Assessment/Plan:      * Ileus  Patient has Post-operative ileus which is adynamic in etiology which is worsening. This is a complication of his procedure.   -CT abdomen/pelvis with "dilated fluid and gas-filled small bowel loops without definite transition point, this could represent enteritis or ileus; nonspecific adrenal nodules bilaterally, probably adenomas; fat stranding in the anterior abdominal wall and mild skin thickening of the inferior aspect of the pannus may be related to " "edema, but cellulitis is not excluded; gas in the bladder may be secondary to recent manipulation, please correlate with urinalysis to evaluate for cystitis; small intraperitoneal free air foci may be related to recent surgery; fluid in the colon is suggestive of diarrheal state; bowel containing ventral hernia."  -white blood cell count 6.99, LFTs unremarkable, afebrile  -Will treat conservatively with bowel rest, IV fluids, serial abdominal exams and avoidance if possible of GI paralytics such as narcotics or anti-spasmodics. Monitor patient closely.    -NG tube insertion has been ordered.    -Correct electrolytes as needed.  -check a.m. labs  -check urinalysis with reflex culture  -oxygen saturations on the low end with 92% on room air without complain of shortness a breath.  Suspect atelectasis and splinting from intra-abdominal process.  O2 supplementation if needed, frequent incentive spirometry, continuous pulse oximetry monitoring  -colorectal surgery following      Dehiscence of fascia    Postop day 1 of closure of fascia.  -continue NG tube to low intermittent Suction  -abdominal binder per surgery  Out of bed and IS    Hyponatremia  Mild hyponatremia related to intractable nausea with vomiting and dehydration.  Corrected    Intractable nausea and vomiting  Intractable nausea and vomiting related to postoperative ileus.  Resolved with NG tube placement      Adenoma of colon  Postop day 6 status post robotic right colectomy.  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    Appears to be getting better      Normocytic anemia  Patient's anemia is currently controlled. Has not received any PRBCs to date. Etiology likely d/t acute blood loss which was from recent surgery and underlying colon pathology  Current CBC reviewed-   Lab Results   Component Value Date    HGB 10.1 (L) 03/18/2024    HCT 31.4 (L) 03/18/2024     Monitor serial CBC and " transfuse if patient becomes hemodynamically unstable, symptomatic or H/H drops below 7/21.    Stage 3a chronic kidney disease  Creatine stable for now. BMP reviewed- noted Estimated Creatinine Clearance: 52.6 mL/min (based on SCr of 1.4 mg/dL). according to latest data. Based on current GFR, CKD stage is stage 3 - GFR 30-59.  Monitor UOP and serial BMP and adjust therapy as needed. Renally dose meds. Avoid nephrotoxic medications and procedures.    BRANDY likely secondary to volume depletion due to large NG output.  We will repeat a.m. and if need be check urine electrolytes    Severe obesity (BMI 35.0-39.9) with comorbidity  Body mass index is 36.58 kg/m². Morbid obesity complicates all aspects of disease management from diagnostic modalities to treatment. Weight loss encouraged and health benefits explained to patient.             Prediabetes  Last A1c in our system was 5.3 on 05/30/2023.  Repeat A1c.  NPO, IV fluids, blood glucose q.6 hours with general hypoglycemia protocol.  Sliding scale insulin has not been ordered at this time.  Monitor blood glucose closely and if significantly elevated we will add sliding scale insulin at that time.    stable      Essential hypertension  Chronic, controlled. Latest blood pressure and vitals reviewed-     Temp:  [97 °F (36.1 °C)-99.2 °F (37.3 °C)]   Pulse:  [88-93]   Resp:  [14-20]   BP: (140-177)/(61-73)   SpO2:  [92 %-96 %] .   Home meds for hypertension were reviewed and noted below.   Hypertension Medications               amLODIPine (NORVASC) 5 MG tablet TAKE 1 TABLET BY MOUTH EVERY DAY    doxazosin (CARDURA) 4 MG tablet Take 4 mg by mouth once daily.    hydroCHLOROthiazide (HYDRODIURIL) 25 MG tablet TAKE 1 TABLET BY MOUTH EVERY DAY IN THE MORNING    metoprolol succinate (TOPROL-XL) 100 MG 24 hr tablet Take 1 tablet (100 mg total) by mouth 2 (two) times daily.    olmesartan (BENICAR) 40 MG tablet Take 1 tablet (40 mg total) by mouth once daily.            While in the  hospital, will manage blood pressure as follows; Adjust home antihypertensive regimen as follows- hold all antihypertensives due to postoperative ileus with intractable nausea and vomiting and current NPO state. Held Norvasc, Cardura, hydrochlorothiazide, and Benicar.  We will restart low-dose metoprolol  Will utilize p.r.n. blood pressure medication only if patient's blood pressure greater than 160/100 and he develops symptoms such as worsening chest pain or shortness of breath.      VTE Risk Mitigation (From admission, onward)           Ordered     enoxaparin injection 40 mg  Daily         03/16/24 0507     IP VTE HIGH RISK PATIENT  Once         03/16/24 0507     Place sequential compression device  Until discontinued         03/16/24 0507                    Discharge Planning   MERCEDEZ:      Code Status: Full Code   Is the patient medically ready for discharge?:     Reason for patient still in hospital (select all that apply): Patient trending condition, Laboratory test, Treatment, Imaging, Consult recommendations, and PT / OT recommendations  Discharge Plan A: Home with family          Ildefonso Cross MD  Department of Hospital Medicine   [facility] - [facility] S[facility]

## 2024-03-18 NOTE — ASSESSMENT & PLAN NOTE
Creatine stable for now. BMP reviewed- noted Estimated Creatinine Clearance: 52.6 mL/min (based on SCr of 1.4 mg/dL). according to latest data. Based on current GFR, CKD stage is stage 3 - GFR 30-59.  Monitor UOP and serial BMP and adjust therapy as needed. Renally dose meds. Avoid nephrotoxic medications and procedures.    BRANDY likely secondary to volume depletion due to large NG output.  We will repeat a.m. and if need be check urine electrolytes

## 2024-03-18 NOTE — ASSESSMENT & PLAN NOTE
POD #2 s/p closure of fascia.  Doing well, awaiting return of bowel function     - abdominal binder when OOB

## 2024-03-18 NOTE — SUBJECTIVE & OBJECTIVE
Interval History: PETE. Denies flatus or BM. Had 30cc bilious output from NGT in canister. Is OOB ambulating frequently.     Medications:  Continuous Infusions:   0.45 % NaCl with KCl 20 mEq 150 mL/hr at 03/18/24 0932     Scheduled Meds:   chlorhexidine  10 mL Mouth/Throat BID    enoxparin  40 mg Subcutaneous Daily    metoprolol succinate  12.5 mg Oral Daily    pantoprazole  40 mg Intravenous Daily     PRN Meds:glucagon (human recombinant), glucose, glucose, hydrALAZINE, morphine, naloxone, ondansetron, sodium chloride 0.9%     Review of patient's allergies indicates:  No Known Allergies  Objective:     Vital Signs (Most Recent):  Temp: 98.4 °F (36.9 °C) (03/18/24 1153)  Pulse: 88 (03/18/24 1152)  Resp: 14 (03/18/24 1152)  BP: (!) 163/72 (03/18/24 1152)  SpO2: (!) 94 % (03/18/24 1152) Vital Signs (24h Range):  Temp:  [97 °F (36.1 °C)-99.2 °F (37.3 °C)] 98.4 °F (36.9 °C)  Pulse:  [88-93] 88  Resp:  [14-18] 14  SpO2:  [92 %-95 %] 94 %  BP: (140-177)/(61-73) 163/72     Weight: 99.7 kg (219 lb 12.8 oz)  Body mass index is 36.58 kg/m².    Intake/Output - Last 3 Shifts         03/16 0700  03/17 0659 03/17 0700  03/18 0659 03/18 0700  03/19 0659    P.O. 0      I.V. (mL/kg) 1700 (17.1) 4363.6 (43.8)     IV Piggyback  196.9     Total Intake(mL/kg) 1700 (17.1) 4560.6 (45.7)     Urine (mL/kg/hr) 300 (0.1) 25 (0)     Drains 600      Other       Stool  0     Total Output 900 25     Net +800 +4535.6            Stool Occurrence  1 x              Physical Exam  Constitutional:       Appearance: He is well-developed.   HENT:      Head: Normocephalic and atraumatic.   Eyes:      Conjunctiva/sclera: Conjunctivae normal.      Pupils: Pupils are equal, round, and reactive to light.   Neck:      Thyroid: No thyromegaly.   Cardiovascular:      Rate and Rhythm: Normal rate and regular rhythm.   Pulmonary:      Effort: Pulmonary effort is normal. No respiratory distress.   Abdominal:      Comments: Distended, tympanic, soft, nontender.  Incision c/d/I, abdominal binder in place   Musculoskeletal:         General: No tenderness. Normal range of motion.      Cervical back: Normal range of motion.   Skin:     General: Skin is warm and dry.      Capillary Refill: Capillary refill takes less than 2 seconds.   Neurological:      General: No focal deficit present.      Mental Status: He is alert and oriented to person, place, and time.          Significant Labs:  I have reviewed all pertinent lab results within the past 24 hours.  CBC:   Recent Labs   Lab 03/18/24  0538   WBC 8.10   RBC 3.59*   HGB 10.1*   HCT 31.4*      MCV 88   MCH 28.1   MCHC 32.2     CMP:   Recent Labs   Lab 03/17/24  0531 03/18/24  0538    96   CALCIUM 8.1* 8.3*   ALBUMIN 2.7* 2.5*   PROT 5.7*  --     139   K 3.3* 4.0   CO2 27 23    101   BUN 44* 39*   CREATININE 2.2* 1.4   ALKPHOS 40*  --    ALT 17  --    AST 15  --    BILITOT 0.7  --        Significant Diagnostics:  I have reviewed all pertinent imaging results/findings within the past 24 hours.

## 2024-03-18 NOTE — ASSESSMENT & PLAN NOTE
Chronic, controlled. Latest blood pressure and vitals reviewed-     Temp:  [97 °F (36.1 °C)-99.2 °F (37.3 °C)]   Pulse:  [88-93]   Resp:  [14-20]   BP: (140-177)/(61-73)   SpO2:  [92 %-96 %] .   Home meds for hypertension were reviewed and noted below.   Hypertension Medications               amLODIPine (NORVASC) 5 MG tablet TAKE 1 TABLET BY MOUTH EVERY DAY    doxazosin (CARDURA) 4 MG tablet Take 4 mg by mouth once daily.    hydroCHLOROthiazide (HYDRODIURIL) 25 MG tablet TAKE 1 TABLET BY MOUTH EVERY DAY IN THE MORNING    metoprolol succinate (TOPROL-XL) 100 MG 24 hr tablet Take 1 tablet (100 mg total) by mouth 2 (two) times daily.    olmesartan (BENICAR) 40 MG tablet Take 1 tablet (40 mg total) by mouth once daily.            While in the hospital, will manage blood pressure as follows; Adjust home antihypertensive regimen as follows- hold all antihypertensives due to postoperative ileus with intractable nausea and vomiting and current NPO state. Held Norvasc, Cardura, hydrochlorothiazide, and Benicar.  We will restart low-dose metoprolol  Will utilize p.r.n. blood pressure medication only if patient's blood pressure greater than 160/100 and he develops symptoms such as worsening chest pain or shortness of breath.

## 2024-03-18 NOTE — PLAN OF CARE
Patient is stable. No signs or symptoms of acute distress. IV fluids and meds given as ordered. NG tube in place on intermittent suctioning. Patient tolerating well. Monitoring of I's and O's. Incision, CDI with abdominal binder. No bowel movement at this time. Bed in lowest position, call light in place. Chart orders reviewed.

## 2024-03-19 PROBLEM — I35.0 NONRHEUMATIC AORTIC VALVE STENOSIS: Status: ACTIVE | Noted: 2024-03-19

## 2024-03-19 PROBLEM — E87.1 HYPONATREMIA: Status: RESOLVED | Noted: 2024-03-16 | Resolved: 2024-03-19

## 2024-03-19 PROBLEM — I48.0 PAF (PAROXYSMAL ATRIAL FIBRILLATION): Status: ACTIVE | Noted: 2024-03-19

## 2024-03-19 LAB
ANION GAP SERPL CALC-SCNC: 16 MMOL/L (ref 8–16)
AORTIC ROOT ANNULUS: 3.85 CM
APTT PPP: 31.8 SEC (ref 21–32)
APTT PPP: 55.3 SEC (ref 21–32)
ASCENDING AORTA: 4 CM
AV INDEX (PROSTH): 0.58
AV MEAN GRADIENT: 17 MMHG
AV PEAK GRADIENT: 22 MMHG
AV VALVE AREA BY VELOCITY RATIO: 1.85 CM²
AV VALVE AREA: 2.16 CM²
AV VELOCITY RATIO: 0.5
BASOPHILS # BLD AUTO: 0.03 K/UL (ref 0–0.2)
BASOPHILS # BLD AUTO: 0.03 K/UL (ref 0–0.2)
BASOPHILS NFR BLD: 0.3 % (ref 0–1.9)
BASOPHILS NFR BLD: 0.3 % (ref 0–1.9)
BSA FOR ECHO PROCEDURE: 2.14 M2
BUN SERPL-MCNC: 40 MG/DL (ref 8–23)
CALCIUM SERPL-MCNC: 8.2 MG/DL (ref 8.7–10.5)
CHLORIDE SERPL-SCNC: 101 MMOL/L (ref 95–110)
CO2 SERPL-SCNC: 23 MMOL/L (ref 23–29)
CREAT SERPL-MCNC: 1.3 MG/DL (ref 0.5–1.4)
CV ECHO LV RWT: 0.6 CM
DIFFERENTIAL METHOD BLD: ABNORMAL
DIFFERENTIAL METHOD BLD: ABNORMAL
DOP CALC AO PEAK VEL: 2.32 M/S
DOP CALC AO VTI: 44 CM
DOP CALC LVOT AREA: 3.7 CM2
DOP CALC LVOT DIAMETER: 2.17 CM
DOP CALC LVOT PEAK VEL: 1.16 M/S
DOP CALC LVOT STROKE VOLUME: 95 CM3
DOP CALC RVOT PEAK VEL: 1.34 M/S
DOP CALC RVOT VTI: 23.1 CM
DOP CALCLVOT PEAK VEL VTI: 25.7 CM
ECHO LV POSTERIOR WALL: 1.32 CM (ref 0.6–1.1)
EOSINOPHIL # BLD AUTO: 0.1 K/UL (ref 0–0.5)
EOSINOPHIL # BLD AUTO: 0.1 K/UL (ref 0–0.5)
EOSINOPHIL NFR BLD: 0.6 % (ref 0–8)
EOSINOPHIL NFR BLD: 0.7 % (ref 0–8)
ERYTHROCYTE [DISTWIDTH] IN BLOOD BY AUTOMATED COUNT: 13.6 % (ref 11.5–14.5)
ERYTHROCYTE [DISTWIDTH] IN BLOOD BY AUTOMATED COUNT: 13.6 % (ref 11.5–14.5)
EST. GFR  (NO RACE VARIABLE): 59 ML/MIN/1.73 M^2
FRACTIONAL SHORTENING: 31 % (ref 28–44)
GLUCOSE SERPL-MCNC: 115 MG/DL (ref 70–110)
HCT VFR BLD AUTO: 33.9 % (ref 40–54)
HCT VFR BLD AUTO: 35.1 % (ref 40–54)
HGB BLD-MCNC: 11 G/DL (ref 14–18)
HGB BLD-MCNC: 11.7 G/DL (ref 14–18)
IMM GRANULOCYTES # BLD AUTO: 0.12 K/UL (ref 0–0.04)
IMM GRANULOCYTES # BLD AUTO: 0.13 K/UL (ref 0–0.04)
IMM GRANULOCYTES NFR BLD AUTO: 1.2 % (ref 0–0.5)
IMM GRANULOCYTES NFR BLD AUTO: 1.2 % (ref 0–0.5)
INR PPP: 1 (ref 0.8–1.2)
INTERVENTRICULAR SEPTUM: 1.21 CM (ref 0.6–1.1)
IVC DIAMETER: 1.15 CM
IVRT: 54.23 MSEC
LA MAJOR: 5.24 CM
LA MINOR: 4.11 CM
LA WIDTH: 6.5 CM
LEFT ATRIUM SIZE: 3.42 CM
LEFT ATRIUM VOLUME INDEX: 42.3 ML/M2
LEFT ATRIUM VOLUME: 87.05 CM3
LEFT INTERNAL DIMENSION IN SYSTOLE: 3.03 CM (ref 2.1–4)
LEFT VENTRICLE DIASTOLIC VOLUME INDEX: 42.48 ML/M2
LEFT VENTRICLE DIASTOLIC VOLUME: 87.5 ML
LEFT VENTRICLE MASS INDEX: 100 G/M2
LEFT VENTRICLE SYSTOLIC VOLUME INDEX: 17.4 ML/M2
LEFT VENTRICLE SYSTOLIC VOLUME: 35.9 ML
LEFT VENTRICULAR INTERNAL DIMENSION IN DIASTOLE: 4.4 CM (ref 3.5–6)
LEFT VENTRICULAR MASS: 206.63 G
LVOT MG: 4.3 MMHG
LVOT MV: 1.03 CM/S
LYMPHOCYTES # BLD AUTO: 0.5 K/UL (ref 1–4.8)
LYMPHOCYTES # BLD AUTO: 0.5 K/UL (ref 1–4.8)
LYMPHOCYTES NFR BLD: 4.2 % (ref 18–48)
LYMPHOCYTES NFR BLD: 5.1 % (ref 18–48)
MAGNESIUM SERPL-MCNC: 2.4 MG/DL (ref 1.6–2.6)
MCH RBC QN AUTO: 28.1 PG (ref 27–31)
MCH RBC QN AUTO: 29 PG (ref 27–31)
MCHC RBC AUTO-ENTMCNC: 32.4 G/DL (ref 32–36)
MCHC RBC AUTO-ENTMCNC: 33.3 G/DL (ref 32–36)
MCV RBC AUTO: 87 FL (ref 82–98)
MCV RBC AUTO: 87 FL (ref 82–98)
MONOCYTES # BLD AUTO: 1.3 K/UL (ref 0.3–1)
MONOCYTES # BLD AUTO: 1.3 K/UL (ref 0.3–1)
MONOCYTES NFR BLD: 12.2 % (ref 4–15)
MONOCYTES NFR BLD: 12.7 % (ref 4–15)
NEUTROPHILS # BLD AUTO: 8 K/UL (ref 1.8–7.7)
NEUTROPHILS # BLD AUTO: 8.9 K/UL (ref 1.8–7.7)
NEUTROPHILS NFR BLD: 80 % (ref 38–73)
NEUTROPHILS NFR BLD: 81.5 % (ref 38–73)
NRBC BLD-RTO: 0 /100 WBC
NRBC BLD-RTO: 0 /100 WBC
OHS QRS DURATION: 106 MS
OHS QRS DURATION: 112 MS
OHS QTC CALCULATION: 432 MS
OHS QTC CALCULATION: 440 MS
PHOSPHATE SERPL-MCNC: 1.7 MG/DL (ref 2.7–4.5)
PISA TR MAX VEL: 2.93 M/S
PLATELET # BLD AUTO: 280 K/UL (ref 150–450)
PLATELET # BLD AUTO: 289 K/UL (ref 150–450)
PMV BLD AUTO: 9.4 FL (ref 9.2–12.9)
PMV BLD AUTO: 9.7 FL (ref 9.2–12.9)
POCT GLUCOSE: 105 MG/DL (ref 70–110)
POCT GLUCOSE: 106 MG/DL (ref 70–110)
POCT GLUCOSE: 114 MG/DL (ref 70–110)
POCT GLUCOSE: 133 MG/DL (ref 70–110)
POTASSIUM SERPL-SCNC: 4.1 MMOL/L (ref 3.5–5.1)
PROTHROMBIN TIME: 11.1 SEC (ref 9–12.5)
PV MEAN GRADIENT: 5 MMHG
RA MAJOR: 5.04 CM
RA PRESSURE ESTIMATED: 3 MMHG
RA WIDTH: 2.2 CM
RBC # BLD AUTO: 3.91 M/UL (ref 4.6–6.2)
RBC # BLD AUTO: 4.04 M/UL (ref 4.6–6.2)
RV TB RVSP: 6 MMHG
SODIUM SERPL-SCNC: 140 MMOL/L (ref 136–145)
STJ: 3.91 CM
TDI LATERAL: 0.11 M/S
TDI SEPTAL: 0.07 M/S
TDI: 0.09 M/S
TR MAX PG: 34 MMHG
TRICUSPID ANNULAR PLANE SYSTOLIC EXCURSION: 1.82 CM
TV REST PULMONARY ARTERY PRESSURE: 37 MMHG
WBC # BLD AUTO: 10.05 K/UL (ref 3.9–12.7)
WBC # BLD AUTO: 10.87 K/UL (ref 3.9–12.7)
Z-SCORE OF LEFT VENTRICULAR DIMENSION IN END DIASTOLE: -3.46
Z-SCORE OF LEFT VENTRICULAR DIMENSION IN END SYSTOLE: -1.81

## 2024-03-19 PROCEDURE — 11000001 HC ACUTE MED/SURG PRIVATE ROOM

## 2024-03-19 PROCEDURE — 25000003 PHARM REV CODE 250: Performed by: EMERGENCY MEDICINE

## 2024-03-19 PROCEDURE — 93010 ELECTROCARDIOGRAM REPORT: CPT | Mod: ,,, | Performed by: INTERNAL MEDICINE

## 2024-03-19 PROCEDURE — 85025 COMPLETE CBC W/AUTO DIFF WBC: CPT | Performed by: EMERGENCY MEDICINE

## 2024-03-19 PROCEDURE — 25000003 PHARM REV CODE 250: Performed by: INTERNAL MEDICINE

## 2024-03-19 PROCEDURE — 99233 SBSQ HOSP IP/OBS HIGH 50: CPT | Mod: ,,, | Performed by: INTERNAL MEDICINE

## 2024-03-19 PROCEDURE — 93005 ELECTROCARDIOGRAM TRACING: CPT

## 2024-03-19 PROCEDURE — 80048 BASIC METABOLIC PNL TOTAL CA: CPT | Performed by: EMERGENCY MEDICINE

## 2024-03-19 PROCEDURE — 21400001 HC TELEMETRY ROOM

## 2024-03-19 PROCEDURE — 84100 ASSAY OF PHOSPHORUS: CPT | Performed by: EMERGENCY MEDICINE

## 2024-03-19 PROCEDURE — 83735 ASSAY OF MAGNESIUM: CPT | Performed by: EMERGENCY MEDICINE

## 2024-03-19 PROCEDURE — 36415 COLL VENOUS BLD VENIPUNCTURE: CPT | Mod: XB | Performed by: EMERGENCY MEDICINE

## 2024-03-19 PROCEDURE — 63600175 PHARM REV CODE 636 W HCPCS: Performed by: INTERNAL MEDICINE

## 2024-03-19 PROCEDURE — 85025 COMPLETE CBC W/AUTO DIFF WBC: CPT | Mod: 91 | Performed by: INTERNAL MEDICINE

## 2024-03-19 PROCEDURE — 85730 THROMBOPLASTIN TIME PARTIAL: CPT | Mod: 91 | Performed by: EMERGENCY MEDICINE

## 2024-03-19 PROCEDURE — 99024 POSTOP FOLLOW-UP VISIT: CPT | Mod: ,,,

## 2024-03-19 PROCEDURE — 85610 PROTHROMBIN TIME: CPT | Performed by: INTERNAL MEDICINE

## 2024-03-19 PROCEDURE — 85730 THROMBOPLASTIN TIME PARTIAL: CPT | Performed by: INTERNAL MEDICINE

## 2024-03-19 PROCEDURE — 36415 COLL VENOUS BLD VENIPUNCTURE: CPT | Performed by: INTERNAL MEDICINE

## 2024-03-19 PROCEDURE — 63600175 PHARM REV CODE 636 W HCPCS: Performed by: SURGERY

## 2024-03-19 PROCEDURE — C9113 INJ PANTOPRAZOLE SODIUM, VIA: HCPCS | Performed by: SURGERY

## 2024-03-19 PROCEDURE — 25000003 PHARM REV CODE 250: Performed by: SURGERY

## 2024-03-19 RX ORDER — INSULIN ASPART 100 [IU]/ML
0-10 INJECTION, SOLUTION INTRAVENOUS; SUBCUTANEOUS
Status: DISCONTINUED | OUTPATIENT
Start: 2024-03-19 | End: 2024-03-20 | Stop reason: HOSPADM

## 2024-03-19 RX ORDER — LANOLIN ALCOHOL/MO/W.PET/CERES
400 CREAM (GRAM) TOPICAL DAILY
Status: DISCONTINUED | OUTPATIENT
Start: 2024-03-20 | End: 2024-03-20 | Stop reason: HOSPADM

## 2024-03-19 RX ORDER — LANOLIN ALCOHOL/MO/W.PET/CERES
400 CREAM (GRAM) TOPICAL 2 TIMES DAILY
Status: DISCONTINUED | OUTPATIENT
Start: 2024-03-19 | End: 2024-03-19

## 2024-03-19 RX ORDER — DILTIAZEM HCL/D5W 125 MG/125
15 PLASTIC BAG, INJECTION (ML) INTRAVENOUS CONTINUOUS
Status: DISCONTINUED | OUTPATIENT
Start: 2024-03-19 | End: 2024-03-20

## 2024-03-19 RX ORDER — INSULIN ASPART 100 [IU]/ML
0-5 INJECTION, SOLUTION INTRAVENOUS; SUBCUTANEOUS
Status: DISCONTINUED | OUTPATIENT
Start: 2024-03-19 | End: 2024-03-20 | Stop reason: HOSPADM

## 2024-03-19 RX ORDER — METOPROLOL TARTRATE 1 MG/ML
5 INJECTION, SOLUTION INTRAVENOUS EVERY 5 MIN PRN
Status: DISCONTINUED | OUTPATIENT
Start: 2024-03-19 | End: 2024-03-20 | Stop reason: HOSPADM

## 2024-03-19 RX ORDER — HEPARIN SODIUM,PORCINE/D5W 25000/250
0-40 INTRAVENOUS SOLUTION INTRAVENOUS CONTINUOUS
Status: DISCONTINUED | OUTPATIENT
Start: 2024-03-19 | End: 2024-03-20

## 2024-03-19 RX ORDER — PANTOPRAZOLE SODIUM 40 MG/1
40 TABLET, DELAYED RELEASE ORAL DAILY
Status: DISCONTINUED | OUTPATIENT
Start: 2024-03-20 | End: 2024-03-20 | Stop reason: HOSPADM

## 2024-03-19 RX ORDER — METOPROLOL TARTRATE 1 MG/ML
5 INJECTION, SOLUTION INTRAVENOUS EVERY 8 HOURS
Status: DISCONTINUED | OUTPATIENT
Start: 2024-03-19 | End: 2024-03-20

## 2024-03-19 RX ADMIN — METOPROLOL SUCCINATE 12.5 MG: 25 TABLET, EXTENDED RELEASE ORAL at 08:03

## 2024-03-19 RX ADMIN — CHLORHEXIDINE GLUCONATE 0.12% ORAL RINSE 10 ML: 1.2 LIQUID ORAL at 09:03

## 2024-03-19 RX ADMIN — Medication 15 MG/HR: at 11:03

## 2024-03-19 RX ADMIN — Medication 2 PACKET: at 10:03

## 2024-03-19 RX ADMIN — METOROPROLOL TARTRATE 5 MG: 5 INJECTION, SOLUTION INTRAVENOUS at 02:03

## 2024-03-19 RX ADMIN — METOROPROLOL TARTRATE 5 MG: 5 INJECTION, SOLUTION INTRAVENOUS at 09:03

## 2024-03-19 RX ADMIN — CHLORHEXIDINE GLUCONATE 0.12% ORAL RINSE 10 ML: 1.2 LIQUID ORAL at 08:03

## 2024-03-19 RX ADMIN — Medication 5 MG/HR: at 12:03

## 2024-03-19 RX ADMIN — PANTOPRAZOLE SODIUM 40 MG: 40 INJECTION, POWDER, FOR SOLUTION INTRAVENOUS at 09:03

## 2024-03-19 RX ADMIN — Medication 400 MG: at 10:03

## 2024-03-19 RX ADMIN — HEPARIN SODIUM 12 UNITS/KG/HR: 10000 INJECTION, SOLUTION INTRAVENOUS at 02:03

## 2024-03-19 RX ADMIN — POTASSIUM PHOSPHATE, MONOBASIC POTASSIUM PHOSPHATE, DIBASIC 20 MMOL: 224; 236 INJECTION, SOLUTION, CONCENTRATE INTRAVENOUS at 04:03

## 2024-03-19 RX ADMIN — Medication 2 PACKET: at 05:03

## 2024-03-19 NOTE — HPI
Mr. Ling is a 73 year old male patient whose current medical conditions include HTN, hyperlipidemia, pre-DM, obesity, ED, CKD stage III, lumbar radiculopathy, and PAD who presented to [facility] ED on 3/16/24 due to intractable nausea and vomiting over the past 2-3 days after prior robotic colectomy on 3/11/24. Initial workup in ED revealed ileus and patient subsequently had NGT placed and was admitted for further treatment. This AM, he converted to afib with RVR and cardiology was consulted to assist with management. Patient seen and examined today, sitting up in bedside chair. Feels ok overall. NGT removed, switched to liquid diet. No CV complaints. Denies CP/SOB. NO prior history of afib. Labs reviewed/stable. TTE pending.

## 2024-03-19 NOTE — PLAN OF CARE
Discussed poc with pt, pt verbalized understanding    Purposeful rounding every 2hours    VS wnl  Cardiac monitoring in use, pt is NSR, tele monitor # [id number]  Blood glucose monitoring   Fall precautions in place, remains injury free  Pain and nausea under control with PRN meds  Heparin initiated per MD order.   IVFs  Accurate I&Os  Bed locked at lowest position  Call light within reach    Chart check complete  Will cont with POC

## 2024-03-19 NOTE — ASSESSMENT & PLAN NOTE
Intractable nausea and vomiting related to postoperative ileus.  Resolved with NG tube placement    Resolved, NGT out yesterday

## 2024-03-19 NOTE — ASSESSMENT & PLAN NOTE
Patient with Paroxysmal (<7 days) atrial fibrillation which is uncontrolled currently with Beta Blocker and Calcium Channel Blocker. Patient is currently in atrial fibrillation.[id number] Score: 1. HASBLED Score: . Anticoagulation indicated. Anticoagulation done with Heparin .    Pt on Cardizem gtt plus Heparin gtt

## 2024-03-19 NOTE — ASSESSMENT & PLAN NOTE
Patient's anemia is currently controlled. Has not received any PRBCs to date. Etiology likely d/t acute blood loss which was from recent surgery and underlying colon pathology  Current CBC reviewed-   Lab Results   Component Value Date    HGB 11.7 (L) 03/19/2024    HCT 35.1 (L) 03/19/2024     Monitor serial CBC and transfuse if patient becomes hemodynamically unstable, symptomatic or H/H drops below 7/21.    Stable H/H

## 2024-03-19 NOTE — ASSESSMENT & PLAN NOTE
Chronic, controlled. Latest blood pressure and vitals reviewed-     Temp:  [97.9 °F (36.6 °C)-99.2 °F (37.3 °C)]   Pulse:  []   Resp:  [17-19]   BP: (120-178)/(58-75)   SpO2:  [92 %-97 %] .   Home meds for hypertension were reviewed and noted below.   Hypertension Medications               amLODIPine (NORVASC) 5 MG tablet TAKE 1 TABLET BY MOUTH EVERY DAY    doxazosin (CARDURA) 4 MG tablet Take 4 mg by mouth once daily.    hydroCHLOROthiazide (HYDRODIURIL) 25 MG tablet TAKE 1 TABLET BY MOUTH EVERY DAY IN THE MORNING    metoprolol succinate (TOPROL-XL) 100 MG 24 hr tablet Take 1 tablet (100 mg total) by mouth 2 (two) times daily.    olmesartan (BENICAR) 40 MG tablet Take 1 tablet (40 mg total) by mouth once daily.            While in the hospital, will manage blood pressure as follows; Adjust home antihypertensive regimen as follows- hold all antihypertensives due to postoperative ileus with intractable nausea and vomiting and current NPO state. Held Norvasc, Cardura, hydrochlorothiazide, and Benicar.  We will restart low-dose metoprolol  Will utilize p.r.n. blood pressure medication only if patient's blood pressure greater than 160/100 and he develops symptoms such as worsening chest pain or shortness of breath.

## 2024-03-19 NOTE — ASSESSMENT & PLAN NOTE
Body mass index is 36.61 kg/m². Morbid obesity complicates all aspects of disease management from diagnostic modalities to treatment. Weight loss encouraged and health benefits explained to patient.

## 2024-03-19 NOTE — SUBJECTIVE & OBJECTIVE
Past Medical History:   Diagnosis Date    Diabetes mellitus     Hyperlipidemia     Hypertension        Past Surgical History:   Procedure Laterality Date    BIOPSY OF PERITONEUM N/A 1/29/2024    Procedure: BIOPSY, PERITONEUM;  Surgeon: Tosin Boothe MD;  Location: [facility] OR;  Service: Colon and Rectal;  Laterality: N/A;    CLOSURE OF WOUND N/A 3/16/2024    Procedure: CLOSURE, WOUND;  Surgeon: Magda Stark MD;  Location: [facility] OR;  Service: General;  Laterality: N/A;    COLONOSCOPY N/A 1/5/2024    Procedure: COLONOSCOPY;  Surgeon: Irasema Gil MD;  Location: [facility] ENDO;  Service: Endoscopy;  Laterality: N/A;    DIAGNOSTIC LAPAROSCOPY N/A 1/29/2024    Procedure: LAPAROSCOPY, DIAGNOSTIC;  Surgeon: Tosin Boothe MD;  Location: [facility] OR;  Service: Colon and Rectal;  Laterality: N/A;    EPIDURAL STEROID INJECTION N/A 11/13/2023    Procedure: Lumbar L5/S1 IL JOSE;  Surgeon: Oneil Carlson MD;  Location: [facility] PAIN MGT;  Service: Pain Management;  Laterality: N/A;    ESOPHAGOGASTRODUODENOSCOPY N/A 1/5/2024    Procedure: EGD (ESOPHAGOGASTRODUODENOSCOPY);  Surgeon: Irasema Gil MD;  Location: [facility];  Service: Endoscopy;  Laterality: N/A;    INJECTION OF ANESTHETIC AGENT INTO TISSUE PLANE DEFINED BY TRANSVERSUS ABDOMINIS MUSCLE N/A 3/11/2024    Procedure: BLOCK, TRANSVERSUS ABDOMINIS PLANE;  Surgeon: Tosin Boothe MD;  Location: [facility] OR;  Service: Colon and Rectal;  Laterality: N/A;    NO PAST SURGERIES      WOUND EXPLORATION N/A 3/16/2024    Procedure: EXPLORATION, WOUND;  Surgeon: Magda Stark MD;  Location: [facility] OR;  Service: General;  Laterality: N/A;    XI ROBOTIC COLECTOMY, RIGHT Right 3/11/2024    Procedure: XI ROBOTIC COLECTOMY, RIGHT;  Surgeon: Tosin Boothe MD;  Location: [facility] OR;  Service: Colon and Rectal;  Laterality: Right;  WITH OMENECTOMY AND AMNIOFIX       Review of patient's allergies indicates:  No Known Allergies    No current facility-administered  medications on file prior to encounter.     Current Outpatient Medications on File Prior to Encounter   Medication Sig    amLODIPine (NORVASC) 5 MG tablet TAKE 1 TABLET BY MOUTH EVERY DAY    atorvastatin (LIPITOR) 20 MG tablet TAKE 1 TABLET BY MOUTH EVERY DAY    doxazosin (CARDURA) 4 MG tablet Take 4 mg by mouth once daily.    fenofibrate (TRICOR) 54 MG tablet Take 1 tablet (54 mg total) by mouth once daily.    hydroCHLOROthiazide (HYDRODIURIL) 25 MG tablet TAKE 1 TABLET BY MOUTH EVERY DAY IN THE MORNING    metFORMIN (GLUCOPHAGE) 500 MG tablet TAKE 1 TABLET BY MOUTH TWICE A DAY WITH FOOD    metoclopramide HCl (REGLAN) 10 MG tablet Take 1 tablet (10 mg total) by mouth 4 (four) times daily before meals and nightly.    metoprolol succinate (TOPROL-XL) 100 MG 24 hr tablet Take 1 tablet (100 mg total) by mouth 2 (two) times daily.    olmesartan (BENICAR) 40 MG tablet Take 1 tablet (40 mg total) by mouth once daily.    oxyCODONE (ROXICODONE) 5 MG immediate release tablet Take 1 tablet (5 mg total) by mouth every 6 (six) hours as needed for Pain.    pregabalin (LYRICA) 75 MG capsule Take 1 capsule (75 mg total) by mouth 2 (two) times daily.    tiZANidine (ZANAFLEX) 4 MG tablet TAKE 1/2-1 TABLET BY MOUTH 2 TIMES DAILY AS NEEDED FOR MUSCLE SPASMS MAY CAUSE DROWSINESS.    traMADoL (ULTRAM) 50 mg tablet Take 1 tablet (50 mg total) by mouth every 6 (six) hours as needed for Pain.    metroNIDAZOLE (FLAGYL) 500 MG tablet Take 1 tablet (500 mg total) by mouth 3 (three) times daily. Take initial dose at 2pm, second dose at 3pm and final dose at 10pm the day before surgery    neomycin (MYCIFRADIN) 500 mg Tab Take 2 tablets (1,000 mg total) by mouth 3 (three) times daily. Take initial dose at 2pm, take second dose at 3pm and take final dose at 10pm the day before surgery.    polyethylene glycol (GLYCOLAX) 17 gram/dose powder Take 238 g by mouth once daily. Mix with 2L of gatorade and drink all of mixed solution starting at 12pm the  day before surgery, finishing by 10pm the night before surgery.     Family History       Problem Relation (Age of Onset)    No Known Problems Mother, Father          Tobacco Use    Smoking status: Former     Passive exposure: Never    Smokeless tobacco: Never   Substance and Sexual Activity    Alcohol use: Not Currently     Comment: OCC    Drug use: Never    Sexual activity: Yes     Partners: Female     Review of Systems   Constitutional: Positive for malaise/fatigue.   HENT: Negative.     Eyes: Negative.    Cardiovascular: Negative.    Respiratory: Negative.     Endocrine: Negative.    Hematologic/Lymphatic: Negative.    Skin: Negative.    Musculoskeletal: Negative.    Gastrointestinal: Negative.    Genitourinary: Negative.    Neurological:  Positive for weakness.   Psychiatric/Behavioral: Negative.     Allergic/Immunologic: Negative.      Objective:     Vital Signs (Most Recent):  Temp: 98.9 °F (37.2 °C) (03/19/24 1204)  Pulse: (!) 127 (03/19/24 1204)  Resp: 19 (03/19/24 1204)  BP: 120/65 (03/19/24 1204)  SpO2: 97 % (03/19/24 1204) Vital Signs (24h Range):  Temp:  [97.9 °F (36.6 °C)-99.2 °F (37.3 °C)] 98.9 °F (37.2 °C)  Pulse:  [] 127  Resp:  [17-20] 19  SpO2:  [92 %-97 %] 97 %  BP: (120-178)/(58-75) 120/65     Weight: 99.7 kg (219 lb 12.8 oz)  Body mass index is 36.58 kg/m².    SpO2: 97 %         Intake/Output Summary (Last 24 hours) at 3/19/2024 1316  Last data filed at 3/19/2024 1051  Gross per 24 hour   Intake 240 ml   Output 200 ml   Net 40 ml       Lines/Drains/Airways       Peripheral Intravenous Line  Duration                  Peripheral IV - Single Lumen 03/16/24 0230 20 G Anterior;Left Forearm 3 days                     Physical Exam  Vitals and nursing note reviewed.   Constitutional:       General: He is not in acute distress.     Appearance: Normal appearance. He is well-developed. He is not diaphoretic.   HENT:      Head: Normocephalic and atraumatic.   Eyes:      General:         Right eye: No  discharge.         Left eye: No discharge.      Pupils: Pupils are equal, round, and reactive to light.   Neck:      Thyroid: No thyromegaly.      Vascular: No JVD.      Trachea: No tracheal deviation.   Cardiovascular:      Rate and Rhythm: Tachycardia present. Rhythm irregularly irregular.      Heart sounds: S1 normal and S2 normal. Murmur heard.      Harsh midsystolic murmur is present at the upper right sternal border radiating to the neck.   Pulmonary:      Effort: Pulmonary effort is normal. No respiratory distress.      Breath sounds: Normal breath sounds. No wheezing or rales.   Abdominal:      General: There is no distension.   Musculoskeletal:      Cervical back: Neck supple.      Right lower leg: Edema present.      Left lower leg: Edema present.   Skin:     General: Skin is warm and dry.      Findings: No erythema.   Neurological:      General: No focal deficit present.      Mental Status: He is alert and oriented to person, place, and time.   Psychiatric:         Mood and Affect: Mood normal.         Behavior: Behavior normal.          Significant Labs: CMP   Recent Labs   Lab 03/18/24  0538 03/19/24  0555    140   K 4.0 4.1    101   CO2 23 23   GLU 96 115*   BUN 39* 40*   CREATININE 1.4 1.3   CALCIUM 8.3* 8.2*   ALBUMIN 2.5*  --    ANIONGAP 15 16   , CBC   Recent Labs   Lab 03/18/24  0538 03/19/24  0555   WBC 8.10 10.87   HGB 10.1* 11.0*   HCT 31.4* 33.9*    289   , and All pertinent lab results from the last 24 hours have been reviewed.    Significant Imaging: Echocardiogram: Transthoracic echo (TTE) complete (Cupid Only):   Results for orders placed or performed during the hospital encounter of 10/02/23   Echo   Result Value Ref Range    BSA 2.14 m2    LVOT stroke volume 85.91 cm3    LVIDd 5.19 3.5 - 6.0 cm    LV Systolic Volume 44.44 mL    LV Systolic Volume Index 21.6 mL/m2    LVIDs 3.31 2.1 - 4.0 cm    LV Diastolic Volume 129.21 mL    LV Diastolic Volume Index 62.72 mL/m2    IVS  1.46 (A) 0.6 - 1.1 cm    LVOT diameter 1.97 cm    LVOT area 3.0 cm2    FS 36 28 - 44 %    Left Ventricle Relative Wall Thickness 0.53 cm    Posterior Wall 1.38 (A) 0.6 - 1.1 cm    LV mass 315.08 g    LV Mass Index 153 g/m2    MV Peak E Norman 1.00 m/s    TDI LATERAL 0.10 m/s    TDI SEPTAL 0.09 m/s    E/E' ratio 10.53 m/s    MV Peak A Norman 0.93 m/s    TR Max Norman 2.06 m/s    E/A ratio 1.08     IVRT 57.09 msec    E wave deceleration time 202.27 msec    LV SEPTAL E/E' RATIO 11.11 m/s    LV LATERAL E/E' RATIO 10.00 m/s    LVOT peak norman 1.04 m/s    Left Ventricular Outflow Tract Mean Velocity 0.73 cm/s    Left Ventricular Outflow Tract Mean Gradient 2.37 mmHg    LA size 3.74 cm    Left Atrium Minor Axis 5.47 cm    Left Atrium Major Axis 5.44 cm    RVOT peak VTI 26.5 cm    TAPSE 2.34 cm    RA Major Axis 4.31 cm    RA Width 2.99 cm    AV regurgitation pressure 1/2 time 1,080.771898992945824 ms    AR Max Norman 2.44 m/s    AV mean gradient 17 mmHg    AV peak gradient 25 mmHg    Ao peak norman 2.52 m/s    Ao VTI 77.50 cm    LVOT peak VTI 28.20 cm    AV valve area 1.11 cm²    AV Velocity Ratio 0.41     AV index (prosthetic) 0.36     DOLLY by Velocity Ratio 1.26 cm²    Mr max norman 3.20 m/s    MV stenosis pressure 1/2 time 58.66 ms    MV valve area p 1/2 method 3.75 cm2    Triscuspid Valve Regurgitation Peak Gradient 17 mmHg    PV mean gradient 3 mmHg    RVOT peak norman 1.01 m/s    Ao root annulus 3.37 cm    Sinus 3.44 cm    STJ 3.25 cm    Ascending aorta 3.21 cm    IVC diameter 1.85 cm    Mean e' 0.10 m/s    ZLVIDS -1.11     ZLVIDD -1.81     LA Volume Index 36.2 mL/m2    LA volume 74.57 cm3    LA WIDTH 4.3 cm    TV resting pulmonary artery pressure 20 mmHg    RV TB RVSP 5 mmHg    Est. RA pres 3 mmHg    Narrative      Left Ventricle: The left ventricle is normal in size. Moderately   increased ventricular mass. Moderately increased wall thickness. There is   concentric hypertrophy. Normal wall motion. There is normal systolic   function with a  visually estimated ejection fraction of 55 - 60%. There is   normal diastolic function.    Left Atrium: Left atrium is mildly dilated.    Right Ventricle: Normal right ventricular cavity size. Wall thickness   is normal. Right ventricle wall motion  is normal. Systolic function is   normal.    Aortic Valve: There is mild to moderate stenosis. Aortic valve area by   VTI is 1.11 cm². Aortic valve peak velocity is 2.52 m/s. Mean gradient is   17 mmHg. The dimensionless index is 0.36.    Mitral Valve: There is mild mitral annular calcification present.    Pulmonary Artery: The estimated pulmonary artery systolic pressure is   20 mmHg.    IVC/SVC: Normal venous pressure at 3 mmHg.      and EKG: Reviewed

## 2024-03-19 NOTE — PROGRESS NOTES
[facility] Surg  Colorectal Surgery  Progress Note    Subjective:     Interval History: new afib rvr, cardio consulted. No n/v, passing flatus and had a BM. Less distended    Medications:  Continuous Infusions:   dilTIAZem 5 mg/hr (03/19/24 1208)    heparin (porcine) in D5W       Scheduled Meds:   chlorhexidine  10 mL Mouth/Throat BID    heparin (PORCINE)  60 Units/kg (Adjusted) Intravenous Once    magnesium oxide  400 mg Oral BID    metoprolol  5 mg Intravenous Q8H    [START ON 3/20/2024] pantoprazole  40 mg Oral Daily    potassium, sodium phosphates  2 packet Oral QID (AC & HS)     PRN Meds:glucagon (human recombinant), glucose, glucose, heparin (PORCINE), heparin (PORCINE), hydrALAZINE, metoprolol, morphine, naloxone, ondansetron, sodium chloride 0.9%     Review of patient's allergies indicates:  No Known Allergies  Objective:     Vital Signs (Most Recent):  Temp: 98.9 °F (37.2 °C) (03/19/24 1204)  Pulse: (!) 127 (03/19/24 1204)  Resp: 19 (03/19/24 1204)  BP: 120/65 (03/19/24 1204)  SpO2: 97 % (03/19/24 1204) Vital Signs (24h Range):  Temp:  [97.9 °F (36.6 °C)-99.2 °F (37.3 °C)] 98.9 °F (37.2 °C)  Pulse:  [] 127  Resp:  [17-20] 19  SpO2:  [92 %-97 %] 97 %  BP: (120-178)/(58-75) 120/65     Weight: 99.8 kg (220 lb)  Body mass index is 36.61 kg/m².    Intake/Output - Last 3 Shifts         03/17 0700  03/18 0659 03/18 0700  03/19 0659 03/19 0700  03/20 0659    P.O.   240    I.V. (mL/kg) 4363.6 (43.8)      IV Piggyback 196.9      Total Intake(mL/kg) 4560.6 (45.7)  240 (2.4)    Urine (mL/kg/hr) 25 (0)      Drains  600     Stool 0 0     Total Output 25 600     Net +4535.6 -600 +240           Stool Occurrence 1 x 1 x              Physical Exam  Constitutional:       Appearance: He is well-developed.   HENT:      Head: Normocephalic and atraumatic.   Eyes:      Conjunctiva/sclera: Conjunctivae normal.      Pupils: Pupils are equal, round, and reactive to light.   Neck:      Thyroid: No thyromegaly.    Cardiovascular:      Rate and Rhythm: Tachycardia present.   Pulmonary:      Effort: Pulmonary effort is normal. No respiratory distress.   Abdominal:      Comments: Distended, tympanic, soft, nontender. Incision c/d/I, abdominal binder in place   Musculoskeletal:         General: No tenderness. Normal range of motion.      Cervical back: Normal range of motion.   Skin:     General: Skin is warm and dry.      Capillary Refill: Capillary refill takes less than 2 seconds.   Neurological:      General: No focal deficit present.      Mental Status: He is alert and oriented to person, place, and time.          Significant Labs:  I have reviewed all pertinent lab results within the past 24 hours.  CBC:   Recent Labs   Lab 03/19/24  1310   WBC 10.05   RBC 4.04*   HGB 11.7*   HCT 35.1*      MCV 87   MCH 29.0   MCHC 33.3     BMP:   Recent Labs   Lab 03/19/24  0555   *      K 4.1      CO2 23   BUN 40*   CREATININE 1.3   CALCIUM 8.2*   MG 2.4     CMP:   Recent Labs   Lab 03/17/24  0531 03/18/24  0538 03/19/24  0555    96 115*   CALCIUM 8.1* 8.3* 8.2*   ALBUMIN 2.7* 2.5*  --    PROT 5.7*  --   --     139 140   K 3.3* 4.0 4.1   CO2 27 23 23    101 101   BUN 44* 39* 40*   CREATININE 2.2* 1.4 1.3   ALKPHOS 40*  --   --    ALT 17  --   --    AST 15  --   --    BILITOT 0.7  --   --        Significant Diagnostics:  I have reviewed all pertinent imaging results/findings within the past 24 hours.    EXAMINATION:  CT ABDOMEN PELVIS WITH IV CONTRAST     CLINICAL HISTORY:  Ileus, rule out abscess;     TECHNIQUE:  Axial CT imaging was performed through the abdomen and pelvis with 100cc  of intravenous contrast. Multiplanar reformats were performed and interpreted.     COMPARISON:  03/16/2024     FINDINGS:  Trace right pleural effusion.  NG tube in the stomach.     The liver, pancreas, spleen, and gallbladder within normal limits.  Subcentimeter right renal cyst and renal vascular  calcifications.  Adenomatous hyperplasia of the bilateral adrenal glands.     No biliary ductal dilatation.     Moderate dilatation the small bowel with long segments of bowel wall thickening greatest at the distal ileum extending to the ileocolic anastomosis.  Scattered air-fluid levels throughout the small bowel.  No transition point identified.  Small amount of loculated fluid in the right mid abdomen measuring up to 6.6 x 2.5 cm.  Small bubbles of air in the anterior abdominal wall associated with recent laparotomy.  No free intraperitoneral air.     Aortic atherosclerosis without evidence of aneurysm.     Air within the urinary bladder likely from recent catheterization.  The prostate is within normal limits.  No free pelvic fluid or adenopathy.     Multilevel lumbar spondylosis.     Impression:     Adynamic ileus pattern with enteritis in this patient that is status post recent laparotomy.     There is a small amount of loculated fluid in the right mid abdomen.  The largest component of the fluid collection measures 6.6 x 2.5 cm on axial image 92.  Recommend close follow-up.  Assessment/Plan:     * Ileus  POD #8 s/p robotic right hemicolectomy with post operative ileus.     - NGT removed as pt started having bowel function  - start CLD and monitor for tolerance  - CT yesterday reviewed - fluid collection not rim enhancing, will monitor  - OOB ambulating frequently  - replace electrolytes as needed    PAF (paroxysmal atrial fibrillation)  Cardiology consulted, started heparin    Dehiscence of fascia  POD #3 s/p closure of fascia.      - abdominal binder when OOB    Hyponatremia  Per hospital medicine    Intractable nausea and vomiting  2/2 ileus     Normocytic anemia  Per hospital medicine    Stage 3a chronic kidney disease  Per hospital medicine    Severe obesity (BMI 35.0-39.9) with comorbidity  Per hospital medicine    Prediabetes  Per hospital medicine    Essential hypertension  Per hospital  medicine        Sarita Gomez PA-C  Colorectal Surgery  [facility]

## 2024-03-19 NOTE — CONSULTS
[facility]  Cardiology  Consult Note    Patient Name: Quintin Ling  MRN: [id number]  Admission Date: 3/16/2024  Hospital Length of Stay: 2 days  Code Status: Full Code   Attending Provider: Ildefonso Cross MD   Consulting Provider: Rose Marquez PA-C  Primary Care Physician: Bishop Gabriel MD  Principal Problem:Ileus    Patient information was obtained from patient, past medical records, and ER records.     Inpatient consult to Cardiology  Consult performed by: Rose Marquez PA-C  Consult ordered by: Ildefonso Cross MD      Inpatient consult to Cardiology  Consult performed by: Rose Marquez PA-C  Consult ordered by: Ildefonso Cross MD        Subjective:     Chief Complaint:  Ileus    HPI:   Mr. Ling is a 73 year old male patient whose current medical conditions include HTN, hyperlipidemia, pre-DM, obesity, ED, CKD stage III, lumbar radiculopathy, and PAD who presented to [facility] ED on 3/16/24 due to intractable nausea and vomiting over the past 2-3 days after prior robotic colectomy on 3/11/24. Initial workup in ED revealed ileus and patient subsequently had NGT placed and was admitted for further treatment. This AM, he converted to afib with RVR and cardiology was consulted to assist with management. Patient seen and examined today, sitting up in bedside chair. Feels ok overall. NGT removed, switched to liquid diet. No CV complaints. Denies CP/SOB. NO prior history of afib. Labs reviewed/stable. TTE pending.       Past Medical History:   Diagnosis Date    Diabetes mellitus     Hyperlipidemia     Hypertension        Past Surgical History:   Procedure Laterality Date    BIOPSY OF PERITONEUM N/A 1/29/2024    Procedure: BIOPSY, PERITONEUM;  Surgeon: Tosin Boothe MD;  Location: [facility] OR;  Service: Colon and Rectal;  Laterality: N/A;    CLOSURE OF WOUND N/A 3/16/2024    Procedure: CLOSURE, WOUND;  Surgeon: Magda Stark MD;  Location: [facility] OR;  Service: General;  Laterality:  N/A;    COLONOSCOPY N/A 1/5/2024    Procedure: COLONOSCOPY;  Surgeon: Irasema Gil MD;  Location: [facility] ENDO;  Service: Endoscopy;  Laterality: N/A;    DIAGNOSTIC LAPAROSCOPY N/A 1/29/2024    Procedure: LAPAROSCOPY, DIAGNOSTIC;  Surgeon: Tosin Boothe MD;  Location: [facility] OR;  Service: Colon and Rectal;  Laterality: N/A;    EPIDURAL STEROID INJECTION N/A 11/13/2023    Procedure: Lumbar L5/S1 IL JOSE;  Surgeon: Oneil Carlson MD;  Location: [facility] PAIN MGT;  Service: Pain Management;  Laterality: N/A;    ESOPHAGOGASTRODUODENOSCOPY N/A 1/5/2024    Procedure: EGD (ESOPHAGOGASTRODUODENOSCOPY);  Surgeon: Irasema Gil MD;  Location: [facility] ENDO;  Service: Endoscopy;  Laterality: N/A;    INJECTION OF ANESTHETIC AGENT INTO TISSUE PLANE DEFINED BY TRANSVERSUS ABDOMINIS MUSCLE N/A 3/11/2024    Procedure: BLOCK, TRANSVERSUS ABDOMINIS PLANE;  Surgeon: Tosin Boothe MD;  Location: [facility] OR;  Service: Colon and Rectal;  Laterality: N/A;    NO PAST SURGERIES      WOUND EXPLORATION N/A 3/16/2024    Procedure: EXPLORATION, WOUND;  Surgeon: Magda Stark MD;  Location: [facility] OR;  Service: General;  Laterality: N/A;    XI ROBOTIC COLECTOMY, RIGHT Right 3/11/2024    Procedure: XI ROBOTIC COLECTOMY, RIGHT;  Surgeon: Tosin Boothe MD;  Location: [facility] OR;  Service: Colon and Rectal;  Laterality: Right;  WITH OMENECTOMY AND AMNIOFIX       Review of patient's allergies indicates:  No Known Allergies    No current facility-administered medications on file prior to encounter.     Current Outpatient Medications on File Prior to Encounter   Medication Sig    amLODIPine (NORVASC) 5 MG tablet TAKE 1 TABLET BY MOUTH EVERY DAY    atorvastatin (LIPITOR) 20 MG tablet TAKE 1 TABLET BY MOUTH EVERY DAY    doxazosin (CARDURA) 4 MG tablet Take 4 mg by mouth once daily.    fenofibrate (TRICOR) 54 MG tablet Take 1 tablet (54 mg total) by mouth once daily.    hydroCHLOROthiazide (HYDRODIURIL) 25 MG tablet TAKE 1 TABLET BY MOUTH  EVERY DAY IN THE MORNING    metFORMIN (GLUCOPHAGE) 500 MG tablet TAKE 1 TABLET BY MOUTH TWICE A DAY WITH FOOD    metoclopramide HCl (REGLAN) 10 MG tablet Take 1 tablet (10 mg total) by mouth 4 (four) times daily before meals and nightly.    metoprolol succinate (TOPROL-XL) 100 MG 24 hr tablet Take 1 tablet (100 mg total) by mouth 2 (two) times daily.    olmesartan (BENICAR) 40 MG tablet Take 1 tablet (40 mg total) by mouth once daily.    oxyCODONE (ROXICODONE) 5 MG immediate release tablet Take 1 tablet (5 mg total) by mouth every 6 (six) hours as needed for Pain.    pregabalin (LYRICA) 75 MG capsule Take 1 capsule (75 mg total) by mouth 2 (two) times daily.    tiZANidine (ZANAFLEX) 4 MG tablet TAKE 1/2-1 TABLET BY MOUTH 2 TIMES DAILY AS NEEDED FOR MUSCLE SPASMS MAY CAUSE DROWSINESS.    traMADoL (ULTRAM) 50 mg tablet Take 1 tablet (50 mg total) by mouth every 6 (six) hours as needed for Pain.    metroNIDAZOLE (FLAGYL) 500 MG tablet Take 1 tablet (500 mg total) by mouth 3 (three) times daily. Take initial dose at 2pm, second dose at 3pm and final dose at 10pm the day before surgery    neomycin (MYCIFRADIN) 500 mg Tab Take 2 tablets (1,000 mg total) by mouth 3 (three) times daily. Take initial dose at 2pm, take second dose at 3pm and take final dose at 10pm the day before surgery.    polyethylene glycol (GLYCOLAX) 17 gram/dose powder Take 238 g by mouth once daily. Mix with 2L of gatorade and drink all of mixed solution starting at 12pm the day before surgery, finishing by 10pm the night before surgery.     Family History       Problem Relation (Age of Onset)    No Known Problems Mother, Father          Tobacco Use    Smoking status: Former     Passive exposure: Never    Smokeless tobacco: Never   Substance and Sexual Activity    Alcohol use: Not Currently     Comment: OCC    Drug use: Never    Sexual activity: Yes     Partners: Female     Review of Systems   Constitutional: Positive for malaise/fatigue.   HENT:  Negative.     Eyes: Negative.    Cardiovascular: Negative.    Respiratory: Negative.     Endocrine: Negative.    Hematologic/Lymphatic: Negative.    Skin: Negative.    Musculoskeletal: Negative.    Gastrointestinal: Negative.    Genitourinary: Negative.    Neurological:  Positive for weakness.   Psychiatric/Behavioral: Negative.     Allergic/Immunologic: Negative.      Objective:     Vital Signs (Most Recent):  Temp: 98.9 °F (37.2 °C) (03/19/24 1204)  Pulse: (!) 127 (03/19/24 1204)  Resp: 19 (03/19/24 1204)  BP: 120/65 (03/19/24 1204)  SpO2: 97 % (03/19/24 1204) Vital Signs (24h Range):  Temp:  [97.9 °F (36.6 °C)-99.2 °F (37.3 °C)] 98.9 °F (37.2 °C)  Pulse:  [] 127  Resp:  [17-20] 19  SpO2:  [92 %-97 %] 97 %  BP: (120-178)/(58-75) 120/65     Weight: 99.7 kg (219 lb 12.8 oz)  Body mass index is 36.58 kg/m².    SpO2: 97 %         Intake/Output Summary (Last 24 hours) at 3/19/2024 1316  Last data filed at 3/19/2024 1051  Gross per 24 hour   Intake 240 ml   Output 200 ml   Net 40 ml       Lines/Drains/Airways       Peripheral Intravenous Line  Duration                  Peripheral IV - Single Lumen 03/16/24 0230 20 G Anterior;Left Forearm 3 days                     Physical Exam  Vitals and nursing note reviewed.   Constitutional:       General: He is not in acute distress.     Appearance: Normal appearance. He is well-developed. He is not diaphoretic.   HENT:      Head: Normocephalic and atraumatic.   Eyes:      General:         Right eye: No discharge.         Left eye: No discharge.      Pupils: Pupils are equal, round, and reactive to light.   Neck:      Thyroid: No thyromegaly.      Vascular: No JVD.      Trachea: No tracheal deviation.   Cardiovascular:      Rate and Rhythm: Tachycardia present. Rhythm irregularly irregular.      Heart sounds: S1 normal and S2 normal. Murmur heard.      Harsh midsystolic murmur is present at the upper right sternal border radiating to the neck.   Pulmonary:      Effort:  Pulmonary effort is normal. No respiratory distress.      Breath sounds: Normal breath sounds. No wheezing or rales.   Abdominal:      General: There is no distension.   Musculoskeletal:      Cervical back: Neck supple.      Right lower leg: Edema present.      Left lower leg: Edema present.   Skin:     General: Skin is warm and dry.      Findings: No erythema.   Neurological:      General: No focal deficit present.      Mental Status: He is alert and oriented to person, place, and time.   Psychiatric:         Mood and Affect: Mood normal.         Behavior: Behavior normal.          Significant Labs: CMP   Recent Labs   Lab 03/18/24  0538 03/19/24  0555    140   K 4.0 4.1    101   CO2 23 23   GLU 96 115*   BUN 39* 40*   CREATININE 1.4 1.3   CALCIUM 8.3* 8.2*   ALBUMIN 2.5*  --    ANIONGAP 15 16   , CBC   Recent Labs   Lab 03/18/24  0538 03/19/24  0555   WBC 8.10 10.87   HGB 10.1* 11.0*   HCT 31.4* 33.9*    289   , and All pertinent lab results from the last 24 hours have been reviewed.    Significant Imaging: Echocardiogram: Transthoracic echo (TTE) complete (Cupid Only):   Results for orders placed or performed during the hospital encounter of 10/02/23   Echo   Result Value Ref Range    BSA 2.14 m2    LVOT stroke volume 85.91 cm3    LVIDd 5.19 3.5 - 6.0 cm    LV Systolic Volume 44.44 mL    LV Systolic Volume Index 21.6 mL/m2    LVIDs 3.31 2.1 - 4.0 cm    LV Diastolic Volume 129.21 mL    LV Diastolic Volume Index 62.72 mL/m2    IVS 1.46 (A) 0.6 - 1.1 cm    LVOT diameter 1.97 cm    LVOT area 3.0 cm2    FS 36 28 - 44 %    Left Ventricle Relative Wall Thickness 0.53 cm    Posterior Wall 1.38 (A) 0.6 - 1.1 cm    LV mass 315.08 g    LV Mass Index 153 g/m2    MV Peak E Norman 1.00 m/s    TDI LATERAL 0.10 m/s    TDI SEPTAL 0.09 m/s    E/E' ratio 10.53 m/s    MV Peak A Norman 0.93 m/s    TR Max Norman 2.06 m/s    E/A ratio 1.08     IVRT 57.09 msec    E wave deceleration time 202.27 msec    LV SEPTAL E/E' RATIO  11.11 m/s    LV LATERAL E/E' RATIO 10.00 m/s    LVOT peak norman 1.04 m/s    Left Ventricular Outflow Tract Mean Velocity 0.73 cm/s    Left Ventricular Outflow Tract Mean Gradient 2.37 mmHg    LA size 3.74 cm    Left Atrium Minor Axis 5.47 cm    Left Atrium Major Axis 5.44 cm    RVOT peak VTI 26.5 cm    TAPSE 2.34 cm    RA Major Axis 4.31 cm    RA Width 2.99 cm    AV regurgitation pressure 1/2 time 1,[id number] ms    AR Max Norman 2.44 m/s    AV mean gradient 17 mmHg    AV peak gradient 25 mmHg    Ao peak norman 2.52 m/s    Ao VTI 77.50 cm    LVOT peak VTI 28.20 cm    AV valve area 1.11 cm²    AV Velocity Ratio 0.41     AV index (prosthetic) 0.36     DOLLY by Velocity Ratio 1.26 cm²    Mr max norman 3.20 m/s    MV stenosis pressure 1/2 time 58.66 ms    MV valve area p 1/2 method 3.75 cm2    Triscuspid Valve Regurgitation Peak Gradient 17 mmHg    PV mean gradient 3 mmHg    RVOT peak norman 1.01 m/s    Ao root annulus 3.37 cm    Sinus 3.44 cm    STJ 3.25 cm    Ascending aorta 3.21 cm    IVC diameter 1.85 cm    Mean e' 0.10 m/s    ZLVIDS -1.11     ZLVIDD -1.81     LA Volume Index 36.2 mL/m2    LA volume 74.57 cm3    LA WIDTH 4.3 cm    TV resting pulmonary artery pressure 20 mmHg    RV TB RVSP 5 mmHg    Est. RA pres 3 mmHg    Narrative      Left Ventricle: The left ventricle is normal in size. Moderately   increased ventricular mass. Moderately increased wall thickness. There is   concentric hypertrophy. Normal wall motion. There is normal systolic   function with a visually estimated ejection fraction of 55 - 60%. There is   normal diastolic function.    Left Atrium: Left atrium is mildly dilated.    Right Ventricle: Normal right ventricular cavity size. Wall thickness   is normal. Right ventricle wall motion  is normal. Systolic function is   normal.    Aortic Valve: There is mild to moderate stenosis. Aortic valve area by   VTI is 1.11 cm². Aortic valve peak velocity is 2.52 m/s. Mean gradient is   17 mmHg. The dimensionless  index is 0.36.    Mitral Valve: There is mild mitral annular calcification present.    Pulmonary Artery: The estimated pulmonary artery systolic pressure is   20 mmHg.    IVC/SVC: Normal venous pressure at 3 mmHg.      and EKG: Reviewed  Assessment and Plan:   Patient who presents with PAF in setting of ileus. Cardizem and heparin drips initiated. TTE pending.     * Ileus  -Mgmt as per hospital medicine and general surgery    Nonrheumatic aortic valve stenosis  -Reassess by TTE    PAF (paroxysmal atrial fibrillation)  -Converted to afib with RVR  -Cardizem and heparin drips initiated  -TTE pending    Severe obesity (BMI 35.0-39.9) with comorbidity  -Weight loss        VTE Risk Mitigation (From admission, onward)           Ordered     heparin 25,000 units in dextrose 5% (100 units/ml) IV bolus from bag LOW INTENSITY nomogram - [facility]  As needed (PRN)        Question:  Heparin Infusion Adjustment (DO NOT MODIFY ANSWER)  Answer:  \[email or web address]\epic\Images\Pharmacy\HeparinInfusions\heparin LOW INTENSITY nomogram for [facility] [id number]    03/19/24 1259     heparin 25,000 units in dextrose 5% (100 units/ml) IV bolus from bag LOW INTENSITY nomogram - [facility]  As needed (PRN)        Question:  Heparin Infusion Adjustment (DO NOT MODIFY ANSWER)  Answer:  \[email or web address]\epic\Images\Pharmacy\HeparinInfusions\heparin LOW INTENSITY nomogram for [facility] [id number]    03/19/24 1259     heparin 25,000 units in dextrose 5% (100 units/ml) IV bolus from bag LOW INTENSITY nomogram - [facility]  Once        Question:  Heparin Infusion Adjustment (DO NOT MODIFY ANSWER)  Answer:  \[email or web address]\epic\Images\Pharmacy\HeparinInfusions\heparin LOW INTENSITY nomogram for [facility] [id number]    03/19/24 1259     heparin 25,000 units in dextrose 5% 250 mL (100 units/mL) infusion LOW INTENSITY nomogram - [facility]  Continuous        Question:  Begin at (units/kg/hr)  Answer:  12    03/19/24 1259     IP VTE HIGH RISK PATIENT  Once         03/16/24 0507     Place sequential  compression device  Until discontinued         03/16/24 9155                    Thank you for your consult. I will follow-up with patient. Please contact us if you have any additional questions.    Rose Marquez PA-C  Cardiology   [facility]

## 2024-03-19 NOTE — ASSESSMENT & PLAN NOTE
POD #8 s/p robotic right hemicolectomy with post operative ileus.     - NGT removed as pt started having bowel function  - start CLD and monitor for tolerance  - CT yesterday reviewed - fluid collection not rim enhancing, will monitor  - OOB ambulating frequently  - replace electrolytes as needed

## 2024-03-19 NOTE — PROGRESS NOTES
[facility] Medicine  Progress Note    Patient Name: Quintin Ling  MRN: [id number]  Patient Class: IP- Inpatient   Admission Date: 3/16/2024  Length of Stay: 2 days  Attending Physician: Ildefonso Cross MD  Primary Care Provider: Bishop Gabriel MD        Subjective:     Principal Problem:Ileus        HPI:  73-year-old white man with history of hypertension, hyperlipidemia, prediabetes, obesity, erectile dysfunction, chronic kidney disease stage IIIA, lumbar radiculopathy, normocytic anemia, cold now adenoma, and peripheral arterial disease who presented to the emergency department with complaint of intractable nausea with vomiting over the last 2-3 days.  He has had associated abdominal pain and abdominal distention.  He can not identify any aggravating or alleviating factors.  He denies any associated fevers or chills.  Patient has had flatus but denies any bowel movements.    Recent hospitalization 3/11-03/12/2024 for colon adenoma status post robotic right colectomy on 03/11/2024.  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    Overview/Hospital Course:  Patient is a 73-year-old male who underwent robotic right hemicolectomy and omentectomy on 03/11/2024.  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  Patient returned to the hospital on postop day 3 complaining of acid reflux, vomiting, and drainage from his wound.  KUB was obtained concerning for possible small bowel obstruction.  To nausea vomiting he presented emergency department he was noted to have mild BRANDY.  CT scan showed dilated loops of small bowel consistent with ileus but also dehiscence of wound.  NG tube was placed with 800 cc of output.  He was taken back to OR for exploration and closure fascia.  Patient continues to have NG tube.  He has moderate amount of output and has not had return of bowel function yet.   General surgery following    BRANDY likely secondary to large GI fluid losses we will increase IV fluid repletion and check bladder scan for incomplete emptying.  Creatinine up to 2.2.    Electrolyte disorders including hyponatremia, hypokalemia.  These has been replaced.    Hypertension-treated with amlodipine 5 mg, hydrochlorothiazide 25 mg, metoprolol 100 mg b.i.d. has been held in the setting of low blood pressure.  We will restart low-dose metoprolol.    BRANDY/CKD we will increase patient's volume repletion recheck in a.m.    3/18- looks and feels better, no pain, has been walking around in the hallways. No BM yet. IVF reduced to 100 cc/hr. K 4 now, Bun/Cr down to 39/1.4. Phos has dropped to 2.2. will check gain in am and start oral replacements.     3/19- Did better overnight and had a couple of BMs last evening and felt better but developed Afib RVR @ 151 this am. No previous hx of Afib or any arrhythmias. Cards consulted and started on Heparin gtt plus Cardizem gtt and given Lopressor 5 IVP twice with transient improvement in HR- hence Cardizem gtt increased to 15 mg/ hr- HR down to 133 now. PO4 also low at 1.7- given KPhos 20 mmol IV. Will increase Metoprolol to 25 bid. Otherwise doing better, walking around well.     Interval History: Did better overnight and had a couple of BMs last evening and felt better but developed Afib RVR @ 151 this am. No previous hx of Afib or any arrhythmias. Cards consulted and started on Heparin gtt plus Cardizem gtt and given Lopressor 5 IVP twice with transient improvement in HR- hence Cardizem gtt increased to 15 mg/ hr- HR down to 133 now. PO4 also low at 1.7- given KPhos 20 mmol IV. Will increase Metoprolol to 25 bid. Otherwise doing better, walking around well.     Review of Systems   Constitutional:  Positive for fatigue. Negative for chills and fever.   Respiratory:  Negative for shortness of breath.    Gastrointestinal:  Negative for abdominal distention, abdominal pain,  constipation (no BM), nausea and vomiting.   Genitourinary:  Negative for dysuria and hematuria.   All other systems reviewed and are negative.    Objective:     Vital Signs (Most Recent):  Temp: 98.9 °F (37.2 °C) (03/19/24 1204)  Pulse: (!) 130 (03/19/24 1518)  Resp: 19 (03/19/24 1204)  BP: 120/65 (03/19/24 1439)  SpO2: 97 % (03/19/24 1204) Vital Signs (24h Range):  Temp:  [97.9 °F (36.6 °C)-99.2 °F (37.3 °C)] 98.9 °F (37.2 °C)  Pulse:  [] 130  Resp:  [17-19] 19  SpO2:  [92 %-97 %] 97 %  BP: (120-178)/(58-75) 120/65     Weight: 99.8 kg (220 lb)  Body mass index is 36.61 kg/m².    Intake/Output Summary (Last 24 hours) at 3/19/2024 1758  Last data filed at 3/19/2024 1726  Gross per 24 hour   Intake 493.77 ml   Output 200 ml   Net 293.77 ml         Physical Exam  Vitals and nursing note reviewed.   Constitutional:       General: He is not in acute distress.     Appearance: Normal appearance. He is well-developed. He is obese. He is not ill-appearing, toxic-appearing or diaphoretic.   HENT:      Head: Normocephalic and atraumatic.      Right Ear: External ear normal.      Left Ear: External ear normal.      Nose: Nose normal.      Mouth/Throat:      Mouth: Mucous membranes are moist.      Pharynx: Oropharynx is clear.   Eyes:      Conjunctiva/sclera: Conjunctivae normal.      Pupils: Pupils are equal, round, and reactive to light.   Neck:      Thyroid: No thyromegaly.   Cardiovascular:      Rate and Rhythm: Tachycardia present. Rhythm irregular.      Pulses: Normal pulses.      Heart sounds: Normal heart sounds. No murmur heard.     No gallop.   Pulmonary:      Effort: Pulmonary effort is normal. No respiratory distress.      Breath sounds: Normal breath sounds.   Abdominal:      General: Abdomen is flat. Bowel sounds are normal. There is distension.      Palpations: Abdomen is soft.      Comments: Distended, tympanic, soft, nontender. Incision c/d/I, abdominal binder in place   Genitourinary:     Comments:  "deferred  Musculoskeletal:         General: No tenderness. Normal range of motion.      Cervical back: Normal range of motion and neck supple.   Skin:     General: Skin is warm and dry.      Capillary Refill: Capillary refill takes less than 2 seconds.   Neurological:      General: No focal deficit present.      Mental Status: He is alert and oriented to person, place, and time.   Psychiatric:         Mood and Affect: Mood normal.         Behavior: Behavior normal.         Thought Content: Thought content normal.         Judgment: Judgment normal.             Significant Labs: All pertinent labs within the past 24 hours have been reviewed.  BMP:   Recent Labs   Lab 03/19/24  0555   *      K 4.1      CO2 23   BUN 40*   CREATININE 1.3   CALCIUM 8.2*   MG 2.4     CBC:   Recent Labs   Lab 03/18/24  0538 03/19/24  0555 03/19/24  1310   WBC 8.10 10.87 10.05   HGB 10.1* 11.0* 11.7*   HCT 31.4* 33.9* 35.1*    289 280     CMP:   Recent Labs   Lab 03/18/24  0538 03/19/24  0555    140   K 4.0 4.1    101   CO2 23 23   GLU 96 115*   BUN 39* 40*   CREATININE 1.4 1.3   CALCIUM 8.3* 8.2*   ALBUMIN 2.5*  --    ANIONGAP 15 16     Magnesium:   Recent Labs   Lab 03/19/24  0555   MG 2.4     POCT Glucose:   Recent Labs   Lab 03/19/24  0550 03/19/24  1210 03/19/24  1701   POCTGLUCOSE 114* 106 133*       Significant Imaging: I have reviewed all pertinent imaging results/findings within the past 24 hours.    Assessment/Plan:      * Ileus  Patient has Post-operative ileus which is adynamic in etiology which is worsening. This is a complication of his procedure.   -CT abdomen/pelvis with "dilated fluid and gas-filled small bowel loops without definite transition point, this could represent enteritis or ileus; nonspecific adrenal nodules bilaterally, probably adenomas; fat stranding in the anterior abdominal wall and mild skin thickening of the inferior aspect of the pannus may be related to edema, but " "cellulitis is not excluded; gas in the bladder may be secondary to recent manipulation, please correlate with urinalysis to evaluate for cystitis; small intraperitoneal free air foci may be related to recent surgery; fluid in the colon is suggestive of diarrheal state; bowel containing ventral hernia."  -white blood cell count 6.99, LFTs unremarkable, afebrile  -Will treat conservatively with bowel rest, IV fluids, serial abdominal exams and avoidance if possible of GI paralytics such as narcotics or anti-spasmodics. Monitor patient closely.    -NG tube insertion has been ordered.    -Correct electrolytes as needed.  -check a.m. labs  -check urinalysis with reflex culture  -oxygen saturations on the low end with 92% on room air without complain of shortness a breath.  Suspect atelectasis and splinting from intra-abdominal process.  O2 supplementation if needed, frequent incentive spirometry, continuous pulse oximetry monitoring  -colorectal surgery following    Improving, had couple of BMs    Nonrheumatic aortic valve stenosis  Seen on echo      PAF (paroxysmal atrial fibrillation)  Patient with Paroxysmal (<7 days) atrial fibrillation which is uncontrolled currently with Beta Blocker and Calcium Channel Blocker. Patient is currently in atrial fibrillation.[id number] Score: 1. HASBLED Score: . Anticoagulation indicated. Anticoagulation done with Heparin .    Pt on Cardizem gtt plus Heparin gtt    Dehiscence of fascia    Postop day 1 of closure of fascia.  -continue NG tube to low intermittent Suction  -abdominal binder per surgery  Out of bed and IS    Intractable nausea and vomiting  Intractable nausea and vomiting related to postoperative ileus.  Resolved with NG tube placement    Resolved, NGT out yesterday      Adenoma of colon  Postop day 6 status post robotic right colectomy.  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    Appears to be " getting better      Normocytic anemia  Patient's anemia is currently controlled. Has not received any PRBCs to date. Etiology likely d/t acute blood loss which was from recent surgery and underlying colon pathology  Current CBC reviewed-   Lab Results   Component Value Date    HGB 11.7 (L) 03/19/2024    HCT 35.1 (L) 03/19/2024     Monitor serial CBC and transfuse if patient becomes hemodynamically unstable, symptomatic or H/H drops below 7/21.    Stable H/H    Stage 3a chronic kidney disease  Creatine stable for now. BMP reviewed- noted Estimated Creatinine Clearance: 56.6 mL/min (based on SCr of 1.3 mg/dL). according to latest data. Based on current GFR, CKD stage is stage 3 - GFR 30-59.  Monitor UOP and serial BMP and adjust therapy as needed. Renally dose meds. Avoid nephrotoxic medications and procedures.    BRANDY likely secondary to volume depletion due to large NG output.  We will repeat a.m. and if need be check urine electrolytes    Severe obesity (BMI 35.0-39.9) with comorbidity  Body mass index is 36.61 kg/m². Morbid obesity complicates all aspects of disease management from diagnostic modalities to treatment. Weight loss encouraged and health benefits explained to patient.             Prediabetes  Last A1c in our system was 5.3 on 05/30/2023.  Repeat A1c.  NPO, IV fluids, blood glucose q.6 hours with general hypoglycemia protocol.  Sliding scale insulin has not been ordered at this time.  Monitor blood glucose closely and if significantly elevated we will add sliding scale insulin at that time.    stable      Essential hypertension  Chronic, controlled. Latest blood pressure and vitals reviewed-     Temp:  [97.9 °F (36.6 °C)-99.2 °F (37.3 °C)]   Pulse:  []   Resp:  [17-19]   BP: (120-178)/(58-75)   SpO2:  [92 %-97 %] .   Home meds for hypertension were reviewed and noted below.   Hypertension Medications               amLODIPine (NORVASC) 5 MG tablet TAKE 1 TABLET BY MOUTH EVERY DAY    doxazosin  (CARDURA) 4 MG tablet Take 4 mg by mouth once daily.    hydroCHLOROthiazide (HYDRODIURIL) 25 MG tablet TAKE 1 TABLET BY MOUTH EVERY DAY IN THE MORNING    metoprolol succinate (TOPROL-XL) 100 MG 24 hr tablet Take 1 tablet (100 mg total) by mouth 2 (two) times daily.    olmesartan (BENICAR) 40 MG tablet Take 1 tablet (40 mg total) by mouth once daily.            While in the hospital, will manage blood pressure as follows; Adjust home antihypertensive regimen as follows- hold all antihypertensives due to postoperative ileus with intractable nausea and vomiting and current NPO state. Held Norvasc, Cardura, hydrochlorothiazide, and Benicar.  We will restart low-dose metoprolol  Will utilize p.r.n. blood pressure medication only if patient's blood pressure greater than 160/100 and he develops symptoms such as worsening chest pain or shortness of breath.      VTE Risk Mitigation (From admission, onward)           Ordered     heparin 25,000 units in dextrose 5% (100 units/ml) IV bolus from bag LOW INTENSITY nomogram - [facility]  As needed (PRN)        Question:  Heparin Infusion Adjustment (DO NOT MODIFY ANSWER)  Answer:  [email or web address]\Images\Pharmacy\HeparinInfusions\heparin LOW INTENSITY nomogram for [facility] [id number]    03/19/24 1259     heparin 25,000 units in dextrose 5% (100 units/ml) IV bolus from bag LOW INTENSITY nomogram - [facility]  As needed (PRN)        Question:  Heparin Infusion Adjustment (DO NOT MODIFY ANSWER)  Answer:  [email or web address]\Images\Pharmacy\HeparinInfusions\heparin LOW INTENSITY nomogram for [facility] [id number]    03/19/24 1259     heparin 25,000 units in dextrose 5% 250 mL (100 units/mL) infusion LOW INTENSITY nomogram - [facility]  Continuous        Question:  Begin at (units/kg/hr)  Answer:  12    03/19/24 1259     IP VTE HIGH RISK PATIENT  Once         03/16/24 0507     Place sequential compression device  Until discontinued         03/16/24 0507                    Discharge Planning   MERCEDEZ:      Code Status:  Full Code   Is the patient medically ready for discharge?:     Reason for patient still in hospital (select all that apply): Patient trending condition, Laboratory test, Treatment, Imaging, and Consult recommendations  Discharge Plan A: Home with family          Ildefonso Cross MD  Department of Hospital Medicine   '[facility]

## 2024-03-19 NOTE — SUBJECTIVE & OBJECTIVE
Interval History: Did better overnight and had a couple of BMs last evening and felt better but developed Afib RVR @ 151 this am. No previous hx of Afib or any arrhythmias. Cards consulted and started on Heparin gtt plus Cardizem gtt and given Lopressor 5 IVP twice with transient improvement in HR- hence Cardizem gtt increased to 15 mg/ hr- HR down to 133 now. PO4 also low at 1.7- given KPhos 20 mmol IV. Will increase Metoprolol to 25 bid. Otherwise doing better, walking around well.     Review of Systems   Constitutional:  Positive for fatigue. Negative for chills and fever.   Respiratory:  Negative for shortness of breath.    Gastrointestinal:  Negative for abdominal distention, abdominal pain, constipation (no BM), nausea and vomiting.   Genitourinary:  Negative for dysuria and hematuria.   All other systems reviewed and are negative.    Objective:     Vital Signs (Most Recent):  Temp: 98.9 °F (37.2 °C) (03/19/24 1204)  Pulse: (!) 130 (03/19/24 1518)  Resp: 19 (03/19/24 1204)  BP: 120/65 (03/19/24 1439)  SpO2: 97 % (03/19/24 1204) Vital Signs (24h Range):  Temp:  [97.9 °F (36.6 °C)-99.2 °F (37.3 °C)] 98.9 °F (37.2 °C)  Pulse:  [] 130  Resp:  [17-19] 19  SpO2:  [92 %-97 %] 97 %  BP: (120-178)/(58-75) 120/65     Weight: 99.8 kg (220 lb)  Body mass index is 36.61 kg/m².    Intake/Output Summary (Last 24 hours) at 3/19/2024 1758  Last data filed at 3/19/2024 1726  Gross per 24 hour   Intake 493.77 ml   Output 200 ml   Net 293.77 ml         Physical Exam  Vitals and nursing note reviewed.   Constitutional:       General: He is not in acute distress.     Appearance: Normal appearance. He is well-developed. He is obese. He is not ill-appearing, toxic-appearing or diaphoretic.   HENT:      Head: Normocephalic and atraumatic.      Right Ear: External ear normal.      Left Ear: External ear normal.      Nose: Nose normal.      Mouth/Throat:      Mouth: Mucous membranes are moist.      Pharynx: Oropharynx is clear.    Eyes:      Conjunctiva/sclera: Conjunctivae normal.      Pupils: Pupils are equal, round, and reactive to light.   Neck:      Thyroid: No thyromegaly.   Cardiovascular:      Rate and Rhythm: Tachycardia present. Rhythm irregular.      Pulses: Normal pulses.      Heart sounds: Normal heart sounds. No murmur heard.     No gallop.   Pulmonary:      Effort: Pulmonary effort is normal. No respiratory distress.      Breath sounds: Normal breath sounds.   Abdominal:      General: Abdomen is flat. Bowel sounds are normal. There is distension.      Palpations: Abdomen is soft.      Comments: Distended, tympanic, soft, nontender. Incision c/d/I, abdominal binder in place   Genitourinary:     Comments: deferred  Musculoskeletal:         General: No tenderness. Normal range of motion.      Cervical back: Normal range of motion and neck supple.   Skin:     General: Skin is warm and dry.      Capillary Refill: Capillary refill takes less than 2 seconds.   Neurological:      General: No focal deficit present.      Mental Status: He is alert and oriented to person, place, and time.   Psychiatric:         Mood and Affect: Mood normal.         Behavior: Behavior normal.         Thought Content: Thought content normal.         Judgment: Judgment normal.             Significant Labs: All pertinent labs within the past 24 hours have been reviewed.  BMP:   Recent Labs   Lab 03/19/24  0555   *      K 4.1      CO2 23   BUN 40*   CREATININE 1.3   CALCIUM 8.2*   MG 2.4     CBC:   Recent Labs   Lab 03/18/24  0538 03/19/24  0555 03/19/24  1310   WBC 8.10 10.87 10.05   HGB 10.1* 11.0* 11.7*   HCT 31.4* 33.9* 35.1*    289 280     CMP:   Recent Labs   Lab 03/18/24  0538 03/19/24  0555    140   K 4.0 4.1    101   CO2 23 23   GLU 96 115*   BUN 39* 40*   CREATININE 1.4 1.3   CALCIUM 8.3* 8.2*   ALBUMIN 2.5*  --    ANIONGAP 15 16     Magnesium:   Recent Labs   Lab 03/19/24  0555   MG 2.4     POCT Glucose:    Recent Labs   Lab 03/19/24  0550 03/19/24  1210 03/19/24  1701   POCTGLUCOSE 114* 106 133*       Significant Imaging: I have reviewed all pertinent imaging results/findings within the past 24 hours.

## 2024-03-19 NOTE — SUBJECTIVE & OBJECTIVE
Interval History: new afib rvr, cardio consulted. No n/v, passing flatus and had a BM. Less distended    Medications:  Continuous Infusions:   dilTIAZem 5 mg/hr (03/19/24 1208)    heparin (porcine) in D5W       Scheduled Meds:   chlorhexidine  10 mL Mouth/Throat BID    heparin (PORCINE)  60 Units/kg (Adjusted) Intravenous Once    magnesium oxide  400 mg Oral BID    metoprolol  5 mg Intravenous Q8H    [START ON 3/20/2024] pantoprazole  40 mg Oral Daily    potassium, sodium phosphates  2 packet Oral QID (AC & HS)     PRN Meds:glucagon (human recombinant), glucose, glucose, heparin (PORCINE), heparin (PORCINE), hydrALAZINE, metoprolol, morphine, naloxone, ondansetron, sodium chloride 0.9%     Review of patient's allergies indicates:  No Known Allergies  Objective:     Vital Signs (Most Recent):  Temp: 98.9 °F (37.2 °C) (03/19/24 1204)  Pulse: (!) 127 (03/19/24 1204)  Resp: 19 (03/19/24 1204)  BP: 120/65 (03/19/24 1204)  SpO2: 97 % (03/19/24 1204) Vital Signs (24h Range):  Temp:  [97.9 °F (36.6 °C)-99.2 °F (37.3 °C)] 98.9 °F (37.2 °C)  Pulse:  [] 127  Resp:  [17-20] 19  SpO2:  [92 %-97 %] 97 %  BP: (120-178)/(58-75) 120/65     Weight: 99.8 kg (220 lb)  Body mass index is 36.61 kg/m².    Intake/Output - Last 3 Shifts         03/17 0700  03/18 0659 03/18 0700  03/19 0659 03/19 0700  03/20 0659    P.O.   240    I.V. (mL/kg) 4363.6 (43.8)      IV Piggyback 196.9      Total Intake(mL/kg) 4560.6 (45.7)  240 (2.4)    Urine (mL/kg/hr) 25 (0)      Drains  600     Stool 0 0     Total Output 25 600     Net +4535.6 -600 +240           Stool Occurrence 1 x 1 x              Physical Exam  Constitutional:       Appearance: He is well-developed.   HENT:      Head: Normocephalic and atraumatic.   Eyes:      Conjunctiva/sclera: Conjunctivae normal.      Pupils: Pupils are equal, round, and reactive to light.   Neck:      Thyroid: No thyromegaly.   Cardiovascular:      Rate and Rhythm: Tachycardia present.   Pulmonary:      Effort:  Pulmonary effort is normal. No respiratory distress.   Abdominal:      Comments: Distended, tympanic, soft, nontender. Incision c/d/I, abdominal binder in place   Musculoskeletal:         General: No tenderness. Normal range of motion.      Cervical back: Normal range of motion.   Skin:     General: Skin is warm and dry.      Capillary Refill: Capillary refill takes less than 2 seconds.   Neurological:      General: No focal deficit present.      Mental Status: He is alert and oriented to person, place, and time.          Significant Labs:  I have reviewed all pertinent lab results within the past 24 hours.  CBC:   Recent Labs   Lab 03/19/24  1310   WBC 10.05   RBC 4.04*   HGB 11.7*   HCT 35.1*      MCV 87   MCH 29.0   MCHC 33.3     BMP:   Recent Labs   Lab 03/19/24  0555   *      K 4.1      CO2 23   BUN 40*   CREATININE 1.3   CALCIUM 8.2*   MG 2.4     CMP:   Recent Labs   Lab 03/17/24  0531 03/18/24  0538 03/19/24  0555    96 115*   CALCIUM 8.1* 8.3* 8.2*   ALBUMIN 2.7* 2.5*  --    PROT 5.7*  --   --     139 140   K 3.3* 4.0 4.1   CO2 27 23 23    101 101   BUN 44* 39* 40*   CREATININE 2.2* 1.4 1.3   ALKPHOS 40*  --   --    ALT 17  --   --    AST 15  --   --    BILITOT 0.7  --   --        Significant Diagnostics:  I have reviewed all pertinent imaging results/findings within the past 24 hours.    EXAMINATION:  CT ABDOMEN PELVIS WITH IV CONTRAST     CLINICAL HISTORY:  Ileus, rule out abscess;     TECHNIQUE:  Axial CT imaging was performed through the abdomen and pelvis with 100cc  of intravenous contrast. Multiplanar reformats were performed and interpreted.     COMPARISON:  03/16/2024     FINDINGS:  Trace right pleural effusion.  NG tube in the stomach.     The liver, pancreas, spleen, and gallbladder within normal limits.  Subcentimeter right renal cyst and renal vascular calcifications.  Adenomatous hyperplasia of the bilateral adrenal glands.     No biliary ductal  dilatation.     Moderate dilatation the small bowel with long segments of bowel wall thickening greatest at the distal ileum extending to the ileocolic anastomosis.  Scattered air-fluid levels throughout the small bowel.  No transition point identified.  Small amount of loculated fluid in the right mid abdomen measuring up to 6.6 x 2.5 cm.  Small bubbles of air in the anterior abdominal wall associated with recent laparotomy.  No free intraperitoneral air.     Aortic atherosclerosis without evidence of aneurysm.     Air within the urinary bladder likely from recent catheterization.  The prostate is within normal limits.  No free pelvic fluid or adenopathy.     Multilevel lumbar spondylosis.     Impression:     Adynamic ileus pattern with enteritis in this patient that is status post recent laparotomy.     There is a small amount of loculated fluid in the right mid abdomen.  The largest component of the fluid collection measures 6.6 x 2.5 cm on axial image 92.  Recommend close follow-up.

## 2024-03-19 NOTE — ASSESSMENT & PLAN NOTE
Creatine stable for now. BMP reviewed- noted Estimated Creatinine Clearance: 56.6 mL/min (based on SCr of 1.3 mg/dL). according to latest data. Based on current GFR, CKD stage is stage 3 - GFR 30-59.  Monitor UOP and serial BMP and adjust therapy as needed. Renally dose meds. Avoid nephrotoxic medications and procedures.    BRANDY likely secondary to volume depletion due to large NG output.  We will repeat a.m. and if need be check urine electrolytes

## 2024-03-19 NOTE — ASSESSMENT & PLAN NOTE
"Patient has Post-operative ileus which is adynamic in etiology which is worsening. This is a complication of his procedure.   -CT abdomen/pelvis with "dilated fluid and gas-filled small bowel loops without definite transition point, this could represent enteritis or ileus; nonspecific adrenal nodules bilaterally, probably adenomas; fat stranding in the anterior abdominal wall and mild skin thickening of the inferior aspect of the pannus may be related to edema, but cellulitis is not excluded; gas in the bladder may be secondary to recent manipulation, please correlate with urinalysis to evaluate for cystitis; small intraperitoneal free air foci may be related to recent surgery; fluid in the colon is suggestive of diarrheal state; bowel containing ventral hernia."  -white blood cell count 6.99, LFTs unremarkable, afebrile  -Will treat conservatively with bowel rest, IV fluids, serial abdominal exams and avoidance if possible of GI paralytics such as narcotics or anti-spasmodics. Monitor patient closely.    -NG tube insertion has been ordered.    -Correct electrolytes as needed.  -check a.m. labs  -check urinalysis with reflex culture  -oxygen saturations on the low end with 92% on room air without complain of shortness a breath.  Suspect atelectasis and splinting from intra-abdominal process.  O2 supplementation if needed, frequent incentive spirometry, continuous pulse oximetry monitoring  -colorectal surgery following    Improving, had couple of BMs  "

## 2024-03-20 ENCOUNTER — TELEPHONE (OUTPATIENT)
Dept: CARDIOLOGY | Facility: HOSPITAL | Age: 74
End: 2024-03-20
Payer: MEDICARE

## 2024-03-20 VITALS
DIASTOLIC BLOOD PRESSURE: 66 MMHG | RESPIRATION RATE: 18 BRPM | HEIGHT: 65 IN | TEMPERATURE: 97 F | HEART RATE: 77 BPM | OXYGEN SATURATION: 95 % | SYSTOLIC BLOOD PRESSURE: 141 MMHG | WEIGHT: 220 LBS | BODY MASS INDEX: 36.65 KG/M2

## 2024-03-20 PROBLEM — K56.7 ILEUS: Status: RESOLVED | Noted: 2024-03-16 | Resolved: 2024-03-20

## 2024-03-20 PROBLEM — D12.6 ADENOMA OF COLON: Status: RESOLVED | Noted: 2024-01-29 | Resolved: 2024-03-20

## 2024-03-20 PROBLEM — R11.2 INTRACTABLE NAUSEA AND VOMITING: Status: RESOLVED | Noted: 2024-03-16 | Resolved: 2024-03-20

## 2024-03-20 PROBLEM — I48.0 PAF (PAROXYSMAL ATRIAL FIBRILLATION): Status: RESOLVED | Noted: 2024-03-19 | Resolved: 2024-03-20

## 2024-03-20 PROBLEM — T81.30XA DEHISCENCE OF FASCIA: Status: RESOLVED | Noted: 2024-03-16 | Resolved: 2024-03-20

## 2024-03-20 LAB
ANION GAP SERPL CALC-SCNC: 10 MMOL/L (ref 8–16)
APTT PPP: 61.8 SEC (ref 21–32)
BASOPHILS # BLD AUTO: 0.02 K/UL (ref 0–0.2)
BASOPHILS NFR BLD: 0.2 % (ref 0–1.9)
BUN SERPL-MCNC: 34 MG/DL (ref 8–23)
CALCIUM SERPL-MCNC: 8 MG/DL (ref 8.7–10.5)
CHLORIDE SERPL-SCNC: 104 MMOL/L (ref 95–110)
CO2 SERPL-SCNC: 25 MMOL/L (ref 23–29)
CREAT SERPL-MCNC: 1.1 MG/DL (ref 0.5–1.4)
DIFFERENTIAL METHOD BLD: ABNORMAL
EOSINOPHIL # BLD AUTO: 0.2 K/UL (ref 0–0.5)
EOSINOPHIL NFR BLD: 2.2 % (ref 0–8)
ERYTHROCYTE [DISTWIDTH] IN BLOOD BY AUTOMATED COUNT: 13.5 % (ref 11.5–14.5)
EST. GFR  (NO RACE VARIABLE): >60 ML/MIN/1.73 M^2
GLUCOSE SERPL-MCNC: 115 MG/DL (ref 70–110)
HCT VFR BLD AUTO: 30.9 % (ref 40–54)
HGB BLD-MCNC: 10.4 G/DL (ref 14–18)
IMM GRANULOCYTES # BLD AUTO: 0.19 K/UL (ref 0–0.04)
IMM GRANULOCYTES NFR BLD AUTO: 2.1 % (ref 0–0.5)
LYMPHOCYTES # BLD AUTO: 0.8 K/UL (ref 1–4.8)
LYMPHOCYTES NFR BLD: 8.9 % (ref 18–48)
MAGNESIUM SERPL-MCNC: 2.3 MG/DL (ref 1.6–2.6)
MCH RBC QN AUTO: 28.5 PG (ref 27–31)
MCHC RBC AUTO-ENTMCNC: 33.7 G/DL (ref 32–36)
MCV RBC AUTO: 85 FL (ref 82–98)
MONOCYTES # BLD AUTO: 1.1 K/UL (ref 0.3–1)
MONOCYTES NFR BLD: 12.5 % (ref 4–15)
NEUTROPHILS # BLD AUTO: 6.7 K/UL (ref 1.8–7.7)
NEUTROPHILS NFR BLD: 74.1 % (ref 38–73)
NRBC BLD-RTO: 0 /100 WBC
OHS QRS DURATION: 124 MS
OHS QTC CALCULATION: 544 MS
PHOSPHATE SERPL-MCNC: 2.6 MG/DL (ref 2.7–4.5)
PLATELET # BLD AUTO: 243 K/UL (ref 150–450)
PMV BLD AUTO: 9.6 FL (ref 9.2–12.9)
POCT GLUCOSE: 107 MG/DL (ref 70–110)
POCT GLUCOSE: 126 MG/DL (ref 70–110)
POCT GLUCOSE: 126 MG/DL (ref 70–110)
POTASSIUM SERPL-SCNC: 3.6 MMOL/L (ref 3.5–5.1)
RBC # BLD AUTO: 3.65 M/UL (ref 4.6–6.2)
SODIUM SERPL-SCNC: 139 MMOL/L (ref 136–145)
WBC # BLD AUTO: 9.02 K/UL (ref 3.9–12.7)

## 2024-03-20 PROCEDURE — 99232 SBSQ HOSP IP/OBS MODERATE 35: CPT | Mod: 25,,, | Performed by: PHYSICIAN ASSISTANT

## 2024-03-20 PROCEDURE — 93005 ELECTROCARDIOGRAM TRACING: CPT

## 2024-03-20 PROCEDURE — 80048 BASIC METABOLIC PNL TOTAL CA: CPT | Performed by: EMERGENCY MEDICINE

## 2024-03-20 PROCEDURE — 25000003 PHARM REV CODE 250: Performed by: EMERGENCY MEDICINE

## 2024-03-20 PROCEDURE — 36415 COLL VENOUS BLD VENIPUNCTURE: CPT | Performed by: EMERGENCY MEDICINE

## 2024-03-20 PROCEDURE — 85025 COMPLETE CBC W/AUTO DIFF WBC: CPT | Performed by: INTERNAL MEDICINE

## 2024-03-20 PROCEDURE — 83735 ASSAY OF MAGNESIUM: CPT | Performed by: EMERGENCY MEDICINE

## 2024-03-20 PROCEDURE — 25000003 PHARM REV CODE 250: Performed by: INTERNAL MEDICINE

## 2024-03-20 PROCEDURE — 84100 ASSAY OF PHOSPHORUS: CPT | Performed by: EMERGENCY MEDICINE

## 2024-03-20 PROCEDURE — 93010 ELECTROCARDIOGRAM REPORT: CPT | Mod: ,,, | Performed by: INTERNAL MEDICINE

## 2024-03-20 PROCEDURE — 85730 THROMBOPLASTIN TIME PARTIAL: CPT | Performed by: EMERGENCY MEDICINE

## 2024-03-20 PROCEDURE — 25000003 PHARM REV CODE 250: Performed by: SURGERY

## 2024-03-20 RX ORDER — ACETAMINOPHEN 500 MG
1000 TABLET ORAL EVERY 6 HOURS
Qty: 40 TABLET | Refills: 0 | Status: SHIPPED | OUTPATIENT
Start: 2024-03-20 | End: 2024-03-25

## 2024-03-20 RX ORDER — SODIUM,POTASSIUM PHOSPHATES 280-250MG
1 POWDER IN PACKET (EA) ORAL
Status: DISCONTINUED | OUTPATIENT
Start: 2024-03-20 | End: 2024-03-20 | Stop reason: HOSPADM

## 2024-03-20 RX ORDER — DILTIAZEM HYDROCHLORIDE 240 MG/1
240 CAPSULE, COATED, EXTENDED RELEASE ORAL DAILY
Qty: 30 EACH | Refills: 11 | Status: SHIPPED | OUTPATIENT
Start: 2024-03-21 | End: 2024-04-17

## 2024-03-20 RX ORDER — METOPROLOL SUCCINATE 25 MG/1
25 TABLET, EXTENDED RELEASE ORAL 2 TIMES DAILY
Status: DISCONTINUED | OUTPATIENT
Start: 2024-03-20 | End: 2024-03-20 | Stop reason: HOSPADM

## 2024-03-20 RX ORDER — DILTIAZEM HYDROCHLORIDE 240 MG/1
240 CAPSULE, COATED, EXTENDED RELEASE ORAL DAILY
Status: DISCONTINUED | OUTPATIENT
Start: 2024-03-20 | End: 2024-03-20 | Stop reason: HOSPADM

## 2024-03-20 RX ORDER — METOPROLOL SUCCINATE 25 MG/1
25 TABLET, EXTENDED RELEASE ORAL 2 TIMES DAILY
Qty: 60 TABLET | Refills: 11 | Status: SHIPPED | OUTPATIENT
Start: 2024-03-20 | End: 2024-04-17

## 2024-03-20 RX ORDER — FUROSEMIDE 40 MG/1
40 TABLET ORAL DAILY PRN
Qty: 30 TABLET | Refills: 1 | Status: SHIPPED | OUTPATIENT
Start: 2024-03-20 | End: 2025-03-20

## 2024-03-20 RX ADMIN — Medication 400 MG: at 08:03

## 2024-03-20 RX ADMIN — CHLORHEXIDINE GLUCONATE 0.12% ORAL RINSE 10 ML: 1.2 LIQUID ORAL at 08:03

## 2024-03-20 RX ADMIN — APIXABAN 5 MG: 2.5 TABLET, FILM COATED ORAL at 01:03

## 2024-03-20 RX ADMIN — METOROPROLOL TARTRATE 5 MG: 5 INJECTION, SOLUTION INTRAVENOUS at 05:03

## 2024-03-20 RX ADMIN — PANTOPRAZOLE SODIUM 40 MG: 40 TABLET, DELAYED RELEASE ORAL at 08:03

## 2024-03-20 RX ADMIN — Medication 1 PACKET: at 11:03

## 2024-03-20 RX ADMIN — METOPROLOL SUCCINATE 25 MG: 25 TABLET, EXTENDED RELEASE ORAL at 01:03

## 2024-03-20 RX ADMIN — DILTIAZEM HYDROCHLORIDE 240 MG: 240 CAPSULE, COATED, EXTENDED RELEASE ORAL at 01:03

## 2024-03-20 RX ADMIN — Medication 15 MG/HR: at 05:03

## 2024-03-20 NOTE — PLAN OF CARE
[facility]  Discharge Final Note    Primary Care Provider: Gonzales Gabriel MD    Expected Discharge Date: 3/20/2024    Final Discharge Note (most recent)       Final Note - 03/20/24 1635          Final Note    Assessment Type Final Discharge Note     Anticipated Discharge Disposition Home or Self Care                                Contact Info       Tosin Boothe MD   Specialty: Colon and Rectal Surgery    [address] [place] [address]   Phone: [phone]       Next Steps: Schedule an appointment as soon as possible for a visit in 1 week(s)    Instructions: Hospital follow up    Yisel Coley FNP-C   Specialty: Cardiology    [address] [place] [address]   Phone: [phone]       Next Steps: Schedule an appointment as soon as possible for a visit in 1 week(s)    Instructions: Hospital follow up        First available appoint with pt's pcp GONZALES GABRIEL Is in May. SW schduled appointment and also placed pt on the waitlist.

## 2024-03-20 NOTE — SUBJECTIVE & OBJECTIVE
Interval History: pt doing well, continues to have regular bowel function, passing flatus and tolerating a diet. Is ambulating independently and pain well controlled. Denies nausea or vomiting. Heart rate better controlled     Medications:  Continuous Infusions:  Scheduled Meds:   apixaban  5 mg Oral BID    chlorhexidine  10 mL Mouth/Throat BID    diltiaZEM  240 mg Oral Daily    magnesium oxide  400 mg Oral Daily    metoprolol succinate  25 mg Oral BID    pantoprazole  40 mg Oral Daily    potassium, sodium phosphates  1 packet Oral QID (WM & HS)     PRN Meds:glucagon (human recombinant), glucose, glucose, hydrALAZINE, insulin aspart U-100, insulin aspart U-100, metoprolol, morphine, naloxone, ondansetron, sodium chloride 0.9%     Review of patient's allergies indicates:  No Known Allergies  Objective:     Vital Signs (Most Recent):  Temp: 97.4 °F (36.3 °C) (03/20/24 1200)  Pulse: 74 (03/20/24 1341)  Resp: 18 (03/20/24 1200)  BP: (!) 141/66 (03/20/24 1341)  SpO2: 95 % (03/20/24 1200) Vital Signs (24h Range):  Temp:  [97.4 °F (36.3 °C)-98.6 °F (37 °C)] 97.4 °F (36.3 °C)  Pulse:  [] 74  Resp:  [16-18] 18  SpO2:  [92 %-96 %] 95 %  BP: (120-150)/(58-66) 141/66     Weight: 99.8 kg (220 lb)  Body mass index is 36.61 kg/m².    Intake/Output - Last 3 Shifts         03/18 0700  03/19 0659 03/19 0700  03/20 0659 03/20 0700  03/21 0659    P.O.  360     I.V. (mL/kg)  101 (1)     IV Piggyback  152.7     Total Intake(mL/kg)  613.8 (6.2)     Urine (mL/kg/hr)       Drains 600      Stool 0      Total Output 600      Net -600 +613.8            Stool Occurrence 1 x               Physical Exam  Constitutional:       Appearance: He is well-developed.   HENT:      Head: Normocephalic and atraumatic.   Eyes:      Conjunctiva/sclera: Conjunctivae normal.      Pupils: Pupils are equal, round, and reactive to light.   Neck:      Thyroid: No thyromegaly.   Cardiovascular:      Rate and Rhythm: Normal rate.   Pulmonary:      Effort:  Pulmonary effort is normal. No respiratory distress.   Abdominal:      General: There is no distension.      Palpations: Abdomen is soft. There is no mass.      Tenderness: There is no abdominal tenderness.      Comments: Incision c/d/i   Musculoskeletal:         General: No tenderness. Normal range of motion.      Cervical back: Normal range of motion.   Skin:     General: Skin is warm and dry.      Capillary Refill: Capillary refill takes less than 2 seconds.   Neurological:      General: No focal deficit present.      Mental Status: He is alert and oriented to person, place, and time.          Significant Labs:  I have reviewed all pertinent lab results within the past 24 hours.  CBC:   Recent Labs   Lab 03/20/24  0302   WBC 9.02   RBC 3.65*   HGB 10.4*   HCT 30.9*      MCV 85   MCH 28.5   MCHC 33.7     CMP:   Recent Labs   Lab 03/17/24  0531 03/18/24  0538 03/19/24  0555 03/20/24  0302    96   < > 115*   CALCIUM 8.1* 8.3*   < > 8.0*   ALBUMIN 2.7* 2.5*  --   --    PROT 5.7*  --   --   --     139   < > 139   K 3.3* 4.0   < > 3.6   CO2 27 23   < > 25    101   < > 104   BUN 44* 39*   < > 34*   CREATININE 2.2* 1.4   < > 1.1   ALKPHOS 40*  --   --   --    ALT 17  --   --   --    AST 15  --   --   --    BILITOT 0.7  --   --   --     < > = values in this interval not displayed.       Significant Diagnostics:  I have reviewed all pertinent imaging results/findings within the past 24 hours.

## 2024-03-20 NOTE — SUBJECTIVE & OBJECTIVE
Review of Systems   Constitutional: Positive for malaise/fatigue.   HENT: Negative.     Eyes: Negative.    Cardiovascular: Negative.    Respiratory: Negative.     Endocrine: Negative.    Hematologic/Lymphatic: Negative.    Skin: Negative.    Musculoskeletal: Negative.    Gastrointestinal: Negative.    Genitourinary: Negative.    Neurological: Negative.    Psychiatric/Behavioral: Negative.     Allergic/Immunologic: Negative.      Objective:     Vital Signs (Most Recent):  Temp: 97.4 °F (36.3 °C) (03/20/24 1200)  Pulse: 74 (03/20/24 1200)  Resp: 18 (03/20/24 1200)  BP: (!) 141/66 (03/20/24 1200)  SpO2: 95 % (03/20/24 1200) Vital Signs (24h Range):  Temp:  [97.4 °F (36.3 °C)-98.6 °F (37 °C)] 97.4 °F (36.3 °C)  Pulse:  [] 74  Resp:  [16-18] 18  SpO2:  [92 %-96 %] 95 %  BP: (120-150)/(58-66) 141/66     Weight: 99.8 kg (220 lb)  Body mass index is 36.61 kg/m².     SpO2: 95 %         Intake/Output Summary (Last 24 hours) at 3/20/2024 1327  Last data filed at 3/19/2024 1944  Gross per 24 hour   Intake 373.77 ml   Output --   Net 373.77 ml       Lines/Drains/Airways       Peripheral Intravenous Line  Duration                  Peripheral IV - Single Lumen 03/16/24 0230 20 G Anterior;Left Forearm 4 days         Peripheral IV - Single Lumen 03/19/24 1400 22 G Anterior;Right Forearm <1 day         Peripheral IV - Single Lumen 03/19/24 1611 22 G Right Antecubital <1 day                       Physical Exam  Vitals and nursing note reviewed.   Constitutional:       General: He is not in acute distress.     Appearance: Normal appearance. He is well-developed. He is not diaphoretic.   HENT:      Head: Normocephalic and atraumatic.   Eyes:      General:         Right eye: No discharge.         Left eye: No discharge.      Pupils: Pupils are equal, round, and reactive to light.   Cardiovascular:      Rate and Rhythm: Normal rate and regular rhythm.      Heart sounds: S1 normal and S2 normal. Murmur heard.      High-pitched  "blowing holosystolic murmur is present at the apex.   Pulmonary:      Effort: Pulmonary effort is normal. No respiratory distress.      Breath sounds: Normal breath sounds. No wheezing or rales.   Abdominal:      General: There is no distension.   Musculoskeletal:      Right lower leg: No edema.      Left lower leg: No edema.   Skin:     General: Skin is warm and dry.      Findings: No erythema.   Neurological:      General: No focal deficit present.      Mental Status: He is alert and oriented to person, place, and time.   Psychiatric:         Mood and Affect: Mood normal.         Behavior: Behavior normal.         Thought Content: Thought content normal.            Significant Labs: CMP   Recent Labs   Lab 03/19/24  0555 03/20/24  0302    139   K 4.1 3.6    104   CO2 23 25   * 115*   BUN 40* 34*   CREATININE 1.3 1.1   CALCIUM 8.2* 8.0*   ANIONGAP 16 10   , CBC   Recent Labs   Lab 03/19/24  0555 03/19/24  1310 03/20/24  0302   WBC 10.87 10.05 9.02   HGB 11.0* 11.7* 10.4*   HCT 33.9* 35.1* 30.9*    280 243   , Troponin No results for input(s): "TROPONINI" in the last 48 hours., and All pertinent lab results from the last 24 hours have been reviewed.    Significant Imaging: Echocardiogram: Transthoracic echo (TTE) complete (Cupid Only):   Results for orders placed or performed during the hospital encounter of 03/16/24   Echo   Result Value Ref Range    BSA 2.14 m2    LVOT stroke volume 95.00 cm3    LVIDd 4.40 3.5 - 6.0 cm    LV Systolic Volume 35.90 mL    LV Systolic Volume Index 17.4 mL/m2    LVIDs 3.03 2.1 - 4.0 cm    LV Diastolic Volume 87.50 mL    LV Diastolic Volume Index 42.48 mL/m2    IVS 1.21 (A) 0.6 - 1.1 cm    LVOT diameter 2.17 cm    LVOT area 3.7 cm2    FS 31 28 - 44 %    Left Ventricle Relative Wall Thickness 0.60 cm    Posterior Wall 1.32 (A) 0.6 - 1.1 cm    LV mass 206.63 g    LV Mass Index 100 g/m2    TDI LATERAL 0.11 m/s    TDI SEPTAL 0.07 m/s    TR Max Norman 2.93 m/s    IVRT " 54.23 msec    LVOT peak cally 1.16 m/s    Left Ventricular Outflow Tract Mean Velocity 1.03 cm/s    Left Ventricular Outflow Tract Mean Gradient 4.30 mmHg    RVOT peak VTI 23.1 cm    TAPSE 1.82 cm    LA size 3.42 cm    Left Atrium Minor Axis 4.11 cm    Left Atrium Major Axis 5.24 cm    RA Major Axis 5.04 cm    AV mean gradient 17 mmHg    AV peak gradient 22 mmHg    Ao peak cally 2.32 m/s    Ao VTI 44.00 cm    LVOT peak VTI 25.70 cm    AV valve area 2.16 cm²    AV Velocity Ratio 0.50     AV index (prosthetic) 0.58     DOLLY by Velocity Ratio 1.85 cm²    Triscuspid Valve Regurgitation Peak Gradient 34 mmHg    PV mean gradient 5 mmHg    RVOT peak cally 1.34 m/s    Ao root annulus 3.85 cm    STJ 3.91 cm    Ascending aorta 4.00 cm    IVC diameter 1.15 cm    Mean e' 0.09 m/s    ZLVIDS -1.81     ZLVIDD -3.46     LA Volume Index 42.3 mL/m2    LA volume 87.05 cm3    LA WIDTH 6.5 cm    RA Width 2.2 cm    TV resting pulmonary artery pressure 37 mmHg    RV TB RVSP 6 mmHg    Est. RA pres 3 mmHg    Narrative      Left Ventricle: The left ventricle is normal in size. Moderately   increased wall thickness. There is moderate concentric hypertrophy. There   is normal systolic function with a visually estimated ejection fraction of   60 - 65%. Unable to assess diastolic function due to atrial fibrillation.    Right Ventricle: Right ventricle was not well visualized due to poor   acoustic window. Normal right ventricular cavity size. Wall thickness is   normal. Right ventricle wall motion  is normal. Systolic function is   normal.    Left Atrium: Left atrium is moderately dilated.    Aortic Valve: Moderately calcified cusps. There is moderate annular   calcification present. Moderately restricted motion. There is mild to   moderate stenosis. Aortic valve area by VTI is 2.16 cm². Aortic valve peak   velocity is 2.32 m/s. Mean gradient is 17 mmHg. The dimensionless index is   0.58.    Mitral Valve: There is mild regurgitation.    Tricuspid  Valve: There is mild regurgitation.    Aorta: Aortic root is normal in size. Ascending aorta is mildly dilated   measuring 4.00 cm.    Pulmonary Artery: The estimated pulmonary artery systolic pressure is   37 mmHg.    IVC/SVC: Normal venous pressure at 3 mmHg.     , EKG: Reviewed, and X-Ray: CXR: X-Ray Chest 1 View (CXR):   Results for orders placed or performed during the hospital encounter of 03/16/24   X-Ray Chest 1 View    Narrative    EXAMINATION:  XR CHEST 1 VIEW    CLINICAL HISTORY:  confirm NGT placement;    TECHNIQUE:  Single frontal view of the chest was performed.    COMPARISON:  None    FINDINGS:  Nasogastric tube in the projection of the stomach.  Consider further placement by approximately 5 cm    Bones are intact.      Impression    As above      Electronically signed by: Tim Lara  Date:    03/16/2024  Time:    17:04    and X-Ray Chest PA and Lateral (CXR): No results found for this visit on 03/16/24.

## 2024-03-20 NOTE — ASSESSMENT & PLAN NOTE
-Converted to afib with RVR  -Cardizem and heparin drips initiated  -TTE pending    3/20/24  -Converted to SR  -Cardizem switched to po, Eliquis initiated  -TTE with normal EF  -Follow-up in clinic

## 2024-03-20 NOTE — PROGRESS NOTES
[facility]  Cardiology  Progress Note    Patient Name: Quintin Ling  MRN: [id number]  Admission Date: 3/16/2024  Hospital Length of Stay: 3 days  Code Status: Full Code   Attending Physician: Ildefonso Cross MD   Primary Care Physician: Bishop Gabriel MD  Expected Discharge Date:   Principal Problem:Ileus    Subjective:   HPI:  Mr. Ling is a 73 year old male patient whose current medical conditions include HTN, hyperlipidemia, pre-DM, obesity, ED, CKD stage III, lumbar radiculopathy, and PAD who presented to [facility] ED on 3/16/24 due to intractable nausea and vomiting over the past 2-3 days after prior robotic colectomy on 3/11/24. Initial workup in ED revealed ileus and patient subsequently had NGT placed and was admitted for further treatment. This AM, he converted to afib with RVR and cardiology was consulted to assist with management. Patient seen and examined today, sitting up in bedside chair. Feels ok overall. NGT removed, switched to liquid diet. No CV complaints. Denies CP/SOB. NO prior history of afib. Labs reviewed/stable. TTE pending.      Hospital Course:   3/20/24- Patient seen and examined today, sitting in chair. Feels good overall. Converted to NSR.Tolerating solids and PO meds. No complaints of nausea, vomiting, CP. Labs reviewed/stable. TTE with normal EF.        Review of Systems   Constitutional: Positive for malaise/fatigue.   HENT: Negative.     Eyes: Negative.    Cardiovascular: Negative.    Respiratory: Negative.     Endocrine: Negative.    Hematologic/Lymphatic: Negative.    Skin: Negative.    Musculoskeletal: Negative.    Gastrointestinal: Negative.    Genitourinary: Negative.    Neurological: Negative.    Psychiatric/Behavioral: Negative.     Allergic/Immunologic: Negative.      Objective:     Vital Signs (Most Recent):  Temp: 97.4 °F (36.3 °C) (03/20/24 1200)  Pulse: 74 (03/20/24 1200)  Resp: 18 (03/20/24 1200)  BP: (!) 141/66 (03/20/24 1200)  SpO2: 95 % (03/20/24 1200)  Vital Signs (24h Range):  Temp:  [97.4 °F (36.3 °C)-98.6 °F (37 °C)] 97.4 °F (36.3 °C)  Pulse:  [] 74  Resp:  [16-18] 18  SpO2:  [92 %-96 %] 95 %  BP: (120-150)/(58-66) 141/66     Weight: 99.8 kg (220 lb)  Body mass index is 36.61 kg/m².     SpO2: 95 %         Intake/Output Summary (Last 24 hours) at 3/20/2024 1327  Last data filed at 3/19/2024 1944  Gross per 24 hour   Intake 373.77 ml   Output --   Net 373.77 ml       Lines/Drains/Airways       Peripheral Intravenous Line  Duration                  Peripheral IV - Single Lumen 03/16/24 0230 20 G Anterior;Left Forearm 4 days         Peripheral IV - Single Lumen 03/19/24 1400 22 G Anterior;Right Forearm <1 day         Peripheral IV - Single Lumen 03/19/24 1611 22 G Right Antecubital <1 day                       Physical Exam  Vitals and nursing note reviewed.   Constitutional:       General: He is not in acute distress.     Appearance: Normal appearance. He is well-developed. He is not diaphoretic.   HENT:      Head: Normocephalic and atraumatic.   Eyes:      General:         Right eye: No discharge.         Left eye: No discharge.      Pupils: Pupils are equal, round, and reactive to light.   Cardiovascular:      Rate and Rhythm: Normal rate and regular rhythm.      Heart sounds: S1 normal and S2 normal. Murmur heard.      High-pitched blowing holosystolic murmur is present at the apex.   Pulmonary:      Effort: Pulmonary effort is normal. No respiratory distress.      Breath sounds: Normal breath sounds. No wheezing or rales.   Abdominal:      General: There is no distension.   Musculoskeletal:      Right lower leg: No edema.      Left lower leg: No edema.   Skin:     General: Skin is warm and dry.      Findings: No erythema.   Neurological:      General: No focal deficit present.      Mental Status: He is alert and oriented to person, place, and time.   Psychiatric:         Mood and Affect: Mood normal.         Behavior: Behavior normal.         Thought  "Content: Thought content normal.            Significant Labs: CMP   Recent Labs   Lab 03/19/24  0555 03/20/24  0302    139   K 4.1 3.6    104   CO2 23 25   * 115*   BUN 40* 34*   CREATININE 1.3 1.1   CALCIUM 8.2* 8.0*   ANIONGAP 16 10   , CBC   Recent Labs   Lab 03/19/24  0555 03/19/24  1310 03/20/24  0302   WBC 10.87 10.05 9.02   HGB 11.0* 11.7* 10.4*   HCT 33.9* 35.1* 30.9*    280 243   , Troponin No results for input(s): "TROPONINI" in the last 48 hours., and All pertinent lab results from the last 24 hours have been reviewed.    Significant Imaging: Echocardiogram: Transthoracic echo (TTE) complete (Cupid Only):   Results for orders placed or performed during the hospital encounter of 03/16/24   Echo   Result Value Ref Range    BSA 2.14 m2    LVOT stroke volume 95.00 cm3    LVIDd 4.40 3.5 - 6.0 cm    LV Systolic Volume 35.90 mL    LV Systolic Volume Index 17.4 mL/m2    LVIDs 3.03 2.1 - 4.0 cm    LV Diastolic Volume 87.50 mL    LV Diastolic Volume Index 42.48 mL/m2    IVS 1.21 (A) 0.6 - 1.1 cm    LVOT diameter 2.17 cm    LVOT area 3.7 cm2    FS 31 28 - 44 %    Left Ventricle Relative Wall Thickness 0.60 cm    Posterior Wall 1.32 (A) 0.6 - 1.1 cm    LV mass 206.63 g    LV Mass Index 100 g/m2    TDI LATERAL 0.11 m/s    TDI SEPTAL 0.07 m/s    TR Max Norman 2.93 m/s    IVRT 54.23 msec    LVOT peak norman 1.16 m/s    Left Ventricular Outflow Tract Mean Velocity 1.03 cm/s    Left Ventricular Outflow Tract Mean Gradient 4.30 mmHg    RVOT peak VTI 23.1 cm    TAPSE 1.82 cm    LA size 3.42 cm    Left Atrium Minor Axis 4.11 cm    Left Atrium Major Axis 5.24 cm    RA Major Axis 5.04 cm    AV mean gradient 17 mmHg    AV peak gradient 22 mmHg    Ao peak norman 2.32 m/s    Ao VTI 44.00 cm    LVOT peak VTI 25.70 cm    AV valve area 2.16 cm²    AV Velocity Ratio 0.50     AV index (prosthetic) 0.58     DOLLY by Velocity Ratio 1.85 cm²    Triscuspid Valve Regurgitation Peak Gradient 34 mmHg    PV mean gradient 5 mmHg "    RVOT peak cally 1.34 m/s    Ao root annulus 3.85 cm    STJ 3.91 cm    Ascending aorta 4.00 cm    IVC diameter 1.15 cm    Mean e' 0.09 m/s    ZLVIDS -1.81     ZLVIDD -3.46     LA Volume Index 42.3 mL/m2    LA volume 87.05 cm3    LA WIDTH 6.5 cm    RA Width 2.2 cm    TV resting pulmonary artery pressure 37 mmHg    RV TB RVSP 6 mmHg    Est. RA pres 3 mmHg    Narrative      Left Ventricle: The left ventricle is normal in size. Moderately   increased wall thickness. There is moderate concentric hypertrophy. There   is normal systolic function with a visually estimated ejection fraction of   60 - 65%. Unable to assess diastolic function due to atrial fibrillation.    Right Ventricle: Right ventricle was not well visualized due to poor   acoustic window. Normal right ventricular cavity size. Wall thickness is   normal. Right ventricle wall motion  is normal. Systolic function is   normal.    Left Atrium: Left atrium is moderately dilated.    Aortic Valve: Moderately calcified cusps. There is moderate annular   calcification present. Moderately restricted motion. There is mild to   moderate stenosis. Aortic valve area by VTI is 2.16 cm². Aortic valve peak   velocity is 2.32 m/s. Mean gradient is 17 mmHg. The dimensionless index is   0.58.    Mitral Valve: There is mild regurgitation.    Tricuspid Valve: There is mild regurgitation.    Aorta: Aortic root is normal in size. Ascending aorta is mildly dilated   measuring 4.00 cm.    Pulmonary Artery: The estimated pulmonary artery systolic pressure is   37 mmHg.    IVC/SVC: Normal venous pressure at 3 mmHg.     , EKG: Reviewed, and X-Ray: CXR: X-Ray Chest 1 View (CXR):   Results for orders placed or performed during the hospital encounter of 03/16/24   X-Ray Chest 1 View    Narrative    EXAMINATION:  XR CHEST 1 VIEW    CLINICAL HISTORY:  confirm NGT placement;    TECHNIQUE:  Single frontal view of the chest was performed.    COMPARISON:  None    FINDINGS:  Nasogastric tube in  the projection of the stomach.  Consider further placement by approximately 5 cm    Bones are intact.      Impression    As above      Electronically signed by: Tim Lara  Date:    03/16/2024  Time:    17:04    and X-Ray Chest PA and Lateral (CXR): No results found for this visit on 03/16/24.  Assessment and Plan:   Patient who presents with ileus/PAF. Converted to SR. Meds adjusted. Follow-up in clinic.    * Ileus  -Mgmt as per hospital medicine and general surgery  -Tolerating po    Nonrheumatic aortic valve stenosis  -Reassess by TTE    3/20/24  -Mild to mod AS    PAF (paroxysmal atrial fibrillation)  -Converted to afib with RVR  -Cardizem and heparin drips initiated  -TTE pending    3/20/24  -Converted to SR  -Cardizem switched to po, Eliquis initiated  -TTE with normal EF  -Follow-up in clinic    Severe obesity (BMI 35.0-39.9) with comorbidity  -Weight loss        VTE Risk Mitigation (From admission, onward)           Ordered     apixaban tablet 5 mg  2 times daily         03/20/24 1325     IP VTE HIGH RISK PATIENT  Once         03/16/24 0507     Place sequential compression device  Until discontinued         03/16/24 0507                    Rose Marquez PA-C  Cardiology  [facility]

## 2024-03-20 NOTE — PLAN OF CARE
[facility]  Discharge Reassessment    Primary Care Provider: Bishop Gabriel MD    Expected Discharge Date:     Reassessment (most recent)       Discharge Reassessment - 03/20/24 0903          Discharge Reassessment    Assessment Type Discharge Planning Reassessment     Did the patient's condition or plan change since previous assessment? No     Discharge Plan discussed with: Patient     Communicated MERCEDEZ with patient/caregiver Date not available/Unable to determine     Discharge Plan A Home

## 2024-03-20 NOTE — ASSESSMENT & PLAN NOTE
s/p robotic right hemicolectomy with post operative ileus.     - tolerating regular diet  - CT  reviewed - fluid collection not rim enhancing, not concerning for abscess will monitor  - OOB ambulating frequently  - replace electrolytes as needed  - ok for discharge from surgery standpoint

## 2024-03-20 NOTE — PROGRESS NOTES
[facility]  General Surgery  Progress Note    Subjective:     History of Present Illness:  Quintin Ling is a 73 y.o. male s/p robotic right hemicolectomy and omentectomy on 3/11/2023.  He was discharged home on postoperative day 1.  However returned to the [facility] Clinic on postoperative day 3 complaining of acid reflux, vomiting, and significant drainage from his midline wound.  At that time he was still having flatus and bowel movements but was also belching.  Outpatient KUB was concerning for a possible small-bowel obstruction.  He was believed to have a mild postoperative ileus and was given home care and dietary instructions and told to present to the ER if his symptoms persisted or worsened.  He presented to the emergency department last night with significant episodes of nausea and vomiting.  He had a mild BRANDY with a creatinine of 1.6 no however no leukocytosis or other significant laboratory abnormalities.  CT scan was done which showed dilated loops of small bowel consistent with ileus however also showed what they called a supraumbilical hernia with small bowel.  An NG-tube was placed with 1800 cc output and this morning he states he is feeling better.  He is continues to have midline abdominal wound drainage    Post-Op Info:  Procedure(s) (LRB):  EXPLORATION, WOUND (N/A)  CLOSURE, WOUND (N/A)   4 Days Post-Op     Interval History: pt doing well, continues to have regular bowel function, passing flatus and tolerating a diet. Is ambulating independently and pain well controlled. Denies nausea or vomiting. Heart rate better controlled     Medications:  Continuous Infusions:  Scheduled Meds:   apixaban  5 mg Oral BID    chlorhexidine  10 mL Mouth/Throat BID    diltiaZEM  240 mg Oral Daily    magnesium oxide  400 mg Oral Daily    metoprolol succinate  25 mg Oral BID    pantoprazole  40 mg Oral Daily    potassium, sodium phosphates  1 packet Oral QID (WM & HS)     PRN Meds:glucagon (human  recombinant), glucose, glucose, hydrALAZINE, insulin aspart U-100, insulin aspart U-100, metoprolol, morphine, naloxone, ondansetron, sodium chloride 0.9%     Review of patient's allergies indicates:  No Known Allergies  Objective:     Vital Signs (Most Recent):  Temp: 97.4 °F (36.3 °C) (03/20/24 1200)  Pulse: 74 (03/20/24 1341)  Resp: 18 (03/20/24 1200)  BP: (!) 141/66 (03/20/24 1341)  SpO2: 95 % (03/20/24 1200) Vital Signs (24h Range):  Temp:  [97.4 °F (36.3 °C)-98.6 °F (37 °C)] 97.4 °F (36.3 °C)  Pulse:  [] 74  Resp:  [16-18] 18  SpO2:  [92 %-96 %] 95 %  BP: (120-150)/(58-66) 141/66     Weight: 99.8 kg (220 lb)  Body mass index is 36.61 kg/m².    Intake/Output - Last 3 Shifts         03/18 0700  03/19 0659 03/19 0700  03/20 0659 03/20 0700  03/21 0659    P.O.  360     I.V. (mL/kg)  101 (1)     IV Piggyback  152.7     Total Intake(mL/kg)  613.8 (6.2)     Urine (mL/kg/hr)       Drains 600      Stool 0      Total Output 600      Net -600 +613.8            Stool Occurrence 1 x               Physical Exam  Constitutional:       Appearance: He is well-developed.   HENT:      Head: Normocephalic and atraumatic.   Eyes:      Conjunctiva/sclera: Conjunctivae normal.      Pupils: Pupils are equal, round, and reactive to light.   Neck:      Thyroid: No thyromegaly.   Cardiovascular:      Rate and Rhythm: Normal rate.   Pulmonary:      Effort: Pulmonary effort is normal. No respiratory distress.   Abdominal:      General: There is no distension.      Palpations: Abdomen is soft. There is no mass.      Tenderness: There is no abdominal tenderness.      Comments: Incision c/d/i   Musculoskeletal:         General: No tenderness. Normal range of motion.      Cervical back: Normal range of motion.   Skin:     General: Skin is warm and dry.      Capillary Refill: Capillary refill takes less than 2 seconds.   Neurological:      General: No focal deficit present.      Mental Status: He is alert and oriented to person, place,  and time.          Significant Labs:  I have reviewed all pertinent lab results within the past 24 hours.  CBC:   Recent Labs   Lab 03/20/24  0302   WBC 9.02   RBC 3.65*   HGB 10.4*   HCT 30.9*      MCV 85   MCH 28.5   MCHC 33.7     CMP:   Recent Labs   Lab 03/17/24  0531 03/18/24  0538 03/19/24  0555 03/20/24  0302    96   < > 115*   CALCIUM 8.1* 8.3*   < > 8.0*   ALBUMIN 2.7* 2.5*  --   --    PROT 5.7*  --   --   --     139   < > 139   K 3.3* 4.0   < > 3.6   CO2 27 23   < > 25    101   < > 104   BUN 44* 39*   < > 34*   CREATININE 2.2* 1.4   < > 1.1   ALKPHOS 40*  --   --   --    ALT 17  --   --   --    AST 15  --   --   --    BILITOT 0.7  --   --   --     < > = values in this interval not displayed.       Significant Diagnostics:  I have reviewed all pertinent imaging results/findings within the past 24 hours.  Assessment/Plan:     * Ileus  s/p robotic right hemicolectomy with post operative ileus.     - tolerating regular diet  - CT  reviewed - fluid collection not rim enhancing, not concerning for abscess will monitor  - OOB ambulating frequently  - replace electrolytes as needed  - ok for discharge from surgery standpoint    Nonrheumatic aortic valve stenosis  Cardiology consulted    PAF (paroxysmal atrial fibrillation)  Cardiology consulted  Ok for anticoagulation    Dehiscence of fascia  s/p closure of fascia.      - abdominal binder when OOB    Intractable nausea and vomiting  2/2 ileus     Normocytic anemia  Per hospital medicine    Stage 3a chronic kidney disease  Per hospital medicine    Severe obesity (BMI 35.0-39.9) with comorbidity  Per hospital medicine    Prediabetes  Per hospital medicine    Essential hypertension  Per hospital medicine        Tosin Boothe MD  General Surgery  [facility]

## 2024-03-20 NOTE — DISCHARGE SUMMARY
[facility] Medicine  Discharge Summary      Patient Name: Quintin Ling  MRN: [id number]  DOROTHY: [id number]  Patient Class: IP- Inpatient  Admission Date: 3/16/2024  Hospital Length of Stay: 3 days  Discharge Date and Time:  03/20/2024 4:25 PM  Attending Physician: Ildefonso Cross MD   Discharging Provider: Ildefonso Cross MD  Primary Care Provider: Bishop Gabriel MD    Primary Care Team: Networked reference to record PCT     HPI:   73-year-old white man with history of hypertension, hyperlipidemia, prediabetes, obesity, erectile dysfunction, chronic kidney disease stage IIIA, lumbar radiculopathy, normocytic anemia, cold now adenoma, and peripheral arterial disease who presented to the emergency department with complaint of intractable nausea with vomiting over the last 2-3 days.  He has had associated abdominal pain and abdominal distention.  He can not identify any aggravating or alleviating factors.  He denies any associated fevers or chills.  Patient has had flatus but denies any bowel movements.    Recent hospitalization 3/11-03/12/2024 for colon adenoma status post robotic right colectomy on 03/11/2024.  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    Procedure(s) (LRB):  EXPLORATION, WOUND (N/A)  CLOSURE, WOUND (N/A)      Hospital Course:   Patient is a 73-year-old male who underwent robotic right hemicolectomy and omentectomy on 03/11/2024.  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  Patient returned to the hospital on postop day 3 complaining of acid reflux, vomiting, and drainage from his wound.  KUB was obtained concerning for possible small bowel obstruction.  To nausea vomiting he presented emergency department he was noted to have mild BRANDY.  CT scan showed dilated loops of small bowel consistent with ileus but also dehiscence of wound.  NG tube was placed with 800 cc of  output.  He was taken back to OR for exploration and closure fascia.  Patient continues to have NG tube.  He has moderate amount of output and has not had return of bowel function yet.  General surgery following    BRANDY likely secondary to large GI fluid losses we will increase IV fluid repletion and check bladder scan for incomplete emptying.  Creatinine up to 2.2.    Electrolyte disorders including hyponatremia, hypokalemia.  These has been replaced.    Hypertension-treated with amlodipine 5 mg, hydrochlorothiazide 25 mg, metoprolol 100 mg b.i.d. has been held in the setting of low blood pressure.  We will restart low-dose metoprolol.    BRANDY/CKD we will increase patient's volume repletion recheck in a.m.    3/18- looks and feels better, no pain, has been walking around in the hallways. No BM yet. IVF reduced to 100 cc/hr. K 4 now, Bun/Cr down to 39/1.4. Phos has dropped to 2.2. will check gain in am and start oral replacements.     3/19- Did better overnight and had a couple of BMs last evening and felt better but developed Afib RVR @ 151 this am. No previous hx of Afib or any arrhythmias. Cards consulted and started on Heparin gtt plus Cardizem gtt and given Lopressor 5 IVP twice with transient improvement in HR- hence Cardizem gtt increased to 15 mg/ hr- HR down to 133 now. PO4 also low at 1.7- given KPhos 20 mmol IV. Will increase Metoprolol to 25 bid. Otherwise doing better, walking around well.     3/20- looks and feels much better, comfortable, converted to NSR last evening and doing better. No palpitations or dizziness or weakness. Eating drinking well, walking around well. Bowels also moving well. Electrolytes also improving. Cardiology switched him from Cardizem gtt to PO Cardizem  and decreased his Lopressor from 100 to 25 po daily and we d/dom his Norvasc 5. Eliquis also added and heparin gtt d/dom for his P Afib. Cards and Surgery both have cleared him for discharge and he is eager to go home. He  was seen and examined and deemed stable for discharge home today.       Goals of Care Treatment Preferences:  Code Status: Full Code      Consults:   Consults (From admission, onward)          Status Ordering Provider     Inpatient consult to Cardiology  Once        Provider:  Naun Holder MD    Completed JAY FLEMING.     Inpatient consult to Cardiology  Once        Provider:  Naun Holder MD    Completed JAY FLEMING     Inpatient consult to Hospitalist  Once        Provider:  Cynthia Jones MD    Acknowledged ENDY STARK     Inpatient consult to General Surgery  Once        Provider:  Endy Stark MD    Completed NAILA ALLEN.            No new Assessment & Plan notes have been filed under this hospital service since the last note was generated.  Service: Hospital Medicine    Final Active Diagnoses:    Diagnosis Date Noted POA    Nonrheumatic aortic valve stenosis [I35.0] 03/19/2024 Yes    Normocytic anemia [D64.9] 01/04/2024 Yes    Stage 3a chronic kidney disease [N18.31] 05/29/2023 Yes    Severe obesity (BMI 35.0-39.9) with comorbidity [E66.01] 05/19/2021 Yes    Essential hypertension [I10] 11/18/2020 Yes    Prediabetes [R73.03] 11/18/2020 Yes      Problems Resolved During this Admission:    Diagnosis Date Noted Date Resolved POA    PRINCIPAL PROBLEM:  Ileus [K56.7] 03/16/2024 03/20/2024 Yes    PAF (paroxysmal atrial fibrillation) [I48.0] 03/19/2024 03/20/2024 No    Intractable nausea and vomiting [R11.2] 03/16/2024 03/20/2024 Yes    Hyponatremia [E87.1] 03/16/2024 03/19/2024 Yes    Dehiscence of fascia [T81.30XA] 03/16/2024 03/20/2024 Yes    Adenoma of colon [D12.6] 01/29/2024 03/20/2024 Yes       Discharged Condition: stable    Disposition: Home or Self Care    Follow Up:   Follow-up Information       Tosin Boothe MD. Schedule an appointment as soon as possible for a visit in 1 week(s).    Specialty: Colon and Rectal Surgery  Why: Hospital follow up  Contact information:  [id number]  [address] [place] [address]  [phone]               Yisel Coley FNP-C. Schedule an appointment as soon as possible for a visit in 1 week(s).    Specialty: Cardiology  Why: Hospital follow up  Contact information:  [address] [place] [id number]  [phone]                           Patient Instructions:      Diet Cardiac     Diet diabetic     Activity as tolerated       Significant Diagnostic Studies: Labs: BMP:   Recent Labs   Lab 03/19/24  0555 03/20/24  0302   * 115*    139   K 4.1 3.6    104   CO2 23 25   BUN 40* 34*   CREATININE 1.3 1.1   CALCIUM 8.2* 8.0*   MG 2.4 2.3   , CMP   Recent Labs   Lab 03/19/24  0555 03/20/24  0302    139   K 4.1 3.6    104   CO2 23 25   * 115*   BUN 40* 34*   CREATININE 1.3 1.1   CALCIUM 8.2* 8.0*   ANIONGAP 16 10   , CBC   Recent Labs   Lab 03/19/24  0555 03/19/24  1310 03/20/24  0302   WBC 10.87 10.05 9.02   HGB 11.0* 11.7* 10.4*   HCT 33.9* 35.1* 30.9*    280 243   , A1C:   Recent Labs   Lab 03/17/24  0531   HGBA1C 5.4   , and All labs within the past 24 hours have been reviewed  Radiology:   Imaging Results              XR Gastric tube check, non-radiologist performed (Final result)  Result time 03/16/24 07:08:04      Final result by Montez Nassar III, MD (03/16/24 07:08:04)                   Impression:      Well-positioned nasogastric tube.      Electronically signed by: Montez Nassar MD  Date:    03/16/2024  Time:    07:08               Narrative:    EXAMINATION:  XR GASTRIC TUBE CHECK, NON-RADIOLOGIST PERFORMED    CLINICAL HISTORY:  tube placement;    FINDINGS:  The nasogastric tube is well positioned in the stomach.  Lungs are clear.  Heart size is normal.                                       CT Abdomen Pelvis  Without Contrast (Final result)  Result time 03/16/24 07:42:25      Final result by Montez Nassar III, MD (03/16/24 07:42:25)                   Impression:      Status post  recent interval right partial colectomy. Mild residual pneumoperitoneum, improved since prior exam.  Ventral abdominal wall soft tissue stranding is likely within normal early postoperative limits.  Small bowel dilation with air-fluid levels and air-fluid levels within nondilated colon suggesting postoperative ileus.  No focal transition point identified. Periumbilical hernia containing a short segment of small bowel which does not represent a site of obstruction.    Note: All CT scans at this facility are performed  using dose modulation techniques as appropriate to performed exam including the following:  automated exposure control; adjustment of mA and/or kV according to the patients size (this includes techniques or standardized protocols for targeted exams where dose is matched to indication/reason for exam: i.e. extremities or head);  iterative reconstruction technique.      Electronically signed by: Montez Nassar MD  Date:    03/16/2024  Time:    07:42               Narrative:    EXAMINATION:  CT ABDOMEN PELVIS WITHOUT CONTRAST    CLINICAL HISTORY:  Bowel obstruction; status post right colectomy 03/11/2024    TECHNIQUE:  Routine CT abdomen and pelvis performed without IV contrast.  Coronal and sagittal reformatted images obtained.    COMPARISON:  Abdominal CT 01/29/2024    FINDINGS:  Lung bases: No acute findings.    Bones: No acute osseous abnormality or suspicious bone lesions.    Kidneys and ureters: No evidence of urolithiasis, hydronephrosis or other acute findings.    Stomach, bowel and peritoneum: Status post recent interval right partial colectomy.  Mild residual pneumoperitoneum, improved since prior exam.  Ventral abdominal wall soft tissue stranding is likely within normal postoperative limits.  There is small bowel dilation with air-fluid levels and air-fluid levels within nondilated colon suggesting postoperative ileus.  No focal transition point identified.  Periumbilical hernia containing a  short segment of small bowel which does not represent a site of obstruction.  Trace ascites.  No evidence of abscess.    Appendix: Surgically absent    Liver: Unremarkable for noncontrast technique.    Gallbladder and bile ducts: Unremarkable.    Pancreas: Unremarkable for noncontrast technique.    Spleen: Unremarkable for noncontrast technique.    Adrenals: Stable small benign incidental bilateral adrenal adenomas which require no imaging follow-up.    Bladder: Intraluminal bladder gas consistent with recent catheterization.  Otherwise unremarkable.    Aorta: No aneurysm.    Reproductive organs: Within normal limits.    Lymph nodes: No pathologic lymphadenopathy.                                     Cardiac Graphics: Echocardiogram: 2D echo with color flow doppler: No results found for this or any previous visit.    Pending Diagnostic Studies:       None           Medications:  Reconciled Home Medications:      Medication List        START taking these medications      apixaban 5 mg Tab  Commonly known as: ELIQUIS  Take 1 tablet (5 mg total) by mouth 2 (two) times daily.     diltiaZEM 240 MG 24 hr capsule  Commonly known as: CARDIZEM CD  Take 1 capsule (240 mg total) by mouth once daily.  Start taking on: March 21, 2024     furosemide 40 MG tablet  Commonly known as: LASIX  Take 1 tablet (40 mg total) by mouth daily as needed (leg swelling).            CHANGE how you take these medications      metoprolol succinate 25 MG 24 hr tablet  Commonly known as: TOPROL-XL  Take 1 tablet (25 mg total) by mouth 2 (two) times daily.  What changed:   medication strength  how much to take            CONTINUE taking these medications      acetaminophen 500 MG tablet  Commonly known as: TYLENOL  Take 2 tablets (1,000 mg total) by mouth every 6 (six) hours. for 5 days     atorvastatin 20 MG tablet  Commonly known as: LIPITOR  TAKE 1 TABLET BY MOUTH EVERY DAY     doxazosin 4 MG tablet  Commonly known as: CARDURA  Take 4 mg by mouth  once daily.     fenofibrate 54 MG tablet  Commonly known as: TRICOR  Take 1 tablet (54 mg total) by mouth once daily.     hydroCHLOROthiazide 25 MG tablet  Commonly known as: HYDRODIURIL  TAKE 1 TABLET BY MOUTH EVERY DAY IN THE MORNING     metFORMIN 500 MG tablet  Commonly known as: GLUCOPHAGE  TAKE 1 TABLET BY MOUTH TWICE A DAY WITH FOOD     olmesartan 40 MG tablet  Commonly known as: BENICAR  Take 1 tablet (40 mg total) by mouth once daily.     polyethylene glycol 17 gram/dose powder  Commonly known as: GLYCOLAX  Take 238 g by mouth once daily. Mix with 2L of gatorade and drink all of mixed solution starting at 12pm the day before surgery, finishing by 10pm the night before surgery.     pregabalin 75 MG capsule  Commonly known as: LYRICA  Take 1 capsule (75 mg total) by mouth 2 (two) times daily.            STOP taking these medications      amLODIPine 5 MG tablet  Commonly known as: NORVASC     methocarbamoL 750 MG Tab  Commonly known as: ROBAXIN     metoclopramide HCl 10 MG tablet  Commonly known as: REGLAN     metroNIDAZOLE 500 MG tablet  Commonly known as: FLAGYL     neomycin 500 mg Tab  Commonly known as: MYCIFRADIN     oxyCODONE 5 MG immediate release tablet  Commonly known as: ROXICODONE     tiZANidine 4 MG tablet  Commonly known as: ZANAFLEX     traMADoL 50 mg tablet  Commonly known as: ULTRAM              Indwelling Lines/Drains at time of discharge:   Lines/Drains/Airways       None                   Time spent on the discharge of patient: 45 minutes         Ildefonso Cross MD  Department of Hospital Medicine  [facility]

## 2024-03-20 NOTE — HOSPITAL COURSE
3/20/24- Patient seen and examined today, sitting in chair. Feels good overall. Converted to NSR.Tolerating solids and PO meds. No complaints of nausea, vomiting, CP. Labs reviewed/stable. TTE with normal EF.

## 2024-03-25 ENCOUNTER — PATIENT MESSAGE (OUTPATIENT)
Dept: SURGERY | Facility: CLINIC | Age: 74
End: 2024-03-25
Payer: MEDICARE

## 2024-03-27 ENCOUNTER — OFFICE VISIT (OUTPATIENT)
Dept: SURGERY | Facility: CLINIC | Age: 74
End: 2024-03-27
Payer: MEDICARE

## 2024-03-27 VITALS
TEMPERATURE: 98 F | HEIGHT: 65 IN | DIASTOLIC BLOOD PRESSURE: 54 MMHG | WEIGHT: 211 LBS | HEART RATE: 64 BPM | OXYGEN SATURATION: 95 % | SYSTOLIC BLOOD PRESSURE: 106 MMHG | BODY MASS INDEX: 35.16 KG/M2

## 2024-03-27 DIAGNOSIS — D12.6 COLON ADENOMA: Primary | ICD-10-CM

## 2024-03-27 PROBLEM — I48.91 NEW ONSET A-FIB: Status: ACTIVE | Noted: 2024-03-19

## 2024-03-27 PROCEDURE — 3044F HG A1C LEVEL LT 7.0%: CPT | Mod: CPTII,S$GLB,, | Performed by: STUDENT IN AN ORGANIZED HEALTH CARE EDUCATION/TRAINING PROGRAM

## 2024-03-27 PROCEDURE — 3078F DIAST BP <80 MM HG: CPT | Mod: CPTII,S$GLB,, | Performed by: STUDENT IN AN ORGANIZED HEALTH CARE EDUCATION/TRAINING PROGRAM

## 2024-03-27 PROCEDURE — 3074F SYST BP LT 130 MM HG: CPT | Mod: CPTII,S$GLB,, | Performed by: STUDENT IN AN ORGANIZED HEALTH CARE EDUCATION/TRAINING PROGRAM

## 2024-03-27 PROCEDURE — 1126F AMNT PAIN NOTED NONE PRSNT: CPT | Mod: CPTII,S$GLB,, | Performed by: STUDENT IN AN ORGANIZED HEALTH CARE EDUCATION/TRAINING PROGRAM

## 2024-03-27 PROCEDURE — 99024 POSTOP FOLLOW-UP VISIT: CPT | Mod: S$GLB,,, | Performed by: STUDENT IN AN ORGANIZED HEALTH CARE EDUCATION/TRAINING PROGRAM

## 2024-03-27 PROCEDURE — 4010F ACE/ARB THERAPY RXD/TAKEN: CPT | Mod: CPTII,S$GLB,, | Performed by: STUDENT IN AN ORGANIZED HEALTH CARE EDUCATION/TRAINING PROGRAM

## 2024-03-27 PROCEDURE — 99999 PR PBB SHADOW E&M-EST. PATIENT-LVL III: CPT | Mod: PBBFAC,,, | Performed by: STUDENT IN AN ORGANIZED HEALTH CARE EDUCATION/TRAINING PROGRAM

## 2024-03-27 RX ORDER — SULFAMETHOXAZOLE AND TRIMETHOPRIM 800; 160 MG/1; MG/1
1 TABLET ORAL 2 TIMES DAILY
Qty: 14 TABLET | Refills: 0 | Status: SHIPPED | OUTPATIENT
Start: 2024-03-27 | End: 2024-04-03

## 2024-03-28 ENCOUNTER — HOSPITAL ENCOUNTER (OUTPATIENT)
Dept: RADIOLOGY | Facility: HOSPITAL | Age: 74
Discharge: HOME | DRG: 068 | End: 2024-03-28
Attending: NURSE PRACTITIONER
Payer: MEDICARE

## 2024-03-28 ENCOUNTER — TELEPHONE (OUTPATIENT)
Dept: SURGERY | Facility: CLINIC | Age: 74
End: 2024-03-28
Payer: MEDICARE

## 2024-03-28 ENCOUNTER — OFFICE VISIT (OUTPATIENT)
Dept: SURGERY | Facility: CLINIC | Age: 74
DRG: 068 | End: 2024-03-28
Payer: MEDICARE

## 2024-03-28 ENCOUNTER — OFFICE VISIT (OUTPATIENT)
Dept: FAMILY MEDICINE | Facility: CLINIC | Age: 74
DRG: 068 | End: 2024-03-28
Payer: MEDICARE

## 2024-03-28 VITALS
HEIGHT: 65 IN | TEMPERATURE: 99 F | HEART RATE: 70 BPM | SYSTOLIC BLOOD PRESSURE: 110 MMHG | BODY MASS INDEX: 35.08 KG/M2 | DIASTOLIC BLOOD PRESSURE: 60 MMHG | OXYGEN SATURATION: 97 % | WEIGHT: 210.56 LBS

## 2024-03-28 DIAGNOSIS — T81.49XA INFECTED SURGICAL WOUND: ICD-10-CM

## 2024-03-28 DIAGNOSIS — T81.49XA INFECTED SURGICAL WOUND: Primary | ICD-10-CM

## 2024-03-28 DIAGNOSIS — K56.7 ILEUS: ICD-10-CM

## 2024-03-28 DIAGNOSIS — K43.9 VENTRAL HERNIA WITHOUT OBSTRUCTION OR GANGRENE: ICD-10-CM

## 2024-03-28 DIAGNOSIS — D12.6 COLON ADENOMA: Primary | ICD-10-CM

## 2024-03-28 DIAGNOSIS — I48.91 NEW ONSET A-FIB: ICD-10-CM

## 2024-03-28 PROCEDURE — 4010F ACE/ARB THERAPY RXD/TAKEN: CPT | Mod: CPTII,S$GLB,, | Performed by: STUDENT IN AN ORGANIZED HEALTH CARE EDUCATION/TRAINING PROGRAM

## 2024-03-28 PROCEDURE — 1126F AMNT PAIN NOTED NONE PRSNT: CPT | Mod: CPTII,S$GLB,, | Performed by: NURSE PRACTITIONER

## 2024-03-28 PROCEDURE — 1101F PT FALLS ASSESS-DOCD LE1/YR: CPT | Mod: CPTII,S$GLB,, | Performed by: NURSE PRACTITIONER

## 2024-03-28 PROCEDURE — 3074F SYST BP LT 130 MM HG: CPT | Mod: CPTII,S$GLB,, | Performed by: NURSE PRACTITIONER

## 2024-03-28 PROCEDURE — 1160F RVW MEDS BY RX/DR IN RCRD: CPT | Mod: CPTII,S$GLB,, | Performed by: STUDENT IN AN ORGANIZED HEALTH CARE EDUCATION/TRAINING PROGRAM

## 2024-03-28 PROCEDURE — 3288F FALL RISK ASSESSMENT DOCD: CPT | Mod: CPTII,S$GLB,, | Performed by: NURSE PRACTITIONER

## 2024-03-28 PROCEDURE — 3044F HG A1C LEVEL LT 7.0%: CPT | Mod: CPTII,S$GLB,, | Performed by: NURSE PRACTITIONER

## 2024-03-28 PROCEDURE — 1111F DSCHRG MED/CURRENT MED MERGE: CPT | Mod: CPTII,S$GLB,, | Performed by: NURSE PRACTITIONER

## 2024-03-28 PROCEDURE — 4010F ACE/ARB THERAPY RXD/TAKEN: CPT | Mod: CPTII,S$GLB,, | Performed by: NURSE PRACTITIONER

## 2024-03-28 PROCEDURE — 1159F MED LIST DOCD IN RCRD: CPT | Mod: CPTII,S$GLB,, | Performed by: NURSE PRACTITIONER

## 2024-03-28 PROCEDURE — 99214 OFFICE O/P EST MOD 30 MIN: CPT | Mod: S$GLB,,, | Performed by: NURSE PRACTITIONER

## 2024-03-28 PROCEDURE — 1159F MED LIST DOCD IN RCRD: CPT | Mod: CPTII,S$GLB,, | Performed by: STUDENT IN AN ORGANIZED HEALTH CARE EDUCATION/TRAINING PROGRAM

## 2024-03-28 PROCEDURE — 76705 ECHO EXAM OF ABDOMEN: CPT | Mod: 26,,, | Performed by: RADIOLOGY

## 2024-03-28 PROCEDURE — 99024 POSTOP FOLLOW-UP VISIT: CPT | Mod: S$GLB,,, | Performed by: STUDENT IN AN ORGANIZED HEALTH CARE EDUCATION/TRAINING PROGRAM

## 2024-03-28 PROCEDURE — 76705 ECHO EXAM OF ABDOMEN: CPT | Mod: TC

## 2024-03-28 PROCEDURE — 3078F DIAST BP <80 MM HG: CPT | Mod: CPTII,S$GLB,, | Performed by: NURSE PRACTITIONER

## 2024-03-28 PROCEDURE — 3044F HG A1C LEVEL LT 7.0%: CPT | Mod: CPTII,S$GLB,, | Performed by: STUDENT IN AN ORGANIZED HEALTH CARE EDUCATION/TRAINING PROGRAM

## 2024-03-28 PROCEDURE — 1160F RVW MEDS BY RX/DR IN RCRD: CPT | Mod: CPTII,S$GLB,, | Performed by: NURSE PRACTITIONER

## 2024-03-28 PROCEDURE — 99999 PR PBB SHADOW E&M-EST. PATIENT-LVL IV: CPT | Mod: PBBFAC,,, | Performed by: NURSE PRACTITIONER

## 2024-03-28 NOTE — TELEPHONE ENCOUNTER
RN called pt x2 in attempt to get him into clinic for I&D per Jacque Hutson NP. No answer, no voicemail set up. No answer from wife either.

## 2024-03-29 ENCOUNTER — HOSPITAL ENCOUNTER (INPATIENT)
Facility: HOSPITAL | Age: 74
LOS: 2 days | Discharge: HOME | DRG: 068 | End: 2024-04-01
Attending: EMERGENCY MEDICINE | Admitting: HOSPITALIST
Payer: MEDICARE

## 2024-03-29 DIAGNOSIS — R53.1 WEAKNESS: ICD-10-CM

## 2024-03-29 DIAGNOSIS — R07.9 CHEST PAIN: ICD-10-CM

## 2024-03-29 DIAGNOSIS — I65.22 STENOSIS OF LEFT CAROTID ARTERY: ICD-10-CM

## 2024-03-29 DIAGNOSIS — R55 SYNCOPE, UNSPECIFIED SYNCOPE TYPE: Primary | ICD-10-CM

## 2024-03-29 DIAGNOSIS — E86.0 DEHYDRATION: ICD-10-CM

## 2024-03-29 DIAGNOSIS — R55 SYNCOPE: ICD-10-CM

## 2024-03-29 LAB
ALBUMIN SERPL BCP-MCNC: 2.7 G/DL (ref 3.5–5.2)
ALP SERPL-CCNC: 49 U/L (ref 55–135)
ALT SERPL W/O P-5'-P-CCNC: 32 U/L (ref 10–44)
ANION GAP SERPL CALC-SCNC: 13 MMOL/L (ref 8–16)
AST SERPL-CCNC: 23 U/L (ref 10–40)
BASOPHILS # BLD AUTO: 0.02 K/UL (ref 0–0.2)
BASOPHILS NFR BLD: 0.2 % (ref 0–1.9)
BILIRUB SERPL-MCNC: 0.4 MG/DL (ref 0.1–1)
BNP SERPL-MCNC: 180 PG/ML (ref 0–99)
BUN SERPL-MCNC: 33 MG/DL (ref 8–23)
CALCIUM SERPL-MCNC: 8.4 MG/DL (ref 8.7–10.5)
CHLORIDE SERPL-SCNC: 106 MMOL/L (ref 95–110)
CK SERPL-CCNC: 29 U/L (ref 20–200)
CO2 SERPL-SCNC: 19 MMOL/L (ref 23–29)
CREAT SERPL-MCNC: 2.5 MG/DL (ref 0.5–1.4)
DIFFERENTIAL METHOD BLD: ABNORMAL
EOSINOPHIL # BLD AUTO: 0 K/UL (ref 0–0.5)
EOSINOPHIL NFR BLD: 0.4 % (ref 0–8)
ERYTHROCYTE [DISTWIDTH] IN BLOOD BY AUTOMATED COUNT: 13.4 % (ref 11.5–14.5)
EST. GFR  (NO RACE VARIABLE): 27 ML/MIN/1.73 M^2
GLUCOSE SERPL-MCNC: 122 MG/DL (ref 70–110)
HCT VFR BLD AUTO: 26.5 % (ref 40–54)
HCV AB SERPL QL IA: NEGATIVE
HEP C VIRUS HOLD SPECIMEN: NORMAL
HGB BLD-MCNC: 8.6 G/DL (ref 14–18)
HIV 1+2 AB+HIV1 P24 AG SERPL QL IA: NEGATIVE
IMM GRANULOCYTES # BLD AUTO: 0.05 K/UL (ref 0–0.04)
IMM GRANULOCYTES NFR BLD AUTO: 0.5 % (ref 0–0.5)
LACTATE SERPL-SCNC: 1.2 MMOL/L (ref 0.5–2.2)
LACTATE SERPL-SCNC: 1.6 MMOL/L (ref 0.5–2.2)
LYMPHOCYTES # BLD AUTO: 0.1 K/UL (ref 1–4.8)
LYMPHOCYTES NFR BLD: 0.7 % (ref 18–48)
MCH RBC QN AUTO: 28.6 PG (ref 27–31)
MCHC RBC AUTO-ENTMCNC: 32.5 G/DL (ref 32–36)
MCV RBC AUTO: 88 FL (ref 82–98)
MONOCYTES # BLD AUTO: 0.2 K/UL (ref 0.3–1)
MONOCYTES NFR BLD: 2.1 % (ref 4–15)
NEUTROPHILS # BLD AUTO: 10.2 K/UL (ref 1.8–7.7)
NEUTROPHILS NFR BLD: 96.1 % (ref 38–73)
NRBC BLD-RTO: 0 /100 WBC
PLATELET # BLD AUTO: 191 K/UL (ref 150–450)
PMV BLD AUTO: 10.3 FL (ref 9.2–12.9)
POTASSIUM SERPL-SCNC: 4.9 MMOL/L (ref 3.5–5.1)
PROCALCITONIN SERPL IA-MCNC: 0.19 NG/ML
PROT SERPL-MCNC: 6.2 G/DL (ref 6–8.4)
RBC # BLD AUTO: 3.01 M/UL (ref 4.6–6.2)
SODIUM SERPL-SCNC: 138 MMOL/L (ref 136–145)
TROPONIN I SERPL DL<=0.01 NG/ML-MCNC: 0.02 NG/ML (ref 0–0.03)
WBC # BLD AUTO: 10.56 K/UL (ref 3.9–12.7)

## 2024-03-29 PROCEDURE — G0378 HOSPITAL OBSERVATION PER HR: HCPCS

## 2024-03-29 PROCEDURE — 83880 ASSAY OF NATRIURETIC PEPTIDE: CPT | Performed by: EMERGENCY MEDICINE

## 2024-03-29 PROCEDURE — 87040 BLOOD CULTURE FOR BACTERIA: CPT | Mod: 59 | Performed by: EMERGENCY MEDICINE

## 2024-03-29 PROCEDURE — 85025 COMPLETE CBC W/AUTO DIFF WBC: CPT | Performed by: EMERGENCY MEDICINE

## 2024-03-29 PROCEDURE — 25000003 PHARM REV CODE 250: Performed by: FAMILY MEDICINE

## 2024-03-29 PROCEDURE — 80053 COMPREHEN METABOLIC PANEL: CPT | Performed by: EMERGENCY MEDICINE

## 2024-03-29 PROCEDURE — 83605 ASSAY OF LACTIC ACID: CPT | Mod: 91 | Performed by: EMERGENCY MEDICINE

## 2024-03-29 PROCEDURE — 25000003 PHARM REV CODE 250: Performed by: EMERGENCY MEDICINE

## 2024-03-29 PROCEDURE — 36415 COLL VENOUS BLD VENIPUNCTURE: CPT | Performed by: FAMILY MEDICINE

## 2024-03-29 PROCEDURE — 82550 ASSAY OF CK (CPK): CPT | Performed by: EMERGENCY MEDICINE

## 2024-03-29 PROCEDURE — 84484 ASSAY OF TROPONIN QUANT: CPT | Performed by: EMERGENCY MEDICINE

## 2024-03-29 PROCEDURE — 96361 HYDRATE IV INFUSION ADD-ON: CPT

## 2024-03-29 PROCEDURE — 86803 HEPATITIS C AB TEST: CPT | Performed by: EMERGENCY MEDICINE

## 2024-03-29 PROCEDURE — 96360 HYDRATION IV INFUSION INIT: CPT

## 2024-03-29 PROCEDURE — 87040 BLOOD CULTURE FOR BACTERIA: CPT | Performed by: FAMILY MEDICINE

## 2024-03-29 PROCEDURE — 93010 ELECTROCARDIOGRAM REPORT: CPT | Mod: ,,, | Performed by: STUDENT IN AN ORGANIZED HEALTH CARE EDUCATION/TRAINING PROGRAM

## 2024-03-29 PROCEDURE — 94761 N-INVAS EAR/PLS OXIMETRY MLT: CPT

## 2024-03-29 PROCEDURE — 93005 ELECTROCARDIOGRAM TRACING: CPT

## 2024-03-29 PROCEDURE — 99285 EMERGENCY DEPT VISIT HI MDM: CPT | Mod: 25

## 2024-03-29 PROCEDURE — 87389 HIV-1 AG W/HIV-1&-2 AB AG IA: CPT | Performed by: EMERGENCY MEDICINE

## 2024-03-29 PROCEDURE — 84145 PROCALCITONIN (PCT): CPT | Performed by: EMERGENCY MEDICINE

## 2024-03-29 RX ORDER — ATORVASTATIN CALCIUM 10 MG/1
20 TABLET, FILM COATED ORAL DAILY
Status: DISCONTINUED | OUTPATIENT
Start: 2024-03-30 | End: 2024-04-01 | Stop reason: HOSPADM

## 2024-03-29 RX ORDER — IBUPROFEN 200 MG
16 TABLET ORAL
Status: DISCONTINUED | OUTPATIENT
Start: 2024-03-29 | End: 2024-04-01 | Stop reason: HOSPADM

## 2024-03-29 RX ORDER — SODIUM CHLORIDE 9 MG/ML
INJECTION, SOLUTION INTRAVENOUS CONTINUOUS
Status: DISCONTINUED | OUTPATIENT
Start: 2024-03-29 | End: 2024-04-01 | Stop reason: HOSPADM

## 2024-03-29 RX ORDER — INSULIN ASPART 100 [IU]/ML
0-10 INJECTION, SOLUTION INTRAVENOUS; SUBCUTANEOUS
Status: DISCONTINUED | OUTPATIENT
Start: 2024-03-29 | End: 2024-04-01 | Stop reason: HOSPADM

## 2024-03-29 RX ORDER — SULFAMETHOXAZOLE AND TRIMETHOPRIM 800; 160 MG/1; MG/1
1 TABLET ORAL 2 TIMES DAILY
Status: DISCONTINUED | OUTPATIENT
Start: 2024-03-29 | End: 2024-04-01 | Stop reason: HOSPADM

## 2024-03-29 RX ORDER — NALOXONE HCL 0.4 MG/ML
0.02 VIAL (ML) INJECTION
Status: DISCONTINUED | OUTPATIENT
Start: 2024-03-29 | End: 2024-04-01 | Stop reason: HOSPADM

## 2024-03-29 RX ORDER — SODIUM CHLORIDE 0.9 % (FLUSH) 0.9 %
10 SYRINGE (ML) INJECTION EVERY 12 HOURS PRN
Status: DISCONTINUED | OUTPATIENT
Start: 2024-03-29 | End: 2024-04-01 | Stop reason: HOSPADM

## 2024-03-29 RX ORDER — ACETAMINOPHEN 325 MG/1
650 TABLET ORAL EVERY 4 HOURS PRN
Status: DISCONTINUED | OUTPATIENT
Start: 2024-03-29 | End: 2024-04-01 | Stop reason: HOSPADM

## 2024-03-29 RX ORDER — GLUCAGON 1 MG
1 KIT INJECTION
Status: DISCONTINUED | OUTPATIENT
Start: 2024-03-29 | End: 2024-04-01 | Stop reason: HOSPADM

## 2024-03-29 RX ORDER — IBUPROFEN 200 MG
24 TABLET ORAL
Status: DISCONTINUED | OUTPATIENT
Start: 2024-03-29 | End: 2024-04-01 | Stop reason: HOSPADM

## 2024-03-29 RX ORDER — HYDROCODONE BITARTRATE AND ACETAMINOPHEN 5; 325 MG/1; MG/1
1 TABLET ORAL EVERY 6 HOURS PRN
Status: DISCONTINUED | OUTPATIENT
Start: 2024-03-29 | End: 2024-04-01 | Stop reason: HOSPADM

## 2024-03-29 RX ADMIN — SULFAMETHOXAZOLE AND TRIMETHOPRIM 1 TABLET: 800; 160 TABLET ORAL at 08:03

## 2024-03-29 RX ADMIN — SODIUM CHLORIDE: 9 INJECTION, SOLUTION INTRAVENOUS at 06:03

## 2024-03-29 RX ADMIN — SODIUM CHLORIDE 1845 ML: 0.9 INJECTION, SOLUTION INTRAVENOUS at 02:03

## 2024-03-29 RX ADMIN — SODIUM CHLORIDE: 9 INJECTION, SOLUTION INTRAVENOUS at 09:03

## 2024-03-29 RX ADMIN — APIXABAN 5 MG: 2.5 TABLET, FILM COATED ORAL at 08:03

## 2024-03-29 NOTE — ASSESSMENT & PLAN NOTE
Patient with Paroxysmal (<7 days) atrial fibrillation which is controlled currently with Calcium Channel Blocker. Patient is currently in sinus rhythm.[id number] Score: 1. Anticoagulation indicated. Anticoagulation done with Eliquis .    -Cardizem held due to hypotension   -continue Eliquis

## 2024-03-29 NOTE — HPI
Mr. Ling is a 73-year-old male with past medical history of diabetes, hyperlipidemia, hypertension, colon mass removal on 03/01 followed by an ileus which was then followed by a fascia tear presented after being found unresponsive on his back porch.  Per wife at bedside states that patient had surgery with Dr. Boothe for a colon mass on 03/01 and had some complications postprocedure.  His latest complication was some cellulitis around the surgical site for which she was started on Bactrim yesterday.  Patient is noted to be on several different blood pressures and upon arrival noted to be hypotensive.  Does not have any leukocytosis or fevers and was feeling in normal health prior to the episode.  Patient does not recall having a syncopal episode he states that the last thing he remembers is sitting out on the porch.  Patient denies any headache chest pain shortness on breath nausea or vomiting.  His lower abdominal incision appears erythematous but no noted active discharge.  Patient did have some dried discharge on his bandages.  CT head showed no acute intracranial CT abnormality.  Hospital medicine will admit for observation, syncope workup, wound care for his abdominal cellulitis and continued p.o. antibiotics.

## 2024-03-29 NOTE — H&P
[facility] - Emergency Dept.  [facility] Medicine  History & Physical    Patient Name: Quintin Ling  MRN: [id number]  Patient Class: OP- Observation  Admission Date: 3/29/2024  Attending Physician: John Mesa MD   Primary Care Provider: Bishop Gabriel MD         Patient information was obtained from patient, spouse/SO, and ER records.     Subjective:     Principal Problem:<principal problem not specified>    Chief Complaint:   Chief Complaint   Patient presents with    Loss of Consciousness     Pt suffered LOC, was found down by wife, crackles to lung bases, hypotensive when fluids stopped, fluids restarted by EMS        HPI: Mr. Ling is a 73-year-old male with past medical history of diabetes, hyperlipidemia, hypertension, colon mass removal on 03/01 followed by an ileus which was then followed by a fascia tear presented after being found unresponsive on his back porch.  Per wife at bedside states that patient had surgery with Dr. Boothe for a colon mass on 03/01 and had some complications postprocedure.  His latest complication was some cellulitis around the surgical site for which she was started on Bactrim yesterday.  Patient is noted to be on several different blood pressures and upon arrival noted to be hypotensive.  Does not have any leukocytosis or fevers and was feeling in normal health prior to the episode.  Patient does not recall having a syncopal episode he states that the last thing he remembers is sitting out on the porch.  Patient denies any headache chest pain shortness on breath nausea or vomiting.  His lower abdominal incision appears erythematous but no noted active discharge.  Patient did have some dried discharge on his bandages.  CT head showed no acute intracranial CT abnormality.  Hospital medicine will admit for observation, syncope workup, wound care for his abdominal cellulitis and continued p.o. antibiotics.    Past Medical History:   Diagnosis Date    Diabetes mellitus      Hyperlipidemia     Hypertension        Past Surgical History:   Procedure Laterality Date    BIOPSY OF PERITONEUM N/A 1/29/2024    Procedure: BIOPSY, PERITONEUM;  Surgeon: Tosin Boothe MD;  Location: [facility] OR;  Service: Colon and Rectal;  Laterality: N/A;    CLOSURE OF WOUND N/A 3/16/2024    Procedure: CLOSURE, WOUND;  Surgeon: Magda Stark MD;  Location: [facility] OR;  Service: General;  Laterality: N/A;    COLONOSCOPY N/A 1/5/2024    Procedure: COLONOSCOPY;  Surgeon: Irasema Gil MD;  Location: [facility] ENDO;  Service: Endoscopy;  Laterality: N/A;    DIAGNOSTIC LAPAROSCOPY N/A 1/29/2024    Procedure: LAPAROSCOPY, DIAGNOSTIC;  Surgeon: Tosin Boothe MD;  Location: [facility] OR;  Service: Colon and Rectal;  Laterality: N/A;    EPIDURAL STEROID INJECTION N/A 11/13/2023    Procedure: Lumbar L5/S1 IL JOSE;  Surgeon: Oneil Carlson MD;  Location: [facility] PAIN MGT;  Service: Pain Management;  Laterality: N/A;    ESOPHAGOGASTRODUODENOSCOPY N/A 1/5/2024    Procedure: EGD (ESOPHAGOGASTRODUODENOSCOPY);  Surgeon: Irasema Gil MD;  Location: [facility];  Service: Endoscopy;  Laterality: N/A;    INJECTION OF ANESTHETIC AGENT INTO TISSUE PLANE DEFINED BY TRANSVERSUS ABDOMINIS MUSCLE N/A 3/11/2024    Procedure: BLOCK, TRANSVERSUS ABDOMINIS PLANE;  Surgeon: Tosin Boothe MD;  Location: [facility] OR;  Service: Colon and Rectal;  Laterality: N/A;    NO PAST SURGERIES      WOUND EXPLORATION N/A 3/16/2024    Procedure: EXPLORATION, WOUND;  Surgeon: Magda Stark MD;  Location: [facility] OR;  Service: General;  Laterality: N/A;    XI ROBOTIC COLECTOMY, RIGHT Right 3/11/2024    Procedure: XI ROBOTIC COLECTOMY, RIGHT;  Surgeon: Tosin Boothe MD;  Location: [facility] OR;  Service: Colon and Rectal;  Laterality: Right;  WITH OMENECTOMY AND AMNIOFIX       Review of patient's allergies indicates:  No Known Allergies    No current facility-administered medications on file prior to encounter.     Current Outpatient Medications  on File Prior to Encounter   Medication Sig    apixaban (ELIQUIS) 5 mg Tab Take 1 tablet (5 mg total) by mouth 2 (two) times daily.    atorvastatin (LIPITOR) 20 MG tablet TAKE 1 TABLET BY MOUTH EVERY DAY    diltiaZEM (CARDIZEM CD) 240 MG 24 hr capsule Take 1 capsule (240 mg total) by mouth once daily.    doxazosin (CARDURA) 4 MG tablet Take 4 mg by mouth once daily.    fenofibrate (TRICOR) 54 MG tablet Take 1 tablet (54 mg total) by mouth once daily.    furosemide (LASIX) 40 MG tablet Take 1 tablet (40 mg total) by mouth daily as needed (leg swelling).    hydroCHLOROthiazide (HYDRODIURIL) 25 MG tablet TAKE 1 TABLET BY MOUTH EVERY DAY IN THE MORNING    metFORMIN (GLUCOPHAGE) 500 MG tablet TAKE 1 TABLET BY MOUTH TWICE A DAY WITH FOOD    metoprolol succinate (TOPROL-XL) 25 MG 24 hr tablet Take 1 tablet (25 mg total) by mouth 2 (two) times daily.    olmesartan (BENICAR) 40 MG tablet Take 1 tablet (40 mg total) by mouth once daily.    polyethylene glycol (GLYCOLAX) 17 gram/dose powder Take 238 g by mouth once daily. Mix with 2L of gatorade and drink all of mixed solution starting at 12pm the day before surgery, finishing by 10pm the night before surgery.    pregabalin (LYRICA) 75 MG capsule Take 1 capsule (75 mg total) by mouth 2 (two) times daily.    sulfamethoxazole-trimethoprim 800-160mg (BACTRIM DS) 800-160 mg Tab Take 1 tablet by mouth 2 (two) times daily. for 7 days     Family History       Problem Relation (Age of Onset)    No Known Problems Mother, Father          Tobacco Use    Smoking status: Former     Passive exposure: Never    Smokeless tobacco: Never   Substance and Sexual Activity    Alcohol use: Not Currently     Comment: OCC    Drug use: Never    Sexual activity: Yes     Partners: Female     Review of Systems   Constitutional:  Negative for activity change, appetite change, chills, fatigue and fever.   Respiratory:  Negative for apnea, cough, shortness of breath and stridor.    Cardiovascular:  Negative  for chest pain, palpitations and leg swelling.   Gastrointestinal:  Negative for abdominal distention, constipation, diarrhea, nausea and vomiting.   Genitourinary:  Negative for difficulty urinating, dysuria, frequency, hematuria and urgency.   Musculoskeletal:  Negative for arthralgias, back pain, gait problem and joint swelling.   Neurological:  Positive for syncope. Negative for dizziness, seizures, weakness, light-headedness, numbness and headaches.   Psychiatric/Behavioral:  Negative for agitation, behavioral problems, confusion, decreased concentration, dysphoric mood and hallucinations.    All other systems reviewed and are negative.    Objective:     Vital Signs (Most Recent):  Temp: 98.3 °F (36.8 °C) (03/29/24 1414)  Pulse: 60 (03/29/24 1503)  Resp: 17 (03/29/24 1503)  BP: (!) 106/53 (03/29/24 1503)  SpO2: 100 % (03/29/24 1503) Vital Signs (24h Range):  Temp:  [98.3 °F (36.8 °C)] 98.3 °F (36.8 °C)  Pulse:  [60-71] 60  Resp:  [16-20] 17  SpO2:  [100 %] 100 %  BP: ()/(44-53) 106/53     Weight: 105.9 kg (233 lb 6.4 oz)  Body mass index is 38.84 kg/m².     Physical Exam  Vitals and nursing note reviewed.   Constitutional:       Appearance: Normal appearance.   HENT:      Head: Normocephalic and atraumatic.      Nose: Nose normal.      Mouth/Throat:      Mouth: Mucous membranes are moist.   Eyes:      Extraocular Movements: Extraocular movements intact.      Conjunctiva/sclera: Conjunctivae normal.   Cardiovascular:      Rate and Rhythm: Normal rate and regular rhythm.      Pulses: Normal pulses.      Heart sounds: Normal heart sounds.   Pulmonary:      Effort: Pulmonary effort is normal.      Breath sounds: Normal breath sounds.   Abdominal:      General: Abdomen is flat. Bowel sounds are normal.      Palpations: Abdomen is soft.      Comments: Abdominal wound does appear cellulitic.  No active discharge noted but dry blood and discharge noted on the bandages.   Musculoskeletal:      Cervical back:  Normal range of motion and neck supple.   Skin:     General: Skin is warm.      Capillary Refill: Capillary refill takes less than 2 seconds.   Neurological:      Mental Status: He is alert and oriented to person, place, and time. Mental status is at baseline.   Psychiatric:         Mood and Affect: Mood normal.         Behavior: Behavior normal.                Significant Labs: All pertinent labs within the past 24 hours have been reviewed.  Recent Lab Results         03/29/24  1457        Procalcitonin 0.19  Comment: A concentration < 0.25 ng/mL represents a low risk of bacterial   infection.  Procalcitonin may not be accurate among patients with localized   infection, recent trauma or major surgery, immunosuppressed state,   invasive fungal infection, renal dysfunction. Decisions regarding   initiation or continuation of antibiotic therapy should not be based   solely on procalcitonin levels.         Albumin 2.7       ALP 49       ALT 32       Anion Gap 13       AST 23       Baso # 0.02       Basophil % 0.2       BILIRUBIN TOTAL 0.4  Comment: For infants and newborns, interpretation of results should be based  on gestational age, weight and in agreement with clinical  observations.    Premature Infant recommended reference ranges:  Up to 24 hours.............<8.0 mg/dL  Up to 48 hours............<12.0 mg/dL  3-5 days..................<15.0 mg/dL  6-29 days.................<15.0 mg/dL           Comment: Values of less than 100 pg/ml are consistent with non-CHF populations.       BUN 33       Calcium 8.4       Chloride 106       CO2 19       CPK 29       Creatinine 2.5       Differential Method Automated       eGFR 27       Eos # 0.0       Eos % 0.4       Glucose 122       Gran # (ANC) 10.2       Gran % 96.1       Hematocrit 26.5       Hemoglobin 8.6       Hepatitis C Ab Negative       HEP C Virus Hold Specimen Hold for HCV sendout       HIV 1/2 Ag/Ab Negative       Immature Grans (Abs) 0.05  Comment: Mild  elevation in immature granulocytes is non specific and   can be seen in a variety of conditions including stress response,   acute inflammation, trauma and pregnancy. Correlation with other   laboratory and clinical findings is essential.         Immature Granulocytes 0.5       Lactic Acid Level 1.6  Comment: Falsely low lactic acid results can be found in samples   containing >=13.0 mg/dL total bilirubin and/or >=3.5 mg/dL   direct bilirubin.         Lymph # 0.1       Lymph % 0.7       MCH 28.6       MCHC 32.5       MCV 88       Mono # 0.2       Mono % 2.1       MPV 10.3       nRBC 0       Platelet Count 191       Potassium 4.9       PROTEIN TOTAL 6.2       RBC 3.01       RDW 13.4       Sodium 138       Troponin I 0.017  Comment: The reference interval for Troponin I represents the 99th percentile   cutoff   for our facility and is consistent with 3rd generation assay   performance.         WBC 10.56               Significant Imaging:   CT Head Without Contrast   Final Result      No acute intracranial CT abnormality.      All CT scans at this facility are performed  using dose modulation techniques as appropriate to performed exam including the following:  automated exposure control; adjustment of mA and/or kV according to the patients size (this includes techniques or standardized protocols for targeted exams where dose is matched to indication/reason for exam: i.e. extremities or head);  iterative reconstruction technique.         Electronically signed by: Petey Rosario   Date:    03/29/2024   Time:    15:31      X-Ray Chest AP Portable   Final Result      No acute process seen.         Electronically signed by: Boston Tracey MD   Date:    03/29/2024   Time:    14:43      US Carotid Bilateral    (Results Pending)      Assessment/Plan:     Syncope  -likely secondary to hypotension   -we will hold hypertension medications at this time.  He is noted to be on several medication likely contributing to his  hypotension.  -echocardiogram and carotid Dopplers      New onset a-fib  Patient with Paroxysmal (<7 days) atrial fibrillation which is controlled currently with Calcium Channel Blocker. Patient is currently in sinus rhythm.[id number] Score: 1. Anticoagulation indicated. Anticoagulation done with Eliquis .    -Cardizem held due to hypotension   -continue Eliquis    Colon adenoma  -patient had adenoma about removed on 03/01/2024 by Dr. Boothe.  -surgical site appeared cellulitic, but had been evaluated by Dr. Boothe on 03/28/2024  -continue home Bactrim   -wound care consult      Essential hypertension  Chronic, uncontrolled. Latest blood pressure and vitals reviewed-     Temp:  [98.3 °F (36.8 °C)]   Pulse:  [60-71]   Resp:  [16-20]   BP: ()/(44-53)   SpO2:  [100 %] .   Home meds for hypertension were reviewed and noted below.   Hypertension Medications               diltiaZEM (CARDIZEM CD) 240 MG 24 hr capsule Take 1 capsule (240 mg total) by mouth once daily.    doxazosin (CARDURA) 4 MG tablet Take 4 mg by mouth once daily.    furosemide (LASIX) 40 MG tablet Take 1 tablet (40 mg total) by mouth daily as needed (leg swelling).    hydroCHLOROthiazide (HYDRODIURIL) 25 MG tablet TAKE 1 TABLET BY MOUTH EVERY DAY IN THE MORNING    metoprolol succinate (TOPROL-XL) 25 MG 24 hr tablet Take 1 tablet (25 mg total) by mouth 2 (two) times daily.    olmesartan (BENICAR) 40 MG tablet Take 1 tablet (40 mg total) by mouth once daily.            While in the hospital, will manage blood pressure as follows; Adjust home antihypertensive regimen as follows- hold home medications due to hypotension    Will utilize p.r.n. blood pressure medication only if patient's blood pressure greater than 160/100 and he develops symptoms such as worsening chest pain or shortness of breath.      VTE Risk Mitigation (From admission, onward)           Ordered     apixaban tablet 5 mg  2 times daily         03/29/24 8676     Reason for No  Pharmacological VTE Prophylaxis  Once        Question:  Reasons:  Answer:  Already adequately anticoagulated on oral Anticoagulants    03/29/24 1657     IP VTE HIGH RISK PATIENT  Once         03/29/24 1657     Place sequential compression device  Until discontinued         03/29/24 1657                       On 03/29/2024, patient should be placed in hospital observation services under my care.             John Mesa MD  Department of Hospital Medicine  [facility] - Emergency Dept.

## 2024-03-29 NOTE — SUBJECTIVE & OBJECTIVE
Past Medical History:   Diagnosis Date    Diabetes mellitus     Hyperlipidemia     Hypertension        Past Surgical History:   Procedure Laterality Date    BIOPSY OF PERITONEUM N/A 1/29/2024    Procedure: BIOPSY, PERITONEUM;  Surgeon: Tosin Boothe MD;  Location: [facility] OR;  Service: Colon and Rectal;  Laterality: N/A;    CLOSURE OF WOUND N/A 3/16/2024    Procedure: CLOSURE, WOUND;  Surgeon: Magda Stark MD;  Location: [facility] OR;  Service: General;  Laterality: N/A;    COLONOSCOPY N/A 1/5/2024    Procedure: COLONOSCOPY;  Surgeon: Irasema Gil MD;  Location: [facility] ENDO;  Service: Endoscopy;  Laterality: N/A;    DIAGNOSTIC LAPAROSCOPY N/A 1/29/2024    Procedure: LAPAROSCOPY, DIAGNOSTIC;  Surgeon: Tosin Boothe MD;  Location: [facility] OR;  Service: Colon and Rectal;  Laterality: N/A;    EPIDURAL STEROID INJECTION N/A 11/13/2023    Procedure: Lumbar L5/S1 IL JOSE;  Surgeon: Oneil Carlson MD;  Location: [facility] PAIN MGT;  Service: Pain Management;  Laterality: N/A;    ESOPHAGOGASTRODUODENOSCOPY N/A 1/5/2024    Procedure: EGD (ESOPHAGOGASTRODUODENOSCOPY);  Surgeon: Irasema Gil MD;  Location: [facility];  Service: Endoscopy;  Laterality: N/A;    INJECTION OF ANESTHETIC AGENT INTO TISSUE PLANE DEFINED BY TRANSVERSUS ABDOMINIS MUSCLE N/A 3/11/2024    Procedure: BLOCK, TRANSVERSUS ABDOMINIS PLANE;  Surgeon: Tosin Boothe MD;  Location: [facility] OR;  Service: Colon and Rectal;  Laterality: N/A;    NO PAST SURGERIES      WOUND EXPLORATION N/A 3/16/2024    Procedure: EXPLORATION, WOUND;  Surgeon: Magda Stark MD;  Location: [facility] OR;  Service: General;  Laterality: N/A;    XI ROBOTIC COLECTOMY, RIGHT Right 3/11/2024    Procedure: XI ROBOTIC COLECTOMY, RIGHT;  Surgeon: Tosin Boothe MD;  Location: [facility] OR;  Service: Colon and Rectal;  Laterality: Right;  WITH OMENECTOMY AND AMNIOFIX       Review of patient's allergies indicates:  No Known Allergies    No current facility-administered  medications on file prior to encounter.     Current Outpatient Medications on File Prior to Encounter   Medication Sig    apixaban (ELIQUIS) 5 mg Tab Take 1 tablet (5 mg total) by mouth 2 (two) times daily.    atorvastatin (LIPITOR) 20 MG tablet TAKE 1 TABLET BY MOUTH EVERY DAY    diltiaZEM (CARDIZEM CD) 240 MG 24 hr capsule Take 1 capsule (240 mg total) by mouth once daily.    doxazosin (CARDURA) 4 MG tablet Take 4 mg by mouth once daily.    fenofibrate (TRICOR) 54 MG tablet Take 1 tablet (54 mg total) by mouth once daily.    furosemide (LASIX) 40 MG tablet Take 1 tablet (40 mg total) by mouth daily as needed (leg swelling).    hydroCHLOROthiazide (HYDRODIURIL) 25 MG tablet TAKE 1 TABLET BY MOUTH EVERY DAY IN THE MORNING    metFORMIN (GLUCOPHAGE) 500 MG tablet TAKE 1 TABLET BY MOUTH TWICE A DAY WITH FOOD    metoprolol succinate (TOPROL-XL) 25 MG 24 hr tablet Take 1 tablet (25 mg total) by mouth 2 (two) times daily.    olmesartan (BENICAR) 40 MG tablet Take 1 tablet (40 mg total) by mouth once daily.    polyethylene glycol (GLYCOLAX) 17 gram/dose powder Take 238 g by mouth once daily. Mix with 2L of gatorade and drink all of mixed solution starting at 12pm the day before surgery, finishing by 10pm the night before surgery.    pregabalin (LYRICA) 75 MG capsule Take 1 capsule (75 mg total) by mouth 2 (two) times daily.    sulfamethoxazole-trimethoprim 800-160mg (BACTRIM DS) 800-160 mg Tab Take 1 tablet by mouth 2 (two) times daily. for 7 days     Family History       Problem Relation (Age of Onset)    No Known Problems Mother, Father          Tobacco Use    Smoking status: Former     Passive exposure: Never    Smokeless tobacco: Never   Substance and Sexual Activity    Alcohol use: Not Currently     Comment: OCC    Drug use: Never    Sexual activity: Yes     Partners: Female     Review of Systems   Constitutional:  Negative for activity change, appetite change, chills, fatigue and fever.   Respiratory:  Negative for  apnea, cough, shortness of breath and stridor.    Cardiovascular:  Negative for chest pain, palpitations and leg swelling.   Gastrointestinal:  Negative for abdominal distention, constipation, diarrhea, nausea and vomiting.   Genitourinary:  Negative for difficulty urinating, dysuria, frequency, hematuria and urgency.   Musculoskeletal:  Negative for arthralgias, back pain, gait problem and joint swelling.   Neurological:  Positive for syncope. Negative for dizziness, seizures, weakness, light-headedness, numbness and headaches.   Psychiatric/Behavioral:  Negative for agitation, behavioral problems, confusion, decreased concentration, dysphoric mood and hallucinations.    All other systems reviewed and are negative.    Objective:     Vital Signs (Most Recent):  Temp: 98.3 °F (36.8 °C) (03/29/24 1414)  Pulse: 60 (03/29/24 1503)  Resp: 17 (03/29/24 1503)  BP: (!) 106/53 (03/29/24 1503)  SpO2: 100 % (03/29/24 1503) Vital Signs (24h Range):  Temp:  [98.3 °F (36.8 °C)] 98.3 °F (36.8 °C)  Pulse:  [60-71] 60  Resp:  [16-20] 17  SpO2:  [100 %] 100 %  BP: ()/(44-53) 106/53     Weight: 105.9 kg (233 lb 6.4 oz)  Body mass index is 38.84 kg/m².     Physical Exam  Vitals and nursing note reviewed.   Constitutional:       Appearance: Normal appearance.   HENT:      Head: Normocephalic and atraumatic.      Nose: Nose normal.      Mouth/Throat:      Mouth: Mucous membranes are moist.   Eyes:      Extraocular Movements: Extraocular movements intact.      Conjunctiva/sclera: Conjunctivae normal.   Cardiovascular:      Rate and Rhythm: Normal rate and regular rhythm.      Pulses: Normal pulses.      Heart sounds: Normal heart sounds.   Pulmonary:      Effort: Pulmonary effort is normal.      Breath sounds: Normal breath sounds.   Abdominal:      General: Abdomen is flat. Bowel sounds are normal.      Palpations: Abdomen is soft.      Comments: Abdominal wound does appear cellulitic.  No active discharge noted but dry blood and  discharge noted on the bandages.   Musculoskeletal:      Cervical back: Normal range of motion and neck supple.   Skin:     General: Skin is warm.      Capillary Refill: Capillary refill takes less than 2 seconds.   Neurological:      Mental Status: He is alert and oriented to person, place, and time. Mental status is at baseline.   Psychiatric:         Mood and Affect: Mood normal.         Behavior: Behavior normal.                Significant Labs: All pertinent labs within the past 24 hours have been reviewed.  Recent Lab Results         03/29/24  1457        Procalcitonin 0.19  Comment: A concentration < 0.25 ng/mL represents a low risk of bacterial   infection.  Procalcitonin may not be accurate among patients with localized   infection, recent trauma or major surgery, immunosuppressed state,   invasive fungal infection, renal dysfunction. Decisions regarding   initiation or continuation of antibiotic therapy should not be based   solely on procalcitonin levels.         Albumin 2.7       ALP 49       ALT 32       Anion Gap 13       AST 23       Baso # 0.02       Basophil % 0.2       BILIRUBIN TOTAL 0.4  Comment: For infants and newborns, interpretation of results should be based  on gestational age, weight and in agreement with clinical  observations.    Premature Infant recommended reference ranges:  Up to 24 hours.............<8.0 mg/dL  Up to 48 hours............<12.0 mg/dL  3-5 days..................<15.0 mg/dL  6-29 days.................<15.0 mg/dL           Comment: Values of less than 100 pg/ml are consistent with non-CHF populations.       BUN 33       Calcium 8.4       Chloride 106       CO2 19       CPK 29       Creatinine 2.5       Differential Method Automated       eGFR 27       Eos # 0.0       Eos % 0.4       Glucose 122       Gran # (ANC) 10.2       Gran % 96.1       Hematocrit 26.5       Hemoglobin 8.6       Hepatitis C Ab Negative       HEP C Virus Hold Specimen Hold for HCV sendout        HIV 1/2 Ag/Ab Negative       Immature Grans (Abs) 0.05  Comment: Mild elevation in immature granulocytes is non specific and   can be seen in a variety of conditions including stress response,   acute inflammation, trauma and pregnancy. Correlation with other   laboratory and clinical findings is essential.         Immature Granulocytes 0.5       Lactic Acid Level 1.6  Comment: Falsely low lactic acid results can be found in samples   containing >=13.0 mg/dL total bilirubin and/or >=3.5 mg/dL   direct bilirubin.         Lymph # 0.1       Lymph % 0.7       MCH 28.6       MCHC 32.5       MCV 88       Mono # 0.2       Mono % 2.1       MPV 10.3       nRBC 0       Platelet Count 191       Potassium 4.9       PROTEIN TOTAL 6.2       RBC 3.01       RDW 13.4       Sodium 138       Troponin I 0.017  Comment: The reference interval for Troponin I represents the 99th percentile   cutoff   for our facility and is consistent with 3rd generation assay   performance.         WBC 10.56               Significant Imaging:   CT Head Without Contrast   Final Result      No acute intracranial CT abnormality.      All CT scans at this facility are performed  using dose modulation techniques as appropriate to performed exam including the following:  automated exposure control; adjustment of mA and/or kV according to the patients size (this includes techniques or standardized protocols for targeted exams where dose is matched to indication/reason for exam: i.e. extremities or head);  iterative reconstruction technique.         Electronically signed by: Petey Rosario   Date:    03/29/2024   Time:    15:31      X-Ray Chest AP Portable   Final Result      No acute process seen.         Electronically signed by: Boston Tracey MD   Date:    03/29/2024   Time:    14:43      US Carotid Bilateral    (Results Pending)

## 2024-03-29 NOTE — ASSESSMENT & PLAN NOTE
Chronic, uncontrolled. Latest blood pressure and vitals reviewed-     Temp:  [98.3 °F (36.8 °C)]   Pulse:  [60-71]   Resp:  [16-20]   BP: ()/(44-53)   SpO2:  [100 %] .   Home meds for hypertension were reviewed and noted below.   Hypertension Medications               diltiaZEM (CARDIZEM CD) 240 MG 24 hr capsule Take 1 capsule (240 mg total) by mouth once daily.    doxazosin (CARDURA) 4 MG tablet Take 4 mg by mouth once daily.    furosemide (LASIX) 40 MG tablet Take 1 tablet (40 mg total) by mouth daily as needed (leg swelling).    hydroCHLOROthiazide (HYDRODIURIL) 25 MG tablet TAKE 1 TABLET BY MOUTH EVERY DAY IN THE MORNING    metoprolol succinate (TOPROL-XL) 25 MG 24 hr tablet Take 1 tablet (25 mg total) by mouth 2 (two) times daily.    olmesartan (BENICAR) 40 MG tablet Take 1 tablet (40 mg total) by mouth once daily.            While in the hospital, will manage blood pressure as follows; Adjust home antihypertensive regimen as follows- hold home medications due to hypotension    Will utilize p.r.n. blood pressure medication only if patient's blood pressure greater than 160/100 and he develops symptoms such as worsening chest pain or shortness of breath.

## 2024-03-29 NOTE — ED PROVIDER NOTES
SCRIBE #1 NOTE: I, Demetris Owusu, am scribing for, and in the presence of, Nino Her MD. I have scribed the entire note.      History      Chief Complaint   Patient presents with    Loss of Consciousness     Pt suffered LOC, was found down by wife, crackles to lung bases, hypotensive when fluids stopped, fluids restarted by EMS       Review of patient's allergies indicates:  No Known Allergies     HPI   HPI    3/29/2024, 2:19 PM   History obtained from the patient and EMS      History of Present Illness: Quintin Ling is a 73 y.o. male patient who presents to the Emergency Department for evaluation following a syncopal episode just PTA. Pt does not remember the episode, but was found unconscious by his wife. EMS reports that the pt was hypotensive at the scene. Symptoms are episodic and moderate in severity. No mitigating or exacerbating factors reported. No associated sxs reported. Patient denies any fever, chills, n/v, SOB, CP, weakness, numbness, dizziness, headache, and all other sxs at this time. Pt notes that he had surgery on 3/16/24 for an ileus and fascial dehiscence. No further complaints or concerns at this time.     Arrival mode: EMS    PCP: Bishop Gabriel MD       Past Medical History:  Past Medical History:   Diagnosis Date    Diabetes mellitus     Hyperlipidemia     Hypertension        Past Surgical History:  Past Surgical History:   Procedure Laterality Date    BIOPSY OF PERITONEUM N/A 1/29/2024    Procedure: BIOPSY, PERITONEUM;  Surgeon: Tosin Boothe MD;  Location: [facility] OR;  Service: Colon and Rectal;  Laterality: N/A;    CLOSURE OF WOUND N/A 3/16/2024    Procedure: CLOSURE, WOUND;  Surgeon: Magda Stark MD;  Location: [facility] OR;  Service: General;  Laterality: N/A;    COLONOSCOPY N/A 1/5/2024    Procedure: COLONOSCOPY;  Surgeon: Irasema Gil MD;  Location: [facility] ENDO;  Service: Endoscopy;  Laterality: N/A;    DIAGNOSTIC LAPAROSCOPY N/A 1/29/2024    Procedure:  LAPAROSCOPY, DIAGNOSTIC;  Surgeon: Tosin Boothe MD;  Location: [facility] OR;  Service: Colon and Rectal;  Laterality: N/A;    EPIDURAL STEROID INJECTION N/A 11/13/2023    Procedure: Lumbar L5/S1 IL JOSE;  Surgeon: Oneil Carlson MD;  Location: [facility] PAIN MGT;  Service: Pain Management;  Laterality: N/A;    ESOPHAGOGASTRODUODENOSCOPY N/A 1/5/2024    Procedure: EGD (ESOPHAGOGASTRODUODENOSCOPY);  Surgeon: Irasema Gil MD;  Location: [facility] ENDO;  Service: Endoscopy;  Laterality: N/A;    INJECTION OF ANESTHETIC AGENT INTO TISSUE PLANE DEFINED BY TRANSVERSUS ABDOMINIS MUSCLE N/A 3/11/2024    Procedure: BLOCK, TRANSVERSUS ABDOMINIS PLANE;  Surgeon: Tosin Boothe MD;  Location: [facility] OR;  Service: Colon and Rectal;  Laterality: N/A;    NO PAST SURGERIES      WOUND EXPLORATION N/A 3/16/2024    Procedure: EXPLORATION, WOUND;  Surgeon: Magda Stark MD;  Location: [facility] OR;  Service: General;  Laterality: N/A;    XI ROBOTIC COLECTOMY, RIGHT Right 3/11/2024    Procedure: XI ROBOTIC COLECTOMY, RIGHT;  Surgeon: Tosin Boothe MD;  Location: [facility] OR;  Service: Colon and Rectal;  Laterality: Right;  WITH OMENECTOMY AND AMNIOFIX         Family History:  Family History   Problem Relation Age of Onset    No Known Problems Mother     No Known Problems Father        Social History:  Social History     Tobacco Use    Smoking status: Former     Passive exposure: Never    Smokeless tobacco: Never   Substance and Sexual Activity    Alcohol use: Not Currently     Comment: OCC    Drug use: Never    Sexual activity: Yes     Partners: Female       ROS   Review of Systems   Constitutional:  Negative for chills and fever.   HENT:  Negative for sore throat.    Respiratory:  Negative for shortness of breath.    Cardiovascular:  Negative for chest pain.   Gastrointestinal:  Negative for nausea and vomiting.   Genitourinary:  Negative for dysuria.   Musculoskeletal:  Negative for back pain.   Skin:  Negative for rash.  "  Neurological:  Positive for syncope. Negative for dizziness, weakness, numbness and headaches.   Hematological:  Does not bruise/bleed easily.   All other systems reviewed and are negative.    Physical Exam      Initial Vitals [03/29/24 1414]   BP Pulse Resp Temp SpO2   (!) 88/44 71 20 98.3 °F (36.8 °C) 100 %      MAP       --          Physical Exam  Nursing Notes and Vital Signs Reviewed.  Constitutional: Patient is in no acute distress. Well-developed and well-nourished.  Head: Atraumatic. Normocephalic.  Eyes: PERRL. EOM intact. Conjunctivae are not pale. No scleral icterus.  ENT: Mucous membranes are moist. Oropharynx is clear and symmetric.    Neck: Supple. Full ROM. No lymphadenopathy.  Cardiovascular: Regular rate. Regular rhythm. No murmurs, rubs, or gallops. Distal pulses are 2+ and symmetric.  Pulmonary/Chest: No respiratory distress. Clear to auscultation bilaterally. No wheezing or rales.  Abdominal: Soft and non-distended. Well-healing midline abdominal surgical incision. There is no tenderness.  No rebound, guarding, or rigidity.   Musculoskeletal: Moves all extremities. No obvious deformities. No edema.  Skin: Warm and dry.  Neurological:  Alert, awake, and appropriate.  Normal speech.  No acute focal neurological deficits are appreciated.  Psychiatric: Normal affect. Good eye contact. Appropriate in content.    ED Course    Procedures  ED Vital Signs:  Vitals:    03/29/24 1414 03/29/24 1437 03/29/24 1501 03/29/24 1503   BP: (!) 88/44 (!) 98/46  (!) 106/53   Pulse: 71 64  60   Resp: 20 16  17   Temp: 98.3 °F (36.8 °C)      TempSrc: Oral      SpO2: 100% 100%  100%   Weight:   105.9 kg (233 lb 6.4 oz)    Height: 5' 5" (1.651 m)          Abnormal Lab Results:  Labs Reviewed   CBC W/ AUTO DIFFERENTIAL - Abnormal; Notable for the following components:       Result Value    RBC 3.01 (*)     Hemoglobin 8.6 (*)     Hematocrit 26.5 (*)     Gran # (ANC) 10.2 (*)     Immature Grans (Abs) 0.05 (*)     Lymph # " 0.1 (*)     Mono # 0.2 (*)     Gran % 96.1 (*)     Lymph % 0.7 (*)     Mono % 2.1 (*)     All other components within normal limits    Narrative:     Release to patient->Immediate   COMPREHENSIVE METABOLIC PANEL - Abnormal; Notable for the following components:    CO2 19 (*)     Glucose 122 (*)     BUN 33 (*)     Creatinine 2.5 (*)     Calcium 8.4 (*)     Albumin 2.7 (*)     Alkaline Phosphatase 49 (*)     eGFR 27 (*)     All other components within normal limits    Narrative:     Release to patient->Immediate   B-TYPE NATRIURETIC PEPTIDE - Abnormal; Notable for the following components:     (*)     All other components within normal limits    Narrative:     Release to patient->Immediate   CULTURE, BLOOD   CULTURE, BLOOD   HIV 1 / 2 ANTIBODY    Narrative:     Release to patient->Immediate   HEPATITIS C ANTIBODY    Narrative:     Release to patient->Immediate   HEP C VIRUS HOLD SPECIMEN    Narrative:     Release to patient->Immediate   LACTIC ACID, PLASMA   PROCALCITONIN    Narrative:     Release to patient->Immediate   TROPONIN I    Narrative:     Release to patient->Immediate   CK   URINALYSIS, REFLEX TO URINE CULTURE   CK        All Lab Results:  Results for orders placed or performed during the hospital encounter of 03/29/24   HIV 1/2 Ag/Ab (4th Gen)   Result Value Ref Range    HIV 1/2 Ag/Ab Negative Negative   Hepatitis C Antibody   Result Value Ref Range    Hepatitis C Ab Negative Negative   HCV Virus Hold Specimen   Result Value Ref Range    HEP C Virus Hold Specimen Hold for HCV sendout    CBC auto differential   Result Value Ref Range    WBC 10.56 3.90 - 12.70 K/uL    RBC 3.01 (L) 4.60 - 6.20 M/uL    Hemoglobin 8.6 (L) 14.0 - 18.0 g/dL    Hematocrit 26.5 (L) 40.0 - 54.0 %    MCV 88 82 - 98 fL    MCH 28.6 27.0 - 31.0 pg    MCHC 32.5 32.0 - 36.0 g/dL    RDW 13.4 11.5 - 14.5 %    Platelets 191 150 - 450 K/uL    MPV 10.3 9.2 - 12.9 fL    Immature Granulocytes 0.5 0.0 - 0.5 %    Gran # (ANC) 10.2 (H) 1.8 -  7.7 K/uL    Immature Grans (Abs) 0.05 (H) 0.00 - 0.04 K/uL    Lymph # 0.1 (L) 1.0 - 4.8 K/uL    Mono # 0.2 (L) 0.3 - 1.0 K/uL    Eos # 0.0 0.0 - 0.5 K/uL    Baso # 0.02 0.00 - 0.20 K/uL    nRBC 0 0 /100 WBC    Gran % 96.1 (H) 38.0 - 73.0 %    Lymph % 0.7 (L) 18.0 - 48.0 %    Mono % 2.1 (L) 4.0 - 15.0 %    Eosinophil % 0.4 0.0 - 8.0 %    Basophil % 0.2 0.0 - 1.9 %    Differential Method Automated    Comprehensive metabolic panel   Result Value Ref Range    Sodium 138 136 - 145 mmol/L    Potassium 4.9 3.5 - 5.1 mmol/L    Chloride 106 95 - 110 mmol/L    CO2 19 (L) 23 - 29 mmol/L    Glucose 122 (H) 70 - 110 mg/dL    BUN 33 (H) 8 - 23 mg/dL    Creatinine 2.5 (H) 0.5 - 1.4 mg/dL    Calcium 8.4 (L) 8.7 - 10.5 mg/dL    Total Protein 6.2 6.0 - 8.4 g/dL    Albumin 2.7 (L) 3.5 - 5.2 g/dL    Total Bilirubin 0.4 0.1 - 1.0 mg/dL    Alkaline Phosphatase 49 (L) 55 - 135 U/L    AST 23 10 - 40 U/L    ALT 32 10 - 44 U/L    eGFR 27 (A) >60 mL/min/1.73 m^2    Anion Gap 13 8 - 16 mmol/L   Lactic acid, plasma #1   Result Value Ref Range    Lactate (Lactic Acid) 1.6 0.5 - 2.2 mmol/L   Procalcitonin   Result Value Ref Range    Procalcitonin 0.19 <0.25 ng/mL   Troponin I   Result Value Ref Range    Troponin I 0.017 0.000 - 0.026 ng/mL   Brain natriuretic peptide   Result Value Ref Range     (H) 0 - 99 pg/mL     Imaging Results:  Imaging Results              CT Head Without Contrast (Final result)  Result time 03/29/24 15:31:47      Final result by Petey Rosario MD (03/29/24 15:31:47)                   Impression:      No acute intracranial CT abnormality.    All CT scans at this facility are performed  using dose modulation techniques as appropriate to performed exam including the following:  automated exposure control; adjustment of mA and/or kV according to the patients size (this includes techniques or standardized protocols for targeted exams where dose is matched to indication/reason for exam: i.e. extremities or head);   iterative reconstruction technique.      Electronically signed by: Petey Rosario  Date:    03/29/2024  Time:    15:31               Narrative:    EXAMINATION:  CT HEAD WITHOUT CONTRAST    CLINICAL HISTORY:  Syncope, recurrent;    TECHNIQUE:  Low dose axial CT images obtained throughout the head without intravenous contrast. Sagittal and coronal reconstructions were performed.    COMPARISON:  None.    FINDINGS:  Intracranial compartment:    Ventricles and sulci are normal in size for age without evidence of hydrocephalus. No extra-axial blood or fluid collections.    The brain parenchyma appears normal. No parenchymal mass, hemorrhage, edema or major vascular distribution infarct.    Skull/extracranial contents (limited evaluation): No fracture. Mastoid air cells and paranasal sinuses are essentially clear.                                       X-Ray Chest AP Portable (Final result)  Result time 03/29/24 14:43:46      Final result by Boston Tracey MD (03/29/24 14:43:46)                   Impression:      No acute process seen.      Electronically signed by: Boston Tracey MD  Date:    03/29/2024  Time:    14:43               Narrative:    EXAMINATION:  XR CHEST AP PORTABLE    CLINICAL HISTORY:  Sepsis;    FINDINGS:  Single view of the chest.  Comparison 03/16/2024    Cardiac silhouette is normal.  The lungs demonstrate no evidence of active disease.  No evidence of pleural effusion or pneumothorax.  Bones appear intact.  Moderate degenerative changes and moderate atherosclerotic disease.                                     The EKG was ordered, reviewed, and independently interpreted by the ED provider.  Interpretation time: 14:34  Rate: 64 BPM  Rhythm: normal sinus rhythm  Interpretation: No acute ST changes. No STEMI.           The Emergency Provider reviewed the vital signs and test results, which are outlined above.    ED Discussion     3:44 PM: Discussed case with Dr. Mesa ([facility]). Dr. Mesa  agrees with current care and management of pt and accepts admission.   Admitting Service: Hospital Medicine  Admitting Physician: Dr. Mesa  Admit to: Obs Tele    3:45 PM: Re-evaluated pt. I have discussed test results, shared treatment plan, and the need for admission with patient and family at bedside. Pt and family express understanding at this time and agree with all information. All questions answered. Pt and family have no further questions or concerns at this time. Pt is ready for admit.         ED Medication(s):  Medications   sodium chloride 0.9% bolus 1,845 mL 1,845 mL (1,845 mLs Intravenous New Bag 3/29/24 1430)     New Prescriptions    No medications on file           Medical Decision Making    Medical Decision Making  Found passed out on the back porch and hypotensive  DDx: Dehydration, syncope    Amount and/or Complexity of Data Reviewed  Independent Historian: EMS  Labs: ordered. Decision-making details documented in ED Course.  Radiology: ordered. Decision-making details documented in ED Course.  ECG/medicine tests: ordered and independent interpretation performed. Decision-making details documented in ED Course.    Risk  Decision regarding hospitalization.                Scribe Attestation:   Scribe #1: I performed the above scribed service and the documentation accurately describes the services I performed. I attest to the accuracy of the note.    Attending:   Physician Attestation Statement for Scribe #1: I, Nino Her MD, personally performed the services described in this documentation, as scribed by Demetris Owusu, in my presence, and it is both accurate and complete.          Clinical Impression       ICD-10-CM ICD-9-CM   1. Syncope, unspecified syncope type  R55 780.2   2. Weakness  R53.1 780.79   3. Dehydration  E86.0 276.51       Disposition:   Disposition: Placed in Observation  Condition: Nino Roberts MD  03/29/24 0342

## 2024-03-29 NOTE — ASSESSMENT & PLAN NOTE
-likely secondary to hypotension   -we will hold hypertension medications at this time.  He is noted to be on several medication likely contributing to his hypotension.  -echocardiogram and carotid Dopplers

## 2024-03-29 NOTE — ASSESSMENT & PLAN NOTE
-patient had adenoma about removed on 03/01/2024 by Dr. Boothe.  -surgical site appeared cellulitic, but had been evaluated by Dr. Boothe on 03/28/2024  -continue home Bactrim   -wound care consult

## 2024-03-30 LAB
ANION GAP SERPL CALC-SCNC: 9 MMOL/L (ref 8–16)
BASOPHILS # BLD AUTO: 0.02 K/UL (ref 0–0.2)
BASOPHILS # BLD AUTO: 0.02 K/UL (ref 0–0.2)
BASOPHILS NFR BLD: 0.4 % (ref 0–1.9)
BASOPHILS NFR BLD: 0.4 % (ref 0–1.9)
BUN SERPL-MCNC: 31 MG/DL (ref 8–23)
CALCIUM SERPL-MCNC: 7.7 MG/DL (ref 8.7–10.5)
CHLORIDE SERPL-SCNC: 109 MMOL/L (ref 95–110)
CO2 SERPL-SCNC: 20 MMOL/L (ref 23–29)
CREAT SERPL-MCNC: 2.1 MG/DL (ref 0.5–1.4)
DIFFERENTIAL METHOD BLD: ABNORMAL
DIFFERENTIAL METHOD BLD: ABNORMAL
EOSINOPHIL # BLD AUTO: 0.2 K/UL (ref 0–0.5)
EOSINOPHIL # BLD AUTO: 0.2 K/UL (ref 0–0.5)
EOSINOPHIL NFR BLD: 3.8 % (ref 0–8)
EOSINOPHIL NFR BLD: 4.1 % (ref 0–8)
ERYTHROCYTE [DISTWIDTH] IN BLOOD BY AUTOMATED COUNT: 13.5 % (ref 11.5–14.5)
ERYTHROCYTE [DISTWIDTH] IN BLOOD BY AUTOMATED COUNT: 13.6 % (ref 11.5–14.5)
EST. GFR  (NO RACE VARIABLE): 33 ML/MIN/1.73 M^2
GLUCOSE SERPL-MCNC: 100 MG/DL (ref 70–110)
HCT VFR BLD AUTO: 23.5 % (ref 40–54)
HCT VFR BLD AUTO: 25.9 % (ref 40–54)
HGB BLD-MCNC: 7.6 G/DL (ref 14–18)
HGB BLD-MCNC: 8.3 G/DL (ref 14–18)
IMM GRANULOCYTES # BLD AUTO: 0.02 K/UL (ref 0–0.04)
IMM GRANULOCYTES # BLD AUTO: 0.08 K/UL (ref 0–0.04)
IMM GRANULOCYTES NFR BLD AUTO: 0.4 % (ref 0–0.5)
IMM GRANULOCYTES NFR BLD AUTO: 1.6 % (ref 0–0.5)
LYMPHOCYTES # BLD AUTO: 0.2 K/UL (ref 1–4.8)
LYMPHOCYTES # BLD AUTO: 0.2 K/UL (ref 1–4.8)
LYMPHOCYTES NFR BLD: 3.7 % (ref 18–48)
LYMPHOCYTES NFR BLD: 4 % (ref 18–48)
MCH RBC QN AUTO: 28.2 PG (ref 27–31)
MCH RBC QN AUTO: 28.3 PG (ref 27–31)
MCHC RBC AUTO-ENTMCNC: 32 G/DL (ref 32–36)
MCHC RBC AUTO-ENTMCNC: 32.3 G/DL (ref 32–36)
MCV RBC AUTO: 87 FL (ref 82–98)
MCV RBC AUTO: 88 FL (ref 82–98)
MONOCYTES # BLD AUTO: 0.3 K/UL (ref 0.3–1)
MONOCYTES # BLD AUTO: 0.3 K/UL (ref 0.3–1)
MONOCYTES NFR BLD: 4.8 % (ref 4–15)
MONOCYTES NFR BLD: 5.1 % (ref 4–15)
NEUTROPHILS # BLD AUTO: 4.2 K/UL (ref 1.8–7.7)
NEUTROPHILS # BLD AUTO: 4.5 K/UL (ref 1.8–7.7)
NEUTROPHILS NFR BLD: 85.1 % (ref 38–73)
NEUTROPHILS NFR BLD: 86.6 % (ref 38–73)
NRBC BLD-RTO: 0 /100 WBC
NRBC BLD-RTO: 0 /100 WBC
PLATELET # BLD AUTO: 169 K/UL (ref 150–450)
PLATELET # BLD AUTO: 169 K/UL (ref 150–450)
PMV BLD AUTO: 9.8 FL (ref 9.2–12.9)
PMV BLD AUTO: 9.9 FL (ref 9.2–12.9)
POTASSIUM SERPL-SCNC: 4.4 MMOL/L (ref 3.5–5.1)
RBC # BLD AUTO: 2.69 M/UL (ref 4.6–6.2)
RBC # BLD AUTO: 2.94 M/UL (ref 4.6–6.2)
SODIUM SERPL-SCNC: 138 MMOL/L (ref 136–145)
WBC # BLD AUTO: 4.94 K/UL (ref 3.9–12.7)
WBC # BLD AUTO: 5.17 K/UL (ref 3.9–12.7)

## 2024-03-30 PROCEDURE — 97166 OT EVAL MOD COMPLEX 45 MIN: CPT

## 2024-03-30 PROCEDURE — 25000003 PHARM REV CODE 250: Performed by: FAMILY MEDICINE

## 2024-03-30 PROCEDURE — 97116 GAIT TRAINING THERAPY: CPT

## 2024-03-30 PROCEDURE — 94799 UNLISTED PULMONARY SVC/PX: CPT | Mod: XB

## 2024-03-30 PROCEDURE — 85025 COMPLETE CBC W/AUTO DIFF WBC: CPT | Performed by: FAMILY MEDICINE

## 2024-03-30 PROCEDURE — 21400001 HC TELEMETRY ROOM

## 2024-03-30 PROCEDURE — 85025 COMPLETE CBC W/AUTO DIFF WBC: CPT | Mod: 91 | Performed by: NURSE PRACTITIONER

## 2024-03-30 PROCEDURE — 94761 N-INVAS EAR/PLS OXIMETRY MLT: CPT

## 2024-03-30 PROCEDURE — 36415 COLL VENOUS BLD VENIPUNCTURE: CPT | Mod: XB | Performed by: FAMILY MEDICINE

## 2024-03-30 PROCEDURE — 36415 COLL VENOUS BLD VENIPUNCTURE: CPT | Performed by: NURSE PRACTITIONER

## 2024-03-30 PROCEDURE — 80048 BASIC METABOLIC PNL TOTAL CA: CPT | Performed by: FAMILY MEDICINE

## 2024-03-30 PROCEDURE — 97530 THERAPEUTIC ACTIVITIES: CPT

## 2024-03-30 PROCEDURE — 96360 HYDRATION IV INFUSION INIT: CPT | Mod: 59

## 2024-03-30 PROCEDURE — 97162 PT EVAL MOD COMPLEX 30 MIN: CPT

## 2024-03-30 PROCEDURE — 96361 HYDRATE IV INFUSION ADD-ON: CPT

## 2024-03-30 RX ADMIN — SULFAMETHOXAZOLE AND TRIMETHOPRIM 1 TABLET: 800; 160 TABLET ORAL at 09:03

## 2024-03-30 RX ADMIN — APIXABAN 5 MG: 2.5 TABLET, FILM COATED ORAL at 08:03

## 2024-03-30 RX ADMIN — APIXABAN 5 MG: 2.5 TABLET, FILM COATED ORAL at 09:03

## 2024-03-30 RX ADMIN — SODIUM CHLORIDE: 9 INJECTION, SOLUTION INTRAVENOUS at 02:03

## 2024-03-30 RX ADMIN — ATORVASTATIN CALCIUM 20 MG: 10 TABLET, FILM COATED ORAL at 08:03

## 2024-03-30 RX ADMIN — SULFAMETHOXAZOLE AND TRIMETHOPRIM 1 TABLET: 800; 160 TABLET ORAL at 08:03

## 2024-03-30 RX ADMIN — SODIUM CHLORIDE: 9 INJECTION, SOLUTION INTRAVENOUS at 05:03

## 2024-03-30 NOTE — ASSESSMENT & PLAN NOTE
-likely secondary to hypotension   -we will hold hypertension medications at this time.  He is noted to be on several medication likely contributing to his hypotension.  -echocardiogram and carotid Dopplers    -CT head showed no acute intracranial CT abnormality.   -Echo showed normal EF.   -Bilateral carotid US showed greater than 70% stenosis bilaterally.   -Vascular surgery consulted.

## 2024-03-30 NOTE — PLAN OF CARE
EVAL AND TX COMPLETED: facilitated bed mobility SPV, transfers with CGA. Ambulated 100 ft x 2 CGA with RW. Recommend low intensity PT services

## 2024-03-30 NOTE — ASSESSMENT & PLAN NOTE
-patient had adenoma about removed on 03/01/2024 by Dr. Booteh.  -surgical site appeared cellulitic, but had been evaluated by Dr. Boothe on 03/28/2024  -continue home Bactrim   -wound care consult    3/30  Surgical site looks good, no s/s of infection  Continue oral bactrim

## 2024-03-30 NOTE — SUBJECTIVE & OBJECTIVE
Review of Systems   Constitutional:  Negative for activity change, appetite change, chills, fatigue and fever.   Respiratory:  Negative for apnea, cough, shortness of breath and stridor.    Cardiovascular:  Negative for chest pain, palpitations and leg swelling.   Gastrointestinal:  Negative for abdominal distention, constipation, diarrhea, nausea and vomiting.   Genitourinary:  Negative for difficulty urinating, dysuria, frequency, hematuria and urgency.   Musculoskeletal:  Negative for arthralgias, back pain, gait problem and joint swelling.   Neurological:  Positive for syncope. Negative for dizziness, seizures, weakness, light-headedness, numbness and headaches.   Psychiatric/Behavioral:  Negative for agitation, behavioral problems, confusion, decreased concentration, dysphoric mood and hallucinations.    All other systems reviewed and are negative.    Objective:     Vital Signs (Most Recent):  Temp: 98.7 °F (37.1 °C) (03/30/24 1223)  Pulse: 75 (03/30/24 1551)  Resp: 18 (03/30/24 1223)  BP: 130/60 (03/30/24 1223)  SpO2: (!) 94 % (03/30/24 1500) Vital Signs (24h Range):  Temp:  [97.3 °F (36.3 °C)-100.8 °F (38.2 °C)] 98.7 °F (37.1 °C)  Pulse:  [65-81] 75  Resp:  [14-20] 18  SpO2:  [94 %-97 %] 94 %  BP: ()/(46-62) 130/60     Weight: 105.9 kg (233 lb 6.4 oz)  Body mass index is 38.84 kg/m².    Intake/Output Summary (Last 24 hours) at 3/30/2024 1712  Last data filed at 3/30/2024 1602  Gross per 24 hour   Intake 2578.1 ml   Output --   Net 2578.1 ml         Physical Exam  Vitals and nursing note reviewed.   Constitutional:       Appearance: Normal appearance. He is obese.   HENT:      Head: Normocephalic and atraumatic.      Nose: Nose normal.      Mouth/Throat:      Mouth: Mucous membranes are moist.   Eyes:      Extraocular Movements: Extraocular movements intact.      Conjunctiva/sclera: Conjunctivae normal.   Cardiovascular:      Rate and Rhythm: Normal rate and regular rhythm.      Pulses: Normal  pulses.      Heart sounds: Normal heart sounds.   Pulmonary:      Effort: Pulmonary effort is normal.      Breath sounds: Normal breath sounds.   Abdominal:      General: Abdomen is flat. Bowel sounds are normal.      Palpations: Abdomen is soft.      Comments: Abdominal wound with no s/s of infection. No redness or drainage.    Musculoskeletal:      Cervical back: Normal range of motion and neck supple.   Skin:     General: Skin is warm.      Capillary Refill: Capillary refill takes less than 2 seconds.   Neurological:      Mental Status: He is alert and oriented to person, place, and time. Mental status is at baseline.   Psychiatric:         Mood and Affect: Mood normal.         Behavior: Behavior normal.             Significant Labs: All pertinent labs within the past 24 hours have been reviewed.  Blood Culture:   Recent Labs   Lab 03/29/24  1458 03/29/24  1816   LABBLOO No Growth to date  No Growth to date No Growth to date  No Growth to date     BMP:   Recent Labs   Lab 03/30/24  0536         K 4.4      CO2 20*   BUN 31*   CREATININE 2.1*   CALCIUM 7.7*     CBC:   Recent Labs   Lab 03/29/24  1457 03/30/24  0536 03/30/24  0852   WBC 10.56 4.94 5.17   HGB 8.6* 7.6* 8.3*   HCT 26.5* 23.5* 25.9*    169 169     CMP:   Recent Labs   Lab 03/29/24  1457 03/30/24  0536    138   K 4.9 4.4    109   CO2 19* 20*   * 100   BUN 33* 31*   CREATININE 2.5* 2.1*   CALCIUM 8.4* 7.7*   PROT 6.2  --    ALBUMIN 2.7*  --    BILITOT 0.4  --    ALKPHOS 49*  --    AST 23  --    ALT 32  --    ANIONGAP 13 9       Significant Imaging: I have reviewed all pertinent imaging results/findings within the past 24 hours.

## 2024-03-30 NOTE — PROGRESS NOTES
[facility] ([facility])  [facility] Medicine  Progress Note    Patient Name: Quintin Ling  MRN: [id number]  Patient Class: IP- Inpatient   Admission Date: 3/29/2024  Length of Stay: 0 days  Attending Physician: Anastacio Jasmine MD  Primary Care Provider: Bishop Gabriel MD        Subjective:     Principal Problem:Syncope        HPI:  Mr. Ling is a 73-year-old male with past medical history of diabetes, hyperlipidemia, hypertension, colon mass removal on 03/01 followed by an ileus which was then followed by a fascia tear presented after being found unresponsive on his back porch.  Per wife at bedside states that patient had surgery with Dr. Boothe for a colon mass on 03/01 and had some complications postprocedure.  His latest complication was some cellulitis around the surgical site for which she was started on Bactrim yesterday.  Patient is noted to be on several different blood pressures and upon arrival noted to be hypotensive.  Does not have any leukocytosis or fevers and was feeling in normal health prior to the episode.  Patient does not recall having a syncopal episode he states that the last thing he remembers is sitting out on the porch.  Patient denies any headache chest pain shortness on breath nausea or vomiting.  His lower abdominal incision appears erythematous but no noted active discharge.  Patient did have some dried discharge on his bandages.  CT head showed no acute intracranial CT abnormality.  Hospital medicine will admit for observation, syncope workup, wound care for his abdominal cellulitis and continued p.o. antibiotics.    Overview/Hospital Course:  74 y/o male admitted for syncope. CT head showed no acute intracranial CT abnormality. Echo showed normal EF. Bilateral carotid US showed greater than 70% stenosis bilaterally. Vascular surgery consulted. Abdomen surgical site with no s/s of infection. Continue oral antibiotics. Low grade fever noted this morning.  Encouraged deep  breathing and coughing. IS ordered.        Review of Systems   Constitutional:  Negative for activity change, appetite change, chills, fatigue and fever.   Respiratory:  Negative for apnea, cough, shortness of breath and stridor.    Cardiovascular:  Negative for chest pain, palpitations and leg swelling.   Gastrointestinal:  Negative for abdominal distention, constipation, diarrhea, nausea and vomiting.   Genitourinary:  Negative for difficulty urinating, dysuria, frequency, hematuria and urgency.   Musculoskeletal:  Negative for arthralgias, back pain, gait problem and joint swelling.   Neurological:  Positive for syncope. Negative for dizziness, seizures, weakness, light-headedness, numbness and headaches.   Psychiatric/Behavioral:  Negative for agitation, behavioral problems, confusion, decreased concentration, dysphoric mood and hallucinations.    All other systems reviewed and are negative.    Objective:     Vital Signs (Most Recent):  Temp: 98.7 °F (37.1 °C) (03/30/24 1223)  Pulse: 75 (03/30/24 1551)  Resp: 18 (03/30/24 1223)  BP: 130/60 (03/30/24 1223)  SpO2: (!) 94 % (03/30/24 1500) Vital Signs (24h Range):  Temp:  [97.3 °F (36.3 °C)-100.8 °F (38.2 °C)] 98.7 °F (37.1 °C)  Pulse:  [65-81] 75  Resp:  [14-20] 18  SpO2:  [94 %-97 %] 94 %  BP: ()/(46-62) 130/60     Weight: 105.9 kg (233 lb 6.4 oz)  Body mass index is 38.84 kg/m².    Intake/Output Summary (Last 24 hours) at 3/30/2024 1712  Last data filed at 3/30/2024 1602  Gross per 24 hour   Intake 2578.1 ml   Output --   Net 2578.1 ml         Physical Exam  Vitals and nursing note reviewed.   Constitutional:       Appearance: Normal appearance. He is obese.   HENT:      Head: Normocephalic and atraumatic.      Nose: Nose normal.      Mouth/Throat:      Mouth: Mucous membranes are moist.   Eyes:      Extraocular Movements: Extraocular movements intact.      Conjunctiva/sclera: Conjunctivae normal.   Cardiovascular:      Rate and Rhythm: Normal rate and  regular rhythm.      Pulses: Normal pulses.      Heart sounds: Normal heart sounds.   Pulmonary:      Effort: Pulmonary effort is normal.      Breath sounds: Normal breath sounds.   Abdominal:      General: Abdomen is flat. Bowel sounds are normal.      Palpations: Abdomen is soft.      Comments: Abdominal wound with no s/s of infection. No redness or drainage.    Musculoskeletal:      Cervical back: Normal range of motion and neck supple.   Skin:     General: Skin is warm.      Capillary Refill: Capillary refill takes less than 2 seconds.   Neurological:      Mental Status: He is alert and oriented to person, place, and time. Mental status is at baseline.   Psychiatric:         Mood and Affect: Mood normal.         Behavior: Behavior normal.             Significant Labs: All pertinent labs within the past 24 hours have been reviewed.  Blood Culture:   Recent Labs   Lab 03/29/24  1458 03/29/24  1816   LABBLOO No Growth to date  No Growth to date No Growth to date  No Growth to date     BMP:   Recent Labs   Lab 03/30/24  0536         K 4.4      CO2 20*   BUN 31*   CREATININE 2.1*   CALCIUM 7.7*     CBC:   Recent Labs   Lab 03/29/24  1457 03/30/24  0536 03/30/24  0852   WBC 10.56 4.94 5.17   HGB 8.6* 7.6* 8.3*   HCT 26.5* 23.5* 25.9*    169 169     CMP:   Recent Labs   Lab 03/29/24  1457 03/30/24  0536    138   K 4.9 4.4    109   CO2 19* 20*   * 100   BUN 33* 31*   CREATININE 2.5* 2.1*   CALCIUM 8.4* 7.7*   PROT 6.2  --    ALBUMIN 2.7*  --    BILITOT 0.4  --    ALKPHOS 49*  --    AST 23  --    ALT 32  --    ANIONGAP 13 9       Significant Imaging: I have reviewed all pertinent imaging results/findings within the past 24 hours.    Assessment/Plan:      * Syncope  -likely secondary to hypotension   -we will hold hypertension medications at this time.  He is noted to be on several medication likely contributing to his hypotension.  -echocardiogram and carotid Dopplers    -CT  head showed no acute intracranial CT abnormality.   -Echo showed normal EF.   -Bilateral carotid US showed greater than 70% stenosis bilaterally.   -Vascular surgery consulted.     New onset a-fib  Patient with Paroxysmal (<7 days) atrial fibrillation which is controlled currently with Calcium Channel Blocker. Patient is currently in sinus rhythm.[id number] Score: 1. Anticoagulation indicated. Anticoagulation done with Eliquis .    -Cardizem held due to hypotension   -continue Eliquis    Colon adenoma  -patient had adenoma about removed on 03/01/2024 by Dr. Boothe.  -surgical site appeared cellulitic, but had been evaluated by Dr. Boothe on 03/28/2024  -continue home Bactrim   -wound care consult    3/30  Surgical site looks good, no s/s of infection  Continue oral bactrim       Essential hypertension  Bp stable.   Restart home meds as tolerated.       VTE Risk Mitigation (From admission, onward)           Ordered     apixaban tablet 5 mg  2 times daily         03/29/24 1657     Reason for No Pharmacological VTE Prophylaxis  Once        Question:  Reasons:  Answer:  Already adequately anticoagulated on oral Anticoagulants    03/29/24 1657     IP VTE HIGH RISK PATIENT  Once         03/29/24 1657     Place sequential compression device  Until discontinued         03/29/24 1657                    Discharge Planning   MERCEDEZ:      Code Status: Full Code   Is the patient medically ready for discharge?:     Reason for patient still in hospital (select all that apply): Patient trending condition, Laboratory test, and Treatment                     Luly Chandra NP  Department of Hospital Medicine   [facility] - Telemetry ([facility])

## 2024-03-30 NOTE — PLAN OF CARE
Goals to be met by: 4/13/24     Patient will increase functional independence with ADLs by performing:    LE Dressing with Minimal Assistance.  Toileting from toilet with Set-up Assistance for hygiene and clothing management.   Toilet transfer to toilet with Modified [place].      DC with low intensity OT indicated.

## 2024-03-30 NOTE — ASSESSMENT & PLAN NOTE
Patient with Paroxysmal (<7 days) atrial fibrillation which is controlled currently with Calcium Channel Blocker. Patient is currently in sinus rhythm.[id number] Score: 1. Anticoagulation indicated. Anticoagulation done with Eliquis .    -Cardizem held due to hypotension   -continue Eliquis

## 2024-03-30 NOTE — PLAN OF CARE
"A230/A230 KATIE Ling is a 73 y.o.male admitted on 3/29/2024 for <principal problem not specified>   Code Status: Full Code MRN: [id number]   Review of patient's allergies indicates:  No Known Allergies  Past Medical History:   Diagnosis Date    Diabetes mellitus     Hyperlipidemia     Hypertension       PRN meds    acetaminophen, 650 mg, Q4H PRN  dextrose 10%, 12.5 g, PRN  dextrose 10%, 25 g, PRN  glucagon (human recombinant), 1 mg, PRN  glucose, 16 g, PRN  glucose, 24 g, PRN  HYDROcodone-acetaminophen, 1 tablet, Q6H PRN  insulin aspart U-100, 0-10 Units, QID (AC + HS) PRN  naloxone, 0.02 mg, PRN  sodium chloride 0.9%, 10 mL, Q12H PRN      Chart check completed. Will continue plan of care.     Pt AAO x 3   SR   /60 (Patient Position: Sitting)   Pulse 75   Temp 98.7 °F (37.1 °C) (Oral)   Resp 18   Ht 5' 5" (1.651 m)   Wt 105.9 kg (233 lb 6.4 oz)   SpO2 95%   BMI 38.84 kg/m²   NS at 100  Abdominal incision - wet to dry daily   IS   Up to chair        Orientation: oriented x 4  Moe Coma Scale Score: 15     Lead Monitored: Lead II Rhythm: normal sinus rhythm    Cardiac/Telemetry Box Number: [id number]  VTE Required Core Measure: Pharmacological prophylaxis initiated/maintained Last Bowel Movement: 03/29/24  Diet diabetic 2000 Calorie     Pawan Score: 20  Fall Risk Score: 11  Accucheck []   Freq?      Lines/Drains/Airways       Peripheral Intravenous Line  Duration                  Peripheral IV - Single Lumen 03/29/24 1501 20 G Left;Posterior Hand 1 day                       "

## 2024-03-30 NOTE — PT/OT/SLP EVAL
Occupational Therapy Evaluation and Treatment    Name: Quintin Ling  MRN: [id number]  Admitting Diagnosis: <principal problem not specified>  Recent Surgery: * No surgery found *      Recommendations:     Discharge Recommendations: Low Intensity Therapy  Level of Assistance Recommended: Intermittent assistance  Discharge Equipment Recommendations: walker, rolling  Barriers to discharge: None    Assessment:     Quintin Ling is a 73 y.o. male with a medical diagnosis of <principal problem not specified>. He presents with performance deficits affecting function including weakness, impaired endurance, impaired self care skills, impaired functional mobility, impaired cardiopulmonary response to activity.     Rehab Prognosis: Good; patient would benefit from acute OT services to address these deficits and reach maximum level of function.    Plan:     Patient to be seen 2 x/week to address the above listed problems via self-care/home management, therapeutic activities, therapeutic exercises, neuromuscular re-education  Plan of Care Expires: 04/13/24  Plan of Care Reviewed with: patient, spouse    Subjective     Chief Complaint: decline in ADL function.  Patient Comments/Goals: Return to prior level of function at home for ADL/IADL  Pain/Comfort:  Pain Rating 1: 0/10  Pain Rating Post-Intervention 1: 0/10    Patients cultural, spiritual, Synagogue conflicts given the current situation: no    Social History:  Living Environment: Patient lives with their spouse in a single story home with number of outside stair(s): 1 and number of inside stair(s): 0  Prior Level of Function: Prior to admission, patient was independent  Roles and Routines: Patient was driving and retired prior to admission.  Equipment Used at Home: none  DME owned (not currently used): none  Assistance Upon Discharge: significant other    Objective:     Communicated with nurse prior to session. Patient found HOB elevated with peripheral IV, telemetry  upon OT entry to room.    General Precautions: Standard, fall   Orthopedic Precautions: N/A   Braces: N/A    Respiratory Status: Room air    Occupational Performance    Gait belt applied - Yes    Bed Mobility:   Rolling/Turning to Right with supervision  Scooting anteriorly to EOB to have both feet planted on floor: supervision  Supine to sit from right side of bed with supervision    Functional Mobility/Transfers:  Sit <> Stand Transfer with supervision with rolling walker  Bed <> Chair Transfer using Step Transfer technique with supervision with rolling walker    Activities of Daily Living:  Grooming: set up assistance  Upper Body Dressing: set up assistance  Lower Body Dressing: maximal assistance  Toileting: maximal assistance    Cognitive/Visual Perceptual:  Cognitive/Psychosocial Skills:    -     Oriented to: Person, Place, Time, Situation  -     Follows Commands/attention: Follows multistep  commands  -     Safety awareness/insight to disability: intact  -     Mood/Affect/Coping skills/emotional control: Appropriate to situation  Visual/Perceptual:    -     Intact    Physical Exam:  Balance:    -     Sitting: supervision  -     Standing: supervision  Upper Extremity Range of Motion:     -       Right Upper Extremity: WNL  -       Left Upper Extremity: WNL  Upper Extremity Strength:    -       Right Upper Extremity: WNL  -       Left Upper Extremity: WNL    AMPAC 6 Click ADL:  AMPAC Total Score: 22    Treatment & Education:  Therapist provided facilitation and instruction of proper body mechanics, energy conservation, and fall prevention strategies during tasks listed above  Patient educated on role of OT, POC, and goals for therapy  Patient educated on importance of OOB activities with staff member assistance and sitting OOB majority of the day    Patient left up in chair with all lines intact, call button in reach, and chair alarm on.    GOALS:   Multidisciplinary Problems       Occupational Therapy Goals           Problem: Occupational Therapy    Goal Priority Disciplines Outcome Interventions   Occupational Therapy Goal     OT, PT/OT     Description: Goals to be met by: 4/13/24     Patient will increase functional independence with ADLs by performing:    LE Dressing with Minimal Assistance.  Toileting from toilet with Set-up Assistance for hygiene and clothing management.   Toilet transfer to toilet with Modified [place].                         History:     Past Medical History:   Diagnosis Date    Diabetes mellitus     Hyperlipidemia     Hypertension          Past Surgical History:   Procedure Laterality Date    BIOPSY OF PERITONEUM N/A 1/29/2024    Procedure: BIOPSY, PERITONEUM;  Surgeon: Tosin Boothe MD;  Location: [facility] OR;  Service: Colon and Rectal;  Laterality: N/A;    CLOSURE OF WOUND N/A 3/16/2024    Procedure: CLOSURE, WOUND;  Surgeon: Magda Stark MD;  Location: [facility] OR;  Service: General;  Laterality: N/A;    COLONOSCOPY N/A 1/5/2024    Procedure: COLONOSCOPY;  Surgeon: Irasema Gil MD;  Location: [facility] ENDO;  Service: Endoscopy;  Laterality: N/A;    DIAGNOSTIC LAPAROSCOPY N/A 1/29/2024    Procedure: LAPAROSCOPY, DIAGNOSTIC;  Surgeon: Tosin Boothe MD;  Location: [facility] OR;  Service: Colon and Rectal;  Laterality: N/A;    EPIDURAL STEROID INJECTION N/A 11/13/2023    Procedure: Lumbar L5/S1 IL JOSE;  Surgeon: Oneil Carlson MD;  Location: [facility] PAIN MGT;  Service: Pain Management;  Laterality: N/A;    ESOPHAGOGASTRODUODENOSCOPY N/A 1/5/2024    Procedure: EGD (ESOPHAGOGASTRODUODENOSCOPY);  Surgeon: Irasema Gil MD;  Location: [facility] ENDO;  Service: Endoscopy;  Laterality: N/A;    INJECTION OF ANESTHETIC AGENT INTO TISSUE PLANE DEFINED BY TRANSVERSUS ABDOMINIS MUSCLE N/A 3/11/2024    Procedure: BLOCK, TRANSVERSUS ABDOMINIS PLANE;  Surgeon: Tosin Boothe MD;  Location: [facility] OR;  Service: Colon and Rectal;  Laterality: N/A;    NO PAST SURGERIES      WOUND EXPLORATION N/A  3/16/2024    Procedure: EXPLORATION, WOUND;  Surgeon: Magda Stark MD;  Location: [facility] OR;  Service: General;  Laterality: N/A;    XI ROBOTIC COLECTOMY, RIGHT Right 3/11/2024    Procedure: XI ROBOTIC COLECTOMY, RIGHT;  Surgeon: Tosin Boothe MD;  Location: [facility] OR;  Service: Colon and Rectal;  Laterality: Right;  WITH OMENECTOMY AND AMNIOFIX       Time Tracking:     OT Date of Treatment: 03/30/24  OT Start Time: 0955  OT Stop Time: 1015  OT Total Time (min): 20 min    Billable Minutes: Evaluation 10 and Therapeutic Activity 10    3/30/2024

## 2024-03-30 NOTE — HOSPITAL COURSE
74 y/o male admitted for syncope. CT head showed no acute intracranial CT abnormality. Echo showed normal EF. Bilateral carotid US showed greater than 70% stenosis bilaterally. Vascular surgery consulted. Abdomen surgical site with no s/s of infection. Continue oral antibiotics. Low grade fever noted this morning.  Encouraged deep breathing and coughing. IS ordered.  3/31/24: NAEON. BP now in 130-150's and HR well above 60. Will resume Metop 25mg daily only for now -he is on 25mg BID 24 hr tab. Seen by vascular today s/t carotid US findings and plan for Carotid Artery Endarectomy with EEG monitoring in the very near future, needs cardiac risk stratification sooner. For now plan to optimize BP. Continue Bactrim EOC 4/5/24. BRANDY resolved and likely due to Bactrim, baseline Cr 1.4-1.6.   4/1: BRANDY resolved. Hgb: 7.8, due to recent syncopal event transfused 1U PRBC. Vitals stable. Planned for carotid endarterectomy per Vascular Surgery as OP. Surgeon staff to arrange appointments. Discussed with patient and spouse, in agreement with plan. Dr. Parks evaluated patient prior to discharge, in agreement with plan.

## 2024-03-30 NOTE — PLAN OF CARE
-VSS; although low grade fever noted this morning  -ECHO still pending but carotid dopplers performed overnight  -BM x1  -Wife remains at bedside and helped clean patient  -No pain  -No syncopal episodes  -Abdominal dressing C/D/I  -IVF @ 100ml/hr  -Bed alarm set

## 2024-03-30 NOTE — PT/OT/SLP EVAL
Physical Therapy Evaluation    Patient Name:  Quintin Ling   MRN:  [id number]    Recommendations:     Discharge Recommendations: Low Intensity Therapy   Discharge Equipment Recommendations: none   Barriers to discharge: None    Assessment:     Quintin Ling is a 73 y.o. male admitted with a medical diagnosis of <principal problem not specified>.  He presents with the following impairments/functional limitations: weakness, impaired endurance, impaired functional mobility, gait instability, impaired balance, impaired cognition, decreased lower extremity function, decreased safety awareness which increases risk of falls,  increases caregiver burden and increases risk of issues related to immobility. Pt has good caregiver assist. Pt will benefit from continued PT services at low intensity in order to return to baseline.    Rehab Prognosis: Good; patient would benefit from acute skilled PT services to address these deficits and reach maximum level of function.    Recent Surgery: * No surgery found *      Plan:     During this hospitalization, patient to be seen 3 x/week to address the identified rehab impairments via gait training, therapeutic activities, therapeutic exercises, neuromuscular re-education and progress toward the following goals:    Plan of Care Expires:  04/13/24    Subjective     Chief Complaint: no major complaints  Patient/Family Comments/goals: to get better/go home  Pain/Comfort:  Pain Rating 1: 0/10  Pain Rating Post-Intervention 1: 0/10    Patients cultural, spiritual, Sabianist conflicts given the current situation: no    Living Environment:  Pt lives with spouse in [facility] with no steps at entry  Prior to admission, patients level of function was independent with ADLs, ambulatory in home and community. Pt drives and is retired.  Equipment used at home: none.  DME owned (not currently used): none.  Upon discharge, patient will have assistance from family.    Objective:     Communicated with  nursing (Venita) and performed chart review via epic system prior to session.  Patient found supine with peripheral IV, telemetry  upon PT entry to room.    General Precautions: Standard, fall  Orthopedic Precautions:N/A   Braces: N/A  Respiratory Status: Room air    Exams:  Cognitive Exam:  Patient is oriented to Person, Place, Time, and Situation  Gross Motor Coordination:  WFL  Postural Exam:  Patient presented with the following abnormalities:    -       Rounded shoulders  -       Forward head  RLE ROM: WFL  RLE Strength: WFL  LLE ROM: WFL  LLE Strength: WFL    Functional Mobility:  Bed Mobility:     Scooting: supervision  Supine to Sit: supervision  Sit to Supine: supervision  Transfers:     Sit to Stand:  contact guard assistance with rolling walker  Bed to Chair: contact guard assistance with  rolling walker  using  Stand Pivot  Gait: 100 ft x 2 CGA with RW, flexed posture, wide NAYANA, shuffled steps, cues for RW management and changes in direction  Balance: fair dynamic standing balance      AM-PAC 6 CLICK MOBILITY  Total Score:16       Treatment & Education:  Educated pt on benefits of consistent participation in PT services to meet functional goals. Educated pt on importance of sitting OOB to promote endurance and overall activity tolerance. Educated pt on call don't fall policy and use of call button to alert nursing staff of needs (including to assist with returning back to bed). Pt and spouse expressed understanding.     Patient left supine with all lines intact, call button in reach, chair alarm on, nursing notified, and wife present.    GOALS:   Multidisciplinary Problems       Physical Therapy Goals          Problem: Physical Therapy    Goal Priority Disciplines Outcome Goal Variances Interventions   Physical Therapy Goal     PT, PT/OT      Description: Pt will perform bed mobility independently in order to participate in EOB activity.  Pt will perform transfers ndependently in order to participate in  OOB activity.  Pt will ambulate 450 ft mod I with LRAD in order to participate in daily tasks.                        History:     Past Medical History:   Diagnosis Date    Diabetes mellitus     Hyperlipidemia     Hypertension        Past Surgical History:   Procedure Laterality Date    BIOPSY OF PERITONEUM N/A 1/29/2024    Procedure: BIOPSY, PERITONEUM;  Surgeon: Tosin Boothe MD;  Location: [facility] OR;  Service: Colon and Rectal;  Laterality: N/A;    CLOSURE OF WOUND N/A 3/16/2024    Procedure: CLOSURE, WOUND;  Surgeon: Magda Stark MD;  Location: [facility] OR;  Service: General;  Laterality: N/A;    COLONOSCOPY N/A 1/5/2024    Procedure: COLONOSCOPY;  Surgeon: Irasema Gil MD;  Location: [facility] ENDO;  Service: Endoscopy;  Laterality: N/A;    DIAGNOSTIC LAPAROSCOPY N/A 1/29/2024    Procedure: LAPAROSCOPY, DIAGNOSTIC;  Surgeon: Tosin Boothe MD;  Location: [facility] OR;  Service: Colon and Rectal;  Laterality: N/A;    EPIDURAL STEROID INJECTION N/A 11/13/2023    Procedure: Lumbar L5/S1 IL JOSE;  Surgeon: Oneil Carlson MD;  Location: [facility] PAIN MGT;  Service: Pain Management;  Laterality: N/A;    ESOPHAGOGASTRODUODENOSCOPY N/A 1/5/2024    Procedure: EGD (ESOPHAGOGASTRODUODENOSCOPY);  Surgeon: Irasema Gil MD;  Location: [facility] ENDO;  Service: Endoscopy;  Laterality: N/A;    INJECTION OF ANESTHETIC AGENT INTO TISSUE PLANE DEFINED BY TRANSVERSUS ABDOMINIS MUSCLE N/A 3/11/2024    Procedure: BLOCK, TRANSVERSUS ABDOMINIS PLANE;  Surgeon: Tosin Boothe MD;  Location: [facility] OR;  Service: Colon and Rectal;  Laterality: N/A;    NO PAST SURGERIES      WOUND EXPLORATION N/A 3/16/2024    Procedure: EXPLORATION, WOUND;  Surgeon: Magda Stark MD;  Location: [facility] OR;  Service: General;  Laterality: N/A;    XI ROBOTIC COLECTOMY, RIGHT Right 3/11/2024    Procedure: XI ROBOTIC COLECTOMY, RIGHT;  Surgeon: Tosin Boothe MD;  Location: [facility] OR;  Service: Colon and Rectal;  Laterality: Right;  WITH  OMENECTOMY AND AMNIOFIX       Time Tracking:     PT Received On: 03/30/24  PT Start Time: 0955     PT Stop Time: 1020  PT Total Time (min): 25 min     Billable Minutes: Evaluation 10 and Gait Training 15      03/30/2024

## 2024-03-31 PROBLEM — I65.22 STENOSIS OF LEFT CAROTID ARTERY: Status: ACTIVE | Noted: 2024-03-31

## 2024-03-31 PROBLEM — N17.9 AKI (ACUTE KIDNEY INJURY): Status: ACTIVE | Noted: 2024-03-31

## 2024-03-31 LAB
ANION GAP SERPL CALC-SCNC: 8 MMOL/L (ref 8–16)
BASOPHILS # BLD AUTO: 0.02 K/UL (ref 0–0.2)
BASOPHILS NFR BLD: 0.4 % (ref 0–1.9)
BUN SERPL-MCNC: 20 MG/DL (ref 8–23)
CALCIUM SERPL-MCNC: 8 MG/DL (ref 8.7–10.5)
CHLORIDE SERPL-SCNC: 110 MMOL/L (ref 95–110)
CO2 SERPL-SCNC: 19 MMOL/L (ref 23–29)
CREAT SERPL-MCNC: 1.6 MG/DL (ref 0.5–1.4)
DIFFERENTIAL METHOD BLD: ABNORMAL
EOSINOPHIL # BLD AUTO: 0.3 K/UL (ref 0–0.5)
EOSINOPHIL NFR BLD: 5.6 % (ref 0–8)
ERYTHROCYTE [DISTWIDTH] IN BLOOD BY AUTOMATED COUNT: 13.4 % (ref 11.5–14.5)
EST. GFR  (NO RACE VARIABLE): 46 ML/MIN/1.73 M^2
GLUCOSE SERPL-MCNC: 96 MG/DL (ref 70–110)
HCT VFR BLD AUTO: 25.5 % (ref 40–54)
HGB BLD-MCNC: 8.2 G/DL (ref 14–18)
IMM GRANULOCYTES # BLD AUTO: 0.04 K/UL (ref 0–0.04)
IMM GRANULOCYTES NFR BLD AUTO: 0.9 % (ref 0–0.5)
LYMPHOCYTES # BLD AUTO: 0.3 K/UL (ref 1–4.8)
LYMPHOCYTES NFR BLD: 6.4 % (ref 18–48)
MCH RBC QN AUTO: 28.1 PG (ref 27–31)
MCHC RBC AUTO-ENTMCNC: 32.2 G/DL (ref 32–36)
MCV RBC AUTO: 87 FL (ref 82–98)
MONOCYTES # BLD AUTO: 0.3 K/UL (ref 0.3–1)
MONOCYTES NFR BLD: 7.3 % (ref 4–15)
NEUTROPHILS # BLD AUTO: 3.7 K/UL (ref 1.8–7.7)
NEUTROPHILS NFR BLD: 79.4 % (ref 38–73)
NRBC BLD-RTO: 0 /100 WBC
OHS QRS DURATION: 116 MS
OHS QTC CALCULATION: 451 MS
PLATELET # BLD AUTO: 169 K/UL (ref 150–450)
PMV BLD AUTO: 9.8 FL (ref 9.2–12.9)
POTASSIUM SERPL-SCNC: 4.5 MMOL/L (ref 3.5–5.1)
RBC # BLD AUTO: 2.92 M/UL (ref 4.6–6.2)
SODIUM SERPL-SCNC: 137 MMOL/L (ref 136–145)
WBC # BLD AUTO: 4.67 K/UL (ref 3.9–12.7)

## 2024-03-31 PROCEDURE — 85025 COMPLETE CBC W/AUTO DIFF WBC: CPT | Performed by: FAMILY MEDICINE

## 2024-03-31 PROCEDURE — 21400001 HC TELEMETRY ROOM

## 2024-03-31 PROCEDURE — 25000003 PHARM REV CODE 250: Performed by: FAMILY MEDICINE

## 2024-03-31 PROCEDURE — 36415 COLL VENOUS BLD VENIPUNCTURE: CPT | Performed by: FAMILY MEDICINE

## 2024-03-31 PROCEDURE — 25000003 PHARM REV CODE 250: Performed by: NURSE PRACTITIONER

## 2024-03-31 PROCEDURE — 99900035 HC TECH TIME PER 15 MIN (STAT)

## 2024-03-31 PROCEDURE — 94799 UNLISTED PULMONARY SVC/PX: CPT | Mod: XB

## 2024-03-31 PROCEDURE — 80048 BASIC METABOLIC PNL TOTAL CA: CPT | Performed by: FAMILY MEDICINE

## 2024-03-31 PROCEDURE — 99223 1ST HOSP IP/OBS HIGH 75: CPT | Mod: ,,, | Performed by: STUDENT IN AN ORGANIZED HEALTH CARE EDUCATION/TRAINING PROGRAM

## 2024-03-31 PROCEDURE — 94761 N-INVAS EAR/PLS OXIMETRY MLT: CPT

## 2024-03-31 RX ORDER — METOPROLOL SUCCINATE 25 MG/1
25 TABLET, EXTENDED RELEASE ORAL DAILY
Status: DISCONTINUED | OUTPATIENT
Start: 2024-03-31 | End: 2024-04-01 | Stop reason: HOSPADM

## 2024-03-31 RX ADMIN — SULFAMETHOXAZOLE AND TRIMETHOPRIM 1 TABLET: 800; 160 TABLET ORAL at 08:03

## 2024-03-31 RX ADMIN — ATORVASTATIN CALCIUM 20 MG: 10 TABLET, FILM COATED ORAL at 08:03

## 2024-03-31 RX ADMIN — SODIUM CHLORIDE: 9 INJECTION, SOLUTION INTRAVENOUS at 12:03

## 2024-03-31 RX ADMIN — APIXABAN 5 MG: 2.5 TABLET, FILM COATED ORAL at 08:03

## 2024-03-31 RX ADMIN — METOPROLOL SUCCINATE 25 MG: 25 TABLET, EXTENDED RELEASE ORAL at 08:03

## 2024-03-31 NOTE — ASSESSMENT & PLAN NOTE
----- Message from Lindsay Wolff MD sent at 10/19/2022  9:20 AM CDT -----  Diphtheroids should respond to Clindamycin.  Cont abx to completion.  Return if symptoms not improving or worsening for re-evaluation.    Signed            Patient would like communication of their results via:        Cell Phone:       Telephone Information:   Mobile [phone]     Okay to leave a message containing results? Yes           Message left on voicemail.     Improving with IV fluids

## 2024-03-31 NOTE — ASSESSMENT & PLAN NOTE
Due to hypotension/carotid artery disease   Add BP medications back slowly as BP remains elevated   Will have CEA for L carotid stenosis

## 2024-03-31 NOTE — ASSESSMENT & PLAN NOTE
Comes in with hypotension/syncope  Patient was on several different medications for BP  Monitor BP, and slowly add back medications as needed

## 2024-03-31 NOTE — ASSESSMENT & PLAN NOTE
-patient had adenoma about removed on 03/01/2024 by Dr. Boothe.  -surgical site appeared cellulitic, but had been evaluated by Dr. Boothe on 03/28/2024  -continue home Bactrim   -wound care consult    3/30  Surgical site looks good, no s/s of infection  Continue oral bactrim     3/31:  Continue Bactrim EOC 4/5

## 2024-03-31 NOTE — H&P (VIEW-ONLY)
Consult    Consulting Physician:  Anastacio Jasmine MD  Reason for Consultation: carotid stenosis    CC: Loss of Consciousness (Pt suffered LOC, was found down by wife, crackles to lung bases, hypotensive when fluids stopped, fluids restarted by EMS)      HPI: Quintin Ling is a 73 y.o. male with PMHx as seen below, was admitted on 3/29/2024 after syncopal event. He was outside in his patio and found slouched in his chair by his wife who called EMS. When he woke up he denies any focal neurological deficits. He denies changes in vision or speech. He denies prior episodes of this type of symptom. He does have history of hypertension and takes blood pressure medication, his wife noted his blood pressure normally runs in 140s but last few days was closer to 110. He is recovering from colon resection that was complicated by fascia dehiscence. Denies any symptoms of fevers/chills/abdominal pain and has been tolerating diet well. Ct head negative. Carotid duplex shows peak systolic velocity 249cm/s on the right and 362cm/s on the left.      Past Medical History:   Diagnosis Date    Diabetes mellitus     Hyperlipidemia     Hypertension        Past Surgical History:   Procedure Laterality Date    BIOPSY OF PERITONEUM N/A 1/29/2024    Procedure: BIOPSY, PERITONEUM;  Surgeon: Tosin Boothe MD;  Location: [facility] OR;  Service: Colon and Rectal;  Laterality: N/A;    CLOSURE OF WOUND N/A 3/16/2024    Procedure: CLOSURE, WOUND;  Surgeon: Magda Stark MD;  Location: [facility] OR;  Service: General;  Laterality: N/A;    COLONOSCOPY N/A 1/5/2024    Procedure: COLONOSCOPY;  Surgeon: Irasema Gil MD;  Location: [facility] ENDO;  Service: Endoscopy;  Laterality: N/A;    DIAGNOSTIC LAPAROSCOPY N/A 1/29/2024    Procedure: LAPAROSCOPY, DIAGNOSTIC;  Surgeon: Tosin Boothe MD;  Location: [facility] OR;  Service: Colon and Rectal;  Laterality: N/A;    EPIDURAL STEROID INJECTION N/A 11/13/2023    Procedure: Lumbar L5/S1 IL JOSE;   Surgeon: Oneil Carlson MD;  [facility];  Service: Pain Management;  Laterality: N/A;    ESOPHAGOGASTRODUODENOSCOPY N/A 1/5/2024    Procedure: EGD (ESOPHAGOGASTRODUODENOSCOPY);  Surgeon: Irasema Gil MD;  Location: [facility] ENDO;  Service: Endoscopy;  Laterality: N/A;    INJECTION OF ANESTHETIC AGENT INTO TISSUE PLANE DEFINED BY TRANSVERSUS ABDOMINIS MUSCLE N/A 3/11/2024    Procedure: BLOCK, TRANSVERSUS ABDOMINIS PLANE;  Surgeon: Tosin Boothe MD;  Location: [facility] OR;  Service: Colon and Rectal;  Laterality: N/A;    NO PAST SURGERIES      WOUND EXPLORATION N/A 3/16/2024    Procedure: EXPLORATION, WOUND;  Surgeon: Magda Stark MD;  Location: [facility] OR;  Service: General;  Laterality: N/A;    XI ROBOTIC COLECTOMY, RIGHT Right 3/11/2024    Procedure: XI ROBOTIC COLECTOMY, RIGHT;  Surgeon: Tosin Boothe MD;  Location: [facility] OR;  Service: Colon and Rectal;  Laterality: Right;  WITH OMENECTOMY AND AMNIOFIX       Social History     Socioeconomic History    Marital status:    Tobacco Use    Smoking status: Former     Passive exposure: Never    Smokeless tobacco: Never   Substance and Sexual Activity    Alcohol use: Not Currently     Comment: OCC    Drug use: Never    Sexual activity: Yes     Partners: Female     Social Determinants of Health     Financial Resource Strain: Patient Unable To Answer (3/17/2024)    Overall Financial Resource Strain (CARDIA)     Difficulty of Paying Living Expenses: Patient unable to answer   Food Insecurity: Patient Unable To Answer (3/17/2024)    Hunger Vital Sign     Worried About Running Out of Food in the Last Year: Patient unable to answer     Ran Out of Food in the Last Year: Patient unable to answer   Transportation Needs: Patient Unable To Answer (3/17/2024)    PRAPARE - Transportation     Lack of Transportation (Medical): Patient unable to answer     Lack of Transportation (Non-Medical): Patient unable to answer   Stress: Patient Unable To Answer  (3/17/2024)    [employer] [place] [employer] - Occupational Stress Questionnaire     Feeling of Stress : Patient unable to answer   Housing Stability: Patient Unable To Answer (3/17/2024)    Housing Stability Vital Sign     Unable to Pay for Housing in the Last Year: Patient unable to answer     Unstable Housing in the Last Year: Patient unable to answer       Family History   Problem Relation Age of Onset    No Known Problems Mother     No Known Problems Father          Current Facility-Administered Medications:     0.9%  NaCl infusion, , Intravenous, Continuous, John Mesa MD, Last Rate: 100 mL/hr at 03/31/24 0031, New Bag at 03/31/24 0031    acetaminophen tablet 650 mg, 650 mg, Oral, Q4H PRN, John Mesa MD    apixaban tablet 5 mg, 5 mg, Oral, BID, John Mesa MD, 5 mg at 03/31/24 0822    atorvastatin tablet 20 mg, 20 mg, Oral, Daily, John Mesa MD, 20 mg at 03/31/24 0822    dextrose 10% bolus 125 mL 125 mL, 12.5 g, Intravenous, PRN, John Mesa MD    dextrose 10% bolus 250 mL 250 mL, 25 g, Intravenous, PRN, John Mesa MD    glucagon (human recombinant) injection 1 mg, 1 mg, Intramuscular, PRN, John Mesa MD    glucose chewable tablet 16 g, 16 g, Oral, PRN, John Mesa MD    glucose chewable tablet 24 g, 24 g, Oral, PRN, John Mesa MD    HYDROcodone-acetaminophen 5-325 mg per tablet 1 tablet, 1 tablet, Oral, Q6H PRN, John Mesa MD    insulin aspart U-100 pen 0-10 Units, 0-10 Units, Subcutaneous, QID (AC + HS) PRNMoshe Palvi J, MD    metoprolol succinate (TOPROL-XL) 24 hr tablet 25 mg, 25 mg, Oral, Daily, Cresencio Mesa, NP, 25 mg at 03/31/24 0822    naloxone 0.4 mg/mL injection 0.02 mg, 0.02 mg, Intravenous, PRN, John Mesa MD    sodium chloride 0.9% flush 10 mL, 10 mL, Intravenous, Q12H PRN, John Mesa MD    sulfamethoxazole-trimethoprim 800-160mg per tablet 1 tablet, 1 tablet, Oral, BID, John Mesa MD, 1 tablet at 03/31/24  "0822    Review of patient's allergies indicates:  No Known Allergies    ROS:  10 point review of systems is negative except as mentioned in HPI.    Vitals:    03/31/24 0839   BP:    Pulse: 89   Resp:    Temp:        Objective:  Vital signs: (most recent): Blood pressure 136/74, pulse 89, temperature 98.4 °F (36.9 °C), resp. rate 18, height 5' 5" (1.651 m), weight 105.9 kg (233 lb 6.4 oz), SpO2 96 %.    Abd soft nt nd, dressing in place no drainage noted or soiling of dressing  Ext palpable pedal pulses b/l  Cv rrr no jvd  Lung ctab  Neuro no focal neurological deficits, strength 5/5 b/l upper and lower, NAD alert oriented    LABS:  CKD, gfr improving to his baseline    IMAGING:  I have personally reviewed the imaging.    US Carotid Bilateral   Final Result   Abnormal      Greater than 70% stenosis bilaterally.  Further evaluation as warranted.      This report was flagged in Epic as abnormal.         Electronically signed by: Petey Rosario   Date:    03/29/2024   Time:    21:47      CT Head Without Contrast   Final Result      No acute intracranial CT abnormality.      All CT scans at this facility are performed  using dose modulation techniques as appropriate to performed exam including the following:  automated exposure control; adjustment of mA and/or kV according to the patients size (this includes techniques or standardized protocols for targeted exams where dose is matched to indication/reason for exam: i.e. extremities or head);  iterative reconstruction technique.         Electronically signed by: Petey Rosario   Date:    03/29/2024   Time:    15:31      X-Ray Chest AP Portable   Final Result      No acute process seen.         Electronically signed by: Boston Tracey MD   Date:    03/29/2024   Time:    14:43          [facility]      Assessment & Plan  Quintin Ling is a 73 y.o. male with asymptomatic bilateral carotid stenosis, he actually has critical stenosis on the left. I have recommended we move forward with " carotid endarterectomy on left with EEG monitoring in coming weeks. He was scheduled to see Dr. Murray in 2-3 weeks I would like him to be seen earlier for cardiac risk stratification. I have discussed the operation in detail with Mr. Ling and his wife and we went over risks of cranial nerve injury, stroke, bleeding, infection.     Chrissie Parks  3/31/2024  [facility]  Vascular Surgery  [phone] (Clinic Number)    Active Hospital Problems    Diagnosis  POA    *Syncope [R55]  Yes    New onset a-fib [I48.91]  Yes    Colon adenoma [D12.6]  Yes    Essential hypertension [I10]  Yes     Near the target.  Increase Cardura to 4 mg.        Resolved Hospital Problems   No resolved problems to display.

## 2024-03-31 NOTE — ASSESSMENT & PLAN NOTE
Patient with acute kidney injury/acute renal failure likely due to pre-renal azotemia due to IVVD BRANDY is currently improving. Baseline creatinine  1.4-1.6  - Labs reviewed- Renal function/electrolytes with Estimated Creatinine Clearance: 47.5 mL/min (A) (based on SCr of 1.6 mg/dL (H)). according to latest data. Monitor urine output and serial BMP and adjust therapy as needed. Avoid nephrotoxins and renally dose meds for GFR listed above.  Likely due to Bactrim use  Decrease IVF flow rate

## 2024-03-31 NOTE — HPI
Mr. Ling is a 73-year-old male with past medical history of diabetes, hyperlipidemia, hypertension, colon mass removal on 03/11 (benign) followed by an ileus which was then followed by a fascia tear presented after being found unresponsive on his back porch.His wife was at his bedside and reported most of the information.  Pt with complaints of passing out and nausea. After having his colon mask removed pt had complaints of nausea and vomiting. His wife reports of him being more fatigued and not as active as he was in the prior months. Pt reported eating and urinating fine before admission. Patient denies any CP. Lately he has been SOB. BP was low at 88/44 and has improved to 136/74. Labs reviewed. Hemoglobin table at 8.2, creatine at 1.6, baseline 1.1.-1.3. Echo from 3./2024 showed EF 60%, mild to moderate AS. EKG showed normal rhythm and has critical blockage on the left carotid artery stenoss on the left will be having left carotid endarterectomy.

## 2024-03-31 NOTE — ASSESSMENT & PLAN NOTE
Patient with Paroxysmal (<7 days) atrial fibrillation which is controlled currently with Calcium Channel Blocker. Patient is currently in sinus rhythm.[id number] Score: 1. Anticoagulation indicated. Anticoagulation done with Eliquis .    -Cardizem held due to hypotension   -continue Eliquis    3/31:  Resume Eliquis   Resume Metop 25mg once daily, since HR >60

## 2024-03-31 NOTE — ASSESSMENT & PLAN NOTE
Now in NSR  Currently on home toprolxl   Restart diltiazem PO if BP becomes elevated  Continue eliquis - will need to stop 2 days prior to CEA procedure

## 2024-03-31 NOTE — CONSULTS
"[facility] ([facility])  Cardiology  Consult Note    Patient Name: Quintin Ling  MRN: [id number]  Admission Date: 3/29/2024  Hospital Length of Stay: 1 days  Code Status: Full Code   Attending Provider: Anastacio Jasmine MD   Consulting Provider: Víctor Beltran MD.  Primary Care Physician: Bishop Gabriel MD  Principal Problem:Syncope    Patient information was obtained from {info source:[id number]::"ER records"}.     Consults  Subjective:     Chief Complaint:  Syncope     HPI: Mr. Ling is a 73-year-old male with past medical history of diabetes, hyperlipidemia, hypertension, colon mass removal on 03/01 followed by an ileus which was then followed by a fascia tear presented after being found unresponsive on his back porch.His wife was at his bedside and reported most of the information.  Pt with complaints of passing out and nausea. After having his colon mask removed pt had complaints of nausea and vomiting. His wife reports of him being more fatigued and not as active as he was in the prior months. Pt reported eating and urinating fine before admission. Patient denies any CP. Lately he has been SOB. BP was low at 88/44 and has improved to 136/74. Labs reviewed. Hemoglobin table at 8.2, creatine at 1.6, baseline 1.1.-1.3. Echo from 3./2024 showed EF 60%, mild to moderate AS. EKG showed normal rhythm and has critical blockage on the left carotid artery stenoss on the left.   Past Medical History:   Diagnosis Date    Diabetes mellitus     Hyperlipidemia     Hypertension        Past Surgical History:   Procedure Laterality Date    BIOPSY OF PERITONEUM N/A 1/29/2024    Procedure: BIOPSY, PERITONEUM;  Surgeon: Tosin Boothe MD;  Location: [facility] OR;  Service: Colon and Rectal;  Laterality: N/A;    CLOSURE OF WOUND N/A 3/16/2024    Procedure: CLOSURE, WOUND;  Surgeon: Magda Stark MD;  Location: [facility] OR;  Service: General;  Laterality: N/A;    COLONOSCOPY N/A 1/5/2024    Procedure: COLONOSCOPY;  " Surgeon: Irasema Gil MD;  Location: [facility] ENDO;  Service: Endoscopy;  Laterality: N/A;    DIAGNOSTIC LAPAROSCOPY N/A 1/29/2024    Procedure: LAPAROSCOPY, DIAGNOSTIC;  Surgeon: Tosin Boothe MD;  Location: [facility] OR;  Service: Colon and Rectal;  Laterality: N/A;    EPIDURAL STEROID INJECTION N/A 11/13/2023    Procedure: Lumbar L5/S1 IL JOSE;  Surgeon: Oneil Carlson MD;  Location: [facility] PAIN MGT;  Service: Pain Management;  Laterality: N/A;    ESOPHAGOGASTRODUODENOSCOPY N/A 1/5/2024    Procedure: EGD (ESOPHAGOGASTRODUODENOSCOPY);  Surgeon: Irasema Gil MD;  Location: [facility] ENDO;  Service: Endoscopy;  Laterality: N/A;    INJECTION OF ANESTHETIC AGENT INTO TISSUE PLANE DEFINED BY TRANSVERSUS ABDOMINIS MUSCLE N/A 3/11/2024    Procedure: BLOCK, TRANSVERSUS ABDOMINIS PLANE;  Surgeon: Tosin Boothe MD;  Location: [facility] OR;  Service: Colon and Rectal;  Laterality: N/A;    NO PAST SURGERIES      WOUND EXPLORATION N/A 3/16/2024    Procedure: EXPLORATION, WOUND;  Surgeon: Magda Stark MD;  Location: [facility] OR;  Service: General;  Laterality: N/A;    XI ROBOTIC COLECTOMY, RIGHT Right 3/11/2024    Procedure: XI ROBOTIC COLECTOMY, RIGHT;  Surgeon: Tosin Boothe MD;  Location: [facility] OR;  Service: Colon and Rectal;  Laterality: Right;  WITH OMENECTOMY AND AMNIOFIX       Review of patient's allergies indicates:  No Known Allergies    No current facility-administered medications on file prior to encounter.     Current Outpatient Medications on File Prior to Encounter   Medication Sig    apixaban (ELIQUIS) 5 mg Tab Take 1 tablet (5 mg total) by mouth 2 (two) times daily.    atorvastatin (LIPITOR) 20 MG tablet TAKE 1 TABLET BY MOUTH EVERY DAY    diltiaZEM (CARDIZEM CD) 240 MG 24 hr capsule Take 1 capsule (240 mg total) by mouth once daily.    doxazosin (CARDURA) 4 MG tablet Take 4 mg by mouth once daily.    fenofibrate (TRICOR) 54 MG tablet Take 1 tablet (54 mg total) by mouth once daily.    furosemide  (LASIX) 40 MG tablet Take 1 tablet (40 mg total) by mouth daily as needed (leg swelling).    hydroCHLOROthiazide (HYDRODIURIL) 25 MG tablet TAKE 1 TABLET BY MOUTH EVERY DAY IN THE MORNING    metFORMIN (GLUCOPHAGE) 500 MG tablet TAKE 1 TABLET BY MOUTH TWICE A DAY WITH FOOD    metoprolol succinate (TOPROL-XL) 25 MG 24 hr tablet Take 1 tablet (25 mg total) by mouth 2 (two) times daily.    olmesartan (BENICAR) 40 MG tablet Take 1 tablet (40 mg total) by mouth once daily.    polyethylene glycol (GLYCOLAX) 17 gram/dose powder Take 238 g by mouth once daily. Mix with 2L of gatorade and drink all of mixed solution starting at 12pm the day before surgery, finishing by 10pm the night before surgery.    pregabalin (LYRICA) 75 MG capsule Take 1 capsule (75 mg total) by mouth 2 (two) times daily.    sulfamethoxazole-trimethoprim 800-160mg (BACTRIM DS) 800-160 mg Tab Take 1 tablet by mouth 2 (two) times daily. for 7 days     Family History       Problem Relation (Age of Onset)    No Known Problems Mother, Father          Tobacco Use    Smoking status: Former     Passive exposure: Never    Smokeless tobacco: Never   Substance and Sexual Activity    Alcohol use: Not Currently     Comment: OCC    Drug use: Never    Sexual activity: Yes     Partners: Female     ROS  Objective:     Vital Signs (Most Recent):  Temp: 98.4 °F (36.9 °C) (03/31/24 0754)  Pulse: 88 (03/31/24 0906)  Resp: 18 (03/31/24 0906)  BP: 136/74 (03/31/24 0754)  SpO2: 96 % (03/31/24 0906) Vital Signs (24h Range):  Temp:  [98.4 °F (36.9 °C)-99.5 °F (37.5 °C)] 98.4 °F (36.9 °C)  Pulse:  [68-89] 88  Resp:  [16-20] 18  SpO2:  [94 %-96 %] 96 %  BP: (130-150)/(60-74) 136/74     Weight: 105.9 kg (233 lb 6.4 oz)  Body mass index is 38.84 kg/m².    SpO2: 96 %         Intake/Output Summary (Last 24 hours) at 3/31/2024 1011  Last data filed at 3/31/2024 0859  Gross per 24 hour   Intake 1813.9 ml   Output 3 ml   Net 1810.9 ml       Lines/Drains/Airways       Peripheral  Intravenous Line  Duration                  Peripheral IV - Single Lumen 24 1501 20 G Left;Posterior Hand 1 day                    Physical Exam    Significant Labs: {Labs:37790}    Significant Imaging: {Imagin}  Assessment and Plan:     Active Diagnoses:    Diagnosis Date Noted POA    PRINCIPAL PROBLEM:  Syncope [R55] 2024 Yes    New onset a-fib [I48.91] 2024 Yes    Colon adenoma [D12.6] 2024 Yes    Essential hypertension [I10] 2020 Yes      Problems Resolved During this Admission:       VTE Risk Mitigation (From admission, onward)           Ordered     apixaban tablet 5 mg  2 times daily         24 1657     Reason for No Pharmacological VTE Prophylaxis  Once        Question:  Reasons:  Answer:  Already adequately anticoagulated on oral Anticoagulants    24 1657     IP VTE HIGH RISK PATIENT  Once         24 1657     Place sequential compression device  Until discontinued         24 1657                    Thank you for your consult. {SIGN OFF/FOLLOW-UP:08759}    Genia Colby  Cardiology   [facility] ([facility])

## 2024-03-31 NOTE — PLAN OF CARE
[facility] (Hospital)  Initial Discharge Assessment       Primary Care Provider: Bishop Gabriel MD    Admission Diagnosis: Dehydration [E86.0]  Syncope [R55]  Weakness [R53.1]  Chest pain [R07.9]  Syncope, unspecified syncope type [R55]    Admission Date: 3/29/2024  Expected Discharge Date:     Transition of Care Barriers: (P) None    Payor: [facility] MEDICARE / Plan: [employer] [place] / Product Type: Medicare Advantage /     Extended Emergency Contact Information  Primary Emergency Contact: Emily Ling  Mobile Phone: [phone]  Relation: Spouse  Secondary Emergency Contact: roderick ling  Mobile Phone: [phone]  Relation: Son    Discharge Plan A: (P) Home with family         [id number] [address]  [address] [facility] [address]  Phone: [phone] Fax: [phone]      Initial Assessment (most recent)       Adult Discharge Assessment - 03/31/24 0856          Discharge Assessment    Assessment Type Discharge Planning Assessment     Confirmed/corrected address, phone number and insurance Yes     Confirmed Demographics Correct on Facesheet     When was your last doctors appointment? 03/28/24 (P)      Communicated MERCEDEZ with patient/caregiver Date not available/Unable to determine     Reason For Admission Loss of Consciousness     People in Home spouse     Do you expect to return to your current living situation? Yes     Do you have help at home or someone to help you manage your care at home? Yes     Who are your caregiver(s) and their phone number(s)? Emily Ling  [phone]     Prior to hospitilization cognitive status: Alert/Oriented     Current cognitive status: Alert/Oriented     Walking or Climbing Stairs Difficulty no     Dressing/Bathing Difficulty no     Home Accessibility wheelchair accessible (P)      Home Layout Able to live on 1st floor     Equipment Currently Used at Home none (P)      Readmission within 30 days?  Yes     Patient currently being followed by outpatient case management? No     Do you currently have service(s) that help you manage your care at home? No     Do you take prescription medications? Yes     Do you have prescription coverage? Yes     Coverage [employer] D MEDICARE - [facility]     Do you have any problems affording any of your prescribed medications? No     Is the patient taking medications as prescribed? yes     Who is going to help you get home at discharge? Emily Ling [phone]     How do you get to doctors appointments? car, drives self;family or friend will provide (P)      Are you on dialysis? No (P)      Do you take coumadin? No (P)      Discharge Plan A Home with family (P)      DME Needed Upon Discharge  walker, rolling;shower chair (P)      Discharge Plan discussed with: Spouse/sig other (P)      Name(s) and Number(s) Emily Ling [phone] (P)      Transition of Care Barriers None (P)         OTHER    Name(s) of People in Home Emily Ling [phone]                      Kina Giles LMSW 3/31/2024 9:13 AM

## 2024-03-31 NOTE — ASSESSMENT & PLAN NOTE
-likely secondary to hypotension   -we will hold hypertension medications at this time.  He is noted to be on several medication likely contributing to his hypotension.  -echocardiogram and carotid Dopplers    -CT head showed no acute intracranial CT abnormality.   -Echo showed normal EF.   -Bilateral carotid US showed greater than 70% stenosis bilaterally.   -Vascular surgery consulted.     3/31:  -Bilateral carotid US showed greater than 70% stenosis bilaterally  Vascular recommends moving forward with left carotid artery endarterectomy with EEG in the very near future  Needs cardiac risk stratification sooner- Cards consult placed

## 2024-03-31 NOTE — CONSULTS
[facility] ([facility])  Cardiology  Consult Note    Patient Name: Quintin Ling  MRN: [id number]  Admission Date: 3/29/2024  Hospital Length of Stay: 1 days  Code Status: Full Code   Attending Provider: Anastacio Jasmine MD   Consulting Provider: Víctor Beltran MD  Primary Care Physician: Bishop Gabriel MD  Principal Problem:Syncope    Patient information was obtained from patient and ER records.     Inpatient consult to Cardiology  Consult performed by: Víctor Beltran MD  Consult ordered by: Anastacio Jasmine MD  Reason for consult: syncope        Subjective:         HPI:   Mr. Ling is a 73-year-old male with past medical history of diabetes, hyperlipidemia, hypertension, colon mass removal on 03/11 (benign) followed by an ileus which was then followed by a fascia tear presented after being found unresponsive on his back porch.His wife was at his bedside and reported most of the information.  Pt with complaints of passing out and nausea. After having his colon mask removed pt had complaints of nausea and vomiting. His wife reports of him being more fatigued and not as active as he was in the prior months. Pt reported eating and urinating fine before admission. Patient denies any CP. Lately he has been SOB. BP was low at 88/44 and has improved to 136/74. Labs reviewed. Hemoglobin table at 8.2, creatine at 1.6, baseline 1.1.-1.3. Echo from 3./2024 showed EF 60%, mild to moderate AS. EKG showed normal rhythm and has critical blockage on the left carotid artery stenoss on the left will be having left carotid endarterectomy.         No new subjective & objective note has been filed under this hospital service since the last note was generated.      Assessment and Plan:     * Syncope  Due to hypotension/carotid artery disease   Add BP medications back slowly as BP remains elevated   Will have CEA for L carotid stenosis    Stenosis of left carotid artery  Critical stenosis of L carotid (right >70%  stenosis)  Will have CEA  Moderate CV risk for CEA  Denies chest  pain/SOB  Recent colonic mass removal without complications  Echo with normal EF, mild-mod AS  Hold eliquis 2 days prior to vascular procedure   Start ASA 81 mg    BRANDY (acute kidney injury)  Improving with IV fluids     New onset a-fib  Now in NSR  Currently on home toprolxl   Restart diltiazem PO if BP becomes elevated  Continue eliquis - will need to stop 2 days prior to CEA procedure    Essential hypertension  Comes in with hypotension/syncope  Patient was on several different medications for BP  Monitor BP, and slowly add back medications as needed         VTE Risk Mitigation (From admission, onward)           Ordered     apixaban tablet 5 mg  2 times daily         03/29/24 1657     Reason for No Pharmacological VTE Prophylaxis  Once        Question:  Reasons:  Answer:  Already adequately anticoagulated on oral Anticoagulants    03/29/24 1657     IP VTE HIGH RISK PATIENT  Once         03/29/24 1657     Place sequential compression device  Until discontinued         03/29/24 1657                    Thank you for your consult.     Víctor Beltran MD  Cardiology   [facility] ([facility])

## 2024-03-31 NOTE — ASSESSMENT & PLAN NOTE
Critical stenosis of L carotid (right >70% stenosis)  Will have CEA  Moderate CV risk for CEA  Denies chest  pain/SOB  Recent colonic mass removal without complications  Echo with normal EF, mild-mod AS  Hold eliquis 2 days prior to vascular procedure

## 2024-03-31 NOTE — ASSESSMENT & PLAN NOTE
Critical stenosis of L carotid (right >70% stenosis)  Will have CEA  Moderate CV risk for CEA  Denies chest  pain/SOB  Recent colonic mass removal without complications  Echo with normal EF, mild-mod AS  Hold eliquis 2 days prior to vascular procedure   Start ASA 81 mg

## 2024-03-31 NOTE — ASSESSMENT & PLAN NOTE
3/31:  BP now in 130-150's and stable  HR >60- resume Metop 25mg now  Patient is on 25mg BID 24hr at home --which is unusual, hence will resume BP meds slowly

## 2024-03-31 NOTE — PROGRESS NOTES
[facility]  [facility] Medicine  Progress Note    Patient Name: Quintin Ling  MRN: [id number]  Patient Class: IP- Inpatient   Admission Date: 3/29/2024  Length of Stay: 1 days  Attending Physician: Anastacio Jasmine MD  Primary Care Provider: Bishop Gabriel MD        Subjective:     Principal Problem:Syncope        HPI:  Mr. Ling is a 73-year-old male with past medical history of diabetes, hyperlipidemia, hypertension, colon mass removal on 03/01 followed by an ileus which was then followed by a fascia tear presented after being found unresponsive on his back porch.  Per wife at bedside states that patient had surgery with Dr. Boothe for a colon mass on 03/01 and had some complications postprocedure.  His latest complication was some cellulitis around the surgical site for which she was started on Bactrim yesterday.  Patient is noted to be on several different blood pressures and upon arrival noted to be hypotensive.  Does not have any leukocytosis or fevers and was feeling in normal health prior to the episode.  Patient does not recall having a syncopal episode he states that the last thing he remembers is sitting out on the porch.  Patient denies any headache chest pain shortness on breath nausea or vomiting.  His lower abdominal incision appears erythematous but no noted active discharge.  Patient did have some dried discharge on his bandages.  CT head showed no acute intracranial CT abnormality.  Hospital medicine will admit for observation, syncope workup, wound care for his abdominal cellulitis and continued p.o. antibiotics.    Overview/Hospital Course:  74 y/o male admitted for syncope. CT head showed no acute intracranial CT abnormality. Echo showed normal EF. Bilateral carotid US showed greater than 70% stenosis bilaterally. Vascular surgery consulted. Abdomen surgical site with no s/s of infection. Continue oral antibiotics. Low grade fever noted this morning.  Encouraged deep  breathing and coughing. IS ordered.  3/31/24: NAEON. BP now in 130-150's and HR well above 60. Will resume Metop 25mg daily only for now -he is on 25mg BID 24 hr tab. Seen by vascular today s/t carotid US findings and plan for Carotid Artery Endarectomy with EEG monitoring in the very near future, needs cardiac risk stratification sooner. For now plan to optimize BP. Continue Bactrim EOC 4/5/24. BRANDY resolved and likely due to Bactrim, baseline Cr 1.4-1.6.         Interval History: NAEON. Reports loose stool likely due to recent colon resection. Afebrile.     Review of Systems   Constitutional:  Negative for activity change, appetite change, chills, fatigue and fever.   Respiratory:  Negative for apnea, cough, shortness of breath and stridor.    Cardiovascular:  Negative for chest pain, palpitations and leg swelling.   Gastrointestinal:  Negative for abdominal distention, constipation, diarrhea, nausea and vomiting.   Genitourinary:  Negative for difficulty urinating, dysuria, frequency, hematuria and urgency.   Musculoskeletal:  Negative for arthralgias, back pain, gait problem and joint swelling.   Neurological:  Positive for syncope. Negative for dizziness, seizures, weakness, light-headedness, numbness and headaches.   Psychiatric/Behavioral:  Negative for agitation, behavioral problems, confusion, decreased concentration, dysphoric mood and hallucinations.    All other systems reviewed and are negative.    Objective:     Vital Signs (Most Recent):  Temp: 98.4 °F (36.9 °C) (03/31/24 0754)  Pulse: 88 (03/31/24 0906)  Resp: 18 (03/31/24 0906)  BP: 136/74 (03/31/24 0754)  SpO2: 96 % (03/31/24 0906) Vital Signs (24h Range):  Temp:  [98.4 °F (36.9 °C)-99.5 °F (37.5 °C)] 98.4 °F (36.9 °C)  Pulse:  [68-89] 88  Resp:  [16-20] 18  SpO2:  [94 %-96 %] 96 %  BP: (130-150)/(60-74) 136/74     Weight: 105.9 kg (233 lb 6.4 oz)  Body mass index is 38.84 kg/m².    Intake/Output Summary (Last 24 hours) at 3/31/2024 1010  Last data  filed at 3/31/2024 0859  Gross per 24 hour   Intake 1813.9 ml   Output 3 ml   Net 1810.9 ml         Physical Exam  Vitals and nursing note reviewed.   Constitutional:       Appearance: Normal appearance. He is obese.   HENT:      Head: Normocephalic and atraumatic.      Nose: Nose normal.      Mouth/Throat:      Mouth: Mucous membranes are moist.   Eyes:      Extraocular Movements: Extraocular movements intact.      Conjunctiva/sclera: Conjunctivae normal.   Cardiovascular:      Rate and Rhythm: Normal rate and regular rhythm.      Pulses: Normal pulses.      Heart sounds: Normal heart sounds.   Pulmonary:      Effort: Pulmonary effort is normal.      Breath sounds: Normal breath sounds.   Abdominal:      General: Abdomen is protuberant. A surgical scar is present. Bowel sounds are normal.      Palpations: Abdomen is soft.      Comments: Abdominal wound with no s/s of infection. No redness or drainage.    Musculoskeletal:      Cervical back: Normal range of motion and neck supple.   Skin:     General: Skin is warm.      Capillary Refill: Capillary refill takes less than 2 seconds.   Neurological:      Mental Status: He is alert and oriented to person, place, and time. Mental status is at baseline.   Psychiatric:         Mood and Affect: Mood normal.         Behavior: Behavior normal.             Significant Labs: All pertinent labs within the past 24 hours have been reviewed.  Recent Lab Results         03/31/24  0629        Anion Gap 8       Baso # 0.02       Basophil % 0.4       BUN 20       Calcium 8.0       Chloride 110       CO2 19       Creatinine 1.6       Differential Method Automated       eGFR 46       Eos # 0.3       Eos % 5.6       Glucose 96       Gran # (ANC) 3.7       Gran % 79.4       Hematocrit 25.5       Hemoglobin 8.2       Immature Grans (Abs) 0.04  Comment: Mild elevation in immature granulocytes is non specific and   can be seen in a variety of conditions including stress response,   acute  inflammation, trauma and pregnancy. Correlation with other   laboratory and clinical findings is essential.         Immature Granulocytes 0.9       Lymph # 0.3       Lymph % 6.4       MCH 28.1       MCHC 32.2       MCV 87       Mono # 0.3       Mono % 7.3       MPV 9.8       nRBC 0       Platelet Count 169       Potassium 4.5       RBC 2.92       RDW 13.4       Sodium 137       WBC 4.67               Significant Imaging: I have reviewed all pertinent imaging results/findings within the past 24 hours.    Assessment/Plan:      * Syncope  -likely secondary to hypotension   -we will hold hypertension medications at this time.  He is noted to be on several medication likely contributing to his hypotension.  -echocardiogram and carotid Dopplers    -CT head showed no acute intracranial CT abnormality.   -Echo showed normal EF.   -Bilateral carotid US showed greater than 70% stenosis bilaterally.   -Vascular surgery consulted.     3/31:  -Bilateral carotid US showed greater than 70% stenosis bilaterally  Vascular recommends moving forward with left carotid artery endarterectomy with EEG in the very near future  Needs cardiac risk stratification sooner- Cards consult placed       New onset a-fib  Patient with Paroxysmal (<7 days) atrial fibrillation which is controlled currently with Calcium Channel Blocker. Patient is currently in sinus rhythm.[id number] Score: 1. Anticoagulation indicated. Anticoagulation done with Eliquis .    -Cardizem held due to hypotension   -continue Eliquis    3/31:  Resume Eliquis   Resume Metop 25mg once daily, since HR >60    Colon adenoma  -patient had adenoma about removed on 03/01/2024 by Dr. Boothe.  -surgical site appeared cellulitic, but had been evaluated by Dr. Boothe on 03/28/2024  -continue home Bactrim   -wound care consult    3/30  Surgical site looks good, no s/s of infection  Continue oral bactrim     3/31:  Continue Bactrim EOC 4/5      Essential hypertension  3/31:  BP now in  130-150's and stable  HR >60- resume Metop 25mg now  Patient is on 25mg BID 24hr at home --which is unusual, hence will resume BP meds slowly      VTE Risk Mitigation (From admission, onward)           Ordered     apixaban tablet 5 mg  2 times daily         03/29/24 1657     Reason for No Pharmacological VTE Prophylaxis  Once        Question:  Reasons:  Answer:  Already adequately anticoagulated on oral Anticoagulants    03/29/24 1657     IP VTE HIGH RISK PATIENT  Once         03/29/24 1657     Place sequential compression device  Until discontinued         03/29/24 1657                    Discharge Planning   MERCEDEZ:      Code Status: Full Code   Is the patient medically ready for discharge?:     Reason for patient still in hospital (select all that apply): Patient trending condition, Treatment, and Consult recommendations  Discharge Plan A: Home with family                  Cresencio Mesa NP  Department of Hospital Medicine   [facility] ([facility])

## 2024-03-31 NOTE — SUBJECTIVE & OBJECTIVE
Past Medical History:   Diagnosis Date    Diabetes mellitus     Hyperlipidemia     Hypertension        Past Surgical History:   Procedure Laterality Date    BIOPSY OF PERITONEUM N/A 1/29/2024    Procedure: BIOPSY, PERITONEUM;  Surgeon: Tosin Boohte MD;  Location: [facility] OR;  Service: Colon and Rectal;  Laterality: N/A;    CLOSURE OF WOUND N/A 3/16/2024    Procedure: CLOSURE, WOUND;  Surgeon: Magda tSark MD;  Location: [facility] OR;  Service: General;  Laterality: N/A;    COLONOSCOPY N/A 1/5/2024    Procedure: COLONOSCOPY;  Surgeon: Irasema Gil MD;  Location: [facility] ENDO;  Service: Endoscopy;  Laterality: N/A;    DIAGNOSTIC LAPAROSCOPY N/A 1/29/2024    Procedure: LAPAROSCOPY, DIAGNOSTIC;  Surgeon: Tosin Boothe MD;  Location: [facility] OR;  Service: Colon and Rectal;  Laterality: N/A;    EPIDURAL STEROID INJECTION N/A 11/13/2023    Procedure: Lumbar L5/S1 IL JOSE;  Surgeon: Oneil Carlson MD;  Location: [facility]GT;  Service: Pain Management;  Laterality: N/A;    ESOPHAGOGASTRODUODENOSCOPY N/A 1/5/2024    Procedure: EGD (ESOPHAGOGASTRODUODENOSCOPY);  Surgeon: Irasema Gil MD;  Location: [facility];  Service: Endoscopy;  Laterality: N/A;    INJECTION OF ANESTHETIC AGENT INTO TISSUE PLANE DEFINED BY TRANSVERSUS ABDOMINIS MUSCLE N/A 3/11/2024    Procedure: BLOCK, TRANSVERSUS ABDOMINIS PLANE;  Surgeon: Tosin Boothe MD;  Location: [facility] OR;  Service: Colon and Rectal;  Laterality: N/A;    NO PAST SURGERIES      WOUND EXPLORATION N/A 3/16/2024    Procedure: EXPLORATION, WOUND;  Surgeon: Magda Stark MD;  Location: [facility] OR;  Service: General;  Laterality: N/A;    XI ROBOTIC COLECTOMY, RIGHT Right 3/11/2024    Procedure: XI ROBOTIC COLECTOMY, RIGHT;  Surgeon: Tosin Boothe MD;  Location: [facility] OR;  Service: Colon and Rectal;  Laterality: Right;  WITH OMENECTOMY AND AMNIOFIX       Review of patient's allergies indicates:  No Known Allergies    No current facility-administered  medications on file prior to encounter.     Current Outpatient Medications on File Prior to Encounter   Medication Sig    apixaban (ELIQUIS) 5 mg Tab Take 1 tablet (5 mg total) by mouth 2 (two) times daily.    atorvastatin (LIPITOR) 20 MG tablet TAKE 1 TABLET BY MOUTH EVERY DAY    diltiaZEM (CARDIZEM CD) 240 MG 24 hr capsule Take 1 capsule (240 mg total) by mouth once daily.    doxazosin (CARDURA) 4 MG tablet Take 4 mg by mouth once daily.    fenofibrate (TRICOR) 54 MG tablet Take 1 tablet (54 mg total) by mouth once daily.    furosemide (LASIX) 40 MG tablet Take 1 tablet (40 mg total) by mouth daily as needed (leg swelling).    hydroCHLOROthiazide (HYDRODIURIL) 25 MG tablet TAKE 1 TABLET BY MOUTH EVERY DAY IN THE MORNING    metFORMIN (GLUCOPHAGE) 500 MG tablet TAKE 1 TABLET BY MOUTH TWICE A DAY WITH FOOD    metoprolol succinate (TOPROL-XL) 25 MG 24 hr tablet Take 1 tablet (25 mg total) by mouth 2 (two) times daily.    olmesartan (BENICAR) 40 MG tablet Take 1 tablet (40 mg total) by mouth once daily.    polyethylene glycol (GLYCOLAX) 17 gram/dose powder Take 238 g by mouth once daily. Mix with 2L of gatorade and drink all of mixed solution starting at 12pm the day before surgery, finishing by 10pm the night before surgery.    pregabalin (LYRICA) 75 MG capsule Take 1 capsule (75 mg total) by mouth 2 (two) times daily.    sulfamethoxazole-trimethoprim 800-160mg (BACTRIM DS) 800-160 mg Tab Take 1 tablet by mouth 2 (two) times daily. for 7 days     Family History       Problem Relation (Age of Onset)    No Known Problems Mother, Father          Tobacco Use    Smoking status: Former     Passive exposure: Never    Smokeless tobacco: Never   Substance and Sexual Activity    Alcohol use: Not Currently     Comment: OCC    Drug use: Never    Sexual activity: Yes     Partners: Female     Review of Systems   Constitutional: Negative for diaphoresis, malaise/fatigue, weight gain and weight loss.   HENT:  Negative for congestion  and nosebleeds.    Cardiovascular:  Positive for syncope. Negative for chest pain, claudication, cyanosis, dyspnea on exertion, irregular heartbeat, leg swelling, near-syncope, orthopnea, palpitations and paroxysmal nocturnal dyspnea.   Respiratory:  Negative for cough, hemoptysis, shortness of breath, sleep disturbances due to breathing, snoring, sputum production and wheezing.    Hematologic/Lymphatic: Negative for bleeding problem. Does not bruise/bleed easily.   Skin:  Negative for rash.   Musculoskeletal:  Negative for arthritis, back pain, falls, joint pain, muscle cramps and muscle weakness.   Gastrointestinal:  Negative for abdominal pain, constipation, diarrhea, heartburn, hematemesis, hematochezia, melena, nausea and vomiting.   Genitourinary:  Negative for dysuria, hematuria and nocturia.   Neurological:  Negative for excessive daytime sleepiness, dizziness, headaches, light-headedness, loss of balance, numbness, vertigo and weakness.     Objective:     Vital Signs (Most Recent):  Temp: 98.9 °F (37.2 °C) (03/31/24 1634)  Pulse: 77 (03/31/24 1634)  Resp: 18 (03/31/24 1634)  BP: (!) 164/71 (03/31/24 1634)  SpO2: 97 % (03/31/24 1634) Vital Signs (24h Range):  Temp:  [98.4 °F (36.9 °C)-99.5 °F (37.5 °C)] 98.9 °F (37.2 °C)  Pulse:  [70-89] 77  Resp:  [16-20] 18  SpO2:  [94 %-97 %] 97 %  BP: (133-164)/(63-74) 164/71     Weight: 105.9 kg (233 lb 6.4 oz)  Body mass index is 38.84 kg/m².    SpO2: 97 %         Intake/Output Summary (Last 24 hours) at 3/31/2024 1659  Last data filed at 3/31/2024 1433  Gross per 24 hour   Intake 1166.63 ml   Output 5 ml   Net 1161.63 ml       Lines/Drains/Airways       Peripheral Intravenous Line  Duration                  Peripheral IV - Single Lumen 03/29/24 1501 20 G Left;Posterior Hand 2 days                     Physical Exam  Vitals and nursing note reviewed.   Constitutional:       Appearance: Normal appearance. He is well-developed.   HENT:      Head: Normocephalic.       "Mouth/Throat:      Mouth: Mucous membranes are moist.   Neck:      Vascular: No carotid bruit or JVD.   Cardiovascular:      Rate and Rhythm: Normal rate and regular rhythm.      Pulses: Normal pulses.      Heart sounds: Normal heart sounds. No murmur heard.     No friction rub.   Pulmonary:      Effort: Pulmonary effort is normal. No respiratory distress.      Breath sounds: Normal breath sounds. No wheezing or rales.   Abdominal:      General: Bowel sounds are normal. There is no distension.      Palpations: Abdomen is soft.      Tenderness: There is no abdominal tenderness. There is no guarding.   Musculoskeletal:         General: No swelling or tenderness.      Cervical back: Neck supple. No tenderness.      Right lower leg: No edema.      Left lower leg: No edema.   Skin:     General: Skin is warm and dry.      Capillary Refill: Capillary refill takes less than 2 seconds.      Findings: No rash.   Neurological:      General: No focal deficit present.      Mental Status: He is alert and oriented to person, place, and time.   Psychiatric:         Mood and Affect: Mood normal.         Behavior: Behavior normal.         Thought Content: Thought content normal.          Significant Labs: BMP:   Recent Labs   Lab 03/30/24  0536 03/31/24  0629    96    137   K 4.4 4.5    110   CO2 20* 19*   BUN 31* 20   CREATININE 2.1* 1.6*   CALCIUM 7.7* 8.0*   , CMP   Recent Labs   Lab 03/30/24  0536 03/31/24  0629    137   K 4.4 4.5    110   CO2 20* 19*    96   BUN 31* 20   CREATININE 2.1* 1.6*   CALCIUM 7.7* 8.0*   ANIONGAP 9 8   , CBC   Recent Labs   Lab 03/30/24  0536 03/30/24  0852 03/31/24  0629   WBC 4.94 5.17 4.67   HGB 7.6* 8.3* 8.2*   HCT 23.5* 25.9* 25.5*    169 169   , INR No results for input(s): "INR", "PROTIME" in the last 48 hours., Lipid Panel No results for input(s): "CHOL", "HDL", "LDLCALC", "TRIG", "CHOLHDL" in the last 48 hours., and Troponin No results for input(s): " ""TROPONINI" in the last 48 hours.    Significant Imaging: Echocardiogram: 2D echo with color flow doppler: No results found for this or any previous visit. and Transthoracic echo (TTE) complete (Cupid Only):   Results for orders placed or performed during the hospital encounter of 03/16/24   Echo   Result Value Ref Range    BSA 2.14 m2    LVOT stroke volume 95.00 cm3    LVIDd 4.40 3.5 - 6.0 cm    LV Systolic Volume 35.90 mL    LV Systolic Volume Index 17.4 mL/m2    LVIDs 3.03 2.1 - 4.0 cm    LV Diastolic Volume 87.50 mL    LV Diastolic Volume Index 42.48 mL/m2    IVS 1.21 (A) 0.6 - 1.1 cm    LVOT diameter 2.17 cm    LVOT area 3.7 cm2    FS 31 28 - 44 %    Left Ventricle Relative Wall Thickness 0.60 cm    Posterior Wall 1.32 (A) 0.6 - 1.1 cm    LV mass 206.63 g    LV Mass Index 100 g/m2    TDI LATERAL 0.11 m/s    TDI SEPTAL 0.07 m/s    TR Max Norman 2.93 m/s    IVRT 54.23 msec    LVOT peak norman 1.16 m/s    Left Ventricular Outflow Tract Mean Velocity 1.03 cm/s    Left Ventricular Outflow Tract Mean Gradient 4.30 mmHg    RVOT peak VTI 23.1 cm    TAPSE 1.82 cm    LA size 3.42 cm    Left Atrium Minor Axis 4.11 cm    Left Atrium Major Axis 5.24 cm    RA Major Axis 5.04 cm    AV mean gradient 17 mmHg    AV peak gradient 22 mmHg    Ao peak norman 2.32 m/s    Ao VTI 44.00 cm    LVOT peak VTI 25.70 cm    AV valve area 2.16 cm²    AV Velocity Ratio 0.50     AV index (prosthetic) 0.58     DOLLY by Velocity Ratio 1.85 cm²    Triscuspid Valve Regurgitation Peak Gradient 34 mmHg    PV mean gradient 5 mmHg    RVOT peak norman 1.34 m/s    Ao root annulus 3.85 cm    STJ 3.91 cm    Ascending aorta 4.00 cm    IVC diameter 1.15 cm    Mean e' 0.09 m/s    ZLVIDS -1.81     ZLVIDD -3.46     LA Volume Index 42.3 mL/m2    LA volume 87.05 cm3    LA WIDTH 6.5 cm    RA Width 2.2 cm    TV resting pulmonary artery pressure 37 mmHg    RV TB RVSP 6 mmHg    Est. RA pres 3 mmHg    Narrative      Left Ventricle: The left ventricle is normal in size. Moderately "   increased wall thickness. There is moderate concentric hypertrophy. There   is normal systolic function with a visually estimated ejection fraction of   60 - 65%. Unable to assess diastolic function due to atrial fibrillation.    Right Ventricle: Right ventricle was not well visualized due to poor   acoustic window. Normal right ventricular cavity size. Wall thickness is   normal. Right ventricle wall motion  is normal. Systolic function is   normal.    Left Atrium: Left atrium is moderately dilated.    Aortic Valve: Moderately calcified cusps. There is moderate annular   calcification present. Moderately restricted motion. There is mild to   moderate stenosis. Aortic valve area by VTI is 2.16 cm². Aortic valve peak   velocity is 2.32 m/s. Mean gradient is 17 mmHg. The dimensionless index is   0.58.    Mitral Valve: There is mild regurgitation.    Tricuspid Valve: There is mild regurgitation.    Aorta: Aortic root is normal in size. Ascending aorta is mildly dilated   measuring 4.00 cm.    Pulmonary Artery: The estimated pulmonary artery systolic pressure is   37 mmHg.    IVC/SVC: Normal venous pressure at 3 mmHg.

## 2024-03-31 NOTE — SUBJECTIVE & OBJECTIVE
Interval History: NAEON. Reports loose stool likely due to recent colon resection. Afebrile.     Review of Systems   Constitutional:  Negative for activity change, appetite change, chills, fatigue and fever.   Respiratory:  Negative for apnea, cough, shortness of breath and stridor.    Cardiovascular:  Negative for chest pain, palpitations and leg swelling.   Gastrointestinal:  Negative for abdominal distention, constipation, diarrhea, nausea and vomiting.   Genitourinary:  Negative for difficulty urinating, dysuria, frequency, hematuria and urgency.   Musculoskeletal:  Negative for arthralgias, back pain, gait problem and joint swelling.   Neurological:  Positive for syncope. Negative for dizziness, seizures, weakness, light-headedness, numbness and headaches.   Psychiatric/Behavioral:  Negative for agitation, behavioral problems, confusion, decreased concentration, dysphoric mood and hallucinations.    All other systems reviewed and are negative.    Objective:     Vital Signs (Most Recent):  Temp: 98.4 °F (36.9 °C) (03/31/24 0754)  Pulse: 88 (03/31/24 0906)  Resp: 18 (03/31/24 0906)  BP: 136/74 (03/31/24 0754)  SpO2: 96 % (03/31/24 0906) Vital Signs (24h Range):  Temp:  [98.4 °F (36.9 °C)-99.5 °F (37.5 °C)] 98.4 °F (36.9 °C)  Pulse:  [68-89] 88  Resp:  [16-20] 18  SpO2:  [94 %-96 %] 96 %  BP: (130-150)/(60-74) 136/74     Weight: 105.9 kg (233 lb 6.4 oz)  Body mass index is 38.84 kg/m².    Intake/Output Summary (Last 24 hours) at 3/31/2024 1010  Last data filed at 3/31/2024 0859  Gross per 24 hour   Intake 1813.9 ml   Output 3 ml   Net 1810.9 ml         Physical Exam  Vitals and nursing note reviewed.   Constitutional:       Appearance: Normal appearance. He is obese.   HENT:      Head: Normocephalic and atraumatic.      Nose: Nose normal.      Mouth/Throat:      Mouth: Mucous membranes are moist.   Eyes:      Extraocular Movements: Extraocular movements intact.      Conjunctiva/sclera: Conjunctivae normal.    Cardiovascular:      Rate and Rhythm: Normal rate and regular rhythm.      Pulses: Normal pulses.      Heart sounds: Normal heart sounds.   Pulmonary:      Effort: Pulmonary effort is normal.      Breath sounds: Normal breath sounds.   Abdominal:      General: Abdomen is protuberant. A surgical scar is present. Bowel sounds are normal.      Palpations: Abdomen is soft.      Comments: Abdominal wound with no s/s of infection. No redness or drainage.    Musculoskeletal:      Cervical back: Normal range of motion and neck supple.   Skin:     General: Skin is warm.      Capillary Refill: Capillary refill takes less than 2 seconds.   Neurological:      Mental Status: He is alert and oriented to person, place, and time. Mental status is at baseline.   Psychiatric:         Mood and Affect: Mood normal.         Behavior: Behavior normal.             Significant Labs: All pertinent labs within the past 24 hours have been reviewed.  Recent Lab Results         03/31/24  0629        Anion Gap 8       Baso # 0.02       Basophil % 0.4       BUN 20       Calcium 8.0       Chloride 110       CO2 19       Creatinine 1.6       Differential Method Automated       eGFR 46       Eos # 0.3       Eos % 5.6       Glucose 96       Gran # (ANC) 3.7       Gran % 79.4       Hematocrit 25.5       Hemoglobin 8.2       Immature Grans (Abs) 0.04  Comment: Mild elevation in immature granulocytes is non specific and   can be seen in a variety of conditions including stress response,   acute inflammation, trauma and pregnancy. Correlation with other   laboratory and clinical findings is essential.         Immature Granulocytes 0.9       Lymph # 0.3       Lymph % 6.4       MCH 28.1       MCHC 32.2       MCV 87       Mono # 0.3       Mono % 7.3       MPV 9.8       nRBC 0       Platelet Count 169       Potassium 4.5       RBC 2.92       RDW 13.4       Sodium 137       WBC 4.67               Significant Imaging: I have reviewed all pertinent imaging  results/findings within the past 24 hours.

## 2024-03-31 NOTE — CONSULTS
Consult    Consulting Physician:  Anastacio Jasmine MD  Reason for Consultation: carotid stenosis    CC: Loss of Consciousness (Pt suffered LOC, was found down by wife, crackles to lung bases, hypotensive when fluids stopped, fluids restarted by EMS)      HPI: Quintin Ling is a 73 y.o. male with PMHx as seen below, was admitted on 3/29/2024 after syncopal event. He was outside in his patio and found slouched in his chair by his wife who called EMS. When he woke up he denies any focal neurological deficits. He denies changes in vision or speech. He denies prior episodes of this type of symptom. He does have history of hypertension and takes blood pressure medication, his wife noted his blood pressure normally runs in 140s but last few days was closer to 110. He is recovering from colon resection that was complicated by fascia dehiscence. Denies any symptoms of fevers/chills/abdominal pain and has been tolerating diet well. Ct head negative. Carotid duplex shows peak systolic velocity 249cm/s on the right and 362cm/s on the left.      Past Medical History:   Diagnosis Date    Diabetes mellitus     Hyperlipidemia     Hypertension        Past Surgical History:   Procedure Laterality Date    BIOPSY OF PERITONEUM N/A 1/29/2024    Procedure: BIOPSY, PERITONEUM;  Surgeon: Tosin Boothe MD;  Location: [facility] OR;  Service: Colon and Rectal;  Laterality: N/A;    CLOSURE OF WOUND N/A 3/16/2024    Procedure: CLOSURE, WOUND;  Surgeon: Magda Stark MD;  Location: [facility] OR;  Service: General;  Laterality: N/A;    COLONOSCOPY N/A 1/5/2024    Procedure: COLONOSCOPY;  Surgeon: Irasema Gil MD;  Location: [facility] ENDO;  Service: Endoscopy;  Laterality: N/A;    DIAGNOSTIC LAPAROSCOPY N/A 1/29/2024    Procedure: LAPAROSCOPY, DIAGNOSTIC;  Surgeon: Tosin oBothe MD;  Location: [facility] OR;  Service: Colon and Rectal;  Laterality: N/A;    EPIDURAL STEROID INJECTION N/A 11/13/2023    Procedure: Lumbar L5/S1 IL JOSE;   Surgeon: Oneil Carlson MD;  [facility];  Service: Pain Management;  Laterality: N/A;    ESOPHAGOGASTRODUODENOSCOPY N/A 1/5/2024    Procedure: EGD (ESOPHAGOGASTRODUODENOSCOPY);  Surgeon: Irasema Gil MD;  Location: [facility] ENDO;  Service: Endoscopy;  Laterality: N/A;    INJECTION OF ANESTHETIC AGENT INTO TISSUE PLANE DEFINED BY TRANSVERSUS ABDOMINIS MUSCLE N/A 3/11/2024    Procedure: BLOCK, TRANSVERSUS ABDOMINIS PLANE;  Surgeon: Tosin Boothe MD;  Location: [facility] OR;  Service: Colon and Rectal;  Laterality: N/A;    NO PAST SURGERIES      WOUND EXPLORATION N/A 3/16/2024    Procedure: EXPLORATION, WOUND;  Surgeon: Magda Stark MD;  Location: [facility] OR;  Service: General;  Laterality: N/A;    XI ROBOTIC COLECTOMY, RIGHT Right 3/11/2024    Procedure: XI ROBOTIC COLECTOMY, RIGHT;  Surgeon: Tosin Boothe MD;  Location: [facility] OR;  Service: Colon and Rectal;  Laterality: Right;  WITH OMENECTOMY AND AMNIOFIX       Social History     Socioeconomic History    Marital status:    Tobacco Use    Smoking status: Former     Passive exposure: Never    Smokeless tobacco: Never   Substance and Sexual Activity    Alcohol use: Not Currently     Comment: OCC    Drug use: Never    Sexual activity: Yes     Partners: Female     Social Determinants of Health     Financial Resource Strain: Patient Unable To Answer (3/17/2024)    Overall Financial Resource Strain (CARDIA)     Difficulty of Paying Living Expenses: Patient unable to answer   Food Insecurity: Patient Unable To Answer (3/17/2024)    Hunger Vital Sign     Worried About Running Out of Food in the Last Year: Patient unable to answer     Ran Out of Food in the Last Year: Patient unable to answer   Transportation Needs: Patient Unable To Answer (3/17/2024)    PRAPARE - Transportation     Lack of Transportation (Medical): Patient unable to answer     Lack of Transportation (Non-Medical): Patient unable to answer   Stress: Patient Unable To Answer  (3/17/2024)    [employer] [employer] - Occupational Stress Questionnaire     Feeling of Stress : Patient unable to answer   Housing Stability: Patient Unable To Answer (3/17/2024)    Housing Stability Vital Sign     Unable to Pay for Housing in the Last Year: Patient unable to answer     Unstable Housing in the Last Year: Patient unable to answer       Family History   Problem Relation Age of Onset    No Known Problems Mother     No Known Problems Father          Current Facility-Administered Medications:     0.9%  NaCl infusion, , Intravenous, Continuous, John Mesa MD, Last Rate: 100 mL/hr at 03/31/24 0031, New Bag at 03/31/24 0031    acetaminophen tablet 650 mg, 650 mg, Oral, Q4H PRN, John Mesa MD    apixaban tablet 5 mg, 5 mg, Oral, BID, John Mesa MD, 5 mg at 03/31/24 0822    atorvastatin tablet 20 mg, 20 mg, Oral, Daily, John Mesa MD, 20 mg at 03/31/24 0822    dextrose 10% bolus 125 mL 125 mL, 12.5 g, Intravenous, PRN, John Mesa MD    dextrose 10% bolus 250 mL 250 mL, 25 g, Intravenous, PRN, John Mesa MD    glucagon (human recombinant) injection 1 mg, 1 mg, Intramuscular, PRN, John Mesa MD    glucose chewable tablet 16 g, 16 g, Oral, PRN, John Mesa MD    glucose chewable tablet 24 g, 24 g, Oral, PRN, John Mesa MD    HYDROcodone-acetaminophen 5-325 mg per tablet 1 tablet, 1 tablet, Oral, Q6H PRN, John Mesa MD    insulin aspart U-100 pen 0-10 Units, 0-10 Units, Subcutaneous, QID (AC + HS) PRNMoshe Palvi J, MD    metoprolol succinate (TOPROL-XL) 24 hr tablet 25 mg, 25 mg, Oral, Daily, Cresencio Mesa, NP, 25 mg at 03/31/24 0822    naloxone 0.4 mg/mL injection 0.02 mg, 0.02 mg, Intravenous, PRN, John Mesa MD    sodium chloride 0.9% flush 10 mL, 10 mL, Intravenous, Q12H PRN, John Mesa MD    sulfamethoxazole-trimethoprim 800-160mg per tablet 1 tablet, 1 tablet, Oral, BID, John Mesa MD, 1 tablet at 03/31/24  "0822    Review of patient's allergies indicates:  No Known Allergies    ROS:  10 point review of systems is negative except as mentioned in HPI.    Vitals:    03/31/24 0839   BP:    Pulse: 89   Resp:    Temp:        Objective:  Vital signs: (most recent): Blood pressure 136/74, pulse 89, temperature 98.4 °F (36.9 °C), resp. rate 18, height 5' 5" (1.651 m), weight 105.9 kg (233 lb 6.4 oz), SpO2 96 %.    Abd soft nt nd, dressing in place no drainage noted or soiling of dressing  Ext palpable pedal pulses b/l  Cv rrr no jvd  Lung ctab  Neuro no focal neurological deficits, strength 5/5 b/l upper and lower, NAD alert oriented    LABS:  CKD, gfr improving to his baseline    IMAGING:  I have personally reviewed the imaging.    US Carotid Bilateral   Final Result   Abnormal      Greater than 70% stenosis bilaterally.  Further evaluation as warranted.      This report was flagged in Epic as abnormal.         Electronically signed by: Petey Rosario   Date:    03/29/2024   Time:    21:47      CT Head Without Contrast   Final Result      No acute intracranial CT abnormality.      All CT scans at this facility are performed  using dose modulation techniques as appropriate to performed exam including the following:  automated exposure control; adjustment of mA and/or kV according to the patients size (this includes techniques or standardized protocols for targeted exams where dose is matched to indication/reason for exam: i.e. extremities or head);  iterative reconstruction technique.         Electronically signed by: Petey Rosario   Date:    03/29/2024   Time:    15:31      X-Ray Chest AP Portable   Final Result      No acute process seen.         Electronically signed by: Bosotn Tracey MD   Date:    03/29/2024   Time:    14:43          [facility]      Assessment & Plan  Quintin Ling is a 73 y.o. male with asymptomatic bilateral carotid stenosis, he actually has critical stenosis on the left. I have recommended we move forward with " carotid endarterectomy on left with EEG monitoring in coming weeks. He was scheduled to see Dr. Murray in 2-3 weeks I would like him to be seen earlier for cardiac risk stratification. I have discussed the operation in detail with Mr. Ling and his wife and we went over risks of cranial nerve injury, stroke, bleeding, infection.     Chrissie Parks  3/31/2024  [facility]  Vascular Surgery  [phone] (Clinic Number)    Active Hospital Problems    Diagnosis  POA    *Syncope [R55]  Yes    New onset a-fib [I48.91]  Yes    Colon adenoma [D12.6]  Yes    Essential hypertension [I10]  Yes     Near the target.  Increase Cardura to 4 mg.        Resolved Hospital Problems   No resolved problems to display.

## 2024-04-01 VITALS
BODY MASS INDEX: 36.36 KG/M2 | SYSTOLIC BLOOD PRESSURE: 132 MMHG | WEIGHT: 218.25 LBS | HEIGHT: 65 IN | RESPIRATION RATE: 18 BRPM | DIASTOLIC BLOOD PRESSURE: 78 MMHG | OXYGEN SATURATION: 97 % | HEART RATE: 76 BPM | TEMPERATURE: 98 F

## 2024-04-01 LAB
ABO + RH BLD: NORMAL
ANION GAP SERPL CALC-SCNC: 8 MMOL/L (ref 8–16)
BASOPHILS # BLD AUTO: 0.01 K/UL (ref 0–0.2)
BASOPHILS NFR BLD: 0.2 % (ref 0–1.9)
BLD GP AB SCN CELLS X3 SERPL QL: NORMAL
BLD PROD TYP BPU: NORMAL
BLOOD UNIT EXPIRATION DATE: NORMAL
BLOOD UNIT TYPE CODE: 600
BLOOD UNIT TYPE: NORMAL
BUN SERPL-MCNC: 16 MG/DL (ref 8–23)
CALCIUM SERPL-MCNC: 7.9 MG/DL (ref 8.7–10.5)
CHLORIDE SERPL-SCNC: 110 MMOL/L (ref 95–110)
CO2 SERPL-SCNC: 19 MMOL/L (ref 23–29)
CODING SYSTEM: NORMAL
CREAT SERPL-MCNC: 1.3 MG/DL (ref 0.5–1.4)
CROSSMATCH INTERPRETATION: NORMAL
DIFFERENTIAL METHOD BLD: ABNORMAL
DISPENSE STATUS: NORMAL
EOSINOPHIL # BLD AUTO: 0.3 K/UL (ref 0–0.5)
EOSINOPHIL NFR BLD: 5.9 % (ref 0–8)
ERYTHROCYTE [DISTWIDTH] IN BLOOD BY AUTOMATED COUNT: 13.3 % (ref 11.5–14.5)
EST. GFR  (NO RACE VARIABLE): 59 ML/MIN/1.73 M^2
FERRITIN SERPL-MCNC: 741 NG/ML (ref 20–300)
FOLATE SERPL-MCNC: 9.3 NG/ML (ref 4–24)
GLUCOSE SERPL-MCNC: 102 MG/DL (ref 70–110)
HCT VFR BLD AUTO: 23.6 % (ref 40–54)
HGB BLD-MCNC: 7.8 G/DL (ref 14–18)
IMM GRANULOCYTES # BLD AUTO: 0.04 K/UL (ref 0–0.04)
IMM GRANULOCYTES NFR BLD AUTO: 0.9 % (ref 0–0.5)
IRON SERPL-MCNC: 16 UG/DL (ref 45–160)
LYMPHOCYTES # BLD AUTO: 0.2 K/UL (ref 1–4.8)
LYMPHOCYTES NFR BLD: 5.4 % (ref 18–48)
MCH RBC QN AUTO: 28.2 PG (ref 27–31)
MCHC RBC AUTO-ENTMCNC: 33.1 G/DL (ref 32–36)
MCV RBC AUTO: 85 FL (ref 82–98)
MONOCYTES # BLD AUTO: 0.3 K/UL (ref 0.3–1)
MONOCYTES NFR BLD: 7.5 % (ref 4–15)
NEUTROPHILS # BLD AUTO: 3.5 K/UL (ref 1.8–7.7)
NEUTROPHILS NFR BLD: 80.1 % (ref 38–73)
NRBC BLD-RTO: 0 /100 WBC
NUM UNITS TRANS PACKED RBC: NORMAL
PLATELET # BLD AUTO: 163 K/UL (ref 150–450)
PMV BLD AUTO: 10 FL (ref 9.2–12.9)
POTASSIUM SERPL-SCNC: 4.4 MMOL/L (ref 3.5–5.1)
RBC # BLD AUTO: 2.77 M/UL (ref 4.6–6.2)
SATURATED IRON: 6 % (ref 20–50)
SODIUM SERPL-SCNC: 137 MMOL/L (ref 136–145)
SPECIMEN OUTDATE: NORMAL
TOTAL IRON BINDING CAPACITY: 247 UG/DL (ref 250–450)
TRANSFERRIN SERPL-MCNC: 167 MG/DL (ref 200–375)
VIT B12 SERPL-MCNC: 343 PG/ML (ref 210–950)
WBC # BLD AUTO: 4.42 K/UL (ref 3.9–12.7)

## 2024-04-01 PROCEDURE — 36415 COLL VENOUS BLD VENIPUNCTURE: CPT | Mod: XB | Performed by: FAMILY MEDICINE

## 2024-04-01 PROCEDURE — 25000003 PHARM REV CODE 250: Performed by: NURSE PRACTITIONER

## 2024-04-01 PROCEDURE — 80048 BASIC METABOLIC PNL TOTAL CA: CPT | Performed by: FAMILY MEDICINE

## 2024-04-01 PROCEDURE — 99900035 HC TECH TIME PER 15 MIN (STAT)

## 2024-04-01 PROCEDURE — 97530 THERAPEUTIC ACTIVITIES: CPT | Mod: CQ

## 2024-04-01 PROCEDURE — 82728 ASSAY OF FERRITIN: CPT | Performed by: NURSE PRACTITIONER

## 2024-04-01 PROCEDURE — 83540 ASSAY OF IRON: CPT | Performed by: NURSE PRACTITIONER

## 2024-04-01 PROCEDURE — 85025 COMPLETE CBC W/AUTO DIFF WBC: CPT | Performed by: FAMILY MEDICINE

## 2024-04-01 PROCEDURE — 97116 GAIT TRAINING THERAPY: CPT | Mod: CQ

## 2024-04-01 PROCEDURE — 36430 TRANSFUSION BLD/BLD COMPNT: CPT

## 2024-04-01 PROCEDURE — 36415 COLL VENOUS BLD VENIPUNCTURE: CPT | Mod: XB | Performed by: NURSE PRACTITIONER

## 2024-04-01 PROCEDURE — 86920 COMPATIBILITY TEST SPIN: CPT | Performed by: NURSE PRACTITIONER

## 2024-04-01 PROCEDURE — 30233N1 TRANSFUSION OF NONAUTOLOGOUS RED BLOOD CELLS INTO PERIPHERAL VEIN, PERCUTANEOUS APPROACH: ICD-10-PCS | Performed by: HOSPITALIST

## 2024-04-01 PROCEDURE — P9016 RBC LEUKOCYTES REDUCED: HCPCS | Performed by: NURSE PRACTITIONER

## 2024-04-01 PROCEDURE — 25000003 PHARM REV CODE 250: Performed by: FAMILY MEDICINE

## 2024-04-01 PROCEDURE — 94761 N-INVAS EAR/PLS OXIMETRY MLT: CPT

## 2024-04-01 PROCEDURE — 86901 BLOOD TYPING SEROLOGIC RH(D): CPT | Performed by: NURSE PRACTITIONER

## 2024-04-01 PROCEDURE — 97110 THERAPEUTIC EXERCISES: CPT

## 2024-04-01 PROCEDURE — 97530 THERAPEUTIC ACTIVITIES: CPT

## 2024-04-01 PROCEDURE — 36415 COLL VENOUS BLD VENIPUNCTURE: CPT | Performed by: HOSPITALIST

## 2024-04-01 PROCEDURE — 82607 VITAMIN B-12: CPT | Performed by: NURSE PRACTITIONER

## 2024-04-01 PROCEDURE — 82746 ASSAY OF FOLIC ACID SERUM: CPT | Performed by: NURSE PRACTITIONER

## 2024-04-01 RX ORDER — HYDROCODONE BITARTRATE AND ACETAMINOPHEN 500; 5 MG/1; MG/1
TABLET ORAL
Status: DISCONTINUED | OUTPATIENT
Start: 2024-04-01 | End: 2024-04-01 | Stop reason: HOSPADM

## 2024-04-01 RX ADMIN — SULFAMETHOXAZOLE AND TRIMETHOPRIM 1 TABLET: 800; 160 TABLET ORAL at 08:04

## 2024-04-01 RX ADMIN — ATORVASTATIN CALCIUM 20 MG: 10 TABLET, FILM COATED ORAL at 08:04

## 2024-04-01 RX ADMIN — APIXABAN 5 MG: 2.5 TABLET, FILM COATED ORAL at 08:04

## 2024-04-01 RX ADMIN — METOPROLOL SUCCINATE 25 MG: 25 TABLET, EXTENDED RELEASE ORAL at 08:04

## 2024-04-01 RX ADMIN — SODIUM CHLORIDE: 9 INJECTION, SOLUTION INTRAVENOUS at 01:04

## 2024-04-01 NOTE — CONSULTS
Consulted on Mr. Ling due to open surgical incision. Patient had recent colon surgery per Dr. Boothe, and was seen in her clinic 3/28 due to surrounding cellulitis and underwent bedside I&D. Family was taught to pack wound. Dressing removed.  Measures 3x1.5x4cm, serosanguinous drainage. Cleansed with vashe, then filled with vashe wetted 4x4 gauze and covaderm applied. Mor incisional blisters/MARSI also noted and left MIAN. Recommend change dressing daily and prn, f/u with surgeon if develops worsening, purulent or feculent drainage, odor, mor woun dcellulitis.

## 2024-04-01 NOTE — PT/OT/SLP PROGRESS
"Occupational Therapy  Treatment    Quintin Ling   MRN: [id number]   Admitting Diagnosis: Syncope    OT Date of Treatment: 04/01/24   OT Start Time: 0910  OT Stop Time: 0930  OT Total Time (min): 20 min    Billable Minutes:  Therapeutic Activity 10 and Therapeutic Exercise 10    OT/MIAN: OT     Number of MIAN visits since last OT visit: 0    General Precautions: Standard, fall  Orthopedic Precautions: N/A  Braces: N/A  Respiratory Status: Room air         Subjective:  Communicated with nurse prior to session.  Patient was seated in chair when OT arrived. He was willing to participate in treatment.  Pain/Comfort  Pain Rating 1: 0/10  Pain Rating Post-Intervention 1: 0/10    Objective:  Patient found with: peripheral IV, telemetry     Functional Mobility:  Bed Mobility: not observed this session.  Transfers: CGA for sit/stand.    Balance:   Static Sit: GOOD: Takes MODERATE challenges from all directions  Dynamic Sit: GOOD: Maintains balance through MODERATE excursions of active trunk movement  Static Stand: FAIR+: Takes MINIMAL challenges from all directions  Dynamic stand: FAIR: Needs CONTACT GUARD during gait    Therapeutic Activities and Exercises:  Upper extremity therex: shoulder flexion/extension x 10 x 2.    AM-PAC 6 CLICK ADL   How much help from another person does this patient currently need?   1 = Unable, Total/Dependent Assistance  2 = A lot, Maximum/Moderate Assistance  3 = A little, Minimum/Contact Guard/Supervision  4 = None, Modified [place]/Independent    Putting on and taking off regular lower body clothing? : 3  Bathing (including washing, rinsing, drying)?: 3  Toileting, which includes using toilet, bedpan, or urinal? : 4  Putting on and taking off regular upper body clothing?: 4  Taking care of personal grooming such as brushing teeth?: 4  Eating meals?: 4  Daily Activity Total Score: 22     AM-PAC Raw Score CMS "G-Code Modifier Level of Impairment Assistance   6 % Total / Unable   7 - 8 " CM 80 - 100% Maximal Assist   9-13 CL 60 - 80% Moderate Assist   14 - 19 CK 40 - 60% Moderate Assist   20 - 22 CJ 20 - 40% Minimal Assist   23 CI 1-20% SBA / CGA   24 CH 0% Independent/ Mod I       Patient left up in chair with all lines intact, call button in reach, and chair alarm on    ASSESSMENT:  Quintin Ling is a 73 y.o. male with a medical diagnosis of Syncope and presents with decline in self care skills.    Rehab identified problem list/impairments:  weakness, impaired endurance, impaired functional mobility, impaired cardiopulmonary response to activity    Rehab potential is good.    Activity tolerance: Good    Discharge recommendations: Low Intensity Therapy   Barriers to discharge: Barriers to Discharge: None    Equipment recommendations: none    GOALS:   Multidisciplinary Problems       Occupational Therapy Goals          Problem: Occupational Therapy    Goal Priority Disciplines Outcome Interventions   Occupational Therapy Goal     OT, PT/OT     Description: Goals to be met by: 4/13/24     Patient will increase functional independence with ADLs by performing:    LE Dressing with Minimal Assistance.  Toileting from toilet with Set-up Assistance for hygiene and clothing management.   Toilet transfer to toilet with Modified [place].                         Plan:  Patient to be seen 2 x/week to address the above listed problems via self-care/home management, therapeutic activities, therapeutic exercises, neuromuscular re-education  Plan of Care expires: 04/13/24  Plan of Care reviewed with: patient         04/01/2024

## 2024-04-01 NOTE — PT/OT/SLP PROGRESS
Physical Therapy  Treatment    Quintin Ling   MRN: [id number]   Admitting Diagnosis: Syncope    PT Received On: 04/01/24  PT Start Time: 0905     PT Stop Time: 0930    PT Total Time (min): 25 min       Billable Minutes:  Gait Training 10 and Therapeutic Activity 15    Treatment Type: Treatment  PT/PTA: PTA     Number of PTA visits since last PT visit: 1       General Precautions: Standard, fall  Orthopedic Precautions: N/A  Braces: N/A  Respiratory Status: Room air    Spiritual, Cultural Beliefs, Denominational Practices, Values that Affect Care: no    Subjective:  Communicated with patient's nurse, Magda, and completed Epic chart review prior to session.  Patient agreed to PT session.     Pain/Comfort  Pain Rating 1: 0/10  Pain Rating Post-Intervention 1: 0/10    Objective:   Patient found with: peripheral IV, telemetry    Patient found sitting up in chair.     STS from chair > RW: CGA (VC for hand placement)    200ft w/ RW CGA     Stand pivot T/F to chair w/ RW: CGA (VC for safety w/ RW mgmt and sequencing of task)    Reviewed AROM TE to BLE including: hip flex/ext, knee flex/ext, ankle PF/DF  To be completed a minimum of 10 reps for each LE in order to promote return of function, strength and ROM.     Educated patient on importance of increased tolerance to upright position and direct impact on CV endurance and strength. Patient encouraged to sit up in chair/ EOB, for a minimum of 2 consecutive hours, 3x per day. Encouraged patient to perform AROM TE to BLE throughout the day within all available planes of motion. Re enforced importance of utilizing call light to meet needs in room and not attempt to get up without staff assistance. Patient verbalized understanding and agreed to comply.       AM-PAC 6 CLICK MOBILITY  How much help from another person does this patient currently need?   1 = Unable, Total/Dependent Assistance  2 = A lot, Maximum/Moderate Assistance  3 = A little, Minimum/Contact Guard/Supervision  4  = None, Modified [place]/Independent    Turning over in bed (including adjusting bedclothes, sheets and blankets)?: 1 (NT)  Sitting down on and standing up from a chair with arms (e.g., wheelchair, bedside commode, etc.): 3  Moving from lying on back to sitting on the side of the bed?: 1 (NT)  Moving to and from a bed to a chair (including a wheelchair)?: 3  Need to walk in hospital room?: 3  Climbing 3-5 steps with a railing?: 1 (NT)  Basic Mobility Total Score: 12    AM-PAC Raw Score CMS G-Code Modifier Level of Impairment Assistance   6 % Total / Unable   7 - 9 CM 80 - 100% Maximal Assist   10 - 14 CL 60 - 80% Moderate Assist   15 - 19 CK 40 - 60% Moderate Assist   20 - 22 CJ 20 - 40% Minimal Assist   23 CI 1-20% SBA / CGA   24 CH 0% Independent/ Mod I     Patient left up in chair with call button in reach.    Assessment:  Quintin Ling is a 73 y.o. male with a medical diagnosis of Syncope and presents with overall decline in functional mobility. Patient would continue to benefit from skilled PT to address functional limitations listed below in order to return to PLOF/decrease caregiver burden.     Rehab identified problem list/impairments: weakness, impaired endurance, impaired functional mobility, impaired cardiopulmonary response to activity, gait instability, impaired balance, decreased safety awareness    Rehab potential is good.    Activity tolerance: Fair    Discharge recommendations: Low Intensity Therapy      Barriers to discharge:      Equipment recommendations: walker, rolling     GOALS:   Multidisciplinary Problems       Physical Therapy Goals          Problem: Physical Therapy    Goal Priority Disciplines Outcome Goal Variances Interventions   Physical Therapy Goal     PT, PT/OT      Description: Pt will perform bed mobility independently in order to participate in EOB activity.  Pt will perform transfers ndependently in order to participate in OOB activity.  Pt will ambulate 450 ft  mod I with LRAD in order to participate in daily tasks.                        PLAN:    Patient to be seen 3 x/week to address the above listed problems via gait training, therapeutic activities, therapeutic exercises  Plan of Care expires: 04/13/24  Plan of Care reviewed with: patient         04/01/2024

## 2024-04-01 NOTE — PLAN OF CARE
Good session today though patient was a little withdrawn, possibly discouraged by his recent illnesses.  He completed functional mobility, transfers with CGA, and upper extremity exercise during today's session.    DC with low intensity OT continues to be indicated.

## 2024-04-01 NOTE — DISCHARGE INSTRUCTIONS
Advised to monitor blood pressure at home. Record blood pressure both morning and evening and keep a journal. Bring the journal to your next appointment. Goal blood pressure is 120/70 or less. If you are feeling dizzy, take your blood pressure again and record it and note if you are feeling dizzy.

## 2024-04-02 NOTE — ASSESSMENT & PLAN NOTE
Patient with acute kidney injury/acute renal failure likely due to pre-renal azotemia due to IVVD BRANDY is currently improving. Baseline creatinine  1.4-1.6  - Labs reviewed- Renal function/electrolytes with Estimated Creatinine Clearance: 56.4 mL/min (based on SCr of 1.3 mg/dL). according to latest data. Monitor urine output and serial BMP and adjust therapy as needed. Avoid nephrotoxins and renally dose meds for GFR listed above.  Likely due to Bactrim use  Decrease IVF flow rate    --4/1- resolved

## 2024-04-02 NOTE — DISCHARGE SUMMARY
[facility] ([facility]  [facility] Medicine  Discharge Summary      Patient Name: Quintin Ling  MRN: [id number]  DOROTHY: [id number]  Patient Class: IP- Inpatient  Admission Date: 3/29/2024  Hospital Length of Stay: 2 days  Discharge Date and Time: 4/1/2024  5:45 PM  Attending Physician: No att. providers found   Discharging Provider: Brianna Reaves NP  Primary Care Provider: Bishop Gabriel MD    Primary Care Team: Networked reference to record PCT     HPI:   Mr. Ling is a 73-year-old male with past medical history of diabetes, hyperlipidemia, hypertension, colon mass removal on 03/01 followed by an ileus which was then followed by a fascia tear presented after being found unresponsive on his back porch.  Per wife at bedside states that patient had surgery with Dr. Boothe for a colon mass on 03/01 and had some complications postprocedure.  His latest complication was some cellulitis around the surgical site for which she was started on Bactrim yesterday.  Patient is noted to be on several different blood pressures and upon arrival noted to be hypotensive.  Does not have any leukocytosis or fevers and was feeling in normal health prior to the episode.  Patient does not recall having a syncopal episode he states that the last thing he remembers is sitting out on the porch.  Patient denies any headache chest pain shortness on breath nausea or vomiting.  His lower abdominal incision appears erythematous but no noted active discharge.  Patient did have some dried discharge on his bandages.  CT head showed no acute intracranial CT abnormality.  Hospital medicine will admit for observation, syncope workup, wound care for his abdominal cellulitis and continued p.o. antibiotics.    * No surgery found *      Hospital Course:   74 y/o male admitted for syncope. CT head showed no acute intracranial CT abnormality. Echo showed normal EF. Bilateral carotid US showed greater than 70% stenosis bilaterally. Vascular  surgery consulted. Abdomen surgical site with no s/s of infection. Continue oral antibiotics. Low grade fever noted this morning.  Encouraged deep breathing and coughing. IS ordered.  3/31/24: NAEON. BP now in 130-150's and HR well above 60. Will resume Metop 25mg daily only for now -he is on 25mg BID 24 hr tab. Seen by vascular today s/t carotid US findings and plan for Carotid Artery Endarectomy with EEG monitoring in the very near future, needs cardiac risk stratification sooner. For now plan to optimize BP. Continue Bactrim EOC 4/5/24. BRANDY resolved and likely due to Bactrim, baseline Cr 1.4-1.6.   4/1: BRANDY resolved. Hgb: 7.8, due to recent syncopal event transfused 1U PRBC. Vitals stable. Planned for carotid endarterectomy per Vascular Surgery as OP. Surgeon staff to arrange appointments. Discussed with patient and spouse, in agreement with plan. Dr. Parks evaluated patient prior to discharge, in agreement with plan.          Goals of Care Treatment Preferences:  Code Status: Full Code      Consults:   Consults (From admission, onward)          Status Ordering Provider     Inpatient consult to Cardiology  Once        Provider:  Víctor Beltran MD    Completed DANA KHOURY     Inpatient consult to Vascular Surgery  Once        Provider:  Chrissie Parks MD    Completed SOPHIA BROWNING            Cardiac/Vascular  Stenosis of left carotid artery  Planned for Carotid Endarterectomy as OP per Vascular Surgery      Essential hypertension  3/31:  BP now in 130-150's and stable  HR >60- resume Metop 25mg now  Patient is on 25mg BID 24hr at home --which is unusual, hence will resume BP meds slowly    --Discharge with HTN regimen as tolerated during admission.    Renal/  BRANDY (acute kidney injury)  Patient with acute kidney injury/acute renal failure likely due to pre-renal azotemia due to IVVD BRANDY is currently improving. Baseline creatinine  1.4-1.6  - Labs reviewed- Renal function/electrolytes with  Estimated Creatinine Clearance: 56.4 mL/min (based on SCr of 1.3 mg/dL). according to latest data. Monitor urine output and serial BMP and adjust therapy as needed. Avoid nephrotoxins and renally dose meds for GFR listed above.  Likely due to Bactrim use  Decrease IVF flow rate    --4/1- resolved    Other  * Syncope  -likely secondary to hypotension   -we will hold hypertension medications at this time.  He is noted to be on several medication likely contributing to his hypotension.  -echocardiogram and carotid Dopplers    -CT head showed no acute intracranial CT abnormality.   -Echo showed normal EF.   -Bilateral carotid US showed greater than 70% stenosis bilaterally.   -Vascular surgery consulted.     3/31:  -Bilateral carotid US showed greater than 70% stenosis bilaterally  Vascular recommends moving forward with left carotid artery endarterectomy with EEG in the very near future  Needs cardiac risk stratification sooner- Cards consult placed - completed    Follow-up with Vascular Surgery- Dr. Parks as OP to scheduled procedure        Final Active Diagnoses:    Diagnosis Date Noted POA    PRINCIPAL PROBLEM:  Syncope [R55] 03/29/2024 Yes    BRANDY (acute kidney injury) [N17.9] 03/31/2024 Yes    Stenosis of left carotid artery [I65.22] 03/31/2024 Yes    New onset a-fib [I48.91] 03/19/2024 Yes    Colon adenoma [D12.6] 01/29/2024 Yes    Essential hypertension [I10] 11/18/2020 Yes      Problems Resolved During this Admission:       Discharged Condition: stable    Disposition: Home or Self Care    Follow Up:   Follow-up Information       Bishop Gabriel MD Follow up in 3 day(s).    Specialty: Family Medicine  Contact information:  [address] [facility] [address] [phone]               Chrissie Parks MD Follow up.    Specialty: Vascular Surgery  Why: Surgeon office staff to schedule follow-up  Contact information:  [address]  [place] [address] [phone]                            Patient Instructions:      ACCEPT - Ambulatory referral/consult to Cardiac Electrophysiology   Standing Status: Future   Referral Priority: Routine Referral Type: Consultation   Referral Reason: Specialty Services Required   Requested Specialty: Cardiology   Number of Visits Requested: 1     Ambulatory referral/consult to Home Health   Standing Status: Future   Referral Priority: Routine Referral Type: Home Health Care   Referral Reason: Specialty Services Required   Requested Specialty: Home Health Services   Number of Visits Requested: 1     Diet Cardiac     Notify your health care provider if you experience any of the following:  temperature >100.4     Notify your health care provider if you experience any of the following:  persistent nausea and vomiting or diarrhea     Notify your health care provider if you experience any of the following:  severe uncontrolled pain     Notify your health care provider if you experience any of the following:  severe persistent headache     No dressing needed     Activity as tolerated       Significant Diagnostic Studies: Labs: CMP   Recent Labs   Lab 04/01/24  0442      K 4.4      CO2 19*      BUN 16   CREATININE 1.3   CALCIUM 7.9*   ANIONGAP 8    and CBC   Recent Labs   Lab 04/01/24  0442   WBC 4.42   HGB 7.8*   HCT 23.6*          Pending Diagnostic Studies:       None           Medications:  Reconciled Home Medications:      Medication List        CONTINUE taking these medications      atorvastatin 20 MG tablet  Commonly known as: LIPITOR  TAKE 1 TABLET BY MOUTH EVERY DAY     diltiaZEM 240 MG 24 hr capsule  Commonly known as: CARDIZEM CD  Take 1 capsule (240 mg total) by mouth once daily.     ELIQUIS 5 mg Tab  Generic drug: apixaban  Take 1 tablet (5 mg total) by mouth 2 (two) times daily.     fenofibrate 54 MG tablet  Commonly known as: TRICOR  Take 1 tablet (54 mg total) by mouth once daily.     furosemide 40 MG tablet  Commonly known as:  LASIX  Take 1 tablet (40 mg total) by mouth daily as needed (leg swelling).     metFORMIN 500 MG tablet  Commonly known as: GLUCOPHAGE  TAKE 1 TABLET BY MOUTH TWICE A DAY WITH FOOD     metoprolol succinate 25 MG 24 hr tablet  Commonly known as: TOPROL-XL  Take 1 tablet (25 mg total) by mouth 2 (two) times daily.     polyethylene glycol 17 gram/dose powder  Commonly known as: GLYCOLAX  Take 238 g by mouth once daily. Mix with 2L of gatorade and drink all of mixed solution starting at 12pm the day before surgery, finishing by 10pm the night before surgery.     pregabalin 75 MG capsule  Commonly known as: LYRICA  Take 1 capsule (75 mg total) by mouth 2 (two) times daily.     sulfamethoxazole-trimethoprim 800-160mg 800-160 mg Tab  Commonly known as: BACTRIM DS  Take 1 tablet by mouth 2 (two) times daily. for 7 days            STOP taking these medications      doxazosin 4 MG tablet  Commonly known as: CARDURA     hydroCHLOROthiazide 25 MG tablet  Commonly known as: HYDRODIURIL     olmesartan 40 MG tablet  Commonly known as: BENICAR              Indwelling Lines/Drains at time of discharge:   Lines/Drains/Airways       None                   Time spent on the discharge of patient: 45 minutes         Brianna Reaves NP  Department of Hospital Medicine  [facility] ([facility])

## 2024-04-02 NOTE — PROGRESS NOTES
"Interventional Pain Progress Note     Referring Physician: No ref. provider found    PCP: Bishop Gabriel MD    Chief Complaint:     Chief Complaint   Patient presents with    Low-back Pain          SUBJECTIVE:  Interval History (4/12/2024):  Patient Quintin Ling presents today for follow-up visit.  Patient today for follow-up of lower back pain.  He rates his pain today a 2/10.  He feels that he got relief for about 3 months following his lumbar JOSE however he is undergoing a few medical issues at this time and would like to prolonged an injection right now.  He is scheduled for a carotid endarterectomy in the near future.  He is currently taking Lyrica for his symptoms.  His Zanaflex was DC'd by his GI doctor.  Patient denies night fever/night sweats, urinary incontinence, bowel incontinence, significant weight loss and significant motor weakness.   Patient denies any other complaints or concerns at this time.      Interval History (12/28/2023):  Quintin Ling presents today for follow-up visit.  he underwent Lumbar L5/S1 IL JOSE on 3/29/24.  The patient reports that he is/was better following the procedure.  he reports 75 % pain relief.  The changes have continued through this visit.  Patient reports pain as "0/10 today. Patient currently c/o pain only on the L side whenever he walks. States it is more of a pulling sensation on the L side of his lower back. He denies leg pain. Also denies pain while sitting.     Interval History (04/12/2024):  Patient returns to clinic for evaluation of lower back pain.  Patient reports continued lower back pain that starts in a band across his lower back.  Patient reports that as we will occasionally radiates to his bilateral posterior thighs.  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  Pain is currently rated a 5/10, but can increase to a 8/10 with exacerbating activity. Denies any fevers, chills, changes in " gait, saddle anesthesia, or bowel and bladder incontinence      Interval History (8/29/2023):  Quintin Ling presents today for follow-up visit for MRI review.  Patient was last seen on 8/4/2023. Taking Lyrica 50 mg BID with no side effects, minimal improvement. Patient reports pain as 8/10 today. Continues to report low back pain with radiation into his left lower extremity. Pain is worsened with prolonged standing and walking. Reports pain and weakness is affecting his gait, he often walks with a limp.  Initial HPI 08/04/2023  Quintin Ling is a 73 y.o. male who presents to the clinic for the evaluation of left lower extremity pain.   Patient reports three-month history of left lower extremity pain.  He denies any inciting events or traumas.  Patient reports 1 year ago he experienced bilateral calf pain that is resolved with B12 supplementation.  He also reports as a child he was placed in traction for 2 weeks on his left lower extremity and to wear a brace for 3 years secondary to hip dysplasia.  Patient denies any recent infections.  Patient denies any previous surgical intervention on his lumbar spine.  Pain is described as a pulling burning pain diffusely over his left calf.  He denies any significant radiation to his foot or up into his left lower back but does report a long history of lower back pain.  Patient denies any significant change in sensation to left lower extremity.  Pain is worse with prolonged sitting or walking, better with lying supine.  Patient does report swelling of his left lower extremity with prolonged walking.  Patient denies any significant history of rheumatoid disease in his family.  Patient reports emergency department on 06/28/2023 for exacerbation of left lower extremity pain.  Denies any fevers, chills, saddle anesthesia, or bowel and bladder incontinence        Non-Pharmacologic Treatments:  Physical Therapy/Home Exercise: yes  Ice/Heat:yes  TENS: no  Acupuncture:  no  Massage: yes  Chiropractic: no        Previous Pain Medications:  NSAIDs, Tylenol, gabapentin, tramadol     report:  Reviewed and consistent with medication use as prescribed.    Pain Procedures:   -11/13/2023: Lumbar L5/S1 IL JOSE with 75% relief    Pain Disability Index Review:         4/12/2024    10:58 AM 12/28/2023     3:23 PM 10/27/2023     8:42 AM   Last 3 PDI Scores   Pain Disability Index (PDI) 14 6 35       Imaging (Reviewed on 4/12/2024):    MRI Lumbar spine 08/22/2023  FINDINGS:  Grade 1 degenerative spondylolisthesis at L4-L5.  Vertebral body height is normal.  Marrow signal is within normal limits. The conus medullaris terminates at the level of T12-L1.  No abnormal signal within the conus. Intervertebral disc levels are as follows:     T12-L1 disc: Disc space height loss with anterior bridging osteophytes and fatty Modic change.  Normal facet joints.  No spinal or foraminal stenosis.     L1-L2 disc : Disc space height loss with a broad-based posterior disc bulge and posterior annular fissure.  Normal facet joints.  The dural canal measures 7 mm.  No foraminal stenosis.     L2-L3 disc: Normal disc space height with anterior osteophytes.  Fatty and edematous Modic change.  Normal facet joints.  Dural canal measures 12 mm.  No foraminal stenosis.     L3-L4 disc: Disc space height is relatively normal with anterolateral right-sided osteophytes.  Normal facet joints.  No spinal or foraminal stenosis.     L4-L5 disc: Grade 1 spondylolisthesis with markedly severe degenerative facet hypertrophy bilaterally.  Unroofing of disc material with a disc extrusion that extends cranially.  The disc extrusion measures 23 mm craniocaudal by 20 mm transverse by 9 mm AP.  There is severe spinal stenosis and severe subarticular recess stenosis.  The dural canal measures 6 mm but is roughly 30% of normal area with redundancy of nerve roots adjacent to this interspace.  There is also a synovial cyst projecting  inferiorly from the right facet joint that may compress the descending right L5 spinal nerve root sleeve best demonstrated on axial T2 series 6, image 25.  This synovial cyst measures 11 mm.  Moderate foraminal stenosis bilaterally.     L5-S1 disc: Severe disc space height loss most pronounced toward the left.  Fatty Modic endplate change.  Disc and osteophyte encroach into the floor of the left exit foramen.  There is also a left paracentral disc protrusion causing mild left subarticular recess stenosis without eli nerve compression.  The dural canal measures 10 mm.  Moderate to severe left foraminal stenosis.     Impression:     1. Severe spinal stenosis at L4-L5 as described above which may account for neurogenic claudication and radiculopathy of the descending nerve roots.  2. Moderate foraminal stenosis bilaterally at L4-L5 and moderate/severe left foraminal stenosis at L5-S1.  This could also precipitate radiculopathy.    EMG/NCS 08/24/2023  IMPRESSION  ABNORMAL study  2.  There is electrodiagnostic evidence of an acute on chronic radiculopathy of the left L5 nerve root, a subacute on chronic radiculopathy of the right L5 nerve root, and a chronic radiculopathy of the S1 and probable L4 nerve roots      Results for orders placed during the hospital encounter of 06/21/23    X-Ray Lumbar Spine AP And Lateral    Narrative  EXAMINATION:  XR LUMBAR SPINE AP AND LATERAL    CLINICAL HISTORY:  Pain in left leg    TECHNIQUE:  AP, lateral and spot images were performed of the lumbar spine.    COMPARISON:  02/01/2022    FINDINGS:  Five lumbar type vertebral bodies.  The lordotic curvature is normal.  Grade 1 anterolisthesis of L5 on S1. no evidence for compression deformity, fracture, or subluxation.  The vertebral heights are maintained without significant intervertebral disc space narrowing.  Progressive degenerative changes of facets are identified.    The bilateral SI joints are normal.    Impression  Spondylotic  changes lumbar spine without evidence for fracture.      Results for orders placed during the hospital encounter of 06/21/23    X-Ray Hip 2 or 3 views Left (with Pelvis when performed)    Narrative  EXAMINATION:  XR HIP WITH PELVIS WHEN PERFORMED, 2 OR 3 VIEWS LEFT    CLINICAL HISTORY:  Pain in left leg    TECHNIQUE:  AP view of the pelvis and frog leg lateral view of the left hip were performed.    COMPARISON:  02/01/2022    FINDINGS:  No fracture the pelvis or proximal femora.  The femoral heads are well seated in the acetabulum without significant joint space narrowing.  Osteopenia is identified.    Degenerative changes of bilateral SI joints.  The pubic symphysis is normal.    Impression  No fracture the pelvis or proximal femora.  Degenerative changes are noted.      XR TIBIA FIBULA 2 VIEW LEFT 6/21/23     CLINICAL HISTORY:  Pain in left leg     TECHNIQUE:  AP and lateral views of the left tibia and fibula were performed.     COMPARISON:  None.     FINDINGS:  No fracture.  The alignment is normal.  Joint space narrowing is identified of the knee as well as of the ankle.  No significant soft tissue swelling.  Calcifications are identified of the vasculature.     Impression:     No fracture of the tibia or fibula.      US LOWER EXTREMITY VEINS BILATERAL 06/21/23     CLINICAL HISTORY:  Pain in left leg     TECHNIQUE:  Duplex and color flow Doppler and dynamic compression was performed of the bilateral lower extremity veins was performed.     COMPARISON:  None     FINDINGS:  Right thigh veins: The common femoral, femoral, popliteal, upper greater saphenous, and deep femoral veins are patent and free of thrombus. The veins are normally compressible and have normal phasic flow and augmentation response.     Right calf veins: The visualized calf veins are patent.     Left thigh veins: The common femoral, femoral, popliteal, upper greater saphenous, and deep femoral veins are patent and free of thrombus. The veins are  normally compressible and have normal phasic flow and augmentation response.     Left calf veins: The visualized calf veins are patent.     Miscellaneous: None     Impression:     No evidence of deep venous thrombosis in either lower extremity.    Past Medical History:   Diagnosis Date    Diabetes mellitus     Hyperlipidemia     Hypertension      Past Surgical History:   Procedure Laterality Date    BIOPSY OF PERITONEUM N/A 1/29/2024    Procedure: BIOPSY, PERITONEUM;  Surgeon: Tosin Boothe MD;  Location: [facility] OR;  Service: Colon and Rectal;  Laterality: N/A;    CLOSURE OF WOUND N/A 3/16/2024    Procedure: CLOSURE, WOUND;  Surgeon: Magda Stark MD;  Location: [facility] OR;  Service: General;  Laterality: N/A;    COLONOSCOPY N/A 1/5/2024    Procedure: COLONOSCOPY;  Surgeon: Irasema Gil MD;  Location: [facility] ENDO;  Service: Endoscopy;  Laterality: N/A;    DIAGNOSTIC LAPAROSCOPY N/A 1/29/2024    Procedure: LAPAROSCOPY, DIAGNOSTIC;  Surgeon: Tosin Boothe MD;  Location: [facility] OR;  Service: Colon and Rectal;  Laterality: N/A;    EPIDURAL STEROID INJECTION N/A 11/13/2023    Procedure: Lumbar L5/S1 IL JOSE;  Surgeon: Oneil Carlson MD;  Location: [facility] PAIN MGT;  Service: Pain Management;  Laterality: N/A;    ESOPHAGOGASTRODUODENOSCOPY N/A 1/5/2024    Procedure: EGD (ESOPHAGOGASTRODUODENOSCOPY);  Surgeon: Irasema Gil MD;  Location: [facility] ENDO;  Service: Endoscopy;  Laterality: N/A;    INJECTION OF ANESTHETIC AGENT INTO TISSUE PLANE DEFINED BY TRANSVERSUS ABDOMINIS MUSCLE N/A 3/11/2024    Procedure: BLOCK, TRANSVERSUS ABDOMINIS PLANE;  Surgeon: Tosin Boothe MD;  Location: [facility] OR;  Service: Colon and Rectal;  Laterality: N/A;    NO PAST SURGERIES      WOUND EXPLORATION N/A 3/16/2024    Procedure: EXPLORATION, WOUND;  Surgeon: Magda Stark MD;  Location: [facility] OR;  Service: General;  Laterality: N/A;    XI ROBOTIC COLECTOMY, RIGHT Right 3/11/2024    Procedure: XI ROBOTIC COLECTOMY, RIGHT;   Surgeon: Tosin Boothe MD;  Location: [facility];  Service: Colon and Rectal;  Laterality: Right;  WITH OMENECTOMY AND AMNIOFIX     Social History     Socioeconomic History    Marital status:    Tobacco Use    Smoking status: Former     Passive exposure: Never    Smokeless tobacco: Never   Substance and Sexual Activity    Alcohol use: Not Currently     Comment: OCC    Drug use: Never    Sexual activity: Yes     Partners: Female     Social Determinants of Health     Financial Resource Strain: Patient Unable To Answer (3/17/2024)    Overall Financial Resource Strain (CARDIA)     Difficulty of Paying Living Expenses: Patient unable to answer   Food Insecurity: Patient Unable To Answer (3/17/2024)    Hunger Vital Sign     Worried About Running Out of Food in the Last Year: Patient unable to answer     Ran Out of Food in the Last Year: Patient unable to answer   Transportation Needs: Patient Unable To Answer (3/17/2024)    PRAPARE - Transportation     Lack of Transportation (Medical): Patient unable to answer     Lack of Transportation (Non-Medical): Patient unable to answer   Stress: Patient Unable To Answer (3/17/2024)    [employer] - Occupational Stress Questionnaire     Feeling of Stress : Patient unable to answer   Housing Stability: Patient Unable To Answer (3/17/2024)    Housing Stability Vital Sign     Unable to Pay for Housing in the Last Year: Patient unable to answer     Unstable Housing in the Last Year: Patient unable to answer     Family History   Problem Relation Age of Onset    No Known Problems Mother     No Known Problems Father        Review of patient's allergies indicates:  No Known Allergies    Current Outpatient Medications   Medication Sig    apixaban (ELIQUIS) 5 mg Tab Take 1 tablet (5 mg total) by mouth 2 (two) times daily.    atorvastatin (LIPITOR) 20 MG tablet TAKE 1 TABLET BY MOUTH EVERY DAY    fenofibrate (TRICOR) 54 MG tablet Take 1 tablet (54 mg  "total) by mouth once daily.    furosemide (LASIX) 40 MG tablet Take 1 tablet (40 mg total) by mouth daily as needed (leg swelling).    metFORMIN (GLUCOPHAGE) 500 MG tablet TAKE 1 TABLET BY MOUTH TWICE A DAY WITH FOOD    metoprolol succinate (TOPROL-XL) 25 MG 24 hr tablet Take 1 tablet (25 mg total) by mouth 2 (two) times daily.    pregabalin (LYRICA) 75 MG capsule Take 1 capsule (75 mg total) by mouth 2 (two) times daily.    diltiaZEM (CARDIZEM CD) 240 MG 24 hr capsule Take 1 capsule (240 mg total) by mouth once daily. (Patient not taking: Reported on 4/12/2024)     No current facility-administered medications for this visit.         ROS  Review of Systems   Constitutional:  Negative for activity change, appetite change and fever.   Respiratory:  Negative for cough, chest tightness, shortness of breath, wheezing and stridor.    Cardiovascular:  Negative for chest pain and palpitations.   Gastrointestinal:  Negative for abdominal pain, blood in stool, constipation, diarrhea, nausea and vomiting.   Endocrine: Negative for polydipsia, polyphagia and polyuria.   Genitourinary:  Negative for dysuria and hematuria.   Musculoskeletal:  Positive for arthralgias, back pain, gait problem and myalgias. Negative for joint swelling, neck pain and neck stiffness.   Skin:  Negative for rash.   Allergic/Immunologic: Negative for food allergies.   Neurological:  Negative for dizziness, tremors, seizures, syncope, facial asymmetry, speech difficulty, light-headedness and headaches.   Psychiatric/Behavioral:  Negative for agitation, hallucinations, self-injury and suicidal ideas. The patient is not nervous/anxious and is not hyperactive.             OBJECTIVE:  /69   Pulse 64   Ht 5' 5" (1.651 m)   Wt 93.4 kg (205 lb 14.6 oz)   BMI 34.27 kg/m²         Physical Exam  Constitutional:       General: He is not in acute distress.     Appearance: Normal appearance. He is not ill-appearing.   HENT:      Head: Normocephalic and " atraumatic.      Nose: No congestion or rhinorrhea.   Eyes:      Extraocular Movements: Extraocular movements intact.      Pupils: Pupils are equal, round, and reactive to light.   Cardiovascular:      Pulses: Normal pulses.   Pulmonary:      Effort: Pulmonary effort is normal.   Abdominal:      General: Abdomen is flat.      Palpations: Abdomen is soft.   Skin:     General: Skin is warm and dry.      Capillary Refill: Capillary refill takes less than 2 seconds.   Neurological:      General: No focal deficit present.      Mental Status: He is alert and oriented to person, place, and time.      Sensory: Sensory deficit present.      Motor: Weakness present. No abnormal muscle tone.      Gait: Gait abnormal.      Deep Tendon Reflexes:      Reflex Scores:       Patellar reflexes are 2+ on the right side and 2+ on the left side.       Achilles reflexes are 2+ on the right side and 1+ on the left side.     Comments: Decreased light touch sensation over lateral aspect left calf  4/5 strength in left dorsiflexion   Psychiatric:         Mood and Affect: Mood normal.         Behavior: Behavior normal.         Thought Content: Thought content normal.           Musculoskeletal:      Lumbar Exam  Incision: no  Pain with Flexion: yes  Pain with Extension: yes  ROM:  Decreased  Paraspinous TTP:  Positive L side only  Facet Loading:  Positive left side  SLR:  Negative bilaterally  SIJ TTP:  Negative bilaterally      LABS:  Lab Results   Component Value Date    WBC 4.42 04/01/2024    HGB 7.8 (L) 04/01/2024    HCT 23.6 (L) 04/01/2024    MCV 85 04/01/2024     04/01/2024       ASSESSMENT:       73 y.o. year old male with left lower extremity pain, consistent with     1. DDD (degenerative disc disease), lumbar        2. Lumbar spondylosis                PLAN:   - Interventions: None at this time. Can repeat JOSE in future  Consider L4-5 and L5-S1 medial branch blocks if the patient has continued axial low back pain.    - S/p   L5-S1 interlaminar epidural steroid injection with 75% relief    - Anticoagulation use:   No no anticoagulation    - Medications:   - DC zanaflex   - Continue Cathy 75 mg BID. We discussed potential side effects of this medication which may include drowsiness,dizziness, dry mouth, constipation or peripheral edema.       - Therapy:    Patient has completed  formal physical therapy in the past with no pain relief.      - Imaging/Diagnostic:   X-ray of lumbar spine reveals grade 1 anterolisthesis of L5 upon S1 with loss of disc height at L5-S1 and L4-5.  Degenerative changes of the facets noted at L4-5 and L5-S1.   MRI of lumbar spine reviewed and findings discussed with patient.  Significant for grade 1 anterolisthesis of L4 upon L5.  There is noted disc extrusion at L4-5 resulting in severe canal stenosis and severe bilateral recess stenosis.  Dural canal measures 6 mm.  Associated right-greater-than-left foraminal stenosis with extrusion.  There is noted loss of height at L5-S1 with left paracentral disc protrusion causing Moderate to severe left foraminal stenosis.   EMG and nerve conduction study of bilateral lower extremities reveals acute on chronic bilateral L5 and S1 radiculopathy    - Consults:   Can consider referral to Neurosurgery if lower back pain continues for evaluation of disc extrusion at L4-5    - Miscellaneous:  Pt has bene provided temporary handicap tag      - Patient Questions: Answered all of the patient's questions regarding diagnosis, therapy, and treatment     This condition does not require this patient to take time off of work, and the primary goal of our Pain Management services is to improve the patient's functional capacity.     - Follow up visit:  He will contact us to schedule follow up in the next few weeks after carotid enterectomy and Gi follow ups.  Patient aware to reach out if symptoms worsen and wanting to proceed with MBB or Nsgy referral.      The above plan and management  options were discussed at length with patient. Patient is in agreement with the above and verbalized understanding.    I discussed the goals of interventional chronic pain management with the patient on today's visit.  I explained the utility of injections for diagnostic and therapeutic purposes.  We discussed a multimodal approach to pain including treating the patient's given worst pain at any given time.  We will use a systematic approach to addressing pain.  We will also adopt a multimodal approach that includes injections, adjuvant medications, physical therapy, at times psychiatry.  There may be a limited role for opioid use intermittently in the treatment of pain, more particularly for acute pain although no one approach can be used as a sole treatment modality.    I emphasized the importance of regular exercise, core strengthening and stretching, diet and weight loss as a cornerstone of long-term pain management.      Becky Clark NP  Interventional Pain Management  [facility]    Disclaimer:  This note was prepared using voice recognition system and is likely to have sound alike errors that may have been overlooked even after proof reading.  Please call me with any questions

## 2024-04-02 NOTE — ASSESSMENT & PLAN NOTE
-likely secondary to hypotension   -we will hold hypertension medications at this time.  He is noted to be on several medication likely contributing to his hypotension.  -echocardiogram and carotid Dopplers    -CT head showed no acute intracranial CT abnormality.   -Echo showed normal EF.   -Bilateral carotid US showed greater than 70% stenosis bilaterally.   -Vascular surgery consulted.     3/31:  -Bilateral carotid US showed greater than 70% stenosis bilaterally  Vascular recommends moving forward with left carotid artery endarterectomy with EEG in the very near future  Needs cardiac risk stratification sooner- Cards consult placed - completed    Follow-up with Vascular Surgery- Dr. Parks as OP to scheduled procedure

## 2024-04-02 NOTE — ASSESSMENT & PLAN NOTE
3/31:  BP now in 130-150's and stable  HR >60- resume Metop 25mg now  Patient is on 25mg BID 24hr at home --which is unusual, hence will resume BP meds slowly    --Discharge with HTN regimen as tolerated during admission.

## 2024-04-03 LAB
BACTERIA BLD CULT: NORMAL
BACTERIA BLD CULT: NORMAL

## 2024-04-03 NOTE — PROGRESS NOTES
Subjective:       Patient ID: Quintin Ling is a 73 y.o. male.    Chief Complaint: Hospital Follow Up  Transitional Care Note    Family and/or Caretaker present at visit?  Yes.  Diagnostic tests reviewed/disposition: I have reviewed all completed as well as pending diagnostic tests at the time of discharge.  Disease/illness education: a fib  Home health/community services discussion/referrals: Patient does not have home health established from hospital visit.  They do not need home health.  If needed, we will set up home health for the patient.   Establishment or re-establishment of referral orders for community resources: No other necessary community resources.   Discussion with other health care providers: No discussion with other health care providers necessary.        Hospital Course:   Patient is a 73-year-old male who underwent robotic right hemicolectomy and omentectomy on 03/11/2024.  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  Patient returned to the hospital on postop day 3 complaining of acid reflux, vomiting, and drainage from his wound.  KUB was obtained concerning for possible small bowel obstruction.  To nausea vomiting he presented emergency department he was noted to have mild BRANDY.  CT scan showed dilated loops of small bowel consistent with ileus but also dehiscence of wound.  NG tube was placed with 800 cc of output.  He was taken back to OR for exploration and closure fascia.  Patient continues to have NG tube.  He has moderate amount of output and has not had return of bowel function yet.  General surgery following     BRANDY likely secondary to large GI fluid losses we will increase IV fluid repletion and check bladder scan for incomplete emptying.  Creatinine up to 2.2.     Electrolyte disorders including hyponatremia, hypokalemia.  These has been replaced.     Hypertension-treated with amlodipine 5 mg, hydrochlorothiazide 25 mg, metoprolol  100 mg b.i.d. has been held in the setting of low blood pressure.  We will restart low-dose metoprolol.     BRANDY/CKD we will increase patient's volume repletion recheck in a.m.     3/18- looks and feels better, no pain, has been walking around in the hallways. No BM yet. IVF reduced to 100 cc/hr. K 4 now, Bun/Cr down to 39/1.4. Phos has dropped to 2.2. will check gain in am and start oral replacements.      3/19- Did better overnight and had a couple of BMs last evening and felt better but developed Afib RVR @ 151 this am. No previous hx of Afib or any arrhythmias. Cards consulted and started on Heparin gtt plus Cardizem gtt and given Lopressor 5 IVP twice with transient improvement in HR- hence Cardizem gtt increased to 15 mg/ hr- HR down to 133 now. PO4 also low at 1.7- given KPhos 20 mmol IV. Will increase Metoprolol to 25 bid. Otherwise doing better, walking around well.      3/20- looks and feels much better, comfortable, converted to NSR last evening and doing better. No palpitations or dizziness or weakness. Eating drinking well, walking around well. Bowels also moving well. Electrolytes also improving. Cardiology switched him from Cardizem gtt to PO Cardizem  and decreased his Lopressor from 100 to 25 po daily and we d/dom his Norvasc 5. Eliquis also added and heparin gtt d/dom for his P Afib. Cards and Surgery both have cleared him for discharge and he is eager to go home. He was seen and examined and deemed stable for discharge home today.        Post discharge pt and wife reports appetite is increasing. Daily BM's, soft.  Reports ambulating in home.  Denies palpitations or SOBWife notes erythema to surgical incision.  Contacted surgeon's office on yesterday, bactrim sent, has not yet began.     .SCANS          Review of Systems   Constitutional:  Negative for activity change, appetite change and unexpected weight change.   Respiratory:  Negative for shortness of breath.    Cardiovascular:  Negative  for palpitations.   Gastrointestinal:  Positive for abdominal pain. Negative for diarrhea and nausea.   Genitourinary: Negative.    Musculoskeletal: Negative.    Skin:  Positive for wound.   Neurological: Negative.    Psychiatric/Behavioral: Negative.         Objective:      Physical Exam  Vitals reviewed.   HENT:      Head: Normocephalic.      Right Ear: External ear normal.      Left Ear: External ear normal.   Eyes:      Extraocular Movements: Extraocular movements intact.   Cardiovascular:      Rate and Rhythm: Normal rate and regular rhythm.   Pulmonary:      Effort: Pulmonary effort is normal. No respiratory distress.   Abdominal:      General: A surgical scar is present.      Tenderness: There is abdominal tenderness in the periumbilical area.          Comments: Midline abd incision with periumbilical erythema, induration tender to touch   Skin:     Coloration: Skin is not jaundiced.   Neurological:      Mental Status: He is alert and oriented to person, place, and time.   Psychiatric:         Behavior: Behavior normal.         Assessment:       1. Infected surgical wound    2. Ventral hernia without obstruction or gangrene    3. New onset a-fib    4. Ileus        Plan:   Infected surgical wound  -     US Soft Tissue Abdomen; Future; Expected date: 03/28/2024  -     US Soft Tissue Abdomen; Future; Expected date: 03/28/2024  Suspect the development of an abscess will obtain us to confirm.  Secure message sent to surgeon for evaluation of I&D. Surgeon's office will contact patient  Begin bactrim as prescribed  Ventral hernia without obstruction or gangrene    New onset a-fib  -     Comprehensive Metabolic Panel; Future; Expected date: 03/28/2024  -     PHOSPHORUS; Future; Expected date: 03/28/2024  Follow up with card  Ileus  resolved    No follow-ups on file.

## 2024-04-03 NOTE — PROGRESS NOTES
Subjective:       Patient ID: Quintin Ling is a 73 y.o. male.    Chief Complaint: No chief complaint on file.    Patient is a 73-year-old male well known to me.  Underwent a robotic right hemicolectomy and was doing well postoperatively.  He developed an ileus at home and had an episode of retching where he had a postoperative hernia formation with reopening of his fascia.  He did not have evisceration.  Since his discharge he reports he was doing well.  He does have some lethargy and decreased energy.  Is eating well and having regular bowel function.  Denies fevers, chills, nausea, vomiting, inability to pass flatus or have a bowel movement.          Pathology: 1. OMENTUM, EXCISION:   Multiple sclerosed lymph nodes   Negative for neoplasia or carcinoma   GMS and AFB stains pending for microorganisms     2. RIGHT COLON, TERMINAL ILEUM AND APPENDIX, RESECTION:   Tubulovillous adenoma with multifocal high-grade dysplasia   Negative for invasion/adenocarcinoma   Multiple tubular adenomas   Margins are all negative for dysplasia or malignancy   Multiple benign lymph nodes   Multiple sclerosed lymph nodes   Unremarkable appendix   Unremarkable portion of terminal ileum           Objective:      Physical Exam  Constitutional:       Appearance: He is well-developed.   HENT:      Head: Normocephalic and atraumatic.   Eyes:      Conjunctiva/sclera: Conjunctivae normal.      Pupils: Pupils are equal, round, and reactive to light.   Neck:      Thyroid: No thyromegaly.   Cardiovascular:      Rate and Rhythm: Normal rate and regular rhythm.   Pulmonary:      Effort: Pulmonary effort is normal. No respiratory distress.   Abdominal:      General: There is no distension.      Palpations: Abdomen is soft. There is no mass.      Tenderness: There is no abdominal tenderness.      Comments: Some erythema and induration around inferior aspect of incision. No purulence or fluctuance   Musculoskeletal:         General: No tenderness.  Normal range of motion.      Cervical back: Normal range of motion.   Skin:     General: Skin is warm and dry.      Capillary Refill: Capillary refill takes less than 2 seconds.   Neurological:      General: No focal deficit present.      Mental Status: He is alert and oriented to person, place, and time.         Assessment:       1. Colon adenoma        Plan:       - start bactrim for indurated area of incision.  - RTC 4 weeks  - pathology reviewed, no evidence of malignancy. Needs colonoscopy in 1 year  - ok to resume regular activity as tolerated  - no lifting >15lbs for 6 weeks post op        Tosin Boothe MD  Colon and Rectal Surgery  [facility]

## 2024-04-04 ENCOUNTER — OFFICE VISIT (OUTPATIENT)
Dept: SURGERY | Facility: CLINIC | Age: 74
End: 2024-04-04
Payer: MEDICARE

## 2024-04-04 VITALS
OXYGEN SATURATION: 97 % | HEIGHT: 65 IN | TEMPERATURE: 98 F | DIASTOLIC BLOOD PRESSURE: 61 MMHG | SYSTOLIC BLOOD PRESSURE: 124 MMHG | WEIGHT: 218 LBS | HEART RATE: 69 BPM | BODY MASS INDEX: 36.32 KG/M2

## 2024-04-04 DIAGNOSIS — D12.6 ADENOMA OF COLON: Primary | ICD-10-CM

## 2024-04-04 LAB
BACTERIA BLD CULT: NORMAL
BACTERIA BLD CULT: NORMAL

## 2024-04-04 PROCEDURE — 3074F SYST BP LT 130 MM HG: CPT | Mod: CPTII,S$GLB,, | Performed by: STUDENT IN AN ORGANIZED HEALTH CARE EDUCATION/TRAINING PROGRAM

## 2024-04-04 PROCEDURE — 3078F DIAST BP <80 MM HG: CPT | Mod: CPTII,S$GLB,, | Performed by: STUDENT IN AN ORGANIZED HEALTH CARE EDUCATION/TRAINING PROGRAM

## 2024-04-04 PROCEDURE — 4010F ACE/ARB THERAPY RXD/TAKEN: CPT | Mod: CPTII,S$GLB,, | Performed by: STUDENT IN AN ORGANIZED HEALTH CARE EDUCATION/TRAINING PROGRAM

## 2024-04-04 PROCEDURE — 1126F AMNT PAIN NOTED NONE PRSNT: CPT | Mod: CPTII,S$GLB,, | Performed by: STUDENT IN AN ORGANIZED HEALTH CARE EDUCATION/TRAINING PROGRAM

## 2024-04-04 PROCEDURE — 3044F HG A1C LEVEL LT 7.0%: CPT | Mod: CPTII,S$GLB,, | Performed by: STUDENT IN AN ORGANIZED HEALTH CARE EDUCATION/TRAINING PROGRAM

## 2024-04-04 PROCEDURE — 99999 PR PBB SHADOW E&M-EST. PATIENT-LVL III: CPT | Mod: PBBFAC,,, | Performed by: STUDENT IN AN ORGANIZED HEALTH CARE EDUCATION/TRAINING PROGRAM

## 2024-04-04 PROCEDURE — 99024 POSTOP FOLLOW-UP VISIT: CPT | Mod: S$GLB,,, | Performed by: STUDENT IN AN ORGANIZED HEALTH CARE EDUCATION/TRAINING PROGRAM

## 2024-04-04 NOTE — PROGRESS NOTES
Subjective:       Patient ID: Quintin Ling is a 73 y.o. male.    Chief Complaint: No chief complaint on file.    Patient is a 73-year-old male well known to me.  Performed an incision and drainage of abdominal wall seroma proximally 1 week ago.  The following day patient was home and had a syncopal event in his yard.  He was taken to the hospital where he was found to be dehydrated and anemic.  Cardiac workup also revealed bilateral carotid artery stenosis.  Vascular surgery was consulted and he is planning to undergo carotid endarterectomy.  Patient reports much more energy since receiving blood and fluids in the hospital.  He continues to eat well and have regular bowel function.  He was packing his wound daily.  Denies fevers, chills, nausea, vomiting, inability to pass flatus or have a bowel movement            Objective:      Physical Exam  Constitutional:       Appearance: He is well-developed.   HENT:      Head: Normocephalic and atraumatic.   Eyes:      Conjunctiva/sclera: Conjunctivae normal.      Pupils: Pupils are equal, round, and reactive to light.   Neck:      Thyroid: No thyromegaly.   Cardiovascular:      Rate and Rhythm: Normal rate and regular rhythm.   Pulmonary:      Effort: Pulmonary effort is normal. No respiratory distress.   Abdominal:      General: There is no distension.      Palpations: Abdomen is soft. There is no mass.      Tenderness: There is no abdominal tenderness.      Comments: Incisional wound 3cm with good granulation tissue, no erythema, edema or purulence   Musculoskeletal:         General: No tenderness. Normal range of motion.      Cervical back: Normal range of motion.   Skin:     General: Skin is warm and dry.      Capillary Refill: Capillary refill takes less than 2 seconds.   Neurological:      General: No focal deficit present.      Mental Status: He is alert and oriented to person, place, and time.         Assessment:       1. Adenoma of colon        Plan:       -  continue wet to dry dressing changes  - f/u with me in 1 month  - all questions answered        Tosin Boothe MD  Colon and Rectal Surgery  [facility]

## 2024-04-05 NOTE — PROGRESS NOTES
Subjective:       Patient ID: Quintin Ling is a 73 y.o. male.    Chief Complaint: No chief complaint on file.    Patient is a 73-year-old male well known to me with unresectable cecal adenoma status post right hemicolectomy.  His postoperative course was complicated by an ileus with retching and fascial dehiscence requiring abdominal wall closure.  Since being home he has been doing better.  He does feel fatigued and weak.  He noticed some induration of his inferior incision.  His primary care got an ultrasound which showed a subcutaneous fluid collection.  He denies fevers or chills            Objective:      Physical Exam  Constitutional:       Appearance: He is well-developed.   HENT:      Head: Normocephalic and atraumatic.   Eyes:      Conjunctiva/sclera: Conjunctivae normal.      Pupils: Pupils are equal, round, and reactive to light.   Neck:      Thyroid: No thyromegaly.   Cardiovascular:      Rate and Rhythm: Normal rate and regular rhythm.   Pulmonary:      Effort: Pulmonary effort is normal. No respiratory distress.   Abdominal:      General: There is no distension.      Palpations: Abdomen is soft. There is no mass.      Tenderness: There is no abdominal tenderness.      Comments: Inferior incision with some erythema and induration, no tenderness or purulence   Musculoskeletal:         General: No tenderness. Normal range of motion.      Cervical back: Normal range of motion.   Skin:     General: Skin is warm and dry.      Capillary Refill: Capillary refill takes less than 2 seconds.   Neurological:      General: No focal deficit present.      Mental Status: He is alert and oriented to person, place, and time.       Procedure: Quintin was seen in the clinic room and placed in the supine position on the treatment table. Attention was directed to the incision on his anterior abdominal wall The area was prepped with betadine. Approximately 10cc of 2% lidocaine with epi was injected into the mor-abscess  area. Once the area was anesthetized allowing 2-3 minutes for the anesthetic to take effect, I utilized a #11 scalpel to cut through the skin into the abscess area. Upon opening of the wound there was clear yellow fluid and some fat necrosis encountered with no evidence of purulence. The  cavity was explored and there were no loculations or undermining. The wound was then packed with gauze, placing a gauze dressing over the wound and securing with paper tape. The patient tolerated the procedure well.  Assessment:       1. Colon adenoma        Plan:       - instructed on wound care  - RTC in 1 week for wound check        Tosin Boothe MD  Colon and Rectal Surgery  [facility]

## 2024-04-12 ENCOUNTER — OFFICE VISIT (OUTPATIENT)
Dept: PAIN MEDICINE | Facility: CLINIC | Age: 74
End: 2024-04-12
Payer: MEDICARE

## 2024-04-12 VITALS
HEART RATE: 64 BPM | WEIGHT: 205.94 LBS | HEIGHT: 65 IN | DIASTOLIC BLOOD PRESSURE: 69 MMHG | BODY MASS INDEX: 34.31 KG/M2 | SYSTOLIC BLOOD PRESSURE: 110 MMHG

## 2024-04-12 DIAGNOSIS — M47.816 LUMBAR SPONDYLOSIS: ICD-10-CM

## 2024-04-12 DIAGNOSIS — M51.36 DDD (DEGENERATIVE DISC DISEASE), LUMBAR: Primary | ICD-10-CM

## 2024-04-12 PROCEDURE — 1159F MED LIST DOCD IN RCRD: CPT | Mod: CPTII,S$GLB,, | Performed by: NURSE PRACTITIONER

## 2024-04-12 PROCEDURE — 1101F PT FALLS ASSESS-DOCD LE1/YR: CPT | Mod: CPTII,S$GLB,, | Performed by: NURSE PRACTITIONER

## 2024-04-12 PROCEDURE — 99213 OFFICE O/P EST LOW 20 MIN: CPT | Mod: S$GLB,,, | Performed by: NURSE PRACTITIONER

## 2024-04-12 PROCEDURE — 3288F FALL RISK ASSESSMENT DOCD: CPT | Mod: CPTII,S$GLB,, | Performed by: NURSE PRACTITIONER

## 2024-04-12 PROCEDURE — 99999 PR PBB SHADOW E&M-EST. PATIENT-LVL III: CPT | Mod: PBBFAC,,, | Performed by: NURSE PRACTITIONER

## 2024-04-12 PROCEDURE — 3044F HG A1C LEVEL LT 7.0%: CPT | Mod: CPTII,S$GLB,, | Performed by: NURSE PRACTITIONER

## 2024-04-12 PROCEDURE — 3078F DIAST BP <80 MM HG: CPT | Mod: CPTII,S$GLB,, | Performed by: NURSE PRACTITIONER

## 2024-04-12 PROCEDURE — 1111F DSCHRG MED/CURRENT MED MERGE: CPT | Mod: CPTII,S$GLB,, | Performed by: NURSE PRACTITIONER

## 2024-04-12 PROCEDURE — 4010F ACE/ARB THERAPY RXD/TAKEN: CPT | Mod: CPTII,S$GLB,, | Performed by: NURSE PRACTITIONER

## 2024-04-12 PROCEDURE — 3008F BODY MASS INDEX DOCD: CPT | Mod: CPTII,S$GLB,, | Performed by: NURSE PRACTITIONER

## 2024-04-12 PROCEDURE — 1125F AMNT PAIN NOTED PAIN PRSNT: CPT | Mod: CPTII,S$GLB,, | Performed by: NURSE PRACTITIONER

## 2024-04-12 PROCEDURE — 3074F SYST BP LT 130 MM HG: CPT | Mod: CPTII,S$GLB,, | Performed by: NURSE PRACTITIONER

## 2024-04-17 ENCOUNTER — OFFICE VISIT (OUTPATIENT)
Dept: CARDIOLOGY | Facility: CLINIC | Age: 74
End: 2024-04-17
Payer: MEDICARE

## 2024-04-17 VITALS
BODY MASS INDEX: 34.41 KG/M2 | OXYGEN SATURATION: 98 % | SYSTOLIC BLOOD PRESSURE: 152 MMHG | WEIGHT: 206.56 LBS | RESPIRATION RATE: 16 BRPM | DIASTOLIC BLOOD PRESSURE: 69 MMHG | HEART RATE: 69 BPM | HEIGHT: 65 IN

## 2024-04-17 DIAGNOSIS — I65.22 STENOSIS OF LEFT CAROTID ARTERY: ICD-10-CM

## 2024-04-17 DIAGNOSIS — R01.1 HEART MURMUR: ICD-10-CM

## 2024-04-17 DIAGNOSIS — I35.0 NONRHEUMATIC AORTIC VALVE STENOSIS: ICD-10-CM

## 2024-04-17 DIAGNOSIS — I25.10 CORONARY ARTERY CALCIFICATION SEEN ON CAT SCAN: ICD-10-CM

## 2024-04-17 DIAGNOSIS — Z09 HOSPITAL DISCHARGE FOLLOW-UP: ICD-10-CM

## 2024-04-17 DIAGNOSIS — E66.01 SEVERE OBESITY (BMI 35.0-39.9) WITH COMORBIDITY: ICD-10-CM

## 2024-04-17 DIAGNOSIS — I48.91 ATRIAL FIB/FLUTTER, TRANSIENT: ICD-10-CM

## 2024-04-17 DIAGNOSIS — I10 ESSENTIAL HYPERTENSION: Primary | ICD-10-CM

## 2024-04-17 DIAGNOSIS — R55 SYNCOPE, UNSPECIFIED SYNCOPE TYPE: ICD-10-CM

## 2024-04-17 DIAGNOSIS — I48.92 ATRIAL FIB/FLUTTER, TRANSIENT: ICD-10-CM

## 2024-04-17 DIAGNOSIS — Z01.810 PREOP CARDIOVASCULAR EXAM: ICD-10-CM

## 2024-04-17 DIAGNOSIS — Z76.89 ENCOUNTER TO ESTABLISH CARE WITH NEW DOCTOR: ICD-10-CM

## 2024-04-17 PROCEDURE — 1101F PT FALLS ASSESS-DOCD LE1/YR: CPT | Mod: CPTII,S$GLB,, | Performed by: INTERNAL MEDICINE

## 2024-04-17 PROCEDURE — 1159F MED LIST DOCD IN RCRD: CPT | Mod: CPTII,S$GLB,, | Performed by: INTERNAL MEDICINE

## 2024-04-17 PROCEDURE — 3078F DIAST BP <80 MM HG: CPT | Mod: CPTII,S$GLB,, | Performed by: INTERNAL MEDICINE

## 2024-04-17 PROCEDURE — 3044F HG A1C LEVEL LT 7.0%: CPT | Mod: CPTII,S$GLB,, | Performed by: INTERNAL MEDICINE

## 2024-04-17 PROCEDURE — 99999 PR PBB SHADOW E&M-EST. PATIENT-LVL IV: CPT | Mod: PBBFAC,,, | Performed by: INTERNAL MEDICINE

## 2024-04-17 PROCEDURE — 3008F BODY MASS INDEX DOCD: CPT | Mod: CPTII,S$GLB,, | Performed by: INTERNAL MEDICINE

## 2024-04-17 PROCEDURE — 3288F FALL RISK ASSESSMENT DOCD: CPT | Mod: CPTII,S$GLB,, | Performed by: INTERNAL MEDICINE

## 2024-04-17 PROCEDURE — 1111F DSCHRG MED/CURRENT MED MERGE: CPT | Mod: CPTII,S$GLB,, | Performed by: INTERNAL MEDICINE

## 2024-04-17 PROCEDURE — 99214 OFFICE O/P EST MOD 30 MIN: CPT | Mod: S$GLB,,, | Performed by: INTERNAL MEDICINE

## 2024-04-17 PROCEDURE — 3077F SYST BP >= 140 MM HG: CPT | Mod: CPTII,S$GLB,, | Performed by: INTERNAL MEDICINE

## 2024-04-17 PROCEDURE — 4010F ACE/ARB THERAPY RXD/TAKEN: CPT | Mod: CPTII,S$GLB,, | Performed by: INTERNAL MEDICINE

## 2024-04-17 RX ORDER — AMLODIPINE BESYLATE 2.5 MG/1
2.5 TABLET ORAL DAILY
Qty: 30 TABLET | Refills: 11 | Status: SHIPPED | OUTPATIENT
Start: 2024-04-17 | End: 2024-04-17

## 2024-04-17 RX ORDER — BISOPROLOL FUMARATE 5 MG/1
5 TABLET, FILM COATED ORAL DAILY
Qty: 30 TABLET | Refills: 11 | Status: ON HOLD | OUTPATIENT
Start: 2024-04-17 | End: 2024-04-26 | Stop reason: HOSPADM

## 2024-04-17 RX ORDER — AMLODIPINE BESYLATE 5 MG/1
5 TABLET ORAL DAILY
Qty: 30 TABLET | Refills: 11 | Status: ON HOLD | OUTPATIENT
Start: 2024-04-17 | End: 2024-04-26 | Stop reason: HOSPADM

## 2024-04-17 NOTE — PROGRESS NOTES
Subjective:   Patient ID:  Quintin Ling is a 73 y.o. male who presents for evaluation of No chief complaint on file.      HPI  73-year-old male, history as below.    Recently discharged from the hospital.  Here for post discharge follow-up.  He was found to be in a flutter at the time of his hospitalization.  History started early March he had a a bowel obstruction surgery this was complicated later on by the he since after he coughed at home.  An infection and this was treated on a rehospitalization.  During that time he was found to be in a flutter.  He converted to sinus rhythm.  Right now on anticoagulants.    No bleeding.    He complains of the cost of Cardizem being too expensive.    He was also found to have bilateral carotid artery stenosis.  He was admitted with syncope thought to be possibly secondary to the infection.    And there is a plan to do a carotid endarterectomy.    He is here for also preop evaluation.  He states that he denies any chest pain.  Dyspnea, palpitations syncope or presyncope since discharge.  Past Medical History:   Diagnosis Date    Diabetes mellitus     Hyperlipidemia     Hypertension        Past Surgical History:   Procedure Laterality Date    BIOPSY OF PERITONEUM N/A 1/29/2024    Procedure: BIOPSY, PERITONEUM;  Surgeon: Tosin Boothe MD;  Location: [facility] OR;  Service: Colon and Rectal;  Laterality: N/A;    CLOSURE OF WOUND N/A 3/16/2024    Procedure: CLOSURE, WOUND;  Surgeon: Magda Stark MD;  Location: [facility] OR;  Service: General;  Laterality: N/A;    COLONOSCOPY N/A 1/5/2024    Procedure: COLONOSCOPY;  Surgeon: Irasema Gil MD;  Location: [facility] ENDO;  Service: Endoscopy;  Laterality: N/A;    DIAGNOSTIC LAPAROSCOPY N/A 1/29/2024    Procedure: LAPAROSCOPY, DIAGNOSTIC;  Surgeon: Tosin Boothe MD;  Location: [facility] OR;  Service: Colon and Rectal;  Laterality: N/A;    EPIDURAL STEROID INJECTION N/A 11/13/2023    Procedure: Lumbar L5/S1 IL JOSE;  Surgeon:  Oneil Carlson MD;  Location: [facility] PAIN MGT;  Service: Pain Management;  Laterality: N/A;    ESOPHAGOGASTRODUODENOSCOPY N/A 1/5/2024    Procedure: EGD (ESOPHAGOGASTRODUODENOSCOPY);  Surgeon: Irasema Gil MD;  Location: [facility] ENDO;  Service: Endoscopy;  Laterality: N/A;    INJECTION OF ANESTHETIC AGENT INTO TISSUE PLANE DEFINED BY TRANSVERSUS ABDOMINIS MUSCLE N/A 3/11/2024    Procedure: BLOCK, TRANSVERSUS ABDOMINIS PLANE;  Surgeon: Tosin Boothe MD;  Location: [facility] OR;  Service: Colon and Rectal;  Laterality: N/A;    NO PAST SURGERIES      WOUND EXPLORATION N/A 3/16/2024    Procedure: EXPLORATION, WOUND;  Surgeon: Magda Stark MD;  Location: [facility] OR;  Service: General;  Laterality: N/A;    XI ROBOTIC COLECTOMY, RIGHT Right 3/11/2024    Procedure: XI ROBOTIC COLECTOMY, RIGHT;  Surgeon: Tosin Boothe MD;  Location: [facility] OR;  Service: Colon and Rectal;  Laterality: Right;  WITH OMENECTOMY AND AMNIOFIX       Social History     Tobacco Use    Smoking status: Former     Passive exposure: Never    Smokeless tobacco: Never   Substance Use Topics    Alcohol use: Not Currently     Comment: OCC    Drug use: Never       Family History   Problem Relation Name Age of Onset    No Known Problems Mother      No Known Problems Father         Review of Systems   Cardiovascular:  Negative for chest pain, dyspnea on exertion, palpitations and syncope.   Genitourinary: Negative.    Neurological: Negative.        Current Outpatient Medications   Medication Sig Dispense Refill    apixaban (ELIQUIS) 5 mg Tab Take 1 tablet (5 mg total) by mouth 2 (two) times daily. 60 tablet 2    atorvastatin (LIPITOR) 20 MG tablet TAKE 1 TABLET BY MOUTH EVERY DAY 90 tablet 3    fenofibrate (TRICOR) 54 MG tablet Take 1 tablet (54 mg total) by mouth once daily. 90 tablet 3    furosemide (LASIX) 40 MG tablet Take 1 tablet (40 mg total) by mouth daily as needed (leg swelling). 30 tablet 1    metFORMIN (GLUCOPHAGE) 500 MG tablet TAKE 1  TABLET BY MOUTH TWICE A DAY WITH FOOD 180 tablet 1    pregabalin (LYRICA) 75 MG capsule Take 1 capsule (75 mg total) by mouth 2 (two) times daily. 60 capsule 3    amLODIPine (NORVASC) 5 MG tablet Take 1 tablet (5 mg total) by mouth once daily. 30 tablet 11    bisoprolol (ZEBETA) 5 MG tablet Take 1 tablet (5 mg total) by mouth once daily. 30 tablet 11     No current facility-administered medications for this visit.       Objective:   Objective:  Wt Readings from Last 3 Encounters:   04/17/24 93.7 kg (206 lb 9.1 oz)   04/12/24 93.4 kg (205 lb 14.6 oz)   04/04/24 98.9 kg (218 lb)     Temp Readings from Last 3 Encounters:   04/04/24 98 °F (36.7 °C) (Oral)   04/01/24 98.4 °F (36.9 °C)   03/28/24 99.4 °F (37.4 °C)     BP Readings from Last 3 Encounters:   04/17/24 (!) 152/69   04/12/24 110/69   04/04/24 124/61     Pulse Readings from Last 3 Encounters:   04/17/24 69   04/12/24 64   04/04/24 69       Physical Exam  Vitals reviewed.   Constitutional:       Appearance: He is well-developed.   Neck:      Vascular: Carotid bruit present.   Cardiovascular:      Rate and Rhythm: Normal rate and regular rhythm.      Pulses: Intact distal pulses.      Heart sounds: Murmur heard.   Pulmonary:      Breath sounds: Normal breath sounds.   Neurological:      Mental Status: He is oriented to person, place, and time.         Lab Results   Component Value Date    CHOL 131 05/30/2023    CHOL 146 05/19/2021    CHOL 154 07/21/2020     Lab Results   Component Value Date    HDL 45 05/30/2023    HDL 45 05/19/2021    HDL 36 (L) 07/21/2020     Lab Results   Component Value Date    LDLCALC 75.0 05/30/2023    LDLCALC 86.6 05/19/2021    LDLCALC 103.2 07/21/2020     Lab Results   Component Value Date    TRIG 55 05/30/2023    TRIG 72 05/19/2021    TRIG 74 07/21/2020     Lab Results   Component Value Date    CHOLHDL 34.4 05/30/2023    CHOLHDL 30.8 05/19/2021    CHOLHDL 23.4 07/21/2020       Chemistry        Component Value Date/Time      04/01/2024 0442    K 4.4 04/01/2024 0442     04/01/2024 0442    CO2 19 (L) 04/01/2024 0442    BUN 16 04/01/2024 0442    CREATININE 1.3 04/01/2024 0442     04/01/2024 0442        Component Value Date/Time    CALCIUM 7.9 (L) 04/01/2024 0442    ALKPHOS 49 (L) 03/29/2024 1457    AST 23 03/29/2024 1457    ALT 32 03/29/2024 1457    BILITOT 0.4 03/29/2024 1457    ESTGFRAFRICA >60.0 05/30/2022 0958    EGFRNONAA >60.0 05/30/2022 0958          Lab Results   Component Value Date    TSH 1.288 07/21/2020     Lab Results   Component Value Date    INR 1.0 03/19/2024     Lab Results   Component Value Date    WBC 4.42 04/01/2024    HGB 7.8 (L) 04/01/2024    HCT 23.6 (L) 04/01/2024    MCV 85 04/01/2024     04/01/2024     BNP  @LABRCNTIP(BNP,BNPTRIAGEBLO)@  CrCl cannot be calculated (Patient's most recent lab result is older than the maximum 7 days allowed.).     Imaging:  ======    No results found for this or any previous visit.    Results for orders placed during the hospital encounter of 03/29/24    US Carotid Bilateral    Narrative  EXAMINATION:  US CAROTID BILATERAL    CLINICAL HISTORY:  syncope;    TECHNIQUE:  Grayscale and color Doppler ultrasound examination of the carotid and vertebral artery systems bilaterally.  Stenosis estimates are per the NASCET measurement criteria.    COMPARISON:  None.    FINDINGS:  Right:    Internal Carotid Artery (ICA) peak systolic velocity 249 cm/sec    ICA/CCA peak systolic velocity ratio: 2.2    Plaque formation: Heterogeneous    Vertebral artery: Antegrade flow and normal waveform.    Left:    Internal Carotid Artery (ICA)  peak systolic velocity 362 cm/sec    ICA/CCA peak systolic velocity ratio: 2.6    Plaque formation: Heterogeneous    Vertebral artery: Antegrade flow and normal waveform.    Impression  Greater than 70% stenosis bilaterally.  Further evaluation as warranted.    This report was flagged in Epic as abnormal.      Electronically signed by: Petey  Marlene  Date:    03/29/2024  Time:    21:47    No results found for this or any previous visit.    No results found for this or any previous visit.    No valid procedures specified.    No results found for this or any previous visit.      No results found for this or any previous visit.      Results for orders placed during the hospital encounter of 03/16/24    Echo    Interpretation Summary    Left Ventricle: The left ventricle is normal in size. Moderately increased wall thickness. There is moderate concentric hypertrophy. There is normal systolic function with a visually estimated ejection fraction of 60 - 65%. Unable to assess diastolic function due to atrial fibrillation.    Right Ventricle: Right ventricle was not well visualized due to poor acoustic window. Normal right ventricular cavity size. Wall thickness is normal. Right ventricle wall motion  is normal. Systolic function is normal.    Left Atrium: Left atrium is moderately dilated.    Aortic Valve: Moderately calcified cusps. There is moderate annular calcification present. Moderately restricted motion. There is mild to moderate stenosis. Aortic valve area by VTI is 2.16 cm². Aortic valve peak velocity is 2.32 m/s. Mean gradient is 17 mmHg. The dimensionless index is 0.58.    Mitral Valve: There is mild regurgitation.    Tricuspid Valve: There is mild regurgitation.    Aorta: Aortic root is normal in size. Ascending aorta is mildly dilated measuring 4.00 cm.    Pulmonary Artery: The estimated pulmonary artery systolic pressure is 37 mmHg.    IVC/SVC: Normal venous pressure at 3 mmHg.      Diagnostic Results:  ECG: Reviewed    The 10-year ASCVD risk score (Mi MENDEZ, et al., 2019) is: 25.3%    Values used to calculate the score:      Age: 73 years      Sex: Male      Is Non- : No      Diabetic: No      Tobacco smoker: No      Systolic Blood Pressure: 152 mmHg      Is BP treated: Yes      HDL Cholesterol: 45 mg/dL      Total  Cholesterol: 131 mg/dL        Assessment and Plan:   Essential hypertension  -     bisoprolol (ZEBETA) 5 MG tablet; Take 1 tablet (5 mg total) by mouth once daily.  Dispense: 30 tablet; Refill: 11  -     Discontinue: amLODIPine (NORVASC) 2.5 MG tablet; Take 1 tablet (2.5 mg total) by mouth once daily.  Dispense: 30 tablet; Refill: 11  -     amLODIPine (NORVASC) 5 MG tablet; Take 1 tablet (5 mg total) by mouth once daily.  Dispense: 30 tablet; Refill: 11    Syncope, unspecified syncope type  -     ACCEPT - Ambulatory referral/consult to Cardiac Electrophysiology    Stenosis of left carotid artery  -     ACCEPT - Ambulatory referral/consult to Cardiac Electrophysiology    Preop cardiovascular exam  -     Nuclear Stress - Cardiology Interpreted; Future    Severe obesity (BMI 35.0-39.9) with comorbidity    Heart murmur    Nonrheumatic aortic valve stenosis    Atrial fib/flutter, transient    Coronary artery calcification seen on CAT scan    Encounter to establish care with new doctor    Hospital discharge follow-up      Complains of cost of Cardizem.  We will switch his medications around.  Stop metoprolol and Cardizem.  Start bisoprolol and amlodipine.    Continue with statins.  Eliquis.    Blood pressure also not at goal.  We will do a nuclear stress test preop.  Limited functional status.  With coronary artery calcification on CT scan.  I personally reviewed the images of his CT scan of the chest as well as his echocardiogram.  Normal EF with moderate aortic stenosis  Reviewed all tests and above medical conditions with patient in detail and formulated treatment plan.  Risk factor modification discussed.   Cardiac low salt diet discussed.  Maintaining healthy weight and weight loss goals were discussed in clinic.    Follow up in  6 months  New patient to me.

## 2024-04-18 ENCOUNTER — TELEPHONE (OUTPATIENT)
Dept: PREADMISSION TESTING | Facility: HOSPITAL | Age: 74
End: 2024-04-18
Payer: MEDICARE

## 2024-04-18 NOTE — TELEPHONE ENCOUNTER
----- Message from Onelia Bradshaw MA sent at 4/18/2024  2:01 PM CDT -----  Regarding: RE: surgery 4/25  Patient needs cbc and bmp morning of surgery as long as they can get the results back before the surgery starts.  ----- Message -----  From: Nabila Johnson RN  Sent: 4/18/2024  10:13 AM CDT  To: Charly Dickens Staff  Subject: surgery 4/25                                     Good Morning.    Pt scheduled for Carotid Endarterectomy on 4/25/24.  Pt's H&H on 4/01/24 was 7.8/23.6.  Pt received  1 unit of blood on 4/01/24.  Will Pt need a repeat CBC prior to surgery?    Please Advise    Thank you,    Nabila  Pre Admit Testing

## 2024-04-19 ENCOUNTER — TELEPHONE (OUTPATIENT)
Dept: PREADMISSION TESTING | Facility: HOSPITAL | Age: 74
End: 2024-04-19
Payer: MEDICARE

## 2024-04-19 NOTE — TELEPHONE ENCOUNTER
----- Message from Onelia Bradshaw MA sent at 4/19/2024  8:59 AM CDT -----  Regarding: RE: surgery 4/25  No ma'am.   Also, if you ever have any pre-op questions, you can call [phone] and ask for isabel or david in surgery scheduling.  ----- Message -----  From: Nabila Johnson RN  Sent: 4/18/2024   2:23 PM CDT  To: Onelia Bradshaw MA  Subject: RE: surgery 4/25                                 Will Pt need a type and screen in pre op also?  ----- Message -----  From: Onelia Bradshaw MA  Sent: 4/18/2024   2:01 PM CDT  To: Nabila Johnson RN; [facility] Dickens Staff  Subject: RE: surgery 4/25                                 Patient needs cbc and bmp morning of surgery as long as they can get the results back before the surgery starts.  ----- Message -----  From: Nabila Johnson RN  Sent: 4/18/2024  10:13 AM CDT  To: Charly Dickens Staff  Subject: surgery 4/25                                     Good Morning.    Pt scheduled for Carotid Endarterectomy on 4/25/24.  Pt's H&H on 4/01/24 was 7.8/23.6.  Pt received  1 unit of blood on 4/01/24.  Will Pt need a repeat CBC prior to surgery?    Please Advise    Thank you,    Nabila  Pre Admit Testing

## 2024-04-22 ENCOUNTER — HOSPITAL ENCOUNTER (OUTPATIENT)
Dept: CARDIOLOGY | Facility: HOSPITAL | Age: 74
Discharge: HOME | DRG: 039 | End: 2024-04-22
Attending: INTERNAL MEDICINE
Payer: MEDICARE

## 2024-04-22 ENCOUNTER — HOSPITAL ENCOUNTER (OUTPATIENT)
Dept: RADIOLOGY | Facility: HOSPITAL | Age: 74
Discharge: HOME | DRG: 039 | End: 2024-04-22
Attending: INTERNAL MEDICINE
Payer: MEDICARE

## 2024-04-22 ENCOUNTER — TELEPHONE (OUTPATIENT)
Dept: PREADMISSION TESTING | Facility: HOSPITAL | Age: 74
End: 2024-04-22
Payer: MEDICARE

## 2024-04-22 DIAGNOSIS — Z01.810 PREOP CARDIOVASCULAR EXAM: ICD-10-CM

## 2024-04-22 LAB
CV STRESS BASE HR: 51 BPM
DIASTOLIC BLOOD PRESSURE: 73 MMHG
NUC REST EJECTION FRACTION: 64
NUC STRESS EJECTION FRACTION: 69 %
OHS CV CPX 85 PERCENT MAX PREDICTED HEART RATE MALE: 127
OHS CV CPX MAX PREDICTED HEART RATE: 149
OHS CV CPX PATIENT IS FEMALE: 0
OHS CV CPX PATIENT IS MALE: 1
OHS CV CPX PEAK DIASTOLIC BLOOD PRESSURE: 58 MMHG
OHS CV CPX PEAK HEAR RATE: 75 BPM
OHS CV CPX PEAK RATE PRESSURE PRODUCT: NORMAL
OHS CV CPX PEAK SYSTOLIC BLOOD PRESSURE: 177 MMHG
OHS CV CPX PERCENT MAX PREDICTED HEART RATE ACHIEVED: 50
OHS CV CPX RATE PRESSURE PRODUCT PRESENTING: 8109
SYSTOLIC BLOOD PRESSURE: 159 MMHG

## 2024-04-22 PROCEDURE — A9502 TC99M TETROFOSMIN: HCPCS | Performed by: INTERNAL MEDICINE

## 2024-04-22 PROCEDURE — 93017 CV STRESS TEST TRACING ONLY: CPT

## 2024-04-22 PROCEDURE — 78452 HT MUSCLE IMAGE SPECT MULT: CPT

## 2024-04-22 PROCEDURE — 93016 CV STRESS TEST SUPVJ ONLY: CPT | Mod: ,,, | Performed by: INTERNAL MEDICINE

## 2024-04-22 PROCEDURE — 93018 CV STRESS TEST I&R ONLY: CPT | Mod: ,,, | Performed by: INTERNAL MEDICINE

## 2024-04-22 PROCEDURE — 78452 HT MUSCLE IMAGE SPECT MULT: CPT | Mod: 26,,, | Performed by: INTERNAL MEDICINE

## 2024-04-22 PROCEDURE — 63600175 PHARM REV CODE 636 W HCPCS: Performed by: INTERNAL MEDICINE

## 2024-04-22 RX ORDER — REGADENOSON 0.08 MG/ML
0.4 INJECTION, SOLUTION INTRAVENOUS ONCE
Status: COMPLETED | OUTPATIENT
Start: 2024-04-22 | End: 2024-04-22

## 2024-04-22 RX ADMIN — TETROFOSMIN 32.1 MILLICURIE: 1.38 INJECTION, POWDER, LYOPHILIZED, FOR SOLUTION INTRAVENOUS at 01:04

## 2024-04-22 RX ADMIN — TETROFOSMIN 10 MILLICURIE: 1.38 INJECTION, POWDER, LYOPHILIZED, FOR SOLUTION INTRAVENOUS at 11:04

## 2024-04-22 RX ADMIN — REGADENOSON 0.4 MG: 0.08 INJECTION, SOLUTION INTRAVENOUS at 12:04

## 2024-04-22 NOTE — TELEPHONE ENCOUNTER
Pre op instructions reviewed with Pt's spouse over telephone, verbalized understanding.    To confirm, Surgery is scheduled on 4/25/24. We will call you late afternoon the business day prior to surgery with your arrival time.    *Please report to the [facility] (1st Floor) located off of [facility] (2nd Entrance/Building on the left, in front of the flag pole).  Address: [address] [place] [address] [place] [address]      INSTRUCTIONS IMPORTANT!!!  DO NOT Eat, Drink, or Smoke after 12 midnight unless instructed otherwise by your Surgeon. OK to brush teeth, no gum, candy or mints!    >>>MEDICATION INSTRUCTIONS<<<: Morning of Surgery, take small sip of water with ONLY these medications:  AMLODIPINE  BISOPROLOL    *Blood Thinners: Stop taking ELIQUIS 2 days prior to surgery per Physician Instructions! Call your Surgeon office to inquire about any questions regarding your blood thinner medication.    *Diabetic/ Prediabetic Patients: !!!If you take diabetic or weight loss medication, Do NOT take morning of surgery unless instructed by Doctor!!!  Metformin to be stopped 24 hrs prior to surgery.   Long Acting Insulin Instructions: HOLD the night before surgery unless instructed differently by Provider!  Ozempic/ Mounjaro/ Wegovy/ Trulicity/ Semaglutide injections or weight loss medication to be stopped 7 days prior to surgery.    !!!STOP ALL Aspirins, NSAIDS, WEIGHT LOSS INJECTIONS/PILLS, Herbal supplements, & Vitamins 7 DAYS BEFORE SURGERY!!!    ____  Avoid Alcoholic beverages 3 days prior to surgery, as it can thin the blood.  ____  NO Acrylic/fake nails or nail polish worn day of surgery (specifically hand/arm & foot surgeries).  ____  NO powder, lotions, deodorants, oils or cream on body.  ____  Remove all jewelry & piercings & foreign objects before arrival & leave at home.  ____  Remove Dentures, Hearing Aids & Contact Lens prior to surgery.  ____  Bring photo ID and insurance information to hospital  (Leave Valuables at Home).  ____  If going home the same day, arrange for a ride home. You will not be able to drive for 24 hrs if Anesthesia was used.   ____  Females (ages 11-60): may need to give a urine sample the morning of surgery; please see Pre op Nurse prior to using the restroom.  ____  Males: Stop ED medications (Viagra, Cialis) 24 hrs prior to surgery.  ____  Wear clean, loose fitting clothing to allow for dressings/ bandages.      Bathing Instructions:    -Shower with anti-bacterial Soap (Hibiclens or Dial) the night before surgery and the morning of.   -Do not use Hibiclens on your face or genitals.   -Apply clean clothes after shower.  -Do not shave your face or body 2 days prior to surgery unless instructed otherwise by your Surgeon.  -Do not shave pubic hair 7 days prior to surgery (gyn pt's).    [facility] Visitor/Ride Policy:  Only 2 adults allowed in pre op/recovery area during your procedure. You MUST HAVE A RIDE HOME from a responsible adult that you know and trust. Medical Transport, Uber or Lyft can ONLY be used if patient has a responsible adult to accompany them during ride home.       *Signs and symptoms of Infection Before or After Surgery:               !!!If you experience any fever, chills, nausea/ vomiting, foul odor/ excessive drainage from surgical site, flu-like symptoms, new wounds or cuts, PLEASE CALL THE SURGEON OFFICE at [phone] or SEND MESSAGE THROUGH [facility] PORTAL!!!       *If you are running late the morning of surgery, please call the Hospital Surgery Dept @ [phone].     *Billing questions:  [phone]       Thank you,  -[facility] Pre Admit Dept.  [phone] or [phone]  M-F 7:30 am-4:00 pm (Closed Major Holidays)

## 2024-04-23 NOTE — PROGRESS NOTES
Normal nuclear stress test reviewed.    Okay to undergo his carotid endarterectomy.    Continue with bisoprolol perioperatively.

## 2024-04-24 ENCOUNTER — ANESTHESIA EVENT (OUTPATIENT)
Dept: SURGERY | Facility: HOSPITAL | Age: 74
DRG: 039 | End: 2024-04-24
Payer: MEDICARE

## 2024-04-24 ENCOUNTER — TELEPHONE (OUTPATIENT)
Dept: PREADMISSION TESTING | Facility: HOSPITAL | Age: 74
End: 2024-04-24
Payer: MEDICARE

## 2024-04-24 DIAGNOSIS — Z01.818 PREOP TESTING: ICD-10-CM

## 2024-04-24 DIAGNOSIS — I65.22 STENOSIS OF LEFT CAROTID ARTERY: Primary | ICD-10-CM

## 2024-04-24 NOTE — ANESTHESIA PREPROCEDURE EVALUATION
04/24/2024  Quintin Ling is a 73 y.o., male with h/o Robotic colect on Monday.  Now NV.  Ileus, wound dehiscence.  Uneventful anesthetic.  Easy intub, Mac 3, 8.0    Past Medical History:   Diagnosis Date    Diabetes mellitus     Hyperlipidemia     Hypertension     Obesity  Mild to Mod AS      Past Surgical History:   Procedure Laterality Date    BIOPSY OF PERITONEUM N/A 1/29/2024    Procedure: BIOPSY, PERITONEUM;  Surgeon: Tosin Boothe MD;  Location: [facility] OR;  Service: Colon and Rectal;  Laterality: N/A;    CLOSURE OF WOUND N/A 3/16/2024    Procedure: CLOSURE, WOUND;  Surgeon: Magda Stark MD;  Location: [facility] OR;  Service: General;  Laterality: N/A;    COLONOSCOPY N/A 1/5/2024    Procedure: COLONOSCOPY;  Surgeon: Irasema Gil MD;  Location: [facility] ENDO;  Service: Endoscopy;  Laterality: N/A;    DIAGNOSTIC LAPAROSCOPY N/A 1/29/2024    Procedure: LAPAROSCOPY, DIAGNOSTIC;  Surgeon: Tosin Boothe MD;  Location: [facility] OR;  Service: Colon and Rectal;  Laterality: N/A;    EPIDURAL STEROID INJECTION N/A 11/13/2023    Procedure: Lumbar L5/S1 IL JOSE;  Surgeon: Oneil Carlson MD;  Location: [facility] PAIN MGT;  Service: Pain Management;  Laterality: N/A;    ESOPHAGOGASTRODUODENOSCOPY N/A 1/5/2024    Procedure: EGD (ESOPHAGOGASTRODUODENOSCOPY);  Surgeon: Irasema Gil MD;  Location: [facility] ENDO;  Service: Endoscopy;  Laterality: N/A;    INJECTION OF ANESTHETIC AGENT INTO TISSUE PLANE DEFINED BY TRANSVERSUS ABDOMINIS MUSCLE N/A 3/11/2024    Procedure: BLOCK, TRANSVERSUS ABDOMINIS PLANE;  Surgeon: Tosin Boothe MD;  Location: [facility] OR;  Service: Colon and Rectal;  Laterality: N/A;    NO PAST SURGERIES      WOUND EXPLORATION N/A 3/16/2024    Procedure: EXPLORATION, WOUND;  Surgeon: Magda Stark MD;  Location: [facility] OR;  Service: General;  Laterality: N/A;    XI ROBOTIC COLECTOMY, RIGHT  Right 3/11/2024    Procedure: XI ROBOTIC COLECTOMY, RIGHT;  Surgeon: Tosin Boothe MD;  Location: [facility];  Service: Colon and Rectal;  Laterality: Right;  WITH OMENECTOMY AND AMNIOFIX      Pre-op Assessment    I have reviewed the Patient Summary Reports.     I have reviewed the Nursing Notes. I have reviewed the NPO Status.   I have reviewed the Medications.     Review of Systems  Anesthesia Hx:  No previous Anesthesia   History of prior surgery of interest to airway management or planning:  Previous anesthesia: General        Denies Family Hx of Anesthesia complications.    Denies Personal Hx of Anesthesia complications.                    Social:  Non-Smoker, Former Smoker       Hematology/Oncology:    Oncology Normal    -- Anemia:                                  EENT/Dental:  EENT/Dental Normal           Cardiovascular:  Exercise tolerance: good   Hypertension           hyperlipidemia    Hx a-fib    Seen by cards:  Normal nuclear stress test reviewed.    Okay to undergo his carotid endarterectomy.    Continue with bisoprolol perioperatively.                             Pulmonary:  Pulmonary Normal                       Renal/:  Chronic Renal Disease (Stage 3), CKD                Hepatic/GI:  Hepatic/GI Normal                 Musculoskeletal:         Spine Disorders: (Lumbar Radiculop)             Neurological:  Neurology Normal                                      Endocrine:  Diabetes         Obesity / BMI > 30  Dermatological:  Skin Normal    Psych:  Psychiatric Normal                    Physical Exam  General: Well nourished    Airway:  Mallampati: III / II  Mouth Opening: Normal  TM Distance: Normal  Tongue: Normal  Neck ROM: Normal ROM    Dental:  Intact    Chest/Lungs:  Clear to auscultation    Heart:  Rate: Normal  Rhythm: Regular Rhythm      Lab Results   Component Value Date    WBC 6.87 04/24/2024    HGB 9.5 (L) 04/24/2024    HCT 29.1 (L) 04/24/2024    MCV 85 04/24/2024     04/24/2024        Chemistry        Component Value Date/Time     04/01/2024 0442    K 4.4 04/01/2024 0442     04/01/2024 0442    CO2 19 (L) 04/01/2024 0442    BUN 16 04/01/2024 0442    CREATININE 1.3 04/01/2024 0442     04/01/2024 0442        Component Value Date/Time    CALCIUM 7.9 (L) 04/01/2024 0442    ALKPHOS 49 (L) 03/29/2024 1457    AST 23 03/29/2024 1457    ALT 32 03/29/2024 1457    BILITOT 0.4 03/29/2024 1457    ESTGFRAFRICA >60.0 05/30/2022 0958    EGFRNONAA >60.0 05/30/2022 0958          Last EKG 1/19  Sinus tavo Incomp RBB  Echo 10/23    Left Ventricle: The left ventricle is normal in size. Moderately increased ventricular mass. Moderately increased wall thickness. There is concentric hypertrophy. Normal wall motion. There is normal systolic function with a visually estimated ejection fraction of 55 - 60%. There is normal diastolic function.    Left Atrium: Left atrium is mildly dilated.    Right Ventricle: Normal right ventricular cavity size. Wall thickness is normal. Right ventricle wall motion  is normal. Systolic function is normal.    Aortic Valve: There is mild to moderate stenosis. Aortic valve area by VTI is 1.11 cm². Aortic valve peak velocity is 2.52 m/s. Mean gradient is 17 mmHg. The dimensionless index is 0.36.    Mitral Valve: There is mild mitral annular calcification present.    Pulmonary Artery: The estimated pulmonary artery systolic pressure is 20 mmHg.    IVC/SVC: Normal venous pressure at 3 mmHg.      Anesthesia Plan  Type of Anesthesia, risks & benefits discussed:    Anesthesia Type: Gen ETT  Intra-op Monitoring Plan: Standard ASA Monitors and Art Line  Post Op Pain Control Plan: multimodal analgesia and IV/PO Opioids PRN  Induction:  IV  Airway Plan: Direct  Informed Consent: Informed consent signed with the Patient and all parties understand the risks and agree with anesthesia plan.  All questions answered.   ASA Score: 3  Day of Surgery Review of History & Physical: H&P  Update referred to the surgeon/provider.I have interviewed and examined the patient. I have reviewed the patient's H&P dated: There are no significant changes. H&P completed by Anesthesiologist.    Ready For Surgery From Anesthesia Perspective.     .

## 2024-04-24 NOTE — TELEPHONE ENCOUNTER
----- Message from Onelia Bradshaw MA sent at 4/24/2024  2:57 PM CDT -----  Regarding: RE: SURGERY 4/25  Our surgery department just spoke to the patients daughter, they got the VM and are aware. They will be there at 5:30AM tomorrow morning.     Thank you  ----- Message -----  From: Nabila Johnson RN  Sent: 4/24/2024   2:44 PM CDT  To: [facility] Staff  Subject: SURGERY 4/25                                     Good Afternoon,    Unable to reach pt to review arrival time for surgery tomorrow.  Arrival time 5:30 am  Surgery scheduled for 4/25/24    Thank you,    Nabila BHAKTA

## 2024-04-24 NOTE — TELEPHONE ENCOUNTER
Called and LVM about the following:     Please arrive to [facility] ([facility] [place]) at 5:30 AM on 4/25/24 for your scheduled procedure.  Address: [address] [place] [address] [place] [address] ([address] Building on left, 1st Floor Lobby)  >>>NO eating or drinking after midnight unless instructed otherwise by your Surgeon<<<

## 2024-04-25 ENCOUNTER — ANESTHESIA (OUTPATIENT)
Dept: SURGERY | Facility: HOSPITAL | Age: 74
DRG: 039 | End: 2024-04-25
Payer: MEDICARE

## 2024-04-25 ENCOUNTER — HOSPITAL ENCOUNTER (INPATIENT)
Facility: HOSPITAL | Age: 74
LOS: 1 days | Discharge: HOME | DRG: 039 | End: 2024-04-26
Attending: STUDENT IN AN ORGANIZED HEALTH CARE EDUCATION/TRAINING PROGRAM | Admitting: STUDENT IN AN ORGANIZED HEALTH CARE EDUCATION/TRAINING PROGRAM
Payer: MEDICARE

## 2024-04-25 DIAGNOSIS — R73.03 PREDIABETES: ICD-10-CM

## 2024-04-25 DIAGNOSIS — R00.1 BRADYCARDIA: ICD-10-CM

## 2024-04-25 DIAGNOSIS — I65.22 STENOSIS OF LEFT CAROTID ARTERY: ICD-10-CM

## 2024-04-25 DIAGNOSIS — I65.22 CAROTID STENOSIS, ASYMPTOMATIC, LEFT: Primary | ICD-10-CM

## 2024-04-25 DIAGNOSIS — I10 ESSENTIAL HYPERTENSION: ICD-10-CM

## 2024-04-25 PROBLEM — Z98.890 S/P CAROTID ENDARTERECTOMY: Status: ACTIVE | Noted: 2024-04-25

## 2024-04-25 LAB
ABO + RH BLD: NORMAL
BLD GP AB SCN CELLS X3 SERPL QL: NORMAL
POCT GLUCOSE: 139 MG/DL (ref 70–110)
POCT GLUCOSE: 147 MG/DL (ref 70–110)

## 2024-04-25 PROCEDURE — 25000003 PHARM REV CODE 250: Performed by: STUDENT IN AN ORGANIZED HEALTH CARE EDUCATION/TRAINING PROGRAM

## 2024-04-25 PROCEDURE — C1768 GRAFT, VASCULAR: HCPCS | Performed by: STUDENT IN AN ORGANIZED HEALTH CARE EDUCATION/TRAINING PROGRAM

## 2024-04-25 PROCEDURE — 36000707: Performed by: STUDENT IN AN ORGANIZED HEALTH CARE EDUCATION/TRAINING PROGRAM

## 2024-04-25 PROCEDURE — 36000706: Performed by: STUDENT IN AN ORGANIZED HEALTH CARE EDUCATION/TRAINING PROGRAM

## 2024-04-25 PROCEDURE — 03CL0ZZ EXTIRPATION OF MATTER FROM LEFT INTERNAL CAROTID ARTERY, OPEN APPROACH: ICD-10-PCS | Performed by: STUDENT IN AN ORGANIZED HEALTH CARE EDUCATION/TRAINING PROGRAM

## 2024-04-25 PROCEDURE — 63600175 PHARM REV CODE 636 W HCPCS: Performed by: STUDENT IN AN ORGANIZED HEALTH CARE EDUCATION/TRAINING PROGRAM

## 2024-04-25 PROCEDURE — 71000039 HC RECOVERY, EACH ADD'L HOUR: Performed by: STUDENT IN AN ORGANIZED HEALTH CARE EDUCATION/TRAINING PROGRAM

## 2024-04-25 PROCEDURE — 27201423 OPTIME MED/SURG SUP & DEVICES STERILE SUPPLY: Performed by: STUDENT IN AN ORGANIZED HEALTH CARE EDUCATION/TRAINING PROGRAM

## 2024-04-25 PROCEDURE — 86901 BLOOD TYPING SEROLOGIC RH(D): CPT | Performed by: ANESTHESIOLOGY

## 2024-04-25 PROCEDURE — 03UL0KZ SUPPLEMENT LEFT INTERNAL CAROTID ARTERY WITH NONAUTOLOGOUS TISSUE SUBSTITUTE, OPEN APPROACH: ICD-10-PCS | Performed by: STUDENT IN AN ORGANIZED HEALTH CARE EDUCATION/TRAINING PROGRAM

## 2024-04-25 PROCEDURE — 35301 RECHANNELING OF ARTERY: CPT | Mod: LT,,, | Performed by: STUDENT IN AN ORGANIZED HEALTH CARE EDUCATION/TRAINING PROGRAM

## 2024-04-25 PROCEDURE — 21400001 HC TELEMETRY ROOM

## 2024-04-25 PROCEDURE — 71000033 HC RECOVERY, INTIAL HOUR: Performed by: STUDENT IN AN ORGANIZED HEALTH CARE EDUCATION/TRAINING PROGRAM

## 2024-04-25 PROCEDURE — 11000001 HC ACUTE MED/SURG PRIVATE ROOM

## 2024-04-25 PROCEDURE — 37000009 HC ANESTHESIA EA ADD 15 MINS: Performed by: STUDENT IN AN ORGANIZED HEALTH CARE EDUCATION/TRAINING PROGRAM

## 2024-04-25 PROCEDURE — 37000008 HC ANESTHESIA 1ST 15 MINUTES: Performed by: STUDENT IN AN ORGANIZED HEALTH CARE EDUCATION/TRAINING PROGRAM

## 2024-04-25 DEVICE — VASCU-GUARD IS PREPARED FROM BOVINE PERICARDIUM CROSS-LINKED WITH GLUTARALDEHYDE, AND TREATED WITH 1 MOLAR SODIUM HYDROXIDE FOR A MINIMUM OF 60 MINUTES AT 20-25 DEGREES C. VASCU-GUARD IS TERMINALLY STERILIZED USING GAMMA IRRADIATION. AND PACKAGED WITHIN A DOUBLE-POUCH SYSTEM. THE CONTENTS OF THE UNOPENED, UNDAMAGED OUTER POUCH ARE STERILE.
Type: IMPLANTABLE DEVICE | Site: CAROTID | Status: FUNCTIONAL
Brand: VASCU-GUARD

## 2024-04-25 RX ORDER — GLUCAGON 1 MG
1 KIT INJECTION
Status: DISCONTINUED | OUTPATIENT
Start: 2024-04-25 | End: 2024-04-26 | Stop reason: HOSPADM

## 2024-04-25 RX ORDER — FENTANYL CITRATE 50 UG/ML
25 INJECTION, SOLUTION INTRAMUSCULAR; INTRAVENOUS EVERY 5 MIN PRN
Status: DISCONTINUED | OUTPATIENT
Start: 2024-04-25 | End: 2024-04-25 | Stop reason: HOSPADM

## 2024-04-25 RX ORDER — CHLORHEXIDINE GLUCONATE ORAL RINSE 1.2 MG/ML
10 SOLUTION DENTAL
Status: DISCONTINUED | OUTPATIENT
Start: 2024-04-25 | End: 2024-04-25 | Stop reason: HOSPADM

## 2024-04-25 RX ORDER — ROCURONIUM BROMIDE 10 MG/ML
INJECTION, SOLUTION INTRAVENOUS
Status: DISCONTINUED | OUTPATIENT
Start: 2024-04-25 | End: 2024-04-25

## 2024-04-25 RX ORDER — OXYCODONE AND ACETAMINOPHEN 5; 325 MG/1; MG/1
1 TABLET ORAL
Status: DISCONTINUED | OUTPATIENT
Start: 2024-04-25 | End: 2024-04-25 | Stop reason: HOSPADM

## 2024-04-25 RX ORDER — ONDANSETRON HYDROCHLORIDE 2 MG/ML
4 INJECTION, SOLUTION INTRAVENOUS EVERY 12 HOURS PRN
Status: DISCONTINUED | OUTPATIENT
Start: 2024-04-25 | End: 2024-04-26 | Stop reason: HOSPADM

## 2024-04-25 RX ORDER — FUROSEMIDE 40 MG/1
40 TABLET ORAL DAILY PRN
Status: DISCONTINUED | OUTPATIENT
Start: 2024-04-25 | End: 2024-04-26 | Stop reason: HOSPADM

## 2024-04-25 RX ORDER — ATORVASTATIN CALCIUM 10 MG/1
20 TABLET, FILM COATED ORAL DAILY
Status: DISCONTINUED | OUTPATIENT
Start: 2024-04-26 | End: 2024-04-25 | Stop reason: SDUPTHER

## 2024-04-25 RX ORDER — ONDANSETRON HYDROCHLORIDE 2 MG/ML
4 INJECTION, SOLUTION INTRAVENOUS ONCE AS NEEDED
Status: DISCONTINUED | OUTPATIENT
Start: 2024-04-25 | End: 2024-04-25 | Stop reason: HOSPADM

## 2024-04-25 RX ORDER — HEPARIN SODIUM 1000 [USP'U]/ML
INJECTION, SOLUTION INTRAVENOUS; SUBCUTANEOUS
Status: DISCONTINUED | OUTPATIENT
Start: 2024-04-25 | End: 2024-04-25

## 2024-04-25 RX ORDER — FENOFIBRATE 48 MG/1
48 TABLET, FILM COATED ORAL DAILY
Status: DISCONTINUED | OUTPATIENT
Start: 2024-04-25 | End: 2024-04-26 | Stop reason: HOSPADM

## 2024-04-25 RX ORDER — ONDANSETRON HYDROCHLORIDE 2 MG/ML
4 INJECTION, SOLUTION INTRAVENOUS DAILY PRN
Status: DISCONTINUED | OUTPATIENT
Start: 2024-04-25 | End: 2024-04-25 | Stop reason: HOSPADM

## 2024-04-25 RX ORDER — PHENYLEPHRINE HYDROCHLORIDE 10 MG/ML
INJECTION INTRAVENOUS
Status: DISCONTINUED | OUTPATIENT
Start: 2024-04-25 | End: 2024-04-25

## 2024-04-25 RX ORDER — LIDOCAINE HYDROCHLORIDE 20 MG/ML
INJECTION INTRAVENOUS
Status: DISCONTINUED | OUTPATIENT
Start: 2024-04-25 | End: 2024-04-25

## 2024-04-25 RX ORDER — NAPROXEN SODIUM 220 MG/1
81 TABLET, FILM COATED ORAL DAILY
Status: DISCONTINUED | OUTPATIENT
Start: 2024-04-25 | End: 2024-04-26 | Stop reason: HOSPADM

## 2024-04-25 RX ORDER — FENOFIBRATE 54 MG/1
54 TABLET ORAL DAILY
Status: DISCONTINUED | OUTPATIENT
Start: 2024-04-25 | End: 2024-04-25

## 2024-04-25 RX ORDER — DEXAMETHASONE SODIUM PHOSPHATE 4 MG/ML
INJECTION, SOLUTION INTRA-ARTICULAR; INTRALESIONAL; INTRAMUSCULAR; INTRAVENOUS; SOFT TISSUE
Status: DISCONTINUED | OUTPATIENT
Start: 2024-04-25 | End: 2024-04-25

## 2024-04-25 RX ORDER — PROTAMINE SULFATE 10 MG/ML
INJECTION, SOLUTION INTRAVENOUS
Status: DISCONTINUED | OUTPATIENT
Start: 2024-04-25 | End: 2024-04-25

## 2024-04-25 RX ORDER — FENTANYL CITRATE 50 UG/ML
INJECTION, SOLUTION INTRAMUSCULAR; INTRAVENOUS
Status: DISCONTINUED | OUTPATIENT
Start: 2024-04-25 | End: 2024-04-25

## 2024-04-25 RX ORDER — IBUPROFEN 200 MG
16 TABLET ORAL
Status: DISCONTINUED | OUTPATIENT
Start: 2024-04-25 | End: 2024-04-26 | Stop reason: HOSPADM

## 2024-04-25 RX ORDER — METOPROLOL TARTRATE 50 MG/1
50 TABLET ORAL 2 TIMES DAILY
Status: DISCONTINUED | OUTPATIENT
Start: 2024-04-25 | End: 2024-04-26

## 2024-04-25 RX ORDER — INSULIN ASPART 100 [IU]/ML
0-5 INJECTION, SOLUTION INTRAVENOUS; SUBCUTANEOUS EVERY 6 HOURS PRN
Status: DISCONTINUED | OUTPATIENT
Start: 2024-04-25 | End: 2024-04-26 | Stop reason: HOSPADM

## 2024-04-25 RX ORDER — EPHEDRINE SULFATE 50 MG/ML
INJECTION, SOLUTION INTRAVENOUS
Status: DISCONTINUED | OUTPATIENT
Start: 2024-04-25 | End: 2024-04-25

## 2024-04-25 RX ORDER — CLOPIDOGREL BISULFATE 75 MG/1
75 TABLET ORAL DAILY
Status: DISCONTINUED | OUTPATIENT
Start: 2024-04-25 | End: 2024-04-26 | Stop reason: HOSPADM

## 2024-04-25 RX ORDER — ONDANSETRON HYDROCHLORIDE 2 MG/ML
INJECTION, SOLUTION INTRAVENOUS
Status: DISCONTINUED | OUTPATIENT
Start: 2024-04-25 | End: 2024-04-25

## 2024-04-25 RX ORDER — MEPERIDINE HYDROCHLORIDE 25 MG/ML
12.5 INJECTION INTRAMUSCULAR; INTRAVENOUS; SUBCUTANEOUS ONCE AS NEEDED
Status: DISCONTINUED | OUTPATIENT
Start: 2024-04-25 | End: 2024-04-25 | Stop reason: HOSPADM

## 2024-04-25 RX ORDER — HEPARIN SODIUM 1000 [USP'U]/ML
INJECTION, SOLUTION INTRAVENOUS; SUBCUTANEOUS
Status: DISCONTINUED | OUTPATIENT
Start: 2024-04-25 | End: 2024-04-25 | Stop reason: HOSPADM

## 2024-04-25 RX ORDER — CHLORHEXIDINE GLUCONATE ORAL RINSE 1.2 MG/ML
10 SOLUTION DENTAL 2 TIMES DAILY
Status: DISCONTINUED | OUTPATIENT
Start: 2024-04-25 | End: 2024-04-26 | Stop reason: HOSPADM

## 2024-04-25 RX ORDER — SODIUM CHLORIDE 0.9 % (FLUSH) 0.9 %
10 SYRINGE (ML) INJECTION
Status: DISCONTINUED | OUTPATIENT
Start: 2024-04-25 | End: 2024-04-25 | Stop reason: HOSPADM

## 2024-04-25 RX ORDER — CEFAZOLIN SODIUM 2 G/50ML
2 SOLUTION INTRAVENOUS
Status: DISCONTINUED | OUTPATIENT
Start: 2024-04-25 | End: 2024-04-25 | Stop reason: HOSPADM

## 2024-04-25 RX ORDER — HYDROCODONE BITARTRATE AND ACETAMINOPHEN 5; 325 MG/1; MG/1
1 TABLET ORAL EVERY 4 HOURS PRN
Status: DISCONTINUED | OUTPATIENT
Start: 2024-04-25 | End: 2024-04-26 | Stop reason: HOSPADM

## 2024-04-25 RX ORDER — PREGABALIN 75 MG/1
75 CAPSULE ORAL 2 TIMES DAILY
Status: DISCONTINUED | OUTPATIENT
Start: 2024-04-25 | End: 2024-04-26 | Stop reason: HOSPADM

## 2024-04-25 RX ORDER — LIDOCAINE HYDROCHLORIDE 10 MG/ML
1 INJECTION, SOLUTION EPIDURAL; INFILTRATION; INTRACAUDAL; PERINEURAL ONCE
Status: DISCONTINUED | OUTPATIENT
Start: 2024-04-25 | End: 2024-04-25 | Stop reason: HOSPADM

## 2024-04-25 RX ORDER — IBUPROFEN 200 MG
24 TABLET ORAL
Status: DISCONTINUED | OUTPATIENT
Start: 2024-04-25 | End: 2024-04-26 | Stop reason: HOSPADM

## 2024-04-25 RX ORDER — ATORVASTATIN CALCIUM 40 MG/1
40 TABLET, FILM COATED ORAL DAILY
Status: DISCONTINUED | OUTPATIENT
Start: 2024-04-25 | End: 2024-04-26 | Stop reason: HOSPADM

## 2024-04-25 RX ORDER — HYDROMORPHONE HYDROCHLORIDE 2 MG/ML
0.2 INJECTION, SOLUTION INTRAMUSCULAR; INTRAVENOUS; SUBCUTANEOUS EVERY 5 MIN PRN
Status: DISCONTINUED | OUTPATIENT
Start: 2024-04-25 | End: 2024-04-25 | Stop reason: HOSPADM

## 2024-04-25 RX ORDER — METFORMIN HYDROCHLORIDE 500 MG/1
500 TABLET ORAL 2 TIMES DAILY WITH MEALS
Status: DISCONTINUED | OUTPATIENT
Start: 2024-04-25 | End: 2024-04-25

## 2024-04-25 RX ORDER — PROPOFOL 10 MG/ML
VIAL (ML) INTRAVENOUS
Status: DISCONTINUED | OUTPATIENT
Start: 2024-04-25 | End: 2024-04-25

## 2024-04-25 RX ORDER — LIDOCAINE HYDROCHLORIDE 10 MG/ML
INJECTION, SOLUTION EPIDURAL; INFILTRATION; INTRACAUDAL; PERINEURAL
Status: DISCONTINUED | OUTPATIENT
Start: 2024-04-25 | End: 2024-04-25 | Stop reason: HOSPADM

## 2024-04-25 RX ORDER — ENOXAPARIN SODIUM 100 MG/ML
40 INJECTION SUBCUTANEOUS EVERY 24 HOURS
Status: DISCONTINUED | OUTPATIENT
Start: 2024-04-25 | End: 2024-04-25

## 2024-04-25 RX ORDER — AMLODIPINE BESYLATE 5 MG/1
5 TABLET ORAL DAILY
Status: DISCONTINUED | OUTPATIENT
Start: 2024-04-26 | End: 2024-04-26 | Stop reason: HOSPADM

## 2024-04-25 RX ADMIN — PHENYLEPHRINE HYDROCHLORIDE 50 MCG: 10 INJECTION INTRAVENOUS at 08:04

## 2024-04-25 RX ADMIN — LIDOCAINE HYDROCHLORIDE 100 MG: 20 INJECTION INTRAVENOUS at 07:04

## 2024-04-25 RX ADMIN — ASPIRIN 81 MG CHEWABLE TABLET 81 MG: 81 TABLET CHEWABLE at 02:04

## 2024-04-25 RX ADMIN — FENOFIBRATE 48 MG: 48 TABLET, FILM COATED ORAL at 02:04

## 2024-04-25 RX ADMIN — SUGAMMADEX 200 MG: 100 INJECTION, SOLUTION INTRAVENOUS at 09:04

## 2024-04-25 RX ADMIN — HEPARIN SODIUM 10000 UNITS: 1000 INJECTION, SOLUTION INTRAVENOUS; SUBCUTANEOUS at 08:04

## 2024-04-25 RX ADMIN — EPHEDRINE SULFATE 10 MG: 50 INJECTION INTRAVENOUS at 09:04

## 2024-04-25 RX ADMIN — PREGABALIN 75 MG: 75 CAPSULE ORAL at 02:04

## 2024-04-25 RX ADMIN — EPHEDRINE SULFATE 5 MG: 50 INJECTION INTRAVENOUS at 08:04

## 2024-04-25 RX ADMIN — FENTANYL CITRATE 50 MCG: 50 INJECTION, SOLUTION INTRAMUSCULAR; INTRAVENOUS at 09:04

## 2024-04-25 RX ADMIN — PROTAMINE SULFATE 40 MG: 10 INJECTION, SOLUTION INTRAVENOUS at 09:04

## 2024-04-25 RX ADMIN — DEXAMETHASONE SODIUM PHOSPHATE 4 MG: 4 INJECTION, SOLUTION INTRA-ARTICULAR; INTRALESIONAL; INTRAMUSCULAR; INTRAVENOUS; SOFT TISSUE at 07:04

## 2024-04-25 RX ADMIN — SODIUM CHLORIDE, SODIUM LACTATE, POTASSIUM CHLORIDE, AND CALCIUM CHLORIDE: .6; .31; .03; .02 INJECTION, SOLUTION INTRAVENOUS at 07:04

## 2024-04-25 RX ADMIN — ROCURONIUM BROMIDE 40 MG: 10 SOLUTION INTRAVENOUS at 07:04

## 2024-04-25 RX ADMIN — ONDANSETRON 4 MG: 2 INJECTION INTRAMUSCULAR; INTRAVENOUS at 09:04

## 2024-04-25 RX ADMIN — FENTANYL CITRATE 50 MCG: 50 INJECTION, SOLUTION INTRAMUSCULAR; INTRAVENOUS at 07:04

## 2024-04-25 RX ADMIN — ROCURONIUM BROMIDE 20 MG: 10 SOLUTION INTRAVENOUS at 08:04

## 2024-04-25 RX ADMIN — PHENYLEPHRINE HYDROCHLORIDE 100 MCG: 10 INJECTION INTRAVENOUS at 09:04

## 2024-04-25 RX ADMIN — PHENYLEPHRINE HYDROCHLORIDE 200 MCG: 10 INJECTION INTRAVENOUS at 07:04

## 2024-04-25 RX ADMIN — CLOPIDOGREL BISULFATE 75 MG: 75 TABLET ORAL at 02:04

## 2024-04-25 RX ADMIN — EPHEDRINE SULFATE 10 MG: 50 INJECTION INTRAVENOUS at 08:04

## 2024-04-25 RX ADMIN — PROPOFOL 70 MG: 10 INJECTION, EMULSION INTRAVENOUS at 07:04

## 2024-04-25 RX ADMIN — PREGABALIN 75 MG: 75 CAPSULE ORAL at 10:04

## 2024-04-25 RX ADMIN — ROCURONIUM BROMIDE 10 MG: 10 SOLUTION INTRAVENOUS at 07:04

## 2024-04-25 RX ADMIN — CHLORHEXIDINE GLUCONATE 0.12% ORAL RINSE 10 ML: 1.2 LIQUID ORAL at 10:04

## 2024-04-25 RX ADMIN — SODIUM CHLORIDE, SODIUM LACTATE, POTASSIUM CHLORIDE, AND CALCIUM CHLORIDE: .6; .31; .03; .02 INJECTION, SOLUTION INTRAVENOUS at 08:04

## 2024-04-25 RX ADMIN — GLYCOPYRROLATE 0.2 MG: 0.2 INJECTION, SOLUTION INTRAMUSCULAR; INTRAVENOUS at 07:04

## 2024-04-25 RX ADMIN — METOPROLOL TARTRATE 50 MG: 50 TABLET, FILM COATED ORAL at 10:04

## 2024-04-25 RX ADMIN — ATORVASTATIN CALCIUM 40 MG: 40 TABLET, FILM COATED ORAL at 02:04

## 2024-04-25 RX ADMIN — PHENYLEPHRINE HYDROCHLORIDE 100 MCG: 10 INJECTION INTRAVENOUS at 07:04

## 2024-04-25 NOTE — SUBJECTIVE & OBJECTIVE
Past Medical History:   Diagnosis Date    Diabetes mellitus     Hyperlipidemia     Hypertension        Past Surgical History:   Procedure Laterality Date    BIOPSY OF PERITONEUM N/A 1/29/2024    Procedure: BIOPSY, PERITONEUM;  Surgeon: Tosin Boothe MD;  Location: [facility] OR;  Service: Colon and Rectal;  Laterality: N/A;    CLOSURE OF WOUND N/A 3/16/2024    Procedure: CLOSURE, WOUND;  Surgeon: Magda Stark MD;  Location: [facility] OR;  Service: General;  Laterality: N/A;    COLONOSCOPY N/A 1/5/2024    Procedure: COLONOSCOPY;  Surgeon: Irasema Gil MD;  Location: [facility] ENDO;  Service: Endoscopy;  Laterality: N/A;    DIAGNOSTIC LAPAROSCOPY N/A 1/29/2024    Procedure: LAPAROSCOPY, DIAGNOSTIC;  Surgeon: Tosin Boothe MD;  Location: [facility] OR;  Service: Colon and Rectal;  Laterality: N/A;    EPIDURAL STEROID INJECTION N/A 11/13/2023    Procedure: Lumbar L5/S1 IL JOSE;  Surgeon: Oneil Carlson MD;  Location: [facility] PAIN MGT;  Service: Pain Management;  Laterality: N/A;    ESOPHAGOGASTRODUODENOSCOPY N/A 1/5/2024    Procedure: EGD (ESOPHAGOGASTRODUODENOSCOPY);  Surgeon: Irasema Gil MD;  Location: [facility];  Service: Endoscopy;  Laterality: N/A;    INJECTION OF ANESTHETIC AGENT INTO TISSUE PLANE DEFINED BY TRANSVERSUS ABDOMINIS MUSCLE N/A 3/11/2024    Procedure: BLOCK, TRANSVERSUS ABDOMINIS PLANE;  Surgeon: Tosin Boothe MD;  Location: [facility] OR;  Service: Colon and Rectal;  Laterality: N/A;    NO PAST SURGERIES      WOUND EXPLORATION N/A 3/16/2024    Procedure: EXPLORATION, WOUND;  Surgeon: Magda Stark MD;  Location: [facility] OR;  Service: General;  Laterality: N/A;    XI ROBOTIC COLECTOMY, RIGHT Right 3/11/2024    Procedure: XI ROBOTIC COLECTOMY, RIGHT;  Surgeon: Tosin Boothe MD;  Location: [facility] OR;  Service: Colon and Rectal;  Laterality: Right;  WITH OMENECTOMY AND AMNIOFIX       Review of patient's allergies indicates:  No Known Allergies    Current Facility-Administered Medications    Medication Dose Route Frequency Provider Last Rate Last Admin    [START ON 4/26/2024] amLODIPine tablet 5 mg  5 mg Oral Daily Chrissie Parks MD        [START ON 4/26/2024] apixaban tablet 5 mg  5 mg Oral BID Chrissie Parks MD        aspirin chewable tablet 81 mg  81 mg Oral Daily Chrissie Parks MD   81 mg at 04/25/24 1438    atorvastatin tablet 40 mg  40 mg Oral Daily Chrissie Parks MD   40 mg at 04/25/24 1438    chlorhexidine 0.12 % solution 10 mL  10 mL Mouth/Throat BID Chrissie Parks MD        clopidogreL tablet 75 mg  75 mg Oral Daily Chrissie Parks MD   75 mg at 04/25/24 1438    dextrose 10% bolus 125 mL 125 mL  12.5 g Intravenous PRN Chrissie Parks MD        dextrose 10% bolus 250 mL 250 mL  25 g Intravenous PRN Chrissie Parks MD        fenofibrate tablet 48 mg  48 mg Oral Daily Chrissie Parks MD   48 mg at 04/25/24 1426    furosemide tablet 40 mg  40 mg Oral Daily PRN Chrissie Parks MD        glucagon (human recombinant) injection 1 mg  1 mg Intramuscular PRN Chrissie Parks MD        glucose chewable tablet 16 g  16 g Oral PRN Chrissie Parks MD        glucose chewable tablet 24 g  24 g Oral PRN Chrissie Parks MD        HYDROcodone-acetaminophen 5-325 mg per tablet 1 tablet  1 tablet Oral Q4H PRN Chrissie Parks MD        insulin aspart U-100 pen 0-5 Units  0-5 Units Subcutaneous Q6H PRN Chrissie Parks MD        metoprolol tartrate (LOPRESSOR) tablet 50 mg  50 mg Oral BID Chrissie Parks MD        ondansetron injection 4 mg  4 mg Intravenous Q12H PRN Chrissie Parks MD        pregabalin capsule 75 mg  75 mg Oral BID Chrissie Parks MD   75 mg at 04/25/24 1431     Family History       Problem Relation (Age of Onset)    No Known Problems Mother, Father          Tobacco Use    Smoking status: Former     Passive exposure: Never    Smokeless tobacco: Never   Substance and Sexual Activity    Alcohol use: Not Currently     Comment: OCC    Drug use: Never    Sexual activity: Yes      Partners: Female     Review of Systems   Constitutional:  Positive for activity change. Negative for appetite change, chills, fatigue and fever.   HENT: Negative.  Negative for congestion, ear pain, facial swelling, hearing loss, nosebleeds, postnasal drip, rhinorrhea, sinus pressure, sneezing, sore throat, trouble swallowing and voice change.    Eyes: Negative.  Negative for pain and redness.   Respiratory: Negative.  Negative for cough, chest tightness, shortness of breath and wheezing.    Cardiovascular: Negative.  Negative for chest pain, palpitations and leg swelling.   Gastrointestinal:  Negative for abdominal pain, blood in stool, constipation, diarrhea, nausea and vomiting.   Endocrine: Negative for cold intolerance, heat intolerance, polydipsia, polyphagia and polyuria.   Genitourinary:  Negative for difficulty urinating, dysuria, flank pain, frequency, hematuria, scrotal swelling, testicular pain and urgency.   Musculoskeletal:  Negative for arthralgias, back pain, gait problem, joint swelling, myalgias, neck pain and neck stiffness.   Skin:  Negative for pallor, rash and wound.   Allergic/Immunologic: Negative for environmental allergies and food allergies.   Neurological: Negative.  Negative for dizziness, tremors, seizures, syncope, facial asymmetry, speech difficulty, weakness, light-headedness, numbness and headaches.   Hematological: Negative.  Does not bruise/bleed easily.   Psychiatric/Behavioral: Negative.     All other systems reviewed and are negative.    Objective:     Vital Signs (Most Recent):  Temp: 98.1 °F (36.7 °C) (04/25/24 1644)  Pulse: (!) 50 (04/25/24 1644)  Resp: 18 (04/25/24 1644)  BP: 133/60 (04/25/24 1644)  SpO2: 95 % (04/25/24 1408) Vital Signs (24h Range):  Temp:  [97.8 °F (36.6 °C)-98.1 °F (36.7 °C)] 98.1 °F (36.7 °C)  Pulse:  [47-62] 50  Resp:  [10-35] 18  SpO2:  [93 %-100 %] 95 %  BP: ()/(46-78) 133/60     Weight: 93.9 kg (207 lb 0.2 oz)  Body mass index is 34.45  kg/m².     Physical Exam  Vitals and nursing note reviewed.   Constitutional:       General: He is not in acute distress.     Appearance: He is well-developed. He is not ill-appearing, toxic-appearing or diaphoretic.   HENT:      Head: Normocephalic and atraumatic.      Right Ear: External ear normal.      Left Ear: External ear normal.      Nose: Nose normal. No congestion.      Mouth/Throat:      Mouth: Mucous membranes are moist.      Pharynx: Oropharynx is clear.      Comments: Very narrow oropharynx  Eyes:      General: No scleral icterus.     Conjunctiva/sclera: Conjunctivae normal.      Pupils: Pupils are equal, round, and reactive to light.   Neck:      Comments: +ve swelling and +ve dressing, mildly soaked L neck  Cardiovascular:      Rate and Rhythm: Normal rate and regular rhythm.      Pulses: Normal pulses.      Heart sounds: Normal heart sounds.   Pulmonary:      Effort: Pulmonary effort is normal. No respiratory distress.      Breath sounds: Normal breath sounds. No wheezing or rales.   Abdominal:      General: Abdomen is flat. Bowel sounds are normal. There is no distension.      Palpations: Abdomen is soft.      Tenderness: There is no abdominal tenderness. There is no guarding.   Genitourinary:     Rectum: Normal.      Comments: deferred  Musculoskeletal:         General: Normal range of motion.      Cervical back: Normal range of motion and neck supple. Tenderness present.   Skin:     General: Skin is warm and dry.      Capillary Refill: Capillary refill takes less than 2 seconds.      Coloration: Skin is not jaundiced.   Neurological:      General: No focal deficit present.      Mental Status: He is alert and oriented to person, place, and time.      Deep Tendon Reflexes: Reflexes are normal and symmetric.   Psychiatric:         Behavior: Behavior normal.         Thought Content: Thought content normal.         Judgment: Judgment normal.          Significant Labs: All pertinent labs within the  past 24 hours have been reviewed.  BMP:   Recent Labs   Lab 04/24/24  1151   GLU 92      K 4.3      CO2 24   BUN 15   CREATININE 1.2   CALCIUM 9.1     CBC:   Recent Labs   Lab 04/24/24  1151   WBC 6.87   HGB 9.5*   HCT 29.1*        CMP:   Recent Labs   Lab 04/24/24  1151      K 4.3      CO2 24   GLU 92   BUN 15   CREATININE 1.2   CALCIUM 9.1   ANIONGAP 5*         Significant Imaging: I have reviewed all pertinent imaging results/findings within the past 24 hours.

## 2024-04-25 NOTE — TRANSFER OF CARE
"Anesthesia Transfer of Care Note    Patient: Quintin Ling    Procedure(s) Performed: Procedure(s) (LRB):  ENDARTERECTOMY, CAROTID (Left)    Patient location: PACU    Anesthesia Type: general    Transport from OR: Transported from OR on room air with adequate spontaneous ventilation    Post pain: adequate analgesia    Post assessment: no apparent anesthetic complications and tolerated procedure well    Post vital signs: stable    Level of consciousness: responds to stimulation and sedated    Nausea/Vomiting: no nausea/vomiting    Complications: none    Transfer of care protocol was followedComments: Report given to PACU RN at bedside. Hand off tool used. RN given opportunity to ask questions or clarify concerns. No Concerns verbalized. RN was asked if ready to assume care of patient. RN verbally confirmed. Pt. left in stable condition. SV. Vital Signs Return to Near Baseline. No s/s of distress noted.     Last vitals: Visit Vitals  BP (!) 160/70 (BP Location: Right arm, Patient Position: Lying)   Pulse (!) 58   Temp 36.6 °C (97.9 °F) (Temporal)   Resp 18   Ht 5' 5" (1.651 m)   Wt 93.9 kg (207 lb 0.2 oz)   SpO2 97%   BMI 34.45 kg/m²     "

## 2024-04-25 NOTE — ASSESSMENT & PLAN NOTE
Chronic, controlled. Latest blood pressure and vitals reviewed-     Temp:  [97.8 °F (36.6 °C)-98.1 °F (36.7 °C)]   Pulse:  [47-62]   Resp:  [10-35]   BP: ()/(46-78)   SpO2:  [93 %-100 %] .   Home meds for hypertension were reviewed and noted below.   Hypertension Medications               amLODIPine (NORVASC) 5 MG tablet Take 1 tablet (5 mg total) by mouth once daily.    bisoprolol (ZEBETA) 5 MG tablet Take 1 tablet (5 mg total) by mouth once daily.    furosemide (LASIX) 40 MG tablet Take 1 tablet (40 mg total) by mouth daily as needed (leg swelling).            While in the hospital, will manage blood pressure as follows; Continue home antihypertensive regimen    Will utilize p.r.n. blood pressure medication only if patient's blood pressure greater than 160/100 and he develops symptoms such as worsening chest pain or shortness of breath.

## 2024-04-25 NOTE — TRANSFER OF CARE
"Anesthesia Transfer of Care Note    Patient: Quintin Ling    Procedure(s) Performed: Procedure(s) (LRB):  ENDARTERECTOMY, CAROTID (Left)    Patient location: PACU    Anesthesia Type: general    Transport from OR: Transported from OR on room air with adequate spontaneous ventilation    Post pain: adequate analgesia    Post assessment: no apparent anesthetic complications and tolerated procedure well    Post vital signs: stable    Level of consciousness: responds to stimulation and sedated    Nausea/Vomiting: no nausea/vomiting    Complications: none    Transfer of care protocol was followedComments: Report given to PACU RN at bedside. Hand off tool used. RN given opportunity to ask questions or clarify concerns. No Concerns verbalized. RN was asked if ready to assume care of patient. RN verbally confirmed. Pt. left in stable condition. SV. Vital Signs Return to Near Baseline. No s/s of distress noted.     Last vitals: Visit Vitals  BP (!) 160/70 (BP Location: Right arm, Patient Position: Lying)   Pulse (!) 58   Temp 36.6 °C (97.9 °F) (Temporal)   Resp 18   Ht 5' 5" (1.651 m)   Wt 93.9 kg (207 lb 0.2 oz)   SpO2 97%   BMI 34.45 kg/m²     " "Pt presents to ED with right sided chest pain radiating to back that started 3 weeks ago.  Pt describes that pain as sharp pain \"something is poking me in the inside.\"  Pt states that pain is worse with deep breath in, denies SOB.    "

## 2024-04-25 NOTE — HPI
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 Surgery complicated by fascia dehiscence but pt eventually recovered well.    Pt was admitted again on 3/29/2024 with Syncope. He was outside on his patio and found slouched in his chair by his wife who called EMS. When he woke up, he denied any focal neurological deficits. He denied changes in vision or speech. He denied any prior episodes. Ct head negative. Carotid duplex showed B Carotid Stenosis > 70%, peak systolic velocity 249cm/s on the right and 362cm/s on the left.     Pt underwent L CEA today, 4/25/24 by Dr. Mar- Vascular Surgery and [facility] consulted for medical management. Pt seen in his room post op- doing well, L Neck dressing in place, mildly soaked and mildy blood, no SOB, dysphagia or dysphonia. Tolerated dinner well and swallowed it well. Speech clear. Incisional pain under control with meds.

## 2024-04-25 NOTE — ANESTHESIA PROCEDURE NOTES
Arterial    Diagnosis: BP monitoring    Patient location during procedure: done in OR  Timeout: 4/25/2024 7:27 AM  Procedure end time: 4/25/2024 7:27 AM    Staffing  Authorizing Provider: Lucy Lynn MD  Performing Provider: Kenney Cannon CRNA    Staffing  Performed by: Kenney Cannon CRNA  Authorized by: Lucy Lynn MD    Anesthesiologist was present at the time of the procedure.    Preanesthetic Checklist  Completed: patient identified, IV checked, site marked, risks and benefits discussed, surgical consent, monitors and equipment checked, pre-op evaluation, timeout performed and anesthesia consent givenArterial  Skin Prep: chlorhexidine gluconate  Local Infiltration: none  Orientation: right  Location: radial    Catheter Size: 20 G  Catheter placement by Ultrasound guidance. Heme positive aspiration all ports.   Vessel Caliber: patent  Needle advanced into vessel with real time Ultrasound guidance.Insertion Attempts: 1  Assessment  Dressing: secured with tape and tegaderm  Patient: Tolerated well

## 2024-04-25 NOTE — PLAN OF CARE
Problem: Adult Inpatient Plan of Care  Goal: Plan of Care Review  Outcome: Progressing  Goal: Patient-Specific Goal (Individualized)  Outcome: Progressing  Goal: Absence of Hospital-Acquired Illness or Injury  Outcome: Progressing  Goal: Optimal Comfort and Wellbeing  Outcome: Progressing  Goal: Readiness for Transition of Care  Outcome: Progressing     Problem: Acute Kidney Injury/Impairment  Goal: Fluid and Electrolyte Balance  Outcome: Progressing  Goal: Improved Oral Intake  Outcome: Progressing  Goal: Effective Renal Function  Outcome: Progressing     Problem: Wound  Goal: Optimal Coping  Outcome: Progressing  Goal: Optimal Functional Ability  Outcome: Progressing  Goal: Absence of Infection Signs and Symptoms  Outcome: Progressing  Goal: Improved Oral Intake  Outcome: Progressing  Goal: Optimal Pain Control and Function  Outcome: Progressing  Goal: Skin Health and Integrity  Outcome: Progressing  Goal: Optimal Wound Healing  Outcome: Progressing     Problem: Fall Injury Risk  Goal: Absence of Fall and Fall-Related Injury  Outcome: Progressing

## 2024-04-25 NOTE — OP NOTE
Pre operative diagnosis: Left carotid artery stenosis    Post operative diagnosis: Left carotid artery stenosis    Date of surgery 4/25/2024    Surgeon: Dr. Parks    Assistant: Yarelis MACDONALD, skilled hands were essential to the the procedure as there was no available resident in the facility to assist.       Procedure: Left carotid endarterectomy with bovine patch angioplasty and intraoperative EEG monitoring    Operation:   Patient was taken to the operating room and placed on the table in a supine fashion.  The Left neck and chest were prepped and draped in the standard fashion.  A standard Left carotid exposure was performed.  The platysma was divided.  The sternocleidomastoid muscle was identified and retracted laterally.  This exposed the Leftinternal jugular vein where the facial vein was identified and divided between silk ties.  This exposed the carotid bifurcation.    Control of the Left common carotid artery was obtained with an umbilical tape and a Phan tourniquet.  Next I dissected up to the bifurcation and then up the internal carotid artery past the level of obvious disease.  I skeletonized the internal carotid artery at this level so that a distal clamp could be placed the appropriate time.  Control of the origin of the external carotid artery was obtained with a Silastic vessel loop.  The patient was systemically heparinized.    A distal followed by proximal clamp was placed.  A longitudinal arteriotomy was created extending from the common carotid artery to the bifurcation an then up the internal carotid artery past the level of disease.There were no EEG changes so no shunt was used  .  An endarterectomy plane was established in the common carotid artery and transected at the proximal extent of the arteriotomy.  Using an elevator the endarterectomy plane was extended to the bifurcation where an eversion technique was used on the external carotid artery.  Lastly, I extended the  endarterectomy plane up the internal carotid artery past the level of disease where a nice distal margin was obtained.  Heparinized saline solution was irrigated across the endarterectomy surface and all loose particulate matter was removed.    A bovine pericardial patch was used to close the arteriotomy site with 6-0 Prolene suture.  Just prior to completing the anastomosis all vessels were flushed.  Once complete, flow was reestablished first up the external then up the internal carotid artery.  Meticulous hemostasis was obtained. No drain was placed.  Protamine was given for heparin reversal.  The wound was closed in 2 layers.    Post-op Plan:  Will be admitted to the hospital for observation, medical management and pain control      Chrissie Parks  4/25/2024  10:47 AM

## 2024-04-25 NOTE — CONSULTS
[facility]  Consult Note    Patient Name: Quintin Ling  MRN: [id number]  Admission Date: 4/25/2024  Hospital Length of Stay: 0 days  Attending Physician: Chrissie Parks MD   Primary Care Provider: Bishop Gabriel MD           Patient information was obtained from patient, past medical records, ER records, and primary team.     Consults  Subjective:     Principal Problem: S/P carotid endarterectomy    Chief Complaint: No chief complaint on file.       HPI:     73-year-old white man with hx of HTN, HLP, prediabetes, obesity, ED, CKD IIIA, lumbar radiculopathy, normocytic anemia, PVD and recent hospitalization 3/11-03/12/2024 for colon adenoma s/p robotic right colectomy on 03/11/2024, Pathology with tubulovillous adenoma with multifocal high-grade dysplasia, and multiple tubular adenomas. Surgery complicated by fascia dehiscence but pt eventually recovered well.    Pt was admitted again on 3/29/2024 with Syncope. He was outside on his patio and found slouched in his chair by his wife who called EMS. When he woke up, he denied any focal neurological deficits. He denied changes in vision or speech. He denied any prior episodes. Ct head negative. Carotid duplex showed B Carotid Stenosis > 70%, peak systolic velocity 249cm/s on the right and 362cm/s on the left.     Pt underwent L CEA today, 4/25/24 by Dr. Mar- Vascular Surgery and \[facility]\"" consulted for medical management. Pt seen in his room post op- doing well, L Neck dressing in place, mildly soaked and mildy blood, no SOB, dysphagia or dysphonia. Tolerated dinner well and swallowed it well. Speech clear. Incisional pain under control with meds.            Past Medical History:   Diagnosis Date    Diabetes mellitus     Hyperlipidemia     Hypertension        Past Surgical History:   Procedure Laterality Date    BIOPSY OF PERITONEUM N/A 1/29/2024    Procedure: BIOPSY, PERITONEUM;  Surgeon: Tosin Boothe MD;  Location: [facility] OR;   Service: Colon and Rectal;  Laterality: N/A;    CLOSURE OF WOUND N/A 3/16/2024    Procedure: CLOSURE, WOUND;  Surgeon: Magda Stark MD;  Location: [facility] OR;  Service: General;  Laterality: N/A;    COLONOSCOPY N/A 1/5/2024    Procedure: COLONOSCOPY;  Surgeon: Irasema Gil MD;  Location: [facility] ENDO;  Service: Endoscopy;  Laterality: N/A;    DIAGNOSTIC LAPAROSCOPY N/A 1/29/2024    Procedure: LAPAROSCOPY, DIAGNOSTIC;  Surgeon: Tosin Boothe MD;  Location: [facility] OR;  Service: Colon and Rectal;  Laterality: N/A;    EPIDURAL STEROID INJECTION N/A 11/13/2023    Procedure: Lumbar L5/S1 IL JOSE;  Surgeon: Oneil Carlson MD;  Location: [facility] PAIN MGT;  Service: Pain Management;  Laterality: N/A;    ESOPHAGOGASTRODUODENOSCOPY N/A 1/5/2024    Procedure: EGD (ESOPHAGOGASTRODUODENOSCOPY);  Surgeon: Irasema Gil MD;  Location: [facility];  Service: Endoscopy;  Laterality: N/A;    INJECTION OF ANESTHETIC AGENT INTO TISSUE PLANE DEFINED BY TRANSVERSUS ABDOMINIS MUSCLE N/A 3/11/2024    Procedure: BLOCK, TRANSVERSUS ABDOMINIS PLANE;  Surgeon: Tosin Boothe MD;  Location: [facility] OR;  Service: Colon and Rectal;  Laterality: N/A;    NO PAST SURGERIES      WOUND EXPLORATION N/A 3/16/2024    Procedure: EXPLORATION, WOUND;  Surgeon: Magda Stark MD;  Location: [facility] OR;  Service: General;  Laterality: N/A;    XI ROBOTIC COLECTOMY, RIGHT Right 3/11/2024    Procedure: XI ROBOTIC COLECTOMY, RIGHT;  Surgeon: Tosin Boothe MD;  Location: [facility] OR;  Service: Colon and Rectal;  Laterality: Right;  WITH OMENECTOMY AND AMNIOFIX       Review of patient's allergies indicates:  No Known Allergies    Current Facility-Administered Medications   Medication Dose Route Frequency Provider Last Rate Last Admin    [START ON 4/26/2024] amLODIPine tablet 5 mg  5 mg Oral Daily Chrissie Parks MD        [START ON 4/26/2024] apixaban tablet 5 mg  5 mg Oral BID Chrissie Parks MD        aspirin chewable tablet 81 mg  81 mg Oral  Daily Chrissie Parks MD   81 mg at 04/25/24 1438    atorvastatin tablet 40 mg  40 mg Oral Daily Chrissie Parks MD   40 mg at 04/25/24 1438    chlorhexidine 0.12 % solution 10 mL  10 mL Mouth/Throat BID Chrissie Parks MD        clopidogreL tablet 75 mg  75 mg Oral Daily Chrissie Parks MD   75 mg at 04/25/24 1438    dextrose 10% bolus 125 mL 125 mL  12.5 g Intravenous PRN Chrissie Parks MD        dextrose 10% bolus 250 mL 250 mL  25 g Intravenous PRN Chrissie Parks MD        fenofibrate tablet 48 mg  48 mg Oral Daily Chrissie Parks MD   48 mg at 04/25/24 1426    furosemide tablet 40 mg  40 mg Oral Daily PRN Chrissie Parks MD        glucagon (human recombinant) injection 1 mg  1 mg Intramuscular PRN Chrissie Parks MD        glucose chewable tablet 16 g  16 g Oral PRN Chrissie Parks MD        glucose chewable tablet 24 g  24 g Oral PRN Chrissie Parks MD        HYDROcodone-acetaminophen 5-325 mg per tablet 1 tablet  1 tablet Oral Q4H PRN Chrissie Parks MD        insulin aspart U-100 pen 0-5 Units  0-5 Units Subcutaneous Q6H PRN Chrissie Parks MD        metoprolol tartrate (LOPRESSOR) tablet 50 mg  50 mg Oral BID Chrissie Parks MD        ondansetron injection 4 mg  4 mg Intravenous Q12H PRN Chrissie Parks MD        pregabalin capsule 75 mg  75 mg Oral BID Chrissie Parks MD   75 mg at 04/25/24 1431     Family History       Problem Relation (Age of Onset)    No Known Problems Mother, Father          Tobacco Use    Smoking status: Former     Passive exposure: Never    Smokeless tobacco: Never   Substance and Sexual Activity    Alcohol use: Not Currently     Comment: OCC    Drug use: Never    Sexual activity: Yes     Partners: Female     Review of Systems   Constitutional:  Positive for activity change. Negative for appetite change, chills, fatigue and fever.   HENT: Negative.  Negative for congestion, ear pain, facial swelling, hearing loss, nosebleeds, postnasal drip, rhinorrhea, sinus  pressure, sneezing, sore throat, trouble swallowing and voice change.    Eyes: Negative.  Negative for pain and redness.   Respiratory: Negative.  Negative for cough, chest tightness, shortness of breath and wheezing.    Cardiovascular: Negative.  Negative for chest pain, palpitations and leg swelling.   Gastrointestinal:  Negative for abdominal pain, blood in stool, constipation, diarrhea, nausea and vomiting.   Endocrine: Negative for cold intolerance, heat intolerance, polydipsia, polyphagia and polyuria.   Genitourinary:  Negative for difficulty urinating, dysuria, flank pain, frequency, hematuria, scrotal swelling, testicular pain and urgency.   Musculoskeletal:  Negative for arthralgias, back pain, gait problem, joint swelling, myalgias, neck pain and neck stiffness.   Skin:  Negative for pallor, rash and wound.   Allergic/Immunologic: Negative for environmental allergies and food allergies.   Neurological: Negative.  Negative for dizziness, tremors, seizures, syncope, facial asymmetry, speech difficulty, weakness, light-headedness, numbness and headaches.   Hematological: Negative.  Does not bruise/bleed easily.   Psychiatric/Behavioral: Negative.     All other systems reviewed and are negative.    Objective:     Vital Signs (Most Recent):  Temp: 98.1 °F (36.7 °C) (04/25/24 1644)  Pulse: (!) 50 (04/25/24 1644)  Resp: 18 (04/25/24 1644)  BP: 133/60 (04/25/24 1644)  SpO2: 95 % (04/25/24 1408) Vital Signs (24h Range):  Temp:  [97.8 °F (36.6 °C)-98.1 °F (36.7 °C)] 98.1 °F (36.7 °C)  Pulse:  [47-62] 50  Resp:  [10-35] 18  SpO2:  [93 %-100 %] 95 %  BP: ()/(46-78) 133/60     Weight: 93.9 kg (207 lb 0.2 oz)  Body mass index is 34.45 kg/m².     Physical Exam  Vitals and nursing note reviewed.   Constitutional:       General: He is not in acute distress.     Appearance: He is well-developed. He is not ill-appearing, toxic-appearing or diaphoretic.   HENT:      Head: Normocephalic and atraumatic.      Right Ear:  External ear normal.      Left Ear: External ear normal.      Nose: Nose normal. No congestion.      Mouth/Throat:      Mouth: Mucous membranes are moist.      Pharynx: Oropharynx is clear.      Comments: Very narrow oropharynx  Eyes:      General: No scleral icterus.     Conjunctiva/sclera: Conjunctivae normal.      Pupils: Pupils are equal, round, and reactive to light.   Neck:      Comments: +ve swelling and +ve dressing, mildly soaked L neck  Cardiovascular:      Rate and Rhythm: Normal rate and regular rhythm.      Pulses: Normal pulses.      Heart sounds: Normal heart sounds.   Pulmonary:      Effort: Pulmonary effort is normal. No respiratory distress.      Breath sounds: Normal breath sounds. No wheezing or rales.   Abdominal:      General: Abdomen is flat. Bowel sounds are normal. There is no distension.      Palpations: Abdomen is soft.      Tenderness: There is no abdominal tenderness. There is no guarding.   Genitourinary:     Rectum: Normal.      Comments: deferred  Musculoskeletal:         General: Normal range of motion.      Cervical back: Normal range of motion and neck supple. Tenderness present.   Skin:     General: Skin is warm and dry.      Capillary Refill: Capillary refill takes less than 2 seconds.      Coloration: Skin is not jaundiced.   Neurological:      General: No focal deficit present.      Mental Status: He is alert and oriented to person, place, and time.      Deep Tendon Reflexes: Reflexes are normal and symmetric.   Psychiatric:         Behavior: Behavior normal.         Thought Content: Thought content normal.         Judgment: Judgment normal.          Significant Labs: All pertinent labs within the past 24 hours have been reviewed.  BMP:   Recent Labs   Lab 04/24/24  1151   GLU 92      K 4.3      CO2 24   BUN 15   CREATININE 1.2   CALCIUM 9.1     CBC:   Recent Labs   Lab 04/24/24  1151   WBC 6.87   HGB 9.5*   HCT 29.1*        CMP:   Recent Labs   Lab  04/24/24  1151      K 4.3      CO2 24   GLU 92   BUN 15   CREATININE 1.2   CALCIUM 9.1   ANIONGAP 5*         Significant Imaging: I have reviewed all pertinent imaging results/findings within the past 24 hours.  Assessment/Plan:     * S/P carotid endarterectomy  S/p L CEA today- had > 70% Stenosis  Doing well post op, no SOB, Dysphagia or dysphonia  Swallowing well, tolerated food well  Post op pain under control.       Stenosis of left carotid artery  See above L CEA      Essential hypertension  Chronic, controlled. Latest blood pressure and vitals reviewed-     Temp:  [97.8 °F (36.6 °C)-98.1 °F (36.7 °C)]   Pulse:  [47-62]   Resp:  [10-35]   BP: ()/(46-78)   SpO2:  [93 %-100 %] .   Home meds for hypertension were reviewed and noted below.   Hypertension Medications               amLODIPine (NORVASC) 5 MG tablet Take 1 tablet (5 mg total) by mouth once daily.    bisoprolol (ZEBETA) 5 MG tablet Take 1 tablet (5 mg total) by mouth once daily.    furosemide (LASIX) 40 MG tablet Take 1 tablet (40 mg total) by mouth daily as needed (leg swelling).            While in the hospital, will manage blood pressure as follows; Continue home antihypertensive regimen    Will utilize p.r.n. blood pressure medication only if patient's blood pressure greater than 160/100 and he develops symptoms such as worsening chest pain or shortness of breath.      VTE Risk Mitigation (From admission, onward)           Ordered     apixaban tablet 5 mg  2 times daily         04/25/24 1107     IP VTE HIGH RISK PATIENT  Once         04/25/24 1104     Place sequential compression device  Until discontinued         04/25/24 1104     Place sequential compression device  Until discontinued         04/25/24 6511                        Thank you for your consult. I will follow-up with patient. Please contact us if you have any additional questions.    Ildefonso Cross MD  Department of Hospital Medicine   [facility]

## 2024-04-25 NOTE — HOSPITAL COURSE
Pt underwent L CEA today, 4/25/24 by Dr. Mar- Vascular Surgery and [facility] consulted for medical management. Pt seen in his room post op- doing well, L Neck dressing in place, mildly soaked and mildy blood, no SOB, dysphagia or dysphonia. Tolerated dinner well and swallowed it well. Speech clear. Incisional pain under control with meds.

## 2024-04-25 NOTE — ANESTHESIA POSTPROCEDURE EVALUATION
Anesthesia Post Evaluation    Patient: Quintin Ling    Procedure(s) Performed: Procedure(s) (LRB):  ENDARTERECTOMY, CAROTID (Left)    Final Anesthesia Type: general      Patient location during evaluation: PACU  Patient participation: Yes- Able to Participate  Level of consciousness: awake and alert, oriented and awake  Post-procedure vital signs: reviewed and stable  Pain management: adequate  Airway patency: patent    PONV status at discharge: No PONV  Anesthetic complications: no      Cardiovascular status: blood pressure returned to baseline  Respiratory status: unassisted  Hydration status: euvolemic  Follow-up not needed.              Vitals Value Taken Time   /54 04/25/24 1121   Temp 36.6 °C (97.8 °F) 04/25/24 0955   Pulse 49 04/25/24 1123   Resp 14 04/25/24 1123   SpO2 94 % 04/25/24 1123   Vitals shown include unfiled device data.      No case tracking events are documented in the log.      Pain/Boston Score: Boston Score: 8 (4/25/2024 10:45 AM)

## 2024-04-25 NOTE — ANESTHESIA PROCEDURE NOTES
Intubation    Date/Time: 4/25/2024 7:15 AM    Performed by: Kenney Cannon CRNA  Authorized by: Lucy Lynn MD    Intubation:     Induction:  Intravenous    Intubated:  Postinduction    Mask Ventilation:  Easy mask    Attempts:  1    Attempted By:  Student and CRNA    Method of Intubation:  Direct    Blade:  Wooten 2    Laryngeal View Grade: Grade IIb - only the arytenoids and epiglottis seen      Difficult Airway Encountered?: No      Complications:  None    Airway Device:  Oral endotracheal tube    Airway Device Size:  7.5    Style/Cuff Inflation:  Cuffed (inflated to minimal occlusive pressure)    Tube secured:  22    Secured at:  The lips    Placement Verified By:  Capnometry    Complicating Factors:  Obesity and poor neck/head extension    Findings Post-Intubation:  BS equal bilateral and atraumatic/condition of teeth unchanged

## 2024-04-25 NOTE — ASSESSMENT & PLAN NOTE
S/p L CEA today- had > 70% Stenosis  Doing well post op, no SOB, Dysphagia or dysphonia  Swallowing well, tolerated food well  Post op pain under control.

## 2024-04-26 VITALS
BODY MASS INDEX: 34.49 KG/M2 | DIASTOLIC BLOOD PRESSURE: 66 MMHG | TEMPERATURE: 98 F | HEIGHT: 65 IN | HEART RATE: 53 BPM | OXYGEN SATURATION: 96 % | SYSTOLIC BLOOD PRESSURE: 147 MMHG | RESPIRATION RATE: 18 BRPM | WEIGHT: 207 LBS

## 2024-04-26 PROBLEM — I65.22 STENOSIS OF LEFT CAROTID ARTERY: Status: RESOLVED | Noted: 2024-03-31 | Resolved: 2024-04-26

## 2024-04-26 PROBLEM — N17.9 AKI (ACUTE KIDNEY INJURY): Status: RESOLVED | Noted: 2024-03-31 | Resolved: 2024-04-26

## 2024-04-26 PROBLEM — D12.6 COLON ADENOMA: Status: RESOLVED | Noted: 2024-01-29 | Resolved: 2024-04-26

## 2024-04-26 PROBLEM — R00.1 BRADYCARDIA: Status: ACTIVE | Noted: 2024-04-26

## 2024-04-26 LAB
ALBUMIN SERPL BCP-MCNC: 2.9 G/DL (ref 3.5–5.2)
ALP SERPL-CCNC: 52 U/L (ref 55–135)
ALT SERPL W/O P-5'-P-CCNC: 7 U/L (ref 10–44)
ANION GAP SERPL CALC-SCNC: 8 MMOL/L (ref 8–16)
AST SERPL-CCNC: 8 U/L (ref 10–40)
BASOPHILS # BLD AUTO: 0.03 K/UL (ref 0–0.2)
BASOPHILS NFR BLD: 0.4 % (ref 0–1.9)
BILIRUB SERPL-MCNC: 0.3 MG/DL (ref 0.1–1)
BUN SERPL-MCNC: 18 MG/DL (ref 8–23)
CALCIUM SERPL-MCNC: 8.8 MG/DL (ref 8.7–10.5)
CHLORIDE SERPL-SCNC: 110 MMOL/L (ref 95–110)
CO2 SERPL-SCNC: 23 MMOL/L (ref 23–29)
CREAT SERPL-MCNC: 1.3 MG/DL (ref 0.5–1.4)
DIFFERENTIAL METHOD BLD: ABNORMAL
EOSINOPHIL # BLD AUTO: 0 K/UL (ref 0–0.5)
EOSINOPHIL NFR BLD: 0 % (ref 0–8)
ERYTHROCYTE [DISTWIDTH] IN BLOOD BY AUTOMATED COUNT: 14.4 % (ref 11.5–14.5)
EST. GFR  (NO RACE VARIABLE): 59 ML/MIN/1.73 M^2
GLUCOSE SERPL-MCNC: 113 MG/DL (ref 70–110)
HCT VFR BLD AUTO: 25.5 % (ref 40–54)
HGB BLD-MCNC: 8.1 G/DL (ref 14–18)
IMM GRANULOCYTES # BLD AUTO: 0.04 K/UL (ref 0–0.04)
IMM GRANULOCYTES NFR BLD AUTO: 0.5 % (ref 0–0.5)
LYMPHOCYTES # BLD AUTO: 0.8 K/UL (ref 1–4.8)
LYMPHOCYTES NFR BLD: 9.7 % (ref 18–48)
MAGNESIUM SERPL-MCNC: 2 MG/DL (ref 1.6–2.6)
MCH RBC QN AUTO: 27.8 PG (ref 27–31)
MCHC RBC AUTO-ENTMCNC: 31.8 G/DL (ref 32–36)
MCV RBC AUTO: 88 FL (ref 82–98)
MONOCYTES # BLD AUTO: 0.9 K/UL (ref 0.3–1)
MONOCYTES NFR BLD: 10.3 % (ref 4–15)
NEUTROPHILS # BLD AUTO: 6.5 K/UL (ref 1.8–7.7)
NEUTROPHILS NFR BLD: 79.1 % (ref 38–73)
NRBC BLD-RTO: 0 /100 WBC
PLATELET # BLD AUTO: 138 K/UL (ref 150–450)
PMV BLD AUTO: 10.3 FL (ref 9.2–12.9)
POCT GLUCOSE: 101 MG/DL (ref 70–110)
POCT GLUCOSE: 111 MG/DL (ref 70–110)
POTASSIUM SERPL-SCNC: 3.8 MMOL/L (ref 3.5–5.1)
PROT SERPL-MCNC: 5.6 G/DL (ref 6–8.4)
RBC # BLD AUTO: 2.91 M/UL (ref 4.6–6.2)
SODIUM SERPL-SCNC: 141 MMOL/L (ref 136–145)
WBC # BLD AUTO: 8.22 K/UL (ref 3.9–12.7)

## 2024-04-26 PROCEDURE — 83735 ASSAY OF MAGNESIUM: CPT | Performed by: INTERNAL MEDICINE

## 2024-04-26 PROCEDURE — 63600175 PHARM REV CODE 636 W HCPCS: Performed by: EMERGENCY MEDICINE

## 2024-04-26 PROCEDURE — 36415 COLL VENOUS BLD VENIPUNCTURE: CPT | Performed by: INTERNAL MEDICINE

## 2024-04-26 PROCEDURE — 99232 SBSQ HOSP IP/OBS MODERATE 35: CPT | Mod: ,,, | Performed by: INTERNAL MEDICINE

## 2024-04-26 PROCEDURE — 85025 COMPLETE CBC W/AUTO DIFF WBC: CPT | Performed by: INTERNAL MEDICINE

## 2024-04-26 PROCEDURE — 80053 COMPREHEN METABOLIC PANEL: CPT | Performed by: INTERNAL MEDICINE

## 2024-04-26 PROCEDURE — 25000003 PHARM REV CODE 250: Performed by: STUDENT IN AN ORGANIZED HEALTH CARE EDUCATION/TRAINING PROGRAM

## 2024-04-26 RX ORDER — FERROUS SULFATE 325(65) MG
325 TABLET ORAL DAILY
Qty: 30 TABLET | Refills: 2 | Status: SHIPPED | OUTPATIENT
Start: 2024-04-26

## 2024-04-26 RX ORDER — NAPROXEN SODIUM 220 MG/1
81 TABLET, FILM COATED ORAL DAILY
Qty: 360 TABLET | Refills: 0 | Status: SHIPPED | OUTPATIENT
Start: 2024-04-27 | End: 2025-04-27

## 2024-04-26 RX ORDER — FENOFIBRATE 48 MG/1
48 TABLET, FILM COATED ORAL DAILY
Qty: 90 TABLET | Refills: 3 | Status: SHIPPED | OUTPATIENT
Start: 2024-04-27 | End: 2025-04-27

## 2024-04-26 RX ORDER — CLOPIDOGREL BISULFATE 75 MG/1
75 TABLET ORAL DAILY
Qty: 30 TABLET | Refills: 11 | Status: SHIPPED | OUTPATIENT
Start: 2024-04-27 | End: 2025-04-27

## 2024-04-26 RX ADMIN — ASPIRIN 81 MG CHEWABLE TABLET 81 MG: 81 TABLET CHEWABLE at 09:04

## 2024-04-26 RX ADMIN — IRON SUCROSE 200 MG: 20 INJECTION, SOLUTION INTRAVENOUS at 04:04

## 2024-04-26 RX ADMIN — ATORVASTATIN CALCIUM 40 MG: 40 TABLET, FILM COATED ORAL at 09:04

## 2024-04-26 RX ADMIN — FENOFIBRATE 48 MG: 48 TABLET, FILM COATED ORAL at 09:04

## 2024-04-26 RX ADMIN — CLOPIDOGREL BISULFATE 75 MG: 75 TABLET ORAL at 09:04

## 2024-04-26 RX ADMIN — APIXABAN 5 MG: 2.5 TABLET, FILM COATED ORAL at 09:04

## 2024-04-26 RX ADMIN — CHLORHEXIDINE GLUCONATE 0.12% ORAL RINSE 10 ML: 1.2 LIQUID ORAL at 09:04

## 2024-04-26 RX ADMIN — AMLODIPINE BESYLATE 5 MG: 5 TABLET ORAL at 09:04

## 2024-04-26 RX ADMIN — PREGABALIN 75 MG: 75 CAPSULE ORAL at 09:04

## 2024-04-26 NOTE — PLAN OF CARE
[facility] - Med Surg  Initial Discharge Assessment       Primary Care Provider: Bishop Gabriel MD    Admission Diagnosis: Occlusion of left carotid artery [I65.22]  Carotid stenosis, asymptomatic, left [I65.22]    Admission Date: 4/25/2024  Expected Discharge Date: per attending         Payor: [employer] MEDICARE / Plan: [facility] [place] / Product Type: Medicare Advantage /     Extended Emergency Contact Information  Primary Emergency Contact: Emily Ling  Mobile Phone: [phone]  Relation: Spouse  Secondary Emergency Contact: roderick ling  Mobile Phone: [phone]  Relation: Son    Discharge Plan A: Home         [id number] - [place], [place] - [address]  [address] [facility] [address]  Phone: [phone] Fax: [phone]      Initial Assessment (most recent)       Adult Discharge Assessment - 04/26/24 1043          Discharge Assessment    Assessment Type Discharge Planning Assessment     Confirmed/corrected address, phone number and insurance Yes     Confirmed Demographics Correct on Facesheet     Source of Information patient     When was your last doctors appointment? --   in march    Communicated MERCEDEZ with patient/caregiver Date not available/Unable to determine     Reason For Admission s/p carotid endarterectomy     People in Home spouse     Do you expect to return to your current living situation? Yes     Do you have help at home or someone to help you manage your care at home? No     Prior to hospitilization cognitive status: Alert/Oriented     Current cognitive status: Alert/Oriented     Walking or Climbing Stairs Difficulty no     Dressing/Bathing Difficulty no     Equipment Currently Used at Home none     Readmission within 30 days? Yes     Patient currently being followed by outpatient case management? No     Do you currently have service(s) that help you manage your care at home? No     Do you take prescription medications? Yes     Do you have  prescription coverage? Yes     Coverage bcbs     Do you have any problems affording any of your prescribed medications? No     Is the patient taking medications as prescribed? yes     Who is going to help you get home at discharge? spouse     How do you get to doctors appointments? car, drives self     Are you on dialysis? No     Do you take coumadin? No     Discharge Plan A Home

## 2024-04-26 NOTE — CONSULTS
[facility]  Cardiology  Consult Note    Patient Name: Quintin Ling  MRN: [id number]  Admission Date: 4/25/2024  Hospital Length of Stay: 1 days  Code Status: Full Code   Attending Provider: Chrissie Parks MD   Consulting Provider: Rose Marquez PA-C  Primary Care Physician: Bishop Gabriel MD  Principal Problem:S/P carotid endarterectomy    Patient information was obtained from patient, past medical records, and ER records.     Inpatient consult to Cardiology  Consult performed by: Rose Marquez PA-C  Consult ordered by: Ildefonso Cross MD        Subjective:     Chief Complaint: CEA    HPI:   Mr. Ling is a 73 year old male patient whose current medical conditions PAFL (on Eliquis), prior SBO, coronary calcification on CT, obesity, and carotid artery disease who presented to [facility] yesterday for elective carotid enterectomy. Patient tolerated procedure but was noted to be bradycardic with HR in 40's and 50's overnight and cardiology was consulted to assist with management. Patient seen and examined today, resting in bed. Feels ok. No CV complaints. No CP/SOB.  Followed in clinic by Dr. uMrray, on Bisprolol 5 mg at home, not on formulary here so was started on Lopressor 50 mg BID, which has been held. MPI stress test 4/22/24 negative for ischemia. TTE 3/19/24 showed normal EF.     Past Medical History:   Diagnosis Date    Diabetes mellitus     Hyperlipidemia     Hypertension        Past Surgical History:   Procedure Laterality Date    BIOPSY OF PERITONEUM N/A 1/29/2024    Procedure: BIOPSY, PERITONEUM;  Surgeon: Tosin Boothe MD;  Location: [facility] OR;  Service: Colon and Rectal;  Laterality: N/A;    CLOSURE OF WOUND N/A 3/16/2024    Procedure: CLOSURE, WOUND;  Surgeon: Magda Stark MD;  Location: [facility] OR;  Service: General;  Laterality: N/A;    COLONOSCOPY N/A 1/5/2024    Procedure: COLONOSCOPY;  Surgeon: Irasema Gil MD;  Location: [facility];  Service: Endoscopy;   Laterality: N/A;    DIAGNOSTIC LAPAROSCOPY N/A 1/29/2024    Procedure: LAPAROSCOPY, DIAGNOSTIC;  Surgeon: Tosin Boothe MD;  Location: [facility] OR;  Service: Colon and Rectal;  Laterality: N/A;    EPIDURAL STEROID INJECTION N/A 11/13/2023    Procedure: Lumbar L5/S1 IL JOSE;  Surgeon: Oneil Carlson MD;  Location: [facility] PAIN MGT;  Service: Pain Management;  Laterality: N/A;    ESOPHAGOGASTRODUODENOSCOPY N/A 1/5/2024    Procedure: EGD (ESOPHAGOGASTRODUODENOSCOPY);  Surgeon: Irasema Gil MD;  Location: [facility] ENDO;  Service: Endoscopy;  Laterality: N/A;    INJECTION OF ANESTHETIC AGENT INTO TISSUE PLANE DEFINED BY TRANSVERSUS ABDOMINIS MUSCLE N/A 3/11/2024    Procedure: BLOCK, TRANSVERSUS ABDOMINIS PLANE;  Surgeon: Tosin Boothe MD;  Location: [facility] OR;  Service: Colon and Rectal;  Laterality: N/A;    NO PAST SURGERIES      WOUND EXPLORATION N/A 3/16/2024    Procedure: EXPLORATION, WOUND;  Surgeon: Magda Stark MD;  Location: [facility] OR;  Service: General;  Laterality: N/A;    XI ROBOTIC COLECTOMY, RIGHT Right 3/11/2024    Procedure: XI ROBOTIC COLECTOMY, RIGHT;  Surgeon: Tosin Boothe MD;  Location: [facility] OR;  Service: Colon and Rectal;  Laterality: Right;  WITH OMENECTOMY AND AMNIOFIX       Review of patient's allergies indicates:  No Known Allergies    Current Facility-Administered Medications   Medication Dose Route Frequency Provider Last Rate Last Admin    amLODIPine tablet 5 mg  5 mg Oral Daily Chrissie Parks MD   5 mg at 04/26/24 0907    apixaban tablet 5 mg  5 mg Oral BID Chrissie Parks MD   5 mg at 04/26/24 0907    aspirin chewable tablet 81 mg  81 mg Oral Daily Chrissie Parks MD   81 mg at 04/26/24 0907    atorvastatin tablet 40 mg  40 mg Oral Daily Chrissie Parks MD   40 mg at 04/26/24 0907    chlorhexidine 0.12 % solution 10 mL  10 mL Mouth/Throat BID Chrissie Parks MD   10 mL at 04/26/24 0907    clopidogreL tablet 75 mg  75 mg Oral Daily Chrissie Parks MD   75 mg at  04/26/24 0907    dextrose 10% bolus 125 mL 125 mL  12.5 g Intravenous PRN Chrissie Parks MD        dextrose 10% bolus 250 mL 250 mL  25 g Intravenous PRN Chrissie Parks MD        fenofibrate tablet 48 mg  48 mg Oral Daily Chrissie Parks MD   48 mg at 04/26/24 0907    furosemide tablet 40 mg  40 mg Oral Daily PRN Chrissie Parks MD        glucagon (human recombinant) injection 1 mg  1 mg Intramuscular PRN Chrissie Parks MD        glucose chewable tablet 16 g  16 g Oral PRN Chrissie Parks MD        glucose chewable tablet 24 g  24 g Oral PRN Chrissie Parks MD        HYDROcodone-acetaminophen 5-325 mg per tablet 1 tablet  1 tablet Oral Q4H PRN Chrissie Parks MD        insulin aspart U-100 pen 0-5 Units  0-5 Units Subcutaneous Q6H PRN Chrissie Parks MD        ondansetron injection 4 mg  4 mg Intravenous Q12H PRN Chrissie Parks MD        pregabalin capsule 75 mg  75 mg Oral BID Chrissie Parks MD   75 mg at 04/26/24 0907     Family History       Problem Relation (Age of Onset)    No Known Problems Mother, Father          Tobacco Use    Smoking status: Former     Passive exposure: Never    Smokeless tobacco: Never   Substance and Sexual Activity    Alcohol use: Not Currently     Comment: OCC    Drug use: Never    Sexual activity: Yes     Partners: Female     Review of Systems   Constitutional: Positive for malaise/fatigue.   HENT: Negative.     Eyes: Negative.    Respiratory: Negative.     Endocrine: Negative.    Hematologic/Lymphatic: Negative.    Skin: Negative.    Musculoskeletal: Negative.    Gastrointestinal: Negative.    Genitourinary: Negative.    Neurological: Negative.    Psychiatric/Behavioral: Negative.     Allergic/Immunologic: Negative.      Objective:     Vital Signs (Most Recent):  Temp: 98 °F (36.7 °C) (04/26/24 0713)  Pulse: (!) 43 (04/26/24 0920)  Resp: 18 (04/26/24 0713)  BP: (!) 152/67 (04/26/24 0713)  SpO2: 97 % (04/26/24 0713) Vital Signs (24h Range):  Temp:  [97.7 °F (36.5  °C)-98.1 °F (36.7 °C)] 98 °F (36.7 °C)  Pulse:  [43-62] 43  Resp:  [10-35] 18  SpO2:  [93 %-100 %] 97 %  BP: ()/(46-67) 152/67     Weight: 93.9 kg (207 lb 0.2 oz)  Body mass index is 34.45 kg/m².    SpO2: 97 %       No intake or output data in the 24 hours ending 04/26/24 0949    Lines/Drains/Airways       Peripheral Intravenous Line  Duration                  Peripheral IV - Single Lumen 04/25/24 0614 18 G Anterior;Right Forearm 1 day         Peripheral IV - Single Lumen 04/25/24 0700 18 G Anterior;Left Forearm 1 day         Peripheral IV - Single Lumen 04/25/24 0700 18 G Left;Posterior Hand 1 day                     Physical Exam  Vitals and nursing note reviewed.   Constitutional:       General: He is not in acute distress.     Appearance: Normal appearance. He is well-developed. He is not diaphoretic.   HENT:      Head: Normocephalic and atraumatic.   Eyes:      General:         Right eye: No discharge.         Left eye: No discharge.      Pupils: Pupils are equal, round, and reactive to light.   Neck:      Comments: L neck incision C/D/I   Cardiovascular:      Rate and Rhythm: Normal rate and regular rhythm.      Heart sounds: Normal heart sounds, S1 normal and S2 normal. No murmur heard.  Pulmonary:      Effort: Pulmonary effort is normal. No respiratory distress.      Breath sounds: Normal breath sounds. No wheezing or rales.   Abdominal:      General: There is no distension.   Musculoskeletal:      Right lower leg: No edema.      Left lower leg: No edema.   Skin:     General: Skin is warm and dry.      Findings: No erythema.   Neurological:      General: No focal deficit present.      Mental Status: He is alert and oriented to person, place, and time.   Psychiatric:         Mood and Affect: Mood normal.         Behavior: Behavior normal.          Significant Labs: CMP   Recent Labs   Lab 04/24/24  1151      K 4.3      CO2 24   GLU 92   BUN 15   CREATININE 1.2   CALCIUM 9.1   ANIONGAP 5*   ,  "CBC   Recent Labs   Lab 04/24/24  1151   WBC 6.87   HGB 9.5*   HCT 29.1*      , Troponin No results for input(s): "TROPONINI" in the last 48 hours., and All pertinent lab results from the last 24 hours have been reviewed.    Significant Imaging: Echocardiogram: Transthoracic echo (TTE) complete (Cupid Only):   Results for orders placed or performed during the hospital encounter of 03/16/24   Echo   Result Value Ref Range    BSA 2.14 m2    LVOT stroke volume 95.00 cm3    LVIDd 4.40 3.5 - 6.0 cm    LV Systolic Volume 35.90 mL    LV Systolic Volume Index 17.4 mL/m2    LVIDs 3.03 2.1 - 4.0 cm    LV Diastolic Volume 87.50 mL    LV Diastolic Volume Index 42.48 mL/m2    IVS 1.21 (A) 0.6 - 1.1 cm    LVOT diameter 2.17 cm    LVOT area 3.7 cm2    FS 31 28 - 44 %    Left Ventricle Relative Wall Thickness 0.60 cm    Posterior Wall 1.32 (A) 0.6 - 1.1 cm    LV mass 206.63 g    LV Mass Index 100 g/m2    TDI LATERAL 0.11 m/s    TDI SEPTAL 0.07 m/s    TR Max Norman 2.93 m/s    IVRT 54.23 msec    LVOT peak norman 1.16 m/s    Left Ventricular Outflow Tract Mean Velocity 1.03 cm/s    Left Ventricular Outflow Tract Mean Gradient 4.30 mmHg    RVOT peak VTI 23.1 cm    TAPSE 1.82 cm    LA size 3.42 cm    Left Atrium Minor Axis 4.11 cm    Left Atrium Major Axis 5.24 cm    RA Major Axis 5.04 cm    AV mean gradient 17 mmHg    AV peak gradient 22 mmHg    Ao peak norman 2.32 m/s    Ao VTI 44.00 cm    LVOT peak VTI 25.70 cm    AV valve area 2.16 cm²    AV Velocity Ratio 0.50     AV index (prosthetic) 0.58     DOLLY by Velocity Ratio 1.85 cm²    Triscuspid Valve Regurgitation Peak Gradient 34 mmHg    PV mean gradient 5 mmHg    RVOT peak norman 1.34 m/s    Ao root annulus 3.85 cm    STJ 3.91 cm    Ascending aorta 4.00 cm    IVC diameter 1.15 cm    Mean e' 0.09 m/s    ZLVIDS -1.81     ZLVIDD -3.46     LA Volume Index 42.3 mL/m2    LA volume 87.05 cm3    LA WIDTH 6.5 cm    RA Width 2.2 cm    TV resting pulmonary artery pressure 37 mmHg    RV TB RVSP 6 mmHg "    Est. RA pres 3 mmHg    Narrative      Left Ventricle: The left ventricle is normal in size. Moderately   increased wall thickness. There is moderate concentric hypertrophy. There   is normal systolic function with a visually estimated ejection fraction of   60 - 65%. Unable to assess diastolic function due to atrial fibrillation.    Right Ventricle: Right ventricle was not well visualized due to poor   acoustic window. Normal right ventricular cavity size. Wall thickness is   normal. Right ventricle wall motion  is normal. Systolic function is   normal.    Left Atrium: Left atrium is moderately dilated.    Aortic Valve: Moderately calcified cusps. There is moderate annular   calcification present. Moderately restricted motion. There is mild to   moderate stenosis. Aortic valve area by VTI is 2.16 cm². Aortic valve peak   velocity is 2.32 m/s. Mean gradient is 17 mmHg. The dimensionless index is   0.58.    Mitral Valve: There is mild regurgitation.    Tricuspid Valve: There is mild regurgitation.    Aorta: Aortic root is normal in size. Ascending aorta is mildly dilated   measuring 4.00 cm.    Pulmonary Artery: The estimated pulmonary artery systolic pressure is   37 mmHg.    IVC/SVC: Normal venous pressure at 3 mmHg.      and EKG: Reviewed  Assessment and Plan:   Patient noted to be bradycardic s/p CEA. Asymptomatic. BB held. Assess HR response with ambulation. Recommend resumption of Bisprolol as OP if HR > 60.    * S/P carotid endarterectomy  -Mgmt as per CTS    Bradycardia  -HR 40-50's overnight  -BB held  -Asymptomatic, no LH, dizziness, near syncope, or syncope  -Assess HR response with ambulation with nursing staff/monitor HR trend  -Recommend resumption of Bisprolol as OP if HR > 60    Stenosis of left carotid artery  -s/p carotid endarterectomy  -Post-op mgmt as per CTS  -ASA, Plavix, statin    Essential hypertension  -Continue amlodipine  -BB held due to bradycardia         VTE Risk Mitigation (From  admission, onward)           Ordered     apixaban tablet 5 mg  2 times daily         04/25/24 1107     IP VTE HIGH RISK PATIENT  Once         04/25/24 1104     Place sequential compression device  Until discontinued         04/25/24 1104     Place sequential compression device  Until discontinued         04/25/24 0549                    Thank you for your consult. I will follow-up with patient. Please contact us if you have any additional questions.    Rose Marquez PA-C  Cardiology   [facility]

## 2024-04-26 NOTE — SUBJECTIVE & OBJECTIVE
Past Medical History:   Diagnosis Date    Diabetes mellitus     Hyperlipidemia     Hypertension        Past Surgical History:   Procedure Laterality Date    BIOPSY OF PERITONEUM N/A 1/29/2024    Procedure: BIOPSY, PERITONEUM;  Surgeon: Tosin Boothe MD;  Location: [facility] OR;  Service: Colon and Rectal;  Laterality: N/A;    CLOSURE OF WOUND N/A 3/16/2024    Procedure: CLOSURE, WOUND;  Surgeon: Magda Stark MD;  Location: [facility] OR;  Service: General;  Laterality: N/A;    COLONOSCOPY N/A 1/5/2024    Procedure: COLONOSCOPY;  Surgeon: Irasema Gil MD;  Location: [facility] ENDO;  Service: Endoscopy;  Laterality: N/A;    DIAGNOSTIC LAPAROSCOPY N/A 1/29/2024    Procedure: LAPAROSCOPY, DIAGNOSTIC;  Surgeon: Tosin Boothe MD;  Location: [facility] OR;  Service: Colon and Rectal;  Laterality: N/A;    EPIDURAL STEROID INJECTION N/A 11/13/2023    Procedure: Lumbar L5/S1 IL JOSE;  Surgeon: Oneil Carlson MD;  Location: [facility] PAIN MGT;  Service: Pain Management;  Laterality: N/A;    ESOPHAGOGASTRODUODENOSCOPY N/A 1/5/2024    Procedure: EGD (ESOPHAGOGASTRODUODENOSCOPY);  Surgeon: Irasema Gil MD;  Location: [facility];  Service: Endoscopy;  Laterality: N/A;    INJECTION OF ANESTHETIC AGENT INTO TISSUE PLANE DEFINED BY TRANSVERSUS ABDOMINIS MUSCLE N/A 3/11/2024    Procedure: BLOCK, TRANSVERSUS ABDOMINIS PLANE;  Surgeon: Tosin Boothe MD;  Location: [facility] OR;  Service: Colon and Rectal;  Laterality: N/A;    NO PAST SURGERIES      WOUND EXPLORATION N/A 3/16/2024    Procedure: EXPLORATION, WOUND;  Surgeon: Magda Stark MD;  Location: [facility] OR;  Service: General;  Laterality: N/A;    XI ROBOTIC COLECTOMY, RIGHT Right 3/11/2024    Procedure: XI ROBOTIC COLECTOMY, RIGHT;  Surgeon: Tosin Boothe MD;  Location: [facility] OR;  Service: Colon and Rectal;  Laterality: Right;  WITH OMENECTOMY AND AMNIOFIX       Review of patient's allergies indicates:  No Known Allergies    Current Facility-Administered Medications    Medication Dose Route Frequency Provider Last Rate Last Admin    amLODIPine tablet 5 mg  5 mg Oral Daily Chrissie Parks MD   5 mg at 04/26/24 0907    apixaban tablet 5 mg  5 mg Oral BID Chrissie Parks MD   5 mg at 04/26/24 0907    aspirin chewable tablet 81 mg  81 mg Oral Daily Chrissie Parks MD   81 mg at 04/26/24 0907    atorvastatin tablet 40 mg  40 mg Oral Daily Chrissie Parks MD   40 mg at 04/26/24 0907    chlorhexidine 0.12 % solution 10 mL  10 mL Mouth/Throat BID Chrissie Parks MD   10 mL at 04/26/24 0907    clopidogreL tablet 75 mg  75 mg Oral Daily Chrissie Parks MD   75 mg at 04/26/24 0907    dextrose 10% bolus 125 mL 125 mL  12.5 g Intravenous PRN Chrissie Parks MD        dextrose 10% bolus 250 mL 250 mL  25 g Intravenous PRN Chrissie Parks MD        fenofibrate tablet 48 mg  48 mg Oral Daily Chrissie Parks MD   48 mg at 04/26/24 0907    furosemide tablet 40 mg  40 mg Oral Daily PRN Chrissie Parks MD        glucagon (human recombinant) injection 1 mg  1 mg Intramuscular PRN Chrissie Parks MD        glucose chewable tablet 16 g  16 g Oral PRN Chrissie Parks MD        glucose chewable tablet 24 g  24 g Oral PRN Chrissie Parks MD        HYDROcodone-acetaminophen 5-325 mg per tablet 1 tablet  1 tablet Oral Q4H PRN Chrissie Parks MD        insulin aspart U-100 pen 0-5 Units  0-5 Units Subcutaneous Q6H PRN Chrissie Parks MD        ondansetron injection 4 mg  4 mg Intravenous Q12H PRN Chrissie Parks MD        pregabalin capsule 75 mg  75 mg Oral BID Chrissie Parks MD   75 mg at 04/26/24 0907     Family History       Problem Relation (Age of Onset)    No Known Problems Mother, Father          Tobacco Use    Smoking status: Former     Passive exposure: Never    Smokeless tobacco: Never   Substance and Sexual Activity    Alcohol use: Not Currently     Comment: OCC    Drug use: Never    Sexual activity: Yes     Partners: Female     Review of Systems   Constitutional: Positive for  malaise/fatigue.   HENT: Negative.     Eyes: Negative.    Respiratory: Negative.     Endocrine: Negative.    Hematologic/Lymphatic: Negative.    Skin: Negative.    Musculoskeletal: Negative.    Gastrointestinal: Negative.    Genitourinary: Negative.    Neurological: Negative.    Psychiatric/Behavioral: Negative.     Allergic/Immunologic: Negative.      Objective:     Vital Signs (Most Recent):  Temp: 98 °F (36.7 °C) (04/26/24 0713)  Pulse: (!) 43 (04/26/24 0920)  Resp: 18 (04/26/24 0713)  BP: (!) 152/67 (04/26/24 0713)  SpO2: 97 % (04/26/24 0713) Vital Signs (24h Range):  Temp:  [97.7 °F (36.5 °C)-98.1 °F (36.7 °C)] 98 °F (36.7 °C)  Pulse:  [43-62] 43  Resp:  [10-35] 18  SpO2:  [93 %-100 %] 97 %  BP: ()/(46-67) 152/67     Weight: 93.9 kg (207 lb 0.2 oz)  Body mass index is 34.45 kg/m².    SpO2: 97 %       No intake or output data in the 24 hours ending 04/26/24 0949    Lines/Drains/Airways       Peripheral Intravenous Line  Duration                  Peripheral IV - Single Lumen 04/25/24 0614 18 G Anterior;Right Forearm 1 day         Peripheral IV - Single Lumen 04/25/24 0700 18 G Anterior;Left Forearm 1 day         Peripheral IV - Single Lumen 04/25/24 0700 18 G Left;Posterior Hand 1 day                     Physical Exam  Vitals and nursing note reviewed.   Constitutional:       General: He is not in acute distress.     Appearance: Normal appearance. He is well-developed. He is not diaphoretic.   HENT:      Head: Normocephalic and atraumatic.   Eyes:      General:         Right eye: No discharge.         Left eye: No discharge.      Pupils: Pupils are equal, round, and reactive to light.   Neck:      Comments: L neck incision C/D/I   Cardiovascular:      Rate and Rhythm: Normal rate and regular rhythm.      Heart sounds: Normal heart sounds, S1 normal and S2 normal. No murmur heard.  Pulmonary:      Effort: Pulmonary effort is normal. No respiratory distress.      Breath sounds: Normal breath sounds. No  "wheezing or rales.   Abdominal:      General: There is no distension.   Musculoskeletal:      Right lower leg: No edema.      Left lower leg: No edema.   Skin:     General: Skin is warm and dry.      Findings: No erythema.   Neurological:      General: No focal deficit present.      Mental Status: He is alert and oriented to person, place, and time.   Psychiatric:         Mood and Affect: Mood normal.         Behavior: Behavior normal.          Significant Labs: CMP   Recent Labs   Lab 04/24/24  1151      K 4.3      CO2 24   GLU 92   BUN 15   CREATININE 1.2   CALCIUM 9.1   ANIONGAP 5*   , CBC   Recent Labs   Lab 04/24/24  1151   WBC 6.87   HGB 9.5*   HCT 29.1*      , Troponin No results for input(s): "TROPONINI" in the last 48 hours., and All pertinent lab results from the last 24 hours have been reviewed.    Significant Imaging: Echocardiogram: Transthoracic echo (TTE) complete (Cupid Only):   Results for orders placed or performed during the hospital encounter of 03/16/24   Echo   Result Value Ref Range    BSA 2.14 m2    LVOT stroke volume 95.00 cm3    LVIDd 4.40 3.5 - 6.0 cm    LV Systolic Volume 35.90 mL    LV Systolic Volume Index 17.4 mL/m2    LVIDs 3.03 2.1 - 4.0 cm    LV Diastolic Volume 87.50 mL    LV Diastolic Volume Index 42.48 mL/m2    IVS 1.21 (A) 0.6 - 1.1 cm    LVOT diameter 2.17 cm    LVOT area 3.7 cm2    FS 31 28 - 44 %    Left Ventricle Relative Wall Thickness 0.60 cm    Posterior Wall 1.32 (A) 0.6 - 1.1 cm    LV mass 206.63 g    LV Mass Index 100 g/m2    TDI LATERAL 0.11 m/s    TDI SEPTAL 0.07 m/s    TR Max Norman 2.93 m/s    IVRT 54.23 msec    LVOT peak norman 1.16 m/s    Left Ventricular Outflow Tract Mean Velocity 1.03 cm/s    Left Ventricular Outflow Tract Mean Gradient 4.30 mmHg    RVOT peak VTI 23.1 cm    TAPSE 1.82 cm    LA size 3.42 cm    Left Atrium Minor Axis 4.11 cm    Left Atrium Major Axis 5.24 cm    RA Major Axis 5.04 cm    AV mean gradient 17 mmHg    AV peak gradient 22 " mmHg    Ao peak cally 2.32 m/s    Ao VTI 44.00 cm    LVOT peak VTI 25.70 cm    AV valve area 2.16 cm²    AV Velocity Ratio 0.50     AV index (prosthetic) 0.58     DOLLY by Velocity Ratio 1.85 cm²    Triscuspid Valve Regurgitation Peak Gradient 34 mmHg    PV mean gradient 5 mmHg    RVOT peak cally 1.34 m/s    Ao root annulus 3.85 cm    STJ 3.91 cm    Ascending aorta 4.00 cm    IVC diameter 1.15 cm    Mean e' 0.09 m/s    ZLVIDS -1.81     ZLVIDD -3.46     LA Volume Index 42.3 mL/m2    LA volume 87.05 cm3    LA WIDTH 6.5 cm    RA Width 2.2 cm    TV resting pulmonary artery pressure 37 mmHg    RV TB RVSP 6 mmHg    Est. RA pres 3 mmHg    Narrative      Left Ventricle: The left ventricle is normal in size. Moderately   increased wall thickness. There is moderate concentric hypertrophy. There   is normal systolic function with a visually estimated ejection fraction of   60 - 65%. Unable to assess diastolic function due to atrial fibrillation.    Right Ventricle: Right ventricle was not well visualized due to poor   acoustic window. Normal right ventricular cavity size. Wall thickness is   normal. Right ventricle wall motion  is normal. Systolic function is   normal.    Left Atrium: Left atrium is moderately dilated.    Aortic Valve: Moderately calcified cusps. There is moderate annular   calcification present. Moderately restricted motion. There is mild to   moderate stenosis. Aortic valve area by VTI is 2.16 cm². Aortic valve peak   velocity is 2.32 m/s. Mean gradient is 17 mmHg. The dimensionless index is   0.58.    Mitral Valve: There is mild regurgitation.    Tricuspid Valve: There is mild regurgitation.    Aorta: Aortic root is normal in size. Ascending aorta is mildly dilated   measuring 4.00 cm.    Pulmonary Artery: The estimated pulmonary artery systolic pressure is   37 mmHg.    IVC/SVC: Normal venous pressure at 3 mmHg.      and EKG: Reviewed

## 2024-04-26 NOTE — PLAN OF CARE
Plan of care discussed with pt. Pt verbalized understanding. Free from injury. No s/s of distress noted. Vitals stable. IV SL. Meds as ordered. No c/o pain. Diet tolerated. Tele monitor in place. Q2 hour rounding. No complaints. Will continue to monitor pt. 12 hour chart review.

## 2024-04-26 NOTE — PROGRESS NOTES
Postop day 1 left carotid endarterectomy.    Patient without complaints this morning. Had heart rate into the 40s last night and when I look back at his record his heart rate has been in the 40s and 50s the entire time.  He seems to be asymptomatic.  Lopressor has been held.    On physical exam he is neurologically intact.  His dressing is dry.  We will observe him this morning and make sure his heart rate is okay but I think he is okay to be discharged after lunch.  We will arrange his follow-up in clinic in 3-4 weeks with a carotid ultrasound.  I gave him our card in the event that he needed to get in touch with us.

## 2024-04-26 NOTE — ASSESSMENT & PLAN NOTE
-HR 40-50's overnight  -BB held  -Asymptomatic, no LH, dizziness, near syncope, or syncope  -Assess HR response with ambulation with nursing staff/monitor HR trend  -Recommend resumption of Bisprolol as OP if HR > 60

## 2024-04-26 NOTE — HPI
Mr. Ling is a 73 year old male patient whose current medical conditions PAFL (on Eliquis), prior SBO, coronary calcification on CT, obesity, and carotid artery disease who presented to [facility] yesterday for elective carotid enterectomy. Patient tolerated procedure but was noted to be bradycardic with HR in 40's and 50's overnight and cardiology was consulted to assist with management. Patient seen and examined today, resting in bed. Feels ok. No CV complaints. No CP/SOB.  Followed in clinic by Dr. Murray, on Bisprolol 5 mg at home, not on formulary here so was started on Lopressor 50 mg BID, which has been held. MPI stress test 4/22/24 negative for ischemia. TTE 3/19/24 showed normal EF.

## 2024-04-26 NOTE — NURSING
Pt ambulated in palacios with continuous HR monitoring. HR 86 at beginning of ambulating then dropped to 62. Once pt returned to room and sat at edge of bed HR dropped to 46. Pt asymptomatic.

## 2024-04-26 NOTE — PLAN OF CARE
Discussed poc with pt, pt verbalized understanding    Purposeful rounding complete    VS wnl  Cardiac monitoring in use tele monitor # [id number]    Blood glucose monitoring   Fall precautions in place, remains injury free  Pt denies c/o anything.     Bed locked at lowest position  Call light within reach    Chart check complete      Pt bradycardic throughout the night with HR low as 40. On call providers notified. Lopressor D/Cd. Pt asleep and showed no s/s of distress

## 2024-04-27 ENCOUNTER — NURSE TRIAGE (OUTPATIENT)
Dept: ADMINISTRATIVE | Facility: CLINIC | Age: 74
End: 2024-04-27
Payer: MEDICARE

## 2024-04-27 NOTE — TELEPHONE ENCOUNTER
Patient was discharged yesterday. His wife states that she is confused because the patient's AVS states to start taking Fenofibrate 48 mg and also states to continue taking Fenofibrate 54 mg. She wants to know if the patient should continue one or the other and also if he should be taking both. Per protocol will contact the on call provider. Per Dr. Cross, patient can continue either dose but only one should be administered. Patient's wife verbalizes understanding. Advised the patient to call back with any further questions or concerns.        Reason for Disposition   [1] Caller has URGENT medicine question about med that PCP or specialist prescribed AND [2] triager unable to answer question    Protocols used: Medication Question Call-A-

## 2024-04-27 NOTE — TELEPHONE ENCOUNTER
Attempted to contact the patient x 2. No response x 2.      Reason for Disposition   Second attempt to contact caller AND no contact made. Phone number verified.    Protocols used: No Contact or Duplicate Contact Call-A-AH

## 2024-04-30 ENCOUNTER — OFFICE VISIT (OUTPATIENT)
Dept: CARDIOLOGY | Facility: CLINIC | Age: 74
End: 2024-04-30
Payer: MEDICARE

## 2024-04-30 VITALS
SYSTOLIC BLOOD PRESSURE: 160 MMHG | HEART RATE: 87 BPM | BODY MASS INDEX: 34.71 KG/M2 | DIASTOLIC BLOOD PRESSURE: 80 MMHG | WEIGHT: 208.31 LBS | HEIGHT: 65 IN | OXYGEN SATURATION: 96 %

## 2024-04-30 DIAGNOSIS — I48.91 ATRIAL FIB/FLUTTER, TRANSIENT: Primary | ICD-10-CM

## 2024-04-30 DIAGNOSIS — I48.92 ATRIAL FIB/FLUTTER, TRANSIENT: Primary | ICD-10-CM

## 2024-04-30 DIAGNOSIS — I25.10 CORONARY ARTERY CALCIFICATION SEEN ON CAT SCAN: ICD-10-CM

## 2024-04-30 DIAGNOSIS — Z98.890 S/P CAROTID ENDARTERECTOMY: ICD-10-CM

## 2024-04-30 DIAGNOSIS — E66.01 SEVERE OBESITY (BMI 35.0-39.9) WITH COMORBIDITY: ICD-10-CM

## 2024-04-30 DIAGNOSIS — I35.0 NONRHEUMATIC AORTIC VALVE STENOSIS: ICD-10-CM

## 2024-04-30 DIAGNOSIS — I10 ESSENTIAL HYPERTENSION: ICD-10-CM

## 2024-04-30 DIAGNOSIS — Z09 HOSPITAL DISCHARGE FOLLOW-UP: ICD-10-CM

## 2024-04-30 PROCEDURE — 1101F PT FALLS ASSESS-DOCD LE1/YR: CPT | Mod: CPTII,S$GLB,, | Performed by: INTERNAL MEDICINE

## 2024-04-30 PROCEDURE — 3288F FALL RISK ASSESSMENT DOCD: CPT | Mod: CPTII,S$GLB,, | Performed by: INTERNAL MEDICINE

## 2024-04-30 PROCEDURE — 4010F ACE/ARB THERAPY RXD/TAKEN: CPT | Mod: CPTII,S$GLB,, | Performed by: INTERNAL MEDICINE

## 2024-04-30 PROCEDURE — 1126F AMNT PAIN NOTED NONE PRSNT: CPT | Mod: CPTII,S$GLB,, | Performed by: INTERNAL MEDICINE

## 2024-04-30 PROCEDURE — 1111F DSCHRG MED/CURRENT MED MERGE: CPT | Mod: CPTII,S$GLB,, | Performed by: INTERNAL MEDICINE

## 2024-04-30 PROCEDURE — 3008F BODY MASS INDEX DOCD: CPT | Mod: CPTII,S$GLB,, | Performed by: INTERNAL MEDICINE

## 2024-04-30 PROCEDURE — 3044F HG A1C LEVEL LT 7.0%: CPT | Mod: CPTII,S$GLB,, | Performed by: INTERNAL MEDICINE

## 2024-04-30 PROCEDURE — 3077F SYST BP >= 140 MM HG: CPT | Mod: CPTII,S$GLB,, | Performed by: INTERNAL MEDICINE

## 2024-04-30 PROCEDURE — 3079F DIAST BP 80-89 MM HG: CPT | Mod: CPTII,S$GLB,, | Performed by: INTERNAL MEDICINE

## 2024-04-30 PROCEDURE — 99999 PR PBB SHADOW E&M-EST. PATIENT-LVL III: CPT | Mod: PBBFAC,,, | Performed by: INTERNAL MEDICINE

## 2024-04-30 PROCEDURE — 1159F MED LIST DOCD IN RCRD: CPT | Mod: CPTII,S$GLB,, | Performed by: INTERNAL MEDICINE

## 2024-04-30 PROCEDURE — 99214 OFFICE O/P EST MOD 30 MIN: CPT | Mod: S$GLB,,, | Performed by: INTERNAL MEDICINE

## 2024-04-30 RX ORDER — METOPROLOL SUCCINATE 25 MG/1
25 TABLET, EXTENDED RELEASE ORAL DAILY
Qty: 30 TABLET | Refills: 11 | Status: SHIPPED | OUTPATIENT
Start: 2024-04-30 | End: 2025-04-30

## 2024-04-30 RX ORDER — AMLODIPINE BESYLATE 2.5 MG/1
5 TABLET ORAL DAILY
COMMUNITY
Start: 2024-04-17 | End: 2024-04-30 | Stop reason: SDUPTHER

## 2024-04-30 RX ORDER — AMLODIPINE BESYLATE 5 MG/1
5 TABLET ORAL DAILY
Qty: 90 TABLET | Refills: 3 | Status: SHIPPED | OUTPATIENT
Start: 2024-04-30

## 2024-04-30 NOTE — PROGRESS NOTES
Subjective:   Patient ID:  Quintin Ling is a 73 y.o. male who presents for evaluation of No chief complaint on file.      HPI  4.30.2024  Comes in for a post discharge follow-up.    Normal nuclear stress test after his last visit.    He underwent a left carotid endarterectomy.    During his hospital stay he was switched off bisoprolol to metoprolol he was noted to have sinus bradycardia he was taken off the medications and on discharge he states that his blood pressure has been elevated up to 180s.    His pulse in the 80s.    He denies chest pain.    He does have left neck hematoma.  But no signs of infection.  Appears to be mild to moderate size hematoma.    Going to see vascular surgery tomorrow.      4.17.2024  73-year-old male, history as below.    Recently discharged from the hospital.  Here for post discharge follow-up.  He was found to be in a flutter at the time of his hospitalization.  History started early March he had a a bowel obstruction surgery this was complicated later on by the he since after he coughed at home.  An infection and this was treated on a rehospitalization.  During that time he was found to be in a flutter.  He converted to sinus rhythm.  Right now on anticoagulants.    No bleeding.    He complains of the cost of Cardizem being too expensive.    He was also found to have bilateral carotid artery stenosis.  He was admitted with syncope thought to be possibly secondary to the infection.    And there is a plan to do a carotid endarterectomy.    He is here for also preop evaluation.  He states that he denies any chest pain.  Dyspnea, palpitations syncope or presyncope since discharge.  Past Medical History:   Diagnosis Date    Diabetes mellitus     Hyperlipidemia     Hypertension        Past Surgical History:   Procedure Laterality Date    BIOPSY OF PERITONEUM N/A 1/29/2024    Procedure: BIOPSY, PERITONEUM;  Surgeon: Tosin Boothe MD;  Location: [facility] OR;  Service: Colon and  Rectal;  Laterality: N/A;    CAROTID ENDARTERECTOMY Left 4/25/2024    Procedure: ENDARTERECTOMY, CAROTID;  Surgeon: Chrissie Parks MD;  Location: [facility] OR;  Service: Peripheral Vascular;  Laterality: Left;    CLOSURE OF WOUND N/A 3/16/2024    Procedure: CLOSURE, WOUND;  Surgeon: Magda Stark MD;  Location: [facility] OR;  Service: General;  Laterality: N/A;    COLONOSCOPY N/A 1/5/2024    Procedure: COLONOSCOPY;  Surgeon: Irasema Gil MD;  Location: [facility] ENDO;  Service: Endoscopy;  Laterality: N/A;    DIAGNOSTIC LAPAROSCOPY N/A 1/29/2024    Procedure: LAPAROSCOPY, DIAGNOSTIC;  Surgeon: Tosin Boothe MD;  Location: [facility] OR;  Service: Colon and Rectal;  Laterality: N/A;    EPIDURAL STEROID INJECTION N/A 11/13/2023    Procedure: Lumbar L5/S1 IL JOSE;  Surgeon: Oneil Carslon MD;  Location: [facility] PAIN MGT;  Service: Pain Management;  Laterality: N/A;    ESOPHAGOGASTRODUODENOSCOPY N/A 1/5/2024    Procedure: EGD (ESOPHAGOGASTRODUODENOSCOPY);  Surgeon: Irasema Gil MD;  Location: [facility];  Service: Endoscopy;  Laterality: N/A;    INJECTION OF ANESTHETIC AGENT INTO TISSUE PLANE DEFINED BY TRANSVERSUS ABDOMINIS MUSCLE N/A 3/11/2024    Procedure: BLOCK, TRANSVERSUS ABDOMINIS PLANE;  Surgeon: Tosin Boothe MD;  Location: [facility] OR;  Service: Colon and Rectal;  Laterality: N/A;    NO PAST SURGERIES      REPAIR OF BLOOD VESSEL WITH PATCH GRAFT Left 4/25/2024    Procedure: ANGIOPLASTY, USING PATCH GRAFT;  Surgeon: Chrissie Parks MD;  Location: [facility] OR;  Service: Peripheral Vascular;  Laterality: Left;    WOUND EXPLORATION N/A 3/16/2024    Procedure: EXPLORATION, WOUND;  Surgeon: Magda Stark MD;  Location: [facility] OR;  Service: General;  Laterality: N/A;    XI ROBOTIC COLECTOMY, RIGHT Right 3/11/2024    Procedure: XI ROBOTIC COLECTOMY, RIGHT;  Surgeon: Tosin Boothe MD;  Location: [facility];  Service: Colon and Rectal;  Laterality: Right;  WITH OMENECTOMY AND AMNIOFIX       Social History      Tobacco Use    Smoking status: Former     Passive exposure: Never    Smokeless tobacco: Never   Substance Use Topics    Alcohol use: Not Currently     Comment: OCC    Drug use: Never       Family History   Problem Relation Name Age of Onset    No Known Problems Mother      No Known Problems Father         Review of Systems   Cardiovascular:  Negative for chest pain, dyspnea on exertion, palpitations and syncope.   Genitourinary: Negative.    Neurological: Negative.        Current Outpatient Medications   Medication Sig Dispense Refill    apixaban (ELIQUIS) 5 mg Tab Take 1 tablet (5 mg total) by mouth 2 (two) times daily. 60 tablet 2    aspirin 81 MG Chew Take 1 tablet (81 mg total) by mouth once daily. 360 tablet 0    atorvastatin (LIPITOR) 20 MG tablet TAKE 1 TABLET BY MOUTH EVERY DAY 90 tablet 3    clopidogreL (PLAVIX) 75 mg tablet Take 1 tablet (75 mg total) by mouth once daily. 30 tablet 11    fenofibrate (TRICOR) 54 MG tablet Take 1 tablet (54 mg total) by mouth once daily. 90 tablet 3    ferrous sulfate (FEOSOL) 325 mg (65 mg iron) Tab tablet Take 1 tablet (325 mg total) by mouth once daily. 30 tablet 2    furosemide (LASIX) 40 MG tablet Take 1 tablet (40 mg total) by mouth daily as needed (leg swelling). 30 tablet 1    metFORMIN (GLUCOPHAGE) 500 MG tablet TAKE 1 TABLET BY MOUTH TWICE A DAY WITH FOOD 180 tablet 1    pregabalin (LYRICA) 75 MG capsule Take 1 capsule (75 mg total) by mouth 2 (two) times daily. 60 capsule 3    amLODIPine (NORVASC) 5 MG tablet Take 1 tablet (5 mg total) by mouth once daily. 90 tablet 3    fenofibrate (TRICOR) 48 MG tablet Take 1 tablet (48 mg total) by mouth once daily. (Patient not taking: Reported on 4/30/2024) 90 tablet 3    metoprolol succinate (TOPROL-XL) 25 MG 24 hr tablet Take 1 tablet (25 mg total) by mouth once daily. 30 tablet 11     No current facility-administered medications for this visit.       Objective:   Objective:  Wt Readings from Last 3 Encounters:    04/30/24 94.5 kg (208 lb 5.4 oz)   04/25/24 93.9 kg (207 lb 0.2 oz)   04/17/24 93.7 kg (206 lb 9.1 oz)     Temp Readings from Last 3 Encounters:   04/26/24 98.1 °F (36.7 °C) (Oral)   04/04/24 98 °F (36.7 °C) (Oral)   04/01/24 98.4 °F (36.9 °C)     BP Readings from Last 3 Encounters:   04/30/24 (!) 160/80   04/26/24 (!) 147/66   04/17/24 (!) 152/69     Pulse Readings from Last 3 Encounters:   04/30/24 87   04/26/24 (!) 53   04/17/24 69       Physical Exam  Vitals reviewed.   Constitutional:       Appearance: He is well-developed.   Cardiovascular:      Rate and Rhythm: Normal rate and regular rhythm.      Pulses: Intact distal pulses.      Heart sounds: Murmur heard.   Pulmonary:      Breath sounds: Normal breath sounds.   Neurological:      Mental Status: He is oriented to person, place, and time.         Lab Results   Component Value Date    CHOL 131 05/30/2023    CHOL 146 05/19/2021    CHOL 154 07/21/2020     Lab Results   Component Value Date    HDL 45 05/30/2023    HDL 45 05/19/2021    HDL 36 (L) 07/21/2020     Lab Results   Component Value Date    LDLCALC 75.0 05/30/2023    LDLCALC 86.6 05/19/2021    LDLCALC 103.2 07/21/2020     Lab Results   Component Value Date    TRIG 55 05/30/2023    TRIG 72 05/19/2021    TRIG 74 07/21/2020     Lab Results   Component Value Date    CHOLHDL 34.4 05/30/2023    CHOLHDL 30.8 05/19/2021    CHOLHDL 23.4 07/21/2020       Chemistry        Component Value Date/Time     04/26/2024 1022    K 3.8 04/26/2024 1022     04/26/2024 1022    CO2 23 04/26/2024 1022    BUN 18 04/26/2024 1022    CREATININE 1.3 04/26/2024 1022     (H) 04/26/2024 1022        Component Value Date/Time    CALCIUM 8.8 04/26/2024 1022    ALKPHOS 52 (L) 04/26/2024 1022    AST 8 (L) 04/26/2024 1022    ALT 7 (L) 04/26/2024 1022    BILITOT 0.3 04/26/2024 1022    ESTGFRAFRICA >60.0 05/30/2022 0958    EGFRNONAA >60.0 05/30/2022 0958          Lab Results   Component Value Date    TSH 1.288 07/21/2020      Lab Results   Component Value Date    INR 1.0 03/19/2024     Lab Results   Component Value Date    WBC 8.22 04/26/2024    HGB 8.1 (L) 04/26/2024    HCT 25.5 (L) 04/26/2024    MCV 88 04/26/2024     (L) 04/26/2024     BNP  @LABRCNTIP(BNP,BNPTRIAGEBLO)@  Estimated Creatinine Clearance: 55.1 mL/min (based on SCr of 1.3 mg/dL).     Imaging:  ======    No results found for this or any previous visit.    Results for orders placed during the hospital encounter of 03/29/24    US Carotid Bilateral    Narrative  EXAMINATION:  US CAROTID BILATERAL    CLINICAL HISTORY:  syncope;    TECHNIQUE:  Grayscale and color Doppler ultrasound examination of the carotid and vertebral artery systems bilaterally.  Stenosis estimates are per the NASCET measurement criteria.    COMPARISON:  None.    FINDINGS:  Right:    Internal Carotid Artery (ICA) peak systolic velocity 249 cm/sec    ICA/CCA peak systolic velocity ratio: 2.2    Plaque formation: Heterogeneous    Vertebral artery: Antegrade flow and normal waveform.    Left:    Internal Carotid Artery (ICA)  peak systolic velocity 362 cm/sec    ICA/CCA peak systolic velocity ratio: 2.6    Plaque formation: Heterogeneous    Vertebral artery: Antegrade flow and normal waveform.    Impression  Greater than 70% stenosis bilaterally.  Further evaluation as warranted.    This report was flagged in Epic as abnormal.      Electronically signed by: Petey Rosario  Date:    03/29/2024  Time:    21:47    No results found for this or any previous visit.    No results found for this or any previous visit.    No valid procedures specified.    No results found for this or any previous visit.      No results found for this or any previous visit.      Results for orders placed during the hospital encounter of 03/16/24    Echo    Interpretation Summary    Left Ventricle: The left ventricle is normal in size. Moderately increased wall thickness. There is moderate concentric hypertrophy. There is normal  systolic function with a visually estimated ejection fraction of 60 - 65%. Unable to assess diastolic function due to atrial fibrillation.    Right Ventricle: Right ventricle was not well visualized due to poor acoustic window. Normal right ventricular cavity size. Wall thickness is normal. Right ventricle wall motion  is normal. Systolic function is normal.    Left Atrium: Left atrium is moderately dilated.    Aortic Valve: Moderately calcified cusps. There is moderate annular calcification present. Moderately restricted motion. There is mild to moderate stenosis. Aortic valve area by VTI is 2.16 cm². Aortic valve peak velocity is 2.32 m/s. Mean gradient is 17 mmHg. The dimensionless index is 0.58.    Mitral Valve: There is mild regurgitation.    Tricuspid Valve: There is mild regurgitation.    Aorta: Aortic root is normal in size. Ascending aorta is mildly dilated measuring 4.00 cm.    Pulmonary Artery: The estimated pulmonary artery systolic pressure is 37 mmHg.    IVC/SVC: Normal venous pressure at 3 mmHg.      Diagnostic Results:  ECG: Reviewed  Results for orders placed during the hospital encounter of 04/22/24    Nuclear Stress - Cardiology Interpreted    Interpretation Summary    Normal myocardial perfusion scan. There is no evidence of myocardial ischemia or infarction.    The gated perfusion images showed an ejection fraction of 64% at rest. The gated perfusion images showed an ejection fraction of 69% post stress.    The ECG portion of the study is negative for ischemia.    The patient reported no chest pain during the stress test.    The 10-year ASCVD risk score (Mi DK, et al., 2019) is: 27.3%    Values used to calculate the score:      Age: 73 years      Sex: Male      Is Non- : No      Diabetic: No      Tobacco smoker: No      Systolic Blood Pressure: 160 mmHg      Is BP treated: Yes      HDL Cholesterol: 45 mg/dL      Total Cholesterol: 131 mg/dL        Assessment and Plan:    Atrial fib/flutter, transient  -     metoprolol succinate (TOPROL-XL) 25 MG 24 hr tablet; Take 1 tablet (25 mg total) by mouth once daily.  Dispense: 30 tablet; Refill: 11    Severe obesity (BMI 35.0-39.9) with comorbidity    Nonrheumatic aortic valve stenosis    Coronary artery calcification seen on CAT scan    Hospital discharge follow-up    Essential hypertension  -     amLODIPine (NORVASC) 5 MG tablet; Take 1 tablet (5 mg total) by mouth once daily.  Dispense: 90 tablet; Refill: 3    S/P carotid endarterectomy      Hypertension not at goal.  Resume amlodipine.  Switch bisoprolol to metoprolol XL low-dose given history of a flutter AFib  Sinus bradycardia resolved likely due to sedentary state while in the hospital  Continue with statins.    Okay to hold Eliquis for 48 hours to help with his left neck hematoma status post recent carotid endarterectomy.  Warm compresses.  Follow with vascular surgery  Normal nuclear stress test.    Reviewed all tests and above medical conditions with patient in detail and formulated treatment plan.  Risk factor modification discussed.   Cardiac low salt diet discussed.  Maintaining healthy weight and weight loss goals were discussed in clinic.    Follow up in  6 months

## 2024-05-09 ENCOUNTER — OFFICE VISIT (OUTPATIENT)
Dept: SURGERY | Facility: CLINIC | Age: 74
End: 2024-05-09
Payer: MEDICARE

## 2024-05-09 VITALS
HEIGHT: 65 IN | WEIGHT: 207.69 LBS | BODY MASS INDEX: 34.6 KG/M2 | HEART RATE: 74 BPM | TEMPERATURE: 98 F | SYSTOLIC BLOOD PRESSURE: 175 MMHG | OXYGEN SATURATION: 99 % | DIASTOLIC BLOOD PRESSURE: 72 MMHG

## 2024-05-09 DIAGNOSIS — D12.6 COLON ADENOMA: Primary | ICD-10-CM

## 2024-05-09 PROCEDURE — 1126F AMNT PAIN NOTED NONE PRSNT: CPT | Mod: CPTII,S$GLB,, | Performed by: STUDENT IN AN ORGANIZED HEALTH CARE EDUCATION/TRAINING PROGRAM

## 2024-05-09 PROCEDURE — 4010F ACE/ARB THERAPY RXD/TAKEN: CPT | Mod: CPTII,S$GLB,, | Performed by: STUDENT IN AN ORGANIZED HEALTH CARE EDUCATION/TRAINING PROGRAM

## 2024-05-09 PROCEDURE — 3077F SYST BP >= 140 MM HG: CPT | Mod: CPTII,S$GLB,, | Performed by: STUDENT IN AN ORGANIZED HEALTH CARE EDUCATION/TRAINING PROGRAM

## 2024-05-09 PROCEDURE — 99024 POSTOP FOLLOW-UP VISIT: CPT | Mod: S$GLB,,, | Performed by: STUDENT IN AN ORGANIZED HEALTH CARE EDUCATION/TRAINING PROGRAM

## 2024-05-09 PROCEDURE — 99999 PR PBB SHADOW E&M-EST. PATIENT-LVL III: CPT | Mod: PBBFAC,,, | Performed by: STUDENT IN AN ORGANIZED HEALTH CARE EDUCATION/TRAINING PROGRAM

## 2024-05-09 PROCEDURE — 3078F DIAST BP <80 MM HG: CPT | Mod: CPTII,S$GLB,, | Performed by: STUDENT IN AN ORGANIZED HEALTH CARE EDUCATION/TRAINING PROGRAM

## 2024-05-09 PROCEDURE — 3044F HG A1C LEVEL LT 7.0%: CPT | Mod: CPTII,S$GLB,, | Performed by: STUDENT IN AN ORGANIZED HEALTH CARE EDUCATION/TRAINING PROGRAM

## 2024-05-09 PROCEDURE — 1159F MED LIST DOCD IN RCRD: CPT | Mod: CPTII,S$GLB,, | Performed by: STUDENT IN AN ORGANIZED HEALTH CARE EDUCATION/TRAINING PROGRAM

## 2024-05-09 NOTE — PROGRESS NOTES
Subjective:       Patient ID: Quintin Ling is a 73 y.o. male.    Chief Complaint: No chief complaint on file.    Patient is a 73-year-old male well known to me after robotic right hemicolectomy for unresectable adenoma.  Postoperative course complicated by fascial dehiscence requiring abdominal closure.  Since his discharge he was done very well.  Underwent carotid endarterectomy and has recovered well.  From a GI standpoint he was now having normal bowel movements, tolerating regular diet and regaining his energy levels.  Has no complaints              Objective:      Physical Exam  Constitutional:       Appearance: He is well-developed.   HENT:      Head: Normocephalic and atraumatic.   Eyes:      Conjunctiva/sclera: Conjunctivae normal.      Pupils: Pupils are equal, round, and reactive to light.   Neck:      Thyroid: No thyromegaly.   Cardiovascular:      Rate and Rhythm: Normal rate and regular rhythm.   Pulmonary:      Effort: Pulmonary effort is normal. No respiratory distress.   Abdominal:      General: There is no distension.      Palpations: Abdomen is soft. There is no mass.      Tenderness: There is no abdominal tenderness.      Comments: Incision well healed   Musculoskeletal:         General: No tenderness. Normal range of motion.      Cervical back: Normal range of motion.   Skin:     General: Skin is warm and dry.      Capillary Refill: Capillary refill takes less than 2 seconds.   Neurological:      General: No focal deficit present.      Mental Status: He is alert and oriented to person, place, and time.         Assessment:       1. Colon adenoma        Plan:       - will need colonoscopy 1 year after colectomy  - RTC 2/2025        Tosin Boothe MD  Colon and Rectal Surgery  [facility]

## 2024-05-13 ENCOUNTER — OFFICE VISIT (OUTPATIENT)
Dept: FAMILY MEDICINE | Facility: CLINIC | Age: 74
End: 2024-05-13
Payer: MEDICARE

## 2024-05-13 VITALS
BODY MASS INDEX: 34.16 KG/M2 | DIASTOLIC BLOOD PRESSURE: 60 MMHG | HEIGHT: 65 IN | SYSTOLIC BLOOD PRESSURE: 152 MMHG | TEMPERATURE: 99 F | OXYGEN SATURATION: 97 % | HEART RATE: 74 BPM | WEIGHT: 205 LBS

## 2024-05-13 DIAGNOSIS — N18.31 STAGE 3A CHRONIC KIDNEY DISEASE: ICD-10-CM

## 2024-05-13 DIAGNOSIS — Z98.890 S/P CAROTID ENDARTERECTOMY: Primary | ICD-10-CM

## 2024-05-13 DIAGNOSIS — I48.91 NEW ONSET A-FIB: ICD-10-CM

## 2024-05-13 DIAGNOSIS — M54.16 LUMBAR RADICULOPATHY: ICD-10-CM

## 2024-05-13 DIAGNOSIS — R73.03 PREDIABETES: ICD-10-CM

## 2024-05-13 DIAGNOSIS — T81.49XA INFECTED SURGICAL WOUND: ICD-10-CM

## 2024-05-13 DIAGNOSIS — I10 ESSENTIAL HYPERTENSION: ICD-10-CM

## 2024-05-13 PROCEDURE — 3077F SYST BP >= 140 MM HG: CPT | Mod: CPTII,S$GLB,, | Performed by: FAMILY MEDICINE

## 2024-05-13 PROCEDURE — 1101F PT FALLS ASSESS-DOCD LE1/YR: CPT | Mod: CPTII,S$GLB,, | Performed by: FAMILY MEDICINE

## 2024-05-13 PROCEDURE — 3288F FALL RISK ASSESSMENT DOCD: CPT | Mod: CPTII,S$GLB,, | Performed by: FAMILY MEDICINE

## 2024-05-13 PROCEDURE — 3078F DIAST BP <80 MM HG: CPT | Mod: CPTII,S$GLB,, | Performed by: FAMILY MEDICINE

## 2024-05-13 PROCEDURE — 3008F BODY MASS INDEX DOCD: CPT | Mod: CPTII,S$GLB,, | Performed by: FAMILY MEDICINE

## 2024-05-13 PROCEDURE — 1111F DSCHRG MED/CURRENT MED MERGE: CPT | Mod: CPTII,S$GLB,, | Performed by: FAMILY MEDICINE

## 2024-05-13 PROCEDURE — 1126F AMNT PAIN NOTED NONE PRSNT: CPT | Mod: CPTII,S$GLB,, | Performed by: FAMILY MEDICINE

## 2024-05-13 PROCEDURE — 4010F ACE/ARB THERAPY RXD/TAKEN: CPT | Mod: CPTII,S$GLB,, | Performed by: FAMILY MEDICINE

## 2024-05-13 PROCEDURE — 1159F MED LIST DOCD IN RCRD: CPT | Mod: CPTII,S$GLB,, | Performed by: FAMILY MEDICINE

## 2024-05-13 PROCEDURE — 99214 OFFICE O/P EST MOD 30 MIN: CPT | Mod: S$GLB,,, | Performed by: FAMILY MEDICINE

## 2024-05-13 PROCEDURE — 3044F HG A1C LEVEL LT 7.0%: CPT | Mod: CPTII,S$GLB,, | Performed by: FAMILY MEDICINE

## 2024-05-13 PROCEDURE — 99999 PR PBB SHADOW E&M-EST. PATIENT-LVL IV: CPT | Mod: PBBFAC,,, | Performed by: FAMILY MEDICINE

## 2024-05-13 NOTE — PROGRESS NOTES
Subjective:       Patient ID: Quintin Ling is a 73 y.o. male.    Chief Complaint: Follow-up      HPI Comments:       Current Outpatient Medications:     amLODIPine (NORVASC) 5 MG tablet, Take 1 tablet (5 mg total) by mouth once daily., Disp: 90 tablet, Rfl: 3    apixaban (ELIQUIS) 5 mg Tab, Take 1 tablet (5 mg total) by mouth 2 (two) times daily., Disp: 60 tablet, Rfl: 2    aspirin 81 MG Chew, Take 1 tablet (81 mg total) by mouth once daily., Disp: 360 tablet, Rfl: 0    atorvastatin (LIPITOR) 20 MG tablet, TAKE 1 TABLET BY MOUTH EVERY DAY, Disp: 90 tablet, Rfl: 3    clopidogreL (PLAVIX) 75 mg tablet, Take 1 tablet (75 mg total) by mouth once daily., Disp: 30 tablet, Rfl: 11    fenofibrate (TRICOR) 48 MG tablet, Take 1 tablet (48 mg total) by mouth once daily., Disp: 90 tablet, Rfl: 3    fenofibrate (TRICOR) 54 MG tablet, Take 1 tablet (54 mg total) by mouth once daily., Disp: 90 tablet, Rfl: 3    ferrous sulfate (FEOSOL) 325 mg (65 mg iron) Tab tablet, Take 1 tablet (325 mg total) by mouth once daily., Disp: 30 tablet, Rfl: 2    furosemide (LASIX) 40 MG tablet, Take 1 tablet (40 mg total) by mouth daily as needed (leg swelling)., Disp: 30 tablet, Rfl: 1    metFORMIN (GLUCOPHAGE) 500 MG tablet, TAKE 1 TABLET BY MOUTH TWICE A DAY WITH FOOD, Disp: 180 tablet, Rfl: 1    metoprolol succinate (TOPROL-XL) 25 MG 24 hr tablet, Take 1 tablet (25 mg total) by mouth once daily., Disp: 30 tablet, Rfl: 11    pregabalin (LYRICA) 75 MG capsule, Take 1 capsule (75 mg total) by mouth 2 (two) times daily., Disp: 60 capsule, Rfl: 3    Transitional Care Note    Family and/or Caretaker present at visit?  No.  Diagnostic tests reviewed/disposition: No diagnosic tests pending after this hospitalization.  Disease/illness education: CEA  Home health/community services discussion/referrals: Patient does not have home health established from hospital visit.  They do not need home health.  If needed, we will set up home health for the  "patient.   Establishment or re-establishment of referral orders for community resources: No other necessary community resources.   Discussion with other health care providers: No discussion with other health care providers necessary.                "74 yearold white man with hx of HTN, HLP, prediabetes, obesity, ED, CKD IIIA, lumbar radiculopathy, normocytic anemia, PVD and recent hospitalization 3/11-03/12/2024 for colon adenoma s/p robotic right colectomy on 03/11/2024, Pathology with tubulovillous adenoma with multifocal high-grade dysplasia, and multiple tubular adenomas. Surgery complicated by fascia dehiscence but pt eventually recovered well.     Pt was admitted again on 3/29/2024 with Syncope. He was outside on his patio and found slouched in his chair by his wife who called EMS. When he woke up, he denied any focal neurological deficits. He denied changes in vision or speech. He denied any prior episodes. Ct head negative. Carotid duplex showed B Carotid Stenosis > 70%, peak systolic velocity 249cm/s on the right and 362cm/s on the left.      Pt underwent L CEA today, 4/25/24 by Dr. Mar- Vascular Surgery and \[facility]\"" consulted for medical management. Pt seen in his room post op- doing well, L Neck dressing in place, mildly soaked and mildy blood, no SOB, dysphagia or dysphonia."      Surgical follow-up complicated by area of swelling in the neck and possible hematoma..  Seen by vascular surgery, who felt that this is not need to be evacuated.  Has now been restarted on his Eliquis.  Patient says he thinks the areas getting smaller, in his he has having no throat obstructions symptoms.    Prior to this he had a right hemicolectomy for a non-resectable mass.  Now doing well.  Says bowel movements still not back to normal but he denies constipation, despite being on iron.  Will have a colonoscopy next year    New onset atrial fib/flutter.  Now on Toprol.  Eliquis.  Back on amlodipine for blood pressure.  " "Blood pressure is still high but his diastolic is low at 60    Back is doing well with Lyrica.      Review of Systems   Constitutional:  Negative for activity change, appetite change and fever.   HENT:  Negative for sore throat.    Respiratory:  Negative for cough and shortness of breath.    Cardiovascular:  Negative for chest pain.   Gastrointestinal:  Negative for abdominal pain, diarrhea and nausea.   Genitourinary:  Negative for difficulty urinating.   Musculoskeletal:  Negative for arthralgias and myalgias.   Neurological:  Negative for dizziness and headaches.       Objective:      Vitals:    05/13/24 1436   BP: (!) 152/60   Pulse: 74   Temp: 98.7 °F (37.1 °C)   TempSrc: Oral   SpO2: 97%   Weight: 93 kg (205 lb 0.4 oz)   Height: 5' 5" (1.651 m)   PainSc: 0-No pain     Physical Exam  Vitals and nursing note reviewed.   Constitutional:       General: He is not in acute distress.     Appearance: He is well-developed. He is not diaphoretic.   HENT:      Head: Normocephalic.      Mouth/Throat:      Pharynx: No oropharyngeal exudate.   Neck:      Thyroid: No thyromegaly.        Comments: Mild-to-moderate swelling around the incision site.  No drainage  Cardiovascular:      Rate and Rhythm: Normal rate and regular rhythm.      Heart sounds: Normal heart sounds. No murmur heard.  Pulmonary:      Effort: Pulmonary effort is normal.      Breath sounds: Normal breath sounds. No wheezing or rales.   Abdominal:      General: There is no distension.      Palpations: Abdomen is soft.   Musculoskeletal:      Cervical back: Neck supple.   Lymphadenopathy:      Cervical: No cervical adenopathy.   Skin:     General: Skin is warm and dry.   Neurological:      Mental Status: He is alert and oriented to person, place, and time.   Psychiatric:         Behavior: Behavior normal.         Thought Content: Thought content normal.         Judgment: Judgment normal.         Assessment:       1. S/P carotid endarterectomy    2. New onset " a-fib    3. Infected surgical wound    4. Stage 3a chronic kidney disease    5. Essential hypertension    6. Lumbar radiculopathy    7. Prediabetes        Plan:   S/P carotid endarterectomy  Comments:  Some localized swelling.  Gradually improving    New onset a-fib  Comments:  On metoprolol and Eliquis    Infected surgical wound  Comments:  Abdominal wound.  Now doing much better    Stage 3a chronic kidney disease  Comments:  Creatinine 1.1    Essential hypertension  Comments:  Stable    Lumbar radiculopathy  Comments:  Doing well on Lyrica.  Followed by pain management    Prediabetes  Comments:  A1c 5.4

## 2024-05-14 NOTE — TELEPHONE ENCOUNTER
Refill Routing Note   Medication(s) are not appropriate for processing by [facility] for the following reason(s):        Drug-disease interaction    [facility] action(s):  Defer   Requires labs : Yes      Medication Therapy Plan: liver enzymes out of range      Appointments  past 12m or future 3m with PCP    Date Provider   Last Visit   5/13/2024 Bishop Gabriel MD   Next Visit   8/29/2024 Bishop Gabriel MD   ED visits in past 90 days: 0        Note composed:11:18 AM 05/14/2024                [Dear  ___] : Dear  [unfilled], [Courtesy Letter:] : I had the pleasure of seeing your patient, [unfilled], in my office today. [Please see my note below.] : Please see my note below. [Consult Closing:] : Thank you very much for allowing me to participate in the care of this patient.  If you have any questions, please do not hesitate to contact me. [Sincerely,] : Sincerely, [FreeTextEntry3] : XAVIER Garcia\par Pediatric Nurse Practitioner\par [facility] Division of Pediatric Endocrinology\par \par

## 2024-05-14 NOTE — TELEPHONE ENCOUNTER
Care Due:                  Date            Visit Type   Department     Provider  --------------------------------------------------------------------------------                                EP -                              PRIMARY      [facility] INTERNAL  Last Visit: 05-      CARE ([facility])   MEDICINE       Bishop Gabriel                              EP                               PRIMARY      [facility] INTERNAL  Next Visit: 08-      CARE ([facility])   MEDICINE       Bishop Gabriel                                                            Last  Test          Frequency    Reason                     Performed    Due Date  --------------------------------------------------------------------------------    Lipid Panel.  12 months..  atorvastatin, fenofibrate  05- 05-    [facility] Embedded Care Due Messages. Reference number: [id number].   5/14/2024 9:22:50 AM CDT

## 2024-05-16 RX ORDER — FENOFIBRATE 54 MG/1
54 TABLET ORAL
Qty: 90 TABLET | Refills: 3 | Status: SHIPPED | OUTPATIENT
Start: 2024-05-16

## 2024-05-16 RX ORDER — ATORVASTATIN CALCIUM 20 MG/1
TABLET, FILM COATED ORAL
Qty: 90 TABLET | Refills: 3 | Status: SHIPPED | OUTPATIENT
Start: 2024-05-16

## 2024-05-28 ENCOUNTER — TELEPHONE (OUTPATIENT)
Dept: INTERNAL MEDICINE | Facility: CLINIC | Age: 74
End: 2024-05-28
Payer: MEDICARE

## 2024-05-30 NOTE — DISCHARGE SUMMARY
[facility] S[facility]  Discharge Note  Short Stay    Procedure(s) (LRB):  ENDARTERECTOMY, CAROTID (Left)  ANGIOPLASTY, USING PATCH GRAFT (Left)      OUTCOME: Condition has improved and patient is now ready for discharge.    DISPOSITION: Home or Self Care    FINAL DIAGNOSIS:  S/P carotid endarterectomy    FOLLOWUP: In clinic    DISCHARGE INSTRUCTIONS:    Discharge Procedure Orders   Diet Cardiac     Diet diabetic     Activity as tolerated        TIME SPENT ON DISCHARGE: 30 minutes

## 2024-06-04 ENCOUNTER — TELEPHONE (OUTPATIENT)
Dept: FAMILY MEDICINE | Facility: CLINIC | Age: 74
End: 2024-06-04
Payer: MEDICARE

## 2024-06-04 VITALS — DIASTOLIC BLOOD PRESSURE: 71 MMHG | SYSTOLIC BLOOD PRESSURE: 173 MMHG

## 2024-06-13 ENCOUNTER — TELEPHONE (OUTPATIENT)
Dept: FAMILY MEDICINE | Facility: CLINIC | Age: 74
End: 2024-06-13
Payer: MEDICARE

## 2024-06-13 VITALS — SYSTOLIC BLOOD PRESSURE: 168 MMHG | DIASTOLIC BLOOD PRESSURE: 70 MMHG

## 2024-06-13 DIAGNOSIS — M54.16 LUMBAR RADICULOPATHY: ICD-10-CM

## 2024-06-13 NOTE — TELEPHONE ENCOUNTER
----- Message from Serafin Castañeda sent at 6/13/2024  2:41 PM CDT -----  Contact: [phone]  Type:  Patient Returning Call    Who Called:ROSA ANDRADE [[id number]]  Who Left Message for Patient:n/a  Does the patient know what this is regarding?:missed call  Would the patient rather a call back or a response via [facility]? Call back  Best Call Back Number:[phone]  Additional Information: n [phone]

## 2024-06-14 RX ORDER — PREGABALIN 75 MG/1
75 CAPSULE ORAL 2 TIMES DAILY
Qty: 60 CAPSULE | Refills: 3 | Status: SHIPPED | OUTPATIENT
Start: 2024-06-14

## 2024-06-28 RX ORDER — BLOOD-GLUCOSE METER
300 KIT MISCELLANEOUS
COMMUNITY
Start: 2024-06-01

## 2024-06-28 RX ORDER — BLOOD-GLUCOSE METER
KIT MISCELLANEOUS
COMMUNITY
Start: 2024-06-01

## 2024-06-28 RX ORDER — LANCETS 28 GAUGE
28 EACH MISCELLANEOUS 3 TIMES DAILY
COMMUNITY
Start: 2024-06-01

## 2024-06-28 NOTE — TELEPHONE ENCOUNTER
Refill Routing Note   Medication(s) are not appropriate for processing by [facility] for the following reason(s):        Outside of protocol    [facility] action(s):  Route               Appointments  past 12m or future 3m with PCP    Date Provider   Last Visit   5/13/2024 Bishop Gabriel MD   Next Visit   8/29/2024 Bishop Gabriel MD   ED visits in past 90 days: 0        Note composed:11:22 AM 06/28/2024

## 2024-07-11 DIAGNOSIS — I48.91 ATRIAL FIB/FLUTTER, TRANSIENT: ICD-10-CM

## 2024-07-11 DIAGNOSIS — I10 ESSENTIAL HYPERTENSION: ICD-10-CM

## 2024-07-11 DIAGNOSIS — I48.92 ATRIAL FIB/FLUTTER, TRANSIENT: ICD-10-CM

## 2024-07-11 RX ORDER — METOPROLOL SUCCINATE 25 MG/1
25 TABLET, EXTENDED RELEASE ORAL DAILY
Qty: 30 TABLET | Refills: 11 | Status: CANCELLED | OUTPATIENT
Start: 2024-07-11 | End: 2025-07-11

## 2024-07-11 RX ORDER — AMLODIPINE BESYLATE 5 MG/1
5 TABLET ORAL DAILY
Qty: 90 TABLET | Refills: 3 | Status: CANCELLED | OUTPATIENT
Start: 2024-07-11

## 2024-07-11 RX ORDER — CLOPIDOGREL BISULFATE 75 MG/1
75 TABLET ORAL DAILY
Qty: 30 TABLET | Refills: 11 | Status: CANCELLED | OUTPATIENT
Start: 2024-07-11 | End: 2025-07-11

## 2024-07-15 DIAGNOSIS — I48.92 ATRIAL FIB/FLUTTER, TRANSIENT: ICD-10-CM

## 2024-07-15 DIAGNOSIS — I48.91 ATRIAL FIB/FLUTTER, TRANSIENT: ICD-10-CM

## 2024-07-15 DIAGNOSIS — I10 ESSENTIAL HYPERTENSION: ICD-10-CM

## 2024-07-15 RX ORDER — METFORMIN HYDROCHLORIDE 500 MG/1
500 TABLET ORAL 2 TIMES DAILY WITH MEALS
Qty: 180 TABLET | Refills: 0 | Status: SHIPPED | OUTPATIENT
Start: 2024-07-15

## 2024-07-15 NOTE — TELEPHONE ENCOUNTER
Care Due:                  Date            Visit Type   Department     Provider  --------------------------------------------------------------------------------                                EP -                              PRIMARY      [facility] INTERNAL  Last Visit: 05-      CARE ([facility])   KERI Gabriel                              [facility]                              PRIMARY      [facility] INTERNAL  Next Visit: 08-      CARE ([facility])   KERI Gabriel                                                            Last  Test          Frequency    Reason                     Performed    Due Date  --------------------------------------------------------------------------------    HBA1C.......  6 months...  metFORMIN................  03- 09-    Lipid Panel.  12 months..  atorvastatin, fenofibrate  05- 05-    [facility] Embedded Care Due Messages. Reference number: [id number].   7/15/2024 2:54:46 PM CDT

## 2024-07-16 NOTE — TELEPHONE ENCOUNTER
Refill Routing Note   Medication(s) are not appropriate for processing by [facility] for the following reason(s):        No active prescription written by provider  Required labs abnormal  Required vitals abnormal    [facility] action(s):  Defer  Approve     Requires labs : Yes             Appointments  past 12m or future 3m with PCP    Date Provider   Last Visit   5/13/2024 Bishop Gabriel MD   Next Visit   8/29/2024 Bishop Gabriel MD   ED visits in past 90 days: 0        Note composed:9:40 PM 07/15/2024

## 2024-07-16 NOTE — TELEPHONE ENCOUNTER
Refill Routing Note   Medication(s) are not appropriate for processing by [facility] for the following reason(s):        No active prescription written by provider  Required labs abnormal  Required vitals abnormal  Required labs outdated    [facility] action(s):  Defer  Approve     Requires labs : Yes             Appointments  past 12m or future 3m with PCP    Date Provider   Last Visit   5/13/2024 Bishop Gabriel MD   Next Visit   8/29/2024 Bishop Gabriel MD   ED visits in past 90 days: 0        Note composed:4:34 PM 07/16/2024

## 2024-07-17 RX ORDER — METOPROLOL SUCCINATE 25 MG/1
25 TABLET, EXTENDED RELEASE ORAL DAILY
Qty: 90 TABLET | Refills: 3 | Status: SHIPPED | OUTPATIENT
Start: 2024-07-17 | End: 2025-07-17

## 2024-07-17 RX ORDER — FERROUS SULFATE 325(65) MG
TABLET ORAL
Qty: 90 TABLET | OUTPATIENT
Start: 2024-07-17

## 2024-07-17 RX ORDER — ATORVASTATIN CALCIUM 20 MG/1
20 TABLET, FILM COATED ORAL DAILY
Qty: 90 TABLET | Refills: 0 | Status: SHIPPED | OUTPATIENT
Start: 2024-07-17

## 2024-07-17 RX ORDER — AMLODIPINE BESYLATE 5 MG/1
5 TABLET ORAL DAILY
Qty: 90 TABLET | Refills: 3 | Status: SHIPPED | OUTPATIENT
Start: 2024-07-17

## 2024-07-17 RX ORDER — CLOPIDOGREL BISULFATE 75 MG/1
75 TABLET ORAL DAILY
Qty: 90 TABLET | Refills: 3 | Status: SHIPPED | OUTPATIENT
Start: 2024-07-17 | End: 2025-07-17

## 2024-07-17 NOTE — TELEPHONE ENCOUNTER
----- Message from Claire Herrmann sent at 7/17/2024 11:26 AM CDT -----  Contact: [phone]  Type:  RX Refill Request    Who Called: [facility]   Refill or New Rx:refill   RX Name and Strength:  How is the patient currently taking it? (ex. 1XDy):  Is this a 30 day or 90 day RX:  Preferred Pharmacy with phone number:  [facility] (Mail Order) - [place] - [address]  [address]  [place] [address]  Phone: [phone] Fax: [phone]  Local or Mail Order:local   Ordering Provider:Dr Gabriel   Would the patient rather a call back or a response via [facility]? Call back   Best Call Back Number:[phone]  Additional Information:    metFORMIN (GLUCOPHAGE) 500 MG table  amLODIPine (NORVASC) 5 MG table  atorvastatin (LIPITOR) 20 MG tablet  metoprolol succinate (TOPROL-XL) 25 MG 24 hr tablet  clopidogreL (PLAVIX) 75 mg tablet

## 2024-07-18 RX ORDER — FERROUS SULFATE 325(65) MG
325 TABLET ORAL DAILY
Qty: 30 TABLET | Refills: 2 | Status: SHIPPED | OUTPATIENT
Start: 2024-07-18

## 2024-08-23 ENCOUNTER — OFFICE VISIT (OUTPATIENT)
Dept: FAMILY MEDICINE | Facility: CLINIC | Age: 74
End: 2024-08-23
Payer: MEDICARE

## 2024-08-23 ENCOUNTER — LAB VISIT (OUTPATIENT)
Dept: LAB | Facility: HOSPITAL | Age: 74
End: 2024-08-23
Attending: FAMILY MEDICINE
Payer: MEDICARE

## 2024-08-23 VITALS
HEART RATE: 70 BPM | SYSTOLIC BLOOD PRESSURE: 152 MMHG | HEIGHT: 65 IN | OXYGEN SATURATION: 95 % | BODY MASS INDEX: 33.88 KG/M2 | DIASTOLIC BLOOD PRESSURE: 78 MMHG | TEMPERATURE: 98 F | WEIGHT: 203.38 LBS

## 2024-08-23 DIAGNOSIS — R73.03 PREDIABETES: ICD-10-CM

## 2024-08-23 DIAGNOSIS — Z12.5 PROSTATE CANCER SCREENING: ICD-10-CM

## 2024-08-23 DIAGNOSIS — N18.31 STAGE 3A CHRONIC KIDNEY DISEASE: ICD-10-CM

## 2024-08-23 DIAGNOSIS — Z98.890 S/P CAROTID ENDARTERECTOMY: ICD-10-CM

## 2024-08-23 DIAGNOSIS — I48.91 ATRIAL FIB/FLUTTER, TRANSIENT: ICD-10-CM

## 2024-08-23 DIAGNOSIS — R79.9 ABNORMAL FINDING OF BLOOD CHEMISTRY, UNSPECIFIED: ICD-10-CM

## 2024-08-23 DIAGNOSIS — I10 ESSENTIAL HYPERTENSION: ICD-10-CM

## 2024-08-23 DIAGNOSIS — I48.92 ATRIAL FIB/FLUTTER, TRANSIENT: ICD-10-CM

## 2024-08-23 DIAGNOSIS — T81.49XA INFECTED SURGICAL WOUND: Primary | ICD-10-CM

## 2024-08-23 LAB
CHOLEST SERPL-MCNC: 119 MG/DL (ref 120–199)
CHOLEST/HDLC SERPL: 2.7 {RATIO} (ref 2–5)
COMPLEXED PSA SERPL-MCNC: 1.4 NG/ML (ref 0–4)
HDLC SERPL-MCNC: 44 MG/DL (ref 40–75)
HDLC SERPL: 37 % (ref 20–50)
LDLC SERPL CALC-MCNC: 58.6 MG/DL (ref 63–159)
NONHDLC SERPL-MCNC: 75 MG/DL
TRIGL SERPL-MCNC: 82 MG/DL (ref 30–150)

## 2024-08-23 PROCEDURE — 99999 PR PBB SHADOW E&M-EST. PATIENT-LVL III: CPT | Mod: PBBFAC,,, | Performed by: FAMILY MEDICINE

## 2024-08-23 PROCEDURE — 84153 ASSAY OF PSA TOTAL: CPT | Performed by: FAMILY MEDICINE

## 2024-08-23 PROCEDURE — 36415 COLL VENOUS BLD VENIPUNCTURE: CPT | Mod: PO | Performed by: FAMILY MEDICINE

## 2024-08-23 PROCEDURE — 80061 LIPID PANEL: CPT | Performed by: FAMILY MEDICINE

## 2024-08-23 RX ORDER — METOPROLOL SUCCINATE 25 MG/1
25 TABLET, EXTENDED RELEASE ORAL 2 TIMES DAILY
Qty: 180 TABLET | Refills: 3 | Status: SHIPPED | OUTPATIENT
Start: 2024-08-23 | End: 2025-08-23

## 2024-08-23 NOTE — PROGRESS NOTES
"Pt's pcp requested dressing change for pt.  Spoke with pt and verified identity.  Inquired where pt needed new dressing.  Pt lifted shirt revealing a 2"x 2" square gauze taped to midline abdomen above the navel.  Advised pt that nurse would return shortly.  Gathered Chlorhexidine, two sterile cotton tipped applicators, triple antibiotic topical ointment, and one 3.6 x 4" Mepore bandage.  Returned to pt's room and performed hand hygiene.  Explained procedure to pt and what each supply was and what it would be used for.  Pt verbalized understanding.  Pt removed gauze. Purulent drainage was observed on gauze and 2" wound on abdomen.  Pt states that wound is where surgical incision was.  Nurse put on single use disposable gloves.  Skin was cleaned with chlorhexidine, triple antibiotic ointment was applied liberally to wound bed using sterile cotton tipped applicators.  Mepore bandage applied to wound site and pressed firmly to adhere to skin.  Spoke to pt about upcoming appointments.  Pt verbalized understanding.  Soiled gauze, chlorhexidine sponge, cotton tipped applicators, and triple antibiotic ointment wrapped in gloves and disposed of.  Performed hand hygiene and escorted pt back to lobby.    "

## 2024-08-23 NOTE — PROGRESS NOTES
Subjective:       Patient ID: Quintin Ling is a 74 y.o. male.    Chief Complaint: Drainage from Incision      HPI Comments:       Current Outpatient Medications:     amLODIPine (NORVASC) 5 MG tablet, Take 1 tablet (5 mg total) by mouth once daily., Disp: 90 tablet, Rfl: 3    apixaban (ELIQUIS) 5 mg Tab, Take 1 tablet (5 mg total) by mouth 2 (two) times daily., Disp: 60 tablet, Rfl: 2    aspirin 81 MG Chew, Take 1 tablet (81 mg total) by mouth once daily., Disp: 360 tablet, Rfl: 0    atorvastatin (LIPITOR) 20 MG tablet, Take 1 tablet (20 mg total) by mouth once daily., Disp: 90 tablet, Rfl: 0    clopidogreL (PLAVIX) 75 mg tablet, Take 1 tablet (75 mg total) by mouth once daily., Disp: 90 tablet, Rfl: 3    fenofibrate (TRICOR) 48 MG tablet, Take 1 tablet (48 mg total) by mouth once daily., Disp: 90 tablet, Rfl: 3    fenofibrate (TRICOR) 54 MG tablet, TAKE 1 TABLET BY MOUTH ONCE DAILY, Disp: 90 tablet, Rfl: 3    ferrous sulfate (FEOSOL) 325 mg (65 mg iron) Tab tablet, Take 1 tablet (325 mg total) by mouth once daily., Disp: 30 tablet, Rfl: 2    FREESTYLE FREEDOM LITE kit, by Other route., Disp: , Rfl:     FREESTYLE LANCETS 28 gauge lancets, Apply 28 lancets topically 3 (three) times daily., Disp: , Rfl:     FREESTYLE LITE STRIPS Strp, Inject 300 strips as directed as needed., Disp: , Rfl:     furosemide (LASIX) 40 MG tablet, Take 1 tablet (40 mg total) by mouth daily as needed (leg swelling)., Disp: 30 tablet, Rfl: 1    metFORMIN (GLUCOPHAGE) 500 MG tablet, Take 1 tablet (500 mg total) by mouth 2 (two) times daily with meals., Disp: 180 tablet, Rfl: 0    pregabalin (LYRICA) 75 MG capsule, TAKE 1 CAPSULE BY MOUTH TWICE A DAY, Disp: 60 capsule, Rfl: 3    metoprolol succinate (TOPROL-XL) 25 MG 24 hr tablet, Take 1 tablet (25 mg total) by mouth 2 (two) times daily., Disp: 180 tablet, Rfl: 3      Last seen by me 3 months ago.    Now has purulent drainage from his abdominal surgical site for the last 3-4 weeks.  No fever  "or chills.  Keeps it covered with gauze.  Had already had a wound dehiscence prior to that as a complication of his colon surgery    Status post CEA earlier this year on the left.    Blood pressure readings have been high at home as well as here.  Will increase Toprol to b.i.d.      Review of Systems   Constitutional:  Negative for activity change, appetite change and fever.   HENT:  Negative for sore throat.    Respiratory:  Negative for cough and shortness of breath.    Cardiovascular:  Negative for chest pain.   Gastrointestinal:  Negative for abdominal pain, diarrhea and nausea.   Genitourinary:  Negative for difficulty urinating.   Musculoskeletal:  Negative for arthralgias and myalgias.   Skin:  Positive for wound.   Neurological:  Negative for dizziness and headaches.       Objective:      Vitals:    08/23/24 0933   BP: (!) 152/78   Pulse: 70   Temp: 97.5 °F (36.4 °C)   TempSrc: Tympanic   SpO2: 95%   Weight: 92.3 kg (203 lb 6 oz)   Height: 5' 5" (1.651 m)   PainSc: 0-No pain     Physical Exam  Vitals and nursing note reviewed.   Constitutional:       General: He is not in acute distress.     Appearance: He is well-developed. He is not diaphoretic.   HENT:      Head: Normocephalic.   Neck:      Thyroid: No thyromegaly.   Cardiovascular:      Rate and Rhythm: Normal rate and regular rhythm.      Heart sounds: Normal heart sounds. No murmur heard.  Pulmonary:      Effort: Pulmonary effort is normal.      Breath sounds: Normal breath sounds. No wheezing or rales.   Abdominal:      General: There is no distension.      Palpations: Abdomen is soft.          Comments: Slightly gaping wound with purulent material visible   Musculoskeletal:      Cervical back: Neck supple.   Lymphadenopathy:      Cervical: No cervical adenopathy.   Skin:     General: Skin is warm and dry.   Neurological:      Mental Status: He is alert and oriented to person, place, and time.   Psychiatric:         Behavior: Behavior normal.         " Thought Content: Thought content normal.         Judgment: Judgment normal.         Assessment:       1. Infected surgical wound    2. Atrial fib/flutter, transient    3. Essential hypertension    4. Stage 3a chronic kidney disease    5. Prediabetes    6. S/P carotid endarterectomy    7. Prostate cancer screening    8. Abnormal finding of blood chemistry, unspecified        Plan:   Infected surgical wound  Comments:  Follow-up with Colorectal surgery ASAP    Atrial fib/flutter, transient  Comments:  Stable on Eliquis and beta-blocker  Orders:  -     metoprolol succinate (TOPROL-XL) 25 MG 24 hr tablet; Take 1 tablet (25 mg total) by mouth 2 (two) times daily.  Dispense: 180 tablet; Refill: 3    Essential hypertension  Comments:  Not at goal.  Increase Toprol to b.i.d..  Follow-up one-month    Stage 3a chronic kidney disease  Comments:  Stable    Prediabetes  Comments:  A1c 5.4  Orders:  -     Lipid Panel; Future; Expected date: 08/23/2024    S/P carotid endarterectomy  Comments:  Normal exam    Prostate cancer screening  Comments:  PSA today  Orders:  -     PSA, Screening; Future; Expected date: 08/23/2024    Abnormal finding of blood chemistry, unspecified  -     Lipid Panel; Future; Expected date: 08/23/2024

## 2024-08-26 ENCOUNTER — TELEPHONE (OUTPATIENT)
Dept: SURGERY | Facility: CLINIC | Age: 74
End: 2024-08-26
Payer: MEDICARE

## 2024-08-26 NOTE — TELEPHONE ENCOUNTER
Called patient to confirm appointment scheduled on 08/28 at 10am with Dr. Boothe. Patient verbalized understanding.    ----- Message from Tosin Boothe MD sent at 8/26/2024 10:37 AM CDT -----  Regarding: RE: Post- op complications  Antonella we can get him in this week  ----- Message -----  From: Payal Paz CMA  Sent: 8/26/2024   8:37 AM CDT  To: Tosin Boothe MD  Subject: RE: Post- op complications                       Pt came in office complaining of pus and discomfort on the incision site  that was performed recently . Per Dr. Gabriel what he saw he requested it. It should  be in his progress notes to give you further information.  ----- Message -----  From: Tosin Boothe MD  Sent: 8/25/2024   3:25 PM CDT  To: Payal Paz CMA; Shyann Leahy Staff  Subject: RE: Post- op complications                       We can get him in whenever, why does he need urgent followup with me though?  ----- Message -----  From: Payal Paz CMA  Sent: 8/23/2024  10:05 AM CDT  To: Tosin Boothe MD; Shyann Leahy Staff  Subject: Post- op complications                           Per Dr. Garbiel Pt is needing a urgent follow up appointment, when is the soonest he can be seen. September 11 is to far out. Please advise.

## 2024-08-28 ENCOUNTER — OFFICE VISIT (OUTPATIENT)
Dept: SURGERY | Facility: CLINIC | Age: 74
End: 2024-08-28
Payer: MEDICARE

## 2024-08-28 VITALS
HEIGHT: 65 IN | SYSTOLIC BLOOD PRESSURE: 162 MMHG | BODY MASS INDEX: 34.49 KG/M2 | TEMPERATURE: 98 F | WEIGHT: 207 LBS | DIASTOLIC BLOOD PRESSURE: 66 MMHG | HEART RATE: 71 BPM

## 2024-08-28 DIAGNOSIS — K63.89 COLONIC MASS: Primary | ICD-10-CM

## 2024-08-28 DIAGNOSIS — T81.30XA ABDOMINAL WOUND DEHISCENCE, INITIAL ENCOUNTER: ICD-10-CM

## 2024-08-28 PROCEDURE — 3008F BODY MASS INDEX DOCD: CPT | Mod: CPTII,S$GLB,, | Performed by: STUDENT IN AN ORGANIZED HEALTH CARE EDUCATION/TRAINING PROGRAM

## 2024-08-28 PROCEDURE — 1126F AMNT PAIN NOTED NONE PRSNT: CPT | Mod: CPTII,S$GLB,, | Performed by: STUDENT IN AN ORGANIZED HEALTH CARE EDUCATION/TRAINING PROGRAM

## 2024-08-28 PROCEDURE — 99999 PR PBB SHADOW E&M-EST. PATIENT-LVL III: CPT | Mod: PBBFAC,,, | Performed by: STUDENT IN AN ORGANIZED HEALTH CARE EDUCATION/TRAINING PROGRAM

## 2024-08-28 PROCEDURE — 3044F HG A1C LEVEL LT 7.0%: CPT | Mod: CPTII,S$GLB,, | Performed by: STUDENT IN AN ORGANIZED HEALTH CARE EDUCATION/TRAINING PROGRAM

## 2024-08-28 PROCEDURE — 1159F MED LIST DOCD IN RCRD: CPT | Mod: CPTII,S$GLB,, | Performed by: STUDENT IN AN ORGANIZED HEALTH CARE EDUCATION/TRAINING PROGRAM

## 2024-08-28 PROCEDURE — 3077F SYST BP >= 140 MM HG: CPT | Mod: CPTII,S$GLB,, | Performed by: STUDENT IN AN ORGANIZED HEALTH CARE EDUCATION/TRAINING PROGRAM

## 2024-08-28 PROCEDURE — 3078F DIAST BP <80 MM HG: CPT | Mod: CPTII,S$GLB,, | Performed by: STUDENT IN AN ORGANIZED HEALTH CARE EDUCATION/TRAINING PROGRAM

## 2024-08-28 PROCEDURE — 4010F ACE/ARB THERAPY RXD/TAKEN: CPT | Mod: CPTII,S$GLB,, | Performed by: STUDENT IN AN ORGANIZED HEALTH CARE EDUCATION/TRAINING PROGRAM

## 2024-08-28 PROCEDURE — 99213 OFFICE O/P EST LOW 20 MIN: CPT | Mod: S$GLB,,, | Performed by: STUDENT IN AN ORGANIZED HEALTH CARE EDUCATION/TRAINING PROGRAM

## 2024-08-28 NOTE — PROGRESS NOTES
CRS Office Visit    SUBJECTIVE:     Chief Complaint: wound dehiscence    History of Present Illness:  Patient is a 74 y.o. male presents with midline wound complications.  He reports that a few weeks ago the midline wound opened and drained some serous fluid.  It has not healed since then.  It was not bothersome or painful.  He does not notice any foul discharge from the wound.      Review of patient's allergies indicates:  No Known Allergies    Past Medical History:   Diagnosis Date    Diabetes mellitus     Hyperlipidemia     Hypertension      Past Surgical History:   Procedure Laterality Date    BIOPSY OF PERITONEUM N/A 1/29/2024    Procedure: BIOPSY, PERITONEUM;  Surgeon: Tosin Boothe MD;  Location: [facility] OR;  Service: Colon and Rectal;  Laterality: N/A;    CAROTID ENDARTERECTOMY Left 4/25/2024    Procedure: ENDARTERECTOMY, CAROTID;  Surgeon: Chrissie Parks MD;  Location: [facility] OR;  Service: Peripheral Vascular;  Laterality: Left;    CLOSURE OF WOUND N/A 3/16/2024    Procedure: CLOSURE, WOUND;  Surgeon: Magda Stark MD;  Location: [facility] OR;  Service: General;  Laterality: N/A;    COLONOSCOPY N/A 1/5/2024    Procedure: COLONOSCOPY;  Surgeon: Irasema Gil MD;  Location: [facility] ENDO;  Service: Endoscopy;  Laterality: N/A;    DIAGNOSTIC LAPAROSCOPY N/A 1/29/2024    Procedure: LAPAROSCOPY, DIAGNOSTIC;  Surgeon: Tosin Boothe MD;  Location: [facility] OR;  Service: Colon and Rectal;  Laterality: N/A;    EPIDURAL STEROID INJECTION N/A 11/13/2023    Procedure: Lumbar L5/S1 IL JOSE;  Surgeon: Oneil Carlson MD;  Location: [facility] PAIN MGT;  Service: Pain Management;  Laterality: N/A;    ESOPHAGOGASTRODUODENOSCOPY N/A 1/5/2024    Procedure: EGD (ESOPHAGOGASTRODUODENOSCOPY);  Surgeon: Irasema Gil MD;  Location: [facility] ENDO;  Service: Endoscopy;  Laterality: N/A;    INJECTION OF ANESTHETIC AGENT INTO TISSUE PLANE DEFINED BY TRANSVERSUS ABDOMINIS MUSCLE N/A 3/11/2024    Procedure: BLOCK, TRANSVERSUS  ABDOMINIS PLANE;  Surgeon: Tosin Boothe MD;  Location: [facility] OR;  Service: Colon and Rectal;  Laterality: N/A;    NO PAST SURGERIES      REPAIR OF BLOOD VESSEL WITH PATCH GRAFT Left 4/25/2024    Procedure: ANGIOPLASTY, USING PATCH GRAFT;  Surgeon: Chrissie Parks MD;  Location: [facility] OR;  Service: Peripheral Vascular;  Laterality: Left;    WOUND EXPLORATION N/A 3/16/2024    Procedure: EXPLORATION, WOUND;  Surgeon: Magda Stark MD;  Location: [facility] OR;  Service: General;  Laterality: N/A;    XI ROBOTIC COLECTOMY, RIGHT Right 3/11/2024    Procedure: XI ROBOTIC COLECTOMY, RIGHT;  Surgeon: Tosin Boothe MD;  Location: [facility] OR;  Service: Colon and Rectal;  Laterality: Right;  WITH OMENECTOMY AND AMNIOFIX     Family History   Problem Relation Name Age of Onset    No Known Problems Mother      No Known Problems Father       Social History     Tobacco Use    Smoking status: Former     Passive exposure: Never    Smokeless tobacco: Never   Substance Use Topics    Alcohol use: Not Currently     Comment: OCC    Drug use: Never          OBJECTIVE:     Vital Signs (Most Recent)  Temp: 98.1 °F (36.7 °C) (08/28/24 0957)  Pulse: 71 (08/28/24 0957)  BP: (!) 162/66 (08/28/24 0957)    Physical Exam:  Physical Exam  Constitutional:       Appearance: He is well-developed.   HENT:      Head: Normocephalic and atraumatic.   Eyes:      Conjunctiva/sclera: Conjunctivae normal.      Pupils: Pupils are equal, round, and reactive to light.   Neck:      Thyroid: No thyromegaly.   Cardiovascular:      Rate and Rhythm: Normal rate and regular rhythm.   Pulmonary:      Effort: Pulmonary effort is normal. No respiratory distress.   Abdominal:      General: There is no distension.      Palpations: Abdomen is soft. There is no mass.      Tenderness: There is no abdominal tenderness.      Comments: Midline incision with a proximally 2 cm dehiscence with epithelialized granulation tissue   Musculoskeletal:         General: No tenderness.  Normal range of motion.      Cervical back: Normal range of motion.   Skin:     General: Skin is warm and dry.      Capillary Refill: Capillary refill takes less than 2 seconds.   Neurological:      General: No focal deficit present.      Mental Status: He is alert and oriented to person, place, and time.           ASSESSMENT/PLAN:     Quintin was seen today for post-op evaluation.    Diagnoses and all orders for this visit:    Colonic mass    Abdominal wound dehiscence, initial encounter      - silver nitrate and blunt dissection used to excise epithelialized gradient granulation tissue  - hemostasis achieved with silver nitrate  - instructed patient and wife on packing  - continue to pack daily with wet-to-dry dressings.  - RTC 2 weeks

## 2024-09-12 ENCOUNTER — OFFICE VISIT (OUTPATIENT)
Dept: SURGERY | Facility: CLINIC | Age: 74
End: 2024-09-12
Payer: MEDICARE

## 2024-09-12 VITALS
HEART RATE: 64 BPM | WEIGHT: 205.5 LBS | HEIGHT: 65 IN | DIASTOLIC BLOOD PRESSURE: 71 MMHG | SYSTOLIC BLOOD PRESSURE: 180 MMHG | TEMPERATURE: 98 F | BODY MASS INDEX: 34.24 KG/M2 | OXYGEN SATURATION: 97 %

## 2024-09-12 DIAGNOSIS — T81.30XD ABDOMINAL WOUND DEHISCENCE, SUBSEQUENT ENCOUNTER: Primary | ICD-10-CM

## 2024-09-12 PROCEDURE — 3044F HG A1C LEVEL LT 7.0%: CPT | Mod: CPTII,S$GLB,, | Performed by: STUDENT IN AN ORGANIZED HEALTH CARE EDUCATION/TRAINING PROGRAM

## 2024-09-12 PROCEDURE — 1126F AMNT PAIN NOTED NONE PRSNT: CPT | Mod: CPTII,S$GLB,, | Performed by: STUDENT IN AN ORGANIZED HEALTH CARE EDUCATION/TRAINING PROGRAM

## 2024-09-12 PROCEDURE — 3008F BODY MASS INDEX DOCD: CPT | Mod: CPTII,S$GLB,, | Performed by: STUDENT IN AN ORGANIZED HEALTH CARE EDUCATION/TRAINING PROGRAM

## 2024-09-12 PROCEDURE — 3078F DIAST BP <80 MM HG: CPT | Mod: CPTII,S$GLB,, | Performed by: STUDENT IN AN ORGANIZED HEALTH CARE EDUCATION/TRAINING PROGRAM

## 2024-09-12 PROCEDURE — 3077F SYST BP >= 140 MM HG: CPT | Mod: CPTII,S$GLB,, | Performed by: STUDENT IN AN ORGANIZED HEALTH CARE EDUCATION/TRAINING PROGRAM

## 2024-09-12 PROCEDURE — 99999 PR PBB SHADOW E&M-EST. PATIENT-LVL III: CPT | Mod: PBBFAC,,, | Performed by: STUDENT IN AN ORGANIZED HEALTH CARE EDUCATION/TRAINING PROGRAM

## 2024-09-12 PROCEDURE — 99213 OFFICE O/P EST LOW 20 MIN: CPT | Mod: S$GLB,,, | Performed by: STUDENT IN AN ORGANIZED HEALTH CARE EDUCATION/TRAINING PROGRAM

## 2024-09-12 PROCEDURE — 1159F MED LIST DOCD IN RCRD: CPT | Mod: CPTII,S$GLB,, | Performed by: STUDENT IN AN ORGANIZED HEALTH CARE EDUCATION/TRAINING PROGRAM

## 2024-09-12 PROCEDURE — 4010F ACE/ARB THERAPY RXD/TAKEN: CPT | Mod: CPTII,S$GLB,, | Performed by: STUDENT IN AN ORGANIZED HEALTH CARE EDUCATION/TRAINING PROGRAM

## 2024-09-13 NOTE — PROGRESS NOTES
CRS Office Visit    SUBJECTIVE:     Chief Complaint: midline abdominal wound    History of Present Illness:  Patient is a 74 y.o. male presents as a follow up for midline abdominal wound dehiscence.  He has been doing packing daily and has noticed some improvement of the wound.  Packing has gotten less than less.  Has no complaints today.  Has not noticed any abnormal discharge from wound      Review of patient's allergies indicates:  No Known Allergies    Past Medical History:   Diagnosis Date    Diabetes mellitus     Hyperlipidemia     Hypertension      Past Surgical History:   Procedure Laterality Date    BIOPSY OF PERITONEUM N/A 1/29/2024    Procedure: BIOPSY, PERITONEUM;  Surgeon: Tosin Boothe MD;  Location: [facility] OR;  Service: Colon and Rectal;  Laterality: N/A;    CAROTID ENDARTERECTOMY Left 4/25/2024    Procedure: ENDARTERECTOMY, CAROTID;  Surgeon: Chrissie Parks MD;  Location: [facility] OR;  Service: Peripheral Vascular;  Laterality: Left;    CLOSURE OF WOUND N/A 3/16/2024    Procedure: CLOSURE, WOUND;  Surgeon: Magda Stark MD;  Location: [facility] OR;  Service: General;  Laterality: N/A;    COLONOSCOPY N/A 1/5/2024    Procedure: COLONOSCOPY;  Surgeon: Irasema Gil MD;  Location: [facility] ENDO;  Service: Endoscopy;  Laterality: N/A;    DIAGNOSTIC LAPAROSCOPY N/A 1/29/2024    Procedure: LAPAROSCOPY, DIAGNOSTIC;  Surgeon: Tosin Boothe MD;  Location: [facility] OR;  Service: Colon and Rectal;  Laterality: N/A;    EPIDURAL STEROID INJECTION N/A 11/13/2023    Procedure: Lumbar L5/S1 IL JOSE;  Surgeon: Oneil Carlson MD;  Location: [facility] PAIN MGT;  Service: Pain Management;  Laterality: N/A;    ESOPHAGOGASTRODUODENOSCOPY N/A 1/5/2024    Procedure: EGD (ESOPHAGOGASTRODUODENOSCOPY);  Surgeon: Irasema Gil MD;  Location: [facility] ENDO;  Service: Endoscopy;  Laterality: N/A;    INJECTION OF ANESTHETIC AGENT INTO TISSUE PLANE DEFINED BY TRANSVERSUS ABDOMINIS MUSCLE N/A 3/11/2024    Procedure: BLOCK,  TRANSVERSUS ABDOMINIS PLANE;  Surgeon: Tosin Boothe MD;  Location: [facility] OR;  Service: Colon and Rectal;  Laterality: N/A;    NO PAST SURGERIES      REPAIR OF BLOOD VESSEL WITH PATCH GRAFT Left 4/25/2024    Procedure: ANGIOPLASTY, USING PATCH GRAFT;  Surgeon: Chrissie Parks MD;  Location: [facility] OR;  Service: Peripheral Vascular;  Laterality: Left;    WOUND EXPLORATION N/A 3/16/2024    Procedure: EXPLORATION, WOUND;  Surgeon: Magda Stark MD;  Location: [facility] OR;  Service: General;  Laterality: N/A;    XI ROBOTIC COLECTOMY, RIGHT Right 3/11/2024    Procedure: XI ROBOTIC COLECTOMY, RIGHT;  Surgeon: Tosin Boothe MD;  Location: [facility] OR;  Service: Colon and Rectal;  Laterality: Right;  WITH OMENECTOMY AND AMNIOFIX     Family History   Problem Relation Name Age of Onset    No Known Problems Mother      No Known Problems Father       Social History     Tobacco Use    Smoking status: Former     Passive exposure: Never    Smokeless tobacco: Never   Substance Use Topics    Alcohol use: Not Currently     Comment: OCC    Drug use: Never          OBJECTIVE:     Vital Signs (Most Recent)  Temp: 97.5 °F (36.4 °C) (09/12/24 1310)  Pulse: 64 (09/12/24 1310)  BP: (!) 180/71 (denies symptoms, MD aware) (09/12/24 1310)  SpO2: 97 % (09/12/24 1310)    Physical Exam:  Physical Exam  Constitutional:       Appearance: He is well-developed.   HENT:      Head: Normocephalic and atraumatic.   Eyes:      Conjunctiva/sclera: Conjunctivae normal.      Pupils: Pupils are equal, round, and reactive to light.   Neck:      Thyroid: No thyromegaly.   Cardiovascular:      Rate and Rhythm: Normal rate and regular rhythm.   Pulmonary:      Effort: Pulmonary effort is normal. No respiratory distress.   Abdominal:      General: There is no distension.      Palpations: Abdomen is soft. There is no mass.      Tenderness: There is no abdominal tenderness.      Comments: Midline wound with good granulation tissue.  Probes to approximately 1  cm   Musculoskeletal:         General: No tenderness. Normal range of motion.      Cervical back: Normal range of motion.   Skin:     General: Skin is warm and dry.      Capillary Refill: Capillary refill takes less than 2 seconds.   Neurological:      General: No focal deficit present.      Mental Status: He is alert and oriented to person, place, and time.           ASSESSMENT/PLAN:     Diagnoses and all orders for this visit:    Abdominal wound dehiscence, subsequent encounter        - wound healing well, can continue packing  - if wound fails to heal would explore operatively and likely place wound vac  - RTC 4 weeks

## 2024-09-20 ENCOUNTER — OFFICE VISIT (OUTPATIENT)
Dept: FAMILY MEDICINE | Facility: CLINIC | Age: 74
End: 2024-09-20
Payer: MEDICARE

## 2024-09-20 VITALS
SYSTOLIC BLOOD PRESSURE: 148 MMHG | DIASTOLIC BLOOD PRESSURE: 82 MMHG | WEIGHT: 210.75 LBS | HEART RATE: 66 BPM | BODY MASS INDEX: 35.07 KG/M2 | OXYGEN SATURATION: 98 % | TEMPERATURE: 97 F

## 2024-09-20 DIAGNOSIS — R73.03 PREDIABETES: ICD-10-CM

## 2024-09-20 DIAGNOSIS — E78.2 MIXED HYPERLIPIDEMIA: ICD-10-CM

## 2024-09-20 DIAGNOSIS — I48.91 ATRIAL FIB/FLUTTER, TRANSIENT: ICD-10-CM

## 2024-09-20 DIAGNOSIS — I48.92 ATRIAL FIB/FLUTTER, TRANSIENT: ICD-10-CM

## 2024-09-20 DIAGNOSIS — I10 ESSENTIAL HYPERTENSION: Primary | ICD-10-CM

## 2024-09-20 DIAGNOSIS — T81.49XA INFECTED SURGICAL WOUND: ICD-10-CM

## 2024-09-20 DIAGNOSIS — N18.31 STAGE 3A CHRONIC KIDNEY DISEASE: ICD-10-CM

## 2024-09-20 PROCEDURE — 99999 PR PBB SHADOW E&M-EST. PATIENT-LVL III: CPT | Mod: PBBFAC,,, | Performed by: FAMILY MEDICINE

## 2024-09-20 RX ORDER — OLMESARTAN MEDOXOMIL 20 MG/1
20 TABLET ORAL NIGHTLY
Qty: 30 TABLET | Refills: 1 | Status: SHIPPED | OUTPATIENT
Start: 2024-09-20 | End: 2025-09-20

## 2024-09-20 NOTE — PROGRESS NOTES
Subjective:       Patient ID: Quintin Ling is a 74 y.o. male.    Chief Complaint: follow up (Bp )      HPI Comments:       Current Outpatient Medications:     amLODIPine (NORVASC) 5 MG tablet, Take 1 tablet (5 mg total) by mouth once daily., Disp: 90 tablet, Rfl: 3    apixaban (ELIQUIS) 5 mg Tab, Take 1 tablet (5 mg total) by mouth 2 (two) times daily., Disp: 60 tablet, Rfl: 2    aspirin 81 MG Chew, Take 1 tablet (81 mg total) by mouth once daily., Disp: 360 tablet, Rfl: 0    atorvastatin (LIPITOR) 20 MG tablet, Take 1 tablet (20 mg total) by mouth once daily., Disp: 90 tablet, Rfl: 0    clopidogreL (PLAVIX) 75 mg tablet, Take 1 tablet (75 mg total) by mouth once daily., Disp: 90 tablet, Rfl: 3    fenofibrate (TRICOR) 48 MG tablet, Take 1 tablet (48 mg total) by mouth once daily., Disp: 90 tablet, Rfl: 3    fenofibrate (TRICOR) 54 MG tablet, TAKE 1 TABLET BY MOUTH ONCE DAILY, Disp: 90 tablet, Rfl: 3    ferrous sulfate (FEOSOL) 325 mg (65 mg iron) Tab tablet, Take 1 tablet (325 mg total) by mouth once daily., Disp: 30 tablet, Rfl: 2    FREESTYLE FREEDOM LITE kit, by Other route., Disp: , Rfl:     FREESTYLE LANCETS 28 gauge lancets, Apply 28 lancets topically 3 (three) times daily., Disp: , Rfl:     FREESTYLE LITE STRIPS Strp, Inject 300 strips as directed as needed., Disp: , Rfl:     furosemide (LASIX) 40 MG tablet, Take 1 tablet (40 mg total) by mouth daily as needed (leg swelling)., Disp: 30 tablet, Rfl: 1    metFORMIN (GLUCOPHAGE) 500 MG tablet, Take 1 tablet (500 mg total) by mouth 2 (two) times daily with meals., Disp: 180 tablet, Rfl: 0    metoprolol succinate (TOPROL-XL) 25 MG 24 hr tablet, Take 1 tablet (25 mg total) by mouth 2 (two) times daily., Disp: 180 tablet, Rfl: 3    pregabalin (LYRICA) 75 MG capsule, TAKE 1 CAPSULE BY MOUTH TWICE A DAY, Disp: 60 capsule, Rfl: 3    olmesartan (BENICAR) 20 MG tablet, Take 1 tablet (20 mg total) by mouth nightly., Disp: 30 tablet, Rfl: 1      One-month follow-up.   Increase blood pressure medication last time.      Blood pressure readings still high at home.  Systolic 150-1 60s.  No adverse affective increase beta-blocker.  Sees cardiology next month      Review of Systems   Constitutional:  Negative for activity change, appetite change and fever.   HENT:  Negative for sore throat.    Respiratory:  Negative for cough and shortness of breath.    Cardiovascular:  Negative for chest pain.   Gastrointestinal:  Negative for abdominal pain, diarrhea and nausea.   Genitourinary:  Negative for difficulty urinating.   Musculoskeletal:  Negative for arthralgias and myalgias.   Neurological:  Negative for dizziness and headaches.       Objective:      Vitals:    09/20/24 0855   BP: (!) 148/82   Pulse: 66   Temp: 97.3 °F (36.3 °C)   SpO2: 98%   Weight: 95.6 kg (210 lb 12.2 oz)   PainSc: 0-No pain     Physical Exam  Vitals and nursing note reviewed.   Constitutional:       General: He is not in acute distress.     Appearance: He is well-developed. He is not diaphoretic.   HENT:      Head: Normocephalic.   Neck:      Thyroid: No thyromegaly.   Cardiovascular:      Rate and Rhythm: Normal rate and regular rhythm.      Heart sounds: Normal heart sounds. No murmur heard.  Pulmonary:      Effort: Pulmonary effort is normal.      Breath sounds: Normal breath sounds. No wheezing or rales.   Abdominal:      General: There is no distension.      Palpations: Abdomen is soft.   Musculoskeletal:      Cervical back: Neck supple.      Right lower leg: No edema.      Left lower leg: No edema.   Lymphadenopathy:      Cervical: No cervical adenopathy.   Skin:     General: Skin is warm and dry.   Neurological:      Mental Status: He is alert and oriented to person, place, and time.   Psychiatric:         Mood and Affect: Mood normal.         Behavior: Behavior normal.         Thought Content: Thought content normal.         Judgment: Judgment normal.         Assessment:       1. Essential hypertension    2.  Atrial fib/flutter, transient    3. Stage 3a chronic kidney disease    4. Prediabetes    5. Mixed hyperlipidemia    6. Infected surgical wound        Plan:   Essential hypertension  Comments:  Still not at goal.  Add  olmesartan 20 mg HS.  Follow-up 30 days    Atrial fib/flutter, transient  Comments:  Eliquis, beta-blocker    Stage 3a chronic kidney disease  Comments:  Stable    Prediabetes  Comments:  A1c 5.4    Mixed hyperlipidemia  Comments:  LDL 58    Infected surgical wound  Comments:  Now well healed    Other orders  -     olmesartan (BENICAR) 20 MG tablet; Take 1 tablet (20 mg total) by mouth nightly.  Dispense: 30 tablet; Refill: 1

## 2024-09-23 NOTE — PROGRESS NOTES
"Interventional Pain Progress Note     Referring Physician: No ref. provider found    PCP: Bishop Gabriel MD    Chief Complaint:     Chief Complaint   Patient presents with    Low-back Pain     Right sided          SUBJECTIVE:  Interval History (10/1/2024):  Patient Quintin Ling presents today for follow-up visit.  Patient is being seen for follow up of low back pain. Pain is remaining axial on the right side of the back without radiculopathy. Pain is worse with prolonged standing and walking. He will get relief with rest. He is curious about a nerve burn. He also asks to increase his lyrica for more relief. He denies side effects.   Patient denies night fever/night sweats, urinary incontinence, bowel incontinence, significant weight loss and significant motor weakness.   Patient denies any other complaints or concerns at this time.      Interval History (4/12/2024):  Patient Quintin Ling presents today for follow-up visit.  Patient today for follow-up of lower back pain.  He rates his pain today a 2/10.  He feels that he got relief for about 3 months following his lumbar JOSE however he is undergoing a few medical issues at this time and would like to prolonged an injection right now.  He is scheduled for a carotid endarterectomy in the near future.  He is currently taking Lyrica for his symptoms.  His Zanaflex was DC'd by his GI doctor.  Patient denies night fever/night sweats, urinary incontinence, bowel incontinence, significant weight loss and significant motor weakness.   Patient denies any other complaints or concerns at this time.      Interval History (12/28/2023):  Quintin Ling presents today for follow-up visit.  he underwent Lumbar L5/S1 IL JOSE on 4/25/24.  The patient reports that he is/was better following the procedure.  he reports 75 % pain relief.  The changes have continued through this visit.  Patient reports pain as "0/10 today. Patient currently c/o pain only on the L side whenever he " walks. States it is more of a pulling sensation on the L side of his lower back. He denies leg pain. Also denies pain while sitting.     Interval History (10/01/2024):  Patient returns to clinic for evaluation of lower back pain.  Patient reports continued lower back pain that starts in a band across his lower back.  Patient reports that as we will occasionally radiates to his bilateral posterior thighs.  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  Pain is currently rated a 5/10, but can increase to a 8/10 with exacerbating activity. Denies any fevers, chills, changes in gait, saddle anesthesia, or bowel and bladder incontinence      Interval History (8/29/2023):  Quintin Ling presents today for follow-up visit for MRI review.  Patient was last seen on 8/4/2023. Taking Lyrica 50 mg BID with no side effects, minimal improvement. Patient reports pain as 8/10 today. Continues to report low back pain with radiation into his left lower extremity. Pain is worsened with prolonged standing and walking. Reports pain and weakness is affecting his gait, he often walks with a limp.  Initial HPI 08/04/2023  Quintin Ling is a 74 y.o. male who presents to the clinic for the evaluation of left lower extremity pain.   Patient reports three-month history of left lower extremity pain.  He denies any inciting events or traumas.  Patient reports 1 year ago he experienced bilateral calf pain that is resolved with B12 supplementation.  He also reports as a child he was placed in traction for 2 weeks on his left lower extremity and to wear a brace for 3 years secondary to hip dysplasia.  Patient denies any recent infections.  Patient denies any previous surgical intervention on his lumbar spine.  Pain is described as a pulling burning pain diffusely over his left calf.  He denies any significant radiation to his foot or up into his left lower back but does report a long  history of lower back pain.  Patient denies any significant change in sensation to left lower extremity.  Pain is worse with prolonged sitting or walking, better with lying supine.  Patient does report swelling of his left lower extremity with prolonged walking.  Patient denies any significant history of rheumatoid disease in his family.  Patient reports emergency department on 06/28/2023 for exacerbation of left lower extremity pain.  Denies any fevers, chills, saddle anesthesia, or bowel and bladder incontinence        Non-Pharmacologic Treatments:  Physical Therapy/Home Exercise: yes  Ice/Heat:yes  TENS: no  Acupuncture: no  Massage: yes  Chiropractic: no        Previous Pain Medications:  NSAIDs, Tylenol, gabapentin, tramadol     report:  Reviewed and consistent with medication use as prescribed.    Pain Procedures:   -11/13/2023: Lumbar L5/S1 IL JOSE with 75% relief    Pain Disability Index Review:         10/1/2024    12:28 PM 4/12/2024    10:58 AM 12/28/2023     3:23 PM   Last 3 PDI Scores   Pain Disability Index (PDI) 45 14 6       Imaging (Reviewed on 10/1/2024):    MRI Lumbar spine 08/22/2023  FINDINGS:  Grade 1 degenerative spondylolisthesis at L4-L5.  Vertebral body height is normal.  Marrow signal is within normal limits. The conus medullaris terminates at the level of T12-L1.  No abnormal signal within the conus. Intervertebral disc levels are as follows:     T12-L1 disc: Disc space height loss with anterior bridging osteophytes and fatty Modic change.  Normal facet joints.  No spinal or foraminal stenosis.     L1-L2 disc : Disc space height loss with a broad-based posterior disc bulge and posterior annular fissure.  Normal facet joints.  The dural canal measures 7 mm.  No foraminal stenosis.     L2-L3 disc: Normal disc space height with anterior osteophytes.  Fatty and edematous Modic change.  Normal facet joints.  Dural canal measures 12 mm.  No foraminal stenosis.     L3-L4 disc: Disc space height  is relatively normal with anterolateral right-sided osteophytes.  Normal facet joints.  No spinal or foraminal stenosis.     L4-L5 disc: Grade 1 spondylolisthesis with markedly severe degenerative facet hypertrophy bilaterally.  Unroofing of disc material with a disc extrusion that extends cranially.  The disc extrusion measures 23 mm craniocaudal by 20 mm transverse by 9 mm AP.  There is severe spinal stenosis and severe subarticular recess stenosis.  The dural canal measures 6 mm but is roughly 30% of normal area with redundancy of nerve roots adjacent to this interspace.  There is also a synovial cyst projecting inferiorly from the right facet joint that may compress the descending right L5 spinal nerve root sleeve best demonstrated on axial T2 series 6, image 25.  This synovial cyst measures 11 mm.  Moderate foraminal stenosis bilaterally.     L5-S1 disc: Severe disc space height loss most pronounced toward the left.  Fatty Modic endplate change.  Disc and osteophyte encroach into the floor of the left exit foramen.  There is also a left paracentral disc protrusion causing mild left subarticular recess stenosis without eli nerve compression.  The dural canal measures 10 mm.  Moderate to severe left foraminal stenosis.     Impression:     1. Severe spinal stenosis at L4-L5 as described above which may account for neurogenic claudication and radiculopathy of the descending nerve roots.  2. Moderate foraminal stenosis bilaterally at L4-L5 and moderate/severe left foraminal stenosis at L5-S1.  This could also precipitate radiculopathy.    EMG/NCS 08/24/2023  IMPRESSION  ABNORMAL study  2.  There is electrodiagnostic evidence of an acute on chronic radiculopathy of the left L5 nerve root, a subacute on chronic radiculopathy of the right L5 nerve root, and a chronic radiculopathy of the S1 and probable L4 nerve roots      Results for orders placed during the hospital encounter of 06/21/23    X-Ray Lumbar Spine AP  And Lateral    Narrative  EXAMINATION:  XR LUMBAR SPINE AP AND LATERAL    CLINICAL HISTORY:  Pain in left leg    TECHNIQUE:  AP, lateral and spot images were performed of the lumbar spine.    COMPARISON:  02/01/2022    FINDINGS:  Five lumbar type vertebral bodies.  The lordotic curvature is normal.  Grade 1 anterolisthesis of L5 on S1. no evidence for compression deformity, fracture, or subluxation.  The vertebral heights are maintained without significant intervertebral disc space narrowing.  Progressive degenerative changes of facets are identified.    The bilateral SI joints are normal.    Impression  Spondylotic changes lumbar spine without evidence for fracture.      Results for orders placed during the hospital encounter of 06/21/23    X-Ray Hip 2 or 3 views Left (with Pelvis when performed)    Narrative  EXAMINATION:  XR HIP WITH PELVIS WHEN PERFORMED, 2 OR 3 VIEWS LEFT    CLINICAL HISTORY:  Pain in left leg    TECHNIQUE:  AP view of the pelvis and frog leg lateral view of the left hip were performed.    COMPARISON:  02/01/2022    FINDINGS:  No fracture the pelvis or proximal femora.  The femoral heads are well seated in the acetabulum without significant joint space narrowing.  Osteopenia is identified.    Degenerative changes of bilateral SI joints.  The pubic symphysis is normal.    Impression  No fracture the pelvis or proximal femora.  Degenerative changes are noted.      XR TIBIA FIBULA 2 VIEW LEFT 6/21/23     CLINICAL HISTORY:  Pain in left leg     TECHNIQUE:  AP and lateral views of the left tibia and fibula were performed.     COMPARISON:  None.     FINDINGS:  No fracture.  The alignment is normal.  Joint space narrowing is identified of the knee as well as of the ankle.  No significant soft tissue swelling.  Calcifications are identified of the vasculature.     Impression:     No fracture of the tibia or fibula.      US LOWER EXTREMITY VEINS BILATERAL 06/21/23     CLINICAL HISTORY:  Pain in left  leg     TECHNIQUE:  Duplex and color flow Doppler and dynamic compression was performed of the bilateral lower extremity veins was performed.     COMPARISON:  None     FINDINGS:  Right thigh veins: The common femoral, femoral, popliteal, upper greater saphenous, and deep femoral veins are patent and free of thrombus. The veins are normally compressible and have normal phasic flow and augmentation response.     Right calf veins: The visualized calf veins are patent.     Left thigh veins: The common femoral, femoral, popliteal, upper greater saphenous, and deep femoral veins are patent and free of thrombus. The veins are normally compressible and have normal phasic flow and augmentation response.     Left calf veins: The visualized calf veins are patent.     Miscellaneous: None     Impression:     No evidence of deep venous thrombosis in either lower extremity.    Past Medical History:   Diagnosis Date    Diabetes mellitus     Hyperlipidemia     Hypertension      Past Surgical History:   Procedure Laterality Date    BIOPSY OF PERITONEUM N/A 1/29/2024    Procedure: BIOPSY, PERITONEUM;  Surgeon: Tosin Boothe MD;  Location: [facility] OR;  Service: Colon and Rectal;  Laterality: N/A;    CAROTID ENDARTERECTOMY Left 4/25/2024    Procedure: ENDARTERECTOMY, CAROTID;  Surgeon: Chrissie Parks MD;  Location: [facility] OR;  Service: Peripheral Vascular;  Laterality: Left;    CLOSURE OF WOUND N/A 3/16/2024    Procedure: CLOSURE, WOUND;  Surgeon: Magda Stark MD;  Location: [facility] OR;  Service: General;  Laterality: N/A;    COLONOSCOPY N/A 1/5/2024    Procedure: COLONOSCOPY;  Surgeon: Irasema Gil MD;  Location: [facility] ENDO;  Service: Endoscopy;  Laterality: N/A;    DIAGNOSTIC LAPAROSCOPY N/A 1/29/2024    Procedure: LAPAROSCOPY, DIAGNOSTIC;  Surgeon: Tosin Boothe MD;  Location: [facility] OR;  Service: Colon and Rectal;  Laterality: N/A;    EPIDURAL STEROID INJECTION N/A 11/13/2023    Procedure: Lumbar L5/S1 IL JOSE;   Surgeon: Oneil Carlson MD;  Location: [facility] PAIN MGT;  Service: Pain Management;  Laterality: N/A;    ESOPHAGOGASTRODUODENOSCOPY N/A 1/5/2024    Procedure: EGD (ESOPHAGOGASTRODUODENOSCOPY);  Surgeon: Irasema Gil MD;  Location: [facility];  Service: Endoscopy;  Laterality: N/A;    INJECTION OF ANESTHETIC AGENT INTO TISSUE PLANE DEFINED BY TRANSVERSUS ABDOMINIS MUSCLE N/A 3/11/2024    Procedure: BLOCK, TRANSVERSUS ABDOMINIS PLANE;  Surgeon: Tosin Boothe MD;  Location: [facility] OR;  Service: Colon and Rectal;  Laterality: N/A;    NO PAST SURGERIES      REPAIR OF BLOOD VESSEL WITH PATCH GRAFT Left 4/25/2024    Procedure: ANGIOPLASTY, USING PATCH GRAFT;  Surgeon: Chrissie Parks MD;  Location: [facility] OR;  Service: Peripheral Vascular;  Laterality: Left;    WOUND EXPLORATION N/A 3/16/2024    Procedure: EXPLORATION, WOUND;  Surgeon: Magda Stark MD;  Location: [facility] OR;  Service: General;  Laterality: N/A;    XI ROBOTIC COLECTOMY, RIGHT Right 3/11/2024    Procedure: XI ROBOTIC COLECTOMY, RIGHT;  Surgeon: Tosin Boothe MD;  Location: [facility] OR;  Service: Colon and Rectal;  Laterality: Right;  WITH OMENECTOMY AND AMNIOFIX     Social History     Socioeconomic History    Marital status:    Tobacco Use    Smoking status: Former     Passive exposure: Never    Smokeless tobacco: Never   Substance and Sexual Activity    Alcohol use: Not Currently     Comment: OCC    Drug use: Never    Sexual activity: Yes     Partners: Female     Social Drivers of Health     Financial Resource Strain: Patient Unable To Answer (3/17/2024)    Overall Financial Resource Strain (CARDIA)     Difficulty of Paying Living Expenses: Patient unable to answer   Food Insecurity: Patient Unable To Answer (3/17/2024)    Hunger Vital Sign     Worried About Running Out of Food in the Last Year: Patient unable to answer     Ran Out of Food in the Last Year: Patient unable to answer   Transportation Needs: Patient Unable To Answer  (3/17/2024)    PRAPARE - Transportation     Lack of Transportation (Medical): Patient unable to answer     Lack of Transportation (Non-Medical): Patient unable to answer   Stress: Patient Unable To Answer (3/17/2024)    [employer] - Occupational Stress Questionnaire     Feeling of Stress : Patient unable to answer   Housing Stability: Patient Unable To Answer (3/17/2024)    Housing Stability Vital Sign     Unable to Pay for Housing in the Last Year: Patient unable to answer     Unstable Housing in the Last Year: Patient unable to answer     Family History   Problem Relation Name Age of Onset    No Known Problems Mother      No Known Problems Father         Review of patient's allergies indicates:  No Known Allergies    Current Outpatient Medications   Medication Sig    amLODIPine (NORVASC) 5 MG tablet Take 1 tablet (5 mg total) by mouth once daily.    apixaban (ELIQUIS) 5 mg Tab Take 1 tablet (5 mg total) by mouth 2 (two) times daily.    aspirin 81 MG Chew Take 1 tablet (81 mg total) by mouth once daily.    atorvastatin (LIPITOR) 20 MG tablet Take 1 tablet (20 mg total) by mouth once daily.    clopidogreL (PLAVIX) 75 mg tablet Take 1 tablet (75 mg total) by mouth once daily.    fenofibrate (TRICOR) 48 MG tablet Take 1 tablet (48 mg total) by mouth once daily.    fenofibrate (TRICOR) 54 MG tablet TAKE 1 TABLET BY MOUTH ONCE DAILY    ferrous sulfate (FEOSOL) 325 mg (65 mg iron) Tab tablet Take 1 tablet (325 mg total) by mouth once daily.    FREESTYLE FREEDOM LITE kit by Other route.    FREESTYLE LANCETS 28 gauge lancets Apply 28 lancets topically 3 (three) times daily.    FREESTYLE LITE STRIPS Strp Inject 300 strips as directed as needed.    furosemide (LASIX) 40 MG tablet Take 1 tablet (40 mg total) by mouth daily as needed (leg swelling).    metFORMIN (GLUCOPHAGE) 500 MG tablet Take 1 tablet (500 mg total) by mouth 2 (two) times daily with meals.    metoprolol succinate (TOPROL-XL) 25  MG 24 hr tablet Take 1 tablet (25 mg total) by mouth 2 (two) times daily.    olmesartan (BENICAR) 20 MG tablet Take 1 tablet (20 mg total) by mouth nightly.     No current facility-administered medications for this visit.         ROS  Review of Systems   Constitutional:  Negative for activity change, appetite change and fever.   Respiratory:  Negative for cough, chest tightness, shortness of breath, wheezing and stridor.    Cardiovascular:  Negative for chest pain and palpitations.   Gastrointestinal:  Negative for abdominal pain, blood in stool, constipation, diarrhea, nausea and vomiting.   Endocrine: Negative for polydipsia, polyphagia and polyuria.   Genitourinary:  Negative for dysuria and hematuria.   Musculoskeletal:  Positive for arthralgias, back pain, gait problem and myalgias. Negative for joint swelling, neck pain and neck stiffness.   Skin:  Negative for rash.   Allergic/Immunologic: Negative for food allergies.   Neurological:  Negative for dizziness, tremors, seizures, syncope, facial asymmetry, speech difficulty, light-headedness and headaches.   Psychiatric/Behavioral:  Negative for agitation, hallucinations, self-injury and suicidal ideas. The patient is not nervous/anxious and is not hyperactive.             OBJECTIVE:  /61   Pulse 65   Resp 17   Wt 94.6 kg (208 lb 7.1 oz)   BMI 34.69 kg/m²         Physical Exam  Constitutional:       General: He is not in acute distress.     Appearance: Normal appearance. He is not ill-appearing.   HENT:      Head: Normocephalic and atraumatic.      Nose: No congestion or rhinorrhea.   Eyes:      Extraocular Movements: Extraocular movements intact.      Pupils: Pupils are equal, round, and reactive to light.   Cardiovascular:      Pulses: Normal pulses.   Pulmonary:      Effort: Pulmonary effort is normal.   Abdominal:      General: Abdomen is flat.      Palpations: Abdomen is soft.   Skin:     General: Skin is warm and dry.      Capillary Refill:  Capillary refill takes less than 2 seconds.   Neurological:      General: No focal deficit present.      Mental Status: He is alert and oriented to person, place, and time.      Sensory: Sensory deficit present.      Motor: Weakness present. No abnormal muscle tone.      Gait: Gait abnormal.      Deep Tendon Reflexes:      Reflex Scores:       Patellar reflexes are 2+ on the right side and 2+ on the left side.       Achilles reflexes are 2+ on the right side and 1+ on the left side.     Comments: Decreased light touch sensation over lateral aspect left calf  4/5 strength in left dorsiflexion   Psychiatric:         Mood and Affect: Mood normal.         Behavior: Behavior normal.         Thought Content: Thought content normal.           Musculoskeletal:      Lumbar Exam  Incision: no  Pain with Flexion: yes  Pain with Extension: yes  ROM:  Decreased  Paraspinous TTP:  Positive R side only  Facet Loading:  Positive right side  SLR:  Negative bilaterally  SIJ TTP:  Negative bilaterally      LABS:  Lab Results   Component Value Date    WBC 8.22 04/26/2024    HGB 8.1 (L) 04/26/2024    HCT 25.5 (L) 04/26/2024    MCV 88 04/26/2024     (L) 04/26/2024       ASSESSMENT:       74 y.o. year old male with left lower extremity pain, consistent with     1. Lumbar spondylosis  Case Request-RAD/Other Procedure Area: Right L3-5 MBB      2. Lumbar radiculopathy        3. Spondylolisthesis of lumbar region                  PLAN:   - Interventions: Schedule right L3-5 MBB #1. We will call the day after the procedure. If patient reports at least 80% relief of pain, we will move forward with MBB #2. If patient again reports at least 80% relief of pain, we will then move forward with the RFA.   Explained the risks and benefits of the procedure in detail with the patient today in clinic along with alternative treatment options, and the patient elected to pursue the intervention.      Can repeat L5/S1 IL JOSE if radiculopathy  returns      - S/p  L5-S1 interlaminar epidural steroid injection with 75% relief    - Anticoagulation use:   No no anticoagulation    - Medications:   - DC zanaflex   - Increase lyrica to 100mg BID. We discussed potential side effects of this medication which may include drowsiness,dizziness, dry mouth, constipation or peripheral edema.       - Therapy:    Patient has completed  formal physical therapy in the past with no pain relief.      - Imaging/Diagnostic:   X-ray of lumbar spine reveals grade 1 anterolisthesis of L5 upon S1 with loss of disc height at L5-S1 and L4-5.  Degenerative changes of the facets noted at L4-5 and L5-S1.   MRI of lumbar spine reviewed and findings discussed with patient.  Significant for grade 1 anterolisthesis of L4 upon L5.  There is noted disc extrusion at L4-5 resulting in severe canal stenosis and severe bilateral recess stenosis.  Dural canal measures 6 mm.  Associated right-greater-than-left foraminal stenosis with extrusion.  There is noted loss of height at L5-S1 with left paracentral disc protrusion causing Moderate to severe left foraminal stenosis.   EMG and nerve conduction study of bilateral lower extremities reveals acute on chronic bilateral L5 and S1 radiculopathy    - Consults:   Can consider referral to Neurosurgery if lower back pain continues for evaluation of disc extrusion at L4-5    - Miscellaneous:  Pt has previously been provided temporary handicap tag      - Patient Questions: Answered all of the patient's questions regarding diagnosis, therapy, and treatment     This condition does not require this patient to take time off of work, and the primary goal of our Pain Management services is to improve the patient's functional capacity.     - Follow up visit:  will call after MBB and move forward from there      The above plan and management options were discussed at length with patient. Patient is in agreement with the above and verbalized understanding.    I  discussed the goals of interventional chronic pain management with the patient on today's visit.  I explained the utility of injections for diagnostic and therapeutic purposes.  We discussed a multimodal approach to pain including treating the patient's given worst pain at any given time.  We will use a systematic approach to addressing pain.  We will also adopt a multimodal approach that includes injections, adjuvant medications, physical therapy, at times psychiatry.  There may be a limited role for opioid use intermittently in the treatment of pain, more particularly for acute pain although no one approach can be used as a sole treatment modality.    I emphasized the importance of regular exercise, core strengthening and stretching, diet and weight loss as a cornerstone of long-term pain management.      Becky Clark NP  Interventional Pain Management  [facility]    Disclaimer:  This note was prepared using voice recognition system and is likely to have sound alike errors that may have been overlooked even after proof reading.  Please call me with any questions

## 2024-10-01 ENCOUNTER — OFFICE VISIT (OUTPATIENT)
Dept: PAIN MEDICINE | Facility: CLINIC | Age: 74
End: 2024-10-01
Payer: MEDICARE

## 2024-10-01 VITALS
SYSTOLIC BLOOD PRESSURE: 127 MMHG | DIASTOLIC BLOOD PRESSURE: 61 MMHG | WEIGHT: 208.44 LBS | HEART RATE: 65 BPM | RESPIRATION RATE: 17 BRPM | BODY MASS INDEX: 34.69 KG/M2

## 2024-10-01 DIAGNOSIS — M43.16 SPONDYLOLISTHESIS OF LUMBAR REGION: ICD-10-CM

## 2024-10-01 DIAGNOSIS — M54.16 LUMBAR RADICULOPATHY: ICD-10-CM

## 2024-10-01 DIAGNOSIS — M47.816 LUMBAR SPONDYLOSIS: Primary | ICD-10-CM

## 2024-10-01 PROCEDURE — 4010F ACE/ARB THERAPY RXD/TAKEN: CPT | Mod: CPTII,S$GLB,, | Performed by: NURSE PRACTITIONER

## 2024-10-01 PROCEDURE — 3288F FALL RISK ASSESSMENT DOCD: CPT | Mod: CPTII,S$GLB,, | Performed by: NURSE PRACTITIONER

## 2024-10-01 PROCEDURE — 99999 PR PBB SHADOW E&M-EST. PATIENT-LVL III: CPT | Mod: PBBFAC,,, | Performed by: NURSE PRACTITIONER

## 2024-10-01 PROCEDURE — 1125F AMNT PAIN NOTED PAIN PRSNT: CPT | Mod: CPTII,S$GLB,, | Performed by: NURSE PRACTITIONER

## 2024-10-01 PROCEDURE — 3074F SYST BP LT 130 MM HG: CPT | Mod: CPTII,S$GLB,, | Performed by: NURSE PRACTITIONER

## 2024-10-01 PROCEDURE — 1159F MED LIST DOCD IN RCRD: CPT | Mod: CPTII,S$GLB,, | Performed by: NURSE PRACTITIONER

## 2024-10-01 PROCEDURE — 1101F PT FALLS ASSESS-DOCD LE1/YR: CPT | Mod: CPTII,S$GLB,, | Performed by: NURSE PRACTITIONER

## 2024-10-01 PROCEDURE — 3078F DIAST BP <80 MM HG: CPT | Mod: CPTII,S$GLB,, | Performed by: NURSE PRACTITIONER

## 2024-10-01 PROCEDURE — 3044F HG A1C LEVEL LT 7.0%: CPT | Mod: CPTII,S$GLB,, | Performed by: NURSE PRACTITIONER

## 2024-10-01 PROCEDURE — 99214 OFFICE O/P EST MOD 30 MIN: CPT | Mod: S$GLB,,, | Performed by: NURSE PRACTITIONER

## 2024-10-01 PROCEDURE — 3008F BODY MASS INDEX DOCD: CPT | Mod: CPTII,S$GLB,, | Performed by: NURSE PRACTITIONER

## 2024-10-01 RX ORDER — PREGABALIN 100 MG/1
100 CAPSULE ORAL 2 TIMES DAILY
Qty: 60 CAPSULE | Refills: 2 | Status: SHIPPED | OUTPATIENT
Start: 2024-10-01 | End: 2025-04-01

## 2024-10-02 ENCOUNTER — TELEPHONE (OUTPATIENT)
Dept: PAIN MEDICINE | Facility: CLINIC | Age: 74
End: 2024-10-02
Payer: MEDICARE

## 2024-10-02 NOTE — TELEPHONE ENCOUNTER
----- Message from Becky Clark NP sent at 10/1/2024 12:49 PM CDT -----  Pain Provider: Aldo  Patient Name: Quintin Ling  MRN: [id number]  Case: LUMBAR MEDIAL BRANCH BLOCKS   Level: L4/5 and L5/S1  Laterality: right  Anticoagulation: eliquis, plavix, asa  Do not have to hold    Call day after for percentage relief

## 2024-10-11 NOTE — TELEPHONE ENCOUNTER
Care Due:                  Date            Visit Type   Department     Provider  --------------------------------------------------------------------------------                                EP -                              PRIMARY      [facility] INTERNAL  Last Visit: 09-      CARE ([facility])   MEDICINE       Bishop Gabriel                              EP -                              PRIMARY      [facility] INTERNAL  Next Visit: 10-      CARE ([facility])   KERI Gabriel                                                            Last  Test          Frequency    Reason                     Performed    Due Date  --------------------------------------------------------------------------------    HBA1C.......  6 months...  metFORMIN................  03- 09-    [facility] Embedded Care Due Messages. Reference number: [id number].   10/11/2024 12:52:45 PM CDT   Po fluids given per erp. Pt tolerating. For d/c

## 2024-10-11 NOTE — TELEPHONE ENCOUNTER
Refill Routing Note   Medication(s) are not appropriate for processing by [facility] for the following reason(s):        Required labs outdated    [facility] action(s):  Defer     Requires labs : Yes             Appointments  past 12m or future 3m with PCP    Date Provider   Last Visit   9/20/2024 Bishop Gabriel MD   Next Visit   10/21/2024 Bishop Gabriel MD   ED visits in past 90 days: 0        Note composed:6:13 PM 10/11/2024

## 2024-10-15 NOTE — TELEPHONE ENCOUNTER
No care due was identified.  [facility] Embedded Care Due Messages. Reference number: [id number].   10/15/2024 8:03:53 AM CDT

## 2024-10-16 ENCOUNTER — OFFICE VISIT (OUTPATIENT)
Dept: CARDIOLOGY | Facility: CLINIC | Age: 74
End: 2024-10-16
Payer: MEDICARE

## 2024-10-16 VITALS
WEIGHT: 212 LBS | HEART RATE: 61 BPM | DIASTOLIC BLOOD PRESSURE: 73 MMHG | BODY MASS INDEX: 35.27 KG/M2 | OXYGEN SATURATION: 99 % | SYSTOLIC BLOOD PRESSURE: 157 MMHG

## 2024-10-16 DIAGNOSIS — E66.01 SEVERE OBESITY (BMI 35.0-39.9) WITH COMORBIDITY: ICD-10-CM

## 2024-10-16 DIAGNOSIS — I35.0 NONRHEUMATIC AORTIC VALVE STENOSIS: ICD-10-CM

## 2024-10-16 DIAGNOSIS — I65.22 STENOSIS OF LEFT CAROTID ARTERY: ICD-10-CM

## 2024-10-16 DIAGNOSIS — I48.91 ATRIAL FIB/FLUTTER, TRANSIENT: Primary | ICD-10-CM

## 2024-10-16 DIAGNOSIS — I48.92 ATRIAL FIB/FLUTTER, TRANSIENT: Primary | ICD-10-CM

## 2024-10-16 DIAGNOSIS — I10 ESSENTIAL HYPERTENSION: ICD-10-CM

## 2024-10-16 DIAGNOSIS — Z98.890 S/P CAROTID ENDARTERECTOMY: ICD-10-CM

## 2024-10-16 DIAGNOSIS — I25.10 CORONARY ARTERY CALCIFICATION SEEN ON CAT SCAN: ICD-10-CM

## 2024-10-16 PROCEDURE — 3288F FALL RISK ASSESSMENT DOCD: CPT | Mod: CPTII,S$GLB,, | Performed by: INTERNAL MEDICINE

## 2024-10-16 PROCEDURE — 99999 PR PBB SHADOW E&M-EST. PATIENT-LVL III: CPT | Mod: PBBFAC,,, | Performed by: INTERNAL MEDICINE

## 2024-10-16 PROCEDURE — 3078F DIAST BP <80 MM HG: CPT | Mod: CPTII,S$GLB,, | Performed by: INTERNAL MEDICINE

## 2024-10-16 PROCEDURE — 3008F BODY MASS INDEX DOCD: CPT | Mod: CPTII,S$GLB,, | Performed by: INTERNAL MEDICINE

## 2024-10-16 PROCEDURE — 3077F SYST BP >= 140 MM HG: CPT | Mod: CPTII,S$GLB,, | Performed by: INTERNAL MEDICINE

## 2024-10-16 PROCEDURE — 1101F PT FALLS ASSESS-DOCD LE1/YR: CPT | Mod: CPTII,S$GLB,, | Performed by: INTERNAL MEDICINE

## 2024-10-16 PROCEDURE — 3044F HG A1C LEVEL LT 7.0%: CPT | Mod: CPTII,S$GLB,, | Performed by: INTERNAL MEDICINE

## 2024-10-16 PROCEDURE — 1125F AMNT PAIN NOTED PAIN PRSNT: CPT | Mod: CPTII,S$GLB,, | Performed by: INTERNAL MEDICINE

## 2024-10-16 PROCEDURE — 99214 OFFICE O/P EST MOD 30 MIN: CPT | Mod: S$GLB,,, | Performed by: INTERNAL MEDICINE

## 2024-10-16 PROCEDURE — 1159F MED LIST DOCD IN RCRD: CPT | Mod: CPTII,S$GLB,, | Performed by: INTERNAL MEDICINE

## 2024-10-16 PROCEDURE — 4010F ACE/ARB THERAPY RXD/TAKEN: CPT | Mod: CPTII,S$GLB,, | Performed by: INTERNAL MEDICINE

## 2024-10-16 RX ORDER — AMLODIPINE BESYLATE 10 MG/1
10 TABLET ORAL DAILY
Qty: 30 TABLET | Refills: 11 | Status: SHIPPED | OUTPATIENT
Start: 2024-10-16

## 2024-10-16 NOTE — PROGRESS NOTES
Subjective:   Patient ID:  Quintin Ling is a 74 y.o. male who presents for evaluation of Follow-up      Follow-up  Pertinent negatives include no chest pain.   10.16.2024  Comes in for six-month visit.    Denies any significant VELEZ, angina, palpitations, lower extremity swelling syncope or presyncope   Compliant with medications.      4.30.2024  Comes in for a post discharge follow-up.    Normal nuclear stress test after his last visit.    He underwent a left carotid endarterectomy.    During his hospital stay he was switched off bisoprolol to metoprolol he was noted to have sinus bradycardia he was taken off the medications and on discharge he states that his blood pressure has been elevated up to 180s.    His pulse in the 80s.    He denies chest pain.    He does have left neck hematoma.  But no signs of infection.  Appears to be mild to moderate size hematoma.    Going to see vascular surgery tomorrow.      4.17.2024  73-year-old male, history as below.    Recently discharged from the hospital.  Here for post discharge follow-up.  He was found to be in a flutter at the time of his hospitalization.  History started early March he had a a bowel obstruction surgery this was complicated later on by the he since after he coughed at home.  An infection and this was treated on a rehospitalization.  During that time he was found to be in a flutter.  He converted to sinus rhythm.  Right now on anticoagulants.    No bleeding.    He complains of the cost of Cardizem being too expensive.    He was also found to have bilateral carotid artery stenosis.  He was admitted with syncope thought to be possibly secondary to the infection.    And there is a plan to do a carotid endarterectomy.    He is here for also preop evaluation.  He states that he denies any chest pain.  Dyspnea, palpitations syncope or presyncope since discharge.  Past Medical History:   Diagnosis Date    Diabetes mellitus     Hyperlipidemia     Hypertension         Past Surgical History:   Procedure Laterality Date    BIOPSY OF PERITONEUM N/A 1/29/2024    Procedure: BIOPSY, PERITONEUM;  Surgeon: Tosin Boothe MD;  Location: [facility] OR;  Service: Colon and Rectal;  Laterality: N/A;    CAROTID ENDARTERECTOMY Left 4/25/2024    Procedure: ENDARTERECTOMY, CAROTID;  Surgeon: Chrissie Parks MD;  Location: [facility] OR;  Service: Peripheral Vascular;  Laterality: Left;    CLOSURE OF WOUND N/A 3/16/2024    Procedure: CLOSURE, WOUND;  Surgeon: Magda Stark MD;  Location: [facility] OR;  Service: General;  Laterality: N/A;    COLONOSCOPY N/A 1/5/2024    Procedure: COLONOSCOPY;  Surgeon: Irasema Gil MD;  Location: [facility] ENDO;  Service: Endoscopy;  Laterality: N/A;    DIAGNOSTIC LAPAROSCOPY N/A 1/29/2024    Procedure: LAPAROSCOPY, DIAGNOSTIC;  Surgeon: Tosin Boothe MD;  Location: [facility] OR;  Service: Colon and Rectal;  Laterality: N/A;    EPIDURAL STEROID INJECTION N/A 11/13/2023    Procedure: Lumbar L5/S1 IL JOSE;  Surgeon: Oneil Carlson MD;  Location: [facility] PAIN MGT;  Service: Pain Management;  Laterality: N/A;    ESOPHAGOGASTRODUODENOSCOPY N/A 1/5/2024    Procedure: EGD (ESOPHAGOGASTRODUODENOSCOPY);  Surgeon: Irasema Gil MD;  Location: [facility] ENDO;  Service: Endoscopy;  Laterality: N/A;    INJECTION OF ANESTHETIC AGENT INTO TISSUE PLANE DEFINED BY TRANSVERSUS ABDOMINIS MUSCLE N/A 3/11/2024    Procedure: BLOCK, TRANSVERSUS ABDOMINIS PLANE;  Surgeon: Tosin Boothe MD;  Location: [facility] OR;  Service: Colon and Rectal;  Laterality: N/A;    NO PAST SURGERIES      REPAIR OF BLOOD VESSEL WITH PATCH GRAFT Left 4/25/2024    Procedure: ANGIOPLASTY, USING PATCH GRAFT;  Surgeon: Chrissie Parks MD;  Location: [facility] OR;  Service: Peripheral Vascular;  Laterality: Left;    WOUND EXPLORATION N/A 3/16/2024    Procedure: EXPLORATION, WOUND;  Surgeon: Magda Stark MD;  Location: [facility] OR;  Service: General;  Laterality: N/A;    XI ROBOTIC COLECTOMY, RIGHT Right  3/11/2024    Procedure: XI ROBOTIC COLECTOMY, RIGHT;  Surgeon: Tosin Boothe MD;  Location: [facility];  Service: Colon and Rectal;  Laterality: Right;  WITH OMENECTOMY AND AMNIOFIX       Social History     Tobacco Use    Smoking status: Former     Passive exposure: Never    Smokeless tobacco: Never   Substance Use Topics    Alcohol use: Not Currently     Comment: OCC    Drug use: Never       Family History   Problem Relation Name Age of Onset    No Known Problems Mother      No Known Problems Father         Review of Systems   Cardiovascular:  Negative for chest pain, dyspnea on exertion, palpitations and syncope.   Genitourinary: Negative.    Neurological: Negative.        Current Outpatient Medications on File Prior to Visit   Medication Sig    apixaban (ELIQUIS) 5 mg Tab Take 1 tablet (5 mg total) by mouth 2 (two) times daily.    aspirin 81 MG Chew Take 1 tablet (81 mg total) by mouth once daily.    atorvastatin (LIPITOR) 20 MG tablet Take 1 tablet (20 mg total) by mouth once daily.    fenofibrate (TRICOR) 54 MG tablet TAKE 1 TABLET BY MOUTH ONCE DAILY    ferrous sulfate (FEOSOL) 325 mg (65 mg iron) Tab tablet Take 1 tablet (325 mg total) by mouth once daily.    FREESTYLE FREEDOM LITE kit by Other route.    FREESTYLE LANCETS 28 gauge lancets Apply 28 lancets topically 3 (three) times daily.    FREESTYLE LITE STRIPS Strp Inject 300 strips as directed as needed.    metFORMIN (GLUCOPHAGE) 500 MG tablet Take 1 tablet (500 mg total) by mouth 2 (two) times daily with meals.    metoprolol succinate (TOPROL-XL) 25 MG 24 hr tablet Take 1 tablet (25 mg total) by mouth 2 (two) times daily.    olmesartan (BENICAR) 20 MG tablet Take 1 tablet (20 mg total) by mouth nightly.    pregabalin (LYRICA) 100 MG capsule Take 1 capsule (100 mg total) by mouth 2 (two) times daily.    [DISCONTINUED] amLODIPine (NORVASC) 5 MG tablet Take 1 tablet (5 mg total) by mouth once daily.    [DISCONTINUED] clopidogreL (PLAVIX) 75 mg tablet Take  1 tablet (75 mg total) by mouth once daily.    fenofibrate (TRICOR) 48 MG tablet Take 1 tablet (48 mg total) by mouth once daily. (Patient not taking: Reported on 10/16/2024)    [DISCONTINUED] furosemide (LASIX) 40 MG tablet Take 1 tablet (40 mg total) by mouth daily as needed (leg swelling). (Patient not taking: Reported on 10/16/2024)     No current facility-administered medications on file prior to visit.       Objective:   Objective:  Wt Readings from Last 3 Encounters:   10/16/24 96.2 kg (211 lb 15.6 oz)   10/01/24 94.6 kg (208 lb 7.1 oz)   09/20/24 95.6 kg (210 lb 12.2 oz)     Temp Readings from Last 3 Encounters:   09/20/24 97.3 °F (36.3 °C)   09/12/24 97.5 °F (36.4 °C) (Oral)   08/28/24 98.1 °F (36.7 °C) (Oral)     BP Readings from Last 3 Encounters:   10/16/24 (!) 157/73   10/01/24 127/61   09/20/24 (!) 148/82     Pulse Readings from Last 3 Encounters:   10/16/24 61   10/01/24 65   09/20/24 66       Physical Exam  Vitals reviewed.   Constitutional:       Appearance: He is well-developed.   Cardiovascular:      Rate and Rhythm: Normal rate and regular rhythm.      Pulses: Intact distal pulses.      Heart sounds: Murmur heard.   Pulmonary:      Breath sounds: Normal breath sounds.   Neurological:      Mental Status: He is oriented to person, place, and time.         Lab Results   Component Value Date    CHOL 119 (L) 08/23/2024    CHOL 131 05/30/2023    CHOL 146 05/19/2021     Lab Results   Component Value Date    HDL 44 08/23/2024    HDL 45 05/30/2023    HDL 45 05/19/2021     Lab Results   Component Value Date    LDLCALC 58.6 (L) 08/23/2024    LDLCALC 75.0 05/30/2023    LDLCALC 86.6 05/19/2021     Lab Results   Component Value Date    TRIG 82 08/23/2024    TRIG 55 05/30/2023    TRIG 72 05/19/2021     Lab Results   Component Value Date    CHOLHDL 37.0 08/23/2024    CHOLHDL 34.4 05/30/2023    CHOLHDL 30.8 05/19/2021       Chemistry        Component Value Date/Time     04/26/2024 1022    K 3.8 04/26/2024  1022     04/26/2024 1022    CO2 23 04/26/2024 1022    BUN 18 04/26/2024 1022    CREATININE 1.3 04/26/2024 1022     (H) 04/26/2024 1022        Component Value Date/Time    CALCIUM 8.8 04/26/2024 1022    ALKPHOS 52 (L) 04/26/2024 1022    AST 8 (L) 04/26/2024 1022    ALT 7 (L) 04/26/2024 1022    BILITOT 0.3 04/26/2024 1022    ESTGFRAFRICA >60.0 05/30/2022 0958    EGFRNONAA >60.0 05/30/2022 0958          Lab Results   Component Value Date    TSH 1.288 07/21/2020     Lab Results   Component Value Date    INR 1.0 03/19/2024     Lab Results   Component Value Date    WBC 8.22 04/26/2024    HGB 8.1 (L) 04/26/2024    HCT 25.5 (L) 04/26/2024    MCV 88 04/26/2024     (L) 04/26/2024     BNP  @LABRCNTIP(BNP,BNPTRIAGEBLO)@  CrCl cannot be calculated (Patient's most recent lab result is older than the maximum 7 days allowed.).     Imaging:  ======    No results found for this or any previous visit.    Results for orders placed during the hospital encounter of 03/29/24    US Carotid Bilateral    Narrative  EXAMINATION:  US CAROTID BILATERAL    CLINICAL HISTORY:  syncope;    TECHNIQUE:  Grayscale and color Doppler ultrasound examination of the carotid and vertebral artery systems bilaterally.  Stenosis estimates are per the NASCET measurement criteria.    COMPARISON:  None.    FINDINGS:  Right:    Internal Carotid Artery (ICA) peak systolic velocity 249 cm/sec    ICA/CCA peak systolic velocity ratio: 2.2    Plaque formation: Heterogeneous    Vertebral artery: Antegrade flow and normal waveform.    Left:    Internal Carotid Artery (ICA)  peak systolic velocity 362 cm/sec    ICA/CCA peak systolic velocity ratio: 2.6    Plaque formation: Heterogeneous    Vertebral artery: Antegrade flow and normal waveform.    Impression  Greater than 70% stenosis bilaterally.  Further evaluation as warranted.    This report was flagged in Epic as abnormal.      Electronically signed by: Petey  Marlene  Date:    03/29/2024  Time:    21:47    No results found for this or any previous visit.    No results found for this or any previous visit.    No valid procedures specified.    No results found for this or any previous visit.      No results found for this or any previous visit.      Results for orders placed during the hospital encounter of 03/16/24    Echo    Interpretation Summary    Left Ventricle: The left ventricle is normal in size. Moderately increased wall thickness. There is moderate concentric hypertrophy. There is normal systolic function with a visually estimated ejection fraction of 60 - 65%. Unable to assess diastolic function due to atrial fibrillation.    Right Ventricle: Right ventricle was not well visualized due to poor acoustic window. Normal right ventricular cavity size. Wall thickness is normal. Right ventricle wall motion  is normal. Systolic function is normal.    Left Atrium: Left atrium is moderately dilated.    Aortic Valve: Moderately calcified cusps. There is moderate annular calcification present. Moderately restricted motion. There is mild to moderate stenosis. Aortic valve area by VTI is 2.16 cm². Aortic valve peak velocity is 2.32 m/s. Mean gradient is 17 mmHg. The dimensionless index is 0.58.    Mitral Valve: There is mild regurgitation.    Tricuspid Valve: There is mild regurgitation.    Aorta: Aortic root is normal in size. Ascending aorta is mildly dilated measuring 4.00 cm.    Pulmonary Artery: The estimated pulmonary artery systolic pressure is 37 mmHg.    IVC/SVC: Normal venous pressure at 3 mmHg.      Diagnostic Results:  ECG: Reviewed  Results for orders placed during the hospital encounter of 04/22/24    Nuclear Stress - Cardiology Interpreted    Interpretation Summary    Normal myocardial perfusion scan. There is no evidence of myocardial ischemia or infarction.    The gated perfusion images showed an ejection fraction of 64% at rest. The gated perfusion images  showed an ejection fraction of 69% post stress.    The ECG portion of the study is negative for ischemia.    The patient reported no chest pain during the stress test.    The ASCVD Risk score (Mi DK, et al., 2019) failed to calculate for the following reasons:    The valid total cholesterol range is 130 to 320 mg/dL        Assessment and Plan:   Atrial fib/flutter, transient    Nonrheumatic aortic valve stenosis    Essential hypertension  -     amLODIPine (NORVASC) 10 MG tablet; Take 1 tablet (10 mg total) by mouth once daily.  Dispense: 30 tablet; Refill: 11    Coronary artery calcification seen on CAT scan    S/P carotid endarterectomy    Severe obesity (BMI 35.0-39.9) with comorbidity    Stenosis of left carotid artery      Angina free,, euvolemic  Mild-to-moderate aortic stenosis.3.2024 we will in 2 years  Normal nuclear stress test.  4.2024  Decrease salt intake.  Blood pressure better but not at goal yet.  We will increase his amlodipine  Reviewed all tests and above medical conditions with patient in detail and formulated treatment plan.  Risk factor modification discussed.   Cardiac low salt diet discussed.  Maintaining healthy weight and weight loss goals were discussed in clinic.  On Eliquis for his history of PAF  Follows with vascular.  On statin for carotid stenosis.       Follow up in  6 months

## 2024-10-19 RX ORDER — METFORMIN HYDROCHLORIDE 500 MG/1
500 TABLET ORAL 2 TIMES DAILY WITH MEALS
Qty: 180 TABLET | Refills: 0 | Status: SHIPPED | OUTPATIENT
Start: 2024-10-19

## 2024-10-19 RX ORDER — METFORMIN HYDROCHLORIDE 500 MG/1
500 TABLET ORAL 2 TIMES DAILY WITH MEALS
Qty: 180 TABLET | Refills: 0 | OUTPATIENT
Start: 2024-10-19

## 2024-10-21 ENCOUNTER — OFFICE VISIT (OUTPATIENT)
Dept: FAMILY MEDICINE | Facility: CLINIC | Age: 74
End: 2024-10-21
Payer: MEDICARE

## 2024-10-21 ENCOUNTER — LAB VISIT (OUTPATIENT)
Dept: LAB | Facility: HOSPITAL | Age: 74
End: 2024-10-21
Attending: FAMILY MEDICINE
Payer: MEDICARE

## 2024-10-21 VITALS
BODY MASS INDEX: 35.49 KG/M2 | DIASTOLIC BLOOD PRESSURE: 70 MMHG | SYSTOLIC BLOOD PRESSURE: 136 MMHG | OXYGEN SATURATION: 97 % | WEIGHT: 213.31 LBS | TEMPERATURE: 99 F

## 2024-10-21 DIAGNOSIS — Z23 NEED FOR VACCINATION: ICD-10-CM

## 2024-10-21 DIAGNOSIS — I48.91 ATRIAL FIB/FLUTTER, TRANSIENT: ICD-10-CM

## 2024-10-21 DIAGNOSIS — D64.9 ANEMIA, UNSPECIFIED TYPE: ICD-10-CM

## 2024-10-21 DIAGNOSIS — I48.92 ATRIAL FIB/FLUTTER, TRANSIENT: ICD-10-CM

## 2024-10-21 DIAGNOSIS — N18.31 STAGE 3A CHRONIC KIDNEY DISEASE: ICD-10-CM

## 2024-10-21 DIAGNOSIS — I35.0 NONRHEUMATIC AORTIC VALVE STENOSIS: ICD-10-CM

## 2024-10-21 DIAGNOSIS — R73.03 PREDIABETES: ICD-10-CM

## 2024-10-21 DIAGNOSIS — E78.2 MIXED HYPERLIPIDEMIA: ICD-10-CM

## 2024-10-21 DIAGNOSIS — I10 ESSENTIAL HYPERTENSION: Primary | ICD-10-CM

## 2024-10-21 DIAGNOSIS — Z98.890 S/P CAROTID ENDARTERECTOMY: ICD-10-CM

## 2024-10-21 LAB
BASOPHILS # BLD AUTO: 0.04 K/UL (ref 0–0.2)
BASOPHILS NFR BLD: 0.5 % (ref 0–1.9)
DIFFERENTIAL METHOD BLD: ABNORMAL
EOSINOPHIL # BLD AUTO: 0.2 K/UL (ref 0–0.5)
EOSINOPHIL NFR BLD: 2.5 % (ref 0–8)
ERYTHROCYTE [DISTWIDTH] IN BLOOD BY AUTOMATED COUNT: 14.6 % (ref 11.5–14.5)
HCT VFR BLD AUTO: 36.6 % (ref 40–54)
HGB BLD-MCNC: 11.8 G/DL (ref 14–18)
IMM GRANULOCYTES # BLD AUTO: 0.03 K/UL (ref 0–0.04)
IMM GRANULOCYTES NFR BLD AUTO: 0.4 % (ref 0–0.5)
IRON SERPL-MCNC: 60 UG/DL (ref 45–160)
LYMPHOCYTES # BLD AUTO: 0.8 K/UL (ref 1–4.8)
LYMPHOCYTES NFR BLD: 10 % (ref 18–48)
MCH RBC QN AUTO: 28 PG (ref 27–31)
MCHC RBC AUTO-ENTMCNC: 32.2 G/DL (ref 32–36)
MCV RBC AUTO: 87 FL (ref 82–98)
MONOCYTES # BLD AUTO: 0.7 K/UL (ref 0.3–1)
MONOCYTES NFR BLD: 8.8 % (ref 4–15)
NEUTROPHILS # BLD AUTO: 6.2 K/UL (ref 1.8–7.7)
NEUTROPHILS NFR BLD: 77.8 % (ref 38–73)
NRBC BLD-RTO: 0 /100 WBC
PLATELET # BLD AUTO: 183 K/UL (ref 150–450)
PMV BLD AUTO: 11 FL (ref 9.2–12.9)
RBC # BLD AUTO: 4.21 M/UL (ref 4.6–6.2)
SATURATED IRON: 15 % (ref 20–50)
TOTAL IRON BINDING CAPACITY: 400 UG/DL (ref 250–450)
TRANSFERRIN SERPL-MCNC: 270 MG/DL (ref 200–375)
WBC # BLD AUTO: 7.92 K/UL (ref 3.9–12.7)

## 2024-10-21 PROCEDURE — 90653 IIV ADJUVANT VACCINE IM: CPT | Mod: S$GLB,,, | Performed by: FAMILY MEDICINE

## 2024-10-21 PROCEDURE — 99999 PR PBB SHADOW E&M-EST. PATIENT-LVL IV: CPT | Mod: PBBFAC,,, | Performed by: FAMILY MEDICINE

## 2024-10-21 PROCEDURE — 84466 ASSAY OF TRANSFERRIN: CPT | Performed by: FAMILY MEDICINE

## 2024-10-21 PROCEDURE — 36415 COLL VENOUS BLD VENIPUNCTURE: CPT | Mod: PO | Performed by: FAMILY MEDICINE

## 2024-10-21 PROCEDURE — 3044F HG A1C LEVEL LT 7.0%: CPT | Mod: CPTII,S$GLB,, | Performed by: FAMILY MEDICINE

## 2024-10-21 PROCEDURE — 1125F AMNT PAIN NOTED PAIN PRSNT: CPT | Mod: CPTII,S$GLB,, | Performed by: FAMILY MEDICINE

## 2024-10-21 PROCEDURE — 3075F SYST BP GE 130 - 139MM HG: CPT | Mod: CPTII,S$GLB,, | Performed by: FAMILY MEDICINE

## 2024-10-21 PROCEDURE — 3008F BODY MASS INDEX DOCD: CPT | Mod: CPTII,S$GLB,, | Performed by: FAMILY MEDICINE

## 2024-10-21 PROCEDURE — G2211 COMPLEX E/M VISIT ADD ON: HCPCS | Mod: S$GLB,,, | Performed by: FAMILY MEDICINE

## 2024-10-21 PROCEDURE — 83540 ASSAY OF IRON: CPT | Performed by: FAMILY MEDICINE

## 2024-10-21 PROCEDURE — 99214 OFFICE O/P EST MOD 30 MIN: CPT | Mod: S$GLB,,, | Performed by: FAMILY MEDICINE

## 2024-10-21 PROCEDURE — 4010F ACE/ARB THERAPY RXD/TAKEN: CPT | Mod: CPTII,S$GLB,, | Performed by: FAMILY MEDICINE

## 2024-10-21 PROCEDURE — 1101F PT FALLS ASSESS-DOCD LE1/YR: CPT | Mod: CPTII,S$GLB,, | Performed by: FAMILY MEDICINE

## 2024-10-21 PROCEDURE — 85025 COMPLETE CBC W/AUTO DIFF WBC: CPT | Performed by: FAMILY MEDICINE

## 2024-10-21 PROCEDURE — G0008 ADMIN INFLUENZA VIRUS VAC: HCPCS | Mod: S$GLB,,, | Performed by: FAMILY MEDICINE

## 2024-10-21 PROCEDURE — 3078F DIAST BP <80 MM HG: CPT | Mod: CPTII,S$GLB,, | Performed by: FAMILY MEDICINE

## 2024-10-21 PROCEDURE — 1159F MED LIST DOCD IN RCRD: CPT | Mod: CPTII,S$GLB,, | Performed by: FAMILY MEDICINE

## 2024-10-21 PROCEDURE — 3288F FALL RISK ASSESSMENT DOCD: CPT | Mod: CPTII,S$GLB,, | Performed by: FAMILY MEDICINE

## 2024-10-21 RX ORDER — FERROUS SULFATE 325(65) MG
325 TABLET ORAL DAILY
Qty: 30 TABLET | Refills: 2 | Status: SHIPPED | OUTPATIENT
Start: 2024-10-21

## 2024-10-21 NOTE — PROGRESS NOTES
Subjective:       Patient ID: Quintin Ling is a 74 y.o. male.    Chief Complaint: follow up       HPI Comments:       Current Outpatient Medications:     amLODIPine (NORVASC) 10 MG tablet, Take 1 tablet (10 mg total) by mouth once daily., Disp: 30 tablet, Rfl: 11    apixaban (ELIQUIS) 5 mg Tab, Take 1 tablet (5 mg total) by mouth 2 (two) times daily., Disp: 60 tablet, Rfl: 2    aspirin 81 MG Chew, Take 1 tablet (81 mg total) by mouth once daily., Disp: 360 tablet, Rfl: 0    atorvastatin (LIPITOR) 20 MG tablet, Take 1 tablet (20 mg total) by mouth once daily., Disp: 90 tablet, Rfl: 0    fenofibrate (TRICOR) 48 MG tablet, Take 1 tablet (48 mg total) by mouth once daily., Disp: 90 tablet, Rfl: 3    fenofibrate (TRICOR) 54 MG tablet, TAKE 1 TABLET BY MOUTH ONCE DAILY, Disp: 90 tablet, Rfl: 3    FREESTYLE FREEDOM LITE kit, by Other route., Disp: , Rfl:     FREESTYLE LANCETS 28 gauge lancets, Apply 28 lancets topically 3 (three) times daily., Disp: , Rfl:     FREESTYLE LITE STRIPS Strp, Inject 300 strips as directed as needed., Disp: , Rfl:     metFORMIN (GLUCOPHAGE) 500 MG tablet, Take 1 tablet (500 mg total) by mouth 2 (two) times daily with meals., Disp: 180 tablet, Rfl: 0    metoprolol succinate (TOPROL-XL) 25 MG 24 hr tablet, Take 1 tablet (25 mg total) by mouth 2 (two) times daily., Disp: 180 tablet, Rfl: 3    olmesartan (BENICAR) 20 MG tablet, Take 1 tablet (20 mg total) by mouth nightly., Disp: 30 tablet, Rfl: 1    pregabalin (LYRICA) 100 MG capsule, Take 1 capsule (100 mg total) by mouth 2 (two) times daily., Disp: 60 capsule, Rfl: 2    ferrous sulfate (FEOSOL) 325 mg (65 mg iron) Tab tablet, Take 1 tablet (325 mg total) by mouth once daily., Disp: 30 tablet, Rfl: 2      One-month follow-up on blood pressure.  Started the olmesartan.  Also had his amlodipine increase by Cardiology a few days ago.  Blood pressure readings at home tend to be improved 135/75.    Also had his Plavix discontinued by Cardiology.   Now he is just on aspirin and Eliquis.      Pain management increased his Lyrica.  Stops his Zanaflex.  Plan on doing a MBB soon    Patient has been on iron for awhile since his anemia.  This anemia presumed secondary to his GI mass and surgery.  Will recheck CBC and iron levels today.      Review of Systems   Constitutional:  Negative for activity change, appetite change and fever.   HENT:  Negative for sore throat.    Respiratory:  Negative for cough and shortness of breath.    Cardiovascular:  Negative for chest pain.   Gastrointestinal:  Negative for abdominal pain, diarrhea and nausea.   Genitourinary:  Negative for difficulty urinating.   Musculoskeletal:  Positive for back pain. Negative for arthralgias and myalgias.   Neurological:  Negative for dizziness and headaches.       Objective:      Vitals:    10/21/24 0737 10/21/24 0750   BP: (!) 148/70 136/70   Temp: 98.5 °F (36.9 °C)    SpO2: 97%    Weight: 96.7 kg (213 lb 4.7 oz)    PainSc:   8    PainLoc: Back      Physical Exam  Vitals and nursing note reviewed.   Constitutional:       General: He is not in acute distress.     Appearance: He is well-developed. He is not diaphoretic.   HENT:      Head: Normocephalic.   Neck:      Thyroid: No thyromegaly.   Cardiovascular:      Rate and Rhythm: Normal rate and regular rhythm.      Heart sounds: Normal heart sounds. No murmur heard.  Pulmonary:      Effort: Pulmonary effort is normal.      Breath sounds: Normal breath sounds. No wheezing or rales.   Abdominal:      General: There is no distension.      Palpations: Abdomen is soft.   Musculoskeletal:      Cervical back: Neck supple.   Lymphadenopathy:      Cervical: No cervical adenopathy.   Skin:     General: Skin is warm and dry.   Neurological:      Mental Status: He is alert and oriented to person, place, and time.   Psychiatric:         Mood and Affect: Mood normal.         Behavior: Behavior normal.         Thought Content: Thought content normal.          Judgment: Judgment normal.         Assessment:       1. Essential hypertension    2. Atrial fib/flutter, transient    3. Stage 3a chronic kidney disease    4. Prediabetes    5. Mixed hyperlipidemia    6. S/P carotid endarterectomy    7. Nonrheumatic aortic valve stenosis    8. Anemia, unspecified type    9. Need for vaccination        Plan:   Essential hypertension  Comments:  Controlled.  Continue to monitor closely.  Follow-up 3 months    Atrial fib/flutter, transient  Comments:  Now on aspirin and Eliquis.  Beta-blocker    Stage 3a chronic kidney disease  Comments:  Stable    Prediabetes  Comments:  A1c 5.4    Mixed hyperlipidemia  Comments:  LDL 58    S/P carotid endarterectomy  Comments:  Followed by Cardiology.  On aspirin and Eliquis and statin    Nonrheumatic aortic valve stenosis  Comments:  mild-to-mod  Orders:  -     CBC Auto Differential; Future; Expected date: 10/21/2024    Anemia, unspecified type  Comments:  Recheck CBC and iron studies today  Orders:  -     CBC Auto Differential; Future; Expected date: 10/21/2024  -     IRON AND TIBC; Future; Expected date: 10/21/2024    Need for vaccination  Comments:  Flu shot  Orders:  -     Influenza - Trivalent (Adjuvanted)    Other orders  -     ferrous sulfate (FEOSOL) 325 mg (65 mg iron) Tab tablet; Take 1 tablet (325 mg total) by mouth once daily.  Dispense: 30 tablet; Refill: 2

## 2024-10-21 NOTE — PROGRESS NOTES
Vis given. Pt instructed to wait 15 min before leaving facility. Pt states understanding. Administered in deltoid. Pt tolerated well. No complaints or concerns noted at this time

## 2024-10-23 ENCOUNTER — OFFICE VISIT (OUTPATIENT)
Dept: SURGERY | Facility: CLINIC | Age: 74
End: 2024-10-23
Payer: MEDICARE

## 2024-10-23 VITALS
TEMPERATURE: 98 F | SYSTOLIC BLOOD PRESSURE: 149 MMHG | WEIGHT: 211.19 LBS | OXYGEN SATURATION: 98 % | HEIGHT: 65 IN | DIASTOLIC BLOOD PRESSURE: 68 MMHG | BODY MASS INDEX: 35.18 KG/M2 | HEART RATE: 63 BPM

## 2024-10-23 DIAGNOSIS — T81.321D ABDOMINAL WOUND DEHISCENCE, SUBSEQUENT ENCOUNTER: ICD-10-CM

## 2024-10-23 DIAGNOSIS — D12.6 ADENOMA OF COLON: Primary | ICD-10-CM

## 2024-10-23 PROCEDURE — 3078F DIAST BP <80 MM HG: CPT | Mod: CPTII,S$GLB,, | Performed by: STUDENT IN AN ORGANIZED HEALTH CARE EDUCATION/TRAINING PROGRAM

## 2024-10-23 PROCEDURE — 4010F ACE/ARB THERAPY RXD/TAKEN: CPT | Mod: CPTII,S$GLB,, | Performed by: STUDENT IN AN ORGANIZED HEALTH CARE EDUCATION/TRAINING PROGRAM

## 2024-10-23 PROCEDURE — 1159F MED LIST DOCD IN RCRD: CPT | Mod: CPTII,S$GLB,, | Performed by: STUDENT IN AN ORGANIZED HEALTH CARE EDUCATION/TRAINING PROGRAM

## 2024-10-23 PROCEDURE — 3077F SYST BP >= 140 MM HG: CPT | Mod: CPTII,S$GLB,, | Performed by: STUDENT IN AN ORGANIZED HEALTH CARE EDUCATION/TRAINING PROGRAM

## 2024-10-23 PROCEDURE — 3008F BODY MASS INDEX DOCD: CPT | Mod: CPTII,S$GLB,, | Performed by: STUDENT IN AN ORGANIZED HEALTH CARE EDUCATION/TRAINING PROGRAM

## 2024-10-23 PROCEDURE — 3044F HG A1C LEVEL LT 7.0%: CPT | Mod: CPTII,S$GLB,, | Performed by: STUDENT IN AN ORGANIZED HEALTH CARE EDUCATION/TRAINING PROGRAM

## 2024-10-23 PROCEDURE — 1126F AMNT PAIN NOTED NONE PRSNT: CPT | Mod: CPTII,S$GLB,, | Performed by: STUDENT IN AN ORGANIZED HEALTH CARE EDUCATION/TRAINING PROGRAM

## 2024-10-23 PROCEDURE — 99999 PR PBB SHADOW E&M-EST. PATIENT-LVL III: CPT | Mod: PBBFAC,,, | Performed by: STUDENT IN AN ORGANIZED HEALTH CARE EDUCATION/TRAINING PROGRAM

## 2024-10-23 PROCEDURE — 99213 OFFICE O/P EST LOW 20 MIN: CPT | Mod: S$GLB,,, | Performed by: STUDENT IN AN ORGANIZED HEALTH CARE EDUCATION/TRAINING PROGRAM

## 2024-10-23 NOTE — PROGRESS NOTES
CRS Office Visit    SUBJECTIVE:     Chief Complaint:  Abdominal wound    History of Present Illness:  Patient is a 74 y.o. male presents as a follow up for his abdominal wound.  Reports that it is completely healed.  He has no complaints today.  Having regular bowel movements.      Review of patient's allergies indicates:  No Known Allergies    Past Medical History:   Diagnosis Date    Diabetes mellitus     Hyperlipidemia     Hypertension      Past Surgical History:   Procedure Laterality Date    BIOPSY OF PERITONEUM N/A 1/29/2024    Procedure: BIOPSY, PERITONEUM;  Surgeon: Tosin Boothe MD;  Location: [facility] OR;  Service: Colon and Rectal;  Laterality: N/A;    CAROTID ENDARTERECTOMY Left 4/25/2024    Procedure: ENDARTERECTOMY, CAROTID;  Surgeon: Chrissie Parks MD;  Location: [facility] OR;  Service: Peripheral Vascular;  Laterality: Left;    CLOSURE OF WOUND N/A 3/16/2024    Procedure: CLOSURE, WOUND;  Surgeon: Magda Stark MD;  Location: [facility] OR;  Service: General;  Laterality: N/A;    COLONOSCOPY N/A 1/5/2024    Procedure: COLONOSCOPY;  Surgeon: Irasema Gil MD;  Location: [facility] ENDO;  Service: Endoscopy;  Laterality: N/A;    DIAGNOSTIC LAPAROSCOPY N/A 1/29/2024    Procedure: LAPAROSCOPY, DIAGNOSTIC;  Surgeon: Tosin Boothe MD;  Location: [facility] OR;  Service: Colon and Rectal;  Laterality: N/A;    EPIDURAL STEROID INJECTION N/A 11/13/2023    Procedure: Lumbar L5/S1 IL JOSE;  Surgeon: Oneil Carlson MD;  Location: [facility] PAIN MGT;  Service: Pain Management;  Laterality: N/A;    ESOPHAGOGASTRODUODENOSCOPY N/A 1/5/2024    Procedure: EGD (ESOPHAGOGASTRODUODENOSCOPY);  Surgeon: Irasema Gil MD;  Location: [facility] ENDO;  Service: Endoscopy;  Laterality: N/A;    INJECTION OF ANESTHETIC AGENT INTO TISSUE PLANE DEFINED BY TRANSVERSUS ABDOMINIS MUSCLE N/A 3/11/2024    Procedure: BLOCK, TRANSVERSUS ABDOMINIS PLANE;  Surgeon: Tosin Boothe MD;  Location: [facility] OR;  Service: Colon and Rectal;   Laterality: N/A;    NO PAST SURGERIES      REPAIR OF BLOOD VESSEL WITH PATCH GRAFT Left 4/25/2024    Procedure: ANGIOPLASTY, USING PATCH GRAFT;  Surgeon: Chrissie Parks MD;  Location: [facility] OR;  Service: Peripheral Vascular;  Laterality: Left;    WOUND EXPLORATION N/A 3/16/2024    Procedure: EXPLORATION, WOUND;  Surgeon: Magda Stark MD;  Location: [facility] OR;  Service: General;  Laterality: N/A;    XI ROBOTIC COLECTOMY, RIGHT Right 3/11/2024    Procedure: XI ROBOTIC COLECTOMY, RIGHT;  Surgeon: Tosin Boothe MD;  Location: [facility] OR;  Service: Colon and Rectal;  Laterality: Right;  WITH OMENECTOMY AND AMNIOFIX     Family History   Problem Relation Name Age of Onset    No Known Problems Mother      No Known Problems Father       Social History     Tobacco Use    Smoking status: Former     Passive exposure: Never    Smokeless tobacco: Never   Substance Use Topics    Alcohol use: Not Currently     Comment: OCC    Drug use: Never          OBJECTIVE:     Vital Signs (Most Recent)  Temp: 97.7 °F (36.5 °C) (10/23/24 0956)  Pulse: 63 (10/23/24 0956)  BP: (!) 149/68 (10/23/24 0956)  SpO2: 98 % (10/23/24 0956)    Physical Exam:  Physical Exam  Constitutional:       Appearance: He is well-developed.   HENT:      Head: Normocephalic and atraumatic.   Eyes:      Conjunctiva/sclera: Conjunctivae normal.      Pupils: Pupils are equal, round, and reactive to light.   Neck:      Thyroid: No thyromegaly.   Cardiovascular:      Rate and Rhythm: Normal rate and regular rhythm.   Pulmonary:      Effort: Pulmonary effort is normal. No respiratory distress.   Abdominal:      General: There is no distension.      Palpations: Abdomen is soft. There is no mass.      Tenderness: There is no abdominal tenderness.      Comments: Midline wound clean, dry, intact.  Healed without residual defect   Musculoskeletal:         General: No tenderness. Normal range of motion.      Cervical back: Normal range of motion.   Skin:     General: Skin  is warm and dry.      Capillary Refill: Capillary refill takes less than 2 seconds.   Neurological:      General: No focal deficit present.      Mental Status: He is alert and oriented to person, place, and time.           ASSESSMENT/PLAN:     Quintin was seen today for follow-up.    Diagnoses and all orders for this visit:    Adenoma of colon    Abdominal wound dehiscence, subsequent encounter      - wound completely healed.  Okay to resume regular lifestyle and bathing habits  - RTC 02/2025 to schedule 1 year colonoscopy

## 2024-10-24 NOTE — PRE-PROCEDURE INSTRUCTIONS
Spoke with patient regarding procedure scheduled on 11/4     Arrival time 0730     Has patient been sick with fever or on antibiotics within the last 7 days? No     Does the patient have any open wounds, sores or rashes? No     Does the patient have any recent fractures? no     Has patient received a vaccination within the last 7 days? No     Received the COVID vaccination?      Has the patient stopped all medications as directed? na     Does patient have a pacemaker, defibrillator, or implantable stimulator? No     Does the patient have a ride to and from procedure and someone reliable to remain with patient?  ismael     Is the patient diabetic? YES     Does the patient have sleep apnea? Or use O2 at home? no     Is the patient receiving sedation?     Is the patient instructed to remain NPO beginning at midnight the night before their procedure? yes     Procedure location confirmed with patient? Yes     Covid- Denies signs/symptoms. Instructed to notify PAT/MD if any changes.

## 2024-10-28 DIAGNOSIS — Z98.890 S/P CAROTID ENDARTERECTOMY: ICD-10-CM

## 2024-10-28 DIAGNOSIS — I10 ESSENTIAL HYPERTENSION: Primary | ICD-10-CM

## 2024-10-28 DIAGNOSIS — E78.2 MIXED HYPERLIPIDEMIA: ICD-10-CM

## 2024-10-30 ENCOUNTER — TELEPHONE (OUTPATIENT)
Dept: CARDIOLOGY | Facility: CLINIC | Age: 74
End: 2024-10-30
Payer: MEDICARE

## 2024-10-30 DIAGNOSIS — I10 ESSENTIAL HYPERTENSION: ICD-10-CM

## 2024-10-30 RX ORDER — OLMESARTAN MEDOXOMIL 20 MG/1
20 TABLET ORAL NIGHTLY
Qty: 30 TABLET | Refills: 1 | Status: SHIPPED | OUTPATIENT
Start: 2024-10-30 | End: 2025-10-30

## 2024-10-30 RX ORDER — ATORVASTATIN CALCIUM 20 MG/1
20 TABLET, FILM COATED ORAL DAILY
Qty: 90 TABLET | Refills: 0 | Status: SHIPPED | OUTPATIENT
Start: 2024-10-30

## 2024-10-31 RX ORDER — OLMESARTAN MEDOXOMIL 20 MG/1
20 TABLET ORAL NIGHTLY
Qty: 90 TABLET | Refills: 1 | OUTPATIENT
Start: 2024-10-31 | End: 2025-10-31

## 2024-11-01 ENCOUNTER — TELEPHONE (OUTPATIENT)
Dept: CARDIOLOGY | Facility: CLINIC | Age: 74
End: 2024-11-01
Payer: MEDICARE

## 2024-11-04 ENCOUNTER — HOSPITAL ENCOUNTER (OUTPATIENT)
Facility: HOSPITAL | Age: 74
Discharge: HOME | End: 2024-11-04
Attending: ANESTHESIOLOGY | Admitting: ANESTHESIOLOGY
Payer: MEDICARE

## 2024-11-04 VITALS
SYSTOLIC BLOOD PRESSURE: 167 MMHG | WEIGHT: 211.06 LBS | HEIGHT: 65 IN | DIASTOLIC BLOOD PRESSURE: 72 MMHG | OXYGEN SATURATION: 99 % | TEMPERATURE: 97 F | HEART RATE: 68 BPM | RESPIRATION RATE: 17 BRPM | BODY MASS INDEX: 35.17 KG/M2

## 2024-11-04 DIAGNOSIS — Z98.890 S/P CAROTID ENDARTERECTOMY: ICD-10-CM

## 2024-11-04 DIAGNOSIS — M47.816 LUMBAR SPONDYLOSIS: Primary | ICD-10-CM

## 2024-11-04 LAB — POCT GLUCOSE: 106 MG/DL (ref 70–110)

## 2024-11-04 PROCEDURE — 64494 INJ PARAVERT F JNT L/S 2 LEV: CPT | Mod: RT,,, | Performed by: ANESTHESIOLOGY

## 2024-11-04 PROCEDURE — 64493 INJ PARAVERT F JNT L/S 1 LEV: CPT | Mod: RT,,, | Performed by: ANESTHESIOLOGY

## 2024-11-04 PROCEDURE — 82962 GLUCOSE BLOOD TEST: CPT | Performed by: ANESTHESIOLOGY

## 2024-11-04 PROCEDURE — 64494 INJ PARAVERT F JNT L/S 2 LEV: CPT | Mod: RT | Performed by: ANESTHESIOLOGY

## 2024-11-04 PROCEDURE — 64493 INJ PARAVERT F JNT L/S 1 LEV: CPT | Mod: RT | Performed by: ANESTHESIOLOGY

## 2024-11-04 PROCEDURE — 63600175 PHARM REV CODE 636 W HCPCS: Performed by: ANESTHESIOLOGY

## 2024-11-04 RX ORDER — BUPIVACAINE HYDROCHLORIDE 5 MG/ML
INJECTION, SOLUTION EPIDURAL; INTRACAUDAL
Status: DISCONTINUED | OUTPATIENT
Start: 2024-11-04 | End: 2024-11-04 | Stop reason: HOSPADM

## 2024-11-04 RX ORDER — FENTANYL CITRATE 50 UG/ML
INJECTION, SOLUTION INTRAMUSCULAR; INTRAVENOUS
Status: DISCONTINUED | OUTPATIENT
Start: 2024-11-04 | End: 2024-11-04 | Stop reason: HOSPADM

## 2024-11-04 RX ORDER — CLOPIDOGREL BISULFATE 75 MG/1
75 TABLET ORAL DAILY
COMMUNITY

## 2024-11-04 RX ORDER — MIDAZOLAM HYDROCHLORIDE 1 MG/ML
INJECTION, SOLUTION INTRAMUSCULAR; INTRAVENOUS
Status: DISCONTINUED | OUTPATIENT
Start: 2024-11-04 | End: 2024-11-04 | Stop reason: HOSPADM

## 2024-11-04 RX ORDER — ONDANSETRON HYDROCHLORIDE 2 MG/ML
4 INJECTION, SOLUTION INTRAVENOUS ONCE AS NEEDED
Status: DISCONTINUED | OUTPATIENT
Start: 2024-11-04 | End: 2024-11-04 | Stop reason: HOSPADM

## 2024-11-04 NOTE — OP NOTE
LUMBAR Medial Branch Block Under Fluoroscopy,  Right L4/5 and L5/S1    INFORMED CONSENT: The procedure, risks, benefits and options were discussed with patient. There are no contraindications to the procedure. The patient expressed understanding and agreed to proceed. The personnel performing the procedure was discussed.    Date of procedure 11/04/2024    Time-out taken to identify patient and procedure side prior to starting the procedure.                                                                PROCEDURE:  Right medial branch block at the transverse processes at the level of L4, L5, and the sacral ala    Pre Procedure diagnosis:    Lumbar spondylosis [M47.816]  1. Lumbar spondylosis        Post-Procedure diagnosis:   same    PHYSICIAN: Oneil Carlson MD  ASSISTANTS: None    MEDICATIONS INJECTED: 0.5% bupivicane, 1mL at each level    LOCAL ANESTHETIC USED: Lidocaine, 1%, 1ml at each level    ESTIMATED BLOOD LOSS:  None.    COMPLICATIONS:  None.    Sedation: Conscious sedation provided by M.D    SEDATION MEDICATIONS: local/IV sedation: Versed 1 mg and fentanyl 25 mcg IV.  Conscious sedation ordered by MD.  Patient reevaluated and sedation administered by MD and monitored by RN.  Total sedation time was less than 10 min.    Total sedation time was <10 min      TECHNIQUE: Laying in a prone position, the patient was prepped and draped in the usual sterile fashion using ChloraPrep and fenestrated drape.  The level was determined under fluoroscopic guidance.  Local anesthetic was given by going down to the hub of the 27-gauge 1.25in needle and raising a wheel.  A 25-gauge 3.5inch needle was introduced to the anatomic local of the medial branch at each of the above levels using fluoroscopy in the AP, oblique, and lateral views.  After negative aspiration, medication was injected slowly. The patient tolerated the procedure well.       The patient was monitored after the procedure.  Patient was given post procedure  and discharge instructions to follow at home.  We will see the patient back in two weeks or the patient may call to inform of status. The patient was discharged in a stable condition

## 2024-11-04 NOTE — DISCHARGE INSTRUCTIONS

## 2024-11-04 NOTE — DISCHARGE SUMMARY
Discharge Note  Short Stay      SUMMARY     Admit Date: 11/4/2024    Attending Physician: Oneil Carlson MD        Discharge Physician: Oneil Carlson MD        Discharge Date: 11/4/2024 8:51 AM    Procedure(s) (LRB):  Right L3-5 MBB (Right)    Final Diagnosis: Lumbar spondylosis [M47.816]    Disposition: Home or self care    Patient Instructions:   Current Discharge Medication List        CONTINUE these medications which have NOT CHANGED    Details   amLODIPine (NORVASC) 10 MG tablet Take 1 tablet (10 mg total) by mouth once daily.  Qty: 30 tablet, Refills: 11    Comments: .  Associated Diagnoses: Essential hypertension      aspirin 81 MG Chew Take 1 tablet (81 mg total) by mouth once daily.  Qty: 360 tablet, Refills: 0      atorvastatin (LIPITOR) 20 MG tablet Take 1 tablet (20 mg total) by mouth once daily.  Qty: 90 tablet, Refills: 0    Associated Diagnoses: Mixed hyperlipidemia      clopidogreL (PLAVIX) 75 mg tablet Take 75 mg by mouth once daily. Pt states he does not normally take this medication. He was unable to fill his Eliquis prescrption. He was on Plavix recently but was ordered to stop taking it and take the Eliquis only.      fenofibrate (TRICOR) 54 MG tablet TAKE 1 TABLET BY MOUTH ONCE DAILY  Qty: 90 tablet, Refills: 3      ferrous sulfate (FEOSOL) 325 mg (65 mg iron) Tab tablet Take 1 tablet (325 mg total) by mouth once daily.  Qty: 30 tablet, Refills: 2      metFORMIN (GLUCOPHAGE) 500 MG tablet Take 1 tablet (500 mg total) by mouth 2 (two) times daily with meals.  Qty: 180 tablet, Refills: 0      metoprolol succinate (TOPROL-XL) 25 MG 24 hr tablet Take 1 tablet (25 mg total) by mouth 2 (two) times daily.  Qty: 180 tablet, Refills: 3    Comments: .  Associated Diagnoses: Atrial fib/flutter, transient      olmesartan (BENICAR) 20 MG tablet Take 1 tablet (20 mg total) by mouth nightly.  Qty: 30 tablet, Refills: 1    Comments: .  Associated Diagnoses: Essential hypertension      pregabalin  (LYRICA) 100 MG capsule Take 1 capsule (100 mg total) by mouth 2 (two) times daily.  Qty: 60 capsule, Refills: 2      apixaban (ELIQUIS) 5 mg Tab Take 1 tablet (5 mg total) by mouth 2 (two) times daily.  Qty: 60 tablet, Refills: 2    Associated Diagnoses: S/P carotid endarterectomy      fenofibrate (TRICOR) 48 MG tablet Take 1 tablet (48 mg total) by mouth once daily.  Qty: 90 tablet, Refills: 3      FREESTYLE FREEDOM LITE kit by Other route.      FREESTYLE LANCETS 28 gauge lancets Apply 28 lancets topically 3 (three) times daily.      FREESTYLE LITE STRIPS Strp Inject 300 strips as directed as needed.                 Discharge Diagnosis: Lumbar spondylosis [M47.816]  Condition on Discharge: Stable with no complications to procedure   Diet on Discharge: Same as before.  Activity: as per instruction sheet.  Discharge to: Home with a responsible adult.  Follow up: 2-4 weeks       Please call the office at [phone] if you experience any weakness or loss of sensation, fever > 101.5, pain uncontrolled with oral medications, persistent nausea/vomiting/or diarrhea, redness or drainage from the incisions, or any other worrisome concerns. If physician on call was not reached or could not communicate with our office for any reason please go to the nearest emergency department

## 2024-11-04 NOTE — H&P
HPI  Patient presenting for Procedure(s) (LRB):  Right L3-5 MBB (Right)     Patient on Anti-coagulation Yes, ASA and Plavix, can continue    No health changes since previous encounter    Past Medical History:   Diagnosis Date    Diabetes mellitus     Hyperlipidemia     Hypertension      Past Surgical History:   Procedure Laterality Date    BIOPSY OF PERITONEUM N/A 1/29/2024    Procedure: BIOPSY, PERITONEUM;  Surgeon: Tosin Boothe MD;  Location: [facility] OR;  Service: Colon and Rectal;  Laterality: N/A;    CAROTID ENDARTERECTOMY Left 4/25/2024    Procedure: ENDARTERECTOMY, CAROTID;  Surgeon: Chrissie Parks MD;  Location: [facility] OR;  Service: Peripheral Vascular;  Laterality: Left;    CLOSURE OF WOUND N/A 3/16/2024    Procedure: CLOSURE, WOUND;  Surgeon: Magda Stark MD;  Location: [facility] OR;  Service: General;  Laterality: N/A;    COLONOSCOPY N/A 1/5/2024    Procedure: COLONOSCOPY;  Surgeon: Irasema Gil MD;  Location: [facility] ENDO;  Service: Endoscopy;  Laterality: N/A;    DIAGNOSTIC LAPAROSCOPY N/A 1/29/2024    Procedure: LAPAROSCOPY, DIAGNOSTIC;  Surgeon: Tosin Boothe MD;  Location: [facility] OR;  Service: Colon and Rectal;  Laterality: N/A;    EPIDURAL STEROID INJECTION N/A 11/13/2023    Procedure: Lumbar L5/S1 IL JOSE;  Surgeon: Oneil Carlson MD;  Location: [facility] PAIN MGT;  Service: Pain Management;  Laterality: N/A;    ESOPHAGOGASTRODUODENOSCOPY N/A 1/5/2024    Procedure: EGD (ESOPHAGOGASTRODUODENOSCOPY);  Surgeon: Irasema Gil MD;  Location: [facility] ENDO;  Service: Endoscopy;  Laterality: N/A;    INJECTION OF ANESTHETIC AGENT INTO TISSUE PLANE DEFINED BY TRANSVERSUS ABDOMINIS MUSCLE N/A 3/11/2024    Procedure: BLOCK, TRANSVERSUS ABDOMINIS PLANE;  Surgeon: Tosin Boothe MD;  Location: [facility] OR;  Service: Colon and Rectal;  Laterality: N/A;    NO PAST SURGERIES      REPAIR OF BLOOD VESSEL WITH PATCH GRAFT Left 4/25/2024    Procedure: ANGIOPLASTY, USING PATCH GRAFT;  Surgeon: Charly  "MD Chrissie;  Location: [facility] OR;  Service: Peripheral Vascular;  Laterality: Left;    WOUND EXPLORATION N/A 3/16/2024    Procedure: EXPLORATION, WOUND;  Surgeon: Magda Stark MD;  Location: [facility] OR;  Service: General;  Laterality: N/A;    XI ROBOTIC COLECTOMY, RIGHT Right 3/11/2024    Procedure: XI ROBOTIC COLECTOMY, RIGHT;  Surgeon: Tosin Boothe MD;  Location: [facility] OR;  Service: Colon and Rectal;  Laterality: Right;  WITH OMENECTOMY AND AMNIOFIX     Review of patient's allergies indicates:  No Known Allergies     No current facility-administered medications on file prior to encounter.     Current Outpatient Medications on File Prior to Encounter   Medication Sig Dispense Refill    aspirin 81 MG Chew Take 1 tablet (81 mg total) by mouth once daily. 360 tablet 0    clopidogreL (PLAVIX) 75 mg tablet Take 75 mg by mouth once daily. Pt states he does not normally take this medication. He was unable to fill his Eliquis prescrption. He was on Plavix recently but was ordered to stop taking it and take the Eliquis only.      fenofibrate (TRICOR) 54 MG tablet TAKE 1 TABLET BY MOUTH ONCE DAILY 90 tablet 3    metoprolol succinate (TOPROL-XL) 25 MG 24 hr tablet Take 1 tablet (25 mg total) by mouth 2 (two) times daily. 180 tablet 3    fenofibrate (TRICOR) 48 MG tablet Take 1 tablet (48 mg total) by mouth once daily. 90 tablet 3    FREESTYLE FREEDOM LITE kit by Other route.      FREESTYLE LANCETS 28 gauge lancets Apply 28 lancets topically 3 (three) times daily.      FREESTYLE LITE STRIPS Strp Inject 300 strips as directed as needed.          PMHx, PSHx, Allergies, Medications reviewed in epic    ROS negative except pain complaints in HPI    OBJECTIVE:    BP (!) 167/74 (BP Location: Right arm, Patient Position: Sitting)   Pulse 65   Temp 97.8 °F (36.6 °C) (Temporal)   Resp 16   Ht 5' 5" (1.651 m)   Wt 95.8 kg (211 lb 1.5 oz)   SpO2 98%   BMI 35.13 kg/m²     PHYSICAL EXAMINATION:    GENERAL: Well appearing, in no " acute distress, alert and oriented x3.  PSYCH:  Mood and affect appropriate.  SKIN: Skin color, texture, turgor normal, no rashes or lesions which will impact the procedure.  CV: RRR with palpation of the radial artery.  PULM: No evidence of respiratory difficulty, symmetric chest rise. Clear to auscultation.  NEURO: Cranial nerves grossly intact.    Plan:    Proceed with procedure as planned Procedure(s) (LRB):  Right L3-5 MBB (Right)    Oneil Carlson MD  11/04/2024

## 2024-11-05 ENCOUNTER — TELEPHONE (OUTPATIENT)
Dept: PAIN MEDICINE | Facility: CLINIC | Age: 74
End: 2024-11-05
Payer: MEDICARE

## 2024-11-05 ENCOUNTER — PATIENT MESSAGE (OUTPATIENT)
Dept: PAIN MEDICINE | Facility: CLINIC | Age: 74
End: 2024-11-05
Payer: MEDICARE

## 2024-11-05 DIAGNOSIS — M47.816 LUMBAR SPONDYLOSIS: Primary | ICD-10-CM

## 2024-11-05 NOTE — TELEPHONE ENCOUNTER
PATIENT REPORTS 80% RELIEF FOR PROXIMALLY 10 HOURS AFTER RIGHT L3-L5 MEDIAL BRANCH BLOCK ON 11/04/2024.  WE WILL PROCEED WITH 2ND  DIAGNOSTIC MEDIAL BRANCH BLOCK

## 2024-11-05 NOTE — TELEPHONE ENCOUNTER
Called patient and obtained this information from the procedure on yesterday:    1. What percentage of pain relief did you receive following the block, from 0-100%? 80%    2. What was pain score the day before your procedure on a scale from 0-10? 8    3. What was pain score immediately after your procedure (up to 6 hours)  on a scale from 0-10? 0    4. When your pain returned, what was your pain score on a scale from 0-10? 0     5. How many hours did pain relief last following the block?  10+     6. During this time, please describe in detail the activities you were able to do? Patient stated he was able to walk        Pain Disability Index (PDI) Score Review:      10/1/2024    12:28 PM 4/12/2024    10:58 AM 12/28/2023     3:23 PM   Last 3 PDI Scores   Pain Disability Index (PDI) 45 14 6

## 2024-11-05 NOTE — TELEPHONE ENCOUNTER
----- Message from SiriusDecisionstremaine sent at 11/5/2024 10:03 AM CST -----  Contact: Quintin  .Type:  Patient Returning Call    Who Called: Quintin   Who Left Message for Patient: no message    Does the patient know what this is regarding?: yes   Would the patient rather a call back or a response via [facility]?  Call back   Best Call Back Number: .[phone] or   [phone]  Additional Information:      Thanks

## 2024-11-06 DIAGNOSIS — Z98.890 S/P CAROTID ENDARTERECTOMY: ICD-10-CM

## 2024-11-06 NOTE — TELEPHONE ENCOUNTER
----- Message from Danielle sent at 11/6/2024  9:31 AM CST -----  Type:  RX Refill Request    Who Called: pt  Refill or New Rx:refill  RX Name and Strength:apixaban (ELIQUIS) 5 mg Tab60 msorlp9012/4/2024-NoSig - Route: Take 1 tablet (5 mg total) by mouth 2 (two) times daily. - OralSent to pharmacy as: apixaban (ELIQUIS) 5 mg TabClass: NormalOrder: [id number]/Time Signed: 11/4/2024 13:04E-Prescribing Status: Receipt confirmed by pharmacy (11/4/2024  3:02 PM CST)   Preferred Pharmacy with phone number:  [facility] #[id number] - [facility] [id number] [facility] [address]  Phone: [phone] Fax: [phone]  Local or Mail Order:local  Ordering Provider:aram  Would the patient rather a call back or a response via [facility]? call  Best Call Back Number:[phone]     Additional Information: requesting to refill at preferred pharmacy   Statement Selected

## 2024-11-11 ENCOUNTER — TELEPHONE (OUTPATIENT)
Dept: PAIN MEDICINE | Facility: CLINIC | Age: 74
End: 2024-11-11
Payer: MEDICARE

## 2024-11-11 ENCOUNTER — PATIENT MESSAGE (OUTPATIENT)
Dept: CARDIOLOGY | Facility: CLINIC | Age: 74
End: 2024-11-11
Payer: MEDICARE

## 2024-11-11 NOTE — TELEPHONE ENCOUNTER
I attempted to schedule this patient for his second procedure and he informed me that you wanted him to schedule an appt with cardiology. Patient wanted to know why he needs to see cardiology before procedure. I informed patient that I would send the provider a message. Pt verbalized understanding.    Yumiko ELIZABETH

## 2024-11-11 NOTE — TELEPHONE ENCOUNTER
----- Message from Jessy sent at 11/11/2024  8:53 AM CST -----  Contact: [phone] or  [phone]  .[email or web address]     Patient is calling for Medical Advice regarding:speak to the office     How long has patient had these symptoms:    Pharmacy name and phone#:    Patient wants a call back or thru [facility]:call back     Comments:  Pt is calling in regards to an appt   Please advise patient replies from provider may take up to 48 hours.

## 2024-11-12 ENCOUNTER — TELEPHONE (OUTPATIENT)
Dept: PAIN MEDICINE | Facility: CLINIC | Age: 74
End: 2024-11-12
Payer: MEDICARE

## 2024-11-12 DIAGNOSIS — I48.91 ATRIAL FIB/FLUTTER, TRANSIENT: Primary | ICD-10-CM

## 2024-11-12 DIAGNOSIS — I48.92 ATRIAL FIB/FLUTTER, TRANSIENT: Primary | ICD-10-CM

## 2024-11-12 NOTE — TELEPHONE ENCOUNTER
----- Message from Oneil Carlson MD sent at 11/5/2024  2:22 PM CST -----  Pain Provider: Aldo  Patient Name: Quintin Ling  MRN: [id number]  Case: LUMBAR MEDIAL BRANCH BLOCKS   Level: L3, L4, and L5  Laterality: right      Pain Provider: Aldo  Patient Name: Quintin Ling  MRN: [id number]  Case: LUMBAR RFA  Level: L3, L4, and L5  Laterality: right       Patient can follow up 5 weeks after procedure with AP P

## 2024-11-12 NOTE — TELEPHONE ENCOUNTER
Call to schedule pt # 2 block with Dr. Carlson, pt was scheduled for 11/27.    .Ladonna ELIZABETH

## 2024-11-13 ENCOUNTER — OFFICE VISIT (OUTPATIENT)
Dept: CARDIOLOGY | Facility: CLINIC | Age: 74
End: 2024-11-13
Payer: MEDICARE

## 2024-11-13 ENCOUNTER — HOSPITAL ENCOUNTER (OUTPATIENT)
Dept: CARDIOLOGY | Facility: HOSPITAL | Age: 74
Discharge: HOME | End: 2024-11-13
Attending: INTERNAL MEDICINE
Payer: MEDICARE

## 2024-11-13 VITALS
DIASTOLIC BLOOD PRESSURE: 70 MMHG | SYSTOLIC BLOOD PRESSURE: 150 MMHG | WEIGHT: 215.38 LBS | OXYGEN SATURATION: 98 % | HEIGHT: 65 IN | BODY MASS INDEX: 35.88 KG/M2 | HEART RATE: 75 BPM

## 2024-11-13 DIAGNOSIS — I48.91 ATRIAL FIB/FLUTTER, TRANSIENT: ICD-10-CM

## 2024-11-13 DIAGNOSIS — I48.91 NEW ONSET A-FIB: ICD-10-CM

## 2024-11-13 DIAGNOSIS — I48.0 PAROXYSMAL ATRIAL FIBRILLATION: Primary | ICD-10-CM

## 2024-11-13 DIAGNOSIS — I48.92 ATRIAL FIB/FLUTTER, TRANSIENT: ICD-10-CM

## 2024-11-13 LAB
OHS QRS DURATION: 128 MS
OHS QTC CALCULATION: 438 MS

## 2024-11-13 PROCEDURE — 99999 PR PBB SHADOW E&M-EST. PATIENT-LVL III: CPT | Mod: PBBFAC,,, | Performed by: INTERNAL MEDICINE

## 2024-11-13 PROCEDURE — 93005 ELECTROCARDIOGRAM TRACING: CPT

## 2024-11-13 PROCEDURE — 3078F DIAST BP <80 MM HG: CPT | Mod: CPTII,S$GLB,, | Performed by: INTERNAL MEDICINE

## 2024-11-13 PROCEDURE — 3008F BODY MASS INDEX DOCD: CPT | Mod: CPTII,S$GLB,, | Performed by: INTERNAL MEDICINE

## 2024-11-13 PROCEDURE — 3044F HG A1C LEVEL LT 7.0%: CPT | Mod: CPTII,S$GLB,, | Performed by: INTERNAL MEDICINE

## 2024-11-13 PROCEDURE — 93010 ELECTROCARDIOGRAM REPORT: CPT | Mod: ,,, | Performed by: INTERNAL MEDICINE

## 2024-11-13 PROCEDURE — 4010F ACE/ARB THERAPY RXD/TAKEN: CPT | Mod: CPTII,S$GLB,, | Performed by: INTERNAL MEDICINE

## 2024-11-13 PROCEDURE — 1126F AMNT PAIN NOTED NONE PRSNT: CPT | Mod: CPTII,S$GLB,, | Performed by: INTERNAL MEDICINE

## 2024-11-13 PROCEDURE — 99204 OFFICE O/P NEW MOD 45 MIN: CPT | Mod: S$GLB,,, | Performed by: INTERNAL MEDICINE

## 2024-11-13 PROCEDURE — 3077F SYST BP >= 140 MM HG: CPT | Mod: CPTII,S$GLB,, | Performed by: INTERNAL MEDICINE

## 2024-11-13 NOTE — PROGRESS NOTES
Subjective   Patient ID:  Quintin Ling is a 74 y.o. male who presents for evaluation of Atrial Fibrillation      74 yoM here for arrhythmia management. He was in SR 1/24 and developed AF 3/24 after a colon surgery 3/11/24. He has post-operative complications including SBO and infection He developed AF/AFL at that time 3/19/24. He converted to SR 3/20/24 and was in normal rhythm 4/24. EF normal, no ischemia on stress. He remains on OAC. No recurrence of AF/AFL after the initial event.     NM Stress 4/24:  ·  Normal myocardial perfusion scan. There is no evidence of myocardial ischemia or infarction.  ·  The gated perfusion images showed an ejection fraction of 64% at rest. The gated perfusion images showed an ejection fraction of 69% post stress.  ·  The ECG portion of the study is negative for ischemia.  ·  The patient reported no chest pain during the stress test.    Echo 3/24:  ·  Left Ventricle: The left ventricle is normal in size. Moderately increased wall thickness. There is moderate concentric hypertrophy. There is normal systolic function with a visually estimated ejection fraction of 60 - 65%. Unable to assess diastolic function due to atrial fibrillation.  ·  Right Ventricle: Right ventricle was not well visualized due to poor acoustic window. Normal right ventricular cavity size. Wall thickness is normal. Right ventricle wall motion  is normal. Systolic function is normal.  ·  Left Atrium: Left atrium is moderately dilated.  ·  Aortic Valve: Moderately calcified cusps. There is moderate annular calcification present. Moderately restricted motion. There is mild to moderate stenosis. Aortic valve area by VTI is 2.16 cm². Aortic valve peak velocity is 2.32 m/s. Mean gradient is 17 mmHg. The dimensionless index is 0.58.  ·  Mitral Valve: There is mild regurgitation.  ·  Tricuspid Valve: There is mild regurgitation.  ·  Aorta: Aortic root is normal in size. Ascending aorta is mildly dilated measuring 4.00  cm.  ·  Pulmonary Artery: The estimated pulmonary artery systolic pressure is 37 mmHg.  ·  IVC/SVC: Normal venous pressure at 3 mmHg.    My interpretation of the ECG is:  NSR nl KS, QRS, QTc    Past Medical History:  No date: Diabetes mellitus  No date: Hyperlipidemia  No date: Hypertension    Past Surgical History:  1/29/2024: BIOPSY OF PERITONEUM; N/A      Comment:  Procedure: BIOPSY, PERITONEUM;  Surgeon: Tosin Boothe MD;  Location: [facility] OR;  Service: Colon and                Rectal;  Laterality: N/A;  4/25/2024: CAROTID ENDARTERECTOMY; Left      Comment:  Procedure: ENDARTERECTOMY, CAROTID;  Surgeon: Chrissie Parks MD;  Location: [facility] OR;  Service: Peripheral                Vascular;  Laterality: Left;  3/16/2024: CLOSURE OF WOUND; N/A      Comment:  Procedure: CLOSURE, WOUND;  Surgeon: Magda Stark MD;  Location: [facility] OR;  Service: General;  Laterality:                N/A;  1/5/2024: COLONOSCOPY; N/A      Comment:  Procedure: COLONOSCOPY;  Surgeon: Irasema Gil MD;  Location: [facility];  Service: Endoscopy;                 Laterality: N/A;  1/29/2024: DIAGNOSTIC LAPAROSCOPY; N/A      Comment:  Procedure: LAPAROSCOPY, DIAGNOSTIC;  Surgeon: Tosin Boothe MD;  Location: [facility] OR;  Service: Colon and                Rectal;  Laterality: N/A;  11/13/2023: EPIDURAL STEROID INJECTION; N/A      Comment:  Procedure: Lumbar L5/S1 IL JOSE;  Surgeon: Oneil Carlson MD;  Location: [facility] PAIN MGT;  Service: Pain                Management;  Laterality: N/A;  1/5/2024: ESOPHAGOGASTRODUODENOSCOPY; N/A      Comment:  Procedure: EGD (ESOPHAGOGASTRODUODENOSCOPY);  Surgeon:                Irasema Gil MD;  Location: [facility] ENDO;  Service:                Endoscopy;  Laterality: N/A;  11/4/2024: INJECTION OF ANESTHETIC AGENT AROUND MEDIAL BRANCH NERVES   INNERVATING LUMBAR FACET JOINT; Right      Comment:   Procedure: Right L3-5 MBB;  Surgeon: Oneil Carlson MD;  Location: [facility] PAIN MGT;  Service: Pain Management;                Laterality: Right;  3/11/2024: INJECTION OF ANESTHETIC AGENT INTO TISSUE PLANE DEFINED BY   TRANSVERSUS ABDOMINIS MUSCLE; N/A      Comment:  Procedure: BLOCK, TRANSVERSUS ABDOMINIS PLANE;  Surgeon:               Tosin Boothe MD;  Location: [facility] OR;  Service:                Colon and Rectal;  Laterality: N/A;  No date: NO PAST SURGERIES  4/25/2024: REPAIR OF BLOOD VESSEL WITH PATCH GRAFT; Left      Comment:  Procedure: ANGIOPLASTY, USING PATCH GRAFT;  Surgeon:                Chrissie Parks MD;  Location: [facility] OR;  Service:                Peripheral Vascular;  Laterality: Left;  3/16/2024: WOUND EXPLORATION; N/A      Comment:  Procedure: EXPLORATION, WOUND;  Surgeon: Magda Stark MD;  Location: [facility];  Service: General;                 Laterality: N/A;  3/11/2024: XI ROBOTIC COLECTOMY, RIGHT; Right      Comment:  Procedure: XI ROBOTIC COLECTOMY, RIGHT;  Surgeon:                Tosin Boothe MD;  Location: [facility];  Service:                Colon and Rectal;  Laterality: Right;  WITH OMENECTOMY                AND AMNIOFIX    Social History    Socioeconomic History      Marital status:     Tobacco Use      Smoking status: Former        Passive exposure: Never      Smokeless tobacco: Never    Substance and Sexual Activity      Alcohol use: Not Currently        Comment: OCC      Drug use: Never      Sexual activity: Yes        Partners: Female    Social Drivers of Health  Financial Resource Strain: Patient Unable To Answer (3/17/2024)      Overall Financial Resource Strain (CARDIA)          Difficulty of Paying Living Expenses: Patient unable to answer  Food Insecurity: Patient Unable To Answer (3/17/2024)      Hunger Vital Sign          Worried About Running Out of Food in the Last Year: Patient unable to answer          Ran Out of Food  in the Last Year: Patient unable to answer  Transportation Needs: Patient Unable To Answer (3/17/2024)      PRAPARE - Transportation          Lack of Transportation (Medical): Patient unable to answer          Lack of Transportation (Non-Medical): Patient unable to answer  Stress: Patient Unable To Answer (3/17/2024)      [employer] [place] [employer] - Occupational Stress Questionnaire          Feeling of Stress : Patient unable to answer  Housing Stability: Patient Unable To Answer (3/17/2024)      Housing Stability Vital Sign          Unable to Pay for Housing in the Last Year: Patient unable to answer           Unstable Housing in the Last Year: Patient unable to answer    Review of patient's family history indicates:  Problem: No Known Problems      Relation: Mother          Name:               Age of Onset: (Not Specified)  Problem: No Known Problems      Relation: Father          Name:               Age of Onset: (Not Specified)      Current Outpatient Medications:  amLODIPine (NORVASC) 10 MG tablet, Take 1 tablet (10 mg total) by mouth once daily., Disp: 30 tablet, Rfl: 11  apixaban (ELIQUIS) 5 mg Tab, Take 1 tablet (5 mg total) by mouth 2 (two) times daily., Disp: 60 tablet, Rfl: 11  aspirin 81 MG Chew, Take 1 tablet (81 mg total) by mouth once daily., Disp: 360 tablet, Rfl: 0  atorvastatin (LIPITOR) 20 MG tablet, Take 1 tablet (20 mg total) by mouth once daily., Disp: 90 tablet, Rfl: 0  clopidogreL (PLAVIX) 75 mg tablet, Take 75 mg by mouth once daily. Pt states he does not normally take this medication. He was unable to fill his Eliquis prescrption. He was on Plavix recently but was ordered to stop taking it and take the Eliquis only., Disp: , Rfl:   fenofibrate (TRICOR) 48 MG tablet, Take 1 tablet (48 mg total) by mouth once daily., Disp: 90 tablet, Rfl: 3  fenofibrate (TRICOR) 54 MG tablet, TAKE 1 TABLET BY MOUTH ONCE DAILY, Disp: 90 tablet, Rfl: 3  ferrous sulfate (FEOSOL) 325 mg (65 mg  iron) Tab tablet, Take 1 tablet (325 mg total) by mouth once daily., Disp: 30 tablet, Rfl: 2  FREESTYLE FREEDOM LITE kit, by Other route., Disp: , Rfl:   FREESTYLE LANCETS 28 gauge lancets, Apply 28 lancets topically 3 (three) times daily., Disp: , Rfl:   FREESTYLE LITE STRIPS Strp, Inject 300 strips as directed as needed., Disp: , Rfl:   metFORMIN (GLUCOPHAGE) 500 MG tablet, Take 1 tablet (500 mg total) by mouth 2 (two) times daily with meals., Disp: 180 tablet, Rfl: 0  metoprolol succinate (TOPROL-XL) 25 MG 24 hr tablet, Take 1 tablet (25 mg total) by mouth 2 (two) times daily., Disp: 180 tablet, Rfl: 3  olmesartan (BENICAR) 20 MG tablet, Take 1 tablet (20 mg total) by mouth nightly., Disp: 30 tablet, Rfl: 1  pregabalin (LYRICA) 100 MG capsule, Take 1 capsule (100 mg total) by mouth 2 (two) times daily., Disp: 60 capsule, Rfl: 2    No current facility-administered medications for this visit.            Review of Systems   Cardiovascular:  Negative for chest pain, dyspnea on exertion, palpitations and syncope.   Genitourinary: Negative.    Neurological: Negative.           Objective     Physical Exam  Vitals reviewed.   Constitutional:       General: He is not in acute distress.     Appearance: He is well-developed.   HENT:      Head: Normocephalic and atraumatic.   Eyes:      Pupils: Pupils are equal, round, and reactive to light.   Neck:      Thyroid: No thyromegaly.      Vascular: No JVD.   Cardiovascular:      Rate and Rhythm: Normal rate and regular rhythm.      Chest Wall: PMI is not displaced.      Heart sounds: Normal heart sounds, S1 normal and S2 normal. No murmur heard.     No friction rub. No gallop.   Pulmonary:      Effort: Pulmonary effort is normal. No respiratory distress.      Breath sounds: Normal breath sounds. No wheezing or rales.   Abdominal:      General: Bowel sounds are normal. There is no distension.      Palpations: Abdomen is soft.      Tenderness: There is no abdominal tenderness.  There is no guarding or rebound.   Musculoskeletal:         General: No tenderness. Normal range of motion.      Cervical back: Normal range of motion.   Skin:     General: Skin is warm and dry.      Findings: No erythema or rash.   Neurological:      Mental Status: He is alert and oriented to person, place, and time.      Cranial Nerves: No cranial nerve deficit.   Psychiatric:         Behavior: Behavior normal.         Thought Content: Thought content normal.         Judgment: Judgment normal.            Assessment and Plan     1. Paroxysmal atrial fibrillation    2. New onset a-fib        Plan:  74 yoM here for arrhythmia management. He has a single episode of self limited arrhythmia while suffering a severe post operative complication. I would not continue therapy unless he has subsequent arrhythmia. Will assess with Qiana.

## 2024-11-21 NOTE — PRE-PROCEDURE INSTRUCTIONS
Spoke with patient wife regarding procedure scheduled on 11.27     Arrival time 0830     Has patient been sick with fever or on antibiotics within the last 7 days? No     Does the patient have any open wounds, sores or rashes? No     Does the patient have any recent fractures? no     Has patient received a vaccination within the last 7 days? No     Received the COVID vaccination?      Has the patient stopped all medications as directed? na     Does patient have a pacemaker, defibrillator, or implantable stimulator? No     Does the patient have a ride to and from procedure and someone reliable to remain with patient?  wife     Is the patient diabetic? yes     Does the patient have sleep apnea? Or use O2 at home? no     Is the patient receiving sedation?      Is the patient instructed to remain NPO beginning at midnight the night before their procedure? yes     Procedure location confirmed with patient? Yes     Covid- Denies signs/symptoms. Instructed to notify PAT/MD if any changes.

## 2024-11-25 LAB
LEFT EYE DM RETINOPATHY: NEGATIVE
RIGHT EYE DM RETINOPATHY: NEGATIVE

## 2024-11-27 ENCOUNTER — HOSPITAL ENCOUNTER (OUTPATIENT)
Facility: HOSPITAL | Age: 74
Discharge: HOME | End: 2024-11-27
Attending: ANESTHESIOLOGY | Admitting: ANESTHESIOLOGY
Payer: MEDICARE

## 2024-11-27 VITALS
HEIGHT: 65 IN | BODY MASS INDEX: 35.09 KG/M2 | SYSTOLIC BLOOD PRESSURE: 180 MMHG | TEMPERATURE: 97 F | HEART RATE: 62 BPM | DIASTOLIC BLOOD PRESSURE: 71 MMHG | RESPIRATION RATE: 17 BRPM | OXYGEN SATURATION: 99 % | WEIGHT: 210.63 LBS

## 2024-11-27 DIAGNOSIS — M47.816 LUMBAR SPONDYLOSIS: Primary | ICD-10-CM

## 2024-11-27 LAB — POCT GLUCOSE: 99 MG/DL (ref 70–110)

## 2024-11-27 PROCEDURE — 64494 INJ PARAVERT F JNT L/S 2 LEV: CPT | Mod: RT | Performed by: ANESTHESIOLOGY

## 2024-11-27 PROCEDURE — 64493 INJ PARAVERT F JNT L/S 1 LEV: CPT | Mod: RT | Performed by: ANESTHESIOLOGY

## 2024-11-27 PROCEDURE — 64493 INJ PARAVERT F JNT L/S 1 LEV: CPT | Mod: RT,,, | Performed by: ANESTHESIOLOGY

## 2024-11-27 PROCEDURE — 63600175 PHARM REV CODE 636 W HCPCS: Performed by: ANESTHESIOLOGY

## 2024-11-27 PROCEDURE — 64494 INJ PARAVERT F JNT L/S 2 LEV: CPT | Mod: RT,,, | Performed by: ANESTHESIOLOGY

## 2024-11-27 RX ORDER — MIDAZOLAM HYDROCHLORIDE 1 MG/ML
INJECTION, SOLUTION INTRAMUSCULAR; INTRAVENOUS
Status: DISCONTINUED | OUTPATIENT
Start: 2024-11-27 | End: 2024-11-27 | Stop reason: HOSPADM

## 2024-11-27 RX ORDER — ONDANSETRON HYDROCHLORIDE 2 MG/ML
4 INJECTION, SOLUTION INTRAVENOUS ONCE AS NEEDED
Status: DISCONTINUED | OUTPATIENT
Start: 2024-11-27 | End: 2024-11-27 | Stop reason: HOSPADM

## 2024-11-27 RX ORDER — BUPIVACAINE HYDROCHLORIDE 5 MG/ML
INJECTION, SOLUTION EPIDURAL; INTRACAUDAL
Status: DISCONTINUED | OUTPATIENT
Start: 2024-11-27 | End: 2024-11-27 | Stop reason: HOSPADM

## 2024-11-27 RX ORDER — FENTANYL CITRATE 50 UG/ML
INJECTION, SOLUTION INTRAMUSCULAR; INTRAVENOUS
Status: DISCONTINUED | OUTPATIENT
Start: 2024-11-27 | End: 2024-11-27 | Stop reason: HOSPADM

## 2024-11-27 NOTE — OP NOTE
LUMBAR Medial Branch Block Under Fluoroscopy,  Right L4/5 and L5/S1    INFORMED CONSENT: The procedure, risks, benefits and options were discussed with patient. There are no contraindications to the procedure. The patient expressed understanding and agreed to proceed. The personnel performing the procedure was discussed.    Date of procedure 11/27/2024    Time-out taken to identify patient and procedure side prior to starting the procedure.                                                                PROCEDURE:  Right medial branch block at the transverse processes at the level of L4, L5, and the sacral ala    Pre Procedure diagnosis:    Lumbar spondylosis [M47.816]  1. Lumbar spondylosis        Post-Procedure diagnosis:   same    PHYSICIAN: Oneil Carlson MD  ASSISTANTS: None    MEDICATIONS INJECTED: 0.5% bupivicane, 1mL at each level    LOCAL ANESTHETIC USED: Lidocaine, 1%, 1ml at each level    ESTIMATED BLOOD LOSS:  None.    COMPLICATIONS:  None.    Sedation: Conscious sedation provided by M.D    SEDATION MEDICATIONS: local/IV sedation: Versed 2 mg and fentanyl 25 mcg IV.  Conscious sedation ordered by MD.  Patient reevaluated and sedation administered by MD and monitored by RN.  Total sedation time was less than 10 min.    Total sedation time was <10 min      TECHNIQUE: Laying in a prone position, the patient was prepped and draped in the usual sterile fashion using ChloraPrep and fenestrated drape.  The level was determined under fluoroscopic guidance.  Local anesthetic was given by going down to the hub of the 27-gauge 1.25in needle and raising a wheel.  A 25-gauge 3.5inch needle was introduced to the anatomic local of the medial branch at each of the above levels using fluoroscopy in the AP, oblique, and lateral views.  After negative aspiration, medication was injected slowly. The patient tolerated the procedure well.       The patient was monitored after the procedure.  Patient was given post procedure  and discharge instructions to follow at home.  We will see the patient back in two weeks or the patient may call to inform of status. The patient was discharged in a stable condition

## 2024-11-27 NOTE — DISCHARGE SUMMARY
Discharge Note  Short Stay      SUMMARY     Admit Date: 11/27/2024    Attending Physician: Oneil Carlson MD        Discharge Physician: Oneil Carlson MD        Discharge Date: 11/27/2024 9:37 AM    Procedure(s) (LRB):  Right L3-L5 MBB (Right)    Final Diagnosis: Lumbar spondylosis [M47.816]    Disposition: Home or self care    Patient Instructions:   Current Discharge Medication List        CONTINUE these medications which have NOT CHANGED    Details   amLODIPine (NORVASC) 10 MG tablet Take 1 tablet (10 mg total) by mouth once daily.  Qty: 30 tablet, Refills: 11    Comments: .  Associated Diagnoses: Essential hypertension      apixaban (ELIQUIS) 5 mg Tab Take 1 tablet (5 mg total) by mouth 2 (two) times daily.  Qty: 60 tablet, Refills: 11    Associated Diagnoses: S/P carotid endarterectomy      aspirin 81 MG Chew Take 1 tablet (81 mg total) by mouth once daily.  Qty: 360 tablet, Refills: 0      atorvastatin (LIPITOR) 20 MG tablet Take 1 tablet (20 mg total) by mouth once daily.  Qty: 90 tablet, Refills: 0    Associated Diagnoses: Mixed hyperlipidemia      clopidogreL (PLAVIX) 75 mg tablet Take 75 mg by mouth once daily. Pt states he does not normally take this medication. He was unable to fill his Eliquis prescrption. He was on Plavix recently but was ordered to stop taking it and take the Eliquis only.      ferrous sulfate (FEOSOL) 325 mg (65 mg iron) Tab tablet Take 1 tablet (325 mg total) by mouth once daily.  Qty: 30 tablet, Refills: 2      metFORMIN (GLUCOPHAGE) 500 MG tablet Take 1 tablet (500 mg total) by mouth 2 (two) times daily with meals.  Qty: 180 tablet, Refills: 0      metoprolol succinate (TOPROL-XL) 25 MG 24 hr tablet Take 1 tablet (25 mg total) by mouth 2 (two) times daily.  Qty: 180 tablet, Refills: 3    Comments: .  Associated Diagnoses: Atrial fib/flutter, transient      olmesartan (BENICAR) 20 MG tablet Take 1 tablet (20 mg total) by mouth nightly.  Qty: 30 tablet, Refills: 1     Comments: .  Associated Diagnoses: Essential hypertension      pregabalin (LYRICA) 100 MG capsule Take 1 capsule (100 mg total) by mouth 2 (two) times daily.  Qty: 60 capsule, Refills: 2      fenofibrate (TRICOR) 48 MG tablet Take 1 tablet (48 mg total) by mouth once daily.  Qty: 90 tablet, Refills: 3      fenofibrate (TRICOR) 54 MG tablet TAKE 1 TABLET BY MOUTH ONCE DAILY  Qty: 90 tablet, Refills: 3      FREESTYLE FREEDOM LITE kit by Other route.      FREESTYLE LANCETS 28 gauge lancets Apply 28 lancets topically 3 (three) times daily.      FREESTYLE LITE STRIPS Strp Inject 300 strips as directed as needed.                 Discharge Diagnosis: Lumbar spondylosis [M47.816]  Condition on Discharge: Stable with no complications to procedure   Diet on Discharge: Same as before.  Activity: as per instruction sheet.  Discharge to: Home with a responsible adult.  Follow up: 2-4 weeks       Please call the office at [phone] if you experience any weakness or loss of sensation, fever > 101.5, pain uncontrolled with oral medications, persistent nausea/vomiting/or diarrhea, redness or drainage from the incisions, or any other worrisome concerns. If physician on call was not reached or could not communicate with our office for any reason please go to the nearest emergency department

## 2024-11-27 NOTE — DISCHARGE INSTRUCTIONS

## 2024-11-27 NOTE — H&P
HPI  Patient presenting for Procedure(s) (LRB):  Right L3-L5 MBB (Right)     Patient on Anti-coagulation Yes, ASA and Plavix, may continue    No health changes since previous encounter    Past Medical History:   Diagnosis Date    Diabetes mellitus     Hyperlipidemia     Hypertension      Past Surgical History:   Procedure Laterality Date    BIOPSY OF PERITONEUM N/A 1/29/2024    Procedure: BIOPSY, PERITONEUM;  Surgeon: Tosin Boothe MD;  Location: [facility] OR;  Service: Colon and Rectal;  Laterality: N/A;    CAROTID ENDARTERECTOMY Left 4/25/2024    Procedure: ENDARTERECTOMY, CAROTID;  Surgeon: Chrissie Parks MD;  Location: [facility] OR;  Service: Peripheral Vascular;  Laterality: Left;    CLOSURE OF WOUND N/A 3/16/2024    Procedure: CLOSURE, WOUND;  Surgeon: Magda Stark MD;  Location: [facility] OR;  Service: General;  Laterality: N/A;    COLONOSCOPY N/A 1/5/2024    Procedure: COLONOSCOPY;  Surgeon: Irasema Gil MD;  Location: [facility] ENDO;  Service: Endoscopy;  Laterality: N/A;    DIAGNOSTIC LAPAROSCOPY N/A 1/29/2024    Procedure: LAPAROSCOPY, DIAGNOSTIC;  Surgeon: Tosin Boothe MD;  Location: [facility] OR;  Service: Colon and Rectal;  Laterality: N/A;    EPIDURAL STEROID INJECTION N/A 11/13/2023    Procedure: Lumbar L5/S1 IL JOSE;  Surgeon: Oneil Carlson MD;  Location: [facility] PAIN [facility]GT;  Service: Pain Management;  Laterality: N/A;    ESOPHAGOGASTRODUODENOSCOPY N/A 1/5/2024    Procedure: EGD (ESOPHAGOGASTRODUODENOSCOPY);  Surgeon: Irasema iGl MD;  Location: [facility] ENDO;  Service: Endoscopy;  Laterality: N/A;    INJECTION OF ANESTHETIC AGENT AROUND MEDIAL BRANCH NERVES INNERVATING LUMBAR FACET JOINT Right 11/4/2024    Procedure: Right L3-5 MBB;  Surgeon: Oneil Carlson MD;  [facility];  Service: Pain Management;  Laterality: Right;    INJECTION OF ANESTHETIC AGENT INTO TISSUE PLANE DEFINED BY TRANSVERSUS ABDOMINIS MUSCLE N/A 3/11/2024    Procedure: BLOCK, TRANSVERSUS ABDOMINIS PLANE;   Surgeon: Tosin Boothe MD;  Location: [facility] OR;  Service: Colon and Rectal;  Laterality: N/A;    NO PAST SURGERIES      REPAIR OF BLOOD VESSEL WITH PATCH GRAFT Left 4/25/2024    Procedure: ANGIOPLASTY, USING PATCH GRAFT;  Surgeon: Chrissie Parks MD;  Location: [facility] OR;  Service: Peripheral Vascular;  Laterality: Left;    WOUND EXPLORATION N/A 3/16/2024    Procedure: EXPLORATION, WOUND;  Surgeon: Magda Stark MD;  Location: [facility] OR;  Service: General;  Laterality: N/A;    XI ROBOTIC COLECTOMY, RIGHT Right 3/11/2024    Procedure: XI ROBOTIC COLECTOMY, RIGHT;  Surgeon: Tosin Boothe MD;  Location: [facility] OR;  Service: Colon and Rectal;  Laterality: Right;  WITH OMENECTOMY AND AMNIOFIX     Review of patient's allergies indicates:  No Known Allergies     No current facility-administered medications on file prior to encounter.     Current Outpatient Medications on File Prior to Encounter   Medication Sig Dispense Refill    amLODIPine (NORVASC) 10 MG tablet Take 1 tablet (10 mg total) by mouth once daily. 30 tablet 11    aspirin 81 MG Chew Take 1 tablet (81 mg total) by mouth once daily. 360 tablet 0    atorvastatin (LIPITOR) 20 MG tablet Take 1 tablet (20 mg total) by mouth once daily. 90 tablet 0    clopidogreL (PLAVIX) 75 mg tablet Take 75 mg by mouth once daily. Pt states he does not normally take this medication. He was unable to fill his Eliquis prescrption. He was on Plavix recently but was ordered to stop taking it and take the Eliquis only.      ferrous sulfate (FEOSOL) 325 mg (65 mg iron) Tab tablet Take 1 tablet (325 mg total) by mouth once daily. 30 tablet 2    metFORMIN (GLUCOPHAGE) 500 MG tablet Take 1 tablet (500 mg total) by mouth 2 (two) times daily with meals. 180 tablet 0    metoprolol succinate (TOPROL-XL) 25 MG 24 hr tablet Take 1 tablet (25 mg total) by mouth 2 (two) times daily. 180 tablet 3    olmesartan (BENICAR) 20 MG tablet Take 1 tablet (20 mg total) by mouth nightly. 30 tablet 1  "   pregabalin (LYRICA) 100 MG capsule Take 1 capsule (100 mg total) by mouth 2 (two) times daily. 60 capsule 2    fenofibrate (TRICOR) 48 MG tablet Take 1 tablet (48 mg total) by mouth once daily. 90 tablet 3    fenofibrate (TRICOR) 54 MG tablet TAKE 1 TABLET BY MOUTH ONCE DAILY 90 tablet 3    FREESTYLE FREEDOM LITE kit by Other route.      FREESTYLE LANCETS 28 gauge lancets Apply 28 lancets topically 3 (three) times daily.      FREESTYLE LITE STRIPS Strp Inject 300 strips as directed as needed.          PMHx, PSHx, Allergies, Medications reviewed in epic    ROS negative except pain complaints in HPI    OBJECTIVE:    BP (!) 178/74 (BP Location: Right arm, Patient Position: Sitting)   Pulse 64   Temp 96.6 °F (35.9 °C) (Temporal)   Resp 16   Ht 5' 5" (1.651 m)   Wt 95.5 kg (210 lb 10.4 oz)   SpO2 98%   BMI 35.05 kg/m²     PHYSICAL EXAMINATION:    GENERAL: Well appearing, in no acute distress, alert and oriented x3.  PSYCH:  Mood and affect appropriate.  SKIN: Skin color, texture, turgor normal, no rashes or lesions which will impact the procedure.  CV: RRR with palpation of the radial artery.  PULM: No evidence of respiratory difficulty, symmetric chest rise. Clear to auscultation.  NEURO: Cranial nerves grossly intact.    Plan:    Proceed with procedure as planned Procedure(s) (LRB):  Right L3-L5 MBB (Right)    Oneil Carlson MD  11/27/2024            "

## 2024-11-29 ENCOUNTER — TELEPHONE (OUTPATIENT)
Dept: PAIN MEDICINE | Facility: CLINIC | Age: 74
End: 2024-11-29
Payer: MEDICARE

## 2024-11-29 ENCOUNTER — TELEPHONE (OUTPATIENT)
Dept: ELECTROPHYSIOLOGY | Facility: CLINIC | Age: 74
End: 2024-11-29
Payer: MEDICARE

## 2024-11-29 NOTE — TELEPHONE ENCOUNTER
Spoke with patient to discuss Holter monitoring question and unscheduled branch block procedure. Explained to patient the Holter monitor should not be affected. Instructed patient to ask at the question on date of Holter monitor placement. Patient verbalized understanding and appreciated the call.       ----- Message from Med Assistant Alexander sent at 11/29/2024  8:43 AM CST -----  Contact: [phone] [phone]    ----- Message -----  From: Zaida Stoo  Sent: 11/29/2024   8:40 AM CST  To: Jose Espinoza    Good morning patient would like a call back to discuss his upcoming procedure. Please call patient to advise  [phone] or [phone]

## 2024-11-29 NOTE — TELEPHONE ENCOUNTER
Patient reports 90% relief for proximally 12 hours after right L3-L5 medial branch block on 11/27/2024.  This is patient's 2nd positive medial branch block.  We will proceed with RFA.

## 2024-11-29 NOTE — TELEPHONE ENCOUNTER
Orders are in for the next procedure, just waiting on patient to confirm a date.     1. What percentage of pain relief did you receive following the block, from 0-100%?   100    2. What was pain score the day before your procedure on a scale from 0-10?  8    3. What was pain score immediately after your procedure (up to 6 hours)  on a scale from 0-10?  2    4. When your pain returned, what was your pain score on a scale from 0-10?  5     5. How many hours did pain relief last following the block?    Over hours hours     6. During this time, please describe in detail the activities you were able to do?  House Chores and Garden work        Pain Disability Index (PDI) Score Review:      10/1/2024    12:28 PM 4/12/2024    10:58 AM 12/28/2023     3:23 PM   Last 3 PDI Scores   Pain Disability Index (PDI) 45 14 6

## 2024-12-03 ENCOUNTER — HOSPITAL ENCOUNTER (OUTPATIENT)
Dept: CARDIOLOGY | Facility: HOSPITAL | Age: 74
Discharge: HOME | End: 2024-12-03
Attending: INTERNAL MEDICINE
Payer: MEDICARE

## 2024-12-03 ENCOUNTER — TELEPHONE (OUTPATIENT)
Dept: PAIN MEDICINE | Facility: CLINIC | Age: 74
End: 2024-12-03
Payer: MEDICARE

## 2024-12-03 DIAGNOSIS — I48.91 NEW ONSET A-FIB: ICD-10-CM

## 2024-12-03 NOTE — TELEPHONE ENCOUNTER
----- Message from Oneil Carlson MD sent at 11/29/2024 12:26 PM CST -----  Pain Provider: Aldo  Patient Name: Quintin Ling  MRN: [id number]  Case: LUMBAR RFA  Level: L4/5 and L5/S1  Laterality: bilateral      Patient can follow up 5 weeks after procedure with me

## 2024-12-04 DIAGNOSIS — N18.31 STAGE 3A CHRONIC KIDNEY DISEASE: ICD-10-CM

## 2024-12-06 NOTE — PRE-PROCEDURE INSTRUCTIONS
Spoke with patients wife regarding procedure scheduled on 12.18     Arrival time 0615     Has patient been sick with fever or on antibiotics within the last 7 days? No     Does the patient have any open wounds, sores or rashes? No     Does the patient have any recent fractures? no     Has patient received a vaccination within the last 7 days? No     Received the COVID vaccination? yes     Has the patient stopped all medications as directed? na     Does patient have a pacemaker, defibrillator, or implantable stimulator? No     Does the patient have a ride to and from procedure and someone reliable to remain with patient?  WIFE     Is the patient diabetic? YES      Does the patient have sleep apnea? Or use O2 at home? no     Is the patient receiving sedation? Yes      Is the patient instructed to remain NPO beginning at midnight the night before their procedure? yes     Procedure location confirmed with patient? Yes     Covid- Denies signs/symptoms. Instructed to notify PAT/MD if any changes.

## 2024-12-10 ENCOUNTER — PATIENT OUTREACH (OUTPATIENT)
Dept: ADMINISTRATIVE | Facility: HOSPITAL | Age: 74
End: 2024-12-10
Payer: MEDICARE

## 2024-12-18 ENCOUNTER — HOSPITAL ENCOUNTER (OUTPATIENT)
Facility: HOSPITAL | Age: 74
Discharge: HOME | End: 2024-12-18
Attending: ANESTHESIOLOGY | Admitting: ANESTHESIOLOGY
Payer: MEDICARE

## 2024-12-18 VITALS
OXYGEN SATURATION: 99 % | BODY MASS INDEX: 35.74 KG/M2 | HEART RATE: 66 BPM | DIASTOLIC BLOOD PRESSURE: 59 MMHG | SYSTOLIC BLOOD PRESSURE: 134 MMHG | WEIGHT: 214.5 LBS | HEIGHT: 65 IN | TEMPERATURE: 98 F | RESPIRATION RATE: 18 BRPM

## 2024-12-18 DIAGNOSIS — M47.816 LUMBAR SPONDYLOSIS: Primary | ICD-10-CM

## 2024-12-18 LAB — POCT GLUCOSE: 97 MG/DL (ref 70–110)

## 2024-12-18 PROCEDURE — 63600175 PHARM REV CODE 636 W HCPCS: Performed by: ANESTHESIOLOGY

## 2024-12-18 PROCEDURE — 64635 DESTROY LUMB/SAC FACET JNT: CPT | Mod: RT | Performed by: ANESTHESIOLOGY

## 2024-12-18 PROCEDURE — 64636 DESTROY L/S FACET JNT ADDL: CPT | Mod: RT,,, | Performed by: ANESTHESIOLOGY

## 2024-12-18 PROCEDURE — 64635 DESTROY LUMB/SAC FACET JNT: CPT | Mod: RT,,, | Performed by: ANESTHESIOLOGY

## 2024-12-18 PROCEDURE — 64636 DESTROY L/S FACET JNT ADDL: CPT | Mod: RT | Performed by: ANESTHESIOLOGY

## 2024-12-18 RX ORDER — METHYLPREDNISOLONE ACETATE 40 MG/ML
INJECTION, SUSPENSION INTRA-ARTICULAR; INTRALESIONAL; INTRAMUSCULAR; SOFT TISSUE
Status: DISCONTINUED | OUTPATIENT
Start: 2024-12-18 | End: 2024-12-18 | Stop reason: HOSPADM

## 2024-12-18 RX ORDER — LIDOCAINE HYDROCHLORIDE 10 MG/ML
INJECTION, SOLUTION EPIDURAL; INFILTRATION; INTRACAUDAL; PERINEURAL
Status: DISCONTINUED | OUTPATIENT
Start: 2024-12-18 | End: 2024-12-18 | Stop reason: HOSPADM

## 2024-12-18 RX ORDER — FENTANYL CITRATE 50 UG/ML
INJECTION, SOLUTION INTRAMUSCULAR; INTRAVENOUS
Status: DISCONTINUED | OUTPATIENT
Start: 2024-12-18 | End: 2024-12-18 | Stop reason: HOSPADM

## 2024-12-18 RX ORDER — BUPIVACAINE HYDROCHLORIDE 2.5 MG/ML
INJECTION, SOLUTION EPIDURAL; INFILTRATION; INTRACAUDAL
Status: DISCONTINUED | OUTPATIENT
Start: 2024-12-18 | End: 2024-12-18 | Stop reason: HOSPADM

## 2024-12-18 RX ORDER — MIDAZOLAM HYDROCHLORIDE 1 MG/ML
INJECTION INTRAMUSCULAR; INTRAVENOUS
Status: DISCONTINUED | OUTPATIENT
Start: 2024-12-18 | End: 2024-12-18 | Stop reason: HOSPADM

## 2024-12-18 RX ORDER — ONDANSETRON HYDROCHLORIDE 2 MG/ML
4 INJECTION, SOLUTION INTRAVENOUS ONCE AS NEEDED
Status: DISCONTINUED | OUTPATIENT
Start: 2024-12-18 | End: 2024-12-18 | Stop reason: HOSPADM

## 2024-12-18 NOTE — OP NOTE
Lumbar Medial nerve branch block radiofrequency ablation Under Fluoroscopy  Right L4/5 and L5/S1    INFORMED CONSENT: The procedure, risks, benefits and options were discussed with patient. There are no contraindications to the procedure. The patient expressed understanding and agreed to proceed. The personnel performing the procedure was discussed.    Date of procedure 12/18/2024    Time-out taken to identify patient and procedure side prior to starting the procedure.                     PROCEDURE: Right radiofrequency ablation of the the medial branch nerves at the   transverse process of  L4, L5, and the sacral ala    Pre Procedure diagnosis:    Lumbar spondylosis [M47.816]  1. Lumbar spondylosis        Post-Procedure diagnosis:   same      PHYSICIAN: Oneil Carlson MD    ASSISTANTS: None     LOCAL ANESTHETIC USED: 1ml of Lidocaine 1%, at each level    Sedation: Conscious sedation provided by M.D    SEDATION MEDICATIONS: local/IV sedation: Versed 2 mg and fentanyl 50 mcg IV.  Conscious sedation ordered by MD.  Patient reevaluated and sedation administered by MD and monitored by RN.  Total sedation time was less than 20 min.    Total sedation time was >10 but <20 min    ESTIMATED BLOOD LOSS: None.     COMPLICATIONS: None.       TECHNIQUE: Laying in a prone position, the patient was prepped and draped in the usual sterile fashion using ChloraPrep and fenestrated drape. The level was determined under fluoroscopic guidance. Local anesthetic was given by going down to the hub of the 27-gauge 1.25in needle and raising a wheel. A 18-gauge 10mm curved active tip needle was introduced to the anatomic local of the medial branch at each of the above levels using fluoroscopy. Then sensory and motor testing was performed to confirm that the needle tips were in the correct location. Then after negative aspiration, 1ml of lidocaine PF 1% was injected at each level. This was followed by thermal lesioning at 80 degrees celsius  for 90 seconds. After lesioning, 0.5ml of bupivacaine PF 0.25% and 5mg of DepoMedrol was injected at each level.    The patient tolerated the procedure well. Did not have any procedure related motor deficit at the conclusion of the procedure      The patient was monitored for approximately 30 minutes after the procedure.  Patient was given post procedure and discharge instructions to follow at home.  The patient was discharged in a stable condition

## 2024-12-18 NOTE — H&P
HPI  Patient presenting for Procedure(s) (LRB):  Right L3-5 Lumbar RFA (Right)     Patient on Anti-coagulation Yes, ASA and Plavix, may continue    No health changes since previous encounter    Past Medical History:   Diagnosis Date    Diabetes mellitus     Hyperlipidemia     Hypertension      Past Surgical History:   Procedure Laterality Date    BIOPSY OF PERITONEUM N/A 1/29/2024    Procedure: BIOPSY, PERITONEUM;  Surgeon: Tosin Boothe MD;  Location: [facility] OR;  Service: Colon and Rectal;  Laterality: N/A;    CAROTID ENDARTERECTOMY Left 4/25/2024    Procedure: ENDARTERECTOMY, CAROTID;  Surgeon: Chrissie Parks MD;  Location: [facility] OR;  Service: Peripheral Vascular;  Laterality: Left;    CLOSURE OF WOUND N/A 3/16/2024    Procedure: CLOSURE, WOUND;  Surgeon: Magda Stark MD;  Location: [facility] OR;  Service: General;  Laterality: N/A;    COLONOSCOPY N/A 1/5/2024    Procedure: COLONOSCOPY;  Surgeon: Irasema Gil MD;  Location: [facility] ENDO;  Service: Endoscopy;  Laterality: N/A;    DIAGNOSTIC LAPAROSCOPY N/A 1/29/2024    Procedure: LAPAROSCOPY, DIAGNOSTIC;  Surgeon: Tosin Boothe MD;  Location: [facility] OR;  Service: Colon and Rectal;  Laterality: N/A;    EPIDURAL STEROID INJECTION N/A 11/13/2023    Procedure: Lumbar L5/S1 IL JOSE;  Surgeon: Oneil Carlson MD;  Location: [facility] PAIN MGT;  Service: Pain Management;  Laterality: N/A;    ESOPHAGOGASTRODUODENOSCOPY N/A 1/5/2024    Procedure: EGD (ESOPHAGOGASTRODUODENOSCOPY);  Surgeon: Irasema Gil MD;  Location: [facility] ENDO;  Service: Endoscopy;  Laterality: N/A;    INJECTION OF ANESTHETIC AGENT AROUND MEDIAL BRANCH NERVES INNERVATING LUMBAR FACET JOINT Right 11/4/2024    Procedure: Right L3-5 MBB;  Surgeon: Oneil Carlson MD;  Location: [facility] PAIN MGT;  Service: Pain Management;  Laterality: Right;    INJECTION OF ANESTHETIC AGENT AROUND MEDIAL BRANCH NERVES INNERVATING LUMBAR FACET JOINT Right 11/27/2024    Procedure: Right L3-L5 MBB;  Surgeon:  Oneil Carlson MD;  Location: [facility] PAIN [facility]GT;  Service: Pain Management;  Laterality: Right;    INJECTION OF ANESTHETIC AGENT INTO TISSUE PLANE DEFINED BY TRANSVERSUS ABDOMINIS MUSCLE N/A 3/11/2024    Procedure: BLOCK, TRANSVERSUS ABDOMINIS PLANE;  Surgeon: Tosin Boothe MD;  Location: [facility] OR;  Service: Colon and Rectal;  Laterality: N/A;    NO PAST SURGERIES      REPAIR OF BLOOD VESSEL WITH PATCH GRAFT Left 4/25/2024    Procedure: ANGIOPLASTY, USING PATCH GRAFT;  Surgeon: Chrissie Parks MD;  Location: [facility] OR;  Service: Peripheral Vascular;  Laterality: Left;    WOUND EXPLORATION N/A 3/16/2024    Procedure: EXPLORATION, WOUND;  Surgeon: Magda Stark MD;  Location: [facility] OR;  Service: General;  Laterality: N/A;    XI ROBOTIC COLECTOMY, RIGHT Right 3/11/2024    Procedure: XI ROBOTIC COLECTOMY, RIGHT;  Surgeon: Tosin Boohte MD;  Location: [facility] OR;  Service: Colon and Rectal;  Laterality: Right;  WITH OMENECTOMY AND AMNIOFIX     Review of patient's allergies indicates:  No Known Allergies     No current facility-administered medications on file prior to encounter.     Current Outpatient Medications on File Prior to Encounter   Medication Sig Dispense Refill    amLODIPine (NORVASC) 10 MG tablet Take 1 tablet (10 mg total) by mouth once daily. 30 tablet 11    aspirin 81 MG Chew Take 1 tablet (81 mg total) by mouth once daily. 360 tablet 0    atorvastatin (LIPITOR) 20 MG tablet Take 1 tablet (20 mg total) by mouth once daily. 90 tablet 0    fenofibrate (TRICOR) 48 MG tablet Take 1 tablet (48 mg total) by mouth once daily. 90 tablet 3    fenofibrate (TRICOR) 54 MG tablet TAKE 1 TABLET BY MOUTH ONCE DAILY 90 tablet 3    ferrous sulfate (FEOSOL) 325 mg (65 mg iron) Tab tablet Take 1 tablet (325 mg total) by mouth once daily. 30 tablet 2    metFORMIN (GLUCOPHAGE) 500 MG tablet Take 1 tablet (500 mg total) by mouth 2 (two) times daily with meals. 180 tablet 0    metoprolol succinate (TOPROL-XL) 25 MG 24  "hr tablet Take 1 tablet (25 mg total) by mouth 2 (two) times daily. 180 tablet 3    olmesartan (BENICAR) 20 MG tablet Take 1 tablet (20 mg total) by mouth nightly. 30 tablet 1    pregabalin (LYRICA) 100 MG capsule Take 1 capsule (100 mg total) by mouth 2 (two) times daily. 60 capsule 2    clopidogreL (PLAVIX) 75 mg tablet Take 75 mg by mouth once daily. Pt states he does not normally take this medication. He was unable to fill his Eliquis prescrption. He was on Plavix recently but was ordered to stop taking it and take the Eliquis only.      FREESTYLE FREEDOM LITE kit by Other route.      FREESTYLE LANCETS 28 gauge lancets Apply 28 lancets topically 3 (three) times daily.      FREESTYLE LITE STRIPS Strp Inject 300 strips as directed as needed.          PMHx, PSHx, Allergies, Medications reviewed in epic    ROS negative except pain complaints in HPI    OBJECTIVE:    BP (!) 177/78   Pulse 62   Temp 97.9 °F (36.6 °C) (Temporal)   Resp 18   Ht 5' 5" (1.651 m)   Wt 97.3 kg (214 lb 8.1 oz)   SpO2 96%   BMI 35.70 kg/m²     PHYSICAL EXAMINATION:    GENERAL: Well appearing, in no acute distress, alert and oriented x3.  PSYCH:  Mood and affect appropriate.  SKIN: Skin color, texture, turgor normal, no rashes or lesions which will impact the procedure.  CV: RRR with palpation of the radial artery.  PULM: No evidence of respiratory difficulty, symmetric chest rise. Clear to auscultation.  NEURO: Cranial nerves grossly intact.    Plan:    Proceed with procedure as planned Procedure(s) (LRB):  Right L3-5 Lumbar RFA (Right)    Oneil Carlson MD  12/18/2024            "

## 2024-12-18 NOTE — DISCHARGE SUMMARY
Discharge Note  Short Stay      SUMMARY     Admit Date: 12/18/2024    Attending Physician: Oneil Carlson MD        Discharge Physician: Oneil Carlson MD        Discharge Date: 12/18/2024 7:35 AM    Procedure(s) (LRB):  Right L3-5 Lumbar RFA (Right)    Final Diagnosis: Lumbar spondylosis [M47.816]    Disposition: Home or self care    Patient Instructions:   Current Discharge Medication List        CONTINUE these medications which have NOT CHANGED    Details   amLODIPine (NORVASC) 10 MG tablet Take 1 tablet (10 mg total) by mouth once daily.  Qty: 30 tablet, Refills: 11    Comments: .  Associated Diagnoses: Essential hypertension      aspirin 81 MG Chew Take 1 tablet (81 mg total) by mouth once daily.  Qty: 360 tablet, Refills: 0      atorvastatin (LIPITOR) 20 MG tablet Take 1 tablet (20 mg total) by mouth once daily.  Qty: 90 tablet, Refills: 0    Associated Diagnoses: Mixed hyperlipidemia      fenofibrate (TRICOR) 48 MG tablet Take 1 tablet (48 mg total) by mouth once daily.  Qty: 90 tablet, Refills: 3      fenofibrate (TRICOR) 54 MG tablet TAKE 1 TABLET BY MOUTH ONCE DAILY  Qty: 90 tablet, Refills: 3      ferrous sulfate (FEOSOL) 325 mg (65 mg iron) Tab tablet Take 1 tablet (325 mg total) by mouth once daily.  Qty: 30 tablet, Refills: 2      metFORMIN (GLUCOPHAGE) 500 MG tablet Take 1 tablet (500 mg total) by mouth 2 (two) times daily with meals.  Qty: 180 tablet, Refills: 0      metoprolol succinate (TOPROL-XL) 25 MG 24 hr tablet Take 1 tablet (25 mg total) by mouth 2 (two) times daily.  Qty: 180 tablet, Refills: 3    Comments: .  Associated Diagnoses: Atrial fib/flutter, transient      olmesartan (BENICAR) 20 MG tablet Take 1 tablet (20 mg total) by mouth nightly.  Qty: 30 tablet, Refills: 1    Comments: .  Associated Diagnoses: Essential hypertension      pregabalin (LYRICA) 100 MG capsule Take 1 capsule (100 mg total) by mouth 2 (two) times daily.  Qty: 60 capsule, Refills: 2      apixaban (ELIQUIS) 5  mg Tab Take 1 tablet (5 mg total) by mouth 2 (two) times daily.  Qty: 60 tablet, Refills: 11    Associated Diagnoses: S/P carotid endarterectomy      clopidogreL (PLAVIX) 75 mg tablet Take 75 mg by mouth once daily. Pt states he does not normally take this medication. He was unable to fill his Eliquis prescrption. He was on Plavix recently but was ordered to stop taking it and take the Eliquis only.      FREESTYLE FREEDOM LITE kit by Other route.      FREESTYLE LANCETS 28 gauge lancets Apply 28 lancets topically 3 (three) times daily.      FREESTYLE LITE STRIPS Strp Inject 300 strips as directed as needed.                 Discharge Diagnosis: Lumbar spondylosis [M47.816]  Condition on Discharge: Stable with no complications to procedure   Diet on Discharge: Same as before.  Activity: as per instruction sheet.  Discharge to: Home with a responsible adult.  Follow up: 2-4 weeks       Please call the office at [phone] if you experience any weakness or loss of sensation, fever > 101.5, pain uncontrolled with oral medications, persistent nausea/vomiting/or diarrhea, redness or drainage from the incisions, or any other worrisome concerns. If physician on call was not reached or could not communicate with our office for any reason please go to the nearest emergency department

## 2024-12-18 NOTE — DISCHARGE INSTRUCTIONS

## 2024-12-18 NOTE — PLAN OF CARE
Patient d/c home in stable condition via wheelchair with ride. Verbalized understanding of d/c instructions. Patient voiced no complaints. Patient stood at side of bed, walked steps with no new motor deficits. Neuro intact.     Future oriented/Identifies reasons for living/Responsibility to family and others Responsibility to family and others/Identifies reasons for living

## 2024-12-27 DIAGNOSIS — I10 ESSENTIAL HYPERTENSION: ICD-10-CM

## 2024-12-27 NOTE — TELEPHONE ENCOUNTER
Care Due:                  Date            Visit Type   Department     Provider  --------------------------------------------------------------------------------                                EP -                              PRIMARY      [facility] INTERNAL  Last Visit: 10-      CARE ([facility])   KERI Gabriel                              EP -                              PRIMARY      [facility] INTERNAL  Next Visit: 01-      CARE ([facility])   KERI Gabriel                                                            Last  Test          Frequency    Reason                     Performed    Due Date  --------------------------------------------------------------------------------    HBA1C.......  6 months...  metFORMIN................  03- 09-    Health [facility] Embedded Care Due Messages. Reference number: [id number].   12/27/2024 9:27:34 AM CST

## 2024-12-31 RX ORDER — OLMESARTAN MEDOXOMIL 20 MG/1
20 TABLET ORAL NIGHTLY
Qty: 90 TABLET | Refills: 3 | Status: SHIPPED | OUTPATIENT
Start: 2024-12-31 | End: 2025-12-31

## 2025-01-13 ENCOUNTER — TELEPHONE (OUTPATIENT)
Dept: PAIN MEDICINE | Facility: CLINIC | Age: 75
End: 2025-01-13
Payer: MEDICARE

## 2025-01-13 ENCOUNTER — TELEPHONE (OUTPATIENT)
Dept: ELECTROPHYSIOLOGY | Facility: CLINIC | Age: 75
End: 2025-01-13
Payer: MEDICARE

## 2025-01-13 NOTE — TELEPHONE ENCOUNTER
No care due was identified.  Health [facility] Embedded Care Due Messages. Reference number: [id number].   1/13/2025 4:02:43 PM CST

## 2025-01-13 NOTE — TELEPHONE ENCOUNTER
----- Message from Erin sent at 1/13/2025 10:45 AM CST -----  Contact: Quintin  Type:  RX Refill Request    Who Called: Quintin  Refill or New Rx:Rx for refill  RX Name and Strength:  How is the patient currently taking it? (ex. 1XDay): 2x day  Is this a 30 day or 90 day RX: 60 capsules  Preferred Pharmacy with phone number:  [facility]  [id number], [place] [address]  [phone]   Local or Mail Order:Local  Ordering Provider:MATTIE Cheema  Would the patient rather a call back or a response via [facility]? Call back  Best Call Back Number:[phone]  Additional Information: Patient swithcing from [facility] to [facility]

## 2025-01-13 NOTE — PROGRESS NOTES
Interventional Pain Progress Note     Referring Physician: No ref. provider found    PCP: Bishop Gabriel MD    Chief Complaint:     Chief Complaint   Patient presents with    Low-back Pain          SUBJECTIVE:  Interval History (1/15/2025):  Patient Quintin Ling presents today for follow-up visit.  Patient was last seen on 12/18/2024 Right L3-5 RFA with 50% relief. He continues to have some pain in the lower back. No radiculopathy but has cramping sensations in the legs at times. He requests to increase lyrica as it helps without SE. He rates his pain 5/10 today.   Requests renewal on handicap tag.  Patient denies night fever/night sweats, urinary incontinence, bowel incontinence, significant weight loss and significant motor weakness.   Patient denies any other complaints or concerns at this time.      Interval History (10/1/2024):  Patient Quintin Ling presents today for follow-up visit.  Patient is being seen for follow up of low back pain. Pain is remaining axial on the right side of the back without radiculopathy. Pain is worse with prolonged standing and walking. He will get relief with rest. He is curious about a nerve burn. He also asks to increase his lyrica for more relief. He denies side effects.   Patient denies night fever/night sweats, urinary incontinence, bowel incontinence, significant weight loss and significant motor weakness.   Patient denies any other complaints or concerns at this time.      Interval History (4/12/2024):  Patient Quintin Ling presents today for follow-up visit.  Patient today for follow-up of lower back pain.  He rates his pain today a 2/10.  He feels that he got relief for about 3 months following his lumbar JOSE however he is undergoing a few medical issues at this time and would like to prolonged an injection right now.  He is scheduled for a carotid endarterectomy in the near future.  He is currently taking Lyrica for his symptoms.  His Zanaflex was DC'd by his  "GI doctor.  Patient denies night fever/night sweats, urinary incontinence, bowel incontinence, significant weight loss and significant motor weakness.   Patient denies any other complaints or concerns at this time.      Interval History (12/28/2023):  Quintin Ling presents today for follow-up visit.  he underwent Lumbar L5/S1 IL JOSE on 12/18/24.  The patient reports that he is/was better following the procedure.  he reports 75 % pain relief.  The changes have continued through this visit.  Patient reports pain as "0/10 today. Patient currently c/o pain only on the L side whenever he walks. States it is more of a pulling sensation on the L side of his lower back. He denies leg pain. Also denies pain while sitting.     Interval History (01/15/2025):  Patient returns to clinic for evaluation of lower back pain.  Patient reports continued lower back pain that starts in a band across his lower back.  Patient reports that as we will occasionally radiates to his bilateral posterior thighs.  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  Pain is currently rated a 5/10, but can increase to a 8/10 with exacerbating activity. Denies any fevers, chills, changes in gait, saddle anesthesia, or bowel and bladder incontinence      Interval History (8/29/2023):  Quintin Ling presents today for follow-up visit for MRI review.  Patient was last seen on 8/4/2023. Taking Lyrica 50 mg BID with no side effects, minimal improvement. Patient reports pain as 8/10 today. Continues to report low back pain with radiation into his left lower extremity. Pain is worsened with prolonged standing and walking. Reports pain and weakness is affecting his gait, he often walks with a limp.  Initial HPI 08/04/2023  Quintin Ling is a 74 y.o. male who presents to the clinic for the evaluation of left lower extremity pain.   Patient reports three-month history of left lower extremity pain.  He " denies any inciting events or traumas.  Patient reports 1 year ago he experienced bilateral calf pain that is resolved with B12 supplementation.  He also reports as a child he was placed in traction for 2 weeks on his left lower extremity and to wear a brace for 3 years secondary to hip dysplasia.  Patient denies any recent infections.  Patient denies any previous surgical intervention on his lumbar spine.  Pain is described as a pulling burning pain diffusely over his left calf.  He denies any significant radiation to his foot or up into his left lower back but does report a long history of lower back pain.  Patient denies any significant change in sensation to left lower extremity.  Pain is worse with prolonged sitting or walking, better with lying supine.  Patient does report swelling of his left lower extremity with prolonged walking.  Patient denies any significant history of rheumatoid disease in his family.  Patient reports emergency department on 06/28/2023 for exacerbation of left lower extremity pain.  Denies any fevers, chills, saddle anesthesia, or bowel and bladder incontinence        Non-Pharmacologic Treatments:  Physical Therapy/Home Exercise: yes  Ice/Heat:yes  TENS: no  Acupuncture: no  Massage: yes  Chiropractic: no        Previous Pain Medications:  NSAIDs, Tylenol, gabapentin, tramadol     report:  Reviewed and consistent with medication use as prescribed.    Pain Procedures:   -11/13/2023: Lumbar L5/S1 IL JOSE with 75% relief  12/18/2024 Right L3-5 RFA with 50% relief.    Pain Disability Index Review:         10/1/2024    12:28 PM 4/12/2024    10:58 AM 12/28/2023     3:23 PM   Last 3 PDI Scores   Pain Disability Index (PDI) 45 14 6       Imaging (Reviewed on 1/15/2025):    MRI Lumbar spine 08/22/2023  FINDINGS:  Grade 1 degenerative spondylolisthesis at L4-L5.  Vertebral body height is normal.  Marrow signal is within normal limits. The conus medullaris terminates at the level of T12-L1.  No  abnormal signal within the conus. Intervertebral disc levels are as follows:     T12-L1 disc: Disc space height loss with anterior bridging osteophytes and fatty Modic change.  Normal facet joints.  No spinal or foraminal stenosis.     L1-L2 disc : Disc space height loss with a broad-based posterior disc bulge and posterior annular fissure.  Normal facet joints.  The dural canal measures 7 mm.  No foraminal stenosis.     L2-L3 disc: Normal disc space height with anterior osteophytes.  Fatty and edematous Modic change.  Normal facet joints.  Dural canal measures 12 mm.  No foraminal stenosis.     L3-L4 disc: Disc space height is relatively normal with anterolateral right-sided osteophytes.  Normal facet joints.  No spinal or foraminal stenosis.     L4-L5 disc: Grade 1 spondylolisthesis with markedly severe degenerative facet hypertrophy bilaterally.  Unroofing of disc material with a disc extrusion that extends cranially.  The disc extrusion measures 23 mm craniocaudal by 20 mm transverse by 9 mm AP.  There is severe spinal stenosis and severe subarticular recess stenosis.  The dural canal measures 6 mm but is roughly 30% of normal area with redundancy of nerve roots adjacent to this interspace.  There is also a synovial cyst projecting inferiorly from the right facet joint that may compress the descending right L5 spinal nerve root sleeve best demonstrated on axial T2 series 6, image 25.  This synovial cyst measures 11 mm.  Moderate foraminal stenosis bilaterally.     L5-S1 disc: Severe disc space height loss most pronounced toward the left.  Fatty Modic endplate change.  Disc and osteophyte encroach into the floor of the left exit foramen.  There is also a left paracentral disc protrusion causing mild left subarticular recess stenosis without eli nerve compression.  The dural canal measures 10 mm.  Moderate to severe left foraminal stenosis.     Impression:     1. Severe spinal stenosis at L4-L5 as described  above which may account for neurogenic claudication and radiculopathy of the descending nerve roots.  2. Moderate foraminal stenosis bilaterally at L4-L5 and moderate/severe left foraminal stenosis at L5-S1.  This could also precipitate radiculopathy.    EMG/NCS 08/24/2023  IMPRESSION  ABNORMAL study  2.  There is electrodiagnostic evidence of an acute on chronic radiculopathy of the left L5 nerve root, a subacute on chronic radiculopathy of the right L5 nerve root, and a chronic radiculopathy of the S1 and probable L4 nerve roots      Results for orders placed during the hospital encounter of 06/21/23    X-Ray Lumbar Spine AP And Lateral    Narrative  EXAMINATION:  XR LUMBAR SPINE AP AND LATERAL    CLINICAL HISTORY:  Pain in left leg    TECHNIQUE:  AP, lateral and spot images were performed of the lumbar spine.    COMPARISON:  02/01/2022    FINDINGS:  Five lumbar type vertebral bodies.  The lordotic curvature is normal.  Grade 1 anterolisthesis of L5 on S1. no evidence for compression deformity, fracture, or subluxation.  The vertebral heights are maintained without significant intervertebral disc space narrowing.  Progressive degenerative changes of facets are identified.    The bilateral SI joints are normal.    Impression  Spondylotic changes lumbar spine without evidence for fracture.      Results for orders placed during the hospital encounter of 06/21/23    X-Ray Hip 2 or 3 views Left (with Pelvis when performed)    Narrative  EXAMINATION:  XR HIP WITH PELVIS WHEN PERFORMED, 2 OR 3 VIEWS LEFT    CLINICAL HISTORY:  Pain in left leg    TECHNIQUE:  AP view of the pelvis and frog leg lateral view of the left hip were performed.    COMPARISON:  02/01/2022    FINDINGS:  No fracture the pelvis or proximal femora.  The femoral heads are well seated in the acetabulum without significant joint space narrowing.  Osteopenia is identified.    Degenerative changes of bilateral SI joints.  The pubic symphysis is  normal.    Impression  No fracture the pelvis or proximal femora.  Degenerative changes are noted.      XR TIBIA FIBULA 2 VIEW LEFT 6/21/23     CLINICAL HISTORY:  Pain in left leg     TECHNIQUE:  AP and lateral views of the left tibia and fibula were performed.     COMPARISON:  None.     FINDINGS:  No fracture.  The alignment is normal.  Joint space narrowing is identified of the knee as well as of the ankle.  No significant soft tissue swelling.  Calcifications are identified of the vasculature.     Impression:     No fracture of the tibia or fibula.      US LOWER EXTREMITY VEINS BILATERAL 06/21/23     CLINICAL HISTORY:  Pain in left leg     TECHNIQUE:  Duplex and color flow Doppler and dynamic compression was performed of the bilateral lower extremity veins was performed.     COMPARISON:  None     FINDINGS:  Right thigh veins: The common femoral, femoral, popliteal, upper greater saphenous, and deep femoral veins are patent and free of thrombus. The veins are normally compressible and have normal phasic flow and augmentation response.     Right calf veins: The visualized calf veins are patent.     Left thigh veins: The common femoral, femoral, popliteal, upper greater saphenous, and deep femoral veins are patent and free of thrombus. The veins are normally compressible and have normal phasic flow and augmentation response.     Left calf veins: The visualized calf veins are patent.     Miscellaneous: None     Impression:     No evidence of deep venous thrombosis in either lower extremity.    Past Medical History:   Diagnosis Date    Diabetes mellitus     Hyperlipidemia     Hypertension      Past Surgical History:   Procedure Laterality Date    BIOPSY OF PERITONEUM N/A 1/29/2024    Procedure: BIOPSY, PERITONEUM;  Surgeon: Tosin Boothe MD;  Location: [facility];  Service: Colon and Rectal;  Laterality: N/A;    CAROTID ENDARTERECTOMY Left 4/25/2024    Procedure: ENDARTERECTOMY, CAROTID;  Surgeon: Chrissie Parks  MD;  Location: [facility] OR;  Service: Peripheral Vascular;  Laterality: Left;    CLOSURE OF WOUND N/A 3/16/2024    Procedure: CLOSURE, WOUND;  Surgeon: Magda Stark MD;  Location: [facility] OR;  Service: General;  Laterality: N/A;    COLONOSCOPY N/A 1/5/2024    Procedure: COLONOSCOPY;  Surgeon: Irasema Gil MD;  Location: [facility] ENDO;  Service: Endoscopy;  Laterality: N/A;    DIAGNOSTIC LAPAROSCOPY N/A 1/29/2024    Procedure: LAPAROSCOPY, DIAGNOSTIC;  Surgeon: Tosin Boothe MD;  Location: [facility] OR;  Service: Colon and Rectal;  Laterality: N/A;    EPIDURAL STEROID INJECTION N/A 11/13/2023    Procedure: Lumbar L5/S1 IL JOSE;  Surgeon: Oneil Carlson MD;  Location: [facility] PAIN MGT;  Service: Pain Management;  Laterality: N/A;    ESOPHAGOGASTRODUODENOSCOPY N/A 1/5/2024    Procedure: EGD (ESOPHAGOGASTRODUODENOSCOPY);  Surgeon: Irasema Gil MD;  Location: [facility] ENDO;  Service: Endoscopy;  Laterality: N/A;    INJECTION OF ANESTHETIC AGENT AROUND MEDIAL BRANCH NERVES INNERVATING LUMBAR FACET JOINT Right 11/4/2024    Procedure: Right L3-5 MBB;  Surgeon: Oneil Carlson MD;  Location: [facility] PAIN MGT;  Service: Pain Management;  Laterality: Right;    INJECTION OF ANESTHETIC AGENT AROUND MEDIAL BRANCH NERVES INNERVATING LUMBAR FACET JOINT Right 11/27/2024    Procedure: Right L3-L5 MBB;  Surgeon: Oneil Carlson MD;  Location: [facility] PAIN MGT;  Service: Pain Management;  Laterality: Right;    INJECTION OF ANESTHETIC AGENT INTO TISSUE PLANE DEFINED BY TRANSVERSUS ABDOMINIS MUSCLE N/A 3/11/2024    Procedure: BLOCK, TRANSVERSUS ABDOMINIS PLANE;  Surgeon: Tosin Boothe MD;  Location: [facility] OR;  Service: Colon and Rectal;  Laterality: N/A;    NO PAST SURGERIES      RADIOFREQUENCY THERMOCOAGULATION Right 12/18/2024    Procedure: Right L3-5 Lumbar RFA;  Surgeon: Oneil Carlson MD;  Location: [facility] PAIN MGT;  Service: Pain Management;  Laterality: Right;    REPAIR OF BLOOD VESSEL WITH PATCH GRAFT Left 4/25/2024     Procedure: ANGIOPLASTY, USING PATCH GRAFT;  Surgeon: Chrissie Parks MD;  Location: [facility] OR;  Service: Peripheral Vascular;  Laterality: Left;    WOUND EXPLORATION N/A 3/16/2024    Procedure: EXPLORATION, WOUND;  Surgeon: Magda Stark MD;  Location: [facility] OR;  Service: General;  Laterality: N/A;    XI ROBOTIC COLECTOMY, RIGHT Right 3/11/2024    Procedure: XI ROBOTIC COLECTOMY, RIGHT;  Surgeon: Tosin Boothe MD;  Location: [facility] OR;  Service: Colon and Rectal;  Laterality: Right;  WITH OMENECTOMY AND AMNIOFIX     Social History     Socioeconomic History    Marital status:    Tobacco Use    Smoking status: Former     Passive exposure: Never    Smokeless tobacco: Never   Substance and Sexual Activity    Alcohol use: Not Currently     Comment: OCC    Drug use: Never    Sexual activity: Yes     Partners: Female     Social Drivers of Health     Financial Resource Strain: Low Risk  (11/13/2024)    Overall Financial Resource Strain (CARDIA)     Difficulty of Paying Living Expenses: Not hard at all   Food Insecurity: No Food Insecurity (11/13/2024)    Hunger Vital Sign     Worried About Running Out of Food in the Last Year: Never true     Ran Out of Food in the Last Year: Never true   Transportation Needs: Patient Unable To Answer (3/17/2024)    PRAPARE - Transportation     Lack of Transportation (Medical): Patient unable to answer     Lack of Transportation (Non-Medical): Patient unable to answer   Physical Activity: Inactive (11/13/2024)    Exercise Vital Sign     Days of Exercise per Week: 0 days     Minutes of Exercise per Session: 0 min   Stress: No Stress Concern Present (11/13/2024)    [employer] - Occupational Stress Questionnaire     Feeling of Stress : Not at all   Housing Stability: Unknown (11/13/2024)    Housing Stability Vital Sign     Unable to Pay for Housing in the Last Year: No     Family History   Problem Relation Name Age of Onset    No Known Problems  Mother      No Known Problems Father         Review of patient's allergies indicates:  No Known Allergies    Current Outpatient Medications   Medication Sig    amLODIPine (NORVASC) 10 MG tablet Take 1 tablet (10 mg total) by mouth once daily.    aspirin 81 MG Chew Take 1 tablet (81 mg total) by mouth once daily.    atorvastatin (LIPITOR) 20 MG tablet Take 1 tablet (20 mg total) by mouth once daily.    fenofibrate (TRICOR) 48 MG tablet Take 1 tablet (48 mg total) by mouth once daily.    fenofibrate (TRICOR) 54 MG tablet TAKE 1 TABLET BY MOUTH ONCE DAILY    ferrous sulfate (FEOSOL) 325 mg (65 mg iron) Tab tablet Take 1 tablet (325 mg total) by mouth once daily.    FREESTYLE FREEDOM LITE kit by Other route.    FREESTYLE LANCETS 28 gauge lancets Apply 28 lancets topically 3 (three) times daily.    FREESTYLE LITE STRIPS Strp Inject 300 strips as directed as needed.    metFORMIN (GLUCOPHAGE) 500 MG tablet Take 1 tablet (500 mg total) by mouth 2 (two) times daily with meals.    metoprolol succinate (TOPROL-XL) 25 MG 24 hr tablet Take 1 tablet (25 mg total) by mouth 2 (two) times daily.    olmesartan (BENICAR) 20 MG tablet Take 1 tablet (20 mg total) by mouth nightly.    apixaban (ELIQUIS) 5 mg Tab Take 1 tablet (5 mg total) by mouth 2 (two) times daily. (Patient not taking: Reported on 1/15/2025)    clopidogreL (PLAVIX) 75 mg tablet Take 75 mg by mouth once daily. Pt states he does not normally take this medication. He was unable to fill his Eliquis prescrption. He was on Plavix recently but was ordered to stop taking it and take the Eliquis only. (Patient not taking: Reported on 1/15/2025)     No current facility-administered medications for this visit.         ROS  Review of Systems   Constitutional:  Negative for activity change, appetite change and fever.   Respiratory:  Negative for cough, chest tightness, shortness of breath, wheezing and stridor.    Cardiovascular:  Negative for chest pain and palpitations.  "  Gastrointestinal:  Negative for abdominal pain, blood in stool, constipation, diarrhea, nausea and vomiting.   Endocrine: Negative for polydipsia, polyphagia and polyuria.   Genitourinary:  Negative for dysuria and hematuria.   Musculoskeletal:  Positive for arthralgias, back pain, gait problem and myalgias. Negative for joint swelling, neck pain and neck stiffness.   Skin:  Negative for rash.   Allergic/Immunologic: Negative for food allergies.   Neurological:  Negative for dizziness, tremors, seizures, syncope, facial asymmetry, speech difficulty, light-headedness and headaches.   Psychiatric/Behavioral:  Negative for agitation, hallucinations, self-injury and suicidal ideas. The patient is not nervous/anxious and is not hyperactive.             OBJECTIVE:  BP (!) 189/79 Comment: no B.P meds  Pulse (!) 5   Resp 17   Ht 5' 5" (1.651 m)   Wt 99 kg (218 lb 4.1 oz)   BMI 36.32 kg/m²         Physical Exam  Constitutional:       General: He is not in acute distress.     Appearance: Normal appearance. He is not ill-appearing.   HENT:      Head: Normocephalic and atraumatic.      Nose: No congestion or rhinorrhea.   Eyes:      Extraocular Movements: Extraocular movements intact.      Pupils: Pupils are equal, round, and reactive to light.   Cardiovascular:      Pulses: Normal pulses.   Pulmonary:      Effort: Pulmonary effort is normal.   Abdominal:      General: Abdomen is flat.      Palpations: Abdomen is soft.   Skin:     General: Skin is warm and dry.      Capillary Refill: Capillary refill takes less than 2 seconds.   Neurological:      General: No focal deficit present.      Mental Status: He is alert and oriented to person, place, and time.      Sensory: Sensory deficit present.      Motor: Weakness present. No abnormal muscle tone.      Gait: Gait abnormal.      Deep Tendon Reflexes:      Reflex Scores:       Patellar reflexes are 2+ on the right side and 2+ on the left side.       Achilles reflexes are 2+ " on the right side and 1+ on the left side.     Comments: Decreased light touch sensation over lateral aspect left calf  4/5 strength in left dorsiflexion   Psychiatric:         Mood and Affect: Mood normal.         Behavior: Behavior normal.         Thought Content: Thought content normal.           Musculoskeletal:      Lumbar Exam  Incision: no  Pain with Flexion: yes  Pain with Extension: yes  ROM:  Decreased  Paraspinous TTP:  Positive R side only  Facet Loading:  Positive right side  SLR:  Negative bilaterally  SIJ TTP:  Negative bilaterally      LABS:  Lab Results   Component Value Date    WBC 7.92 10/21/2024    HGB 11.8 (L) 10/21/2024    HCT 36.6 (L) 10/21/2024    MCV 87 10/21/2024     10/21/2024       ASSESSMENT:       74 y.o. year old male with left lower extremity pain, consistent with     1. Lumbar spondylosis        2. Lumbar radiculopathy        3. Spondylolisthesis of lumbar region                    PLAN:   - Interventions: none at this time    Can repeat L5/S1 IL JOSE if radiculopathy returns      - S/p  L5-S1 interlaminar epidural steroid injection with 75% relief    - Anticoagulation use:   No no anticoagulation    - Medications:   - DC zanaflex   - Increase lyrica to 150mg BID. We discussed potential side effects of this medication which may include drowsiness,dizziness, dry mouth, constipation or peripheral edema.       - Therapy:    Patient has completed  formal physical therapy in the past with no pain relief.      - Imaging/Diagnostic:   X-ray of lumbar spine reveals grade 1 anterolisthesis of L5 upon S1 with loss of disc height at L5-S1 and L4-5.  Degenerative changes of the facets noted at L4-5 and L5-S1.   MRI of lumbar spine reviewed and findings discussed with patient.  Significant for grade 1 anterolisthesis of L4 upon L5.  There is noted disc extrusion at L4-5 resulting in severe canal stenosis and severe bilateral recess stenosis.  Dural canal measures 6 mm.  Associated  right-greater-than-left foraminal stenosis with extrusion.  There is noted loss of height at L5-S1 with left paracentral disc protrusion causing Moderate to severe left foraminal stenosis.   EMG and nerve conduction study of bilateral lower extremities reveals acute on chronic bilateral L5 and S1 radiculopathy    - Consults:   Can consider referral to Neurosurgery if lower back pain continues for evaluation of disc extrusion at L4-5    - Miscellaneous:  Pt provided with temporary handicap tag      - Patient Questions: Answered all of the patient's questions regarding diagnosis, therapy, and treatment     This condition does not require this patient to take time off of work, and the primary goal of our Pain Management services is to improve the patient's functional capacity.     - Follow up visit:  3-6 months or sooner if needed  BP elevated. Did not take his medications this morning. Will go home and take medications and monitor BP, if remains >160/100, he will go to urgent care. No CP or HA      The above plan and management options were discussed at length with patient. Patient is in agreement with the above and verbalized understanding.    I discussed the goals of interventional chronic pain management with the patient on today's visit.  I explained the utility of injections for diagnostic and therapeutic purposes.  We discussed a multimodal approach to pain including treating the patient's given worst pain at any given time.  We will use a systematic approach to addressing pain.  We will also adopt a multimodal approach that includes injections, adjuvant medications, physical therapy, at times psychiatry.  There may be a limited role for opioid use intermittently in the treatment of pain, more particularly for acute pain although no one approach can be used as a sole treatment modality.    I emphasized the importance of regular exercise, core strengthening and stretching, diet and weight loss as a cornerstone of  long-term pain management.  Visit today included increased complexity associated with the care of the episodic problem of chronic pain which was addressed and continue to manage the longitudinal care of the patient due to the serious and/or complex managed problem(s) listed above.      Becky Clark NP  Interventional Pain Management  [facility]    Disclaimer:  This note was prepared using voice recognition system and is likely to have sound alike errors that may have been overlooked even after proof reading.  Please call me with any questions

## 2025-01-13 NOTE — TELEPHONE ENCOUNTER
----- Message from Yair Garcia MD sent at 1/13/2025  3:51 PM CST -----  No AF or AFL on his monitor. He can stop his eliquis. No need for follow up thanks  ----- Message -----  From: Joi Hutson RN  Sent: 1/13/2025  11:04 AM CST  To: Yair Garcia MD    Pt had new pt clinic visit with you on 11/13/24:  Plan:  74 yoM here for arrhythmia management. He has a single episode of self limited arrhythmia while suffering a severe post operative complication. I would not continue therapy unless he has subsequent arrhythmia. Will assess with Zio.         Please review zio results and let me know what you would like me to tell the pt about his results and if you would like a f/u appt scheduled for the pt    Joi Landon  ----- Message -----  From: Joi Hutson RN  Sent: 1/13/2025  10:47 AM CST  To: Joi Hutson RN      ----- Message -----  From: Danielle Jay  Sent: 1/13/2025   8:47 AM CST  To: Jose Mazariegos Staff    Type:  Test Results    Who Called: pt  Name of Test (Lab/Mammo/Etc): ekg  Date of Test: na  Ordering Provider: na  Where the test was performed: na  Would the patient rather a call back or a response via [facility]? call  Best Call Back Number: [phone]    Additional Information:  requesting call regarding results

## 2025-01-13 NOTE — TELEPHONE ENCOUNTER
Called pt, no answer left message that per Dr Garcia there was no AF or AFL on monitor and that he can stop his eliquis

## 2025-01-13 NOTE — TELEPHONE ENCOUNTER
Call pt to schedule a med refill visit with LIZA Clark NP. Pt was scheduled for 1/15/24.    .Ladonna ELIZABETH

## 2025-01-14 RX ORDER — METFORMIN HYDROCHLORIDE 500 MG/1
500 TABLET ORAL 2 TIMES DAILY WITH MEALS
Qty: 200 TABLET | Refills: 0 | Status: SHIPPED | OUTPATIENT
Start: 2025-01-14 | End: 2025-04-24

## 2025-01-14 RX ORDER — FERROUS SULFATE 325(65) MG
325 TABLET ORAL DAILY
Qty: 100 TABLET | Refills: 0 | Status: SHIPPED | OUTPATIENT
Start: 2025-01-14 | End: 2025-04-24

## 2025-01-14 NOTE — TELEPHONE ENCOUNTER
Spoke with pt to make sure he did receive the message, pt reports he did and will stop his eliquis

## 2025-01-14 NOTE — TELEPHONE ENCOUNTER
Refill Routing Note   Medication(s) are not appropriate for processing by [facility] for the following reason(s):        Outside of protocol  Required labs outdated    [facility] action(s):  Defer  Route               Appointments  past 12m or future 3m with PCP    Date Provider   Last Visit   10/21/2024 Bishop Gabriel MD   Next Visit   1/21/2025 Bishop Gabriel MD   ED visits in past 90 days: 0        Note composed:10:03 PM 01/13/2025

## 2025-01-15 ENCOUNTER — OFFICE VISIT (OUTPATIENT)
Dept: PAIN MEDICINE | Facility: CLINIC | Age: 75
End: 2025-01-15
Payer: MEDICARE

## 2025-01-15 VITALS
HEART RATE: 5 BPM | DIASTOLIC BLOOD PRESSURE: 79 MMHG | BODY MASS INDEX: 36.36 KG/M2 | WEIGHT: 218.25 LBS | SYSTOLIC BLOOD PRESSURE: 189 MMHG | RESPIRATION RATE: 17 BRPM | HEIGHT: 65 IN

## 2025-01-15 DIAGNOSIS — M47.816 LUMBAR SPONDYLOSIS: Primary | ICD-10-CM

## 2025-01-15 DIAGNOSIS — M43.16 SPONDYLOLISTHESIS OF LUMBAR REGION: ICD-10-CM

## 2025-01-15 DIAGNOSIS — M54.16 LUMBAR RADICULOPATHY: Primary | ICD-10-CM

## 2025-01-15 DIAGNOSIS — M54.16 LUMBAR RADICULOPATHY: ICD-10-CM

## 2025-01-15 PROCEDURE — 1101F PT FALLS ASSESS-DOCD LE1/YR: CPT | Mod: CPTII,S$GLB,, | Performed by: NURSE PRACTITIONER

## 2025-01-15 PROCEDURE — 99214 OFFICE O/P EST MOD 30 MIN: CPT | Mod: S$GLB,,, | Performed by: NURSE PRACTITIONER

## 2025-01-15 PROCEDURE — 1125F AMNT PAIN NOTED PAIN PRSNT: CPT | Mod: CPTII,S$GLB,, | Performed by: NURSE PRACTITIONER

## 2025-01-15 PROCEDURE — 99999 PR PBB SHADOW E&M-EST. PATIENT-LVL III: CPT | Mod: PBBFAC,,, | Performed by: NURSE PRACTITIONER

## 2025-01-15 PROCEDURE — 3077F SYST BP >= 140 MM HG: CPT | Mod: CPTII,S$GLB,, | Performed by: NURSE PRACTITIONER

## 2025-01-15 PROCEDURE — 1159F MED LIST DOCD IN RCRD: CPT | Mod: CPTII,S$GLB,, | Performed by: NURSE PRACTITIONER

## 2025-01-15 PROCEDURE — 3008F BODY MASS INDEX DOCD: CPT | Mod: CPTII,S$GLB,, | Performed by: NURSE PRACTITIONER

## 2025-01-15 PROCEDURE — G2211 COMPLEX E/M VISIT ADD ON: HCPCS | Mod: S$GLB,,, | Performed by: NURSE PRACTITIONER

## 2025-01-15 PROCEDURE — 3078F DIAST BP <80 MM HG: CPT | Mod: CPTII,S$GLB,, | Performed by: NURSE PRACTITIONER

## 2025-01-15 PROCEDURE — 3288F FALL RISK ASSESSMENT DOCD: CPT | Mod: CPTII,S$GLB,, | Performed by: NURSE PRACTITIONER

## 2025-01-15 RX ORDER — PREGABALIN 150 MG/1
150 CAPSULE ORAL 2 TIMES DAILY
Qty: 60 CAPSULE | Refills: 3 | Status: SHIPPED | OUTPATIENT
Start: 2025-01-15 | End: 2025-07-16

## 2025-01-20 ENCOUNTER — PATIENT MESSAGE (OUTPATIENT)
Dept: FAMILY MEDICINE | Facility: CLINIC | Age: 75
End: 2025-01-20
Payer: MEDICARE

## 2025-01-28 ENCOUNTER — LAB VISIT (OUTPATIENT)
Dept: LAB | Facility: HOSPITAL | Age: 75
End: 2025-01-28
Attending: FAMILY MEDICINE
Payer: MEDICARE

## 2025-01-28 ENCOUNTER — OFFICE VISIT (OUTPATIENT)
Dept: FAMILY MEDICINE | Facility: CLINIC | Age: 75
End: 2025-01-28
Payer: MEDICARE

## 2025-01-28 ENCOUNTER — TELEPHONE (OUTPATIENT)
Dept: FAMILY MEDICINE | Facility: CLINIC | Age: 75
End: 2025-01-28

## 2025-01-28 VITALS
SYSTOLIC BLOOD PRESSURE: 160 MMHG | HEART RATE: 87 BPM | DIASTOLIC BLOOD PRESSURE: 68 MMHG | OXYGEN SATURATION: 95 % | WEIGHT: 225.06 LBS | HEIGHT: 65 IN | TEMPERATURE: 96 F | BODY MASS INDEX: 37.5 KG/M2

## 2025-01-28 DIAGNOSIS — M54.16 LUMBAR RADICULOPATHY: ICD-10-CM

## 2025-01-28 DIAGNOSIS — E78.2 MIXED HYPERLIPIDEMIA: ICD-10-CM

## 2025-01-28 DIAGNOSIS — I10 ESSENTIAL HYPERTENSION: ICD-10-CM

## 2025-01-28 DIAGNOSIS — I27.20 PULMONARY HYPERTENSION: Primary | ICD-10-CM

## 2025-01-28 DIAGNOSIS — N18.31 STAGE 3A CHRONIC KIDNEY DISEASE: ICD-10-CM

## 2025-01-28 DIAGNOSIS — R73.03 PREDIABETES: ICD-10-CM

## 2025-01-28 DIAGNOSIS — K63.5 POLYP OF COLON, UNSPECIFIED PART OF COLON, UNSPECIFIED TYPE: ICD-10-CM

## 2025-01-28 DIAGNOSIS — I48.91 NEW ONSET A-FIB: ICD-10-CM

## 2025-01-28 DIAGNOSIS — Z98.890 S/P CAROTID ENDARTERECTOMY: ICD-10-CM

## 2025-01-28 DIAGNOSIS — Z90.49 S/P RIGHT HEMICOLECTOMY: ICD-10-CM

## 2025-01-28 DIAGNOSIS — E66.01 SEVERE OBESITY (BMI 35.0-39.9) WITH COMORBIDITY: ICD-10-CM

## 2025-01-28 LAB
ALBUMIN SERPL BCP-MCNC: 4 G/DL (ref 3.5–5.2)
ALP SERPL-CCNC: 62 U/L (ref 40–150)
ALT SERPL W/O P-5'-P-CCNC: 17 U/L (ref 10–44)
ANION GAP SERPL CALC-SCNC: 7 MMOL/L (ref 8–16)
AST SERPL-CCNC: 19 U/L (ref 10–40)
BASOPHILS # BLD AUTO: 0.04 K/UL (ref 0–0.2)
BASOPHILS NFR BLD: 0.6 % (ref 0–1.9)
BILIRUB SERPL-MCNC: 0.5 MG/DL (ref 0.1–1)
BUN SERPL-MCNC: 18 MG/DL (ref 8–23)
CALCIUM SERPL-MCNC: 9.4 MG/DL (ref 8.7–10.5)
CHLORIDE SERPL-SCNC: 107 MMOL/L (ref 95–110)
CO2 SERPL-SCNC: 27 MMOL/L (ref 23–29)
CREAT SERPL-MCNC: 1.2 MG/DL (ref 0.5–1.4)
DIFFERENTIAL METHOD BLD: ABNORMAL
EOSINOPHIL # BLD AUTO: 0.1 K/UL (ref 0–0.5)
EOSINOPHIL NFR BLD: 1.6 % (ref 0–8)
ERYTHROCYTE [DISTWIDTH] IN BLOOD BY AUTOMATED COUNT: 13.4 % (ref 11.5–14.5)
EST. GFR  (NO RACE VARIABLE): >60 ML/MIN/1.73 M^2
ESTIMATED AVG GLUCOSE: 105 MG/DL (ref 68–131)
GLUCOSE SERPL-MCNC: 93 MG/DL (ref 70–110)
HBA1C MFR BLD: 5.3 % (ref 4–5.6)
HCT VFR BLD AUTO: 37.3 % (ref 40–54)
HGB BLD-MCNC: 11.9 G/DL (ref 14–18)
IMM GRANULOCYTES # BLD AUTO: 0.03 K/UL (ref 0–0.04)
IMM GRANULOCYTES NFR BLD AUTO: 0.5 % (ref 0–0.5)
LYMPHOCYTES # BLD AUTO: 0.8 K/UL (ref 1–4.8)
LYMPHOCYTES NFR BLD: 12.8 % (ref 18–48)
MCH RBC QN AUTO: 28.3 PG (ref 27–31)
MCHC RBC AUTO-ENTMCNC: 31.9 G/DL (ref 32–36)
MCV RBC AUTO: 89 FL (ref 82–98)
MONOCYTES # BLD AUTO: 0.6 K/UL (ref 0.3–1)
MONOCYTES NFR BLD: 9.9 % (ref 4–15)
NEUTROPHILS # BLD AUTO: 4.7 K/UL (ref 1.8–7.7)
NEUTROPHILS NFR BLD: 74.6 % (ref 38–73)
NRBC BLD-RTO: 0 /100 WBC
PLATELET # BLD AUTO: 171 K/UL (ref 150–450)
PMV BLD AUTO: 11.1 FL (ref 9.2–12.9)
POTASSIUM SERPL-SCNC: 4.9 MMOL/L (ref 3.5–5.1)
PROT SERPL-MCNC: 7.3 G/DL (ref 6–8.4)
RBC # BLD AUTO: 4.2 M/UL (ref 4.6–6.2)
SODIUM SERPL-SCNC: 141 MMOL/L (ref 136–145)
WBC # BLD AUTO: 6.26 K/UL (ref 3.9–12.7)

## 2025-01-28 PROCEDURE — 1159F MED LIST DOCD IN RCRD: CPT | Mod: CPTII,S$GLB,, | Performed by: FAMILY MEDICINE

## 2025-01-28 PROCEDURE — G2211 COMPLEX E/M VISIT ADD ON: HCPCS | Mod: S$GLB,,, | Performed by: FAMILY MEDICINE

## 2025-01-28 PROCEDURE — 99999 PR PBB SHADOW E&M-EST. PATIENT-LVL IV: CPT | Mod: PBBFAC,,, | Performed by: FAMILY MEDICINE

## 2025-01-28 PROCEDURE — 3077F SYST BP >= 140 MM HG: CPT | Mod: CPTII,S$GLB,, | Performed by: FAMILY MEDICINE

## 2025-01-28 PROCEDURE — 3288F FALL RISK ASSESSMENT DOCD: CPT | Mod: CPTII,S$GLB,, | Performed by: FAMILY MEDICINE

## 2025-01-28 PROCEDURE — 85025 COMPLETE CBC W/AUTO DIFF WBC: CPT | Performed by: FAMILY MEDICINE

## 2025-01-28 PROCEDURE — 99215 OFFICE O/P EST HI 40 MIN: CPT | Mod: S$GLB,,, | Performed by: FAMILY MEDICINE

## 2025-01-28 PROCEDURE — 3078F DIAST BP <80 MM HG: CPT | Mod: CPTII,S$GLB,, | Performed by: FAMILY MEDICINE

## 2025-01-28 PROCEDURE — 83036 HEMOGLOBIN GLYCOSYLATED A1C: CPT | Performed by: FAMILY MEDICINE

## 2025-01-28 PROCEDURE — 80053 COMPREHEN METABOLIC PANEL: CPT | Performed by: FAMILY MEDICINE

## 2025-01-28 PROCEDURE — 1101F PT FALLS ASSESS-DOCD LE1/YR: CPT | Mod: CPTII,S$GLB,, | Performed by: FAMILY MEDICINE

## 2025-01-28 PROCEDURE — 1126F AMNT PAIN NOTED NONE PRSNT: CPT | Mod: CPTII,S$GLB,, | Performed by: FAMILY MEDICINE

## 2025-01-28 PROCEDURE — 36415 COLL VENOUS BLD VENIPUNCTURE: CPT | Mod: PO | Performed by: FAMILY MEDICINE

## 2025-01-28 PROCEDURE — 3008F BODY MASS INDEX DOCD: CPT | Mod: CPTII,S$GLB,, | Performed by: FAMILY MEDICINE

## 2025-01-28 RX ORDER — OLMESARTAN MEDOXOMIL 40 MG/1
40 TABLET ORAL DAILY
Qty: 90 TABLET | Refills: 3 | Status: SHIPPED | OUTPATIENT
Start: 2025-01-28 | End: 2026-01-28

## 2025-01-28 NOTE — TELEPHONE ENCOUNTER
----- Message from Janie sent at 1/28/2025  9:29 AM CST -----  Contact: [place]/[facility]  [place] is calling in regards to rather the pt stopped the olmesartan (BENICAR) 20 MG tablet.please call back at [phone]      [facility]  [address]   [place] [address]     Thanks  ANDREY

## 2025-01-28 NOTE — PROGRESS NOTES
Subjective:       Patient ID: Quintin Ling is a 74 y.o. male.    Chief Complaint: Follow-up      HPI Comments:       Current Outpatient Medications:     amLODIPine (NORVASC) 10 MG tablet, Take 1 tablet (10 mg total) by mouth once daily., Disp: 30 tablet, Rfl: 11    aspirin 81 MG Chew, Take 1 tablet (81 mg total) by mouth once daily., Disp: 360 tablet, Rfl: 0    atorvastatin (LIPITOR) 20 MG tablet, Take 1 tablet (20 mg total) by mouth once daily., Disp: 90 tablet, Rfl: 0    fenofibrate (TRICOR) 48 MG tablet, Take 1 tablet (48 mg total) by mouth once daily., Disp: 90 tablet, Rfl: 3    fenofibrate (TRICOR) 54 MG tablet, TAKE 1 TABLET BY MOUTH ONCE DAILY, Disp: 90 tablet, Rfl: 3    ferrous sulfate (FEOSOL) 325 mg (65 mg iron) Tab tablet, Take 1 tablet (325 mg total) by mouth once daily., Disp: 100 tablet, Rfl: 0    FREESTYLE FREEDOM LITE kit, by Other route., Disp: , Rfl:     FREESTYLE LANCETS 28 gauge lancets, Apply 28 lancets topically 3 (three) times daily., Disp: , Rfl:     FREESTYLE LITE STRIPS Strp, Inject 300 strips as directed as needed., Disp: , Rfl:     metFORMIN (GLUCOPHAGE) 500 MG tablet, Take 1 tablet (500 mg total) by mouth 2 (two) times daily with meals., Disp: 200 tablet, Rfl: 0    metoprolol succinate (TOPROL-XL) 25 MG 24 hr tablet, Take 1 tablet (25 mg total) by mouth 2 (two) times daily., Disp: 180 tablet, Rfl: 3    pregabalin (LYRICA) 150 MG capsule, Take 1 capsule (150 mg total) by mouth 2 (two) times daily., Disp: 60 capsule, Rfl: 3    olmesartan (BENICAR) 40 MG tablet, Take 1 tablet (40 mg total) by mouth once daily., Disp: 90 tablet, Rfl: 3      Last seen by me 3 months ago.      Generally doing well.  Recent back procedures have not helped his back pain very much.  Lyrica was increase in his taking it twice a day.      Was on Eliquis for awhile because of an episode of atrial fibrillation.  Subsequent extended Holter showed no  arrhythmias/no AFib.  This was found to be a isolated case and he  "was told to stop the Eliquis.  Unfortunately he had stop Plavix just prior to that because of the cost, thinking that he was covered with the Eliquis.  With the Eliquis was discontinued he did not go back on the Plavix in his now only on 1 baby aspirin per day.  He has bilateral carotid artery stenosis that has been monitored closely.  I sent a note to both vascular and arrhythmia cardiology specialist to advise on the case of the Plavix.    Due for a 1 year repeat colonoscopy this year.  He is already working with the schedulers and thinks he is going to doing in February.      Review of Systems   Constitutional:  Negative for activity change, appetite change and fever.   HENT:  Negative for sore throat.    Respiratory:  Negative for cough and shortness of breath.    Cardiovascular:  Negative for chest pain.   Gastrointestinal:  Negative for abdominal pain, diarrhea and nausea.   Genitourinary:  Negative for difficulty urinating.   Musculoskeletal:  Positive for back pain. Negative for arthralgias and myalgias.   Neurological:  Negative for dizziness and headaches.       Objective:      Vitals:    01/28/25 0853   BP: (!) 160/68   Pulse: 87   Temp: 96.3 °F (35.7 °C)   TempSrc: Tympanic   SpO2: 95%   Weight: 102.1 kg (225 lb 1.4 oz)   Height: 5' 5" (1.651 m)   PainSc: 0-No pain     Physical Exam  Vitals and nursing note reviewed.   Constitutional:       General: He is not in acute distress.     Appearance: He is well-developed. He is not diaphoretic.   HENT:      Head: Normocephalic.   Neck:      Thyroid: No thyromegaly.   Cardiovascular:      Rate and Rhythm: Normal rate and regular rhythm.      Heart sounds: Normal heart sounds. No murmur heard.  Pulmonary:      Effort: Pulmonary effort is normal.      Breath sounds: Normal breath sounds. No wheezing or rales.   Abdominal:      General: There is no distension.      Palpations: Abdomen is soft.   Musculoskeletal:      Cervical back: Neck supple.   Lymphadenopathy: "      Cervical: No cervical adenopathy.   Skin:     General: Skin is warm and dry.   Neurological:      Mental Status: He is alert and oriented to person, place, and time.   Psychiatric:         Mood and Affect: Mood normal.         Behavior: Behavior normal.         Thought Content: Thought content normal.         Judgment: Judgment normal.         Assessment:       1. Pulmonary hypertension    2. Severe obesity (BMI 35.0-39.9) with comorbidity    3. New onset a-fib    4. Stage 3a chronic kidney disease    5. Essential hypertension    6. Prediabetes    7. S/P carotid endarterectomy    8. Mixed hyperlipidemia    9. Polyp of colon, unspecified part of colon, unspecified type    10. Lumbar radiculopathy    11. S/P right hemicolectomy        Plan:   Pulmonary hypertension  Comments:  Monitored by Cardiology and pulmonology    Severe obesity (BMI 35.0-39.9) with comorbidity  Comments:  No change in weight.  Limited physical activity due to back pain    New onset a-fib  Comments:  one time only. No more eliquis    Stage 3a chronic kidney disease  Comments:  CMP today  Orders:  -     Comprehensive Metabolic Panel; Future; Expected date: 01/28/2025    Essential hypertension  Comments:  Increase olmesartan to 40 mg.  Nurse visit recheck in one-month.  Follow-up with me in 6 months  Orders:  -     CBC Auto Differential; Future; Expected date: 01/28/2025    Prediabetes  Comments:  A1c today  Orders:  -     Hemoglobin A1C; Future; Expected date: 01/28/2025    S/P carotid endarterectomy  Comments:  Left-sided.  Estimated to have a 40-59% stenosis now.  Right side 60-79%.  On aspirin.  Message sent to vascular about Plavix    Mixed hyperlipidemia  Comments:  LDL 58 on statin    Polyp of colon, unspecified part of colon, unspecified type  Comments:  Repeat colonoscopy in a month    Lumbar radiculopathy  Comments:  Followed closely by pain management    S/P right hemicolectomy  Comments:  for unresectable adenoma.  No further  complications (wound dehiscence)    Other orders  -     olmesartan (BENICAR) 40 MG tablet; Take 1 tablet (40 mg total) by mouth once daily.  Dispense: 90 tablet; Refill: 3

## 2025-01-28 NOTE — TELEPHONE ENCOUNTER
Called pharmacy back to clarify medication olmesatan WAS CHANGED FROM 20MG TO 40MG once daily. She verbalized understanding.

## 2025-01-29 ENCOUNTER — TELEPHONE (OUTPATIENT)
Dept: FAMILY MEDICINE | Facility: CLINIC | Age: 75
End: 2025-01-29
Payer: MEDICARE

## 2025-01-29 NOTE — TELEPHONE ENCOUNTER
----- Message from Bishop Gabriel MD sent at 1/29/2025  8:49 AM CST -----  Blood work came out good.    Tell him I talked to the other 2 SPECIALISTS AND THEY BOTH AGREE THAT HE NEEDS TO RESTART HIS PLAVIX

## 2025-01-29 NOTE — TELEPHONE ENCOUNTER
I called the patient to inform him of his results and successfully reached him. He verified his information. The patient verbalized understanding regarding restarting Plavix. He then stated that it was actually Eliquis, not Plavix, that is too expensive for him to obtain.

## 2025-02-03 DIAGNOSIS — Z86.0100 HISTORY OF COLON POLYPS: Primary | ICD-10-CM

## 2025-02-03 RX ORDER — SODIUM, POTASSIUM,MAG SULFATES 17.5-3.13G
1 SOLUTION, RECONSTITUTED, ORAL ORAL DAILY
Qty: 1 KIT | Refills: 0 | Status: SHIPPED | OUTPATIENT
Start: 2025-02-03 | End: 2025-02-05

## 2025-02-07 ENCOUNTER — E-CONSULT (OUTPATIENT)
Dept: CARDIOLOGY | Facility: CLINIC | Age: 75
End: 2025-02-07
Payer: MEDICARE

## 2025-02-07 DIAGNOSIS — Z01.810 PREOP CARDIOVASCULAR EXAM: Primary | ICD-10-CM

## 2025-02-07 PROCEDURE — 99451 NTRPROF PH1/NTRNET/EHR 5/>: CPT | Mod: S$GLB,,, | Performed by: INTERNAL MEDICINE

## 2025-02-07 RX ORDER — CLOPIDOGREL BISULFATE 75 MG/1
75 TABLET ORAL DAILY
COMMUNITY

## 2025-02-07 NOTE — CONSULTS
[facility]  Response for E-Consult     Patient Name: Quintin Ling  MRN: [id number]  Primary Care Provider: Bishop Gabriel MD   Requesting Provider: Wilma Montiel NP  E-Consult to General Cardiology  Consult performed by: Chai Paiz MD  Consult ordered by: Wilma Montiel NP           73 yo male E consult for preop clearance of colonoscopy  The chart reviewed.  PMH afib PAD pulm HTN s/p CEA  03/24 echo showed EF 60% and PASP 37 mmHg  11/24 EKG NSR IVCD    Plan  Elevated periop risk of CV events for non-high risk procedure.  Ok to proceed the scheduled procedure without further cardiac study.  OK to hold ASA and Plavix 5 days before the procedure and resume postop once hemodynamically stable      Total time of Consultation: 10 minute    I did not speak to the requesting provider verbally about this.     *This eConsult is based on the clinical data available to me and is furnished without benefit of a physical examination. The eConsult will need to be interpreted in light of any clinical issues or changes in patient status not available to me at the time of filing this eConsults. Significant changes in patient condition or level of acuity should result in immediate formal consultation and reevaluation. Please alert me if you have further questions.    Thank you for this eConsult referral.     Chai Paiz MD  [facility]

## 2025-02-13 ENCOUNTER — ANESTHESIA EVENT (OUTPATIENT)
Dept: ENDOSCOPY | Facility: HOSPITAL | Age: 75
End: 2025-02-13
Payer: MEDICARE

## 2025-02-13 ENCOUNTER — ANESTHESIA (OUTPATIENT)
Dept: ENDOSCOPY | Facility: HOSPITAL | Age: 75
End: 2025-02-13
Payer: MEDICARE

## 2025-02-13 ENCOUNTER — HOSPITAL ENCOUNTER (OUTPATIENT)
Dept: ENDOSCOPY | Facility: HOSPITAL | Age: 75
Discharge: HOME | End: 2025-02-13
Attending: STUDENT IN AN ORGANIZED HEALTH CARE EDUCATION/TRAINING PROGRAM
Payer: MEDICARE

## 2025-02-13 DIAGNOSIS — Z86.0100 HISTORY OF COLON POLYPS: Primary | ICD-10-CM

## 2025-02-13 LAB — POCT GLUCOSE: 103 MG/DL (ref 70–110)

## 2025-02-13 PROCEDURE — 37000008 HC ANESTHESIA 1ST 15 MINUTES

## 2025-02-13 PROCEDURE — 37000009 HC ANESTHESIA EA ADD 15 MINS

## 2025-02-13 PROCEDURE — 63600175 PHARM REV CODE 636 W HCPCS: Performed by: NURSE ANESTHETIST, CERTIFIED REGISTERED

## 2025-02-13 PROCEDURE — A4216 STERILE WATER/SALINE, 10 ML: HCPCS | Performed by: NURSE ANESTHETIST, CERTIFIED REGISTERED

## 2025-02-13 PROCEDURE — 27201089 HC SNARE, DISP (ANY)

## 2025-02-13 PROCEDURE — 25000003 PHARM REV CODE 250: Performed by: NURSE ANESTHETIST, CERTIFIED REGISTERED

## 2025-02-13 PROCEDURE — 27200997

## 2025-02-13 RX ORDER — SODIUM CHLORIDE 0.9 % (FLUSH) 0.9 %
SYRINGE (ML) INJECTION
Status: DISCONTINUED | OUTPATIENT
Start: 2025-02-13 | End: 2025-02-13

## 2025-02-13 RX ORDER — PHENYLEPHRINE HYDROCHLORIDE 10 MG/ML
INJECTION INTRAVENOUS
Status: DISCONTINUED | OUTPATIENT
Start: 2025-02-13 | End: 2025-02-13

## 2025-02-13 RX ORDER — PROPOFOL 10 MG/ML
VIAL (ML) INTRAVENOUS
Status: DISCONTINUED | OUTPATIENT
Start: 2025-02-13 | End: 2025-02-13

## 2025-02-13 RX ORDER — LIDOCAINE HYDROCHLORIDE 10 MG/ML
INJECTION, SOLUTION EPIDURAL; INFILTRATION; INTRACAUDAL; PERINEURAL
Status: DISCONTINUED | OUTPATIENT
Start: 2025-02-13 | End: 2025-02-13

## 2025-02-13 RX ADMIN — PHENYLEPHRINE HYDROCHLORIDE 200 MCG: 10 INJECTION INTRAVENOUS at 07:02

## 2025-02-13 RX ADMIN — PROPOFOL 40 MG: 10 INJECTION, EMULSION INTRAVENOUS at 07:02

## 2025-02-13 RX ADMIN — PROPOFOL 50 MG: 10 INJECTION, EMULSION INTRAVENOUS at 06:02

## 2025-02-13 RX ADMIN — PROPOFOL 40 MG: 10 INJECTION, EMULSION INTRAVENOUS at 06:02

## 2025-02-13 RX ADMIN — LIDOCAINE HYDROCHLORIDE 50 MG: 10 SOLUTION INTRAVENOUS at 06:02

## 2025-02-13 RX ADMIN — PHENYLEPHRINE HYDROCHLORIDE 100 MCG: 10 INJECTION INTRAVENOUS at 07:02

## 2025-02-13 RX ADMIN — SODIUM CHLORIDE 10 ML: 9 INJECTION INTRAMUSCULAR; INTRAVENOUS; SUBCUTANEOUS at 06:02

## 2025-02-13 NOTE — TRANSFER OF CARE
"Anesthesia Transfer of Care Note    Patient: Quintin Ling    Procedure(s) Performed: * No procedures listed *    Patient location: GI    Anesthesia Type: MAC    Transport from OR: Transported from OR on room air with adequate spontaneous ventilation    Post pain: adequate analgesia    Post assessment: no apparent anesthetic complications and tolerated procedure well    Post vital signs: stable    Level of consciousness: responds to stimulation    Nausea/Vomiting: no nausea/vomiting    Complications: none    Transfer of care protocol was followed      Last vitals: Visit Vitals  BP (!) 151/67   Pulse 71   Temp 36.6 °C (97.9 °F) (Temporal)   Resp 20   Ht 5' 5" (1.651 m)   Wt 99.8 kg (220 lb)   SpO2 97%   BMI 36.61 kg/m²     "

## 2025-02-13 NOTE — ANESTHESIA PREPROCEDURE EVALUATION
02/13/2025  Quintin Ling is a 74 y.o., male.  Patient Active Problem List   Diagnosis    Essential hypertension    Prediabetes    Severe obesity (BMI 35.0-39.9) with comorbidity    Prostate cancer screening    Erectile dysfunction    Nocturia    Stage 3a chronic kidney disease    Lumbar radiculopathy    Heart murmur    Normocytic anemia    Preop cardiovascular exam    Peripheral arterial disease    New onset a-fib    Nonrheumatic aortic valve stenosis    Syncope    S/P carotid endarterectomy    Bradycardia    Pulmonary hypertension      Past Surgical History:   Procedure Laterality Date    BIOPSY OF PERITONEUM N/A 1/29/2024    Procedure: BIOPSY, PERITONEUM;  Surgeon: Tosin Boothe MD;  Location: [facility] OR;  Service: Colon and Rectal;  Laterality: N/A;    CAROTID ENDARTERECTOMY Left 4/25/2024    Procedure: ENDARTERECTOMY, CAROTID;  Surgeon: Chrissie Parks MD;  Location: [facility] OR;  Service: Peripheral Vascular;  Laterality: Left;    CLOSURE OF WOUND N/A 3/16/2024    Procedure: CLOSURE, WOUND;  Surgeon: Magda Stark MD;  Location: [facility] OR;  Service: General;  Laterality: N/A;    COLONOSCOPY N/A 1/5/2024    Procedure: COLONOSCOPY;  Surgeon: Irasema Gil MD;  Location: [facility] ENDO;  Service: Endoscopy;  Laterality: N/A;    DIAGNOSTIC LAPAROSCOPY N/A 1/29/2024    Procedure: LAPAROSCOPY, DIAGNOSTIC;  Surgeon: Tosin Boothe MD;  Location: [facility] OR;  Service: Colon and Rectal;  Laterality: N/A;    EPIDURAL STEROID INJECTION N/A 11/13/2023    Procedure: Lumbar L5/S1 IL JOSE;  Surgeon: Oneil Carlson MD;  Location: [facility] PAIN MGT;  Service: Pain Management;  Laterality: N/A;    ESOPHAGOGASTRODUODENOSCOPY N/A 1/5/2024    Procedure: EGD (ESOPHAGOGASTRODUODENOSCOPY);  Surgeon: Irasema Gil MD;  Location: [facility] ENDO;  Service: Endoscopy;  Laterality: N/A;    INJECTION OF ANESTHETIC AGENT  AROUND MEDIAL BRANCH NERVES INNERVATING LUMBAR FACET JOINT Right 11/4/2024    Procedure: Right L3-5 MBB;  Surgeon: Oneil Carlson MD;  Location: [facility] PAIN MGT;  Service: Pain Management;  Laterality: Right;    INJECTION OF ANESTHETIC AGENT AROUND MEDIAL BRANCH NERVES INNERVATING LUMBAR FACET JOINT Right 11/27/2024    Procedure: Right L3-L5 MBB;  Surgeon: Oneil Carlson MD;  Location: [facility] PAIN [facility]GT;  Service: Pain Management;  Laterality: Right;    INJECTION OF ANESTHETIC AGENT INTO TISSUE PLANE DEFINED BY TRANSVERSUS ABDOMINIS MUSCLE N/A 3/11/2024    Procedure: BLOCK, TRANSVERSUS ABDOMINIS PLANE;  Surgeon: Tosin Boothe MD;  Location: [facility] OR;  Service: Colon and Rectal;  Laterality: N/A;    NO PAST SURGERIES      RADIOFREQUENCY THERMOCOAGULATION Right 12/18/2024    Procedure: Right L3-5 Lumbar RFA;  Surgeon: Oneil Carlson MD;  Location: [facility] PAIN MGT;  Service: Pain Management;  Laterality: Right;    REPAIR OF BLOOD VESSEL WITH PATCH GRAFT Left 4/25/2024    Procedure: ANGIOPLASTY, USING PATCH GRAFT;  Surgeon: Chrissie Parks MD;  Location: [facility] OR;  Service: Peripheral Vascular;  Laterality: Left;    WOUND EXPLORATION N/A 3/16/2024    Procedure: EXPLORATION, WOUND;  Surgeon: Magda Stark MD;  Location: [facility] OR;  Service: General;  Laterality: N/A;    XI ROBOTIC COLECTOMY, RIGHT Right 3/11/2024    Procedure: XI ROBOTIC COLECTOMY, RIGHT;  Surgeon: Tosin Boothe MD;  Location: [facility] OR;  Service: Colon and Rectal;  Laterality: Right;  WITH OMENECTOMY AND AMNIOFIX      Pre-op Assessment    I have reviewed the Patient Summary Reports.     I have reviewed the Nursing Notes. I have reviewed the NPO Status.   I have reviewed the Medications.     Review of Systems  Cardiovascular:     Hypertension                                    Hypertension     Atrial Fibrillation     Renal/:  Chronic Renal Disease        Kidney Function/Disease             Hepatic/GI:  Bowel Prep.                    Neurological:    Neuromuscular Disease,                                 Neuromuscular Disease   Endocrine:  Diabetes    Diabetes                          Physical Exam  General: Well nourished, Cooperative, Alert and Oriented    Airway:  Mallampati: II   Mouth Opening: Normal  TM Distance: Normal  Tongue: Normal  Neck ROM: Normal ROM    Dental:  Intact        Anesthesia Plan  Type of Anesthesia, risks & benefits discussed:    Anesthesia Type: MAC, Gen ETT  Intra-op Monitoring Plan: Standard ASA Monitors  Post Op Pain Control Plan: multimodal analgesia  Induction:  IV  Informed Consent: Informed consent signed with the Patient and all parties understand the risks and agree with anesthesia plan.  All questions answered.   ASA Score: 3  Day of Surgery Review of History & Physical: H&P Update referred to the surgeon/provider.    Ready For Surgery From Anesthesia Perspective.     .

## 2025-02-13 NOTE — H&P
[facility] ([facility])  Colon and Rectal Surgery  History & Physical    Patient Name: Quintin Ling  MRN: [id number]  Admission Date: 2/13/2025  Attending Physician: Tosin Boothe MD  Primary Care Provider: Bishop Gabriel MD    Patient information was obtained from patient and medical records.    Subjective:     Chief Complaint/Reason for Admission: Here for Colonoscopy    History of Present Illness:  Patient is a 74 y.o. male presents for colonoscopy. Had unresectable polyp s/p R hemicolectomy 4/2024    Denies changes in bowel habits such as bleeding, melena, painful bowel movements, change in caliber of stool, diarrhea or constipation.    Denies FH of colorectal cancer, polyps or IBD       Current Outpatient Medications on File Prior to Encounter   Medication Sig    amLODIPine (NORVASC) 10 MG tablet Take 1 tablet (10 mg total) by mouth once daily.    aspirin 81 MG Chew Take 1 tablet (81 mg total) by mouth once daily.    atorvastatin (LIPITOR) 20 MG tablet Take 1 tablet (20 mg total) by mouth once daily.    fenofibrate (TRICOR) 48 MG tablet Take 1 tablet (48 mg total) by mouth once daily.    fenofibrate (TRICOR) 54 MG tablet TAKE 1 TABLET BY MOUTH ONCE DAILY    ferrous sulfate (FEOSOL) 325 mg (65 mg iron) Tab tablet Take 1 tablet (325 mg total) by mouth once daily.    metFORMIN (GLUCOPHAGE) 500 MG tablet Take 1 tablet (500 mg total) by mouth 2 (two) times daily with meals.    metoprolol succinate (TOPROL-XL) 25 MG 24 hr tablet Take 1 tablet (25 mg total) by mouth 2 (two) times daily.    olmesartan (BENICAR) 40 MG tablet Take 1 tablet (40 mg total) by mouth once daily.    pregabalin (LYRICA) 150 MG capsule Take 1 capsule (150 mg total) by mouth 2 (two) times daily.    clopidogreL (PLAVIX) 75 mg tablet Take 75 mg by mouth once daily.    FREESTYLE FREEDOM LITE kit by Other route.    FREESTYLE LANCETS 28 gauge lancets Apply 28 lancets topically 3 (three) times daily.    FREESTYLE LITE STRIPS Strp Inject  300 strips as directed as needed.     No current facility-administered medications on file prior to encounter.       Review of patient's allergies indicates:  No Known Allergies    Past Medical History:   Diagnosis Date    Diabetes mellitus     Hyperlipidemia     Hypertension      Past Surgical History:   Procedure Laterality Date    BIOPSY OF PERITONEUM N/A 1/29/2024    Procedure: BIOPSY, PERITONEUM;  Surgeon: Tosin Boothe MD;  Location: [facility] OR;  Service: Colon and Rectal;  Laterality: N/A;    CAROTID ENDARTERECTOMY Left 4/25/2024    Procedure: ENDARTERECTOMY, CAROTID;  Surgeon: Chrissie Parks MD;  Location: [facility] OR;  Service: Peripheral Vascular;  Laterality: Left;    CLOSURE OF WOUND N/A 3/16/2024    Procedure: CLOSURE, WOUND;  Surgeon: Magda Stark MD;  Location: [facility] OR;  Service: General;  Laterality: N/A;    COLONOSCOPY N/A 1/5/2024    Procedure: COLONOSCOPY;  Surgeon: Irasema Gil MD;  Location: [facility] ENDO;  Service: Endoscopy;  Laterality: N/A;    DIAGNOSTIC LAPAROSCOPY N/A 1/29/2024    Procedure: LAPAROSCOPY, DIAGNOSTIC;  Surgeon: Tosin Boothe MD;  Location: [facility] OR;  Service: Colon and Rectal;  Laterality: N/A;    EPIDURAL STEROID INJECTION N/A 11/13/2023    Procedure: Lumbar L5/S1 IL JOSE;  Surgeon: Oneil Carlson MD;  Location: [facility] PAIN MGT;  Service: Pain Management;  Laterality: N/A;    ESOPHAGOGASTRODUODENOSCOPY N/A 1/5/2024    Procedure: EGD (ESOPHAGOGASTRODUODENOSCOPY);  Surgeon: Irasema Gil MD;  Location: [facility] ENDO;  Service: Endoscopy;  Laterality: N/A;    INJECTION OF ANESTHETIC AGENT AROUND MEDIAL BRANCH NERVES INNERVATING LUMBAR FACET JOINT Right 11/4/2024    Procedure: Right L3-5 MBB;  Surgeon: Oneil Carlson MD;  Location: [facility]IN [facility]GT;  Service: Pain Management;  Laterality: Right;    INJECTION OF ANESTHETIC AGENT AROUND MEDIAL BRANCH NERVES INNERVATING LUMBAR FACET JOINT Right 11/27/2024    Procedure: Right L3-L5 MBB;  Surgeon: Aldo  MD Oneil;  Location: [facility] PAIN MGT;  Service: Pain Management;  Laterality: Right;    INJECTION OF ANESTHETIC AGENT INTO TISSUE PLANE DEFINED BY TRANSVERSUS ABDOMINIS MUSCLE N/A 3/11/2024    Procedure: BLOCK, TRANSVERSUS ABDOMINIS PLANE;  Surgeon: Tosin Boothe MD;  Location: [facility] OR;  Service: Colon and Rectal;  Laterality: N/A;    NO PAST SURGERIES      RADIOFREQUENCY THERMOCOAGULATION Right 12/18/2024    Procedure: Right L3-5 Lumbar RFA;  Surgeon: Oneil Carlson MD;  Location: [facility] PAIN MGT;  Service: Pain Management;  Laterality: Right;    REPAIR OF BLOOD VESSEL WITH PATCH GRAFT Left 4/25/2024    Procedure: ANGIOPLASTY, USING PATCH GRAFT;  Surgeon: Chrissie Parks MD;  Location: [facility] OR;  Service: Peripheral Vascular;  Laterality: Left;    WOUND EXPLORATION N/A 3/16/2024    Procedure: EXPLORATION, WOUND;  Surgeon: Magda Stark MD;  Location: [facility] OR;  Service: General;  Laterality: N/A;    XI ROBOTIC COLECTOMY, RIGHT Right 3/11/2024    Procedure: XI ROBOTIC COLECTOMY, RIGHT;  Surgeon: Tosin Boothe MD;  Location: [facility] OR;  Service: Colon and Rectal;  Laterality: Right;  WITH OMENECTOMY AND AMNIOFIX     Family History       Problem Relation (Age of Onset)    No Known Problems Mother, Father          Tobacco Use    Smoking status: Former     Passive exposure: Never    Smokeless tobacco: Never   Substance and Sexual Activity    Alcohol use: Not Currently     Comment: OCC    Drug use: Never    Sexual activity: Yes     Partners: Female       Objective:     Vital Signs (Most Recent):  Temp: 97.9 °F (36.6 °C) (02/13/25 0610)  Pulse: 71 (02/13/25 0620)  Resp: 20 (02/13/25 0610)  BP: (!) 151/67 (02/13/25 0620)  SpO2: 97 % (02/13/25 0620) Vital Signs (24h Range):  Temp:  [97.9 °F (36.6 °C)] 97.9 °F (36.6 °C)  Pulse:  [71] 71  Resp:  [20] 20  SpO2:  [97 %] 97 %  BP: (151)/(67) 151/67     Weight: 99.8 kg (220 lb)  Body mass index is 36.61 kg/m².    Physical Exam  Constitutional:       Appearance: He  is well-developed.   HENT:      Head: Normocephalic and atraumatic.   Eyes:      Conjunctiva/sclera: Conjunctivae normal.      Pupils: Pupils are equal, round, and reactive to light.   Neck:      Thyroid: No thyromegaly.   Cardiovascular:      Rate and Rhythm: Normal rate and regular rhythm.   Pulmonary:      Effort: Pulmonary effort is normal. No respiratory distress.   Abdominal:      General: There is no distension.      Palpations: Abdomen is soft. There is no mass.      Tenderness: There is no abdominal tenderness.   Musculoskeletal:         General: No tenderness. Normal range of motion.      Cervical back: Normal range of motion.   Skin:     General: Skin is warm and dry.      Capillary Refill: Capillary refill takes less than 2 seconds.   Neurological:      General: No focal deficit present.      Mental Status: He is alert and oriented to person, place, and time.           Assessment/Plan:     Patient is a 74 y.o. male who presents for colonoscopy     - Ok to proceed to endoscopy suite for colonoscopy  - Consent obtained. All risks, benefits and alternatives fully explained to patient, including but not limited to bleeding, infection, perforation, and missed polyps. All questions appropriately answered to patient's satisfaction. Consent signed and placed on chart.    There are no hospital problems to display for this patient.    VTE Risk Mitigation (From admission, onward)      None            Tosin Boothe MD  Colon and Rectal Surgery  [place] [facility] ([facility])

## 2025-02-13 NOTE — ANESTHESIA POSTPROCEDURE EVALUATION
Anesthesia Post Evaluation    Patient: Quintin Ling    Procedure(s) Performed: * No procedures listed *    Final Anesthesia Type: MAC      Patient location during evaluation: GI PACU  Patient participation: Yes- Able to Participate  Level of consciousness: awake and alert  Post-procedure vital signs: reviewed and stable  Pain management: adequate  Airway patency: patent    PONV status at discharge: No PONV  Anesthetic complications: no      Cardiovascular status: blood pressure returned to baseline, hemodynamically stable and stable  Respiratory status: unassisted, room air and spontaneous ventilation  Hydration status: euvolemic  Follow-up not needed.              Vitals Value Taken Time   /56 02/13/25 0730   Temp 36.7 °C (98.1 °F) 02/13/25 0710   Pulse 70 02/13/25 0730   Resp 15 02/13/25 0730   SpO2 100 % 02/13/25 0730         Event Time   Out of Recovery 07:35:00         Pain/Boston Score: Boston Score: 10 (2/13/2025  7:30 AM)

## 2025-02-14 VITALS
RESPIRATION RATE: 15 BRPM | BODY MASS INDEX: 36.65 KG/M2 | TEMPERATURE: 98 F | HEART RATE: 70 BPM | DIASTOLIC BLOOD PRESSURE: 56 MMHG | HEIGHT: 65 IN | WEIGHT: 220 LBS | OXYGEN SATURATION: 100 % | SYSTOLIC BLOOD PRESSURE: 120 MMHG

## 2025-02-17 DIAGNOSIS — E78.2 MIXED HYPERLIPIDEMIA: ICD-10-CM

## 2025-02-17 NOTE — TELEPHONE ENCOUNTER
No care due was identified.  [facility] Embedded Care Due Messages. Reference number: [id number].   2/17/2025 11:05:19 AM CST

## 2025-02-18 ENCOUNTER — RESULTS FOLLOW-UP (OUTPATIENT)
Dept: SURGERY | Facility: CLINIC | Age: 75
End: 2025-02-18

## 2025-02-21 DIAGNOSIS — Z00.00 ENCOUNTER FOR MEDICARE ANNUAL WELLNESS EXAM: ICD-10-CM

## 2025-02-21 RX ORDER — ATORVASTATIN CALCIUM 20 MG/1
20 TABLET, FILM COATED ORAL DAILY
Qty: 90 TABLET | Refills: 0 | Status: SHIPPED | OUTPATIENT
Start: 2025-02-21

## 2025-02-28 ENCOUNTER — CLINICAL SUPPORT (OUTPATIENT)
Dept: FAMILY MEDICINE | Facility: CLINIC | Age: 75
End: 2025-02-28
Payer: MEDICARE

## 2025-02-28 VITALS — DIASTOLIC BLOOD PRESSURE: 64 MMHG | SYSTOLIC BLOOD PRESSURE: 144 MMHG

## 2025-02-28 DIAGNOSIS — I10 HYPERTENSION, UNSPECIFIED TYPE: Primary | ICD-10-CM

## 2025-02-28 NOTE — PROGRESS NOTES
Pt presents to clinic for NV bp check.  Two pt identifiers used.  Pt verbalized understanding of reason for visit.  Pt allowed to sit for 10 minutes to come to baseline.  Pt denies any H/A, SOB, blurry vision, palpitations, chest pain, weakness, or unexplained sweating.  Pts manual blood pressure was 144/64 after sitting for 10 minutes.  Pt scheduled for f/u with pcp in 1 week to address HTN.  Pcp notified.  Pt escorted back to lobby.

## 2025-03-10 ENCOUNTER — OFFICE VISIT (OUTPATIENT)
Dept: FAMILY MEDICINE | Facility: CLINIC | Age: 75
End: 2025-03-10
Payer: MEDICARE

## 2025-03-10 VITALS
TEMPERATURE: 98 F | OXYGEN SATURATION: 97 % | WEIGHT: 227.38 LBS | SYSTOLIC BLOOD PRESSURE: 119 MMHG | HEIGHT: 65 IN | DIASTOLIC BLOOD PRESSURE: 66 MMHG | BODY MASS INDEX: 37.88 KG/M2 | HEART RATE: 70 BPM

## 2025-03-10 DIAGNOSIS — I48.91 NEW ONSET A-FIB: ICD-10-CM

## 2025-03-10 DIAGNOSIS — I10 HYPERTENSION, UNSPECIFIED TYPE: Primary | ICD-10-CM

## 2025-03-10 DIAGNOSIS — R73.03 PREDIABETES: ICD-10-CM

## 2025-03-10 DIAGNOSIS — E78.2 MIXED HYPERLIPIDEMIA: ICD-10-CM

## 2025-03-10 DIAGNOSIS — E66.01 SEVERE OBESITY (BMI 35.0-39.9) WITH COMORBIDITY: ICD-10-CM

## 2025-03-10 DIAGNOSIS — N18.31 STAGE 3A CHRONIC KIDNEY DISEASE: ICD-10-CM

## 2025-03-10 PROCEDURE — 1126F AMNT PAIN NOTED NONE PRSNT: CPT | Mod: CPTII,S$GLB,, | Performed by: FAMILY MEDICINE

## 2025-03-10 PROCEDURE — 1159F MED LIST DOCD IN RCRD: CPT | Mod: CPTII,S$GLB,, | Performed by: FAMILY MEDICINE

## 2025-03-10 PROCEDURE — 99999 PR PBB SHADOW E&M-EST. PATIENT-LVL IV: CPT | Mod: PBBFAC,,, | Performed by: FAMILY MEDICINE

## 2025-03-10 PROCEDURE — G2211 COMPLEX E/M VISIT ADD ON: HCPCS | Mod: S$GLB,,, | Performed by: FAMILY MEDICINE

## 2025-03-10 PROCEDURE — 4010F ACE/ARB THERAPY RXD/TAKEN: CPT | Mod: CPTII,S$GLB,, | Performed by: FAMILY MEDICINE

## 2025-03-10 PROCEDURE — 3044F HG A1C LEVEL LT 7.0%: CPT | Mod: CPTII,S$GLB,, | Performed by: FAMILY MEDICINE

## 2025-03-10 PROCEDURE — 3078F DIAST BP <80 MM HG: CPT | Mod: CPTII,S$GLB,, | Performed by: FAMILY MEDICINE

## 2025-03-10 PROCEDURE — 3074F SYST BP LT 130 MM HG: CPT | Mod: CPTII,S$GLB,, | Performed by: FAMILY MEDICINE

## 2025-03-10 PROCEDURE — 3008F BODY MASS INDEX DOCD: CPT | Mod: CPTII,S$GLB,, | Performed by: FAMILY MEDICINE

## 2025-03-10 PROCEDURE — 99214 OFFICE O/P EST MOD 30 MIN: CPT | Mod: S$GLB,,, | Performed by: FAMILY MEDICINE

## 2025-03-10 NOTE — PROGRESS NOTES
"Subjective:       Patient ID: Quintin Ling is a 74 y.o. male.    Chief Complaint: Follow-up      HPI Comments:     Current Medications[1]      Last seen by me 6 weeks ago.  Came back early because of an elevated blood pressure on a nurse visit.  Sometimes his systolic is slightly over 140.  However his diastolic is consistently in the 60s.      Readings at home tend to be less than 140 systolic.  Will continue current medication and current doses, otherwise would be likely hypotensive if we increased it anymore.      Has some left knee pain.  Plans to see his wife's orthopedist soon.      Had a colonoscopy since our last visit.  Good until 2028.      Got my message about the Plavix.  Has restarted and takes both that and the aspirin every day.    Labs last time: A1c 5.3      Review of Systems   Constitutional:  Negative for activity change, appetite change and fever.   HENT:  Negative for sore throat.    Respiratory:  Negative for cough and shortness of breath.    Cardiovascular:  Negative for chest pain.   Gastrointestinal:  Negative for abdominal pain, diarrhea and nausea.   Genitourinary:  Negative for difficulty urinating.   Musculoskeletal:  Negative for arthralgias and myalgias.   Neurological:  Negative for dizziness and headaches.       Objective:      Vitals:    03/10/25 0831 03/10/25 0840   BP: (!) 142/68 119/66   Pulse: 70    Temp: 97.8 °F (36.6 °C)    TempSrc: Tympanic    SpO2: 97%    Weight: 103.1 kg (227 lb 6.5 oz)    Height: 5' 5" (1.651 m)    PainSc: 0-No pain      Physical Exam  Vitals and nursing note reviewed.   Constitutional:       General: He is not in acute distress.     Appearance: He is well-developed. He is not diaphoretic.   HENT:      Head: Normocephalic.   Neck:      Thyroid: No thyromegaly.   Cardiovascular:      Rate and Rhythm: Normal rate and regular rhythm.      Heart sounds: Normal heart sounds. No murmur heard.  Pulmonary:      Effort: Pulmonary effort is normal.      Breath " sounds: Normal breath sounds. No wheezing or rales.   Abdominal:      General: There is no distension.      Palpations: Abdomen is soft.   Musculoskeletal:      Cervical back: Neck supple.   Lymphadenopathy:      Cervical: No cervical adenopathy.   Skin:     General: Skin is warm and dry.   Neurological:      Mental Status: He is alert and oriented to person, place, and time.   Psychiatric:         Mood and Affect: Mood normal.         Behavior: Behavior normal.         Thought Content: Thought content normal.         Judgment: Judgment normal.         Assessment:       1. Hypertension, unspecified type    2. Mixed hyperlipidemia    3. Severe obesity (BMI 35.0-39.9) with comorbidity    4. New onset a-fib    5. Stage 3a chronic kidney disease    6. Prediabetes        Plan:   Hypertension, unspecified type  Comments:  Satisfied with current range.  Continue current medications.  Follow-up in July as planned    Mixed hyperlipidemia  Comments:  LDL 58 on statin    Severe obesity (BMI 35.0-39.9) with comorbidity  Comments:  No change in weight    New onset a-fib  Comments:  Back on aspirin and Plavix    Stage 3a chronic kidney disease  Comments:  Stable    Prediabetes  Comments:  A1c 5.3                 [1]   Current Outpatient Medications:     amLODIPine (NORVASC) 10 MG tablet, Take 1 tablet (10 mg total) by mouth once daily., Disp: 30 tablet, Rfl: 11    aspirin 81 MG Chew, Take 1 tablet (81 mg total) by mouth once daily., Disp: 360 tablet, Rfl: 0    atorvastatin (LIPITOR) 20 MG tablet, Take 1 tablet (20 mg total) by mouth once daily., Disp: 90 tablet, Rfl: 0    clopidogreL (PLAVIX) 75 mg tablet, Take 75 mg by mouth once daily., Disp: , Rfl:     fenofibrate (TRICOR) 48 MG tablet, Take 1 tablet (48 mg total) by mouth once daily., Disp: 90 tablet, Rfl: 3    fenofibrate (TRICOR) 54 MG tablet, TAKE 1 TABLET BY MOUTH ONCE DAILY, Disp: 90 tablet, Rfl: 3    ferrous sulfate (FEOSOL) 325 mg (65 mg iron) Tab tablet, Take 1 tablet  (325 mg total) by mouth once daily., Disp: 100 tablet, Rfl: 0    FREESTYLE FREEDOM LITE kit, by Other route., Disp: , Rfl:     FREESTYLE LANCETS 28 gauge lancets, Apply 28 lancets topically 3 (three) times daily., Disp: , Rfl:     FREESTYLE LITE STRIPS Strp, Inject 300 strips as directed as needed., Disp: , Rfl:     metFORMIN (GLUCOPHAGE) 500 MG tablet, Take 1 tablet (500 mg total) by mouth 2 (two) times daily with meals., Disp: 200 tablet, Rfl: 0    metoprolol succinate (TOPROL-XL) 25 MG 24 hr tablet, Take 1 tablet (25 mg total) by mouth 2 (two) times daily., Disp: 180 tablet, Rfl: 3    olmesartan (BENICAR) 40 MG tablet, Take 1 tablet (40 mg total) by mouth once daily., Disp: 90 tablet, Rfl: 3    pregabalin (LYRICA) 150 MG capsule, Take 1 capsule (150 mg total) by mouth 2 (two) times daily., Disp: 60 capsule, Rfl: 3

## 2025-03-15 DIAGNOSIS — M54.16 LUMBAR RADICULOPATHY: ICD-10-CM

## 2025-03-15 RX ORDER — PREGABALIN 150 MG/1
150 CAPSULE ORAL 2 TIMES DAILY
Qty: 60 CAPSULE | Refills: 3 | Status: CANCELLED | OUTPATIENT
Start: 2025-03-15 | End: 2025-09-13

## 2025-03-15 NOTE — TELEPHONE ENCOUNTER
No care due was identified.  [facility] Embedded Care Due Messages. Reference number: [id number].   3/15/2025 11:36:42 AM CDT

## 2025-03-17 NOTE — TELEPHONE ENCOUNTER
Called patient to informed him that he have 2 refills left at his pharmacy but patient didn't answer the phone LVM to call back at earliest convenience.    Yumiko ELIZABETH

## 2025-03-20 RX ORDER — FENOFIBRATE 54 MG/1
54 TABLET ORAL DAILY
Qty: 90 TABLET | Refills: 3 | Status: SHIPPED | OUTPATIENT
Start: 2025-03-20

## 2025-03-24 NOTE — TELEPHONE ENCOUNTER
No care due was identified.  [facility] Embedded Care Due Messages. Reference number: [id number].   3/24/2025 11:27:49 AM CDT

## 2025-03-28 RX ORDER — CLOPIDOGREL BISULFATE 75 MG/1
75 TABLET ORAL DAILY
Qty: 90 TABLET | Refills: 3 | Status: SHIPPED | OUTPATIENT
Start: 2025-03-28

## 2025-04-16 DIAGNOSIS — I10 ESSENTIAL HYPERTENSION: ICD-10-CM

## 2025-04-16 RX ORDER — AMLODIPINE BESYLATE 10 MG/1
10 TABLET ORAL
Qty: 30 TABLET | Refills: 11 | OUTPATIENT
Start: 2025-04-16

## 2025-04-16 RX ORDER — METFORMIN HYDROCHLORIDE 500 MG/1
500 TABLET ORAL 2 TIMES DAILY WITH MEALS
Qty: 180 TABLET | Refills: 1 | Status: SHIPPED | OUTPATIENT
Start: 2025-04-16 | End: 2025-07-25

## 2025-04-16 NOTE — TELEPHONE ENCOUNTER
No care due was identified.  [facility] Embedded Care Due Messages. Reference number: [id number].   4/16/2025 9:59:10 AM CDT

## 2025-04-16 NOTE — TELEPHONE ENCOUNTER
Refill Routing Note   Medication(s) are not appropriate for processing by [facility] for the following reason(s):        Outside of protocol    [facility] action(s):  Approve  Route               Appointments  past 12m or future 3m with PCP    Date Provider   Last Visit   3/10/2025 Bishop Gabriel MD   Next Visit   7/29/2025 Bishop Gabriel MD   ED visits in past 90 days: 0        Note composed:1:56 PM 04/16/2025

## 2025-04-17 RX ORDER — FERROUS SULFATE 325(65) MG
325 TABLET ORAL DAILY
Qty: 100 TABLET | Refills: 0 | Status: SHIPPED | OUTPATIENT
Start: 2025-04-17 | End: 2025-07-26

## 2025-04-22 ENCOUNTER — OFFICE VISIT (OUTPATIENT)
Dept: CARDIOLOGY | Facility: CLINIC | Age: 75
End: 2025-04-22
Payer: MEDICARE

## 2025-04-22 VITALS
SYSTOLIC BLOOD PRESSURE: 156 MMHG | HEART RATE: 63 BPM | DIASTOLIC BLOOD PRESSURE: 65 MMHG | BODY MASS INDEX: 37.81 KG/M2 | OXYGEN SATURATION: 96 % | WEIGHT: 227.19 LBS

## 2025-04-22 DIAGNOSIS — I65.21 CAROTID STENOSIS, ASYMPTOMATIC, RIGHT: ICD-10-CM

## 2025-04-22 DIAGNOSIS — I10 ESSENTIAL HYPERTENSION: Primary | ICD-10-CM

## 2025-04-22 DIAGNOSIS — I35.0 MODERATE AORTIC STENOSIS: ICD-10-CM

## 2025-04-22 DIAGNOSIS — Z01.810 PREOP CARDIOVASCULAR EXAM: ICD-10-CM

## 2025-04-22 PROCEDURE — 1126F AMNT PAIN NOTED NONE PRSNT: CPT | Mod: CPTII,S$GLB,, | Performed by: INTERNAL MEDICINE

## 2025-04-22 PROCEDURE — 3008F BODY MASS INDEX DOCD: CPT | Mod: CPTII,S$GLB,, | Performed by: INTERNAL MEDICINE

## 2025-04-22 PROCEDURE — 3288F FALL RISK ASSESSMENT DOCD: CPT | Mod: CPTII,S$GLB,, | Performed by: INTERNAL MEDICINE

## 2025-04-22 PROCEDURE — 1159F MED LIST DOCD IN RCRD: CPT | Mod: CPTII,S$GLB,, | Performed by: INTERNAL MEDICINE

## 2025-04-22 PROCEDURE — 99214 OFFICE O/P EST MOD 30 MIN: CPT | Mod: S$GLB,,, | Performed by: INTERNAL MEDICINE

## 2025-04-22 PROCEDURE — 3078F DIAST BP <80 MM HG: CPT | Mod: CPTII,S$GLB,, | Performed by: INTERNAL MEDICINE

## 2025-04-22 PROCEDURE — 3077F SYST BP >= 140 MM HG: CPT | Mod: CPTII,S$GLB,, | Performed by: INTERNAL MEDICINE

## 2025-04-22 PROCEDURE — 99999 PR PBB SHADOW E&M-EST. PATIENT-LVL IV: CPT | Mod: PBBFAC,,, | Performed by: INTERNAL MEDICINE

## 2025-04-22 PROCEDURE — 1101F PT FALLS ASSESS-DOCD LE1/YR: CPT | Mod: CPTII,S$GLB,, | Performed by: INTERNAL MEDICINE

## 2025-04-22 PROCEDURE — 4010F ACE/ARB THERAPY RXD/TAKEN: CPT | Mod: CPTII,S$GLB,, | Performed by: INTERNAL MEDICINE

## 2025-04-22 PROCEDURE — 3044F HG A1C LEVEL LT 7.0%: CPT | Mod: CPTII,S$GLB,, | Performed by: INTERNAL MEDICINE

## 2025-04-22 RX ORDER — HYDRALAZINE HYDROCHLORIDE 25 MG/1
25 TABLET, FILM COATED ORAL EVERY 12 HOURS
Qty: 60 TABLET | Refills: 11 | Status: SHIPPED | OUTPATIENT
Start: 2025-04-22 | End: 2026-04-22

## 2025-04-22 RX ORDER — NAPROXEN SODIUM 220 MG/1
81 TABLET, FILM COATED ORAL DAILY
COMMUNITY
Start: 2025-04-22 | End: 2026-04-22

## 2025-04-22 NOTE — PROGRESS NOTES
Subjective:   Patient ID:  04/22/2025      Quintin Ling is a 74 y.o. male who presents for evaluation of Follow-up      History of Present Illness    CHIEF COMPLAINT:  Patient presents today for follow up    CARDIOVASCULAR:  He has a history of AF. Recent cardiac monitor results were reported as good, leading to discontinuation of Eliquis. He denies current chest pain, dyspnea, or palpitations.    VASCULAR:  History of left carotid endarterectomy complicated by post-op hematoma. Most recent Carotid duplex from December shows left carotid artery is 0-19% blocked and right carotid artery is 60-80% blocked.    MUSCULOSKELETAL:  He reports back and knee pain and plans to undergo nerve ablation for back pain management.         Follow-up  Pertinent negatives include no chest pain.       Past Medical History:   Diagnosis Date    Diabetes mellitus     Hyperlipidemia     Hypertension        Past Surgical History:   Procedure Laterality Date    BIOPSY OF PERITONEUM N/A 1/29/2024    Procedure: BIOPSY, PERITONEUM;  Surgeon: Tosin Boothe MD;  Location: [facility] OR;  Service: Colon and Rectal;  Laterality: N/A;    CAROTID ENDARTERECTOMY Left 4/25/2024    Procedure: ENDARTERECTOMY, CAROTID;  Surgeon: Chrissie Parks MD;  Location: [facility] OR;  Service: Peripheral Vascular;  Laterality: Left;    CLOSURE OF WOUND N/A 3/16/2024    Procedure: CLOSURE, WOUND;  Surgeon: Magda Stark MD;  Location: [facility] OR;  Service: General;  Laterality: N/A;    COLONOSCOPY N/A 1/5/2024    Procedure: COLONOSCOPY;  Surgeon: Irasema Gil MD;  Location: [facility] ENDO;  Service: Endoscopy;  Laterality: N/A;    DIAGNOSTIC LAPAROSCOPY N/A 1/29/2024    Procedure: LAPAROSCOPY, DIAGNOSTIC;  Surgeon: Tosin Boothe MD;  Location: [facility] OR;  Service: Colon and Rectal;  Laterality: N/A;    EPIDURAL STEROID INJECTION N/A 11/13/2023    Procedure: Lumbar L5/S1 IL JOSE;  Surgeon: Oneil Carlson MD;  Location: [facility] PAIN MGT;  Service: Pain  Management;  Laterality: N/A;    ESOPHAGOGASTRODUODENOSCOPY N/A 1/5/2024    Procedure: EGD (ESOPHAGOGASTRODUODENOSCOPY);  Surgeon: Irasema Gil MD;  Location: [facility];  Service: Endoscopy;  Laterality: N/A;    INJECTION OF ANESTHETIC AGENT AROUND MEDIAL BRANCH NERVES INNERVATING LUMBAR FACET JOINT Right 11/4/2024    Procedure: Right L3-5 MBB;  Surgeon: Oneil Carlson MD;  Location: [facility] PAIN MGT;  Service: Pain Management;  Laterality: Right;    INJECTION OF ANESTHETIC AGENT AROUND MEDIAL BRANCH NERVES INNERVATING LUMBAR FACET JOINT Right 11/27/2024    Procedure: Right L3-L5 MBB;  Surgeon: Oneil Carlson MD;  Location: [facility] PAIN MGT;  Service: Pain Management;  Laterality: Right;    INJECTION OF ANESTHETIC AGENT INTO TISSUE PLANE DEFINED BY TRANSVERSUS ABDOMINIS MUSCLE N/A 3/11/2024    Procedure: BLOCK, TRANSVERSUS ABDOMINIS PLANE;  Surgeon: Tosin Boothe MD;  Location: [facility];  Service: Colon and Rectal;  Laterality: N/A;    NO PAST SURGERIES      RADIOFREQUENCY THERMOCOAGULATION Right 12/18/2024    Procedure: Right L3-5 Lumbar RFA;  Surgeon: Oneil Carlson MD;  Location: [facility] PAIN MGT;  Service: Pain Management;  Laterality: Right;    REPAIR OF BLOOD VESSEL WITH PATCH GRAFT Left 4/25/2024    Procedure: ANGIOPLASTY, USING PATCH GRAFT;  Surgeon: Chrissie Parks MD;  Location: [facility] OR;  Service: Peripheral Vascular;  Laterality: Left;    WOUND EXPLORATION N/A 3/16/2024    Procedure: EXPLORATION, WOUND;  Surgeon: Magda Stark MD;  Location: [facility] OR;  Service: General;  Laterality: N/A;    XI ROBOTIC COLECTOMY, RIGHT Right 3/11/2024    Procedure: XI ROBOTIC COLECTOMY, RIGHT;  Surgeon: Tosin Boothe MD;  Location: [facility];  Service: Colon and Rectal;  Laterality: Right;  WITH OMENECTOMY AND AMNIOFIX       Social History[1]    Family History   Problem Relation Name Age of Onset    No Known Problems Mother      No Known Problems Father           Review of Systems   Cardiovascular:   Negative for chest pain, dyspnea on exertion, palpitations and syncope.   Genitourinary: Negative.    Neurological: Negative.        Current Outpatient Medications on File Prior to Visit   Medication Sig    amLODIPine (NORVASC) 10 MG tablet Take 1 tablet (10 mg total) by mouth once daily.    atorvastatin (LIPITOR) 20 MG tablet Take 1 tablet (20 mg total) by mouth once daily.    clopidogreL (PLAVIX) 75 mg tablet Take 1 tablet (75 mg total) by mouth once daily.    fenofibrate (TRICOR) 48 MG tablet Take 1 tablet (48 mg total) by mouth once daily.    fenofibrate (TRICOR) 54 MG tablet Take 1 tablet (54 mg total) by mouth once daily.    ferrous sulfate (FEOSOL) 325 mg (65 mg iron) Tab tablet Take 1 tablet (325 mg total) by mouth once daily.    FREESTYLE FREEDOM LITE kit by Other route.    FREESTYLE LANCETS 28 gauge lancets Apply 28 lancets topically 3 (three) times daily.    FREESTYLE LITE STRIPS Strp Inject 300 strips as directed as needed.    metFORMIN (GLUCOPHAGE) 500 MG tablet Take 1 tablet (500 mg total) by mouth 2 (two) times daily with meals.    metoprolol succinate (TOPROL-XL) 25 MG 24 hr tablet Take 1 tablet (25 mg total) by mouth 2 (two) times daily.    olmesartan (BENICAR) 40 MG tablet Take 1 tablet (40 mg total) by mouth once daily.    pregabalin (LYRICA) 150 MG capsule Take 1 capsule (150 mg total) by mouth 2 (two) times daily.    [DISCONTINUED] aspirin 81 MG Chew Take 1 tablet (81 mg total) by mouth once daily.     No current facility-administered medications on file prior to visit.       Objective:   Objective:  Wt Readings from Last 3 Encounters:   04/22/25 103 kg (227 lb 2.9 oz)   03/10/25 103.1 kg (227 lb 6.5 oz)   02/13/25 99.8 kg (220 lb)     Temp Readings from Last 3 Encounters:   03/10/25 97.8 °F (36.6 °C) (Tympanic)   02/13/25 98.1 °F (36.7 °C)   01/28/25 96.3 °F (35.7 °C) (Tympanic)     BP Readings from Last 3 Encounters:   04/22/25 (!) 156/65   03/10/25 119/66   02/28/25 (!) 144/64     Pulse  Readings from Last 3 Encounters:   04/22/25 63   03/10/25 70   02/13/25 70       Physical Exam  Vitals reviewed.   Constitutional:       Appearance: He is well-developed.   Neck:      Vascular: No carotid bruit.   Cardiovascular:      Rate and Rhythm: Normal rate and regular rhythm.      Pulses: Intact distal pulses.      Heart sounds: Normal heart sounds. No murmur heard.  Pulmonary:      Breath sounds: Normal breath sounds.   Neurological:      Mental Status: He is oriented to person, place, and time.           Lab Results   Component Value Date    CHOL 119 (L) 08/23/2024    CHOL 131 05/30/2023    CHOL 146 05/19/2021     Lab Results   Component Value Date    HDL 44 08/23/2024    HDL 45 05/30/2023    HDL 45 05/19/2021     Lab Results   Component Value Date    LDLCALC 58.6 (L) 08/23/2024    LDLCALC 75.0 05/30/2023    LDLCALC 86.6 05/19/2021     Lab Results   Component Value Date    TRIG 82 08/23/2024    TRIG 55 05/30/2023    TRIG 72 05/19/2021     Lab Results   Component Value Date    CHOLHDL 37.0 08/23/2024    CHOLHDL 34.4 05/30/2023    CHOLHDL 30.8 05/19/2021       Chemistry        Component Value Date/Time     01/28/2025 0924    K 4.9 01/28/2025 0924     01/28/2025 0924    CO2 27 01/28/2025 0924    BUN 18 01/28/2025 0924    CREATININE 1.2 01/28/2025 0924    GLU 93 01/28/2025 0924        Component Value Date/Time    CALCIUM 9.4 01/28/2025 0924    ALKPHOS 62 01/28/2025 0924    AST 19 01/28/2025 0924    ALT 17 01/28/2025 0924    BILITOT 0.5 01/28/2025 0924    ESTGFRAFRICA >60.0 05/30/2022 0958    EGFRNONAA >60.0 05/30/2022 0958          Lab Results   Component Value Date    TSH 1.288 07/21/2020     Lab Results   Component Value Date    INR 1.0 03/19/2024     Lab Results   Component Value Date    WBC 6.26 01/28/2025    HGB 11.9 (L) 01/28/2025    HCT 37.3 (L) 01/28/2025    MCV 89 01/28/2025     01/28/2025     BNP  @LABRCNTIP(BNP,BNPTRIAGEBLO)@  CrCl cannot be calculated (Patient's most recent lab  result is older than the maximum 7 days allowed.).     Imaging:  ======    No results found for this or any previous visit.    Results for orders placed during the hospital encounter of 03/29/24    US Carotid Bilateral    Narrative  EXAMINATION:  US CAROTID BILATERAL    CLINICAL HISTORY:  syncope;    TECHNIQUE:  Grayscale and color Doppler ultrasound examination of the carotid and vertebral artery systems bilaterally.  Stenosis estimates are per the NASCET measurement criteria.    COMPARISON:  None.    FINDINGS:  Right:    Internal Carotid Artery (ICA) peak systolic velocity 249 cm/sec    ICA/CCA peak systolic velocity ratio: 2.2    Plaque formation: Heterogeneous    Vertebral artery: Antegrade flow and normal waveform.    Left:    Internal Carotid Artery (ICA)  peak systolic velocity 362 cm/sec    ICA/CCA peak systolic velocity ratio: 2.6    Plaque formation: Heterogeneous    Vertebral artery: Antegrade flow and normal waveform.    Impression  Greater than 70% stenosis bilaterally.  Further evaluation as warranted.    This report was flagged in Epic as abnormal.      Electronically signed by: Petey Rosario  Date:    03/29/2024  Time:    21:47    No results found for this or any previous visit.      No results found for this or any previous visit.      No valid procedures specified.        Results for orders placed during the hospital encounter of 04/22/24    Nuclear Stress - Cardiology Interpreted    Interpretation Summary    Normal myocardial perfusion scan. There is no evidence of myocardial ischemia or infarction.    The gated perfusion images showed an ejection fraction of 64% at rest. The gated perfusion images showed an ejection fraction of 69% post stress.    The ECG portion of the study is negative for ischemia.    The patient reported no chest pain during the stress test.      Results for orders placed during the hospital encounter of 03/16/24    Echo    Interpretation Summary    Left Ventricle: The left  ventricle is normal in size. Moderately increased wall thickness. There is moderate concentric hypertrophy. There is normal systolic function with a visually estimated ejection fraction of 60 - 65%. Unable to assess diastolic function due to atrial fibrillation.    Right Ventricle: Right ventricle was not well visualized due to poor acoustic window. Normal right ventricular cavity size. Wall thickness is normal. Right ventricle wall motion  is normal. Systolic function is normal.    Left Atrium: Left atrium is moderately dilated.    Aortic Valve: Moderately calcified cusps. There is moderate annular calcification present. Moderately restricted motion. There is mild to moderate stenosis. Aortic valve area by VTI is 2.16 cm². Aortic valve peak velocity is 2.32 m/s. Mean gradient is 17 mmHg. The dimensionless index is 0.58.    Mitral Valve: There is mild regurgitation.    Tricuspid Valve: There is mild regurgitation.    Aorta: Aortic root is normal in size. Ascending aorta is mildly dilated measuring 4.00 cm.    Pulmonary Artery: The estimated pulmonary artery systolic pressure is 37 mmHg.    IVC/SVC: Normal venous pressure at 3 mmHg.      No results found for this or any previous visit.      Lab Results   Component Value Date    HGBA1C 5.3 01/28/2025         The ASCVD Risk score (Mi MENDEZ, et al., 2019) failed to calculate for the following reasons:    The valid total cholesterol range is 130 to 320 mg/dL    Diagnostic Results:  ECG: Reviewed    Assessment and Plan:   Essential hypertension  -     hydrALAZINE (APRESOLINE) 25 MG tablet; Take 1 tablet (25 mg total) by mouth every 12 (twelve) hours.  Dispense: 60 tablet; Refill: 11  -     Echo; Future    Moderate aortic stenosis  -     Echo; Future    Carotid stenosis, asymptomatic, right  -     aspirin 81 MG Chew; Take 1 tablet (81 mg total) by mouth once daily.        Assessment & Plan    I10 Essential hypertension  I35.0 Moderate aortic stenosis  I65.21 Carotid  stenosis, asymptomatic, right    IMPRESSION:  - Recent cardiac monitoring results showed resolution of previous AFib episode.  - Current blood pressure control suboptimal at 156/65.  - Started hydralazine 25 mg twice daily for better blood pressure management, given patient is maxed out on other antihypertensives.  - Antiplatelet therapy with Plavix and aspirin continued due to history of carotid endarterectomy and ongoing right carotid stenosis.  - Right carotid 60-80% blocked, may require future intervention.  - Eliquis Discontinued by EP based on recent cardiac monitoring results.    ESSENTIAL HYPERTENSION:  - Explained importance of blood pressure control.  - Continued amlodipine 10 mg daily for blood pressure.  Continue Benicar 40 mg  - Continued metoprolol 25 mg twice daily for blood pressure.    MODERATE AORTIC STENOSIS:  - Explained importance of blood pressure control, especially in context of aortic valve stenosis.  - Ordered echocardiogram for annual monitoring of aortic valve stenosis progression, to be scheduled before next visit.    CAROTID STENOSIS, ASYMPTOMATIC, RIGHT:  - Informed patient about right carotid stenosis and potential need for future intervention.    LIFESTYLE CHANGES:  - Patient to stay active, walk at least 30 minutes per day if tolerated, minimize sugar intake, increase vegetable consumption, reduce salt intake to help control blood pressure.  - Continued Plavix.  - Continued aspirin 81 mg (OTC).  - Continued atorvastatin 20 mg daily for cholesterol.  - Continued metformin 500 mg twice daily for diabetes.  - Continued fenofibrate for high triglycerides.  - Follow up in 6 months.       Okay to hold aspirin and Plavix for possible back procedure/ablation to hold aspirin for 7 days and Plavix for 5 days in case of epidural injection or ablation    This note was generated with the assistance of ambient listening technology. Verbal consent was obtained by the patient and accompanying  visitor(s) for the recording of patient appointment to facilitate this note. I attest to having reviewed and edited the generated note for accuracy, though some syntax or spelling errors may persist. Please contact the author of this note for any clarification.      Reviewed all tests and above medical conditions with patient in detail and formulated treatment plan.  Maintaining healthy weight and weight loss goals were discussed in clinic.    Follow up in  6 months         [1]   Social History  Tobacco Use    Smoking status: Former     Passive exposure: Never    Smokeless tobacco: Never   Substance Use Topics    Alcohol use: Not Currently     Comment: OCC    Drug use: Never

## 2025-04-28 DIAGNOSIS — I48.92 ATRIAL FIB/FLUTTER, TRANSIENT: ICD-10-CM

## 2025-04-28 DIAGNOSIS — I48.91 ATRIAL FIB/FLUTTER, TRANSIENT: ICD-10-CM

## 2025-04-28 NOTE — TELEPHONE ENCOUNTER
No care due was identified.  [facility] Embedded Care Due Messages. Reference number: [id number].   4/28/2025 10:08:03 AM CDT

## 2025-05-02 RX ORDER — METOPROLOL SUCCINATE 25 MG/1
25 TABLET, EXTENDED RELEASE ORAL 2 TIMES DAILY
Qty: 200 TABLET | Refills: 0 | Status: SHIPPED | OUTPATIENT
Start: 2025-05-02 | End: 2025-08-10

## 2025-05-14 DIAGNOSIS — E78.2 MIXED HYPERLIPIDEMIA: ICD-10-CM

## 2025-05-14 RX ORDER — ATORVASTATIN CALCIUM 20 MG/1
20 TABLET, FILM COATED ORAL DAILY
Qty: 90 TABLET | Refills: 1 | Status: SHIPPED | OUTPATIENT
Start: 2025-05-14

## 2025-05-14 NOTE — TELEPHONE ENCOUNTER
Provider Staff:  Action required for this patient    Requires labs      Please see care gap opportunities below in Care Due Message.    Thanks!  [facility]     Appointments      Date Provider   Last Visit   3/10/2025 Bishop Gabriel MD   Next Visit   7/29/2025 Bishop Gabriel MD     Refill Decision Note   Quintin Ling  is requesting a refill authorization.  Brief Assessment and Rationale for Refill:  Approve     Medication Therapy Plan:         Comments:     Note composed:2:55 PM 05/14/2025

## 2025-05-14 NOTE — TELEPHONE ENCOUNTER
Care Due:                  Date            Visit Type   Department     Provider  --------------------------------------------------------------------------------                                EP -                              PRIMARY      [facility] INTERNAL  Last Visit: 03-      CARE ([facility])   KERI Gabriel                              EP -                              PRIMARY      [facility] INTERNAL  Next Visit: 07-      CARE ([facility])   KERI Gabriel                                                            Last  Test          Frequency    Reason                     Performed    Due Date  --------------------------------------------------------------------------------    HBA1C.......  6 months...  metFORMIN................  01- 07-    [facility] Embedded Care Due Messages. Reference number: [id number].   5/14/2025 10:34:00 AM CDT

## 2025-05-17 DIAGNOSIS — M54.16 LUMBAR RADICULOPATHY: ICD-10-CM

## 2025-05-19 RX ORDER — PREGABALIN 150 MG/1
150 CAPSULE ORAL 2 TIMES DAILY
Qty: 60 CAPSULE | Refills: 3 | Status: SHIPPED | OUTPATIENT
Start: 2025-05-19 | End: 2025-11-17

## 2025-05-19 NOTE — TELEPHONE ENCOUNTER
Good Morning/Afternoon,     Pt is requesting for a refill of: Pregabalin 150mg  Last filed: 01/15/2025 +3 refills  Last encounter: 1/15/2025  Up coming apt: 7/16/2025  Pharmacy: [id number]  Is this something you can do?      Rachele Carrasquillo  Medical Assistant

## 2025-06-08 DIAGNOSIS — E78.2 MIXED HYPERLIPIDEMIA: ICD-10-CM

## 2025-06-08 NOTE — TELEPHONE ENCOUNTER
No care due was identified.  [facility] Embedded Care Due Messages. Reference number: [id number].   6/08/2025 1:15:43 PM CDT

## 2025-06-09 RX ORDER — FENOFIBRATE 54 MG/1
54 TABLET ORAL DAILY
Qty: 90 TABLET | Refills: 0 | OUTPATIENT
Start: 2025-06-09

## 2025-06-09 NOTE — TELEPHONE ENCOUNTER
Refill Decision Note   Quintin Ling  is requesting a refill authorization.  Brief Assessment and Rationale for Refill:  Quick Discontinue     Medication Therapy Plan:  Receipt confirmed by pharmacy (6/5/2025  1:04 PM CDT)      Comments:     Note composed:9:56 AM 06/09/2025

## 2025-06-26 NOTE — PROGRESS NOTES
Pt came in today for bp check. Pt bp at 8:20 was 179/74 pulse 66 , pt sat for 15 mins and bp was 143/74 pulse 60. Pt did take his medication this morning.   Speaking Coherently

## 2025-07-03 NOTE — PROGRESS NOTES
Interventional Pain Progress Note     Referring Physician: No ref. provider found    PCP: Bishop Gabriel MD    Chief Complaint:     Chief Complaint   Patient presents with    Follow-up          SUBJECTIVE:  Interval History (7/21/2025):  Patient Quintin Ling presents today for follow-up visit.  Patient is being seen for followup of low back pain. Pain is primarily axial but will be referred to the back of the thighs with prolonged activity. He does not think the RFA helped a lot. Previously had JOSE with good relief but had more leg pain at that time. He rates his pain 8/10. He would like to try a higher does lyrica before more procedures to see if he can get more relief  Patient denies night fever/night sweats, urinary incontinence, bowel incontinence, significant weight loss and significant motor weakness.   Patient denies any other complaints or concerns at this time.      Interval History (1/15/2025):  Patient Quintin Ling presents today for follow-up visit.  Patient was last seen on 12/18/2024 Right L3-5 RFA with 50% relief. He continues to have some pain in the lower back. No radiculopathy but has cramping sensations in the legs at times. He requests to increase lyrica as it helps without SE. He rates his pain 5/10 today.   Requests renewal on handicap tag.  Patient denies night fever/night sweats, urinary incontinence, bowel incontinence, significant weight loss and significant motor weakness.   Patient denies any other complaints or concerns at this time.      Interval History (10/1/2024):  Patient Quintin Ling presents today for follow-up visit.  Patient is being seen for follow up of low back pain. Pain is remaining axial on the right side of the back without radiculopathy. Pain is worse with prolonged standing and walking. He will get relief with rest. He is curious about a nerve burn. He also asks to increase his lyrica for more relief. He denies side effects.   Patient denies night  "fever/night sweats, urinary incontinence, bowel incontinence, significant weight loss and significant motor weakness.   Patient denies any other complaints or concerns at this time.      Interval History (4/12/2024):  Patient Quintin Ling presents today for follow-up visit.  Patient today for follow-up of lower back pain.  He rates his pain today a 2/10.  He feels that he got relief for about 3 months following his lumbar JOSE however he is undergoing a few medical issues at this time and would like to prolonged an injection right now.  He is scheduled for a carotid endarterectomy in the near future.  He is currently taking Lyrica for his symptoms.  His Zanaflex was DC'd by his GI doctor.  Patient denies night fever/night sweats, urinary incontinence, bowel incontinence, significant weight loss and significant motor weakness.   Patient denies any other complaints or concerns at this time.      Interval History (12/28/2023):  Quintin Ling presents today for follow-up visit.  he underwent Lumbar L5/S1 IL JOSE on 12/18/24.  The patient reports that he is/was better following the procedure.  he reports 75 % pain relief.  The changes have continued through this visit.  Patient reports pain as "0/10 today. Patient currently c/o pain only on the L side whenever he walks. States it is more of a pulling sensation on the L side of his lower back. He denies leg pain. Also denies pain while sitting.     Interval History (07/21/2025):  Patient returns to clinic for evaluation of lower back pain.  Patient reports continued lower back pain that starts in a band across his lower back.  Patient reports that as we will occasionally radiates to his bilateral posterior thighs.  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  Pain is currently rated a 5/10, but can increase to a 8/10 with exacerbating activity. Denies any fevers, chills, changes in gait, saddle anesthesia, or " bowel and bladder incontinence      Interval History (8/29/2023):  Quintin Ling presents today for follow-up visit for MRI review.  Patient was last seen on 8/4/2023. Taking Lyrica 50 mg BID with no side effects, minimal improvement. Patient reports pain as 8/10 today. Continues to report low back pain with radiation into his left lower extremity. Pain is worsened with prolonged standing and walking. Reports pain and weakness is affecting his gait, he often walks with a limp.  Initial HPI 08/04/2023  Quintin Ling is a 75 y.o. male who presents to the clinic for the evaluation of left lower extremity pain.   Patient reports three-month history of left lower extremity pain.  He denies any inciting events or traumas.  Patient reports 1 year ago he experienced bilateral calf pain that is resolved with B12 supplementation.  He also reports as a child he was placed in traction for 2 weeks on his left lower extremity and to wear a brace for 3 years secondary to hip dysplasia.  Patient denies any recent infections.  Patient denies any previous surgical intervention on his lumbar spine.  Pain is described as a pulling burning pain diffusely over his left calf.  He denies any significant radiation to his foot or up into his left lower back but does report a long history of lower back pain.  Patient denies any significant change in sensation to left lower extremity.  Pain is worse with prolonged sitting or walking, better with lying supine.  Patient does report swelling of his left lower extremity with prolonged walking.  Patient denies any significant history of rheumatoid disease in his family.  Patient reports emergency department on 06/28/2023 for exacerbation of left lower extremity pain.  Denies any fevers, chills, saddle anesthesia, or bowel and bladder incontinence        Non-Pharmacologic Treatments:  Physical Therapy/Home Exercise: yes  Ice/Heat:yes  TENS: no  Acupuncture: no  Massage: yes  Chiropractic: no         Previous Pain Medications:  NSAIDs, Tylenol, gabapentin, tramadol     report:  Reviewed and consistent with medication use as prescribed.    Pain Procedures:   -11/13/2023: Lumbar L5/S1 IL JOSE with 75% relief  12/18/2024 Right L3-5 RFA with 50% relief.    Pain Disability Index Review:         7/21/2025     8:57 AM 10/1/2024    12:28 PM 4/12/2024    10:58 AM   Last 3 PDI Scores   Pain Disability Index (PDI) 48 45 14       Imaging (Reviewed on 7/21/2025):    MRI Lumbar spine 08/22/2023  FINDINGS:  Grade 1 degenerative spondylolisthesis at L4-L5.  Vertebral body height is normal.  Marrow signal is within normal limits. The conus medullaris terminates at the level of T12-L1.  No abnormal signal within the conus. Intervertebral disc levels are as follows:     T12-L1 disc: Disc space height loss with anterior bridging osteophytes and fatty Modic change.  Normal facet joints.  No spinal or foraminal stenosis.     L1-L2 disc : Disc space height loss with a broad-based posterior disc bulge and posterior annular fissure.  Normal facet joints.  The dural canal measures 7 mm.  No foraminal stenosis.     L2-L3 disc: Normal disc space height with anterior osteophytes.  Fatty and edematous Modic change.  Normal facet joints.  Dural canal measures 12 mm.  No foraminal stenosis.     L3-L4 disc: Disc space height is relatively normal with anterolateral right-sided osteophytes.  Normal facet joints.  No spinal or foraminal stenosis.     L4-L5 disc: Grade 1 spondylolisthesis with markedly severe degenerative facet hypertrophy bilaterally.  Unroofing of disc material with a disc extrusion that extends cranially.  The disc extrusion measures 23 mm craniocaudal by 20 mm transverse by 9 mm AP.  There is severe spinal stenosis and severe subarticular recess stenosis.  The dural canal measures 6 mm but is roughly 30% of normal area with redundancy of nerve roots adjacent to this interspace.  There is also a synovial cyst projecting  inferiorly from the right facet joint that may compress the descending right L5 spinal nerve root sleeve best demonstrated on axial T2 series 6, image 25.  This synovial cyst measures 11 mm.  Moderate foraminal stenosis bilaterally.     L5-S1 disc: Severe disc space height loss most pronounced toward the left.  Fatty Modic endplate change.  Disc and osteophyte encroach into the floor of the left exit foramen.  There is also a left paracentral disc protrusion causing mild left subarticular recess stenosis without eli nerve compression.  The dural canal measures 10 mm.  Moderate to severe left foraminal stenosis.     Impression:     1. Severe spinal stenosis at L4-L5 as described above which may account for neurogenic claudication and radiculopathy of the descending nerve roots.  2. Moderate foraminal stenosis bilaterally at L4-L5 and moderate/severe left foraminal stenosis at L5-S1.  This could also precipitate radiculopathy.    EMG/NCS 08/24/2023  IMPRESSION  ABNORMAL study  2.  There is electrodiagnostic evidence of an acute on chronic radiculopathy of the left L5 nerve root, a subacute on chronic radiculopathy of the right L5 nerve root, and a chronic radiculopathy of the S1 and probable L4 nerve roots      Results for orders placed during the hospital encounter of 06/21/23    X-Ray Lumbar Spine AP And Lateral    Narrative  EXAMINATION:  XR LUMBAR SPINE AP AND LATERAL    CLINICAL HISTORY:  Pain in left leg    TECHNIQUE:  AP, lateral and spot images were performed of the lumbar spine.    COMPARISON:  02/01/2022    FINDINGS:  Five lumbar type vertebral bodies.  The lordotic curvature is normal.  Grade 1 anterolisthesis of L5 on S1. no evidence for compression deformity, fracture, or subluxation.  The vertebral heights are maintained without significant intervertebral disc space narrowing.  Progressive degenerative changes of facets are identified.    The bilateral SI joints are normal.    Impression  Spondylotic  changes lumbar spine without evidence for fracture.      Results for orders placed during the hospital encounter of 06/21/23    X-Ray Hip 2 or 3 views Left (with Pelvis when performed)    Narrative  EXAMINATION:  XR HIP WITH PELVIS WHEN PERFORMED, 2 OR 3 VIEWS LEFT    CLINICAL HISTORY:  Pain in left leg    TECHNIQUE:  AP view of the pelvis and frog leg lateral view of the left hip were performed.    COMPARISON:  02/01/2022    FINDINGS:  No fracture the pelvis or proximal femora.  The femoral heads are well seated in the acetabulum without significant joint space narrowing.  Osteopenia is identified.    Degenerative changes of bilateral SI joints.  The pubic symphysis is normal.    Impression  No fracture the pelvis or proximal femora.  Degenerative changes are noted.      XR TIBIA FIBULA 2 VIEW LEFT 6/21/23     CLINICAL HISTORY:  Pain in left leg     TECHNIQUE:  AP and lateral views of the left tibia and fibula were performed.     COMPARISON:  None.     FINDINGS:  No fracture.  The alignment is normal.  Joint space narrowing is identified of the knee as well as of the ankle.  No significant soft tissue swelling.  Calcifications are identified of the vasculature.     Impression:     No fracture of the tibia or fibula.      US LOWER EXTREMITY VEINS BILATERAL 06/21/23     CLINICAL HISTORY:  Pain in left leg     TECHNIQUE:  Duplex and color flow Doppler and dynamic compression was performed of the bilateral lower extremity veins was performed.     COMPARISON:  None     FINDINGS:  Right thigh veins: The common femoral, femoral, popliteal, upper greater saphenous, and deep femoral veins are patent and free of thrombus. The veins are normally compressible and have normal phasic flow and augmentation response.     Right calf veins: The visualized calf veins are patent.     Left thigh veins: The common femoral, femoral, popliteal, upper greater saphenous, and deep femoral veins are patent and free of thrombus. The veins are  normally compressible and have normal phasic flow and augmentation response.     Left calf veins: The visualized calf veins are patent.     Miscellaneous: None     Impression:     No evidence of deep venous thrombosis in either lower extremity.    Past Medical History:   Diagnosis Date    Diabetes mellitus     Hyperlipidemia     Hypertension      Past Surgical History:   Procedure Laterality Date    BIOPSY OF PERITONEUM N/A 1/29/2024    Procedure: BIOPSY, PERITONEUM;  Surgeon: Tosin Boothe MD;  Location: [facility] OR;  Service: Colon and Rectal;  Laterality: N/A;    CAROTID ENDARTERECTOMY Left 4/25/2024    Procedure: ENDARTERECTOMY, CAROTID;  Surgeon: Chrissie Parks MD;  Location: [facility] OR;  Service: Peripheral Vascular;  Laterality: Left;    CLOSURE OF WOUND N/A 3/16/2024    Procedure: CLOSURE, WOUND;  Surgeon: Magda Stark MD;  Location: [facility] OR;  Service: General;  Laterality: N/A;    COLONOSCOPY N/A 1/5/2024    Procedure: COLONOSCOPY;  Surgeon: Irasema Gil MD;  Location: [facility] ENDO;  Service: Endoscopy;  Laterality: N/A;    DIAGNOSTIC LAPAROSCOPY N/A 1/29/2024    Procedure: LAPAROSCOPY, DIAGNOSTIC;  Surgeon: Tosin Boothe MD;  Location: [facility] OR;  Service: Colon and Rectal;  Laterality: N/A;    EPIDURAL STEROID INJECTION N/A 11/13/2023    Procedure: Lumbar L5/S1 IL JOSE;  Surgeon: Oneil Carlson MD;  Location: [facility] PAIN MGT;  Service: Pain Management;  Laterality: N/A;    ESOPHAGOGASTRODUODENOSCOPY N/A 1/5/2024    Procedure: EGD (ESOPHAGOGASTRODUODENOSCOPY);  Surgeon: Irasema Gil MD;  Location: [facility] ENDO;  Service: Endoscopy;  Laterality: N/A;    INJECTION OF ANESTHETIC AGENT AROUND MEDIAL BRANCH NERVES INNERVATING LUMBAR FACET JOINT Right 11/4/2024    Procedure: Right L3-5 MBB;  Surgeon: Oneil Carlson MD;  Location: [facility] PAIN MGT;  Service: Pain Management;  Laterality: Right;    INJECTION OF ANESTHETIC AGENT AROUND MEDIAL BRANCH NERVES INNERVATING LUMBAR FACET JOINT Right  11/27/2024    Procedure: Right L3-L5 MBB;  Surgeon: Oneil Carlson MD;  Location: [facility] PAIN MGT;  Service: Pain Management;  Laterality: Right;    INJECTION OF ANESTHETIC AGENT INTO TISSUE PLANE DEFINED BY TRANSVERSUS ABDOMINIS MUSCLE N/A 3/11/2024    Procedure: BLOCK, TRANSVERSUS ABDOMINIS PLANE;  Surgeon: Tosin Boothe MD;  Location: [facility] OR;  Service: Colon and Rectal;  Laterality: N/A;    NO PAST SURGERIES      RADIOFREQUENCY THERMOCOAGULATION Right 12/18/2024    Procedure: Right L3-5 Lumbar RFA;  Surgeon: Oneil Carlson MD;  Location: [facility] PAIN MGT;  Service: Pain Management;  Laterality: Right;    REPAIR OF BLOOD VESSEL WITH PATCH GRAFT Left 4/25/2024    Procedure: ANGIOPLASTY, USING PATCH GRAFT;  Surgeon: Chrissie Parks MD;  Location: [facility] OR;  Service: Peripheral Vascular;  Laterality: Left;    WOUND EXPLORATION N/A 3/16/2024    Procedure: EXPLORATION, WOUND;  Surgeon: Magda Stark MD;  Location: [facility] OR;  Service: General;  Laterality: N/A;    XI ROBOTIC COLECTOMY, RIGHT Right 3/11/2024    Procedure: XI ROBOTIC COLECTOMY, RIGHT;  Surgeon: Tosin Boothe MD;  Location: [facility] OR;  Service: Colon and Rectal;  Laterality: Right;  WITH OMENECTOMY AND AMNIOFIX     Social History     Socioeconomic History    Marital status:    Tobacco Use    Smoking status: Former     Passive exposure: Never    Smokeless tobacco: Never   Substance and Sexual Activity    Alcohol use: Not Currently     Comment: OCC    Drug use: Never    Sexual activity: Yes     Partners: Female     Social Drivers of Health     Financial Resource Strain: Low Risk  (11/13/2024)    Overall Financial Resource Strain (CARDIA)     Difficulty of Paying Living Expenses: Not hard at all   Food Insecurity: No Food Insecurity (11/13/2024)    Hunger Vital Sign     Worried About Running Out of Food in the Last Year: Never true     Ran Out of Food in the Last Year: Never true   Transportation Needs: Patient Unable To Answer  (3/17/2024)    PRAPARE - Transportation     Lack of Transportation (Medical): Patient unable to answer     Lack of Transportation (Non-Medical): Patient unable to answer   Physical Activity: Inactive (11/13/2024)    Exercise Vital Sign     Days of Exercise per Week: 0 days     Minutes of Exercise per Session: 0 min   Stress: No Stress Concern Present (11/13/2024)    [employer] - Occupational Stress Questionnaire     Feeling of Stress : Not at all   Housing Stability: Unknown (11/13/2024)    Housing Stability Vital Sign     Unable to Pay for Housing in the Last Year: No     Family History   Problem Relation Name Age of Onset    No Known Problems Mother      No Known Problems Father         Review of patient's allergies indicates:  No Known Allergies    Current Outpatient Medications   Medication Sig    amLODIPine (NORVASC) 10 MG tablet TAKE ONE Tablet BY MOUTH ONCE DAILY    aspirin 81 MG Chew Take 1 tablet (81 mg total) by mouth once daily.    aspirin 81 MG Chew Take 81 mg by mouth every morning.    atorvastatin (LIPITOR) 20 MG tablet Take 1 tablet (20 mg total) by mouth once daily.    clopidogreL (PLAVIX) 75 mg tablet Take 1 tablet (75 mg total) by mouth once daily.    clopidogreL (PLAVIX) 75 mg tablet Take 75 mg by mouth every morning.    fenofibrate (TRICOR) 54 MG tablet Take 1 tablet (54 mg total) by mouth once daily.    ferrous sulfate (FEOSOL) 325 mg (65 mg iron) Tab tablet Take 1 tablet (325 mg total) by mouth once daily.    FREESTYLE FREEDOM LITE kit by Other route.    FREESTYLE LANCETS 28 gauge lancets Apply 28 lancets topically 3 (three) times daily.    FREESTYLE LITE STRIPS Strp Inject 300 strips as directed as needed.    hydrALAZINE (APRESOLINE) 25 MG tablet Take 1 tablet (25 mg total) by mouth every 12 (twelve) hours.    metFORMIN (GLUCOPHAGE) 500 MG tablet Take 1 tablet (500 mg total) by mouth 2 (two) times daily with meals.    metoprolol succinate (TOPROL-XL) 25 MG 24 hr  "tablet Take 1 tablet (25 mg total) by mouth 2 (two) times daily.    olmesartan (BENICAR) 40 MG tablet Take 1 tablet (40 mg total) by mouth once daily.    fenofibrate (TRICOR) 48 MG tablet Take 1 tablet (48 mg total) by mouth once daily.     No current facility-administered medications for this visit.         ROS  Review of Systems   Constitutional:  Negative for activity change, appetite change and fever.   Respiratory:  Negative for cough, chest tightness, shortness of breath, wheezing and stridor.    Cardiovascular:  Negative for chest pain and palpitations.   Gastrointestinal:  Negative for abdominal pain, blood in stool, constipation, diarrhea, nausea and vomiting.   Endocrine: Negative for polydipsia, polyphagia and polyuria.   Genitourinary:  Negative for dysuria and hematuria.   Musculoskeletal:  Positive for arthralgias, back pain, gait problem and myalgias. Negative for joint swelling, neck pain and neck stiffness.   Skin:  Negative for rash.   Allergic/Immunologic: Negative for food allergies.   Neurological:  Negative for dizziness, tremors, seizures, syncope, facial asymmetry, speech difficulty, light-headedness and headaches.   Psychiatric/Behavioral:  Negative for agitation, hallucinations, self-injury and suicidal ideas. The patient is not nervous/anxious and is not hyperactive.             OBJECTIVE:  BP (!) 159/68   Pulse 64   Resp 17   Ht 5' 5" (1.651 m)   Wt 104.5 kg (230 lb 6.1 oz)   BMI 38.34 kg/m²         Physical Exam  Constitutional:       General: He is not in acute distress.     Appearance: Normal appearance. He is not ill-appearing.   HENT:      Head: Normocephalic and atraumatic.      Nose: No congestion or rhinorrhea.   Eyes:      Extraocular Movements: Extraocular movements intact.      Pupils: Pupils are equal, round, and reactive to light.   Cardiovascular:      Pulses: Normal pulses.   Pulmonary:      Effort: Pulmonary effort is normal.   Abdominal:      General: Abdomen is " flat.      Palpations: Abdomen is soft.   Skin:     General: Skin is warm and dry.      Capillary Refill: Capillary refill takes less than 2 seconds.   Neurological:      General: No focal deficit present.      Mental Status: He is alert and oriented to person, place, and time.      Sensory: Sensory deficit present.      Motor: Weakness present. No abnormal muscle tone.      Gait: Gait abnormal.      Deep Tendon Reflexes:      Reflex Scores:       Patellar reflexes are 2+ on the right side and 2+ on the left side.       Achilles reflexes are 2+ on the right side and 1+ on the left side.     Comments: Decreased light touch sensation over lateral aspect left calf  4/5 strength in left dorsiflexion   Psychiatric:         Mood and Affect: Mood normal.         Behavior: Behavior normal.         Thought Content: Thought content normal.           Musculoskeletal:      Lumbar Exam  Incision: no  Pain with Flexion: yes  Pain with Extension: yes  ROM:  Decreased  Paraspinous TTP:  Positive R side only  Facet Loading:  Positive right side  SLR:  Negative bilaterally  SIJ TTP:  Negative bilaterally      LABS:  Lab Results   Component Value Date    WBC 6.26 01/28/2025    HGB 11.9 (L) 01/28/2025    HCT 37.3 (L) 01/28/2025    MCV 89 01/28/2025     01/28/2025       ASSESSMENT:       75 y.o. year old male with left lower extremity pain, consistent with     1. Lumbar radiculopathy        2. Lumbar spondylosis        3. Spondylolisthesis of lumbar region                      PLAN:   - Interventions: discussed repeat L5/S1 IL JOSE- patient would like to wait at this time. He may call if he decides to schedule        - S/p  L5-S1 interlaminar epidural steroid injection with 75% relief    - Anticoagulation use:   Plavix and aspirin- Dr. Murray    - Medications:   - DC zanaflex   - Increase lyrica to 200 mg BID. We discussed potential side effects of this medication which may include drowsiness,dizziness, dry mouth, constipation  or peripheral edema.       - Therapy:    Patient has completed  formal physical therapy in the past with no pain relief.      - Imaging/Diagnostic:   X-ray of lumbar spine reveals grade 1 anterolisthesis of L5 upon S1 with loss of disc height at L5-S1 and L4-5.  Degenerative changes of the facets noted at L4-5 and L5-S1.   MRI of lumbar spine reviewed and findings discussed with patient.  Significant for grade 1 anterolisthesis of L4 upon L5.  There is noted disc extrusion at L4-5 resulting in severe canal stenosis and severe bilateral recess stenosis.  Dural canal measures 6 mm.  Associated right-greater-than-left foraminal stenosis with extrusion.  There is noted loss of height at L5-S1 with left paracentral disc protrusion causing Moderate to severe left foraminal stenosis.   EMG and nerve conduction study of bilateral lower extremities reveals acute on chronic bilateral L5 and S1 radiculopathy    - Consults:   Can consider referral to Neurosurgery if lower back pain continues for evaluation of disc extrusion at L4-5- pt defers at this time    - Miscellaneous:  Pt previously provided with temporary handicap tag      - Patient Questions: Answered all of the patient's questions regarding diagnosis, therapy, and treatment     This condition does not require this patient to take time off of work, and the primary goal of our Pain Management services is to improve the patient's functional capacity.     - Follow up visit:  3 months or sooner if needed      The above plan and management options were discussed at length with patient. Patient is in agreement with the above and verbalized understanding.    I discussed the goals of interventional chronic pain management with the patient on today's visit.  I explained the utility of injections for diagnostic and therapeutic purposes.  We discussed a multimodal approach to pain including treating the patient's given worst pain at any given time.  We will use a systematic  approach to addressing pain.  We will also adopt a multimodal approach that includes injections, adjuvant medications, physical therapy, at times psychiatry.  There may be a limited role for opioid use intermittently in the treatment of pain, more particularly for acute pain although no one approach can be used as a sole treatment modality.    I emphasized the importance of regular exercise, core strengthening and stretching, diet and weight loss as a cornerstone of long-term pain management.  Visit today included increased complexity associated with the care of the episodic problem of chronic pain which was addressed and continue to manage the longitudinal care of the patient due to the serious and/or complex managed problem(s) listed above.      Becky Clark NP  Interventional Pain Management  [facility]    Disclaimer:  This note was prepared using voice recognition system and is likely to have sound alike errors that may have been overlooked even after proof reading.  Please call me with any questions

## 2025-07-16 DIAGNOSIS — I10 ESSENTIAL HYPERTENSION: ICD-10-CM

## 2025-07-17 RX ORDER — AMLODIPINE BESYLATE 10 MG/1
10 TABLET ORAL
Qty: 30 TABLET | Refills: 11 | Status: SHIPPED | OUTPATIENT
Start: 2025-07-17

## 2025-07-21 ENCOUNTER — OFFICE VISIT (OUTPATIENT)
Dept: PAIN MEDICINE | Facility: CLINIC | Age: 75
End: 2025-07-21
Payer: MEDICARE

## 2025-07-21 VITALS
SYSTOLIC BLOOD PRESSURE: 159 MMHG | DIASTOLIC BLOOD PRESSURE: 68 MMHG | HEIGHT: 65 IN | BODY MASS INDEX: 38.38 KG/M2 | RESPIRATION RATE: 17 BRPM | HEART RATE: 64 BPM | WEIGHT: 230.38 LBS

## 2025-07-21 DIAGNOSIS — M47.816 LUMBAR SPONDYLOSIS: ICD-10-CM

## 2025-07-21 DIAGNOSIS — M43.16 SPONDYLOLISTHESIS OF LUMBAR REGION: ICD-10-CM

## 2025-07-21 DIAGNOSIS — M54.16 LUMBAR RADICULOPATHY: Primary | ICD-10-CM

## 2025-07-21 PROCEDURE — 1125F AMNT PAIN NOTED PAIN PRSNT: CPT | Mod: CPTII,S$GLB,, | Performed by: NURSE PRACTITIONER

## 2025-07-21 PROCEDURE — 3008F BODY MASS INDEX DOCD: CPT | Mod: CPTII,S$GLB,, | Performed by: NURSE PRACTITIONER

## 2025-07-21 PROCEDURE — 1159F MED LIST DOCD IN RCRD: CPT | Mod: CPTII,S$GLB,, | Performed by: NURSE PRACTITIONER

## 2025-07-21 PROCEDURE — G2211 COMPLEX E/M VISIT ADD ON: HCPCS | Mod: S$GLB,,, | Performed by: NURSE PRACTITIONER

## 2025-07-21 PROCEDURE — 99999 PR PBB SHADOW E&M-EST. PATIENT-LVL IV: CPT | Mod: PBBFAC,,, | Performed by: NURSE PRACTITIONER

## 2025-07-21 PROCEDURE — 4010F ACE/ARB THERAPY RXD/TAKEN: CPT | Mod: CPTII,S$GLB,, | Performed by: NURSE PRACTITIONER

## 2025-07-21 PROCEDURE — 3044F HG A1C LEVEL LT 7.0%: CPT | Mod: CPTII,S$GLB,, | Performed by: NURSE PRACTITIONER

## 2025-07-21 PROCEDURE — 1101F PT FALLS ASSESS-DOCD LE1/YR: CPT | Mod: CPTII,S$GLB,, | Performed by: NURSE PRACTITIONER

## 2025-07-21 PROCEDURE — 3078F DIAST BP <80 MM HG: CPT | Mod: CPTII,S$GLB,, | Performed by: NURSE PRACTITIONER

## 2025-07-21 PROCEDURE — 3288F FALL RISK ASSESSMENT DOCD: CPT | Mod: CPTII,S$GLB,, | Performed by: NURSE PRACTITIONER

## 2025-07-21 PROCEDURE — 3077F SYST BP >= 140 MM HG: CPT | Mod: CPTII,S$GLB,, | Performed by: NURSE PRACTITIONER

## 2025-07-21 PROCEDURE — 99214 OFFICE O/P EST MOD 30 MIN: CPT | Mod: S$GLB,,, | Performed by: NURSE PRACTITIONER

## 2025-07-21 RX ORDER — CLOPIDOGREL BISULFATE 75 MG/1
75 TABLET ORAL EVERY MORNING
COMMUNITY
Start: 2025-03-28

## 2025-07-21 RX ORDER — PREGABALIN 200 MG/1
200 CAPSULE ORAL 2 TIMES DAILY
Qty: 60 CAPSULE | Refills: 2 | Status: SHIPPED | OUTPATIENT
Start: 2025-07-21

## 2025-07-21 RX ORDER — NAPROXEN SODIUM 220 MG/1
81 TABLET, FILM COATED ORAL EVERY MORNING
COMMUNITY
Start: 2025-04-22 | End: 2026-04-22

## 2025-07-29 ENCOUNTER — OFFICE VISIT (OUTPATIENT)
Dept: FAMILY MEDICINE | Facility: CLINIC | Age: 75
End: 2025-07-29
Payer: MEDICARE

## 2025-07-29 VITALS
WEIGHT: 230.5 LBS | DIASTOLIC BLOOD PRESSURE: 70 MMHG | HEART RATE: 68 BPM | OXYGEN SATURATION: 95 % | RESPIRATION RATE: 16 BRPM | HEIGHT: 65 IN | TEMPERATURE: 98 F | BODY MASS INDEX: 38.4 KG/M2 | SYSTOLIC BLOOD PRESSURE: 136 MMHG

## 2025-07-29 DIAGNOSIS — I48.91 NEW ONSET A-FIB: ICD-10-CM

## 2025-07-29 DIAGNOSIS — I35.0 NONRHEUMATIC AORTIC VALVE STENOSIS: ICD-10-CM

## 2025-07-29 DIAGNOSIS — Z98.890 S/P CAROTID ENDARTERECTOMY: ICD-10-CM

## 2025-07-29 DIAGNOSIS — E66.01 SEVERE OBESITY (BMI 35.0-39.9) WITH COMORBIDITY: ICD-10-CM

## 2025-07-29 DIAGNOSIS — I10 HYPERTENSION, UNSPECIFIED TYPE: Primary | ICD-10-CM

## 2025-07-29 DIAGNOSIS — E78.2 MIXED HYPERLIPIDEMIA: ICD-10-CM

## 2025-07-29 DIAGNOSIS — N18.31 STAGE 3A CHRONIC KIDNEY DISEASE: ICD-10-CM

## 2025-07-29 DIAGNOSIS — M54.16 LUMBAR RADICULOPATHY: ICD-10-CM

## 2025-07-29 DIAGNOSIS — R73.03 PREDIABETES: ICD-10-CM

## 2025-07-29 PROCEDURE — 4010F ACE/ARB THERAPY RXD/TAKEN: CPT | Mod: CPTII,S$GLB,, | Performed by: FAMILY MEDICINE

## 2025-07-29 PROCEDURE — 3078F DIAST BP <80 MM HG: CPT | Mod: CPTII,S$GLB,, | Performed by: FAMILY MEDICINE

## 2025-07-29 PROCEDURE — 99214 OFFICE O/P EST MOD 30 MIN: CPT | Mod: S$GLB,,, | Performed by: FAMILY MEDICINE

## 2025-07-29 PROCEDURE — 1101F PT FALLS ASSESS-DOCD LE1/YR: CPT | Mod: CPTII,S$GLB,, | Performed by: FAMILY MEDICINE

## 2025-07-29 PROCEDURE — 3008F BODY MASS INDEX DOCD: CPT | Mod: CPTII,S$GLB,, | Performed by: FAMILY MEDICINE

## 2025-07-29 PROCEDURE — G2211 COMPLEX E/M VISIT ADD ON: HCPCS | Mod: S$GLB,,, | Performed by: FAMILY MEDICINE

## 2025-07-29 PROCEDURE — 99999 PR PBB SHADOW E&M-EST. PATIENT-LVL V: CPT | Mod: PBBFAC,,, | Performed by: FAMILY MEDICINE

## 2025-07-29 PROCEDURE — 3075F SYST BP GE 130 - 139MM HG: CPT | Mod: CPTII,S$GLB,, | Performed by: FAMILY MEDICINE

## 2025-07-29 PROCEDURE — 3288F FALL RISK ASSESSMENT DOCD: CPT | Mod: CPTII,S$GLB,, | Performed by: FAMILY MEDICINE

## 2025-07-29 PROCEDURE — 3044F HG A1C LEVEL LT 7.0%: CPT | Mod: CPTII,S$GLB,, | Performed by: FAMILY MEDICINE

## 2025-07-29 PROCEDURE — 1159F MED LIST DOCD IN RCRD: CPT | Mod: CPTII,S$GLB,, | Performed by: FAMILY MEDICINE

## 2025-07-29 PROCEDURE — 1125F AMNT PAIN NOTED PAIN PRSNT: CPT | Mod: CPTII,S$GLB,, | Performed by: FAMILY MEDICINE

## 2025-07-29 NOTE — PROGRESS NOTES
"Subjective:       Patient ID: Quintin Ling is a 75 y.o. male.    Chief Complaint: Hypertension (6 month f/u )      HPI Comments:     Current Medications[1]      Last seen by me about 4 months ago    Followed by pain management for lumbar radiculopathy.  Recently has Lyrica increase to 100 mg b.i.d. considering another steroid injection    Bilateral carotid stenosis.  Left side CEA recently..  Moderate aortic stenosis    Chronic right knee pain.  Does not want to see anybody for now.  Takes Tylenol    Weight is about the same    Review of Systems   Constitutional:  Negative for activity change, appetite change and fever.   HENT:  Negative for sore throat.    Respiratory:  Negative for cough and shortness of breath.    Cardiovascular:  Negative for chest pain.   Gastrointestinal:  Negative for abdominal pain, diarrhea and nausea.   Genitourinary:  Negative for difficulty urinating.   Musculoskeletal:  Positive for arthralgias and back pain. Negative for myalgias.   Neurological:  Negative for dizziness and headaches.       Objective:      Vitals:    07/29/25 0925 07/29/25 0934   BP: (!) 142/70 136/70   Pulse: 68    Resp: 16    Temp: 97.6 °F (36.4 °C)    TempSrc: Tympanic    SpO2: 95%    Weight: 104.5 kg (230 lb 7.9 oz)    Height: 5' 5" (1.651 m)    PainSc:   6    PainLoc: Back      Physical Exam  Vitals and nursing note reviewed.   Constitutional:       General: He is not in acute distress.     Appearance: He is well-developed. He is not diaphoretic.   HENT:      Head: Normocephalic.   Neck:      Thyroid: No thyromegaly.   Cardiovascular:      Rate and Rhythm: Normal rate and regular rhythm.      Heart sounds: Murmur heard.      Systolic murmur is present with a grade of 2/6.   Pulmonary:      Effort: Pulmonary effort is normal.      Breath sounds: Normal breath sounds. No wheezing or rales.   Abdominal:      General: There is no distension.      Palpations: Abdomen is soft.   Musculoskeletal:      Cervical back: " Neck supple.   Lymphadenopathy:      Cervical: No cervical adenopathy.   Skin:     General: Skin is warm and dry.   Neurological:      Mental Status: He is alert and oriented to person, place, and time.   Psychiatric:         Mood and Affect: Mood normal.         Behavior: Behavior normal.         Thought Content: Thought content normal.         Judgment: Judgment normal.         Assessment:       1. Hypertension, unspecified type    2. New onset a-fib    3. Stage 3a chronic kidney disease    4. Mixed hyperlipidemia    5. Prediabetes    6. Severe obesity (BMI 35.0-39.9) with comorbidity    7. Lumbar radiculopathy    8. S/P carotid endarterectomy    9. Nonrheumatic aortic valve stenosis        Plan:   Hypertension, unspecified type  Comments:  Controlled.  Follow-up 6 months    New onset a-fib  Comments:  On aspirin and Plavix    Stage 3a chronic kidney disease  Comments:  Stable creatinine    Mixed hyperlipidemia  Comments:  LDL 58    Prediabetes  Comments:  A1c 5.3    Severe obesity (BMI 35.0-39.9) with comorbidity  Comments:  No change in weight    Lumbar radiculopathy  Comments:  Followed by pain management.  Recent increase in Lyrica dose is helping some    S/P carotid endarterectomy  Comments:  Left CEA last year.  Recent Doppler unchanged:  Right 60-79%, left 40-59%.  Compliant with aspirin, Plavix, statin    Nonrheumatic aortic valve stenosis  Comments:  Moderate.  Echo later this year.  Murmur present                 [1]   Current Outpatient Medications:     amLODIPine (NORVASC) 10 MG tablet, TAKE ONE Tablet BY MOUTH ONCE DAILY, Disp: 30 tablet, Rfl: 11    aspirin 81 MG Chew, Take 1 tablet (81 mg total) by mouth once daily., Disp: , Rfl:     aspirin 81 MG Chew, Take 81 mg by mouth every morning., Disp: , Rfl:     atorvastatin (LIPITOR) 20 MG tablet, Take 1 tablet (20 mg total) by mouth once daily., Disp: 90 tablet, Rfl: 1    clopidogreL (PLAVIX) 75 mg tablet, Take 1 tablet (75 mg total) by mouth once daily.,  Disp: 90 tablet, Rfl: 3    clopidogreL (PLAVIX) 75 mg tablet, Take 75 mg by mouth every morning., Disp: , Rfl:     fenofibrate (TRICOR) 54 MG tablet, Take 1 tablet (54 mg total) by mouth once daily., Disp: 90 tablet, Rfl: 0    FREESTYLE FREEDOM LITE kit, by Other route., Disp: , Rfl:     FREESTYLE LANCETS 28 gauge lancets, Apply 28 lancets topically 3 (three) times daily., Disp: , Rfl:     FREESTYLE LITE STRIPS Strp, Inject 300 strips as directed as needed., Disp: , Rfl:     hydrALAZINE (APRESOLINE) 25 MG tablet, Take 1 tablet (25 mg total) by mouth every 12 (twelve) hours., Disp: 60 tablet, Rfl: 11    metoprolol succinate (TOPROL-XL) 25 MG 24 hr tablet, Take 1 tablet (25 mg total) by mouth 2 (two) times daily., Disp: 200 tablet, Rfl: 0    olmesartan (BENICAR) 40 MG tablet, Take 1 tablet (40 mg total) by mouth once daily., Disp: 90 tablet, Rfl: 3    pregabalin (LYRICA) 200 MG Cap, Take 1 capsule (200 mg total) by mouth 2 (two) times daily., Disp: 60 capsule, Rfl: 2    fenofibrate (TRICOR) 48 MG tablet, Take 1 tablet (48 mg total) by mouth once daily., Disp: 90 tablet, Rfl: 3    metFORMIN (GLUCOPHAGE) 500 MG tablet, Take 1 tablet (500 mg total) by mouth 2 (two) times daily with meals., Disp: 180 tablet, Rfl: 1

## 2025-07-30 ENCOUNTER — TELEPHONE (OUTPATIENT)
Dept: FAMILY MEDICINE | Facility: CLINIC | Age: 75
End: 2025-07-30
Payer: MEDICARE

## 2025-07-30 NOTE — TELEPHONE ENCOUNTER
Copied from CRM #[id number]. Topic: Medications - Medication Refill  >> Jul 30, 2025  9:11 AM Aubrie wrote:  .Type:  RX Refill Request    Who Called:  Quintin   Refill or New Rx: Refill   RX Name and Strength: ferrous sulfate 325mg  How is the patient currently taking it? (ex. 1XDay): 1 x day  Is this a 30 day or 90 day RX: 90 day   Preferred Pharmacy with phone number: [facility]   Fax:  [phone]  Local or Mail Order: Mail Order   Ordering Provider: Bishop Gabriel   Would the patient rather a call back or a response via [facility]?  My Chart   Best Call Back Number:   Additional Information:  pt states he has a week supply left

## 2025-08-01 RX ORDER — FERROUS SULFATE 325(65) MG
325 TABLET ORAL
Qty: 90 TABLET | Refills: 3 | Status: SHIPPED | OUTPATIENT
Start: 2025-08-01

## 2025-08-04 ENCOUNTER — PATIENT OUTREACH (OUTPATIENT)
Dept: ADMINISTRATIVE | Facility: HOSPITAL | Age: 75
End: 2025-08-04
Payer: MEDICARE

## 2025-08-04 ENCOUNTER — TELEPHONE (OUTPATIENT)
Dept: FAMILY MEDICINE | Facility: CLINIC | Age: 75
End: 2025-08-04
Payer: MEDICARE

## 2025-08-05 DIAGNOSIS — E78.2 MIXED HYPERLIPIDEMIA: ICD-10-CM

## 2025-08-05 DIAGNOSIS — I48.92 ATRIAL FIB/FLUTTER, TRANSIENT: ICD-10-CM

## 2025-08-05 DIAGNOSIS — I48.91 ATRIAL FIB/FLUTTER, TRANSIENT: ICD-10-CM

## 2025-08-05 NOTE — TELEPHONE ENCOUNTER
Care Due:                  Date            Visit Type   Department     Provider  --------------------------------------------------------------------------------                                EP -                              PRIMARY      [facility] INTERNAL  Last Visit: 07-      CARE ([facility])   KERI Gabriel                              [facility]                              PRIMARY      [facility] INTERNAL  Next Visit: 01-      CARE ([facility])   KERI Gabriel                                                            Last  Test          Frequency    Reason                     Performed    Due Date  --------------------------------------------------------------------------------    HBA1C.......  6 months...  metFORMIN................  01- 07-    Lipid Panel.  12 months..  atorvastatin, fenofibrate  08- 08-    [employer] Embedded Care Due Messages. Reference number: [id number].   8/05/2025 4:18:48 PM CDT

## 2025-08-05 NOTE — TELEPHONE ENCOUNTER
Copied from CRM #[id number]. Topic: Medications - Medication Refill  >> Aug 5, 2025  3:52 PM Mirna wrote:  Type:  RX Refill Request    Who Called: Quintin  Refill or New Rx:Refill  RX Name and Strength:atorvastatin (LIPITOR) 20 MG tablet  How is the patient currently taking it? (ex. 1XDay):  Is this a 30 day or 90 day RX:  Preferred Pharmacy with phone number:   [facility]-[id number]  Local or Mail Order:Mail Order  Ordering Provider:Bishop Gabriel  Would the patient rather a call back or a response via [facility]? Callback  Best Call Back Number:  Additional Information: Need refills and all other medication sent to above pharmacy         Type:  RX Refill Request    Who Called: Quintin  Refill or New Rx:Refill  RX Name and Strength:metoprolol succinate (TOPROL-XL) 25 MG 24 hr tablet  How is the patient currently taking it? (ex. 1XDay):  Is this a 30 day or 90 day RX:  Preferred Pharmacy with phone number:   [facility]-[phone]  Local or Mail Order:Mail Order  Ordering Provider:Bishop Gabriel  Would the patient rather a call back or a response via [facility]? Callback  Best Call Back Number:  Additional Information: Need refills and all other medication sent to above pharmacy         Type:  RX Refill Request    Who Called: Quintin  Refill or New Rx:Refill  RX Name and Strength:metFORMIN (GLUCOPHAGE) 500 MG tablet  How is the patient currently taking it? (ex. 1XDay):  Is this a 30 day or 90 day RX:  Preferred Pharmacy with phone number:   [email or web address]-[id number]  Local or Mail Order:Mail Order  Ordering Provider: Bishop Gabriel  Would the patient rather a call back or a response via [facility]? Callback  Best Call Back Number:  Additional Information: Need refills and all other medication sent to above pharmacy

## 2025-08-06 RX ORDER — METFORMIN HYDROCHLORIDE 500 MG/1
500 TABLET ORAL 2 TIMES DAILY WITH MEALS
Qty: 180 TABLET | Refills: 1 | Status: SHIPPED | OUTPATIENT
Start: 2025-08-06 | End: 2025-11-14

## 2025-08-06 RX ORDER — ATORVASTATIN CALCIUM 20 MG/1
20 TABLET, FILM COATED ORAL DAILY
Qty: 90 TABLET | Refills: 1 | Status: SHIPPED | OUTPATIENT
Start: 2025-08-06

## 2025-08-06 RX ORDER — METOPROLOL SUCCINATE 25 MG/1
25 TABLET, EXTENDED RELEASE ORAL 2 TIMES DAILY
Qty: 200 TABLET | Refills: 0 | Status: SHIPPED | OUTPATIENT
Start: 2025-08-06 | End: 2025-11-14

## 2025-08-10 RX ORDER — FERROUS SULFATE 325(65) MG
325 TABLET ORAL
Qty: 90 TABLET | Refills: 3 | OUTPATIENT
Start: 2025-08-10

## 2025-08-14 ENCOUNTER — TELEPHONE (OUTPATIENT)
Dept: PAIN MEDICINE | Facility: CLINIC | Age: 75
End: 2025-08-14
Payer: MEDICARE

## 2025-08-15 ENCOUNTER — TELEPHONE (OUTPATIENT)
Dept: PAIN MEDICINE | Facility: CLINIC | Age: 75
End: 2025-08-15
Payer: MEDICARE

## 2025-08-21 ENCOUNTER — TELEPHONE (OUTPATIENT)
Dept: FAMILY MEDICINE | Facility: CLINIC | Age: 75
End: 2025-08-21
Payer: MEDICARE

## (undated) DEVICE — CLIP LIGATING TITANIUM SMALL

## (undated) DEVICE — NDL PNEUMO INSUFFLATI 120MM

## (undated) DEVICE — CUTTER PROXIMATE BLUE 75MM

## (undated) DEVICE — BAG TISSUE RETRIEVAL 5MM

## (undated) DEVICE — DRAPE THREE-QTR REINF 53X77IN

## (undated) DEVICE — SUT VICRYL 3-0 27 SH

## (undated) DEVICE — DRAPE STERI LONG

## (undated) DEVICE — OBTURATOR BLADELESS 8MM XI CLR

## (undated) DEVICE — ADHESIVE DERMABOND ADVANCED

## (undated) DEVICE — NDL ECLIPSE SAFETY 23G 1.5IN

## (undated) DEVICE — MANIFOLD 4 PORT

## (undated) DEVICE — SET TRI-LUMEN FILTERED TUBE

## (undated) DEVICE — SUT 1 48IN PDS II VIO MONO

## (undated) DEVICE — COVER LIGHT HANDLE 80/CA

## (undated) DEVICE — Device

## (undated) DEVICE — GLOVE SENSICARE PI GRN 7

## (undated) DEVICE — SOL NORMAL USPCA 0.9%

## (undated) DEVICE — SPONGE COTTON TRAY 4X4IN

## (undated) DEVICE — GOWN POLY REINF BRTH SLV XL

## (undated) DEVICE — DRAPE INSTR MAGNETIC 10X16IN

## (undated) DEVICE — SUT CTD VICRYL 2-0 UND BR

## (undated) DEVICE — SUT PDS II 0 CT-1 VIL MONO

## (undated) DEVICE — DRAPE T TRNSVRS LAP 102X78X121

## (undated) DEVICE — GLOVE SIGNATURE ESSNTL LTX 7

## (undated) DEVICE — SEALER VESSEL EXTEND

## (undated) DEVICE — BAG POSTOP W/ACCESS CUT TO FIT

## (undated) DEVICE — APPLICATOR CHLORAPREP ORN 26ML

## (undated) DEVICE — CLIP HEMO-LOK MLX LARGE LF

## (undated) DEVICE — TROCAR ENDOPATH XCEL 8MM 10CM

## (undated) DEVICE — NDL ECLIPSE SAF REG 25GX1.5IN

## (undated) DEVICE — GLOVE SENSICARE PI GRN 6.5

## (undated) DEVICE — SYR 3CC LUER LOC

## (undated) DEVICE — PACK BASIC SETUP SC BR

## (undated) DEVICE — TOWEL OR DISP STRL BLUE 4/PK

## (undated) DEVICE — SYR 1CC TB SG 27GX1/2

## (undated) DEVICE — SUT PROLENE 6-0 BV-1 30IN

## (undated) DEVICE — SEALER LIGASURE IMPACT 18CM

## (undated) DEVICE — SUT MONOCRYL 4-0 PS-1 UND

## (undated) DEVICE — TUBING MEDI-VAC 20FT .25IN

## (undated) DEVICE — BOOT SUTURE AID

## (undated) DEVICE — SYR LUER LOCK STERILE 10ML

## (undated) DEVICE — TRAY CATH FOL SIL URIMTR 16FR

## (undated) DEVICE — DEVICE CLOSURE DISP 14G

## (undated) DEVICE — LOOP VESSEL YELLOW MAXI

## (undated) DEVICE — SOL NACL IRR 1000ML BTL

## (undated) DEVICE — PORT ACCESS 5MM W/120MM

## (undated) DEVICE — SET EXTENSION STERILE 30IN

## (undated) DEVICE — STAPLER SKIN PROXIMATE WIDE

## (undated) DEVICE — LOOP VESSEL MAXI RED

## (undated) DEVICE — ELECTRODE REM PLYHSV RETURN 9

## (undated) DEVICE — SUT CTD VICRYL 0 UND BR SUT

## (undated) DEVICE — BINDER ABDOMINAL 10IN 27-48IN

## (undated) DEVICE — COVER PROXIMA MAYO STAND

## (undated) DEVICE — CLIP LIGACLIP XTRA TITANIUM

## (undated) DEVICE — PAD PINK TRENDELENBURG POS XL

## (undated) DEVICE — CANNULA SEAL 12MM

## (undated) DEVICE — SUT VICRYL+ 27 UR-6 VIOL

## (undated) DEVICE — SEAL UNIVERSAL 5MM-8MM XI

## (undated) DEVICE — SYR 30CC LUER LOCK

## (undated) DEVICE — CONTAINER SPECIMEN OR STER 4OZ

## (undated) DEVICE — DRAPE COLUMN DAVINCI XI

## (undated) DEVICE — SET PNEUMOCLEAR HEAT HUM SE HF

## (undated) DEVICE — SUT MONOCRYL 4.0 PS2 CP496G

## (undated) DEVICE — DRAPE THYROID SOFT STERILE

## (undated) DEVICE — RELOAD PROXIMATE CUT BLUE 75MM

## (undated) DEVICE — PACK DRAPE PERI/GYN TIBURON

## (undated) DEVICE — PENCIL ELECTROCAUTERY W/ HLSTR

## (undated) DEVICE — SPONGE LAP 18X18 PREWASHED

## (undated) DEVICE — TIP GRASPER FENESTRATED DISP

## (undated) DEVICE — SUT 4/0 27IN PDS II VIO MO

## (undated) DEVICE — KIT SHUNT ARTERY 6: Type: IMPLANTABLE DEVICE | Site: CAROTID | Status: NON-FUNCTIONAL

## (undated) DEVICE — SUT VLOC 90 3-0 V-20 NDL 6

## (undated) DEVICE — KIT ANTIFOG W/SPONG & FLUID

## (undated) DEVICE — GLOVE SENSICARE PI ALOE 6.5

## (undated) DEVICE — DRESSING TRANS 4X4 TEGADERM

## (undated) DEVICE — SOL NACL 0.9% INJ 500ML BG

## (undated) DEVICE — SUT VICRYL PLUS 3-0 SH 18IN

## (undated) DEVICE — SUT SILK 3-0 STRANDS 30IN

## (undated) DEVICE — SUT 1 36IN PDS II VIO MONO

## (undated) DEVICE — SUT PROLENE 6-0 BV-1

## (undated) DEVICE — COVER TIP CURVED SCISSORS XI

## (undated) DEVICE — DRESSING TELFA N ADH 3X8

## (undated) DEVICE — DRAPE LAPSCP CHOLE 122X102X78

## (undated) DEVICE — DRAPE ARM DAVINCI XI

## (undated) DEVICE — TOWEL COTTON SURG WHT 27X17IN

## (undated) DEVICE — SUT SILK 2-0 STRANDS 30IN

## (undated) DEVICE — PENCIL ROCKER SWITCH 10FT CORD

## (undated) DEVICE — GAUZE SPONGE PEANUT STRL

## (undated) DEVICE — TUBING PNEUMO

## (undated) DEVICE — CANNULA REDUCER 12-8MM

## (undated) DEVICE — DRAPE INCISE IOBAN 2 13X13IN

## (undated) DEVICE — CATH IV 20GAX1.25 JELCO

## (undated) DEVICE — SUT MCRYL PLUS 4-0 PS2 27IN

## (undated) DEVICE — SYR ONLY LUER LOCK 20CC

## (undated) DEVICE — DRAPE CORETEMP FLD WRM 56X62IN

## (undated) DEVICE — SUT VICRYL 2-0 27 CT-1

## (undated) DEVICE — LOOP VESSEL BLUE MINI 2/CARD

## (undated) DEVICE — HEMOSTAT SURGICEL 4X8IN

## (undated) DEVICE — BLADE SCALP OPHTL RND TIP